# Patient Record
Sex: FEMALE | Race: WHITE | NOT HISPANIC OR LATINO | Employment: OTHER | URBAN - NONMETROPOLITAN AREA
[De-identification: names, ages, dates, MRNs, and addresses within clinical notes are randomized per-mention and may not be internally consistent; named-entity substitution may affect disease eponyms.]

---

## 2017-02-03 DIAGNOSIS — I10 ESSENTIAL HYPERTENSION: ICD-10-CM

## 2017-02-03 RX ORDER — ACEBUTOLOL HYDROCHLORIDE 200 MG/1
200 CAPSULE ORAL 2 TIMES DAILY
Qty: 60 CAPSULE | Refills: 5 | Status: SHIPPED | OUTPATIENT
Start: 2017-02-03

## 2019-05-08 ENCOUNTER — HOSPITAL ENCOUNTER (OUTPATIENT)
Facility: HOSPITAL | Age: 79
Discharge: HOME | End: 2019-05-10
Attending: EMERGENCY MEDICINE | Admitting: SPECIALIST

## 2019-05-08 ENCOUNTER — APPOINTMENT (OUTPATIENT)
Dept: CT IMAGING | Facility: HOSPITAL | Age: 79
End: 2019-05-08

## 2019-05-08 ENCOUNTER — APPOINTMENT (OUTPATIENT)
Dept: GENERAL RADIOLOGY | Facility: HOSPITAL | Age: 79
End: 2019-05-08

## 2019-05-08 DIAGNOSIS — K81.9 CHOLECYSTITIS: Primary | ICD-10-CM

## 2019-05-08 LAB
ALBUMIN SERPL-MCNC: 5 G/DL (ref 3.5–5)
ALBUMIN/GLOB SERPL: 1.6 G/DL (ref 1.1–2.5)
ALP SERPL-CCNC: 54 U/L (ref 24–120)
ALT SERPL W P-5'-P-CCNC: 19 U/L (ref 0–54)
ANION GAP SERPL CALCULATED.3IONS-SCNC: 13 MMOL/L (ref 4–13)
APTT PPP: 25.3 SECONDS (ref 24.1–35)
AST SERPL-CCNC: 71 U/L (ref 7–45)
BACTERIA UR QL AUTO: ABNORMAL /HPF
BASOPHILS # BLD AUTO: 0.01 10*3/MM3 (ref 0–0.2)
BASOPHILS NFR BLD AUTO: 0.1 % (ref 0–2)
BILIRUB SERPL-MCNC: 0.8 MG/DL (ref 0.1–1)
BILIRUB UR QL STRIP: NEGATIVE
BUN BLD-MCNC: 18 MG/DL (ref 5–21)
BUN/CREAT SERPL: 31.6 (ref 7–25)
CALCIUM SPEC-SCNC: 9.7 MG/DL (ref 8.4–10.4)
CHLORIDE SERPL-SCNC: 96 MMOL/L (ref 98–110)
CK MB SERPL-CCNC: 8.1 NG/ML (ref 0–5)
CK MB SERPL-RTO: 0.9 % (ref 0–5.7)
CK SERPL-CCNC: 925 U/L (ref 0–203)
CLARITY UR: CLEAR
CO2 SERPL-SCNC: 24 MMOL/L (ref 24–31)
COLOR UR: YELLOW
CREAT BLD-MCNC: 0.57 MG/DL (ref 0.5–1.4)
DEPRECATED RDW RBC AUTO: 45.3 FL (ref 40–54)
EOSINOPHIL # BLD AUTO: 0 10*3/MM3 (ref 0–0.7)
EOSINOPHIL NFR BLD AUTO: 0 % (ref 0–4)
ERYTHROCYTE [DISTWIDTH] IN BLOOD BY AUTOMATED COUNT: 12.6 % (ref 12–15)
GFR SERPL CREATININE-BSD FRML MDRD: 103 ML/MIN/1.73
GLOBULIN UR ELPH-MCNC: 3.1 GM/DL
GLUCOSE BLD-MCNC: 109 MG/DL (ref 70–100)
GLUCOSE BLDC GLUCOMTR-MCNC: 117 MG/DL (ref 70–130)
GLUCOSE UR STRIP-MCNC: NEGATIVE MG/DL
HCT VFR BLD AUTO: 40 % (ref 37–47)
HGB BLD-MCNC: 14.2 G/DL (ref 12–16)
HGB UR QL STRIP.AUTO: ABNORMAL
HOLD SPECIMEN: NORMAL
HOLD SPECIMEN: NORMAL
IMM GRANULOCYTES # BLD AUTO: 0.05 10*3/MM3 (ref 0–0.05)
IMM GRANULOCYTES NFR BLD AUTO: 0.4 % (ref 0–5)
INR PPP: 1.06 (ref 0.91–1.09)
KETONES UR QL STRIP: NEGATIVE
LEUKOCYTE ESTERASE UR QL STRIP.AUTO: NEGATIVE
LIPASE SERPL-CCNC: 59 U/L (ref 23–203)
LYMPHOCYTES # BLD AUTO: 1.07 10*3/MM3 (ref 0.72–4.86)
LYMPHOCYTES NFR BLD AUTO: 9.5 % (ref 15–45)
MCH RBC QN AUTO: 34.5 PG (ref 28–32)
MCHC RBC AUTO-ENTMCNC: 35.5 G/DL (ref 33–36)
MCV RBC AUTO: 97.1 FL (ref 82–98)
MONOCYTES # BLD AUTO: 0.4 10*3/MM3 (ref 0.19–1.3)
MONOCYTES NFR BLD AUTO: 3.5 % (ref 4–12)
NEUTROPHILS # BLD AUTO: 9.77 10*3/MM3 (ref 1.87–8.4)
NEUTROPHILS NFR BLD AUTO: 86.5 % (ref 39–78)
NITRITE UR QL STRIP: NEGATIVE
NRBC BLD AUTO-RTO: 0 /100 WBC (ref 0–0.2)
PH UR STRIP.AUTO: 7 [PH] (ref 5–8)
PLATELET # BLD AUTO: 233 10*3/MM3 (ref 130–400)
PMV BLD AUTO: 10.1 FL (ref 6–12)
POTASSIUM BLD-SCNC: 4.8 MMOL/L (ref 3.5–5.3)
PROT SERPL-MCNC: 8.1 G/DL (ref 6.3–8.7)
PROT UR QL STRIP: NEGATIVE
PROTHROMBIN TIME: 14.1 SECONDS (ref 11.9–14.6)
RBC # BLD AUTO: 4.12 10*6/MM3 (ref 4.2–5.4)
RBC # UR: ABNORMAL /HPF
REF LAB TEST METHOD: ABNORMAL
SODIUM BLD-SCNC: 133 MMOL/L (ref 135–145)
SP GR UR STRIP: 1.02 (ref 1–1.03)
SQUAMOUS #/AREA URNS HPF: ABNORMAL /HPF
TROPONIN I SERPL-MCNC: 0.02 NG/ML (ref 0–0.03)
UROBILINOGEN UR QL STRIP: ABNORMAL
WBC NRBC COR # BLD: 11.3 10*3/MM3 (ref 4.8–10.8)
WBC UR QL AUTO: ABNORMAL /HPF
WHOLE BLOOD HOLD SPECIMEN: NORMAL
WHOLE BLOOD HOLD SPECIMEN: NORMAL

## 2019-05-08 PROCEDURE — 96375 TX/PRO/DX INJ NEW DRUG ADDON: CPT

## 2019-05-08 PROCEDURE — 80053 COMPREHEN METABOLIC PANEL: CPT | Performed by: EMERGENCY MEDICINE

## 2019-05-08 PROCEDURE — 82550 ASSAY OF CK (CPK): CPT | Performed by: EMERGENCY MEDICINE

## 2019-05-08 PROCEDURE — 84484 ASSAY OF TROPONIN QUANT: CPT | Performed by: EMERGENCY MEDICINE

## 2019-05-08 PROCEDURE — 85025 COMPLETE CBC W/AUTO DIFF WBC: CPT | Performed by: EMERGENCY MEDICINE

## 2019-05-08 PROCEDURE — 93010 ELECTROCARDIOGRAM REPORT: CPT | Performed by: INTERNAL MEDICINE

## 2019-05-08 PROCEDURE — 93005 ELECTROCARDIOGRAM TRACING: CPT | Performed by: EMERGENCY MEDICINE

## 2019-05-08 PROCEDURE — 85730 THROMBOPLASTIN TIME PARTIAL: CPT | Performed by: EMERGENCY MEDICINE

## 2019-05-08 PROCEDURE — 71045 X-RAY EXAM CHEST 1 VIEW: CPT

## 2019-05-08 PROCEDURE — 25010000002 METOCLOPRAMIDE PER 10 MG: Performed by: EMERGENCY MEDICINE

## 2019-05-08 PROCEDURE — 70450 CT HEAD/BRAIN W/O DYE: CPT

## 2019-05-08 PROCEDURE — 82962 GLUCOSE BLOOD TEST: CPT

## 2019-05-08 PROCEDURE — 25010000002 ONDANSETRON PER 1 MG: Performed by: EMERGENCY MEDICINE

## 2019-05-08 PROCEDURE — 81001 URINALYSIS AUTO W/SCOPE: CPT | Performed by: EMERGENCY MEDICINE

## 2019-05-08 PROCEDURE — 83690 ASSAY OF LIPASE: CPT | Performed by: EMERGENCY MEDICINE

## 2019-05-08 PROCEDURE — 25010000002 DIPHENHYDRAMINE PER 50 MG: Performed by: EMERGENCY MEDICINE

## 2019-05-08 PROCEDURE — 83735 ASSAY OF MAGNESIUM: CPT | Performed by: EMERGENCY MEDICINE

## 2019-05-08 PROCEDURE — 85610 PROTHROMBIN TIME: CPT | Performed by: EMERGENCY MEDICINE

## 2019-05-08 PROCEDURE — 82553 CREATINE MB FRACTION: CPT | Performed by: EMERGENCY MEDICINE

## 2019-05-08 PROCEDURE — 99284 EMERGENCY DEPT VISIT MOD MDM: CPT

## 2019-05-08 RX ORDER — METOCLOPRAMIDE HYDROCHLORIDE 5 MG/ML
10 INJECTION INTRAMUSCULAR; INTRAVENOUS ONCE
Status: COMPLETED | OUTPATIENT
Start: 2019-05-08 | End: 2019-05-08

## 2019-05-08 RX ORDER — METOCLOPRAMIDE 5 MG/1
5 TABLET ORAL 2 TIMES DAILY
COMMUNITY
End: 2021-04-09 | Stop reason: HOSPADM

## 2019-05-08 RX ORDER — ACEBUTOLOL HYDROCHLORIDE 200 MG/1
200 CAPSULE ORAL 2 TIMES DAILY
Status: ON HOLD | COMMUNITY
End: 2023-02-03

## 2019-05-08 RX ORDER — MELATONIN
10000 DAILY
COMMUNITY
End: 2022-05-10 | Stop reason: HOSPADM

## 2019-05-08 RX ORDER — PROMETHAZINE HYDROCHLORIDE 12.5 MG/1
12.5 TABLET ORAL EVERY 6 HOURS PRN
COMMUNITY
End: 2019-12-08

## 2019-05-08 RX ORDER — BUTALBITAL, ACETAMINOPHEN AND CAFFEINE 50; 325; 40 MG/1; MG/1; MG/1
1 TABLET ORAL EVERY 4 HOURS PRN
COMMUNITY
End: 2021-04-09 | Stop reason: HOSPADM

## 2019-05-08 RX ORDER — LORAZEPAM 1 MG/1
1 TABLET ORAL EVERY 8 HOURS PRN
COMMUNITY
End: 2019-12-08

## 2019-05-08 RX ORDER — FENOFIBRATE 145 MG/1
145 TABLET, COATED ORAL DAILY
Status: ON HOLD | COMMUNITY
End: 2023-01-09

## 2019-05-08 RX ORDER — ONDANSETRON 2 MG/ML
4 INJECTION INTRAMUSCULAR; INTRAVENOUS ONCE
Status: COMPLETED | OUTPATIENT
Start: 2019-05-08 | End: 2019-05-08

## 2019-05-08 RX ORDER — ASPIRIN 81 MG/1
81 TABLET, CHEWABLE ORAL DAILY
COMMUNITY
End: 2022-05-10 | Stop reason: HOSPADM

## 2019-05-08 RX ORDER — PAROXETINE HYDROCHLORIDE 40 MG/1
40 TABLET, FILM COATED ORAL EVERY MORNING
Status: ON HOLD | COMMUNITY
End: 2023-02-03

## 2019-05-08 RX ORDER — DIPHENHYDRAMINE HYDROCHLORIDE 50 MG/ML
25 INJECTION INTRAMUSCULAR; INTRAVENOUS ONCE
Status: COMPLETED | OUTPATIENT
Start: 2019-05-08 | End: 2019-05-08

## 2019-05-08 RX ADMIN — SODIUM CHLORIDE 500 ML: 9 INJECTION, SOLUTION INTRAVENOUS at 23:00

## 2019-05-08 RX ADMIN — DIPHENHYDRAMINE HYDROCHLORIDE 25 MG: 50 INJECTION, SOLUTION INTRAMUSCULAR; INTRAVENOUS at 21:31

## 2019-05-08 RX ADMIN — METOCLOPRAMIDE 10 MG: 5 INJECTION, SOLUTION INTRAMUSCULAR; INTRAVENOUS at 23:16

## 2019-05-08 RX ADMIN — ONDANSETRON HYDROCHLORIDE 4 MG: 2 INJECTION, SOLUTION INTRAMUSCULAR; INTRAVENOUS at 21:29

## 2019-05-09 ENCOUNTER — APPOINTMENT (OUTPATIENT)
Dept: ULTRASOUND IMAGING | Facility: HOSPITAL | Age: 79
End: 2019-05-09

## 2019-05-09 ENCOUNTER — APPOINTMENT (OUTPATIENT)
Dept: GENERAL RADIOLOGY | Facility: HOSPITAL | Age: 79
End: 2019-05-09

## 2019-05-09 ENCOUNTER — ANESTHESIA EVENT (OUTPATIENT)
Dept: PERIOP | Facility: HOSPITAL | Age: 79
End: 2019-05-09

## 2019-05-09 ENCOUNTER — APPOINTMENT (OUTPATIENT)
Dept: CT IMAGING | Facility: HOSPITAL | Age: 79
End: 2019-05-09

## 2019-05-09 ENCOUNTER — ANESTHESIA (OUTPATIENT)
Dept: PERIOP | Facility: HOSPITAL | Age: 79
End: 2019-05-09

## 2019-05-09 PROBLEM — K81.9 CHOLECYSTITIS: Status: ACTIVE | Noted: 2019-05-09

## 2019-05-09 LAB
MAGNESIUM SERPL-MCNC: 1.8 MG/DL (ref 1.4–2.2)
TROPONIN I SERPL-MCNC: 0.02 NG/ML (ref 0–0.03)

## 2019-05-09 PROCEDURE — C1726 CATH, BAL DIL, NON-VASCULAR: HCPCS | Performed by: SPECIALIST

## 2019-05-09 PROCEDURE — 76705 ECHO EXAM OF ABDOMEN: CPT

## 2019-05-09 PROCEDURE — A9270 NON-COVERED ITEM OR SERVICE: HCPCS | Performed by: SPECIALIST

## 2019-05-09 PROCEDURE — 25010000002 ONDANSETRON PER 1 MG: Performed by: SPECIALIST

## 2019-05-09 PROCEDURE — 25010000002 IOPAMIDOL 61 % SOLUTION: Performed by: EMERGENCY MEDICINE

## 2019-05-09 PROCEDURE — 25010000002 SUCCINYLCHOLINE PER 20 MG: Performed by: NURSE ANESTHETIST, CERTIFIED REGISTERED

## 2019-05-09 PROCEDURE — 25010000002 FENTANYL CITRATE (PF) 250 MCG/5ML SOLUTION: Performed by: NURSE ANESTHETIST, CERTIFIED REGISTERED

## 2019-05-09 PROCEDURE — G0378 HOSPITAL OBSERVATION PER HR: HCPCS

## 2019-05-09 PROCEDURE — 63710000001 ASPIRIN 81 MG CHEWABLE TABLET: Performed by: SPECIALIST

## 2019-05-09 PROCEDURE — 76000 FLUOROSCOPY <1 HR PHYS/QHP: CPT

## 2019-05-09 PROCEDURE — 25010000002 MORPHINE PER 10 MG: Performed by: EMERGENCY MEDICINE

## 2019-05-09 PROCEDURE — 63710000001 METOCLOPRAMIDE 5 MG TABLET: Performed by: SPECIALIST

## 2019-05-09 PROCEDURE — 96375 TX/PRO/DX INJ NEW DRUG ADDON: CPT

## 2019-05-09 PROCEDURE — 88304 TISSUE EXAM BY PATHOLOGIST: CPT | Performed by: SPECIALIST

## 2019-05-09 PROCEDURE — 25010000002 MORPHINE PER 10 MG: Performed by: SPECIALIST

## 2019-05-09 PROCEDURE — 96365 THER/PROPH/DIAG IV INF INIT: CPT

## 2019-05-09 PROCEDURE — 96376 TX/PRO/DX INJ SAME DRUG ADON: CPT

## 2019-05-09 PROCEDURE — 63710000001 METHYLCELLULOSE (LAXATIVE) 500 MG TABLET: Performed by: SPECIALIST

## 2019-05-09 PROCEDURE — 63710000001 GABAPENTIN 300 MG CAPSULE: Performed by: SPECIALIST

## 2019-05-09 PROCEDURE — 63710000001 ONDANSETRON PER 8 MG: Performed by: SPECIALIST

## 2019-05-09 PROCEDURE — 25010000002 PROPOFOL 10 MG/ML EMULSION: Performed by: NURSE ANESTHETIST, CERTIFIED REGISTERED

## 2019-05-09 PROCEDURE — 25010000002 CEFOXITIN PER 1 G: Performed by: SPECIALIST

## 2019-05-09 PROCEDURE — 63710000001 SUCRALFATE 1 GM/10ML SUSPENSION: Performed by: SPECIALIST

## 2019-05-09 PROCEDURE — 25010000002 ONDANSETRON PER 1 MG: Performed by: EMERGENCY MEDICINE

## 2019-05-09 PROCEDURE — 25010000002 KETOROLAC TROMETHAMINE PER 15 MG: Performed by: SPECIALIST

## 2019-05-09 PROCEDURE — 63710000001 ACEBUTOLOL 200 MG CAPSULE: Performed by: SPECIALIST

## 2019-05-09 PROCEDURE — 63710000001 FAMOTIDINE 20 MG TABLET: Performed by: SPECIALIST

## 2019-05-09 PROCEDURE — 25010000002 CEFOXITIN PER 1 G: Performed by: NURSE ANESTHETIST, CERTIFIED REGISTERED

## 2019-05-09 PROCEDURE — 94799 UNLISTED PULMONARY SVC/PX: CPT

## 2019-05-09 PROCEDURE — 25010000002 NEOSTIGMINE PER 0.5 MG: Performed by: NURSE ANESTHETIST, CERTIFIED REGISTERED

## 2019-05-09 PROCEDURE — 74300 X-RAY BILE DUCTS/PANCREAS: CPT

## 2019-05-09 PROCEDURE — 25010000002 ONDANSETRON PER 1 MG: Performed by: NURSE ANESTHETIST, CERTIFIED REGISTERED

## 2019-05-09 PROCEDURE — 63710000001 HYDROCODONE-ACETAMINOPHEN 7.5-325 MG TABLET: Performed by: SPECIALIST

## 2019-05-09 PROCEDURE — 96361 HYDRATE IV INFUSION ADD-ON: CPT

## 2019-05-09 PROCEDURE — 25010000002 IOPAMIDOL 61 % SOLUTION: Performed by: SPECIALIST

## 2019-05-09 PROCEDURE — 25010000002 PROMETHAZINE PER 50 MG: Performed by: EMERGENCY MEDICINE

## 2019-05-09 PROCEDURE — 25010000002 CEFOXITIN SODIUM-DEXTROSE 1-4 GM-%(50ML) RECONSTITUTED SOLUTION: Performed by: EMERGENCY MEDICINE

## 2019-05-09 PROCEDURE — 94760 N-INVAS EAR/PLS OXIMETRY 1: CPT

## 2019-05-09 PROCEDURE — 25010000002 CEFOXITIN SODIUM-DEXTROSE 1-4 GM-%(50ML) RECONSTITUTED SOLUTION: Performed by: SPECIALIST

## 2019-05-09 PROCEDURE — 74177 CT ABD & PELVIS W/CONTRAST: CPT

## 2019-05-09 RX ORDER — PROMETHAZINE HYDROCHLORIDE 25 MG/1
12.5 TABLET ORAL EVERY 6 HOURS PRN
Status: DISCONTINUED | OUTPATIENT
Start: 2019-05-09 | End: 2019-05-10 | Stop reason: HOSPADM

## 2019-05-09 RX ORDER — OXYCODONE AND ACETAMINOPHEN 7.5; 325 MG/1; MG/1
2 TABLET ORAL EVERY 4 HOURS PRN
Status: DISCONTINUED | OUTPATIENT
Start: 2019-05-09 | End: 2019-05-09 | Stop reason: HOSPADM

## 2019-05-09 RX ORDER — FENTANYL CITRATE 50 UG/ML
INJECTION, SOLUTION INTRAMUSCULAR; INTRAVENOUS AS NEEDED
Status: DISCONTINUED | OUTPATIENT
Start: 2019-05-09 | End: 2019-05-09

## 2019-05-09 RX ORDER — LORAZEPAM 1 MG/1
1 TABLET ORAL EVERY 8 HOURS PRN
Status: DISCONTINUED | OUTPATIENT
Start: 2019-05-09 | End: 2019-05-10 | Stop reason: HOSPADM

## 2019-05-09 RX ORDER — PAROXETINE HYDROCHLORIDE 20 MG/1
40 TABLET, FILM COATED ORAL EVERY MORNING
Status: DISCONTINUED | OUTPATIENT
Start: 2019-05-09 | End: 2019-05-10 | Stop reason: HOSPADM

## 2019-05-09 RX ORDER — LORAZEPAM 2 MG/ML
1 INJECTION INTRAMUSCULAR EVERY 6 HOURS PRN
Status: DISCONTINUED | OUTPATIENT
Start: 2019-05-09 | End: 2019-05-10 | Stop reason: HOSPADM

## 2019-05-09 RX ORDER — SODIUM CHLORIDE 0.9 % (FLUSH) 0.9 %
1-10 SYRINGE (ML) INJECTION AS NEEDED
Status: DISCONTINUED | OUTPATIENT
Start: 2019-05-09 | End: 2019-05-09 | Stop reason: HOSPADM

## 2019-05-09 RX ORDER — SIMETHICONE 80 MG
80 TABLET,CHEWABLE ORAL 4 TIMES DAILY PRN
Status: DISCONTINUED | OUTPATIENT
Start: 2019-05-09 | End: 2019-05-10 | Stop reason: HOSPADM

## 2019-05-09 RX ORDER — CEFOXITIN SODIUM 1 G/50ML
1 INJECTION, SOLUTION INTRAVENOUS ONCE
Status: COMPLETED | OUTPATIENT
Start: 2019-05-09 | End: 2019-05-09

## 2019-05-09 RX ORDER — LIDOCAINE HYDROCHLORIDE 40 MG/ML
SOLUTION TOPICAL AS NEEDED
Status: DISCONTINUED | OUTPATIENT
Start: 2019-05-09 | End: 2019-05-09 | Stop reason: SURG

## 2019-05-09 RX ORDER — GABAPENTIN 300 MG/1
600 CAPSULE ORAL EVERY 12 HOURS SCHEDULED
Status: DISCONTINUED | OUTPATIENT
Start: 2019-05-09 | End: 2019-05-10 | Stop reason: HOSPADM

## 2019-05-09 RX ORDER — IBUPROFEN 600 MG/1
600 TABLET ORAL ONCE AS NEEDED
Status: DISCONTINUED | OUTPATIENT
Start: 2019-05-09 | End: 2019-05-09 | Stop reason: HOSPADM

## 2019-05-09 RX ORDER — ASPIRIN 81 MG/1
81 TABLET, CHEWABLE ORAL DAILY
Status: DISCONTINUED | OUTPATIENT
Start: 2019-05-09 | End: 2019-05-10 | Stop reason: HOSPADM

## 2019-05-09 RX ORDER — SODIUM CHLORIDE 9 MG/ML
INJECTION, SOLUTION INTRAVENOUS AS NEEDED
Status: DISCONTINUED | OUTPATIENT
Start: 2019-05-09 | End: 2019-05-09 | Stop reason: HOSPADM

## 2019-05-09 RX ORDER — ONDANSETRON 2 MG/ML
4 INJECTION INTRAMUSCULAR; INTRAVENOUS EVERY 6 HOURS PRN
Status: DISCONTINUED | OUTPATIENT
Start: 2019-05-09 | End: 2019-05-09

## 2019-05-09 RX ORDER — MELATONIN
2000 DAILY
Status: DISCONTINUED | OUTPATIENT
Start: 2019-05-09 | End: 2019-05-10 | Stop reason: HOSPADM

## 2019-05-09 RX ORDER — METOPROLOL TARTRATE 5 MG/5ML
2 INJECTION INTRAVENOUS ONCE AS NEEDED
Status: COMPLETED | OUTPATIENT
Start: 2019-05-09 | End: 2019-05-09

## 2019-05-09 RX ORDER — ONDANSETRON 2 MG/ML
INJECTION INTRAMUSCULAR; INTRAVENOUS AS NEEDED
Status: DISCONTINUED | OUTPATIENT
Start: 2019-05-09 | End: 2019-05-09 | Stop reason: SURG

## 2019-05-09 RX ORDER — CEFOXITIN SODIUM 1 G/50ML
1 INJECTION, SOLUTION INTRAVENOUS EVERY 6 HOURS
Status: COMPLETED | OUTPATIENT
Start: 2019-05-09 | End: 2019-05-10

## 2019-05-09 RX ORDER — KETOROLAC TROMETHAMINE 30 MG/ML
15 INJECTION, SOLUTION INTRAMUSCULAR; INTRAVENOUS EVERY 6 HOURS PRN
Status: DISCONTINUED | OUTPATIENT
Start: 2019-05-09 | End: 2019-05-10 | Stop reason: HOSPADM

## 2019-05-09 RX ORDER — CEFOXITIN SODIUM 1 G/50ML
1 INJECTION, SOLUTION INTRAVENOUS EVERY 6 HOURS
Status: DISCONTINUED | OUTPATIENT
Start: 2019-05-09 | End: 2019-05-09 | Stop reason: SDUPTHER

## 2019-05-09 RX ORDER — LABETALOL HYDROCHLORIDE 5 MG/ML
5 INJECTION, SOLUTION INTRAVENOUS
Status: DISCONTINUED | OUTPATIENT
Start: 2019-05-09 | End: 2019-05-09 | Stop reason: HOSPADM

## 2019-05-09 RX ORDER — SODIUM CHLORIDE, SODIUM LACTATE, POTASSIUM CHLORIDE, CALCIUM CHLORIDE 600; 310; 30; 20 MG/100ML; MG/100ML; MG/100ML; MG/100ML
9 INJECTION, SOLUTION INTRAVENOUS CONTINUOUS
Status: DISCONTINUED | OUTPATIENT
Start: 2019-05-09 | End: 2019-05-09 | Stop reason: HOSPADM

## 2019-05-09 RX ORDER — SODIUM CHLORIDE, SODIUM LACTATE, POTASSIUM CHLORIDE, CALCIUM CHLORIDE 600; 310; 30; 20 MG/100ML; MG/100ML; MG/100ML; MG/100ML
75 INJECTION, SOLUTION INTRAVENOUS CONTINUOUS
Status: DISCONTINUED | OUTPATIENT
Start: 2019-05-09 | End: 2019-05-09

## 2019-05-09 RX ORDER — ALBUTEROL SULFATE 2.5 MG/3ML
2.5 SOLUTION RESPIRATORY (INHALATION) EVERY 6 HOURS PRN
Status: DISCONTINUED | OUTPATIENT
Start: 2019-05-09 | End: 2019-05-10 | Stop reason: HOSPADM

## 2019-05-09 RX ORDER — BUTALBITAL, ACETAMINOPHEN AND CAFFEINE 50; 325; 40 MG/1; MG/1; MG/1
1 TABLET ORAL EVERY 4 HOURS PRN
Status: DISCONTINUED | OUTPATIENT
Start: 2019-05-09 | End: 2019-05-10 | Stop reason: HOSPADM

## 2019-05-09 RX ORDER — FENTANYL CITRATE 50 UG/ML
25 INJECTION, SOLUTION INTRAMUSCULAR; INTRAVENOUS AS NEEDED
Status: DISCONTINUED | OUTPATIENT
Start: 2019-05-09 | End: 2019-05-09 | Stop reason: HOSPADM

## 2019-05-09 RX ORDER — FENOFIBRATE 48 MG/1
48 TABLET, COATED ORAL DAILY
Status: DISCONTINUED | OUTPATIENT
Start: 2019-05-09 | End: 2019-05-10 | Stop reason: HOSPADM

## 2019-05-09 RX ORDER — PROMETHAZINE HYDROCHLORIDE 25 MG/ML
6.25 INJECTION, SOLUTION INTRAMUSCULAR; INTRAVENOUS ONCE
Status: COMPLETED | OUTPATIENT
Start: 2019-05-09 | End: 2019-05-09

## 2019-05-09 RX ORDER — SUCCINYLCHOLINE CHLORIDE 20 MG/ML
INJECTION INTRAMUSCULAR; INTRAVENOUS AS NEEDED
Status: DISCONTINUED | OUTPATIENT
Start: 2019-05-09 | End: 2019-05-09 | Stop reason: SURG

## 2019-05-09 RX ORDER — FAMOTIDINE 10 MG/ML
20 INJECTION, SOLUTION INTRAVENOUS ONCE
Status: COMPLETED | OUTPATIENT
Start: 2019-05-09 | End: 2019-05-09

## 2019-05-09 RX ORDER — FAMOTIDINE 20 MG/1
20 TABLET, FILM COATED ORAL 2 TIMES DAILY
Status: DISCONTINUED | OUTPATIENT
Start: 2019-05-09 | End: 2019-05-09

## 2019-05-09 RX ORDER — ONDANSETRON 2 MG/ML
4 INJECTION INTRAMUSCULAR; INTRAVENOUS ONCE AS NEEDED
Status: DISCONTINUED | OUTPATIENT
Start: 2019-05-09 | End: 2019-05-09 | Stop reason: HOSPADM

## 2019-05-09 RX ORDER — ONDANSETRON 2 MG/ML
4 INJECTION INTRAMUSCULAR; INTRAVENOUS EVERY 4 HOURS PRN
Status: DISCONTINUED | OUTPATIENT
Start: 2019-05-09 | End: 2019-05-09

## 2019-05-09 RX ORDER — FENTANYL CITRATE 50 UG/ML
INJECTION, SOLUTION INTRAMUSCULAR; INTRAVENOUS AS NEEDED
Status: DISCONTINUED | OUTPATIENT
Start: 2019-05-09 | End: 2019-05-09 | Stop reason: SURG

## 2019-05-09 RX ORDER — METOCLOPRAMIDE 5 MG/1
5 TABLET ORAL
Status: DISCONTINUED | OUTPATIENT
Start: 2019-05-09 | End: 2019-05-10 | Stop reason: HOSPADM

## 2019-05-09 RX ORDER — ROCURONIUM BROMIDE 10 MG/ML
INJECTION, SOLUTION INTRAVENOUS AS NEEDED
Status: DISCONTINUED | OUTPATIENT
Start: 2019-05-09 | End: 2019-05-09 | Stop reason: SURG

## 2019-05-09 RX ORDER — METOCLOPRAMIDE HYDROCHLORIDE 5 MG/ML
5 INJECTION INTRAMUSCULAR; INTRAVENOUS
Status: DISCONTINUED | OUTPATIENT
Start: 2019-05-09 | End: 2019-05-09 | Stop reason: HOSPADM

## 2019-05-09 RX ORDER — HYDROCODONE BITARTRATE AND ACETAMINOPHEN 7.5; 325 MG/1; MG/1
1 TABLET ORAL EVERY 4 HOURS PRN
Status: DISCONTINUED | OUTPATIENT
Start: 2019-05-09 | End: 2019-05-10 | Stop reason: HOSPADM

## 2019-05-09 RX ORDER — OXYCODONE AND ACETAMINOPHEN 10; 325 MG/1; MG/1
1 TABLET ORAL ONCE AS NEEDED
Status: DISCONTINUED | OUTPATIENT
Start: 2019-05-09 | End: 2019-05-09 | Stop reason: HOSPADM

## 2019-05-09 RX ORDER — DEXTROSE MONOHYDRATE 25 G/50ML
12.5 INJECTION, SOLUTION INTRAVENOUS AS NEEDED
Status: DISCONTINUED | OUTPATIENT
Start: 2019-05-09 | End: 2019-05-09 | Stop reason: HOSPADM

## 2019-05-09 RX ORDER — PROPOFOL 10 MG/ML
VIAL (ML) INTRAVENOUS AS NEEDED
Status: DISCONTINUED | OUTPATIENT
Start: 2019-05-09 | End: 2019-05-09 | Stop reason: SURG

## 2019-05-09 RX ORDER — NALOXONE HCL 0.4 MG/ML
0.4 VIAL (ML) INJECTION AS NEEDED
Status: DISCONTINUED | OUTPATIENT
Start: 2019-05-09 | End: 2019-05-09 | Stop reason: HOSPADM

## 2019-05-09 RX ORDER — BUPIVACAINE HYDROCHLORIDE AND EPINEPHRINE 5; 5 MG/ML; UG/ML
INJECTION, SOLUTION PERINEURAL AS NEEDED
Status: DISCONTINUED | OUTPATIENT
Start: 2019-05-09 | End: 2019-05-09 | Stop reason: HOSPADM

## 2019-05-09 RX ORDER — MEPERIDINE HYDROCHLORIDE 25 MG/ML
12.5 INJECTION INTRAMUSCULAR; INTRAVENOUS; SUBCUTANEOUS
Status: DISCONTINUED | OUTPATIENT
Start: 2019-05-09 | End: 2019-05-09 | Stop reason: HOSPADM

## 2019-05-09 RX ORDER — CEFOXITIN 1 G/1
INJECTION, POWDER, FOR SOLUTION INTRAVENOUS AS NEEDED
Status: DISCONTINUED | OUTPATIENT
Start: 2019-05-09 | End: 2019-05-09 | Stop reason: SURG

## 2019-05-09 RX ORDER — IPRATROPIUM BROMIDE AND ALBUTEROL SULFATE 2.5; .5 MG/3ML; MG/3ML
3 SOLUTION RESPIRATORY (INHALATION) ONCE AS NEEDED
Status: DISCONTINUED | OUTPATIENT
Start: 2019-05-09 | End: 2019-05-09 | Stop reason: HOSPADM

## 2019-05-09 RX ORDER — KETOROLAC TROMETHAMINE 30 MG/ML
30 INJECTION, SOLUTION INTRAMUSCULAR; INTRAVENOUS EVERY 6 HOURS PRN
Status: DISCONTINUED | OUTPATIENT
Start: 2019-05-09 | End: 2019-05-09

## 2019-05-09 RX ORDER — ACEBUTOLOL HYDROCHLORIDE 200 MG/1
200 CAPSULE ORAL
Status: DISCONTINUED | OUTPATIENT
Start: 2019-05-09 | End: 2019-05-10 | Stop reason: HOSPADM

## 2019-05-09 RX ORDER — ONDANSETRON 8 MG/1
8 TABLET, ORALLY DISINTEGRATING ORAL EVERY 6 HOURS PRN
Status: DISCONTINUED | OUTPATIENT
Start: 2019-05-09 | End: 2019-05-10 | Stop reason: HOSPADM

## 2019-05-09 RX ORDER — FAMOTIDINE 20 MG/1
20 TABLET, FILM COATED ORAL DAILY
Status: DISCONTINUED | OUTPATIENT
Start: 2019-05-10 | End: 2019-05-10 | Stop reason: HOSPADM

## 2019-05-09 RX ORDER — SUCRALFATE ORAL 1 G/10ML
1 SUSPENSION ORAL EVERY 6 HOURS SCHEDULED
Status: DISCONTINUED | OUTPATIENT
Start: 2019-05-09 | End: 2019-05-10 | Stop reason: HOSPADM

## 2019-05-09 RX ORDER — NALOXONE HCL 0.4 MG/ML
0.4 VIAL (ML) INJECTION
Status: DISCONTINUED | OUTPATIENT
Start: 2019-05-09 | End: 2019-05-09

## 2019-05-09 RX ORDER — LIDOCAINE HYDROCHLORIDE 20 MG/ML
INJECTION, SOLUTION INFILTRATION; PERINEURAL AS NEEDED
Status: DISCONTINUED | OUTPATIENT
Start: 2019-05-09 | End: 2019-05-09 | Stop reason: SURG

## 2019-05-09 RX ORDER — DEXTROSE AND SODIUM CHLORIDE 5; .45 G/100ML; G/100ML
75 INJECTION, SOLUTION INTRAVENOUS CONTINUOUS
Status: DISCONTINUED | OUTPATIENT
Start: 2019-05-09 | End: 2019-05-10 | Stop reason: HOSPADM

## 2019-05-09 RX ORDER — GLYCOPYRROLATE 0.2 MG/ML
INJECTION INTRAMUSCULAR; INTRAVENOUS AS NEEDED
Status: DISCONTINUED | OUTPATIENT
Start: 2019-05-09 | End: 2019-05-09 | Stop reason: SURG

## 2019-05-09 RX ORDER — FENTANYL CITRATE 50 UG/ML
25 INJECTION, SOLUTION INTRAMUSCULAR; INTRAVENOUS
Status: DISCONTINUED | OUTPATIENT
Start: 2019-05-09 | End: 2019-05-09 | Stop reason: HOSPADM

## 2019-05-09 RX ADMIN — HYDROCODONE BITARTRATE AND ACETAMINOPHEN 1 TABLET: 7.5; 325 TABLET ORAL at 18:39

## 2019-05-09 RX ADMIN — ONDANSETRON 8 MG: 8 TABLET, ORALLY DISINTEGRATING ORAL at 18:39

## 2019-05-09 RX ADMIN — SODIUM CHLORIDE, POTASSIUM CHLORIDE, SODIUM LACTATE AND CALCIUM CHLORIDE: 600; 310; 30; 20 INJECTION, SOLUTION INTRAVENOUS at 09:14

## 2019-05-09 RX ADMIN — FENTANYL CITRATE 50 MCG: 50 INJECTION INTRAMUSCULAR; INTRAVENOUS at 09:18

## 2019-05-09 RX ADMIN — CEFOXITIN SODIUM 1 G: 1 POWDER, FOR SOLUTION INTRAVENOUS at 09:29

## 2019-05-09 RX ADMIN — IOPAMIDOL 60 ML: 612 INJECTION, SOLUTION INTRAVENOUS at 01:44

## 2019-05-09 RX ADMIN — FENTANYL CITRATE 50 MCG: 50 INJECTION INTRAMUSCULAR; INTRAVENOUS at 10:17

## 2019-05-09 RX ADMIN — ONDANSETRON HYDROCHLORIDE 4 MG: 2 INJECTION, SOLUTION INTRAMUSCULAR; INTRAVENOUS at 04:47

## 2019-05-09 RX ADMIN — FENTANYL CITRATE 100 MCG: 50 INJECTION INTRAMUSCULAR; INTRAVENOUS at 09:41

## 2019-05-09 RX ADMIN — SUCRALFATE 1 G: 1 SUSPENSION ORAL at 17:42

## 2019-05-09 RX ADMIN — CEFOXITIN SODIUM 1 G: 1 INJECTION, SOLUTION INTRAVENOUS at 13:01

## 2019-05-09 RX ADMIN — SUCCINYLCHOLINE CHLORIDE 80 MG: 20 INJECTION, SOLUTION INTRAMUSCULAR; INTRAVENOUS at 09:21

## 2019-05-09 RX ADMIN — ONDANSETRON HYDROCHLORIDE 4 MG: 2 INJECTION, SOLUTION INTRAMUSCULAR; INTRAVENOUS at 08:36

## 2019-05-09 RX ADMIN — CEFOXITIN SODIUM 1 G: 1 INJECTION, SOLUTION INTRAVENOUS at 18:30

## 2019-05-09 RX ADMIN — METOCLOPRAMIDE 5 MG: 5 TABLET ORAL at 13:04

## 2019-05-09 RX ADMIN — ROCURONIUM BROMIDE 25 MG: 10 INJECTION INTRAVENOUS at 09:30

## 2019-05-09 RX ADMIN — METOCLOPRAMIDE 5 MG: 5 TABLET ORAL at 17:42

## 2019-05-09 RX ADMIN — MORPHINE SULFATE 4 MG: 4 INJECTION INTRAVENOUS at 15:56

## 2019-05-09 RX ADMIN — ONDANSETRON HYDROCHLORIDE 4 MG: 2 SOLUTION INTRAMUSCULAR; INTRAVENOUS at 10:27

## 2019-05-09 RX ADMIN — OXYCODONE HYDROCHLORIDE AND ACETAMINOPHEN 2 TABLET: 7.5; 325 TABLET ORAL at 11:17

## 2019-05-09 RX ADMIN — KETOROLAC TROMETHAMINE 15 MG: 30 INJECTION, SOLUTION INTRAMUSCULAR at 21:48

## 2019-05-09 RX ADMIN — ASPIRIN 81 MG: 81 TABLET, CHEWABLE ORAL at 13:04

## 2019-05-09 RX ADMIN — PROMETHAZINE HYDROCHLORIDE 6.25 MG: 25 INJECTION INTRAMUSCULAR; INTRAVENOUS at 01:12

## 2019-05-09 RX ADMIN — CEFOXITIN SODIUM 1 G: 1 INJECTION, SOLUTION INTRAVENOUS at 04:53

## 2019-05-09 RX ADMIN — METOPROLOL TARTRATE 2 MG: 5 INJECTION INTRAVENOUS at 09:10

## 2019-05-09 RX ADMIN — METOCLOPRAMIDE 5 MG: 5 TABLET ORAL at 22:38

## 2019-05-09 RX ADMIN — KETOROLAC TROMETHAMINE 30 MG: 30 INJECTION, SOLUTION INTRAMUSCULAR at 13:57

## 2019-05-09 RX ADMIN — FAMOTIDINE 20 MG: 10 INJECTION, SOLUTION INTRAVENOUS at 04:41

## 2019-05-09 RX ADMIN — DEXTROSE AND SODIUM CHLORIDE 75 ML/HR: 5; 450 INJECTION, SOLUTION INTRAVENOUS at 14:50

## 2019-05-09 RX ADMIN — GABAPENTIN 600 MG: 300 CAPSULE ORAL at 22:38

## 2019-05-09 RX ADMIN — GLYCOPYRROLATE 0.2 MG: 0.2 INJECTION, SOLUTION INTRAMUSCULAR; INTRAVENOUS at 10:27

## 2019-05-09 RX ADMIN — MORPHINE SULFATE 1 MG: 4 INJECTION INTRAVENOUS at 04:51

## 2019-05-09 RX ADMIN — FENTANYL CITRATE 25 MCG: 50 INJECTION INTRAMUSCULAR; INTRAVENOUS at 10:28

## 2019-05-09 RX ADMIN — PROPOFOL 80 MG: 10 INJECTION, EMULSION INTRAVENOUS at 09:21

## 2019-05-09 RX ADMIN — ACEBUTOLOL HYDROCHLORIDE 200 MG: 200 CAPSULE ORAL at 13:03

## 2019-05-09 RX ADMIN — Medication 2 MG: at 10:27

## 2019-05-09 RX ADMIN — LIDOCAINE HYDROCHLORIDE 1 EACH: 40 SOLUTION TOPICAL at 09:22

## 2019-05-09 RX ADMIN — SUCRALFATE 1 G: 1 SUSPENSION ORAL at 13:04

## 2019-05-09 RX ADMIN — LIDOCAINE HYDROCHLORIDE 50 MG: 20 INJECTION, SOLUTION INFILTRATION; PERINEURAL at 09:21

## 2019-05-09 RX ADMIN — SODIUM CHLORIDE, POTASSIUM CHLORIDE, SODIUM LACTATE AND CALCIUM CHLORIDE 75 ML/HR: 600; 310; 30; 20 INJECTION, SOLUTION INTRAVENOUS at 06:02

## 2019-05-09 RX ADMIN — METHYLCELLULOSE 1000 MG: 500 TABLET ORAL at 22:39

## 2019-05-09 RX ADMIN — FAMOTIDINE 20 MG: 20 TABLET, FILM COATED ORAL at 13:04

## 2019-05-09 RX ADMIN — FENTANYL CITRATE 25 MCG: 50 INJECTION INTRAMUSCULAR; INTRAVENOUS at 10:35

## 2019-05-09 RX ADMIN — ROCURONIUM BROMIDE 5 MG: 10 INJECTION INTRAVENOUS at 09:21

## 2019-05-09 NOTE — ANESTHESIA PROCEDURE NOTES
Airway  Urgency: elective    Airway not difficult    General Information and Staff    Patient location during procedure: OR  CRNA: Damaso Deleon CRNA    Indications and Patient Condition  Indications for airway management: airway protection    Preoxygenated: yes  Mask difficulty assessment: 1 - vent by mask    Final Airway Details  Final airway type: endotracheal airway      Successful airway: ETT  Cuffed: yes   Successful intubation technique: direct laryngoscopy  Endotracheal tube insertion site: oral  Blade: Liz  Blade size: 2  ETT size (mm): 7.0  Cormack-Lehane Classification: grade I - full view of glottis  Placement verified by: capnometry   Measured from: lips  ETT to lips (cm): 19  Number of attempts at approach: 1

## 2019-05-09 NOTE — PLAN OF CARE
Problem: Patient Care Overview  Goal: Plan of Care Review  Outcome: Ongoing (interventions implemented as appropriate)   05/09/19 9820   Coping/Psychosocial   Plan of Care Reviewed With patient;family   Plan of Care Review   Progress no change   OTHER   Outcome Summary Pt medicated for c/o incisional pain. Lap x4 d/i with g/t; scant drainage noted. IVF. IV abx. Tolerating regular diet. Voiding. VSS. Cont to monitor.      Goal: Individualization and Mutuality  Outcome: Ongoing (interventions implemented as appropriate)    Goal: Discharge Needs Assessment  Outcome: Ongoing (interventions implemented as appropriate)      Problem: Fall Risk (Adult)  Goal: Identify Related Risk Factors and Signs and Symptoms  Outcome: Outcome(s) achieved Date Met: 05/09/19    Goal: Absence of Fall  Outcome: Ongoing (interventions implemented as appropriate)      Problem: Cholecystectomy (Adult)  Goal: Signs and Symptoms of Listed Potential Problems Will be Absent, Minimized or Managed (Cholecystectomy)  Outcome: Ongoing (interventions implemented as appropriate)

## 2019-05-09 NOTE — OP NOTE
CHOLECYSTECTOMY LAPAROSCOPIC INTRAOPERATIVE CHOLANGIOGRAM  Procedure Note    Zelalem Hung  5/8/2019 - 5/9/2019    Pre-op Diagnosis:   Chronic cholecystitis cholelithiasis  Post-op Diagnosis:     Chronic cholecystitis cholelithiasis    Procedure/CPT® Codes:      Procedure(s):  CHOLECYSTECTOMY LAPAROSCOPIC INTRAOPERATIVE CHOLANGIOGRAM    Surgeon(s):  Chandan Bravo MD    Anesthesia: General    Staff:   Specimens     ID Source Type Tests Collected By Collected At Frozen?      A Gallbladder Tissue · TISSUE PATHOLOGY EXAM   Chandan Bravo MD 5/9/19 0959 No     Description: gallbladder          Estimated Blood Loss: 10 cc    Specimens:                ID Type Source Tests Collected by Time   A : gallbladder Tissue Gallbladder TISSUE PATHOLOGY EXAM Chandan Bravo MD 5/9/2019 0959         Drains: There were no drains    Indications: Zelalem Hung  is a 78 y.o. female presents with several month history of fullness and bloating, for the past 24 hours she has had significant nausea, fullness, emesis, and with this she presented to the emergency room.  In the emergency room her CT scan shows a very distended gallbladder, ultrasound and follow-up shows a distended gallbladder with stones and with the stones noted no biliary dilatation in the central ducts she is for laparoscopic cholecystectomy and treatment of increasingly symptomatic stones.  She is aware the procedure the risk and benefits and with full knowledge of this and apparent understanding she gives her informed consent for surgical intervention.          Findings: Very distended gallbladder, chronically inflamed, laparoscopic cholecystectomy completed cholangiogram completed and normal the ducts were wide but no obstruction noted.  The pelvis was evaluated but the bladder was too large to manipulate I can only see the appendix in the right tube and ovary and these were normal.      Complications: There were no complications blood loss less than 10  cc      Procedure: Patient was placed in a supine position in the surgical suite and after a timeout had been completed  the area was scrubbed prepped and draped with alcohol and Betadine a Ioban was applied.  Following this a transverse skin incision was completed in the upper abdomen incising through the skin and subcutaneous taste tissue to the fascia.  Once of the fascia 2-0 Vicryl stay sutures were placed superior and inferior to the planned transverse incision.  An incision was completed with a 15 blade incising through the fascia and out muscle-splitting technique was completed using Gurpreet clamps and dissecting in a muscle-splitting technique down to the peritoneum.  The peritoneum was opened and entered a blunt trocar 10 mm port was placed in this area and insufflation with CO2 was completed to maintain the abdominal pressure between 10 and 14 mm of pressure.  This accomplished under direct visualization 3  5 mm trochars were placed one in the right mid abdomen and 2 in the upper abdomen.  With the 3, 5 mm ports and one 10 the gallbladders and grasped with the right lateral port and elevated toward the right shoulder.  The adhesions at the karina hepatis was taken down sharply and as well as with Bovie electrocautery and dissection was completed at the karina hepatis.  With dissection completed the critical angle was visualized with the cystic artery and cystic duct with this visualized the cystic artery was then clipped ×3 proximally and incised distally further dissection around the infundibulum was completed and a clip was placed across the infundibulum incision into the infundibulum was completed through which a cholangiogram was brought into the field and cholangiography obtained.  There is free flow into the left and right hepatic radical, hepatic duct, bile duct and free flow into the duodenum.  The common hepatic and common bile duct were very large tapered nicely into the duodenum, there is no  obstruction, no mass.  No abnormalities read per cholangiography, with this completed no other abnormalities noted the Cholangiocath was removed the cystic duct was then transected the infundibulum was controlled using 1-0 PDS loop and the cystic duct was controlled using 2-0 PDS loops and one clip distal to this.  With this completed the gallbladder was slowly and tediously removed from the infrahepatic substance prominent venous and arterial tributaries were noted and a single clip was applied across these.  Ultimately the gallbladder was able to be fully removed.  At this point I looked in the pelvis, placed the patient in Trendelenburg position she had a very full distended bladder I did not want to manipulate and potentially injure the bladder and evaluated what was easily noted.  The right tube and ovary were normal as well as the appendix I did not try to evaluate the left tube and ovary and uterus after initially trying but realizing the bladder was very large and thin-walled.  With this completed the bed was evaluated there is no bleeding from the bed and full control of the cystic duct and cystic artery the gallbladder was then grasped and placed in the Endo Catch  and removed from the right midepigastric port.  There is no spillage of bile or stones and this removal and having completed this the excess gas was relieved the right midepigastric port was closed using 4 figure-of-eight sutures of 0 Vicryl the deep dermis was reapproximated using simple inverted interrupted sutures of 3-0 Vicryl and skin was enclosed using running subcuticular suture of 4-0 Vicryl.  The skin was injected with half percent Marcaine with epinephrine a total of 30 mL used the 5 mm trochars sites were then closed using simple inverted interrupted sutures of 4-0 Vicryl and once completed the area was cleaned with saline Mastisol Steri-Strips 2 x 2 and Tegaderm applied.  Patient was then extubated and transferred to the recovery  room in good condition and we supplied back pressure over the right midepigastric port until the patient was extubated to reduce any increased tension on the larger trocar site.  Chandan Bravo MD     Date: 5/9/2019  Time: 10:54 AM    EMR Dragon/Transcription disclaimer: Much of this encounter note is an electronic transcription/translation of spoken language to printed text. The electronic translation of spoken language may permit erroneous, or at times, nonsensical words or phrases to be inadvertently transcribed; although I have reviewed the note for such errors, some may still exist.

## 2019-05-09 NOTE — PROGRESS NOTES
"Pharmacy Renal Dose Conversion    Zelalem Hung is a 78 y.o. year old female  152.4 cm (60\") 42.3 kg (93 lb 4.8 oz)    Estimated Creatinine Clearance: 38.7 mL/min (by C-G formula based on SCr of 0.57 mg/dL).   Lab Results   Component Value Date    CREATININE 0.57 05/08/2019    CREATININE 0.62 06/07/2015    CREATININE 0.65 06/06/2015       PLAN  Based on prescribing information provided by the drug , Famotidine 20mg po BID and Ketorolac 30mg iv Q6H prn moderate pain,  has been changed to Famotidine 20mg po daily and Ketorolac 15mg iv Q6H prn moderate pain.    Adjusted per the directives and guidelines established by [facility] and Therapeutics Committee and [facility] Medical Executive Committee.  Pharmacy will continue to monitor daily and make further adjustment(s) accordingly.    Sonny Sanchez, PharmD  05/09/193:18 PM;  "

## 2019-05-09 NOTE — H&P
Patient Care Team:  Garth Amezcua APRN as PCP - General (Family Medicine)    Chief complaint central abdominal pain, bloating, fullness  Subjective     Subjective     Zelalem Hung  is a 78 y.o. female presents with several month history of fullness and bloating, for the past 24 hours she has had significant nausea, fullness, emesis, and with this she presented to the emergency room.  In the emergency room her CT scan shows a very distended gallbladder, ultrasound and follow-up shows a distended gallbladder with stones and with the stones noted no biliary dilatation in the central ducts she is for laparoscopic cholecystectomy and treatment of increasingly symptomatic stones.  She is aware the procedure the risk and benefits and with full knowledge of this and apparent understanding she gives her informed consent for surgical intervention.    Past surgical history significant for sinus surgery only.    Medical history significant for history of atrial fibrillation treated with aspirin only by Dr. Roy.  Hypertension, reflux, migraine headaches, elevated lipids, anxiety, osteoporosis.  Hyponatremia.    She smokes 1/2 pack/day, no drinking.     1 para 1, her daughter is a ultrasound technologist at [employer].    She lives by herself but close to her daughter, she has minimal activity she cares for her small dog, she has minimal walking exercises, she has minimal activity.  She checks on several friends, but does not walk around [employer] can walk up a flight of stairs but is somewhat slow doing this.      Review of Systems   Pertinent items are noted in HPI, all other systems reviewed and negative    History  Past Medical History:   Diagnosis Date   • Heart disease    • Migraines      History reviewed. No pertinent surgical history.  History reviewed. No pertinent family history.  Social History     Tobacco Use   • Smoking status: Never Smoker   Substance Use Topics   • Alcohol use: No      Frequency: Never   • Drug use: No     Medications Prior to Admission   Medication Sig Dispense Refill Last Dose   • acebutolol (SECTRAL) 200 MG capsule Take 200 mg by mouth 2 (Two) Times a Day.      • aspirin 81 MG chewable tablet Chew 81 mg Daily.      • butalbital-acetaminophen-caffeine (FIORICET, ESGIC) -40 MG per tablet Take 1 tablet by mouth Every 4 (Four) Hours As Needed for Headache.      • cholecalciferol (VITAMIN D3) 1000 units tablet Take 2,000 Units by mouth Daily.      • fenofibrate (TRICOR) 145 MG tablet Take 145 mg by mouth Daily.      • LORazepam (ATIVAN) 1 MG tablet Take 1 mg by mouth Every 8 (Eight) Hours As Needed for Anxiety.      • metoclopramide (REGLAN) 5 MG tablet Take 5 mg by mouth 4 (Four) Times a Day Before Meals & at Bedtime.      • PARoxetine (PAXIL) 20 MG tablet Take 40 mg by mouth Every Morning.      • promethazine (PHENERGAN) 12.5 MG tablet Take 12.5 mg by mouth Every 6 (Six) Hours As Needed for Nausea or Vomiting.        Allergies:  Patient has no known allergies.    Problem list  Patient Active Problem List   Diagnosis   • Cholecystitis       Objective      Objective     Vital Signs  Temp:  [97.8 °F (36.6 °C)-98.2 °F (36.8 °C)] 98.2 °F (36.8 °C)  Heart Rate:  [80-96] 80  Resp:  [15-16] 16  BP: (110-147)/(45-72) 110/45    Physical Exam:      General Appearance:    Alert, cooperative, in no acute distress a very thin frail 78-year-old, does not do much as far as activity.  She has poor conditioning.   Head:    Normocephalic, without obvious abnormality, atraumatic   Eyes:            Lids and lashes normal, conjunctivae and sclerae normal, no   icterus, no pallor, corneas clear, PERRLA   Ears:    Ears appear intact with no abnormalities noted   Throat:   No oral lesions, no thrush, oral mucosa moist   Neck:   No adenopathy, supple, trachea midline, no thyromegaly, no   carotid bruit, no JVD   Back:     No kyphosis present, no scoliosis present, no skin lesions,      erythema or  scars, no tenderness to percussion or                   palpation,   range of motion normal   Lungs:     Clear to auscultation,respirations regular, even and                  unlabored    Heart:    Regular rhythm and normal rate, normal S1 and S2, no            murmur, no gallop, no rub, no click   Chest Wall:    No abnormalities observed   Abdomen:    Small abdomen, flat, rare bowel sounds, mild discomfort in the midepigastrium and right upper quadrant.   Rectal:     Deferred   Extremities:   Moves all extremities well, no edema, no cyanosis, no             redness   Pulses:   Pulses palpable and equal bilaterally   Skin:   No bleeding, bruising or rash   Lymph nodes:   No palpable adenopathy   Neurologic:   Cranial nerves 2 - 12 grossly intact, sensation intact, DTR       present and equal bilaterally       Results Review:    I reviewed the patient's new imaging results and agree with the interpretation.    Results from last 7 days   Lab Units 05/08/19 2029   WBC 10*3/mm3 11.30*   HEMOGLOBIN g/dL 14.2   HEMATOCRIT % 40.0   PLATELETS 10*3/mm3 233        Results from last 7 days   Lab Units 05/08/19 2029   SODIUM mmol/L 133*   POTASSIUM mmol/L 4.8   CHLORIDE mmol/L 96*   CO2 mmol/L 24.0   BUN mg/dL 18   CREATININE mg/dL 0.57   CALCIUM mg/dL 9.7   BILIRUBIN mg/dL 0.8   ALK PHOS U/L 54   ALT (SGPT) U/L 19   AST (SGOT) U/L 71*   GLUCOSE mg/dL 109*   Other: The visualized abdominal aorta is normal in caliber.      IMPRESSION:  Cholelithiasis       IMPRESSION:  1. Dilated bladder. Bladder outlet obstruction may be present.  2. Dilated gallbladder. Correlation with ultrasound may be suggested.         This report was finalized on 05/09/2019 07:09 by Dr. Filiberto Franco MD.  Assessment/Plan     Assessment/Plan       Cholecystitis      Patient with a distended gallbladder, stones by ultrasound, plan for laparoscopic exploration, cholecystectomy and treatment of the above problem.  She is aware the procedure the risk and  benefits and gives her informed consent for surgery.    I discussed the patients findings and my recommendations with patient, family, nursing staff and primary care team.     Chandan Bravo MD  05/09/19  8:26 AM    Time:Time spent with the patient 30 minutes     EMR Dragon/Transcription disclaimer: Much of this encounter note is an electronic transcription/translation of spoken language to printed text. The electronic translation of spoken language may permit erroneous, or at times, nonsensical words or phrases to be inadvertently transcribed; although I have reviewed the note for such errors, some may still exist.

## 2019-05-09 NOTE — ANESTHESIA PREPROCEDURE EVALUATION
Anesthesia Evaluation     Patient summary reviewed   no history of anesthetic complications:  NPO Solid Status: > 8 hours             Airway   Mallampati: II  TM distance: >3 FB  Neck ROM: full  Dental    (+) upper dentures and lower dentures    Pulmonary    (+) a smoker,   (-) COPD, asthma, sleep apnea  Cardiovascular   Exercise tolerance: good (4-7 METS)    ECG reviewed  Patient on routine beta blocker and Beta blocker given within 24 hours of surgery    (+) hypertension,   (-) pacemaker, past MI, angina, cardiac stents      Neuro/Psych  (-) seizures, TIA, CVA  GI/Hepatic/Renal/Endo    (-) GERD, liver disease, no renal disease, diabetes    Musculoskeletal     Abdominal    Substance History      OB/GYN          Other                        Anesthesia Plan    ASA 2     general     intravenous induction   Anesthetic plan, all risks, benefits, and alternatives have been provided, discussed and informed consent has been obtained with: patient.

## 2019-05-09 NOTE — ANESTHESIA POSTPROCEDURE EVALUATION
"Patient: Zelalem Hung    Procedure Summary     Date:  05/09/19 Room / Location:   [facility] OR 08 /  [facility]    Anesthesia Start:  0914 Anesthesia Stop:  1048    Procedure:  CHOLECYSTECTOMY LAPAROSCOPIC INTRAOPERATIVE CHOLANGIOGRAM (N/A Abdomen) Diagnosis:      Surgeon:  Chandan Bravo MD Provider:  Damaso Deleon CRNA    Anesthesia Type:  general ASA Status:  2          Anesthesia Type: general  Last vitals  BP   113/60 (05/09/19 1115)   Temp   98.2 °F (36.8 °C) (05/09/19 1115)   Pulse   82 (05/09/19 1120)   Resp   14 (05/09/19 1120)     SpO2   93 % (05/09/19 1120)     Post Anesthesia Care and Evaluation    PONV Status: none  Comments: Patient d/c from PACU prior to anes eval based on Elsi score.  Please see RN notes for details of d/c criteria.    Blood pressure 113/60, pulse 82, temperature 98.2 °F (36.8 °C), temperature source Temporal, resp. rate 14, height 152.4 cm (60\"), weight 42.3 kg (93 lb 4.8 oz), SpO2 93 %.          "

## 2019-05-09 NOTE — ED PROVIDER NOTES
"Subjective   77 y/o female with history of migraines arrives for \"not feeling well\" that she defines as nausea and vomiting since yesterday with noted right sided headache (similar to her prior, not the worst of her life) for which she actually went to her PMD today and had labs drawn (no results as of yet) and given a shot of phenergan without relief of her symptoms. She denies falls or trauma, cp, sob, abdominal pain, cp, sob, flank or back pain, dysuria, hematuria or diarrhea or medication changes, unilateral weakness, vision or hearing changes or any other issues. She arrives in [place].         Family, social and past history reviewed as below, prior documentation of H and Ps and other documentation are reviewed:    Past Medical History:  No date: Heart disease  No date: Migraines    History reviewed. No pertinent surgical history.    Social History    Socioeconomic History      Marital status:       Spouse name: Not on file      Number of children: Not on file      Years of education: Not on file      Highest education level: Not on file    Tobacco Use      Smoking status: Never Smoker    Substance and Sexual Activity      Alcohol use: No        Frequency: Never      Drug use: No      Sexual activity: Defer      Family history: reviewed and noncontributory             Review of Systems   All other systems reviewed and are negative.      Past Medical History:   Diagnosis Date   • Heart disease    • Migraines        No Known Allergies    History reviewed. No pertinent surgical history.    History reviewed. No pertinent family history.    Social History     Socioeconomic History   • Marital status:      Spouse name: Not on file   • Number of children: Not on file   • Years of education: Not on file   • Highest education level: Not on file   Tobacco Use   • Smoking status: Never Smoker   Substance and Sexual Activity   • Alcohol use: No     Frequency: Never   • Drug use: No   • Sexual activity: Defer "           Objective   Physical Exam   Constitutional: She is oriented to person, place, and time. She appears well-developed and well-nourished.   HENT:   Head: Normocephalic.   Nose: Nose normal.   Eyes: Conjunctivae and EOM are normal. Pupils are equal, round, and reactive to light.   Neck: Normal range of motion. Neck supple.   Cardiovascular: Normal rate, regular rhythm, normal heart sounds and intact distal pulses.   Pulmonary/Chest: Effort normal and breath sounds normal.   Abdominal: Soft. Bowel sounds are normal.   Musculoskeletal: Normal range of motion.   Neurological: She is alert and oriented to person, place, and time. No cranial nerve deficit or sensory deficit. Coordination normal.   Skin: Skin is warm. Capillary refill takes less than 2 seconds.   Psychiatric: She has a normal mood and affect. Her behavior is normal.   Vitals reviewed.      ECG 12 Lead    Date/Time: 5/9/2019 9:19 PM  Performed by: Rosalino Dallas MD  Authorized by: Rosalino Dallas MD   Interpreted by physician  Rhythm: sinus rhythm  Rate: normal  BPM: 86  QRS axis: normal      ECG 12 Lead    Date/Time: 5/9/2019 11:45 PM  Performed by: Rosalino Dallas MD  Authorized by: Rosalino Dallas MD   Interpreted by physician  Rhythm: sinus rhythm  Rate: normal  BPM: 94  QRS axis: normal                   ED Course    Patient continues with nausea, no vomiting in the ED but medications have not improved her symptoms. Given age will do a CT of her abdomen. Will provide further medication and re-check.     On repeat examination she now has +murphys sign.         CT showed GB to to be severely distended with dilated CBC. Dilated bladder    US shows few gallstones and distention, no fluid, CBC is midly dilated.     Given above will talk with Surgery about possible admission.     Dr. Bravo accepts      XR Chest 1 View   Final Result      CT Head Without Contrast   Final Result   1. No acute intracranial abnormality is seen.                                                        This report was finalized on 05/08/2019 21:18 by Dr. Chan Pelaez MD.      CT Abdomen Pelvis With Contrast    (Results Pending)   US Gallbladder    (Results Pending)     Labs Reviewed   COMPREHENSIVE METABOLIC PANEL - Abnormal; Notable for the following components:       Result Value    Glucose 109 (*)     Sodium 133 (*)     Chloride 96 (*)     AST (SGOT) 71 (*)     BUN/Creatinine Ratio 31.6 (*)     All other components within normal limits    Narrative:     GFR Normal >60  Chronic Kidney Disease <60  Kidney Failure <15   CK MB - Abnormal; Notable for the following components:    CKMB 8.10 (*)     All other components within normal limits   URINALYSIS W/ MICROSCOPIC IF INDICATED (NO CULTURE) - Abnormal; Notable for the following components:    Blood, UA Moderate (2+) (*)     All other components within normal limits   CBC WITH AUTO DIFFERENTIAL - Abnormal; Notable for the following components:    WBC 11.30 (*)     RBC 4.12 (*)     MCH 34.5 (*)     Neutrophil % 86.5 (*)     Lymphocyte % 9.5 (*)     Monocyte % 3.5 (*)     Neutrophils, Absolute 9.77 (*)     All other components within normal limits   CK - Abnormal; Notable for the following components:    Creatine Kinase 925 (*)     All other components within normal limits   URINALYSIS, MICROSCOPIC ONLY - Abnormal; Notable for the following components:    RBC, UA 3-5 (*)     WBC, UA 0-2 (*)     All other components within normal limits   LIPASE - Normal   TROPONIN (IN-HOUSE) - Normal   APTT - Normal   PROTIME-INR - Normal   CKMB INDEX CALCULATION - Normal   TROPONIN (IN-HOUSE) - Normal   MAGNESIUM - Normal   POCT GLUCOSE FINGERSTICK - Normal   RAINBOW DRAW    Narrative:     The following orders were created for panel order [place] Draw.  Procedure                               Abnormality         Status                     ---------                               -----------         ------                     Light  Blue Top[[id number]]                                    Final result               Green Top (Gel)[[id number]]                                   Final result               Lavender Top[[id number]]                                      Final result               Red Top[[id number]]                                           Final result                 Please view results for these tests on the individual orders.   POCT GLUCOSE FINGERSTICK   LIGHT BLUE TOP   GREEN TOP   LAVENDER TOP   RED TOP   CBC AND DIFFERENTIAL    Narrative:     The following orders were created for panel order CBC & Differential.  Procedure                               Abnormality         Status                     ---------                               -----------         ------                     CBC Auto Differential[[id number]]        Abnormal            Final result                 Please view results for these tests on the individual orders.     [facility]      Final diagnoses:   Cholecystitis            Rosalino Dallas MD  05/09/19 0037       Rosalino Dallas MD  05/09/19 0429

## 2019-05-10 VITALS
HEIGHT: 60 IN | RESPIRATION RATE: 16 BRPM | HEART RATE: 76 BPM | DIASTOLIC BLOOD PRESSURE: 56 MMHG | WEIGHT: 93.3 LBS | BODY MASS INDEX: 18.32 KG/M2 | TEMPERATURE: 98.2 F | SYSTOLIC BLOOD PRESSURE: 126 MMHG | OXYGEN SATURATION: 93 %

## 2019-05-10 LAB
ALBUMIN SERPL-MCNC: 3.3 G/DL (ref 3.5–5)
ALBUMIN/GLOB SERPL: 1.4 G/DL (ref 1.1–2.5)
ALP SERPL-CCNC: 53 U/L (ref 24–120)
ALT SERPL W P-5'-P-CCNC: 91 U/L (ref 0–54)
ANION GAP SERPL CALCULATED.3IONS-SCNC: 7 MMOL/L (ref 4–13)
AST SERPL-CCNC: 174 U/L (ref 7–45)
BILIRUB SERPL-MCNC: 0.7 MG/DL (ref 0.1–1)
BUN BLD-MCNC: 17 MG/DL (ref 5–21)
BUN/CREAT SERPL: 25 (ref 7–25)
CALCIUM SPEC-SCNC: 8.6 MG/DL (ref 8.4–10.4)
CHLORIDE SERPL-SCNC: 98 MMOL/L (ref 98–110)
CO2 SERPL-SCNC: 25 MMOL/L (ref 24–31)
CREAT BLD-MCNC: 0.68 MG/DL (ref 0.5–1.4)
CYTO UR: NORMAL
DEPRECATED RDW RBC AUTO: 46.8 FL (ref 40–54)
ERYTHROCYTE [DISTWIDTH] IN BLOOD BY AUTOMATED COUNT: 12.8 % (ref 12–15)
GFR SERPL CREATININE-BSD FRML MDRD: 84 ML/MIN/1.73
GLOBULIN UR ELPH-MCNC: 2.3 GM/DL
GLUCOSE BLD-MCNC: 123 MG/DL (ref 70–100)
HCT VFR BLD AUTO: 34.1 % (ref 37–47)
HGB BLD-MCNC: 12 G/DL (ref 12–16)
LAB AP CASE REPORT: NORMAL
MCH RBC QN AUTO: 35.2 PG (ref 28–32)
MCHC RBC AUTO-ENTMCNC: 35.2 G/DL (ref 33–36)
MCV RBC AUTO: 100 FL (ref 82–98)
PATH REPORT.FINAL DX SPEC: NORMAL
PATH REPORT.GROSS SPEC: NORMAL
PLATELET # BLD AUTO: 176 10*3/MM3 (ref 130–400)
PMV BLD AUTO: 10.4 FL (ref 6–12)
POTASSIUM BLD-SCNC: 4.4 MMOL/L (ref 3.5–5.3)
PROT SERPL-MCNC: 5.6 G/DL (ref 6.3–8.7)
RBC # BLD AUTO: 3.41 10*6/MM3 (ref 4.2–5.4)
SODIUM BLD-SCNC: 130 MMOL/L (ref 135–145)
WBC NRBC COR # BLD: 18.13 10*3/MM3 (ref 4.8–10.8)

## 2019-05-10 PROCEDURE — A9270 NON-COVERED ITEM OR SERVICE: HCPCS | Performed by: SPECIALIST

## 2019-05-10 PROCEDURE — 63710000001 METHYLCELLULOSE (LAXATIVE) 500 MG TABLET: Performed by: SPECIALIST

## 2019-05-10 PROCEDURE — 63710000001 ASPIRIN 81 MG CHEWABLE TABLET: Performed by: SPECIALIST

## 2019-05-10 PROCEDURE — 63710000001 FAMOTIDINE 20 MG TABLET: Performed by: SPECIALIST

## 2019-05-10 PROCEDURE — 63710000001 PROMETHAZINE PER 25 MG: Performed by: SPECIALIST

## 2019-05-10 PROCEDURE — 25010000002 CEFOXITIN SODIUM-DEXTROSE 1-4 GM-%(50ML) RECONSTITUTED SOLUTION: Performed by: SPECIALIST

## 2019-05-10 PROCEDURE — 63710000001 FENOFIBRATE 48 MG TABLET: Performed by: SPECIALIST

## 2019-05-10 PROCEDURE — 63710000001 METOCLOPRAMIDE 5 MG TABLET: Performed by: SPECIALIST

## 2019-05-10 PROCEDURE — 94760 N-INVAS EAR/PLS OXIMETRY 1: CPT

## 2019-05-10 PROCEDURE — G0378 HOSPITAL OBSERVATION PER HR: HCPCS

## 2019-05-10 PROCEDURE — 25010000002 KETOROLAC TROMETHAMINE PER 15 MG: Performed by: SPECIALIST

## 2019-05-10 PROCEDURE — 63710000001 PAROXETINE 20 MG TABLET: Performed by: SPECIALIST

## 2019-05-10 PROCEDURE — 63710000001 LORAZEPAM 1 MG TABLET: Performed by: SPECIALIST

## 2019-05-10 PROCEDURE — 80053 COMPREHEN METABOLIC PANEL: CPT | Performed by: SPECIALIST

## 2019-05-10 PROCEDURE — 63710000001 CHOLECALCIFEROL 1000 UNITS TABLET: Performed by: SPECIALIST

## 2019-05-10 PROCEDURE — 63710000001 ACEBUTOLOL 200 MG CAPSULE: Performed by: SPECIALIST

## 2019-05-10 PROCEDURE — 85027 COMPLETE CBC AUTOMATED: CPT | Performed by: SPECIALIST

## 2019-05-10 PROCEDURE — 94799 UNLISTED PULMONARY SVC/PX: CPT

## 2019-05-10 PROCEDURE — 63710000001 SUCRALFATE 1 GM/10ML SUSPENSION: Performed by: SPECIALIST

## 2019-05-10 PROCEDURE — 63710000001 GABAPENTIN 300 MG CAPSULE: Performed by: SPECIALIST

## 2019-05-10 PROCEDURE — 25010000002 MORPHINE PER 10 MG: Performed by: SPECIALIST

## 2019-05-10 PROCEDURE — 63710000001 HYDROCODONE-ACETAMINOPHEN 7.5-325 MG TABLET: Performed by: SPECIALIST

## 2019-05-10 RX ORDER — ONDANSETRON HCL 8 MG
8 TABLET ORAL EVERY 8 HOURS PRN
Qty: 10 TABLET | Refills: 1 | Status: SHIPPED | OUTPATIENT
Start: 2019-05-10 | End: 2019-12-08

## 2019-05-10 RX ORDER — METHYLCELLULOSE 2 G/19G
2 POWDER, FOR SOLUTION ORAL DAILY
Qty: 60 G | Refills: 6 | Status: SHIPPED | OUTPATIENT
Start: 2019-05-10 | End: 2019-06-09

## 2019-05-10 RX ORDER — HYDROCODONE BITARTRATE AND ACETAMINOPHEN 5; 325 MG/1; MG/1
1 TABLET ORAL EVERY 4 HOURS PRN
Qty: 40 TABLET | Refills: 0 | Status: SHIPPED | OUTPATIENT
Start: 2019-05-10 | End: 2019-12-08

## 2019-05-10 RX ADMIN — METOCLOPRAMIDE 5 MG: 5 TABLET ORAL at 11:41

## 2019-05-10 RX ADMIN — LORAZEPAM 1 MG: 1 TABLET ORAL at 01:23

## 2019-05-10 RX ADMIN — DEXTROSE AND SODIUM CHLORIDE 75 ML/HR: 5; 450 INJECTION, SOLUTION INTRAVENOUS at 01:59

## 2019-05-10 RX ADMIN — SUCRALFATE 1 G: 1 SUSPENSION ORAL at 00:58

## 2019-05-10 RX ADMIN — KETOROLAC TROMETHAMINE 15 MG: 30 INJECTION, SOLUTION INTRAMUSCULAR at 08:47

## 2019-05-10 RX ADMIN — CEFOXITIN SODIUM 1 G: 1 INJECTION, SOLUTION INTRAVENOUS at 01:25

## 2019-05-10 RX ADMIN — SUCRALFATE 1 G: 1 SUSPENSION ORAL at 11:41

## 2019-05-10 RX ADMIN — SUCRALFATE 1 G: 1 SUSPENSION ORAL at 06:30

## 2019-05-10 RX ADMIN — FAMOTIDINE 20 MG: 20 TABLET, FILM COATED ORAL at 08:28

## 2019-05-10 RX ADMIN — CHOLECALCIFEROL (VITAMIN D3) 25 MCG (1,000 UNIT) TABLET 2000 UNITS: TABLET at 08:28

## 2019-05-10 RX ADMIN — GABAPENTIN 600 MG: 300 CAPSULE ORAL at 08:29

## 2019-05-10 RX ADMIN — ASPIRIN 81 MG: 81 TABLET, CHEWABLE ORAL at 08:28

## 2019-05-10 RX ADMIN — CEFOXITIN SODIUM 1 G: 1 INJECTION, SOLUTION INTRAVENOUS at 08:30

## 2019-05-10 RX ADMIN — MORPHINE SULFATE 4 MG: 4 INJECTION INTRAVENOUS at 00:59

## 2019-05-10 RX ADMIN — MORPHINE SULFATE 4 MG: 4 INJECTION INTRAVENOUS at 06:30

## 2019-05-10 RX ADMIN — HYDROCODONE BITARTRATE AND ACETAMINOPHEN 1 TABLET: 7.5; 325 TABLET ORAL at 04:44

## 2019-05-10 RX ADMIN — FENOFIBRATE 48 MG: 48 TABLET ORAL at 08:28

## 2019-05-10 RX ADMIN — ACEBUTOLOL HYDROCHLORIDE 200 MG: 200 CAPSULE ORAL at 08:28

## 2019-05-10 RX ADMIN — HYDROCODONE BITARTRATE AND ACETAMINOPHEN 1 TABLET: 7.5; 325 TABLET ORAL at 14:33

## 2019-05-10 RX ADMIN — METOCLOPRAMIDE 5 MG: 5 TABLET ORAL at 08:27

## 2019-05-10 RX ADMIN — METHYLCELLULOSE 1000 MG: 500 TABLET ORAL at 08:27

## 2019-05-10 RX ADMIN — PROMETHAZINE HYDROCHLORIDE 12.5 MG: 25 TABLET ORAL at 01:23

## 2019-05-10 RX ADMIN — PAROXETINE 40 MG: 20 TABLET, FILM COATED ORAL at 06:30

## 2019-05-10 NOTE — PLAN OF CARE
Problem: Patient Care Overview  Goal: Plan of Care Review  Outcome: Outcome(s) achieved Date Met: 05/10/19   05/10/19 1516   Coping/Psychosocial   Plan of Care Reviewed With patient;family   Plan of Care Review   Progress improving   OTHER   Outcome Summary Patient d/c home to the care of family and self. Reports pain however verbalizes that pain medication controls the pain much better than yesterday. Voiding well. Ambulating well. Denies nausea. Tolerating diet. Verbalizes understanding d/c teaching.      Goal: Individualization and Mutuality  Outcome: Outcome(s) achieved Date Met: 05/10/19    Goal: Discharge Needs Assessment  Outcome: Outcome(s) achieved Date Met: 05/10/19    Goal: Interprofessional Rounds/Family Conf  Outcome: Outcome(s) achieved Date Met: 05/10/19      Problem: Fall Risk (Adult)  Goal: Absence of Fall  Outcome: Outcome(s) achieved Date Met: 05/10/19   05/10/19 1516   Fall Risk (Adult)   Absence of Fall achieves outcome       Problem: Cholecystectomy (Adult)  Goal: Signs and Symptoms of Listed Potential Problems Will be Absent, Minimized or Managed (Cholecystectomy)  Outcome: Outcome(s) achieved Date Met: 05/10/19   05/10/19 1516   Goal/Outcome Evaluation   Problems Assessed (Cholecystectomy) all   Problems Present (Cholecystectomy) pain     Goal: Anesthesia/Sedation Recovery  Outcome: Outcome(s) achieved Date Met: 05/10/19

## 2019-05-10 NOTE — DISCHARGE SUMMARY
Date of Discharge:  5/10/2019    Discharge Diagnosis: Acute and chronic cholecystitis cholelithiasis  Presenting Problem/History of Present Illness  Zelalem Hung  is a 78 y.o. female presents with several month history of fullness and bloating, for the past 24 hours she has had significant nausea, fullness, emesis, and with this she presented to the emergency room.  In the emergency room her CT scan shows a very distended gallbladder, ultrasound and follow-up shows a distended gallbladder with stones and with the stones noted no biliary dilatation in the central ducts she is for laparoscopic cholecystectomy and treatment of increasingly symptomatic stones.  She is aware the procedure the risk and benefits and with full knowledge of this and apparent understanding she gives her informed consent for surgical intervention.              Findings: Very distended gallbladder, chronically inflamed, laparoscopic cholecystectomy completed cholangiogram completed and normal the ducts were wide but no obstruction noted.  The pelvis was evaluated but the bladder was too large to manipulate I can only see the appendix in the right tube and ovary and these were normal.        Complications: There were no complications blood loss less than 10 cc         She was admitted had surgery completed had a unremarkable laparoscopic cholecystectomy she is been monitored postoperatively her liver functions are normal except for SGOT of 174, total bilirubin of 0.7.  Hematocrit 34 presently she is increasing her activity increasing her diet, we will look toward discharging her later today and follow-up with me in 1 week.  Diagnosis for this is acute and chronic cholecystitis cholelithiasis, procedure has been laparoscopic cholecystectomy and cholangiography, she did well from her surgery, presently doing well follow-up with me in 1 week.    Procedures Performed  Procedure(s):  CHOLECYSTECTOMY LAPAROSCOPIC INTRAOPERATIVE CHOLANGIOGRAM        Consults:   Consults     No orders found from 4/9/2019 to 5/9/2019.          Pertinent Test Results:   Lab Results (last 24 hours)     Procedure Component Value Units Date/Time    Comprehensive Metabolic Panel [[id number]]  (Abnormal) Collected:  05/10/19 0450    Specimen:  Blood Updated:  05/10/19 0545     Glucose 123 mg/dL      BUN 17 mg/dL      Creatinine 0.68 mg/dL      Sodium 130 mmol/L      Potassium 4.4 mmol/L      Chloride 98 mmol/L      CO2 25.0 mmol/L      Calcium 8.6 mg/dL      Total Protein 5.6 g/dL      Albumin 3.30 g/dL      ALT (SGPT) 91 U/L      AST (SGOT) 174 U/L      Alkaline Phosphatase 53 U/L      Total Bilirubin 0.7 mg/dL      eGFR Non African Amer 84 mL/min/1.73      Globulin 2.3 gm/dL      A/G Ratio 1.4 g/dL      BUN/Creatinine Ratio 25.0     Anion Gap 7.0 mmol/L     Narrative:       GFR Normal >60  Chronic Kidney Disease <60  Kidney Failure <15    CBC (No Diff) [[id number]]  (Abnormal) Collected:  05/10/19 0450    Specimen:  Blood Updated:  05/10/19 0523     WBC 18.13 10*3/mm3      RBC 3.41 10*6/mm3      Hemoglobin 12.0 g/dL      Hematocrit 34.1 %      .0 fL      MCH 35.2 pg      MCHC 35.2 g/dL      RDW 12.8 %      RDW-SD 46.8 fl      MPV 10.4 fL      Platelets 176 10*3/mm3     Tissue Pathology Exam [[id number]] Collected:  05/09/19 0959    Specimen:  Tissue from Gallbladder Updated:  05/09/19 1323            Condition on Discharge: Good condition    Discharge Disposition discharge to home      Discharge Medications     Discharge Medications      ASK your doctor about these medications      Instructions Start Date   acebutolol 200 MG capsule  Commonly known as:  SECTRAL   200 mg, Oral, 2 Times Daily      aspirin 81 MG chewable tablet   81 mg, Oral, Daily      butalbital-acetaminophen-caffeine -40 MG per tablet  Commonly known as:  FIORICET, ESGIC   1 tablet, Oral, Every 4 Hours PRN      cholecalciferol 1000 units tablet  Commonly known as:  VITAMIN D3   2,000 Units, Oral,  Daily      fenofibrate 145 MG tablet  Commonly known as:  TRICOR   145 mg, Oral, Daily      LORazepam 1 MG tablet  Commonly known as:  ATIVAN   1 mg, Oral, Every 8 Hours PRN      metoclopramide 5 MG tablet  Commonly known as:  REGLAN   5 mg, Oral, 4 Times Daily Before Meals & Nightly      PARoxetine 20 MG tablet  Commonly known as:  PAXIL   40 mg, Oral, Every Morning      promethazine 12.5 MG tablet  Commonly known as:  PHENERGAN   12.5 mg, Oral, Every 6 Hours PRN             Discharge Diet: Regular diet    Activity at Discharge: No lifting or straining more than 15 pounds for the next 6 weeks    Follow-up Appointments follow-up with Dr. Bravo in 1 week.  No future appointments.      Test Results Pending at Discharge   Order Current Status    Tissue Pathology Exam In process           Chandan Bravo MD  05/10/19  8:45 AM    Time: Time spent at discharge 30 minutes     EMR Dragon/Transcription disclaimer: Much of this encounter note is an electronic transcription/translation of spoken language to printed text. The electronic translation of spoken language may permit erroneous, or at times, nonsensical words or phrases to be inadvertently transcribed; although I have reviewed the note for such errors, some may still exist.

## 2019-05-10 NOTE — PROGRESS NOTES
Patient looks good this afternoon ready for discharge, discharge and follow-up with me in 2 weeks.

## 2019-06-10 ENCOUNTER — HOSPITAL ENCOUNTER (EMERGENCY)
Facility: HOSPITAL | Age: 79
Discharge: HOME | End: 2019-06-10
Attending: EMERGENCY MEDICINE | Admitting: EMERGENCY MEDICINE

## 2019-06-10 ENCOUNTER — APPOINTMENT (OUTPATIENT)
Dept: CT IMAGING | Facility: HOSPITAL | Age: 79
End: 2019-06-10

## 2019-06-10 VITALS
HEIGHT: 60 IN | SYSTOLIC BLOOD PRESSURE: 110 MMHG | DIASTOLIC BLOOD PRESSURE: 48 MMHG | TEMPERATURE: 97.9 F | WEIGHT: 92 LBS | BODY MASS INDEX: 18.06 KG/M2 | HEART RATE: 79 BPM | OXYGEN SATURATION: 94 % | RESPIRATION RATE: 14 BRPM

## 2019-06-10 DIAGNOSIS — R11.2 NON-INTRACTABLE VOMITING WITH NAUSEA, UNSPECIFIED VOMITING TYPE: Primary | ICD-10-CM

## 2019-06-10 LAB
ALBUMIN SERPL-MCNC: 4.5 G/DL (ref 3.5–5)
ALBUMIN/GLOB SERPL: 1.5 G/DL (ref 1.1–2.5)
ALP SERPL-CCNC: 69 U/L (ref 24–120)
ALT SERPL W P-5'-P-CCNC: 25 U/L (ref 0–54)
ANION GAP SERPL CALCULATED.3IONS-SCNC: 8 MMOL/L (ref 4–13)
AST SERPL-CCNC: 31 U/L (ref 7–45)
BACTERIA UR QL AUTO: ABNORMAL /HPF
BASOPHILS # BLD AUTO: 0.02 10*3/MM3 (ref 0–0.2)
BASOPHILS NFR BLD AUTO: 0.2 % (ref 0–2)
BILIRUB SERPL-MCNC: 0.5 MG/DL (ref 0.1–1)
BILIRUB UR QL STRIP: NEGATIVE
BUN BLD-MCNC: 14 MG/DL (ref 5–21)
BUN/CREAT SERPL: 24.6 (ref 7–25)
CALCIUM SPEC-SCNC: 9.6 MG/DL (ref 8.4–10.4)
CHLORIDE SERPL-SCNC: 100 MMOL/L (ref 98–110)
CLARITY UR: ABNORMAL
CO2 SERPL-SCNC: 27 MMOL/L (ref 24–31)
COLOR UR: YELLOW
CREAT BLD-MCNC: 0.57 MG/DL (ref 0.5–1.4)
DEPRECATED RDW RBC AUTO: 46.3 FL (ref 40–54)
EOSINOPHIL # BLD AUTO: 0.01 10*3/MM3 (ref 0–0.7)
EOSINOPHIL NFR BLD AUTO: 0.1 % (ref 0–4)
ERYTHROCYTE [DISTWIDTH] IN BLOOD BY AUTOMATED COUNT: 12.7 % (ref 12–15)
GFR SERPL CREATININE-BSD FRML MDRD: 103 ML/MIN/1.73
GLOBULIN UR ELPH-MCNC: 3 GM/DL
GLUCOSE BLD-MCNC: 133 MG/DL (ref 70–100)
GLUCOSE UR STRIP-MCNC: NEGATIVE MG/DL
HCT VFR BLD AUTO: 39.7 % (ref 37–47)
HGB BLD-MCNC: 14 G/DL (ref 12–16)
HGB UR QL STRIP.AUTO: ABNORMAL
HOLD SPECIMEN: NORMAL
HOLD SPECIMEN: NORMAL
HYALINE CASTS UR QL AUTO: ABNORMAL /LPF
IMM GRANULOCYTES # BLD AUTO: 0.06 10*3/MM3 (ref 0–0.05)
IMM GRANULOCYTES NFR BLD AUTO: 0.5 % (ref 0–5)
KETONES UR QL STRIP: NEGATIVE
LEUKOCYTE ESTERASE UR QL STRIP.AUTO: NEGATIVE
LIPASE SERPL-CCNC: 70 U/L (ref 23–203)
LYMPHOCYTES # BLD AUTO: 1.21 10*3/MM3 (ref 0.72–4.86)
LYMPHOCYTES NFR BLD AUTO: 10.2 % (ref 15–45)
MCH RBC QN AUTO: 35 PG (ref 28–32)
MCHC RBC AUTO-ENTMCNC: 35.3 G/DL (ref 33–36)
MCV RBC AUTO: 99.3 FL (ref 82–98)
MONOCYTES # BLD AUTO: 0.51 10*3/MM3 (ref 0.19–1.3)
MONOCYTES NFR BLD AUTO: 4.3 % (ref 4–12)
NEUTROPHILS # BLD AUTO: 10.07 10*3/MM3 (ref 1.87–8.4)
NEUTROPHILS NFR BLD AUTO: 84.7 % (ref 39–78)
NITRITE UR QL STRIP: NEGATIVE
NRBC BLD AUTO-RTO: 0 /100 WBC (ref 0–0.2)
PH UR STRIP.AUTO: 8.5 [PH] (ref 5–8)
PLATELET # BLD AUTO: 215 10*3/MM3 (ref 130–400)
PMV BLD AUTO: 10.4 FL (ref 6–12)
POTASSIUM BLD-SCNC: 3.9 MMOL/L (ref 3.5–5.3)
PROT SERPL-MCNC: 7.5 G/DL (ref 6.3–8.7)
PROT UR QL STRIP: NEGATIVE
RBC # BLD AUTO: 4 10*6/MM3 (ref 4.2–5.4)
RBC # UR: ABNORMAL /HPF
REF LAB TEST METHOD: ABNORMAL
SODIUM BLD-SCNC: 135 MMOL/L (ref 135–145)
SP GR UR STRIP: 1.01 (ref 1–1.03)
SQUAMOUS #/AREA URNS HPF: ABNORMAL /HPF
TROPONIN I SERPL-MCNC: 0.02 NG/ML (ref 0–0.03)
UROBILINOGEN UR QL STRIP: ABNORMAL
WBC NRBC COR # BLD: 11.88 10*3/MM3 (ref 4.8–10.8)
WBC UR QL AUTO: ABNORMAL /HPF
WHOLE BLOOD HOLD SPECIMEN: NORMAL
WHOLE BLOOD HOLD SPECIMEN: NORMAL

## 2019-06-10 PROCEDURE — 96375 TX/PRO/DX INJ NEW DRUG ADDON: CPT

## 2019-06-10 PROCEDURE — 25010000002 PROMETHAZINE PER 50 MG: Performed by: EMERGENCY MEDICINE

## 2019-06-10 PROCEDURE — 93005 ELECTROCARDIOGRAM TRACING: CPT | Performed by: EMERGENCY MEDICINE

## 2019-06-10 PROCEDURE — 25010000002 ONDANSETRON PER 1 MG: Performed by: EMERGENCY MEDICINE

## 2019-06-10 PROCEDURE — 99284 EMERGENCY DEPT VISIT MOD MDM: CPT

## 2019-06-10 PROCEDURE — 84484 ASSAY OF TROPONIN QUANT: CPT | Performed by: EMERGENCY MEDICINE

## 2019-06-10 PROCEDURE — 96374 THER/PROPH/DIAG INJ IV PUSH: CPT

## 2019-06-10 PROCEDURE — 96376 TX/PRO/DX INJ SAME DRUG ADON: CPT

## 2019-06-10 PROCEDURE — 81001 URINALYSIS AUTO W/SCOPE: CPT | Performed by: EMERGENCY MEDICINE

## 2019-06-10 PROCEDURE — 83690 ASSAY OF LIPASE: CPT | Performed by: EMERGENCY MEDICINE

## 2019-06-10 PROCEDURE — 25010000002 IOPAMIDOL 61 % SOLUTION: Performed by: EMERGENCY MEDICINE

## 2019-06-10 PROCEDURE — 74177 CT ABD & PELVIS W/CONTRAST: CPT

## 2019-06-10 PROCEDURE — 85025 COMPLETE CBC W/AUTO DIFF WBC: CPT | Performed by: EMERGENCY MEDICINE

## 2019-06-10 PROCEDURE — 80053 COMPREHEN METABOLIC PANEL: CPT | Performed by: EMERGENCY MEDICINE

## 2019-06-10 RX ORDER — PROMETHAZINE HYDROCHLORIDE 12.5 MG/1
12.5 TABLET ORAL EVERY 8 HOURS PRN
Qty: 12 TABLET | Refills: 0 | OUTPATIENT
Start: 2019-06-10 | End: 2021-03-30

## 2019-06-10 RX ORDER — ONDANSETRON 2 MG/ML
4 INJECTION INTRAMUSCULAR; INTRAVENOUS ONCE
Status: COMPLETED | OUTPATIENT
Start: 2019-06-10 | End: 2019-06-10

## 2019-06-10 RX ORDER — ONDANSETRON 4 MG/1
4 TABLET, ORALLY DISINTEGRATING ORAL EVERY 8 HOURS PRN
Qty: 12 TABLET | Refills: 0 | Status: SHIPPED | OUTPATIENT
Start: 2019-06-10 | End: 2019-12-08

## 2019-06-10 RX ORDER — PANTOPRAZOLE SODIUM 40 MG/1
40 TABLET, DELAYED RELEASE ORAL DAILY
Qty: 12 TABLET | Refills: 0 | Status: SHIPPED | OUTPATIENT
Start: 2019-06-10 | End: 2019-12-08

## 2019-06-10 RX ORDER — PROMETHAZINE HYDROCHLORIDE 25 MG/ML
12.5 INJECTION, SOLUTION INTRAMUSCULAR; INTRAVENOUS ONCE
Status: COMPLETED | OUTPATIENT
Start: 2019-06-10 | End: 2019-06-10

## 2019-06-10 RX ADMIN — ONDANSETRON 4 MG: 2 INJECTION, SOLUTION INTRAMUSCULAR; INTRAVENOUS at 06:15

## 2019-06-10 RX ADMIN — PROMETHAZINE HYDROCHLORIDE 12.5 MG: 25 INJECTION INTRAMUSCULAR; INTRAVENOUS at 10:33

## 2019-06-10 RX ADMIN — SODIUM CHLORIDE 1000 ML: 9 INJECTION, SOLUTION INTRAVENOUS at 06:02

## 2019-06-10 RX ADMIN — ONDANSETRON 4 MG: 2 INJECTION, SOLUTION INTRAMUSCULAR; INTRAVENOUS at 09:57

## 2019-06-10 RX ADMIN — IOPAMIDOL 100 ML: 612 INJECTION, SOLUTION INTRAVENOUS at 08:42

## 2019-06-10 NOTE — ED PROVIDER NOTES
Subjective   77 y/o female with recent lap abdoulaye by Dr. Bravo arrives for evaluation of abdominal pain, nausea and vomiting. She notes intermittent abdominal pain since the surgery but noted it worse today with the aformentioned vomiting. She denies diarrhea or constipation but notes her last BM was >24 hours ago. She cannot tell me if she has passed flatus or not. She denies cp, sob, fevers, chills, falls, trauma, medication changes, palpations or any other issues. She arrives in [facility].         Family, social and past history reviewed as below, prior documentation of H and Ps and other documentation are reviewed:    Past Medical History:  No date: Heart disease  No date: Migraines    Past Surgical History:  5/9/2019: CHOLECYSTECTOMY WITH INTRAOPERATIVE CHOLANGIOGRAM; N/A      Comment:  Procedure: CHOLECYSTECTOMY LAPAROSCOPIC INTRAOPERATIVE                CHOLANGIOGRAM;  Surgeon: Chandan Bravo MD;  Location:               [facility];  Service: General    Social History    Socioeconomic History      Marital status:       Spouse name: Not on file      Number of children: Not on file      Years of education: Not on file      Highest education level: Not on file    Tobacco Use      Smoking status: Never Smoker    Substance and Sexual Activity      Alcohol use: No        Frequency: Never      Drug use: No      Sexual activity: Defer      Family history: reviewed and noncontributory                    Review of Systems   All other systems reviewed and are negative.      Past Medical History:   Diagnosis Date   • Heart disease    • Migraines        No Known Allergies    Past Surgical History:   Procedure Laterality Date   • CHOLECYSTECTOMY WITH INTRAOPERATIVE CHOLANGIOGRAM N/A 5/9/2019    Procedure: CHOLECYSTECTOMY LAPAROSCOPIC INTRAOPERATIVE CHOLANGIOGRAM;  Surgeon: Chandan Bravo MD;  Location: [facility] OR;  Service: General       History reviewed. No pertinent family history.    Social History      Socioeconomic History   • Marital status:      Spouse name: Not on file   • Number of children: Not on file   • Years of education: Not on file   • Highest education level: Not on file   Tobacco Use   • Smoking status: Never Smoker   Substance and Sexual Activity   • Alcohol use: No     Frequency: Never   • Drug use: No   • Sexual activity: Defer           Objective   Physical Exam   Constitutional: She is oriented to person, place, and time. She appears well-developed and well-nourished.   HENT:   Head: Normocephalic.   Nose: Nose normal.   Eyes: Conjunctivae are normal. Pupils are equal, round, and reactive to light.   Neck: Normal range of motion. Neck supple.   Cardiovascular: Normal rate, regular rhythm, normal heart sounds and intact distal pulses.   Pulmonary/Chest: Effort normal and breath sounds normal.   Abdominal: Soft. She exhibits no distension and no mass. Bowel sounds are absent. There is tenderness. There is no rigidity, no rebound and no guarding. No hernia.   Musculoskeletal: Normal range of motion.   Neurological: She is alert and oriented to person, place, and time.   Skin: Skin is warm. Capillary refill takes less than 2 seconds.   Psychiatric: She has a normal mood and affect. Her behavior is normal.   Vitals reviewed.      Procedures           ED Course        Endorsed to Dr. Albert      CT Abdomen Pelvis With Contrast   Final Result        Labs Reviewed   COMPREHENSIVE METABOLIC PANEL - Abnormal; Notable for the following components:       Result Value    Glucose 133 (*)     All other components within normal limits    Narrative:     GFR Normal >60  Chronic Kidney Disease <60  Kidney Failure <15   CBC WITH AUTO DIFFERENTIAL - Abnormal; Notable for the following components:    WBC 11.88 (*)     RBC 4.00 (*)     MCV 99.3 (*)     MCH 35.0 (*)     Neutrophil % 84.7 (*)     Lymphocyte % 10.2 (*)     Neutrophils, Absolute 10.07 (*)     Immature Grans, Absolute 0.06 (*)     All other  components within normal limits   URINALYSIS W/ CULTURE IF INDICATED - Abnormal; Notable for the following components:    Appearance, UA Cloudy (*)     pH, UA 8.5 (*)     Blood, UA Trace (*)     All other components within normal limits   URINALYSIS, MICROSCOPIC ONLY - Abnormal; Notable for the following components:    RBC, UA 6-12 (*)     WBC, UA 0-2 (*)     All other components within normal limits   LIPASE - Normal   TROPONIN (IN-HOUSE) - Normal   RAINBOW DRAW    Narrative:     The following orders were created for panel order [place] Draw.  Procedure                               Abnormality         Status                     ---------                               -----------         ------                     Light Blue Top[[id number]]                                   Final result               Green Top (Gel)[[id number]]                                  Final result               Lavender Top[[id number]]                                     Final result               Red Top[[id number]]                                          Final result                 Please view results for these tests on the individual orders.   CBC AND DIFFERENTIAL    Narrative:     The following orders were created for panel order CBC & Differential.  Procedure                               Abnormality         Status                     ---------                               -----------         ------                     CBC Auto Differential[[id number]]        Abnormal            Final result                 Please view results for these tests on the individual orders.   LIGHT BLUE TOP   GREEN TOP   LAVENDER TOP   RED TOP       Lab Results (last 24 hours)     Procedure Component Value Units Date/Time    CBC & Differential [[id number]] Collected:  06/10/19 0602    Specimen:  Blood Updated:  06/10/19 0653    Narrative:       The following orders were created for panel order CBC & Differential.  Procedure                                Abnormality         Status                     ---------                               -----------         ------                     CBC Auto Differential[[id number]]        Abnormal            Final result                 Please view results for these tests on the individual orders.    Comprehensive Metabolic Panel [[id number]]  (Abnormal) Collected:  06/10/19 0602    Specimen:  Blood Updated:  06/10/19 0704     Glucose 133 mg/dL      BUN 14 mg/dL      Creatinine 0.57 mg/dL      Sodium 135 mmol/L      Potassium 3.9 mmol/L      Chloride 100 mmol/L      CO2 27.0 mmol/L      Calcium 9.6 mg/dL      Total Protein 7.5 g/dL      Albumin 4.50 g/dL      ALT (SGPT) 25 U/L      AST (SGOT) 31 U/L      Alkaline Phosphatase 69 U/L      Total Bilirubin 0.5 mg/dL      eGFR Non African Amer 103 mL/min/1.73      Globulin 3.0 gm/dL      A/G Ratio 1.5 g/dL      BUN/Creatinine Ratio 24.6     Anion Gap 8.0 mmol/L     Narrative:       GFR Normal >60  Chronic Kidney Disease <60  Kidney Failure <15    Lipase [[id number]]  (Normal) Collected:  06/10/19 0602    Specimen:  Blood Updated:  06/10/19 0704     Lipase 70 U/L     CBC Auto Differential [[id number]]  (Abnormal) Collected:  06/10/19 0602    Specimen:  Blood Updated:  06/10/19 0653     WBC 11.88 10*3/mm3      RBC 4.00 10*6/mm3      Hemoglobin 14.0 g/dL      Hematocrit 39.7 %      MCV 99.3 fL      MCH 35.0 pg      MCHC 35.3 g/dL      RDW 12.7 %      RDW-SD 46.3 fl      MPV 10.4 fL      Platelets 215 10*3/mm3      Neutrophil % 84.7 %      Lymphocyte % 10.2 %      Monocyte % 4.3 %      Eosinophil % 0.1 %      Basophil % 0.2 %      Immature Grans % 0.5 %      Neutrophils, Absolute 10.07 10*3/mm3      Lymphocytes, Absolute 1.21 10*3/mm3      Monocytes, Absolute 0.51 10*3/mm3      Eosinophils, Absolute 0.01 10*3/mm3      Basophils, Absolute 0.02 10*3/mm3      Immature Grans, Absolute 0.06 10*3/mm3      nRBC 0.0 /100 WBC     Troponin [[id number]]  (Normal) Collected:  06/10/19 0602    Specimen:   Blood Updated:  06/10/19 0717     Troponin I 0.021 ng/mL     Urinalysis With Culture If Indicated - Urine, Clean Catch [[id number]]  (Abnormal) Collected:  06/10/19 0957    Specimen:  Urine, Clean Catch Updated:  06/10/19 1010     Color, UA Yellow     Appearance, UA Cloudy     pH, UA 8.5     Specific Gravity, UA 1.012     Glucose, UA Negative     Ketones, UA Negative     Bilirubin, UA Negative     Blood, UA Trace     Protein, UA Negative     Leuk Esterase, UA Negative     Nitrite, UA Negative     Urobilinogen, UA 0.2 E.U./dL    Urinalysis, Microscopic Only - Urine, Clean Catch [[id number]]  (Abnormal) Collected:  06/10/19 0957    Specimen:  Urine, Clean Catch Updated:  06/10/19 1010     RBC, UA 6-12 /HPF      WBC, UA 0-2 /HPF      Bacteria, UA None Seen /HPF      Squamous Epithelial Cells, UA 0-2 /HPF      Hyaline Casts, UA 0-2 /LPF      Methodology Automated Microscopy        CT Abdomen Pelvis With Contrast   Final Result        Labs are unremarkable.  CT scan showed no acute findings.  Patient felt better after Zofran and Phenergan.  Tolerating po without difficulty.  I suspect the patient could have an ulcer.  Patient will be discharged home with Zofran, Phenergan, Protonix to follow-up with PCP and GI.  Patient may need an outpatient scope if symptoms persist.  Return for any pain, fever, vomiting or other concerns.          MDM      Final diagnoses:   Non-intractable vomiting with nausea, unspecified vomiting type            Shellie Albert MD  06/10/19 0326

## 2019-12-08 ENCOUNTER — TELEPHONE (OUTPATIENT)
Dept: URGENT CARE | Facility: CLINIC | Age: 79
End: 2019-12-08

## 2019-12-08 DIAGNOSIS — J01.40 ACUTE PANSINUSITIS, RECURRENCE NOT SPECIFIED: ICD-10-CM

## 2019-12-08 RX ORDER — FLUTICASONE PROPIONATE 50 MCG
1 SPRAY, SUSPENSION (ML) NASAL DAILY
Qty: 1 BOTTLE | Refills: 0 | Status: SHIPPED | OUTPATIENT
Start: 2019-12-08 | End: 2019-12-22

## 2019-12-08 RX ORDER — AMOXICILLIN 500 MG/1
500 CAPSULE ORAL 2 TIMES DAILY
Qty: 20 CAPSULE | Refills: 0 | Status: SHIPPED | OUTPATIENT
Start: 2019-12-08 | End: 2019-12-18

## 2019-12-09 ENCOUNTER — APPOINTMENT (OUTPATIENT)
Dept: CT IMAGING | Facility: HOSPITAL | Age: 79
End: 2019-12-09

## 2019-12-09 ENCOUNTER — HOSPITAL ENCOUNTER (EMERGENCY)
Facility: HOSPITAL | Age: 79
Discharge: HOME | End: 2019-12-09
Attending: EMERGENCY MEDICINE | Admitting: EMERGENCY MEDICINE

## 2019-12-09 ENCOUNTER — HOSPITAL ENCOUNTER (EMERGENCY)
Facility: HOSPITAL | Age: 79
End: 2019-12-09

## 2019-12-09 VITALS
HEIGHT: 65 IN | RESPIRATION RATE: 16 BRPM | SYSTOLIC BLOOD PRESSURE: 109 MMHG | HEART RATE: 75 BPM | TEMPERATURE: 97.5 F | WEIGHT: 96 LBS | BODY MASS INDEX: 15.99 KG/M2 | OXYGEN SATURATION: 93 % | DIASTOLIC BLOOD PRESSURE: 53 MMHG

## 2019-12-09 DIAGNOSIS — R51.9 ACUTE NONINTRACTABLE HEADACHE, UNSPECIFIED HEADACHE TYPE: Primary | ICD-10-CM

## 2019-12-09 DIAGNOSIS — R11.2 NON-INTRACTABLE VOMITING WITH NAUSEA, UNSPECIFIED VOMITING TYPE: ICD-10-CM

## 2019-12-09 LAB
ALBUMIN SERPL-MCNC: 4.3 G/DL (ref 3.5–5.2)
ALBUMIN/GLOB SERPL: 1.7 G/DL
ALP SERPL-CCNC: 61 U/L (ref 39–117)
ALT SERPL W P-5'-P-CCNC: 12 U/L (ref 1–33)
ANION GAP SERPL CALCULATED.3IONS-SCNC: 12 MMOL/L (ref 5–15)
APTT PPP: 30.6 SECONDS (ref 24.1–35)
AST SERPL-CCNC: 22 U/L (ref 1–32)
BACTERIA UR QL AUTO: ABNORMAL /HPF
BASOPHILS # BLD AUTO: 0.03 10*3/MM3 (ref 0–0.2)
BASOPHILS NFR BLD AUTO: 0.5 % (ref 0–1.5)
BILIRUB SERPL-MCNC: 0.3 MG/DL (ref 0.2–1.2)
BILIRUB UR QL STRIP: NEGATIVE
BUN BLD-MCNC: 12 MG/DL (ref 8–23)
BUN/CREAT SERPL: 16.2 (ref 7–25)
CALCIUM SPEC-SCNC: 9 MG/DL (ref 8.6–10.5)
CHLORIDE SERPL-SCNC: 94 MMOL/L (ref 98–107)
CLARITY UR: ABNORMAL
CO2 SERPL-SCNC: 24 MMOL/L (ref 22–29)
COLOR UR: YELLOW
CREAT BLD-MCNC: 0.74 MG/DL (ref 0.57–1)
D-LACTATE SERPL-SCNC: 1.1 MMOL/L (ref 0.5–2)
DEPRECATED RDW RBC AUTO: 43.7 FL (ref 37–54)
EOSINOPHIL # BLD AUTO: 0.06 10*3/MM3 (ref 0–0.4)
EOSINOPHIL NFR BLD AUTO: 1 % (ref 0.3–6.2)
ERYTHROCYTE [DISTWIDTH] IN BLOOD BY AUTOMATED COUNT: 12.5 % (ref 12.3–15.4)
ERYTHROCYTE [SEDIMENTATION RATE] IN BLOOD: <1 MM/HR (ref 0–20)
GFR SERPL CREATININE-BSD FRML MDRD: 76 ML/MIN/1.73
GLOBULIN UR ELPH-MCNC: 2.6 GM/DL
GLUCOSE BLD-MCNC: 120 MG/DL (ref 65–99)
GLUCOSE UR STRIP-MCNC: NEGATIVE MG/DL
HCT VFR BLD AUTO: 37.3 % (ref 34–46.6)
HGB BLD-MCNC: 13.4 G/DL (ref 12–15.9)
HGB UR QL STRIP.AUTO: ABNORMAL
HOLD SPECIMEN: NORMAL
HYALINE CASTS UR QL AUTO: ABNORMAL /LPF
IMM GRANULOCYTES # BLD AUTO: 0.02 10*3/MM3 (ref 0–0.05)
IMM GRANULOCYTES NFR BLD AUTO: 0.3 % (ref 0–0.5)
INR PPP: 1.02 (ref 0.91–1.09)
KETONES UR QL STRIP: NEGATIVE
LEUKOCYTE ESTERASE UR QL STRIP.AUTO: ABNORMAL
LYMPHOCYTES # BLD AUTO: 1.19 10*3/MM3 (ref 0.7–3.1)
LYMPHOCYTES NFR BLD AUTO: 20.3 % (ref 19.6–45.3)
MCH RBC QN AUTO: 34.4 PG (ref 26.6–33)
MCHC RBC AUTO-ENTMCNC: 35.9 G/DL (ref 31.5–35.7)
MCV RBC AUTO: 95.6 FL (ref 79–97)
MONOCYTES # BLD AUTO: 0.49 10*3/MM3 (ref 0.1–0.9)
MONOCYTES NFR BLD AUTO: 8.4 % (ref 5–12)
NEUTROPHILS # BLD AUTO: 4.07 10*3/MM3 (ref 1.7–7)
NEUTROPHILS NFR BLD AUTO: 69.5 % (ref 42.7–76)
NITRITE UR QL STRIP: NEGATIVE
NRBC BLD AUTO-RTO: 0 /100 WBC (ref 0–0.2)
PH UR STRIP.AUTO: 7.5 [PH] (ref 5–8)
PLATELET # BLD AUTO: 216 10*3/MM3 (ref 140–450)
PMV BLD AUTO: 9.9 FL (ref 6–12)
POTASSIUM BLD-SCNC: 4.3 MMOL/L (ref 3.5–5.2)
PROCALCITONIN SERPL-MCNC: 0.02 NG/ML (ref 0.1–0.25)
PROT SERPL-MCNC: 6.9 G/DL (ref 6–8.5)
PROT UR QL STRIP: NEGATIVE
PROTHROMBIN TIME: 13.7 SECONDS (ref 11.9–14.6)
RBC # BLD AUTO: 3.9 10*6/MM3 (ref 3.77–5.28)
RBC # UR: ABNORMAL /HPF
REF LAB TEST METHOD: ABNORMAL
SODIUM BLD-SCNC: 130 MMOL/L (ref 136–145)
SP GR UR STRIP: 1.01 (ref 1–1.03)
SQUAMOUS #/AREA URNS HPF: ABNORMAL /HPF
UROBILINOGEN UR QL STRIP: ABNORMAL
WBC NRBC COR # BLD: 5.86 10*3/MM3 (ref 3.4–10.8)
WBC UR QL AUTO: ABNORMAL /HPF
WHOLE BLOOD HOLD SPECIMEN: NORMAL
WHOLE BLOOD HOLD SPECIMEN: NORMAL

## 2019-12-09 PROCEDURE — 25010000002 FENTANYL CITRATE (PF) 100 MCG/2ML SOLUTION: Performed by: EMERGENCY MEDICINE

## 2019-12-09 PROCEDURE — 85610 PROTHROMBIN TIME: CPT | Performed by: EMERGENCY MEDICINE

## 2019-12-09 PROCEDURE — 25010000002 ONDANSETRON PER 1 MG: Performed by: EMERGENCY MEDICINE

## 2019-12-09 PROCEDURE — 83605 ASSAY OF LACTIC ACID: CPT | Performed by: EMERGENCY MEDICINE

## 2019-12-09 PROCEDURE — 87040 BLOOD CULTURE FOR BACTERIA: CPT | Performed by: EMERGENCY MEDICINE

## 2019-12-09 PROCEDURE — 81001 URINALYSIS AUTO W/SCOPE: CPT | Performed by: EMERGENCY MEDICINE

## 2019-12-09 PROCEDURE — 96361 HYDRATE IV INFUSION ADD-ON: CPT

## 2019-12-09 PROCEDURE — 96375 TX/PRO/DX INJ NEW DRUG ADDON: CPT

## 2019-12-09 PROCEDURE — 93005 ELECTROCARDIOGRAM TRACING: CPT | Performed by: EMERGENCY MEDICINE

## 2019-12-09 PROCEDURE — 85651 RBC SED RATE NONAUTOMATED: CPT | Performed by: EMERGENCY MEDICINE

## 2019-12-09 PROCEDURE — 99283 EMERGENCY DEPT VISIT LOW MDM: CPT

## 2019-12-09 PROCEDURE — 70450 CT HEAD/BRAIN W/O DYE: CPT

## 2019-12-09 PROCEDURE — 93010 ELECTROCARDIOGRAM REPORT: CPT | Performed by: INTERNAL MEDICINE

## 2019-12-09 PROCEDURE — 36415 COLL VENOUS BLD VENIPUNCTURE: CPT

## 2019-12-09 PROCEDURE — 85730 THROMBOPLASTIN TIME PARTIAL: CPT | Performed by: EMERGENCY MEDICINE

## 2019-12-09 PROCEDURE — 80053 COMPREHEN METABOLIC PANEL: CPT | Performed by: EMERGENCY MEDICINE

## 2019-12-09 PROCEDURE — 25010000002 METOCLOPRAMIDE PER 10 MG: Performed by: EMERGENCY MEDICINE

## 2019-12-09 PROCEDURE — 85025 COMPLETE CBC W/AUTO DIFF WBC: CPT | Performed by: EMERGENCY MEDICINE

## 2019-12-09 PROCEDURE — 96374 THER/PROPH/DIAG INJ IV PUSH: CPT

## 2019-12-09 PROCEDURE — 84145 PROCALCITONIN (PCT): CPT | Performed by: EMERGENCY MEDICINE

## 2019-12-09 RX ORDER — SODIUM CHLORIDE 9 MG/ML
100 INJECTION, SOLUTION INTRAVENOUS CONTINUOUS
Status: DISCONTINUED | OUTPATIENT
Start: 2019-12-09 | End: 2019-12-09 | Stop reason: HOSPADM

## 2019-12-09 RX ORDER — METOCLOPRAMIDE HYDROCHLORIDE 5 MG/ML
5 INJECTION INTRAMUSCULAR; INTRAVENOUS ONCE
Status: COMPLETED | OUTPATIENT
Start: 2019-12-09 | End: 2019-12-09

## 2019-12-09 RX ORDER — ONDANSETRON 2 MG/ML
4 INJECTION INTRAMUSCULAR; INTRAVENOUS ONCE
Status: COMPLETED | OUTPATIENT
Start: 2019-12-09 | End: 2019-12-09

## 2019-12-09 RX ORDER — ONDANSETRON 4 MG/1
4 TABLET, ORALLY DISINTEGRATING ORAL EVERY 8 HOURS PRN
Qty: 10 TABLET | Refills: 0 | OUTPATIENT
Start: 2019-12-09 | End: 2021-03-30

## 2019-12-09 RX ORDER — ONDANSETRON 4 MG/1
4 TABLET, ORALLY DISINTEGRATING ORAL EVERY 8 HOURS PRN
Qty: 10 TABLET | Refills: 0 | Status: SHIPPED | OUTPATIENT
Start: 2019-12-09 | End: 2019-12-09 | Stop reason: SDUPTHER

## 2019-12-09 RX ORDER — FENTANYL CITRATE 50 UG/ML
25 INJECTION, SOLUTION INTRAMUSCULAR; INTRAVENOUS ONCE
Status: COMPLETED | OUTPATIENT
Start: 2019-12-09 | End: 2019-12-09

## 2019-12-09 RX ADMIN — FENTANYL CITRATE 25 MCG: 50 INJECTION INTRAMUSCULAR; INTRAVENOUS at 14:21

## 2019-12-09 RX ADMIN — METOCLOPRAMIDE 5 MG: 5 INJECTION, SOLUTION INTRAMUSCULAR; INTRAVENOUS at 14:21

## 2019-12-09 RX ADMIN — SODIUM CHLORIDE 100 ML/HR: 9 INJECTION, SOLUTION INTRAVENOUS at 16:20

## 2019-12-09 RX ADMIN — SODIUM CHLORIDE 500 ML: 9 INJECTION, SOLUTION INTRAVENOUS at 15:30

## 2019-12-09 RX ADMIN — ONDANSETRON HYDROCHLORIDE 4 MG: 2 SOLUTION INTRAMUSCULAR; INTRAVENOUS at 15:30

## 2019-12-09 NOTE — ED PROVIDER NOTES
Subjective   This 79-year-old patient presents emergency room with a progressively worsening headache which is been bothering her since 9 days.  She also complains that she feels a fullness in her sinuses and pressure in her head.  States that she has been vomiting yesterday and today.  History of migraine headaches but usually the headaches respond to Fiorinal.  The patient states that she has at times when she is felt hot and cold.  She is not documented fever.  She vomited 4-5 times this morning and 3-4 times yesterday.  Patient denies chest pain has at times felt short of breath has had a mild cough she had a normal bowel movement this morning denies any urinary frequency urgency burning or other urinary disc comfort.  She has had decreased appetite lately.          Review of Systems   Constitutional: Positive for appetite change, chills and fever.   HENT: Positive for sinus pressure and sinus pain.    Eyes: Positive for photophobia.   Respiratory: Positive for cough and shortness of breath. Negative for chest tightness.    Cardiovascular: Negative for chest pain and leg swelling.   Gastrointestinal: Positive for nausea and vomiting. Negative for abdominal pain and diarrhea.   Genitourinary: Negative for difficulty urinating, dysuria, flank pain, hematuria and urgency.   Musculoskeletal: Negative.    Skin: Negative.    Neurological: Positive for headaches.   Psychiatric/Behavioral: Negative.        Past Medical History:   Diagnosis Date   • Heart disease    • Migraines        Allergies   Allergen Reactions   • Codeine Anaphylaxis       Past Surgical History:   Procedure Laterality Date   • CHOLECYSTECTOMY WITH INTRAOPERATIVE CHOLANGIOGRAM N/A 5/9/2019    Procedure: CHOLECYSTECTOMY LAPAROSCOPIC INTRAOPERATIVE CHOLANGIOGRAM;  Surgeon: Chandan Bravo MD;  Location: [facility];  Service: General       History reviewed. No pertinent family history.    Social History     Socioeconomic History   • Marital status:       Spouse name: Not on file   • Number of children: Not on file   • Years of education: Not on file   • Highest education level: Not on file   Tobacco Use   • Smoking status: Never Smoker   • Smokeless tobacco: Never Used   Substance and Sexual Activity   • Alcohol use: No     Frequency: Never   • Drug use: No   • Sexual activity: Defer           Objective   Physical Exam   Constitutional: She is oriented to person, place, and time. She appears well-developed and well-nourished. She appears distressed.   HENT:   Head: Normocephalic.   Eyes: Pupils are equal, round, and reactive to light. EOM are normal.   Neck: Normal range of motion. Neck supple.   Cardiovascular: Normal rate, regular rhythm and normal heart sounds.   Pulmonary/Chest: Effort normal and breath sounds normal.   Abdominal: Soft. Bowel sounds are normal.   Musculoskeletal: Normal range of motion.   Neurological: She is alert and oriented to person, place, and time. No cranial nerve deficit or sensory deficit. She exhibits normal muscle tone. Coordination normal.   Skin: Skin is warm and dry. Capillary refill takes less than 2 seconds.   Nursing note and vitals reviewed.      Procedures           ED Course                      No data recorded                        MDM  Number of Diagnoses or Management Options  Diagnosis management comments: 1600 patient's headache is better.  Daughter is here and states that she has had worse headaches than this this was more persistent than usual and this seemed like her usual migraine type of headache.  We had briefly discussed whether an LP might be required on this patient but the family and the patient herself do not want it.  All of her inflammatory markers are completely normal and she feels much better so I am going to go ahead and discharge her home.      Final diagnoses:   Acute nonintractable headache, unspecified headache type   Non-intractable vomiting with nausea, unspecified vomiting type               Shelby, DO Tha  12/09/19 9169

## 2019-12-14 LAB
BACTERIA SPEC AEROBE CULT: NORMAL
BACTERIA SPEC AEROBE CULT: NORMAL

## 2021-03-30 ENCOUNTER — APPOINTMENT (OUTPATIENT)
Dept: GENERAL RADIOLOGY | Facility: HOSPITAL | Age: 81
End: 2021-03-30

## 2021-03-30 ENCOUNTER — HOSPITAL ENCOUNTER (EMERGENCY)
Facility: HOSPITAL | Age: 81
Discharge: HOME | End: 2021-03-30
Attending: EMERGENCY MEDICINE | Admitting: EMERGENCY MEDICINE

## 2021-03-30 ENCOUNTER — APPOINTMENT (OUTPATIENT)
Dept: CT IMAGING | Facility: HOSPITAL | Age: 81
End: 2021-03-30

## 2021-03-30 VITALS
TEMPERATURE: 98.1 F | HEART RATE: 83 BPM | BODY MASS INDEX: 14.72 KG/M2 | OXYGEN SATURATION: 97 % | RESPIRATION RATE: 18 BRPM | DIASTOLIC BLOOD PRESSURE: 58 MMHG | WEIGHT: 75 LBS | SYSTOLIC BLOOD PRESSURE: 119 MMHG | HEIGHT: 60 IN

## 2021-03-30 DIAGNOSIS — R31.9 HEMATURIA, UNSPECIFIED TYPE: ICD-10-CM

## 2021-03-30 DIAGNOSIS — S00.03XA CONTUSION OF SCALP, INITIAL ENCOUNTER: ICD-10-CM
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
## 2021-03-30 DIAGNOSIS — N25.89 RTA (RENAL TUBULAR ACIDOSIS): ICD-10-CM

## 2021-03-30 DIAGNOSIS — N39.0 ACUTE UTI (URINARY TRACT INFECTION): ICD-10-CM

## 2021-03-30 LAB
ALBUMIN SERPL-MCNC: 4.3 G/DL (ref 3.5–5.2)
ALBUMIN/GLOB SERPL: 1.6 G/DL
ALP SERPL-CCNC: 66 U/L (ref 39–117)
ALT SERPL W P-5'-P-CCNC: 16 U/L (ref 1–33)
AMPHET+METHAMPHET UR QL: NEGATIVE
AMPHETAMINES UR QL: NEGATIVE
ANION GAP SERPL CALCULATED.3IONS-SCNC: 22 MMOL/L (ref 5–15)
APTT PPP: 31.8 SECONDS (ref 24.1–35)
ARTERIAL PATENCY WRIST A: ABNORMAL
AST SERPL-CCNC: 36 U/L (ref 1–32)
ATMOSPHERIC PRESS: 748 MMHG
BACTERIA UR QL AUTO: ABNORMAL /HPF
BARBITURATES UR QL SCN: POSITIVE
BASE EXCESS BLDA CALC-SCNC: -6.3 MMOL/L (ref 0–2)
BASOPHILS # BLD AUTO: 0.03 10*3/MM3 (ref 0–0.2)
BASOPHILS NFR BLD AUTO: 0.7 % (ref 0–1.5)
BDY SITE: ABNORMAL
BENZODIAZ UR QL SCN: NEGATIVE
BILIRUB SERPL-MCNC: 0.4 MG/DL (ref 0–1.2)
BILIRUB UR QL STRIP: NEGATIVE
BODY TEMPERATURE: 37 C
BUN SERPL-MCNC: 7 MG/DL (ref 8–23)
BUN/CREAT SERPL: 11.9 (ref 7–25)
BUPRENORPHINE SERPL-MCNC: NEGATIVE NG/ML
CALCIUM SPEC-SCNC: 9.2 MG/DL (ref 8.6–10.5)
CANNABINOIDS SERPL QL: NEGATIVE
CHLORIDE SERPL-SCNC: 91 MMOL/L (ref 98–107)
CLARITY UR: CLEAR
CO2 SERPL-SCNC: 17 MMOL/L (ref 22–29)
COCAINE UR QL: NEGATIVE
COLOR UR: YELLOW
CREAT SERPL-MCNC: 0.59 MG/DL (ref 0.57–1)
D-LACTATE SERPL-SCNC: 1.1 MMOL/L (ref 0.5–2)
DEPRECATED RDW RBC AUTO: 47.8 FL (ref 37–54)
EOSINOPHIL # BLD AUTO: 0 10*3/MM3 (ref 0–0.4)
EOSINOPHIL NFR BLD AUTO: 0 % (ref 0.3–6.2)
ERYTHROCYTE [DISTWIDTH] IN BLOOD BY AUTOMATED COUNT: 13.1 % (ref 12.3–15.4)
ETHANOL UR QL: <0.01 %
GFR SERPL CREATININE-BSD FRML MDRD: 98 ML/MIN/1.73
GLOBULIN UR ELPH-MCNC: 2.7 GM/DL
GLUCOSE SERPL-MCNC: 72 MG/DL (ref 65–99)
GLUCOSE UR STRIP-MCNC: NEGATIVE MG/DL
HCO3 BLDA-SCNC: 17.7 MMOL/L (ref 20–26)
HCT VFR BLD AUTO: 42.1 % (ref 34–46.6)
HGB BLD-MCNC: 14.6 G/DL (ref 12–15.9)
HGB UR QL STRIP.AUTO: ABNORMAL
HYALINE CASTS UR QL AUTO: ABNORMAL /LPF
IMM GRANULOCYTES # BLD AUTO: 0.01 10*3/MM3 (ref 0–0.05)
IMM GRANULOCYTES NFR BLD AUTO: 0.2 % (ref 0–0.5)
INR PPP: 1.1 (ref 0.91–1.09)
KETONES UR QL STRIP: ABNORMAL
LEUKOCYTE ESTERASE UR QL STRIP.AUTO: NEGATIVE
LYMPHOCYTES # BLD AUTO: 0.96 10*3/MM3 (ref 0.7–3.1)
LYMPHOCYTES NFR BLD AUTO: 22.8 % (ref 19.6–45.3)
Lab: ABNORMAL
MCH RBC QN AUTO: 34.7 PG (ref 26.6–33)
MCHC RBC AUTO-ENTMCNC: 34.7 G/DL (ref 31.5–35.7)
MCV RBC AUTO: 100 FL (ref 79–97)
METHADONE UR QL SCN: NEGATIVE
MODALITY: ABNORMAL
MONOCYTES # BLD AUTO: 0.39 10*3/MM3 (ref 0.1–0.9)
MONOCYTES NFR BLD AUTO: 9.3 % (ref 5–12)
NEUTROPHILS NFR BLD AUTO: 2.82 10*3/MM3 (ref 1.7–7)
NEUTROPHILS NFR BLD AUTO: 67 % (ref 42.7–76)
NITRITE UR QL STRIP: NEGATIVE
NRBC BLD AUTO-RTO: 0 /100 WBC (ref 0–0.2)
OPIATES UR QL: NEGATIVE
OXYCODONE UR QL SCN: NEGATIVE
PCO2 BLDA: 30.7 MM HG (ref 35–45)
PCO2 TEMP ADJ BLD: 30.7 MM HG (ref 35–45)
PCP UR QL SCN: NEGATIVE
PH BLDA: 7.37 PH UNITS (ref 7.35–7.45)
PH UR STRIP.AUTO: 5.5 [PH] (ref 5–8)
PH, TEMP CORRECTED: 7.37 PH UNITS (ref 7.35–7.45)
PLATELET # BLD AUTO: 258 10*3/MM3 (ref 140–450)
PMV BLD AUTO: 9.7 FL (ref 6–12)
PO2 BLDA: 84.5 MM HG (ref 83–108)
PO2 TEMP ADJ BLD: 84.5 MM HG (ref 83–108)
POTASSIUM SERPL-SCNC: 3.8 MMOL/L (ref 3.5–5.2)
PROCALCITONIN SERPL-MCNC: 0.04 NG/ML (ref 0–0.25)
PROPOXYPH UR QL: NEGATIVE
PROT SERPL-MCNC: 7 G/DL (ref 6–8.5)
PROT UR QL STRIP: NEGATIVE
PROTHROMBIN TIME: 13.8 SECONDS (ref 11.9–14.6)
RBC # BLD AUTO: 4.21 10*6/MM3 (ref 3.77–5.28)
RBC # UR: ABNORMAL /HPF
REF LAB TEST METHOD: ABNORMAL
SALICYLATES SERPL-MCNC: <0.3 MG/DL
SAO2 % BLDCOA: 96.9 % (ref 94–99)
SARS-COV-2 RNA PNL SPEC NAA+PROBE: NOT DETECTED
SODIUM SERPL-SCNC: 130 MMOL/L (ref 136–145)
SP GR UR STRIP: 1.02 (ref 1–1.03)
SQUAMOUS #/AREA URNS HPF: ABNORMAL /HPF
TRICYCLICS UR QL SCN: NEGATIVE
TROPONIN T SERPL-MCNC: <0.01 NG/ML (ref 0–0.03)
UROBILINOGEN UR QL STRIP: ABNORMAL
VENTILATOR MODE: ABNORMAL
WBC # BLD AUTO: 4.21 10*3/MM3 (ref 3.4–10.8)
WBC UR QL AUTO: ABNORMAL /HPF

## 2021-03-30 PROCEDURE — 87040 BLOOD CULTURE FOR BACTERIA: CPT | Performed by: EMERGENCY MEDICINE

## 2021-03-30 PROCEDURE — 93010 ELECTROCARDIOGRAM REPORT: CPT | Performed by: EMERGENCY MEDICINE

## 2021-03-30 PROCEDURE — P9612 CATHETERIZE FOR URINE SPEC: HCPCS

## 2021-03-30 PROCEDURE — 80179 DRUG ASSAY SALICYLATE: CPT | Performed by: EMERGENCY MEDICINE

## 2021-03-30 PROCEDURE — 36600 WITHDRAWAL OF ARTERIAL BLOOD: CPT

## 2021-03-30 PROCEDURE — 82803 BLOOD GASES ANY COMBINATION: CPT

## 2021-03-30 PROCEDURE — 71045 X-RAY EXAM CHEST 1 VIEW: CPT

## 2021-03-30 PROCEDURE — 84484 ASSAY OF TROPONIN QUANT: CPT | Performed by: EMERGENCY MEDICINE

## 2021-03-30 PROCEDURE — 85730 THROMBOPLASTIN TIME PARTIAL: CPT | Performed by: EMERGENCY MEDICINE

## 2021-03-30 PROCEDURE — 80053 COMPREHEN METABOLIC PANEL: CPT | Performed by: EMERGENCY MEDICINE

## 2021-03-30 PROCEDURE — 93005 ELECTROCARDIOGRAM TRACING: CPT

## 2021-03-30 PROCEDURE — 99285 EMERGENCY DEPT VISIT HI MDM: CPT

## 2021-03-30 PROCEDURE — 87635 SARS-COV-2 COVID-19 AMP PRB: CPT | Performed by: EMERGENCY MEDICINE

## 2021-03-30 PROCEDURE — 84145 PROCALCITONIN (PCT): CPT | Performed by: EMERGENCY MEDICINE

## 2021-03-30 PROCEDURE — 81001 URINALYSIS AUTO W/SCOPE: CPT | Performed by: EMERGENCY MEDICINE

## 2021-03-30 PROCEDURE — 85610 PROTHROMBIN TIME: CPT | Performed by: EMERGENCY MEDICINE

## 2021-03-30 PROCEDURE — 83605 ASSAY OF LACTIC ACID: CPT | Performed by: EMERGENCY MEDICINE

## 2021-03-30 PROCEDURE — 72125 CT NECK SPINE W/O DYE: CPT

## 2021-03-30 PROCEDURE — 70450 CT HEAD/BRAIN W/O DYE: CPT

## 2021-03-30 PROCEDURE — 85025 COMPLETE CBC W/AUTO DIFF WBC: CPT | Performed by: EMERGENCY MEDICINE

## 2021-03-30 PROCEDURE — 80306 DRUG TEST PRSMV INSTRMNT: CPT | Performed by: EMERGENCY MEDICINE

## 2021-03-30 PROCEDURE — 93005 ELECTROCARDIOGRAM TRACING: CPT | Performed by: EMERGENCY MEDICINE

## 2021-03-30 PROCEDURE — 82077 ASSAY SPEC XCP UR&BREATH IA: CPT | Performed by: EMERGENCY MEDICINE

## 2021-03-30 RX ORDER — SUMATRIPTAN 25 MG/1
25 TABLET, FILM COATED ORAL
COMMUNITY
End: 2021-04-02

## 2021-03-30 RX ORDER — ACETAMINOPHEN 500 MG
1000 TABLET ORAL ONCE
Status: COMPLETED | OUTPATIENT
Start: 2021-03-30 | End: 2021-03-30

## 2021-03-30 RX ORDER — CEFDINIR 300 MG/1
300 CAPSULE ORAL 2 TIMES DAILY
Qty: 20 CAPSULE | Refills: 0 | Status: SHIPPED | OUTPATIENT
Start: 2021-03-30 | End: 2021-04-09 | Stop reason: HOSPADM

## 2021-03-30 RX ADMIN — ACETAMINOPHEN 1000 MG: 500 TABLET, FILM COATED ORAL at 17:34

## 2021-03-30 RX ADMIN — SODIUM CHLORIDE 1000 ML: 9 INJECTION, SOLUTION INTRAVENOUS at 17:34

## 2021-04-02 ENCOUNTER — HOSPITAL ENCOUNTER (INPATIENT)
Facility: HOSPITAL | Age: 81
LOS: 7 days | Discharge: NURSING, REHAB OR LONG-TERM CARE | End: 2021-04-09
Attending: EMERGENCY MEDICINE | Admitting: FAMILY MEDICINE

## 2021-04-02 ENCOUNTER — APPOINTMENT (OUTPATIENT)
Dept: GENERAL RADIOLOGY | Facility: HOSPITAL | Age: 81
End: 2021-04-02

## 2021-04-02 ENCOUNTER — APPOINTMENT (OUTPATIENT)
Dept: CT IMAGING | Facility: HOSPITAL | Age: 81
End: 2021-04-02

## 2021-04-02 DIAGNOSIS — R41.82 ALTERED MENTAL STATUS, UNSPECIFIED ALTERED MENTAL STATUS TYPE: Primary | ICD-10-CM

## 2021-04-02 DIAGNOSIS — R13.10 DYSPHAGIA, UNSPECIFIED TYPE: ICD-10-CM

## 2021-04-02 DIAGNOSIS — Z78.9 DECREASED ACTIVITIES OF DAILY LIVING (ADL): ICD-10-CM

## 2021-04-02 DIAGNOSIS — Z74.09 IMPAIRED MOBILITY: ICD-10-CM

## 2021-04-02 PROBLEM — R09.89 UNKNOWN WHEN SUSPECTED STROKE PATIENT WAS LAST WELL: Status: ACTIVE | Noted: 2021-04-02

## 2021-04-02 PROBLEM — R62.7 FAILURE TO THRIVE IN ADULT: Status: ACTIVE | Noted: 2021-04-02

## 2021-04-02 PROBLEM — G43.909 MIGRAINE: Status: ACTIVE | Noted: 2021-04-02

## 2021-04-02 PROBLEM — E43 SEVERE MALNUTRITION: Status: ACTIVE | Noted: 2021-04-02

## 2021-04-02 PROBLEM — E87.1 HYPONATREMIA: Status: ACTIVE | Noted: 2021-04-02

## 2021-04-02 PROBLEM — G93.40 ENCEPHALOPATHY: Status: ACTIVE | Noted: 2021-04-02

## 2021-04-02 LAB
ALBUMIN SERPL-MCNC: 4.2 G/DL (ref 3.5–5.2)
ALBUMIN/GLOB SERPL: 1.6 G/DL
ALP SERPL-CCNC: 63 U/L (ref 39–117)
ALT SERPL W P-5'-P-CCNC: 20 U/L (ref 1–33)
AMPHET+METHAMPHET UR QL: NEGATIVE
AMPHETAMINES UR QL: NEGATIVE
ANION GAP SERPL CALCULATED.3IONS-SCNC: 15 MMOL/L (ref 5–15)
APTT PPP: 30 SECONDS (ref 24.1–35)
AST SERPL-CCNC: 46 U/L (ref 1–32)
BACTERIA UR QL AUTO: NORMAL /HPF
BARBITURATES UR QL SCN: POSITIVE
BASOPHILS # BLD AUTO: 0.04 10*3/MM3 (ref 0–0.2)
BASOPHILS NFR BLD AUTO: 0.6 % (ref 0–1.5)
BENZODIAZ UR QL SCN: POSITIVE
BILIRUB SERPL-MCNC: 0.4 MG/DL (ref 0–1.2)
BILIRUB UR QL STRIP: NEGATIVE
BUN SERPL-MCNC: 5 MG/DL (ref 8–23)
BUN/CREAT SERPL: 8.1 (ref 7–25)
BUPRENORPHINE SERPL-MCNC: NEGATIVE NG/ML
CALCIUM SPEC-SCNC: 9.4 MG/DL (ref 8.6–10.5)
CANNABINOIDS SERPL QL: NEGATIVE
CHLORIDE SERPL-SCNC: 92 MMOL/L (ref 98–107)
CLARITY UR: CLEAR
CO2 SERPL-SCNC: 24 MMOL/L (ref 22–29)
COCAINE UR QL: NEGATIVE
COLOR UR: YELLOW
CREAT SERPL-MCNC: 0.62 MG/DL (ref 0.57–1)
DEPRECATED RDW RBC AUTO: 46.2 FL (ref 37–54)
EOSINOPHIL # BLD AUTO: 0.01 10*3/MM3 (ref 0–0.4)
EOSINOPHIL NFR BLD AUTO: 0.2 % (ref 0.3–6.2)
ERYTHROCYTE [DISTWIDTH] IN BLOOD BY AUTOMATED COUNT: 13 % (ref 12.3–15.4)
GFR SERPL CREATININE-BSD FRML MDRD: 93 ML/MIN/1.73
GLOBULIN UR ELPH-MCNC: 2.7 GM/DL
GLUCOSE SERPL-MCNC: 118 MG/DL (ref 65–99)
GLUCOSE UR STRIP-MCNC: NEGATIVE MG/DL
HCT VFR BLD AUTO: 38.9 % (ref 34–46.6)
HGB BLD-MCNC: 14 G/DL (ref 12–15.9)
HGB UR QL STRIP.AUTO: ABNORMAL
HYALINE CASTS UR QL AUTO: NORMAL /LPF
IMM GRANULOCYTES # BLD AUTO: 0.03 10*3/MM3 (ref 0–0.05)
IMM GRANULOCYTES NFR BLD AUTO: 0.5 % (ref 0–0.5)
INR PPP: 1.11 (ref 0.91–1.09)
KETONES UR QL STRIP: NEGATIVE
LEUKOCYTE ESTERASE UR QL STRIP.AUTO: NEGATIVE
LYMPHOCYTES # BLD AUTO: 1.08 10*3/MM3 (ref 0.7–3.1)
LYMPHOCYTES NFR BLD AUTO: 16.5 % (ref 19.6–45.3)
MAGNESIUM SERPL-MCNC: 1.7 MG/DL (ref 1.6–2.4)
MCH RBC QN AUTO: 34.7 PG (ref 26.6–33)
MCHC RBC AUTO-ENTMCNC: 36 G/DL (ref 31.5–35.7)
MCV RBC AUTO: 96.5 FL (ref 79–97)
METHADONE UR QL SCN: NEGATIVE
MONOCYTES # BLD AUTO: 0.64 10*3/MM3 (ref 0.1–0.9)
MONOCYTES NFR BLD AUTO: 9.8 % (ref 5–12)
NEUTROPHILS NFR BLD AUTO: 4.74 10*3/MM3 (ref 1.7–7)
NEUTROPHILS NFR BLD AUTO: 72.4 % (ref 42.7–76)
NITRITE UR QL STRIP: NEGATIVE
NRBC BLD AUTO-RTO: 0 /100 WBC (ref 0–0.2)
OPIATES UR QL: NEGATIVE
OSMOLALITY UR: 249 MOSM/KG (ref 601–850)
OXYCODONE UR QL SCN: NEGATIVE
PCP UR QL SCN: NEGATIVE
PH UR STRIP.AUTO: 7.5 [PH] (ref 5–8)
PLATELET # BLD AUTO: 230 10*3/MM3 (ref 140–450)
PMV BLD AUTO: 9.7 FL (ref 6–12)
POTASSIUM SERPL-SCNC: 3.6 MMOL/L (ref 3.5–5.2)
PROPOXYPH UR QL: NEGATIVE
PROT SERPL-MCNC: 6.9 G/DL (ref 6–8.5)
PROT UR QL STRIP: ABNORMAL
PROTHROMBIN TIME: 13.9 SECONDS (ref 11.9–14.6)
QT INTERVAL: 406 MS
QTC INTERVAL: 462 MS
RBC # BLD AUTO: 4.03 10*6/MM3 (ref 3.77–5.28)
RBC # UR: NORMAL /HPF
REF LAB TEST METHOD: NORMAL
SARS-COV-2 RNA PNL SPEC NAA+PROBE: NOT DETECTED
SODIUM SERPL-SCNC: 131 MMOL/L (ref 136–145)
SODIUM UR-SCNC: 71 MMOL/L
SP GR UR STRIP: 1.01 (ref 1–1.03)
SQUAMOUS #/AREA URNS HPF: NORMAL /HPF
T4 FREE SERPL-MCNC: 1.75 NG/DL (ref 0.93–1.7)
TRICYCLICS UR QL SCN: NEGATIVE
TSH SERPL DL<=0.05 MIU/L-ACNC: 0.91 UIU/ML (ref 0.27–4.2)
UROBILINOGEN UR QL STRIP: ABNORMAL
WBC # BLD AUTO: 6.54 10*3/MM3 (ref 3.4–10.8)
WBC UR QL AUTO: NORMAL /HPF

## 2021-04-02 PROCEDURE — 84300 ASSAY OF URINE SODIUM: CPT | Performed by: NURSE PRACTITIONER

## 2021-04-02 PROCEDURE — G0378 HOSPITAL OBSERVATION PER HR: HCPCS

## 2021-04-02 PROCEDURE — 83735 ASSAY OF MAGNESIUM: CPT | Performed by: EMERGENCY MEDICINE

## 2021-04-02 PROCEDURE — 99285 EMERGENCY DEPT VISIT HI MDM: CPT

## 2021-04-02 PROCEDURE — 70450 CT HEAD/BRAIN W/O DYE: CPT

## 2021-04-02 PROCEDURE — 25010000002 ONDANSETRON PER 1 MG: Performed by: EMERGENCY MEDICINE

## 2021-04-02 PROCEDURE — 81001 URINALYSIS AUTO W/SCOPE: CPT | Performed by: EMERGENCY MEDICINE

## 2021-04-02 PROCEDURE — 85610 PROTHROMBIN TIME: CPT | Performed by: EMERGENCY MEDICINE

## 2021-04-02 PROCEDURE — 94799 UNLISTED PULMONARY SVC/PX: CPT

## 2021-04-02 PROCEDURE — 80053 COMPREHEN METABOLIC PANEL: CPT | Performed by: EMERGENCY MEDICINE

## 2021-04-02 PROCEDURE — 25010000002 ONDANSETRON PER 1 MG: Performed by: NURSE PRACTITIONER

## 2021-04-02 PROCEDURE — 84443 ASSAY THYROID STIM HORMONE: CPT | Performed by: EMERGENCY MEDICINE

## 2021-04-02 PROCEDURE — 83935 ASSAY OF URINE OSMOLALITY: CPT | Performed by: NURSE PRACTITIONER

## 2021-04-02 PROCEDURE — 84439 ASSAY OF FREE THYROXINE: CPT | Performed by: EMERGENCY MEDICINE

## 2021-04-02 PROCEDURE — 85025 COMPLETE CBC W/AUTO DIFF WBC: CPT | Performed by: EMERGENCY MEDICINE

## 2021-04-02 PROCEDURE — 80306 DRUG TEST PRSMV INSTRMNT: CPT | Performed by: NURSE PRACTITIONER

## 2021-04-02 PROCEDURE — P9612 CATHETERIZE FOR URINE SPEC: HCPCS

## 2021-04-02 PROCEDURE — 71045 X-RAY EXAM CHEST 1 VIEW: CPT

## 2021-04-02 PROCEDURE — 85730 THROMBOPLASTIN TIME PARTIAL: CPT | Performed by: EMERGENCY MEDICINE

## 2021-04-02 PROCEDURE — 87635 SARS-COV-2 COVID-19 AMP PRB: CPT | Performed by: EMERGENCY MEDICINE

## 2021-04-02 RX ORDER — ACEBUTOLOL HYDROCHLORIDE 200 MG/1
200 CAPSULE ORAL EVERY 12 HOURS SCHEDULED
Status: DISCONTINUED | OUTPATIENT
Start: 2021-04-03 | End: 2021-04-09 | Stop reason: HOSPADM

## 2021-04-02 RX ORDER — SODIUM CHLORIDE 0.9 % (FLUSH) 0.9 %
10 SYRINGE (ML) INJECTION AS NEEDED
Status: DISCONTINUED | OUTPATIENT
Start: 2021-04-02 | End: 2021-04-09 | Stop reason: HOSPADM

## 2021-04-02 RX ORDER — ASPIRIN 81 MG/1
81 TABLET, CHEWABLE ORAL DAILY
Status: DISCONTINUED | OUTPATIENT
Start: 2021-04-03 | End: 2021-04-05

## 2021-04-02 RX ORDER — ONDANSETRON 4 MG/1
4 TABLET, FILM COATED ORAL EVERY 6 HOURS PRN
Status: DISCONTINUED | OUTPATIENT
Start: 2021-04-02 | End: 2021-04-09 | Stop reason: HOSPADM

## 2021-04-02 RX ORDER — ONDANSETRON 2 MG/ML
4 INJECTION INTRAMUSCULAR; INTRAVENOUS EVERY 6 HOURS PRN
Status: DISCONTINUED | OUTPATIENT
Start: 2021-04-02 | End: 2021-04-09 | Stop reason: HOSPADM

## 2021-04-02 RX ORDER — BUTALBITAL, ACETAMINOPHEN AND CAFFEINE 50; 325; 40 MG/1; MG/1; MG/1
1 TABLET ORAL EVERY 6 HOURS PRN
Status: DISCONTINUED | OUTPATIENT
Start: 2021-04-02 | End: 2021-04-04

## 2021-04-02 RX ORDER — PAROXETINE HYDROCHLORIDE 20 MG/1
40 TABLET, FILM COATED ORAL DAILY
Status: DISCONTINUED | OUTPATIENT
Start: 2021-04-03 | End: 2021-04-03

## 2021-04-02 RX ORDER — ONDANSETRON 2 MG/ML
4 INJECTION INTRAMUSCULAR; INTRAVENOUS ONCE
Status: COMPLETED | OUTPATIENT
Start: 2021-04-02 | End: 2021-04-02

## 2021-04-02 RX ORDER — ACETAMINOPHEN 650 MG/1
650 SUPPOSITORY RECTAL EVERY 4 HOURS PRN
Status: DISCONTINUED | OUTPATIENT
Start: 2021-04-02 | End: 2021-04-09 | Stop reason: HOSPADM

## 2021-04-02 RX ORDER — ACETAMINOPHEN 160 MG/5ML
650 SOLUTION ORAL EVERY 4 HOURS PRN
Status: DISCONTINUED | OUTPATIENT
Start: 2021-04-02 | End: 2021-04-09 | Stop reason: HOSPADM

## 2021-04-02 RX ORDER — SODIUM CHLORIDE 9 MG/ML
100 INJECTION, SOLUTION INTRAVENOUS CONTINUOUS
Status: DISCONTINUED | OUTPATIENT
Start: 2021-04-02 | End: 2021-04-03

## 2021-04-02 RX ORDER — ACETAMINOPHEN 325 MG/1
650 TABLET ORAL EVERY 4 HOURS PRN
Status: DISCONTINUED | OUTPATIENT
Start: 2021-04-02 | End: 2021-04-09 | Stop reason: HOSPADM

## 2021-04-02 RX ORDER — SODIUM CHLORIDE 0.9 % (FLUSH) 0.9 %
10 SYRINGE (ML) INJECTION EVERY 12 HOURS SCHEDULED
Status: DISCONTINUED | OUTPATIENT
Start: 2021-04-02 | End: 2021-04-09 | Stop reason: HOSPADM

## 2021-04-02 RX ORDER — ATORVASTATIN CALCIUM 40 MG/1
80 TABLET, FILM COATED ORAL NIGHTLY
Status: DISCONTINUED | OUTPATIENT
Start: 2021-04-02 | End: 2021-04-09 | Stop reason: HOSPADM

## 2021-04-02 RX ADMIN — BUTALBITAL, ACETAMINOPHEN, AND CAFFEINE 1 TABLET: 50; 325; 40 TABLET ORAL at 20:23

## 2021-04-02 RX ADMIN — ONDANSETRON HYDROCHLORIDE 4 MG: 2 SOLUTION INTRAMUSCULAR; INTRAVENOUS at 17:49

## 2021-04-02 RX ADMIN — ONDANSETRON HYDROCHLORIDE 4 MG: 2 SOLUTION INTRAMUSCULAR; INTRAVENOUS at 23:44

## 2021-04-02 RX ADMIN — SODIUM CHLORIDE 100 ML/HR: 9 INJECTION, SOLUTION INTRAVENOUS at 23:42

## 2021-04-02 RX ADMIN — ACETAMINOPHEN 650 MG: 650 SUPPOSITORY RECTAL at 23:09

## 2021-04-02 NOTE — ED PROVIDER NOTES
Subjective   Patient is brought to emergency room by ambulance with a report from EMS that the daughter called them.  They said the patient was normal according to her daughter around 10 this morning when the daughter went by the staff to give her some medicine around 330 she found her to have altered mental status.  They have noticed that she does not cooperate with exam but does not really resist either.  They do not see any focal signs but she has had the sudden change in mental status so they brought her in to be checked out.  She was here couple days ago with a fall and was diagnosed with UTI at that time.      History provided by:  EMS personnel   used: No    Altered Mental Status  Presenting symptoms: behavior changes and lethargy    Severity:  Severe  Most recent episode:  Today  Episode history:  Single  Duration:  1 hour  Timing:  Constant  Progression:  Unchanged  Chronicity:  New  Context: not alcohol use, not dementia, not drug use, not head injury, not homeless, taking medications as prescribed, not nursing home resident, not recent change in medication, not recent illness and not recent infection    Associated symptoms: no abdominal pain, normal movement, no agitation, no bladder incontinence, no decreased appetite, no depression, no difficulty breathing, no eye deviation, no fever, no hallucinations, no headaches, no light-headedness, no nausea, no palpitations, no rash, no seizures, no slurred speech, no suicidal behavior, no visual change, no vomiting and no weakness        Review of Systems   Unable to perform ROS: Mental status change   Constitutional: Negative for decreased appetite and fever.   Cardiovascular: Negative for palpitations.   Gastrointestinal: Negative for abdominal pain, nausea and vomiting.   Genitourinary: Negative for bladder incontinence.   Skin: Negative for rash.   Neurological: Negative for seizures, weakness, light-headedness and headaches.    Psychiatric/Behavioral: Negative for agitation and hallucinations.   All other systems reviewed and are negative.      Past Medical History:   Diagnosis Date   • Heart disease    • Migraines        Allergies   Allergen Reactions   • Codeine Anaphylaxis       Past Surgical History:   Procedure Laterality Date   • CHOLECYSTECTOMY WITH INTRAOPERATIVE CHOLANGIOGRAM N/A 5/9/2019    Procedure: CHOLECYSTECTOMY LAPAROSCOPIC INTRAOPERATIVE CHOLANGIOGRAM;  Surgeon: Chandan Bravo MD;  Location: [facility];  Service: General       History reviewed. No pertinent family history.    Social History     Socioeconomic History   • Marital status:      Spouse name: Not on file   • Number of children: Not on file   • Years of education: Not on file   • Highest education level: Not on file   Tobacco Use   • Smoking status: Never Smoker   • Smokeless tobacco: Never Used   Substance and Sexual Activity   • Alcohol use: No   • Drug use: No   • Sexual activity: Defer       Prior to Admission medications    Medication Sig Start Date End Date Taking? Authorizing Provider   acebutolol (SECTRAL) 200 MG capsule Take 200 mg by mouth 2 (Two) Times a Day.    ProviderLevy MD   aspirin 81 MG chewable tablet Chew 81 mg Daily.    ProviderLevy MD   butalbital-acetaminophen-caffeine (FIORICET, ESGIC) -40 MG per tablet Take 1 tablet by mouth Every 4 (Four) Hours As Needed for Headache.    ProviderLevy MD   cefdinir (OMNICEF) 300 MG capsule Take 1 capsule by mouth 2 (Two) Times a Day for 5 days. 3/30/21 4/4/21  Leonardo Jin MD   cholecalciferol (VITAMIN D3) 1000 units tablet Take 2,000 Units by mouth Daily.    ProviderLevy MD   fenofibrate (TRICOR) 145 MG tablet Take 145 mg by mouth Daily.    ProviderLevy MD   LORazepam (ATIVAN) 2 MG tablet Take 1 mg by mouth As Needed. 10/7/19   Emergency, Nurse Epic, RN   metoclopramide (REGLAN) 5 MG tablet Take 5 mg by mouth 2 (Two) Times a Day.     ProviderLevy MD   PARoxetine (PAXIL) 20 MG tablet Take 40 mg by mouth Every Morning.    ProviderLevy MD   SUMAtriptan (IMITREX) 25 MG tablet Take 25 mg by mouth Every 2 (Two) Hours As Needed for Migraine. Take one tablet at onset of headache. May repeat dose one time in 2 hours if headache not relieved.    ProviderLevy MD       Medications   sodium chloride 0.9 % flush 10 mL (has no administration in time range)   ondansetron (ZOFRAN) injection 4 mg (4 mg Intravenous Given 4/2/21 1749)       Vitals:    04/02/21 1651   BP: 137/68   Pulse: 75   Resp: 17   Temp: 97 °F (36.1 °C)   SpO2: 94%         Objective   Physical Exam  Vitals and nursing note reviewed.   Constitutional:       Appearance: She is well-developed.   HENT:      Head: Normocephalic and atraumatic.   Eyes:      Pupils: Pupils are equal, round, and reactive to light.   Cardiovascular:      Rate and Rhythm: Normal rate and regular rhythm.   Pulmonary:      Effort: Pulmonary effort is normal.      Breath sounds: Normal breath sounds.   Abdominal:      General: Bowel sounds are normal.      Palpations: Abdomen is soft.   Musculoskeletal:         General: Normal range of motion.      Cervical back: Normal range of motion and neck supple.   Skin:     General: Skin is warm and dry.   Neurological:      Mental Status: She is lethargic and confused.      Deep Tendon Reflexes: Reflexes normal.      Comments: Patient does open her eyes to name but does not really cooperate with exam.  She does have movement of all extremities and does not seem to have any focal or one-sided weaknesses   Psychiatric:      Comments: Patient is lethargic and nontoxic.         Procedures         Lab Results (last 24 hours)     Procedure Component Value Units Date/Time    CBC & Differential [[id number]]  (Abnormal) Collected: 04/02/21 1729    Specimen: Blood Updated: 04/02/21 1738    Narrative:      The following orders were created for panel order CBC &  Differential.  Procedure                               Abnormality         Status                     ---------                               -----------         ------                     CBC Auto Differential[[id number]]        Abnormal            Final result                 Please view results for these tests on the individual orders.    Comprehensive Metabolic Panel [[id number]]  (Abnormal) Collected: 04/02/21 1729    Specimen: Blood Updated: 04/02/21 1758     Glucose 118 mg/dL      BUN 5 mg/dL      Creatinine 0.62 mg/dL      Sodium 131 mmol/L      Potassium 3.6 mmol/L      Chloride 92 mmol/L      CO2 24.0 mmol/L      Calcium 9.4 mg/dL      Total Protein 6.9 g/dL      Albumin 4.20 g/dL      ALT (SGPT) 20 U/L      AST (SGOT) 46 U/L      Alkaline Phosphatase 63 U/L      Total Bilirubin 0.4 mg/dL      eGFR Non African Amer 93 mL/min/1.73      Globulin 2.7 gm/dL      A/G Ratio 1.6 g/dL      BUN/Creatinine Ratio 8.1     Anion Gap 15.0 mmol/L     Narrative:      GFR Normal >60  Chronic Kidney Disease <60  Kidney Failure <15      aPTT [[id number]]  (Normal) Collected: 04/02/21 1729    Specimen: Blood Updated: 04/02/21 1747     PTT 30.0 seconds     Protime-INR [[id number]]  (Abnormal) Collected: 04/02/21 1729    Specimen: Blood Updated: 04/02/21 1747     Protime 13.9 Seconds      INR 1.11    TSH [[id number]]  (Normal) Collected: 04/02/21 1729    Specimen: Blood Updated: 04/02/21 1804     TSH 0.912 uIU/mL     T4, Free [[id number]]  (Abnormal) Collected: 04/02/21 1729    Specimen: Blood Updated: 04/02/21 1804     Free T4 1.75 ng/dL     Narrative:      Results may be falsely increased if patient taking Biotin.      Magnesium [[id number]]  (Normal) Collected: 04/02/21 1729    Specimen: Blood Updated: 04/02/21 1752     Magnesium 1.7 mg/dL     CBC Auto Differential [[id number]]  (Abnormal) Collected: 04/02/21 1729    Specimen: Blood Updated: 04/02/21 1738     WBC 6.54 10*3/mm3      RBC 4.03 10*6/mm3      Hemoglobin 14.0 g/dL       Hematocrit 38.9 %      MCV 96.5 fL      MCH 34.7 pg      MCHC 36.0 g/dL      RDW 13.0 %      RDW-SD 46.2 fl      MPV 9.7 fL      Platelets 230 10*3/mm3      Neutrophil % 72.4 %      Lymphocyte % 16.5 %      Monocyte % 9.8 %      Eosinophil % 0.2 %      Basophil % 0.6 %      Immature Grans % 0.5 %      Neutrophils, Absolute 4.74 10*3/mm3      Lymphocytes, Absolute 1.08 10*3/mm3      Monocytes, Absolute 0.64 10*3/mm3      Eosinophils, Absolute 0.01 10*3/mm3      Basophils, Absolute 0.04 10*3/mm3      Immature Grans, Absolute 0.03 10*3/mm3      nRBC 0.0 /100 WBC     Urinalysis With Culture If Indicated - Urine, Catheter [[id number]]  (Abnormal) Collected: 04/02/21 1730    Specimen: Urine, Catheter Updated: 04/02/21 1745     Color, UA Yellow     Appearance, UA Clear     pH, UA 7.5     Specific Gravity, UA 1.011     Glucose, UA Negative     Ketones, UA Negative     Bilirubin, UA Negative     Blood, UA Moderate (2+)     Protein, UA Trace     Leuk Esterase, UA Negative     Nitrite, UA Negative     Urobilinogen, UA 0.2 E.U./dL    Urinalysis, Microscopic Only - Urine, Catheter [[id number]] Collected: 04/02/21 1730    Specimen: Urine, Catheter Updated: 04/02/21 1745     RBC, UA None Seen /HPF      WBC, UA None Seen /HPF      Bacteria, UA None Seen /HPF      Squamous Epithelial Cells, UA None Seen /HPF      Hyaline Casts, UA None Seen /LPF      Methodology Automated Microscopy          CT Head Without Contrast   Final Result   No acute intracranial abnormality, no interval change. There   are chronic findings associated with aging.       This report was finalized on 04/02/2021 17:46 by Dr. Louis Akhtar MD.      XR Chest 1 View   Final Result   Subtle new linear infiltrate in the left lung base,   atelectasis versus a small area of aspiration pneumonia.   This report was finalized on 04/02/2021 17:32 by Dr. Louis Akhtar MD.          ED Course  ED Course as of Apr 02 1834 Fri Apr 02, 2021   1069 Patient's daughter is here  now and says that the patient was seen here on Monday or Tuesday and had a good work-up then.  She had a CT head that was negative because of her recent fall.  They found her sodium to be a little low and her urine to have a possible infection were treating that.  Since that time she has not felt pretty good but has done okay.  This morning she had to get up by herself to come to the kitchen to eat.  When daughter came back and checked on this afternoon she was laying across the bed cannot seem to get up.  She has had the symptoms before the low sodium.  She brought her back in for recheck.    [TR]   1833 Patient continues to be poorly responsive.  She does open her eyes to look at you but that is about it.  She obviously is very somnolent compared to usual.  I do not have a good explanation for her altered mental status present time we will put in the hospital for further evaluation.    [TR]      ED Course User Index  [TR] Tha Avitia Jr., MD          MDM  Number of Diagnoses or Management Options  Altered mental status, unspecified altered mental status type: new and requires workup     Amount and/or Complexity of Data Reviewed  Clinical lab tests: ordered and reviewed  Tests in the radiology section of CPT®: ordered and reviewed  Tests in the medicine section of CPT®: ordered and reviewed  Discuss the patient with other providers: yes    Risk of Complications, Morbidity, and/or Mortality  Presenting problems: moderate  Diagnostic procedures: moderate  Management options: moderate    Patient Progress  Patient progress: stable      Final diagnoses:   Altered mental status, unspecified altered mental status type          Tha Avitia Jr., MD  04/02/21 7162

## 2021-04-02 NOTE — H&P
[facility]  HISTORY AND PHYSICAL    Date of Admission: 4/2/2021  Primary Care Physician: Garth Amezcua APRN    Subjective     Chief Complaint: 1-2-week history weakness, altered mental status    History of Present Illness  Zelalem Hung is an 80-year-old female with a past medical history of chronic hyponatremia, frequent UTIs, migraines, SVT, gastroesophageal reflux disease and hypertriglyceridemia.  Patient's daughter is at bedside and provides majority of history.  She reports patient developed headache, weakness and dizziness approximately 3/27/2021, followed with a fall in the middle of the night.  Due to worsening weakness, mild confusion and concerns for injury as patient did hit her left temple they presented to Psychiatric 3/30/2021.  UTI was diagnosed, no other acute findings.  Patient was treated with Omnicef.  Daughter states patient is never totally recovered and has had declining status with extremely poor appetite with minimal oral intake.  She does tolerate soft foods such as baked potato etc.  She has had nausea without vomiting.  This morning patient was taking medication, answering questions and following commands.  This afternoon daughter noted patient was not responding and therefore EMS was called.  Patient transferred to Psychiatric emergency department for evaluation.  No significant acute findings noted except for sodium of 131 which is at baseline.  Patient more alert at this time, she is very slow to answer questions however she is not confused.  She tries to follow commands however states she is too weak to help.  She is incontinent of urine.  She states she has left sided soreness when she yawns.  Pain is not reproducible on palpation.  She was unable to provide meaningful review of systems.  Patient is admitted for further evaluation and treatment.    Review of Systems   Due to slowed mentation/altered  mental status patient is unable to provide a meaningful review of systems.    Past Medical History:   Past Medical History:   Diagnosis Date   • Chronic hyponatremia    • Frequent UTI    • Migraines    • SVT (supraventricular tachycardia) (CMS/HCC)        Past Surgical History:   Past Surgical History:   Procedure Laterality Date   • CHOLECYSTECTOMY WITH INTRAOPERATIVE CHOLANGIOGRAM N/A 5/9/2019    Procedure: CHOLECYSTECTOMY LAPAROSCOPIC INTRAOPERATIVE CHOLANGIOGRAM;  Surgeon: Chandan Bravo MD;  Location: [facility];  Service: General       Family History: family history includes Anemia in her mother; Colon cancer in her father; Heart attack in her father; Heart disease in her father; Stroke in her mother.    Social History:  reports that she has never smoked. She has never used smokeless tobacco. She reports that she does not drink alcohol and does not use drugs.    Code Status: DNI, patient wishes for CPR and defibrillation if needed, daughter will speak for her      Allergies:  Allergies   Allergen Reactions   • Codeine Anaphylaxis       Medications:  Prior to Admission medications    Medication Sig Start Date End Date Taking? Authorizing Provider   acebutolol (SECTRAL) 200 MG capsule Take 200 mg by mouth 2 (Two) Times a Day.    Levy Lo MD   aspirin 81 MG chewable tablet Chew 81 mg Daily.    Levy Lo MD   butalbital-acetaminophen-caffeine (FIORICET, ESGIC) -40 MG per tablet Take 1 tablet by mouth Every 4 (Four) Hours As Needed for Headache.    Levy Lo MD   cefdinir (OMNICEF) 300 MG capsule Take 1 capsule by mouth 2 (Two) Times a Day for 5 days. 3/30/21 4/4/21  Leonardo Jin MD   cholecalciferol (VITAMIN D3) 1000 units tablet Take 2,000 Units by mouth Daily.    Levy Lo MD   fenofibrate (TRICOR) 145 MG tablet Take 145 mg by mouth Daily.    Levy Lo MD   LORazepam (ATIVAN) 2 MG tablet Take 1 mg by mouth As Needed. 10/7/19   Emergency,  "Nurse Rosa, RN   metoclopramide (REGLAN) 5 MG tablet Take 5 mg by mouth 2 (Two) Times a Day.    Provider, MD Levy   PARoxetine (PAXIL) 20 MG tablet Take 40 mg by mouth Every Morning.    Provider, MD Levy       Objective     /68 (BP Location: Left arm, Patient Position: Lying)   Pulse 75   Temp 97 °F (36.1 °C) (Axillary)   Resp 17   Ht 157.5 cm (62\")   Wt 36.8 kg (81 lb 1.6 oz)   SpO2 94%   BMI 14.83 kg/m²   Physical Exam  Vitals reviewed.   Constitutional:       Appearance: She is ill-appearing.      Comments: Awake, appears older than stated age   HENT:      Head: Normocephalic and atraumatic.      Mouth/Throat:      Mouth: Mucous membranes are dry.      Pharynx: Oropharynx is clear.   Eyes:      Conjunctiva/sclera: Conjunctivae normal.      Pupils: Pupils are equal, round, and reactive to light.   Neck:      Comments: No JVD  Cardiovascular:      Rate and Rhythm: Normal rate and regular rhythm.   Pulmonary:      Effort: Pulmonary effort is normal.      Breath sounds: Normal breath sounds.   Abdominal:      General: Abdomen is flat. Bowel sounds are normal.      Palpations: Abdomen is soft.   Musculoskeletal:      Cervical back: Normal range of motion.      Right lower leg: No edema.      Left lower leg: No edema.      Comments: Generalized weakness and debility   Skin:     General: Skin is warm and dry.   Neurological:      Comments: Left upper extremity weakness, extremely slow to respond to questions and commands however she is oriented when she does answer   Psychiatric:         Behavior: Behavior normal.      Comments: Flat affect       Pertinent Data:   Lab Results (last 72 hours)     Procedure Component Value Units Date/Time    COVID PRE-OP / PRE-PROCEDURE SCREENING ORDER (NO ISOLATION) - Swab, Nasal Cavity [[id number]] Collected: 04/02/21 1908    Specimen: Swab from Nasal Cavity Updated: 04/02/21 1917    Narrative:      The following orders were created for panel order COVID PRE-OP " / PRE-PROCEDURE SCREENING ORDER (NO ISOLATION) - Swab, Nasal Cavity.  Procedure                               Abnormality         Status                     ---------                               -----------         ------                     COVID-19,Jane Bio IN-KORY...[[id number]]                      In process                   Please view results for these tests on the individual orders.    COVID-19,Jane Bio IN-HOUSE,Nasal Swab No Transport Media 3-4 HR TAT - Swab, Nasal Cavity [[id number]] Collected: 04/02/21 1908    Specimen: Swab from Nasal Cavity Updated: 04/02/21 1917    TSH [[id number]]  (Normal) Collected: 04/02/21 1729    Specimen: Blood Updated: 04/02/21 1804     TSH 0.912 uIU/mL     T4, Free [[id number]]  (Abnormal) Collected: 04/02/21 1729    Specimen: Blood Updated: 04/02/21 1804     Free T4 1.75 ng/dL     Narrative:      Results may be falsely increased if patient taking Biotin.      Comprehensive Metabolic Panel [[id number]]  (Abnormal) Collected: 04/02/21 1729    Specimen: Blood Updated: 04/02/21 1758     Glucose 118 mg/dL      BUN 5 mg/dL      Creatinine 0.62 mg/dL      Sodium 131 mmol/L      Potassium 3.6 mmol/L      Chloride 92 mmol/L      CO2 24.0 mmol/L      Calcium 9.4 mg/dL      Total Protein 6.9 g/dL      Albumin 4.20 g/dL      ALT (SGPT) 20 U/L      AST (SGOT) 46 U/L      Alkaline Phosphatase 63 U/L      Total Bilirubin 0.4 mg/dL      eGFR Non African Amer 93 mL/min/1.73      Globulin 2.7 gm/dL      A/G Ratio 1.6 g/dL      BUN/Creatinine Ratio 8.1     Anion Gap 15.0 mmol/L     Magnesium [[id number]]  (Normal) Collected: 04/02/21 1729    Specimen: Blood Updated: 04/02/21 1752     Magnesium 1.7 mg/dL     aPTT [[id number]]  (Normal) Collected: 04/02/21 1729    Specimen: Blood Updated: 04/02/21 1747     PTT 30.0 seconds     Protime-INR [[id number]]  (Abnormal) Collected: 04/02/21 1729    Specimen: Blood Updated: 04/02/21 1747     Protime 13.9 Seconds      INR 1.11    Urinalysis, Microscopic Only -  Urine, Catheter [[id number]] Collected: 04/02/21 1730    Specimen: Urine, Catheter Updated: 04/02/21 1745     RBC, UA None Seen /HPF      WBC, UA None Seen /HPF      Bacteria, UA None Seen /HPF      Squamous Epithelial Cells, UA None Seen /HPF      Hyaline Casts, UA None Seen /LPF      Methodology Automated Microscopy    Urinalysis With Culture If Indicated - Urine, Catheter [[id number]]  (Abnormal) Collected: 04/02/21 1730    Specimen: Urine, Catheter Updated: 04/02/21 1745     Color, UA Yellow     Appearance, UA Clear     pH, UA 7.5     Specific Gravity, UA 1.011     Glucose, UA Negative     Ketones, UA Negative     Bilirubin, UA Negative     Blood, UA Moderate (2+)     Protein, UA Trace     Leuk Esterase, UA Negative     Nitrite, UA Negative     Urobilinogen, UA 0.2 E.U./dL    CBC Auto Differential [[id number]]  (Abnormal) Collected: 04/02/21 1729    Specimen: Blood Updated: 04/02/21 1738     WBC 6.54 10*3/mm3      RBC 4.03 10*6/mm3      Hemoglobin 14.0 g/dL      Hematocrit 38.9 %      MCV 96.5 fL      MCH 34.7 pg      MCHC 36.0 g/dL      RDW 13.0 %      RDW-SD 46.2 fl      MPV 9.7 fL      Platelets 230 10*3/mm3      Neutrophil % 72.4 %      Lymphocyte % 16.5 %      Monocyte % 9.8 %      Eosinophil % 0.2 %      Basophil % 0.6 %      Immature Grans % 0.5 %      Neutrophils, Absolute 4.74 10*3/mm3      Lymphocytes, Absolute 1.08 10*3/mm3      Monocytes, Absolute 0.64 10*3/mm3      Eosinophils, Absolute 0.01 10*3/mm3      Basophils, Absolute 0.04 10*3/mm3      Immature Grans, Absolute 0.03 10*3/mm3      nRBC 0.0 /100 WBC           Assessment / Plan     Assessment:   Active Hospital Problems    Diagnosis    • **Encephalopathy    • Unknown when patient last well, possible stroke     • Failure to thrive in adult    • Hyponatremia    • Severe malnutrition (CMS/HCC)    • History of migraines      Plan:   1.  Admit as observation  2.  Follow stroke/TIA protocol  3.  Echocardiogram, MRI of the brain and carotid studies in  a.m.  4.  Home medications reviewed and restarted as appropriate  5.  DVT prophylaxis with SCDs  6.  Consult neurology in a.m.  7.  Labs in a.m.  8.  Urine studies for hyponatremia  9.  PT/OT/speech therapy consults  10.  Consult  will need home health or possibly placement  11.  Consult nutrition  12.  Hydrate with normal saline 100 mL/hour    I discussed the patient's findings and my recommendations with: Lucian Chase DO  Time spent 35 minutes    Patient seen and examined by me on 4/2/2021 at 6:41 PM.    Electronically signed by LISA Stevenson, 04/02/21, 19:41 CDT.    I personally evaluated and examined the patient in conjunction with LISA Zaldivar and agree with the assessment, treatment plan, and disposition of the patient as recorded by her. My history, exam, and further recommendations are:     Seen and discussed with her daughter.  She works here in ultrasound.    The patient has been not at her baseline for the last 10-14 days.  She does live by herself and typically can assist herself in her own activities of daily living even though she has family that checks on her frequently.  Her daughter noticed a change in her overall strength.  She has recently fallen and had to pull herself up on a chest of drawers in her bedroom.  She has a bruise underneath her left eye that they think was secondary to her hitting herself in the face with one of the doors.  They have noticed that her speech has been very slow and choppy.  She takes a long time to decide what she wants to say.  However, she has not been disoriented.  Concerns at this point time are for possible occult stroke as she also does have some left upper extremity weakness that is notable on exam.  She does follow all directions.    She was seen in the ER on 3/30 by Dr. Jin.  She was given IV fluids and diagnosed with urinary tract infection.  She was prescribed Omnicef.  Her urinalysis showed 0-2 white blood  cells and 1+ bacteria.  Negative leukocyte esterase and negative nitrites.  There was some blood.  A culture did not reflex.  She has no urinary symptoms.  I feel that this urinalysis was extremely bland.  No plans to continue antibiotics at this point in time.  Her current urinalysis does show some blood, but no signs of infection.  Hematuria work-up can be deferred to an outpatient basis.    Her sodium is 131.  Her daughter states that she has chronic hyponatremia and sometimes is in the 120s.  We will hydrate.    MRI of the brain without contrast and basically a stroke work-up.  If this is negative, then she basically has failure to thrive at this point.  Thyroid studies have already been assessed and are basically normal.    Therapy services.    Case management consultation as she may require placement post discharge.    Her main complaint for me in the ER tonight is that of headache and nausea.  She and her daughter tell me that she has problems with chronic migraines.  She takes Fioricet for this.  She last took a Fioricet very early this morning and would like another one now.    Electronically signed by Lucian Chase DO, 4/2/2021, 20:02 CDT.

## 2021-04-03 ENCOUNTER — APPOINTMENT (OUTPATIENT)
Dept: MRI IMAGING | Facility: HOSPITAL | Age: 81
End: 2021-04-03

## 2021-04-03 ENCOUNTER — APPOINTMENT (OUTPATIENT)
Dept: CARDIOLOGY | Facility: HOSPITAL | Age: 81
End: 2021-04-03

## 2021-04-03 ENCOUNTER — APPOINTMENT (OUTPATIENT)
Dept: ULTRASOUND IMAGING | Facility: HOSPITAL | Age: 81
End: 2021-04-03

## 2021-04-03 LAB
ANION GAP SERPL CALCULATED.3IONS-SCNC: 20 MMOL/L (ref 5–15)
BH CV ECHO MEAS - AO MAX PG (FULL): 2.6 MMHG
BH CV ECHO MEAS - AO MAX PG: 4.1 MMHG
BH CV ECHO MEAS - AO MEAN PG (FULL): 1 MMHG
BH CV ECHO MEAS - AO MEAN PG: 2 MMHG
BH CV ECHO MEAS - AO ROOT AREA (BSA CORRECTED): 2.2
BH CV ECHO MEAS - AO ROOT AREA: 6.6 CM^2
BH CV ECHO MEAS - AO ROOT DIAM: 2.9 CM
BH CV ECHO MEAS - AO V2 MAX: 101 CM/SEC
BH CV ECHO MEAS - AO V2 MEAN: 67.6 CM/SEC
BH CV ECHO MEAS - AO V2 VTI: 23.3 CM
BH CV ECHO MEAS - AVA(I,A): 1.6 CM^2
BH CV ECHO MEAS - AVA(I,D): 1.6 CM^2
BH CV ECHO MEAS - AVA(V,A): 1.7 CM^2
BH CV ECHO MEAS - AVA(V,D): 1.7 CM^2
BH CV ECHO MEAS - BSA(HAYCOCK): 1.3 M^2
BH CV ECHO MEAS - BSA: 1.3 M^2
BH CV ECHO MEAS - BZI_BMI: 14.3 KILOGRAMS/M^2
BH CV ECHO MEAS - BZI_METRIC_HEIGHT: 160 CM
BH CV ECHO MEAS - BZI_METRIC_WEIGHT: 36.7 KG
BH CV ECHO MEAS - EDV(CUBED): 31.6 ML
BH CV ECHO MEAS - EDV(MOD-SP4): 44 ML
BH CV ECHO MEAS - EDV(TEICH): 39.7 ML
BH CV ECHO MEAS - EF(CUBED): 68.5 %
BH CV ECHO MEAS - EF(MOD-SP4): 60.7 %
BH CV ECHO MEAS - EF(TEICH): 61.5 %
BH CV ECHO MEAS - ESV(CUBED): 9.9 ML
BH CV ECHO MEAS - ESV(MOD-SP4): 17.3 ML
BH CV ECHO MEAS - ESV(TEICH): 15.3 ML
BH CV ECHO MEAS - FS: 32 %
BH CV ECHO MEAS - IVS/LVPW: 0.93
BH CV ECHO MEAS - IVSD: 0.69 CM
BH CV ECHO MEAS - LA DIMENSION: 2.6 CM
BH CV ECHO MEAS - LA/AO: 0.9
BH CV ECHO MEAS - LAT PEAK E' VEL: 6 CM/SEC
BH CV ECHO MEAS - LV DIASTOLIC VOL/BSA (35-75): 33.4 ML/M^2
BH CV ECHO MEAS - LV MASS(C)D: 54.3 GRAMS
BH CV ECHO MEAS - LV MASS(C)DI: 41.2 GRAMS/M^2
BH CV ECHO MEAS - LV MAX PG: 1.5 MMHG
BH CV ECHO MEAS - LV MEAN PG: 1 MMHG
BH CV ECHO MEAS - LV SYSTOLIC VOL/BSA (12-30): 13.1 ML/M^2
BH CV ECHO MEAS - LV V1 MAX: 60.8 CM/SEC
BH CV ECHO MEAS - LV V1 MEAN: 36.1 CM/SEC
BH CV ECHO MEAS - LV V1 VTI: 13.4 CM
BH CV ECHO MEAS - LVIDD: 3.2 CM
BH CV ECHO MEAS - LVIDS: 2.2 CM
BH CV ECHO MEAS - LVLD AP4: 5.7 CM
BH CV ECHO MEAS - LVLS AP4: 4.1 CM
BH CV ECHO MEAS - LVOT AREA (M): 2.8 CM^2
BH CV ECHO MEAS - LVOT AREA: 2.8 CM^2
BH CV ECHO MEAS - LVOT DIAM: 1.9 CM
BH CV ECHO MEAS - LVPWD: 0.74 CM
BH CV ECHO MEAS - MED PEAK E' VEL: 6 CM/SEC
BH CV ECHO MEAS - MV A MAX VEL: 45.1 CM/SEC
BH CV ECHO MEAS - MV DEC SLOPE: 239.5 CM/SEC^2
BH CV ECHO MEAS - MV DEC TIME: 0.26 SEC
BH CV ECHO MEAS - MV E MAX VEL: 48 CM/SEC
BH CV ECHO MEAS - MV E/A: 1.1
BH CV ECHO MEAS - MV P1/2T MAX VEL: 60.3 CM/SEC
BH CV ECHO MEAS - MV P1/2T: 73.7 MSEC
BH CV ECHO MEAS - MVA P1/2T LCG: 3.6 CM^2
BH CV ECHO MEAS - MVA(P1/2T): 3 CM^2
BH CV ECHO MEAS - PA MAX PG: 3.3 MMHG
BH CV ECHO MEAS - PA V2 MAX: 90.9 CM/SEC
BH CV ECHO MEAS - RAP SYSTOLE: 5 MMHG
BH CV ECHO MEAS - RVSP: 39.1 MMHG
BH CV ECHO MEAS - SI(AO): 116.9 ML/M^2
BH CV ECHO MEAS - SI(CUBED): 16.4 ML/M^2
BH CV ECHO MEAS - SI(LVOT): 28.8 ML/M^2
BH CV ECHO MEAS - SI(MOD-SP4): 20.3 ML/M^2
BH CV ECHO MEAS - SI(TEICH): 18.6 ML/M^2
BH CV ECHO MEAS - SV(AO): 153.9 ML
BH CV ECHO MEAS - SV(CUBED): 21.6 ML
BH CV ECHO MEAS - SV(LVOT): 38 ML
BH CV ECHO MEAS - SV(MOD-SP4): 26.7 ML
BH CV ECHO MEAS - SV(TEICH): 24.4 ML
BH CV ECHO MEAS - TR MAX VEL: 292 CM/SEC
BH CV ECHO MEASUREMENTS AVERAGE E/E' RATIO: 8
BUN SERPL-MCNC: 5 MG/DL (ref 8–23)
BUN/CREAT SERPL: 8.3 (ref 7–25)
CALCIUM SPEC-SCNC: 9 MG/DL (ref 8.6–10.5)
CHLORIDE SERPL-SCNC: 94 MMOL/L (ref 98–107)
CHOLEST SERPL-MCNC: 189 MG/DL (ref 0–200)
CO2 SERPL-SCNC: 20 MMOL/L (ref 22–29)
CREAT SERPL-MCNC: 0.6 MG/DL (ref 0.57–1)
DEPRECATED RDW RBC AUTO: 46.5 FL (ref 37–54)
ERYTHROCYTE [DISTWIDTH] IN BLOOD BY AUTOMATED COUNT: 12.9 % (ref 12.3–15.4)
GFR SERPL CREATININE-BSD FRML MDRD: 96 ML/MIN/1.73
GLUCOSE BLDC GLUCOMTR-MCNC: 78 MG/DL (ref 70–130)
GLUCOSE BLDC GLUCOMTR-MCNC: 81 MG/DL (ref 70–130)
GLUCOSE SERPL-MCNC: 75 MG/DL (ref 65–99)
HBA1C MFR BLD: 5.4 % (ref 4.8–5.6)
HCT VFR BLD AUTO: 38.9 % (ref 34–46.6)
HDLC SERPL-MCNC: 71 MG/DL (ref 40–60)
HGB BLD-MCNC: 14 G/DL (ref 12–15.9)
LDLC SERPL CALC-MCNC: 101 MG/DL (ref 0–100)
LDLC/HDLC SERPL: 1.4 {RATIO}
LEFT ATRIUM VOLUME INDEX: 22 ML/M2
MAGNESIUM SERPL-MCNC: 1.7 MG/DL (ref 1.6–2.4)
MAXIMAL PREDICTED HEART RATE: 140 BPM
MCH RBC QN AUTO: 35.1 PG (ref 26.6–33)
MCHC RBC AUTO-ENTMCNC: 36 G/DL (ref 31.5–35.7)
MCV RBC AUTO: 97.5 FL (ref 79–97)
PLATELET # BLD AUTO: 249 10*3/MM3 (ref 140–450)
PMV BLD AUTO: 10.4 FL (ref 6–12)
POTASSIUM SERPL-SCNC: 3.3 MMOL/L (ref 3.5–5.2)
RBC # BLD AUTO: 3.99 10*6/MM3 (ref 3.77–5.28)
SODIUM SERPL-SCNC: 134 MMOL/L (ref 136–145)
STRESS TARGET HR: 119 BPM
TRIGL SERPL-MCNC: 94 MG/DL (ref 0–150)
VLDLC SERPL-MCNC: 17 MG/DL (ref 5–40)
WBC # BLD AUTO: 7.87 10*3/MM3 (ref 3.4–10.8)

## 2021-04-03 PROCEDURE — 92610 EVALUATE SWALLOWING FUNCTION: CPT | Performed by: SPEECH-LANGUAGE PATHOLOGIST

## 2021-04-03 PROCEDURE — 83036 HEMOGLOBIN GLYCOSYLATED A1C: CPT | Performed by: NURSE PRACTITIONER

## 2021-04-03 PROCEDURE — 97166 OT EVAL MOD COMPLEX 45 MIN: CPT

## 2021-04-03 PROCEDURE — 93306 TTE W/DOPPLER COMPLETE: CPT | Performed by: INTERNAL MEDICINE

## 2021-04-03 PROCEDURE — 82962 GLUCOSE BLOOD TEST: CPT

## 2021-04-03 PROCEDURE — 85027 COMPLETE CBC AUTOMATED: CPT | Performed by: NURSE PRACTITIONER

## 2021-04-03 PROCEDURE — 25010000002 ONDANSETRON PER 1 MG: Performed by: NURSE PRACTITIONER

## 2021-04-03 PROCEDURE — 70551 MRI BRAIN STEM W/O DYE: CPT

## 2021-04-03 PROCEDURE — 80061 LIPID PANEL: CPT | Performed by: NURSE PRACTITIONER

## 2021-04-03 PROCEDURE — 63710000001 PROMETHAZINE PER 25 MG: Performed by: FAMILY MEDICINE

## 2021-04-03 PROCEDURE — 93306 TTE W/DOPPLER COMPLETE: CPT

## 2021-04-03 PROCEDURE — 25810000003 SODIUM CHLORIDE 0.9 % WITH KCL 20 MEQ 20-0.9 MEQ/L-% SOLUTION: Performed by: FAMILY MEDICINE

## 2021-04-03 PROCEDURE — 25010000002 KETOROLAC TROMETHAMINE PER 15 MG: Performed by: FAMILY MEDICINE

## 2021-04-03 PROCEDURE — 83735 ASSAY OF MAGNESIUM: CPT | Performed by: FAMILY MEDICINE

## 2021-04-03 PROCEDURE — 80048 BASIC METABOLIC PNL TOTAL CA: CPT | Performed by: NURSE PRACTITIONER

## 2021-04-03 PROCEDURE — 63710000001 ONDANSETRON PER 8 MG: Performed by: NURSE PRACTITIONER

## 2021-04-03 PROCEDURE — 99222 1ST HOSP IP/OBS MODERATE 55: CPT | Performed by: PSYCHIATRY & NEUROLOGY

## 2021-04-03 PROCEDURE — 93880 EXTRACRANIAL BILAT STUDY: CPT | Performed by: SURGERY

## 2021-04-03 PROCEDURE — 97161 PT EVAL LOW COMPLEX 20 MIN: CPT | Performed by: PHYSICAL THERAPIST

## 2021-04-03 PROCEDURE — 93880 EXTRACRANIAL BILAT STUDY: CPT

## 2021-04-03 RX ORDER — SODIUM CHLORIDE AND POTASSIUM CHLORIDE 150; 900 MG/100ML; MG/100ML
75 INJECTION, SOLUTION INTRAVENOUS CONTINUOUS
Status: DISCONTINUED | OUTPATIENT
Start: 2021-04-03 | End: 2021-04-05

## 2021-04-03 RX ORDER — FAMOTIDINE 10 MG/ML
20 INJECTION, SOLUTION INTRAVENOUS EVERY 12 HOURS SCHEDULED
Status: DISCONTINUED | OUTPATIENT
Start: 2021-04-03 | End: 2021-04-04

## 2021-04-03 RX ORDER — KETOROLAC TROMETHAMINE 30 MG/ML
15 INJECTION, SOLUTION INTRAMUSCULAR; INTRAVENOUS EVERY 6 HOURS PRN
Status: DISPENSED | OUTPATIENT
Start: 2021-04-03 | End: 2021-04-08

## 2021-04-03 RX ORDER — POTASSIUM CHLORIDE 750 MG/1
40 CAPSULE, EXTENDED RELEASE ORAL ONCE
Status: DISCONTINUED | OUTPATIENT
Start: 2021-04-03 | End: 2021-04-07

## 2021-04-03 RX ORDER — FAMOTIDINE 10 MG/ML
20 INJECTION, SOLUTION INTRAVENOUS 2 TIMES DAILY
Status: DISCONTINUED | OUTPATIENT
Start: 2021-04-03 | End: 2021-04-03

## 2021-04-03 RX ORDER — PROMETHAZINE HYDROCHLORIDE 25 MG/1
6.25 TABLET ORAL EVERY 6 HOURS PRN
Status: DISCONTINUED | OUTPATIENT
Start: 2021-04-03 | End: 2021-04-09 | Stop reason: HOSPADM

## 2021-04-03 RX ORDER — FAMOTIDINE 10 MG/ML
20 INJECTION, SOLUTION INTRAVENOUS DAILY
Status: DISCONTINUED | OUTPATIENT
Start: 2021-04-04 | End: 2021-04-03

## 2021-04-03 RX ORDER — POTASSIUM CHLORIDE 750 MG/1
40 CAPSULE, EXTENDED RELEASE ORAL 2 TIMES DAILY
Status: DISCONTINUED | OUTPATIENT
Start: 2021-04-03 | End: 2021-04-03

## 2021-04-03 RX ORDER — PROMETHAZINE HYDROCHLORIDE 12.5 MG/1
6.25 SUPPOSITORY RECTAL EVERY 6 HOURS PRN
Status: DISCONTINUED | OUTPATIENT
Start: 2021-04-03 | End: 2021-04-09 | Stop reason: HOSPADM

## 2021-04-03 RX ORDER — PAROXETINE HYDROCHLORIDE 20 MG/1
20 TABLET, FILM COATED ORAL DAILY
Status: DISCONTINUED | OUTPATIENT
Start: 2021-04-04 | End: 2021-04-09 | Stop reason: HOSPADM

## 2021-04-03 RX ORDER — LANOLIN ALCOHOL/MO/W.PET/CERES
3 CREAM (GRAM) TOPICAL NIGHTLY
Status: DISCONTINUED | OUTPATIENT
Start: 2021-04-03 | End: 2021-04-04

## 2021-04-03 RX ADMIN — PROMETHAZINE HYDROCHLORIDE 6.25 MG: 25 TABLET ORAL at 12:25

## 2021-04-03 RX ADMIN — FAMOTIDINE 20 MG: 10 INJECTION INTRAVENOUS at 21:11

## 2021-04-03 RX ADMIN — BUTALBITAL, ACETAMINOPHEN, AND CAFFEINE 1 TABLET: 50; 325; 40 TABLET ORAL at 08:31

## 2021-04-03 RX ADMIN — KETOROLAC TROMETHAMINE 15 MG: 30 INJECTION, SOLUTION INTRAMUSCULAR; INTRAVENOUS at 23:38

## 2021-04-03 RX ADMIN — ACEBUTOLOL HYDROCHLORIDE 200 MG: 200 CAPSULE ORAL at 21:11

## 2021-04-03 RX ADMIN — ONDANSETRON 4 MG: 4 TABLET, FILM COATED ORAL at 21:11

## 2021-04-03 RX ADMIN — ACEBUTOLOL HYDROCHLORIDE 200 MG: 200 CAPSULE ORAL at 08:31

## 2021-04-03 RX ADMIN — POTASSIUM CHLORIDE 40 MEQ: 750 CAPSULE, EXTENDED RELEASE ORAL at 08:32

## 2021-04-03 RX ADMIN — POTASSIUM CHLORIDE AND SODIUM CHLORIDE 75 ML/HR: 900; 150 INJECTION, SOLUTION INTRAVENOUS at 11:26

## 2021-04-03 RX ADMIN — KETOROLAC TROMETHAMINE 15 MG: 30 INJECTION, SOLUTION INTRAMUSCULAR; INTRAVENOUS at 11:26

## 2021-04-03 RX ADMIN — PROMETHAZINE HYDROCHLORIDE 6.25 MG: 25 TABLET ORAL at 23:38

## 2021-04-03 RX ADMIN — Medication 3 MG: at 21:11

## 2021-04-03 RX ADMIN — ONDANSETRON HYDROCHLORIDE 4 MG: 2 SOLUTION INTRAMUSCULAR; INTRAVENOUS at 08:24

## 2021-04-03 RX ADMIN — PAROXETINE 40 MG: 20 TABLET, FILM COATED ORAL at 08:31

## 2021-04-03 RX ADMIN — ASPIRIN 81 MG: 81 TABLET, CHEWABLE ORAL at 08:32

## 2021-04-03 RX ADMIN — FAMOTIDINE 20 MG: 10 INJECTION INTRAVENOUS at 08:24

## 2021-04-03 RX ADMIN — KETOROLAC TROMETHAMINE 15 MG: 30 INJECTION, SOLUTION INTRAMUSCULAR; INTRAVENOUS at 04:18

## 2021-04-03 RX ADMIN — SODIUM CHLORIDE, PRESERVATIVE FREE 10 ML: 5 INJECTION INTRAVENOUS at 21:12

## 2021-04-03 RX ADMIN — ATORVASTATIN CALCIUM 80 MG: 40 TABLET, FILM COATED ORAL at 21:11

## 2021-04-03 RX ADMIN — BUTALBITAL, ACETAMINOPHEN, AND CAFFEINE 1 TABLET: 50; 325; 40 TABLET ORAL at 15:13

## 2021-04-03 RX ADMIN — PROMETHAZINE HYDROCHLORIDE 6.25 MG: 12.5 SUPPOSITORY RECTAL at 04:27

## 2021-04-03 NOTE — NURSING NOTE
"Pt CO headache, asking for more Fioricet. NPO status maintained since she failed initial swallow eval, suppository Tylenol given.   Pt was pressing call light while nursing staff was assisting her stating \"my button doesn't work.\" Explained to pt it does.  Pt is now saying she feels sick and is going to throw up. emesis bag provided, IV fluids, and IV Zofran. Pt told RN she is going to stick her finger down her throat to make herself throw up. Provided education to not stick her finger down her throat and force herself to vomit.     "

## 2021-04-03 NOTE — THERAPY DISCHARGE NOTE
Acute Care - Speech Language Pathology   Swallow Initial Evaluation/Discharge [facility]     Patient Name: Zelalem Hung  : 1940  MRN: [id number]  Today's Date: 2021               Admit Date: 2021  Clinical bedside swallow evaluation complete. Pt alert and oriented x4. She is noted to have upper and lower dentures in place and was presented a full range of PO consistencies except for mechanical soft solids. Adequate oral perpetration and bolus clearance with all trials as well as timely swallow initiation. No overt s/s of aspiration noted at this time. Pt ok to begin a regular diet with thin liquids. Ok for meds to be administered whole with thin liquids as tolerated. SLP is signing off. MD to re-consult if concerns arise.  Bryon Dunlap MS CCC-SLP 2021 14:26 CDT    Visit Dx:    ICD-10-CM ICD-9-CM   1. Altered mental status, unspecified altered mental status type  R41.82 780.97   2. Impaired mobility  Z74.09 799.89   3. Dysphagia, unspecified type  R13.10 787.20   4. Decreased activities of daily living (ADL)  Z78.9 V49.89     Patient Active Problem List   Diagnosis   • Cholecystitis   • Encephalopathy   • Unknown when patient last well, possible stroke    • Hyponatremia   • Failure to thrive in adult   • Severe malnutrition (CMS/HCC)   • History of migraines   • Closed fracture of nasal bone with routine healing   • Traumatic ecchymosis of face   • Acquired deviated nasal septum   • Epistaxis     Past Medical History:   Diagnosis Date   • Chronic hyponatremia    • Frequent UTI    • Migraines    • SVT (supraventricular tachycardia) (CMS/HCC)      Past Surgical History:   Procedure Laterality Date   • CHOLECYSTECTOMY WITH INTRAOPERATIVE CHOLANGIOGRAM N/A 2019    Procedure: CHOLECYSTECTOMY LAPAROSCOPIC INTRAOPERATIVE CHOLANGIOGRAM;  Surgeon: Chandan Bravo MD;  Location: [facility];  Service: General           EDUCATION  The patient has been educated in the following areas:   Dysphagia  (Swallowing Impairment).    SLP Recommendation and Plan                                                                     Time Calculation:                SLP Discharge Summary  Anticipated Discharge Disposition (SLP): home  Reason for Discharge: other (see comments) (eval only)  Progress Toward Achieving Short/long Term Goals: other (see comments) (eval only)  Discharge Destination: unknown    Bryon Dunlap, MS CCC-SLP  4/6/2021

## 2021-04-03 NOTE — PLAN OF CARE
Goal Outcome Evaluation:  Plan of Care Reviewed With: patient     Outcome Summary: PT evaluation completed. The patient presents alert and oriented x4. She reports severe head pain that resembles her typical migraine pain. She reports photophobia with her migraine. She has poor motivation to move. She feels nauseated all the time and being upright makes it worse. Once upright she did belch multiple times, but reported no relief of nausea. She demonstrates no focal strength, ROM, or coordination issues. She is generally weak and unsteady during stance and gait. She will benefit from skilled PT services to work on activity tolerance, strength, and gait. Recommend discharge home with assist or SNF.

## 2021-04-03 NOTE — PLAN OF CARE
Goal Outcome Evaluation:        Outcome Summary: RD nutrition assessment completed.  Pt is currently NPO and in Cardiology.  No family in the room.  Will continue to follow for diet advancement to at least full liquids and/or initiation of AMONS. as well as further d/c needs

## 2021-04-03 NOTE — PLAN OF CARE
Goal Outcome Evaluation:  Plan of Care Reviewed With: patient, family  Progress: no change  Outcome Summary: OT evaluation completed.  Pt demo need for skilled OT services.  Pt required min A with bed mobility, CGA/min A for functional transfers, and CGA/min A for functional mobility due to need for hand held assist.  Pt completed LB dressing sitting EOB with Sup, toileting with CGA/min A for clothing management, and sink side grooming with CGA.  Pt has slow pace with all movements and her migraine pain and nausea are exacerbated with movement/activity.  Pt had 1 posterior loss of balance requiring min A to correct once ambulating from bathroom and turning to sit EOB.  OT will cont to follow to maximize her I and safety with ADLs and functional mobility.  It is recommended for pt to discharge home with assist and HH OT/PT vs SNF pending her progress.

## 2021-04-03 NOTE — PLAN OF CARE
"Goal Outcome Evaluation: Pt c/o headache and nausea most of the day that improved into the evening.  IVF now contain K. US obtained and MRI complete.  Pt able to eat some dinner tonight. Medications adjusted by Physician. Daughter at bedside much of the day.  Pt pleasant and cooperative, although, voices she is \"depressed\".  She appears upset with herself that she fell at home.  Mood and affect improved throughout the day.  Pt educated about the affects of taking Fioricet close to bedtime, as it dose contain caffeine.  Will continue to monitor.            "

## 2021-04-03 NOTE — THERAPY EVALUATION
Patient Name: Zelalem Hung  : 1940    MRN: [id number]                              Today's Date: 4/3/2021       Admit Date: 2021    Visit Dx:     ICD-10-CM ICD-9-CM   1. Altered mental status, unspecified altered mental status type  R41.82 780.97   2. Impaired mobility  Z74.09 799.89     Patient Active Problem List   Diagnosis   • Cholecystitis   • Encephalopathy   • Unknown when patient last well, possible stroke    • Hyponatremia   • Failure to thrive in adult   • Severe malnutrition (CMS/HCC)   • History of migraines     Past Medical History:   Diagnosis Date   • Chronic hyponatremia    • Frequent UTI    • Migraines    • SVT (supraventricular tachycardia) (CMS/HCC)      Past Surgical History:   Procedure Laterality Date   • CHOLECYSTECTOMY WITH INTRAOPERATIVE CHOLANGIOGRAM N/A 2019    Procedure: CHOLECYSTECTOMY LAPAROSCOPIC INTRAOPERATIVE CHOLANGIOGRAM;  Surgeon: Chandan Bravo MD;  Location: [facility];  Service: General     General Information     Row Name 21 1100          Physical Therapy Time and Intention    Document Type  evaluation 1-2 week history of weakness, AMS, s/p fall 3 /27 hitting her head, came to ER found to have UTI and sent home, returned due to decreased responsiveness  -MS     Mode of Treatment  physical therapy;co-treatment  -MS     Row Name 21 1100          General Information    Patient Profile Reviewed  yes  -MS     Prior Level of Function  independent:;all household mobility;ADL's pt was in charge of her brother's medication who lives next door and is on hospice  -MS     Existing Precautions/Restrictions  fall  -MS     Barriers to Rehab  physical barrier  -MS     Row Name 21 1100          Living Environment    Lives With  alone  -MS     Row Name 21 1100          Home Main Entrance    Number of Stairs, Main Entrance  two tub, does a sink bath  -MS     Stair Railings, Main Entrance  railing on right side (ascending)  -MS     Row Name 21  1100          Stairs Within Home, Primary    Number of Stairs, Within Home, Primary  none  -MS     Row Name 04/03/21 1100          Cognition    Orientation Status (Cognition)  oriented x 4  -MS     Row Name 04/03/21 1100          Safety Issues, Functional Mobility    Safety Issues Affecting Function (Mobility)  insight into deficits/self-awareness  -MS     Impairments Affecting Function (Mobility)  balance;endurance/activity tolerance;pain;strength pt reports bright lights make her migraine worse  -MS       User Key  (r) = Recorded By, (t) = Taken By, (c) = Cosigned By    Initials Name Provider Type    Charla Ely, PT, DPT, NCS Physical Therapist        Mobility     Row Name 04/03/21 1100          Bed Mobility    Bed Mobility  supine-sit;sit-supine  -MS     Supine-Sit [place] (Bed Mobility)  minimum assist (75% patient effort);verbal cues;nonverbal cues (demo/gesture)  -MS     Sit-Supine [place] (Bed Mobility)  minimum assist (75% patient effort);verbal cues;nonverbal cues (demo/gesture)  -MS     Assistive Device (Bed Mobility)  bed rails;head of bed elevated  -MS     Row Name 04/03/21 1100          Transfers    Comment (Transfers)  posterior lean and LOB  -MS     Row Name 04/03/21 1100          Sit-Stand Transfer    Sit-Stand [place] (Transfers)  minimum assist (75% patient effort);verbal cues;nonverbal cues (demo/gesture)  -MS     Row Name 04/03/21 1100          Gait/Stairs (Locomotion)    [place] Level (Gait)  minimum assist (75% patient effort);verbal cues;nonverbal cues (demo/gesture)  -MS     Assistive Device (Gait)  -- HHA  -MS     Distance in Feet (Gait)  15ft x2 with posterior LOB multiple times  -MS       User Key  (r) = Recorded By, (t) = Taken By, (c) = Cosigned By    Initials Name Provider Type    Charla Ely, PT, DPT, NCS Physical Therapist        Obj/Interventions     Row Name 04/03/21 1100          Range of Motion Comprehensive    General Range of Motion  no range  of motion deficits identified  -MS     Row Name 04/03/21 1100          Strength Comprehensive (MMT)    Comment, General Manual Muscle Testing (MMT) Assessment  grossly 4-/5 poor effort due to migraine, no focal deficits found  -MS     Row Name 04/03/21 1100          Balance    Balance Assessment  sitting static balance;sitting dynamic balance;standing static balance;standing dynamic balance  -MS     Static Sitting Balance  WFL;sitting, edge of bed  -MS     Dynamic Sitting Balance  WFL  -MS     Static Standing Balance  mild impairment  -MS     Dynamic Standing Balance  mild impairment  -MS     Comment, Balance  posterior lean in standing  -MS     Row Name 04/03/21 1100          Sensory Assessment (Somatosensory)    Sensory Assessment (Somatosensory)  sensation intact  -MS       User Key  (r) = Recorded By, (t) = Taken By, (c) = Cosigned By    Initials Name Provider Type    MS Charla Dennis, PT, DPT, NCS Physical Therapist        Goals/Plan     Row Name 04/03/21 1100          Bed Mobility Goal 1 (PT)    Activity/Assistive Device (Bed Mobility Goal 1, PT)  bed mobility activities, all  -MS     [place] Level/Cues Needed (Bed Mobility Goal 1, PT)  independent  -MS     Time Frame (Bed Mobility Goal 1, PT)  long term goal (LTG);by discharge  -MS     Progress/Outcomes (Bed Mobility Goal 1, PT)  goal ongoing  -MS     Row Name 04/03/21 1100          Transfer Goal 1 (PT)    Activity/Assistive Device (Transfer Goal 1, PT)  sit-to-stand/stand-to-sit;bed-to-chair/chair-to-bed  -MS     [place] Level/Cues Needed (Transfer Goal 1, PT)  independent  -MS     Time Frame (Transfer Goal 1, PT)  long term goal (LTG);by discharge  -MS     Progress/Outcome (Transfer Goal 1, PT)  goal ongoing  -MS     Row Name 04/03/21 1100          Gait Training Goal 1 (PT)    Activity/Assistive Device (Gait Training Goal 1, PT)  gait (walking locomotion);decrease fall risk;increase endurance/gait distance;improve balance and speed  -MS      [place] Level (Gait Training Goal 1, PT)  independent;modified independence  -MS     Distance (Gait Training Goal 1, PT)  100ft with assistive device if needed  -MS     Time Frame (Gait Training Goal 1, PT)  long term goal (LTG);by discharge  -MS     Progress/Outcome (Gait Training Goal 1, PT)  goal ongoing  -MS     Row Name 04/03/21 1100          Stairs Goal 1 (PT)    Activity/Assistive Device (Stairs Goal 1, PT)  ascending stairs;descending stairs;using handrail, right  -MS     [place] Level/Cues Needed (Stairs Goal 1, PT)  modified independence  -MS     Number of Stairs (Stairs Goal 1, PT)  2  -MS     Time Frame (Stairs Goal 1, PT)  long term goal (LTG);by discharge  -MS     Progress/Outcome (Stairs Goal 1, PT)  goal ongoing  -MS       User Key  (r) = Recorded By, (t) = Taken By, (c) = Cosigned By    Initials Name Provider Type    MS Charla Dennis, PT, DPT, NCS Physical Therapist        Clinical Impression     Row Name 04/03/21 1100          Pain    Additional Documentation  Pain Scale: Numbers Pre/Post-Treatment (Group)  -MS     Row Name 04/03/21 1100          Pain Scale: Numbers Pre/Post-Treatment    Pretreatment Pain Rating  7/10  -MS     Posttreatment Pain Rating  7/10  -MS     Pain Location  head  -MS     Pain Intervention(s)  Medication (See MAR);Repositioned;Ambulation/increased activity  -MS     Row Name 04/03/21 1100          Plan of Care Review    Plan of Care Reviewed With  patient  -MS     Outcome Summary  PT evaluation completed. The patient presents alert and oriented x4. She reports severe head pain that resembles her typical migraine pain. She reports photophobia with her migraine. She has poor motivation to move. She feels nauseated all the time and being upright makes it worse. Once upright she did belch multiple times, but reported no relief of nausea. She demonstrates no focal strength, ROM, or coordination issues. She is generally weak and unsteady during stance and gait. She  will benefit from skilled PT services to work on activity tolerance, strength, and gait. Recommend discharge home with assist or SNF.  -MS     Row Name 04/03/21 1100          Therapy Assessment/Plan (PT)    Patient/Family Therapy Goals Statement (PT)  feel better  -MS     Rehab Potential (PT)  good, to achieve stated therapy goals  -MS     Criteria for Skilled Interventions Met (PT)  yes;meets criteria;skilled treatment is necessary  -MS     Predicted Duration of Therapy Intervention (PT)  until discharge  -MS     Row Name 04/03/21 1100          Positioning and Restraints    Post Treatment Position  bed  -MS     In Bed  fowlers;call light within reach;encouraged to call for assist;side rails up x2;with family/caregiver  -MS       User Key  (r) = Recorded By, (t) = Taken By, (c) = Cosigned By    Initials Name Provider Type    Charla Ely, PT, DPT, NCS Physical Therapist        Outcome Measures     Row Name 04/03/21 1100          How much help from another person do you currently need...    Turning from your back to your side while in flat bed without using bedrails?  3  -MS     Moving from lying on back to sitting on the side of a flat bed without bedrails?  3  -MS     Moving to and from a bed to a chair (including a wheelchair)?  3  -MS     Standing up from a chair using your arms (e.g., wheelchair, bedside chair)?  3  -MS     Climbing 3-5 steps with a railing?  1  -MS     To walk in hospital room?  3  -MS     AM-PAC 6 Clicks Score (PT)  16  -MS     Row Name 04/03/21 1100          Functional Assessment    Outcome Measure Options  AM-PAC 6 Clicks Basic Mobility (PT)  -MS       User Key  (r) = Recorded By, (t) = Taken By, (c) = Cosigned By    Initials Name Provider Type    Charla Ely, PT, DPT, NCS Physical Therapist        Physical Therapy Education                 Title: PT OT SLP Therapies (In Progress)     Topic: Physical Therapy (In Progress)     Point: Mobility training (In Progress)     Learning  Progress Summary           Patient Acceptance, E, NR by MS at 4/3/2021 1140    Comment: role of PT in her care                   Point: Home exercise program (Not Started)     Learner Progress:  Not documented in this visit.          Point: Body mechanics (Not Started)     Learner Progress:  Not documented in this visit.          Point: Precautions (Not Started)     Learner Progress:  Not documented in this visit.                      User Key     Initials Effective Dates Name Provider Type Discipline    MS 06/19/18 -  Charla Dennis, PT, DPT, NCS Physical Therapist PT              PT Recommendation and Plan  Planned Therapy Interventions (PT): balance training, bed mobility training, gait training, patient/family education, strengthening, stair training, transfer training  Plan of Care Reviewed With: patient  Outcome Summary: PT evaluation completed. The patient presents alert and oriented x4. She reports severe head pain that resembles her typical migraine pain. She reports photophobia with her migraine. She has poor motivation to move. She feels nauseated all the time and being upright makes it worse. Once upright she did belch multiple times, but reported no relief of nausea. She demonstrates no focal strength, ROM, or coordination issues. She is generally weak and unsteady during stance and gait. She will benefit from skilled PT services to work on activity tolerance, strength, and gait. Recommend discharge home with assist or SNF.     Time Calculation:   PT Charges     Row Name 04/03/21 1100             Time Calculation    Start Time  1100 10 min this morning checking on pt  -MS      Stop Time  1128  -MS      Time Calculation (min)  28 min  -MS      PT Received On  04/03/21  -MS      PT Goal Re-Cert Due Date  04/13/21  -MS        User Key  (r) = Recorded By, (t) = Taken By, (c) = Cosigned By    Initials Name Provider Type    MS Charla Dennis, PT, DPT, NCS Physical Therapist        Therapy Charges for  Today     Code Description Service Date Service Provider Modifiers Qty    80873775829 HC PT EVAL LOW COMPLEXITY 3 4/3/2021 Charla Dennis, PT, DPT, NCS GP 1          PT G-Codes  Outcome Measure Options: AM-PAC 6 Clicks Basic Mobility (PT)  AM-PAC 6 Clicks Score (PT): 16    Charla Dennis, PT, DPT, NCS  4/3/2021

## 2021-04-03 NOTE — THERAPY EVALUATION
Patient Name: Zelalem Hung  : 1940    MRN: [id number]                              Today's Date: 4/3/2021       Admit Date: 2021    Visit Dx:     ICD-10-CM ICD-9-CM   1. Altered mental status, unspecified altered mental status type  R41.82 780.97   2. Impaired mobility  Z74.09 799.89     Patient Active Problem List   Diagnosis   • Cholecystitis   • Encephalopathy   • Unknown when patient last well, possible stroke    • Hyponatremia   • Failure to thrive in adult   • Severe malnutrition (CMS/HCC)   • History of migraines     Past Medical History:   Diagnosis Date   • Chronic hyponatremia    • Frequent UTI    • Migraines    • SVT (supraventricular tachycardia) (CMS/HCC)      Past Surgical History:   Procedure Laterality Date   • CHOLECYSTECTOMY WITH INTRAOPERATIVE CHOLANGIOGRAM N/A 2019    Procedure: CHOLECYSTECTOMY LAPAROSCOPIC INTRAOPERATIVE CHOLANGIOGRAM;  Surgeon: Chandan Bravo MD;  Location: [facility] OR;  Service: General     General Information     Row Name 21 1040          OT Time and Intention    Document Type  evaluation 1-2 week history of weakness, AMS, s/p fall 3 /27 hitting her head, came to ER found to have UTI and sent home, returned due to decreased responsiveness  -CS     Mode of Treatment  occupational therapy;co-treatment  -CS     Row Name 21 1040          General Information    Patient Profile Reviewed  yes  -CS     Prior Level of Function  independent:;ADL's;all household mobility Pt manages her brothers medication who lives across the street  -CS     Existing Precautions/Restrictions  fall  -CS     Barriers to Rehab  physical barrier  -CS     Row Name 21 1040          Occupational Profile    Performance Patterns (Occupational Profile)  Pt reports she bathes herself using wipes and does not get into tub shower any longer  -CS     Environmental Supports and Barriers (Occupational Profile)  tub shower, grab bars  -CS     Row Name 21 1040          Living  Environment    Lives With  alone  -[facility] Name 04/03/21 1040          Home Main Entrance    Number of Stairs, Main Entrance  two  -     Stair Railings, Main Entrance  railing on right side (ascending)  -     Row Name 04/03/21 1040          Cognition    Orientation Status (Cognition)  oriented x 4  -[facility] Name 04/03/21 1040          Safety Issues, Functional Mobility    Safety Issues Affecting Function (Mobility)  insight into deficits/self-awareness  -     Impairments Affecting Function (Mobility)  balance;endurance/activity tolerance;pain;strength  -       User Key  (r) = Recorded By, (t) = Taken By, (c) = Cosigned By    Initials Name Provider Type     Svitlana Anand S, OTR/L, CNT Occupational Therapist          Mobility/ADL's     Row Name 04/03/21 1040          Bed Mobility    Bed Mobility  supine-sit;sit-supine  -     Supine-Sit [place] (Bed Mobility)  minimum assist (75% patient effort);verbal cues;nonverbal cues (demo/gesture)  -     Sit-Supine [place] (Bed Mobility)  minimum assist (75% patient effort);verbal cues;nonverbal cues (demo/gesture)  -     Assistive Device (Bed Mobility)  bed rails;head of bed elevated  -[facility] Name 04/03/21 1040          Transfers    Transfers  sit-stand transfer;toilet transfer  -     Sit-Stand [place] (Transfers)  minimum assist (75% patient effort);verbal cues;nonverbal cues (demo/gesture)  -     [place] Level (Toilet Transfer)  contact guard;verbal cues  -     Assistive Device (Toilet Transfer)  commode;grab bars/safety frame  -[facility] Name 04/03/21 1040          Toilet Transfer    Type (Toilet Transfer)  stand-sit;sit-stand  -[facility] Name 04/03/21 1040          Functional Mobility    Functional Mobility- Ind. Level  contact guard assist;verbal cues required  -     Functional Mobility- Comment  Once pt got back to the EOB she had posterior LOB requiring min A to correct.  -[facility] Name 04/03/21 1040           Activities of Daily Living    BADL Assessment/Intervention  lower body dressing;toileting;grooming  -CS     Row Name 04/03/21 1040          Lower Body Dressing Assessment/Training    [place] Level (Lower Body Dressing)  don;doff;socks;supervision;verbal cues  -CS     Position (Lower Body Dressing)  edge of bed sitting  -CS     Row Name 04/03/21 1040          Toileting Assessment/Training    [place] Level (Toileting)  adjust/manage clothing;contact guard assist;minimum assist (75% patient effort);perform perineal hygiene  -CS     Assistive Devices (Toileting)  commode  -CS     Position (Toileting)  edge of bed sitting  -CS     Row Name 04/03/21 1040          Grooming Assessment/Training    [place] Level (Grooming)  contact guard assist;verbal cues;wash face, hands  -CS     Position (Grooming)  edge of bed sitting  -       User Key  (r) = Recorded By, (t) = Taken By, (c) = Cosigned By    Initials Name Provider Type    CS Svitlana Anand S, OTR/L, CNT Occupational Therapist        Obj/Interventions     Row Name 04/03/21 1040          Sensory Assessment (Somatosensory)    Sensory Assessment (Somatosensory)  sensation intact  -     Row Name 04/03/21 1040          Range of Motion Comprehensive    General Range of Motion  bilateral upper extremity ROM WNL  -     Row Name 04/03/21 1040          Strength Comprehensive (MMT)    Comment, General Manual Muscle Testing (MMT) Assessment  BUE strength: 4-/5  -CS     Row Name 04/03/21 1040          Balance    Balance Assessment  sitting static balance;sitting dynamic balance;standing static balance;standing dynamic balance  -CS     Static Sitting Balance  WNL  -CS     Dynamic Sitting Balance  WNL  -CS     Static Standing Balance  mild impairment  -CS     Dynamic Standing Balance  mild impairment  -CS     Comment, Balance  posterior lean and LOB during static and dynamic standing  -       User Key  (r) = Recorded By, (t) = Taken By, (c) = Cosigned By     Initials Name Provider Type    Svitlana Thomas, OTR/L, CNT Occupational Therapist        Goals/Plan     Row Name 04/03/21 1040          Transfer Goal 1 (OT)    Activity/Assistive Device (Transfer Goal 1, OT)  toilet  -CS     [place] Level/Cues Needed (Transfer Goal 1, OT)  standby assist  -CS     Time Frame (Transfer Goal 1, OT)  long term goal (LTG)  -CS     Progress/Outcome (Transfer Goal 1, OT)  goal ongoing  -CS     Row Name 04/03/21 1040          Dressing Goal 1 (OT)    Activity/Device (Dressing Goal 1, OT)  dressing skills, all  -CS     [place]/Cues Needed (Dressing Goal 1, OT)  supervision required  -CS     Time Frame (Dressing Goal 1, OT)  long term goal (LTG)  -CS     Progress/Outcome (Dressing Goal 1, OT)  goal ongoing  -CS     Row Name 04/03/21 1040          Toileting Goal 1 (OT)    Activity/Device (Toileting Goal 1, OT)  toileting skills, all  -CS     [place] Level/Cues Needed (Toileting Goal 1, OT)  supervision required  -CS     Time Frame (Toileting Goal 1, OT)  long term goal (LTG)  -CS     Progress/Outcome (Toileting Goal 1, OT)  goal ongoing  -CS     Row Name 04/03/21 1040          Strength Goal 1 (OT)    Strength Goal 1 (OT)  Pt will be I with BUE HEP to maximize her strength for BADLs/IADLs.  -CS     Time Frame (Strength Goal 1, OT)  long term goal (LTG)  -CS     Progress/Outcome (Strength Goal 1, OT)  goal ongoing  -CS     Row Name 04/03/21 1040          Therapy Assessment/Plan (OT)    Planned Therapy Interventions (OT)  activity tolerance training;BADL retraining;functional balance retraining;occupation/activity based interventions;patient/caregiver education/training;transfer/mobility retraining;strengthening exercise  -CS       User Key  (r) = Recorded By, (t) = Taken By, (c) = Cosigned By    Initials Name Provider Type    Svitlana Thomas, OTR/L, CNT Occupational Therapist        Clinical Impression     Row Name 04/03/21 1040          Pain Assessment    Additional  Documentation  Pain Scale: Numbers Pre/Post-Treatment (Group)  -CS     Row Name 04/03/21 1040          Pain Scale: Numbers Pre/Post-Treatment    Pretreatment Pain Rating  7/10  -CS     Posttreatment Pain Rating  7/10  -CS     Pain Location  head  -CS     Pain Intervention(s)  Medication (See MAR);Repositioned;Ambulation/increased activity  -CS     Row Name 04/03/21 1040          Pain Scale: FACES Pre/Post-Treatment    Pain: FACES Scale, Pretreatment  2-->hurts little bit  -CS     Posttreatment Pain Rating  2-->hurts little bit  -CS     Row Name 04/03/21 1040          Plan of Care Review    Plan of Care Reviewed With  patient;family  -CS     Progress  no change  -CS     Outcome Summary  OT evaluation completed.  Pt demo need for skilled OT services.  Pt required min A with bed mobility, CGA/min A for functional transfers, and CGA/min A for functional mobility due to need for hand held assist.  Pt completed LB dressing sitting EOB with Sup, toileting with CGA/min A for clothing management, and sink side grooming with CGA.  Pt has slow pace with all movements and her migraine pain and nausea are exacerbated with movement/activity.  Pt had 1 posterior loss of balance requiring min A to correct once ambulating from bathroom and turning to sit EOB.  OT will cont to follow to maximize her I and safety with ADLs and functional mobility.  It is recommended for pt to discharge home with assist and HH OT/PT vs SNF pending her progress.  -     Row Name 04/03/21 1040          Therapy Assessment/Plan (OT)    Patient/Family Therapy Goal Statement (OT)  to go home  -CS     Rehab Potential (OT)  good, to achieve stated therapy goals  -     Criteria for Skilled Therapeutic Interventions Met (OT)  yes;skilled treatment is necessary  -CS     Therapy Frequency (OT)  5 times/wk  -CS     Predicted Duration of Therapy Intervention (OT)  until hospital discharge  -CS     Row Name 04/03/21 1040          Therapy Plan Review/Discharge Plan  (OT)    Equipment Needs Upon Discharge (OT)  walker, rolling  -CS     Anticipated Discharge Disposition (OT)  home with 24/7 care;home with home health;skilled nursing facility  -CS     Row Name 04/03/21 1040          Positioning and Restraints    Pre-Treatment Position  in bed  -CS     Post Treatment Position  bed  -CS     In Bed  fowlers;call light within reach;encouraged to call for assist;exit alarm on;with family/caregiver;notified nsg;side rails up x2  -CS       User Key  (r) = Recorded By, (t) = Taken By, (c) = Cosigned By    Initials Name Provider Type    CS Svitlana Anand OTR/L, CORIE Occupational Therapist        Outcome Measures     Row Name 04/03/21 1040          How much help from another is currently needed...    Putting on and taking off regular lower body clothing?  3  -CS     Bathing (including washing, rinsing, and drying)  3  -CS     Toileting (which includes using toilet bed pan or urinal)  3  -CS     Putting on and taking off regular upper body clothing  4  -CS     Taking care of personal grooming (such as brushing teeth)  4  -CS     Eating meals  4  -CS     AM-PAC 6 Clicks Score (OT)  21  -CS     Row Name 04/03/21 1040          Functional Assessment    Outcome Measure Options  AM-PAC 6 Clicks Daily Activity (OT)  -CS       User Key  (r) = Recorded By, (t) = Taken By, (c) = Cosigned By    Initials Name Provider Type    Svitlana Thomas OTR/L, COREI Occupational Therapist        Occupational Therapy Education                 Title: PT OT SLP Therapies (In Progress)     Topic: Occupational Therapy (In Progress)     Point: ADL training (Done)     Description:   Instruct learner(s) on proper safety adaptation and remediation techniques during self care or transfers.   Instruct in proper use of assistive devices.              Learning Progress Summary           Patient Acceptance, E, VU,NR by  at 4/3/2021 1252                   Point: Home exercise program (Not Started)     Description:    Instruct learner(s) on appropriate technique for monitoring, assisting and/or progressing therapeutic exercises/activities.              Learner Progress:  Not documented in this visit.          Point: Precautions (Done)     Description:   Instruct learner(s) on prescribed precautions during self-care and functional transfers.              Learning Progress Summary           Patient Acceptance, E, VU,NR by  at 4/3/2021 1252                   Point: Body mechanics (Done)     Description:   Instruct learner(s) on proper positioning and spine alignment during self-care, functional mobility activities and/or exercises.              Learning Progress Summary           Patient Acceptance, E, VU,NR by  at 4/3/2021 1252                               User Key     Initials Effective Dates Name Provider Type Discipline     04/02/19 -  Svitlana Anand, OTR/L, CNT Occupational Therapist OT              OT Recommendation and Plan  Planned Therapy Interventions (OT): activity tolerance training, BADL retraining, functional balance retraining, occupation/activity based interventions, patient/caregiver education/training, transfer/mobility retraining, strengthening exercise  Therapy Frequency (OT): 5 times/wk  Plan of Care Review  Plan of Care Reviewed With: patient, family  Progress: no change  Outcome Summary: OT evaluation completed.  Pt demo need for skilled OT services.  Pt required min A with bed mobility, CGA/min A for functional transfers, and CGA/min A for functional mobility due to need for hand held assist.  Pt completed LB dressing sitting EOB with Sup, toileting with CGA/min A for clothing management, and sink side grooming with CGA.  Pt has slow pace with all movements and her migraine pain and nausea are exacerbated with movement/activity.  Pt had 1 posterior loss of balance requiring min A to correct once ambulating from bathroom and turning to sit EOB.  OT will cont to follow to maximize her I and safety  with ADLs and functional mobility.  It is recommended for pt to discharge home with assist and HH OT/PT vs SNF pending her progress.     Time Calculation:   Time Calculation- OT     Row Name 04/03/21 1251             Time Calculation- OT    OT Start Time  1040  -CS      OT Stop Time  1136  -CS      OT Time Calculation (min)  56 min  -CS      OT Received On  04/03/21  -CS      OT Goal Re-Cert Due Date  04/13/21  -CS        User Key  (r) = Recorded By, (t) = Taken By, (c) = Cosigned By    Initials Name Provider Type    CS Svitlana Anand OTR/L, CORIE Occupational Therapist        Therapy Charges for Today     Code Description Service Date Service Provider Modifiers Qty    [id number] HC OT EVAL MOD COMPLEXITY 4 4/3/2021 Svitlana Anand OTR/L, CNT GO 1               AURORA Abrams/L, CNT  4/3/2021

## 2021-04-03 NOTE — CONSULTS
Neurology Consult Note    Patient:  Zelalem Hung   YOB: 1940  MRN:  [id number]  Date of Admission:  4/2/2021  4:47 PM    Date: 4/3/2021    Referring Provider: Silvana Hernandez APRN  Reason for Consultation: suspect stroke      History of present illness:     This is a 80 y.o.  female.with H/O chronic hyponatremia, migraine headaches, SVT, osteoporosis, anxiety, dyslipidemia,  1/2ppd smoker, evaluated for a suspected stroke    Patient noted a headache, dizziness and weakness on 3/27 and fell in the middle of the night when she got up to use bathroom.  She does not recall why she fell or that she felt dizzy but records indicate she c/o this. She hit her head left temple on her dresser. . She presented to our ER on 3/30/21 with some confusion but noting headache and neck pain after the  fall . She had an unremarkable head Ct (except microvascular disease) and Cervical spine Ct. She was diagnosed with a UTI and with noted hematuria, given antibiotics (Omnicef) and sent home. She returned yesterday.when daughter called the ambulance.  She had been normal at 10 AM and returned to see her mom (the patient) around 3:30 PM and found her with altered mental state where she does not resist anything but will not cooperate, not really responding. She moves all 4 extremities and does not show any cranial nerve weakness. Repeat Head CT was unremarkable  She currently c/o nausea and states this occurs every times she stands. She is asking for something to help her sleep and to treat her nausea.     MRI of brain does not show an acute stroke  UDS + for benzo's and barbiturates.--Patient reports that she had 4 Fiorcets and also take lorazepam.   Sodium 134  K+ 3.3  Free T4 1.75  TSH is normal BUN, creat normal    However notes from 2015 indicate she has a H/O atrial fibrillation  Past Medical History:   Diagnosis Date   • Chronic hyponatremia    • Frequent UTI    • Migraines    • SVT (supraventricular  tachycardia) (CMS/HCC)        Past Surgical History:   Procedure Laterality Date   • CHOLECYSTECTOMY WITH INTRAOPERATIVE CHOLANGIOGRAM N/A 5/9/2019    Procedure: CHOLECYSTECTOMY LAPAROSCOPIC INTRAOPERATIVE CHOLANGIOGRAM;  Surgeon: Chandan Bravo MD;  Location: [facility];  Service: General       Prior to Admission medications    Medication Sig Start Date End Date Taking? Authorizing Provider   acebutolol (SECTRAL) 200 MG capsule Take 200 mg by mouth 2 (Two) Times a Day.    Levy Lo MD   aspirin 81 MG chewable tablet Chew 81 mg Daily.    Levy Lo MD   butalbital-acetaminophen-caffeine (FIORICET, ESGIC) -40 MG per tablet Take 1 tablet by mouth Every 4 (Four) Hours As Needed for Headache.    Levy Lo MD   cefdinir (OMNICEF) 300 MG capsule Take 1 capsule by mouth 2 (Two) Times a Day for 5 days. 3/30/21 4/4/21  Leonardo Jin MD   cholecalciferol (VITAMIN D3) 1000 units tablet Take 2,000 Units by mouth Daily.    Levy Lo MD   fenofibrate (TRICOR) 145 MG tablet Take 145 mg by mouth Daily.    Levy Lo MD   LORazepam (ATIVAN) 2 MG tablet Take 1 mg by mouth As Needed. 10/7/19   Emergency, Nurse Rosa, RN   metoclopramide (REGLAN) 5 MG tablet Take 5 mg by mouth 2 (Two) Times a Day.    Levy Lo MD   PARoxetine (PAXIL) 20 MG tablet Take 40 mg by mouth Every Morning.    Levy Lo MD       Hospital scheduled medications:   acebutolol, 200 mg, Oral, Q12H  aspirin, 81 mg, Oral, Daily  atorvastatin, 80 mg, Oral, Nightly  famotidine, 20 mg, Intravenous, Q12H  [START ON 4/4/2021] PARoxetine, 20 mg, Oral, Daily  potassium chloride, 40 mEq, Oral, Once  sodium chloride, 10 mL, Intravenous, Q12H      Hospital PRN medications:  acetaminophen **OR** acetaminophen **OR** acetaminophen  •  butalbital-acetaminophen-caffeine  •  ketorolac  •  ondansetron **OR** ondansetron  •  promethazine **OR** promethazine  •  [COMPLETED] Insert peripheral IV  **AND** sodium chloride  •  sodium chloride    Allergies   Allergen Reactions   • Codeine Anaphylaxis       Social History     Socioeconomic History   • Marital status:      Spouse name: Not on file   • Number of children: Not on file   • Years of education: Not on file   • Highest education level: Not on file   Tobacco Use   • Smoking status: Never Smoker   • Smokeless tobacco: Never Used   Vaping Use   • Vaping Use: Never used   Substance and Sexual Activity   • Alcohol use: No   • Drug use: No   • Sexual activity: Defer     Family History   Problem Relation Age of Onset   • Stroke Mother    • Anemia Mother    • Heart disease Father    • Heart attack Father    • Colon cancer Father        Review of Systems  A 14 point review of systems was reviewed and was negative except for nausea and headache (ranked a 2 or 3) and difficulty falling asleep    Vital Signs   Temp:  [97 °F (36.1 °C)-99.4 °F (37.4 °C)] 97.6 °F (36.4 °C)  Heart Rate:  [69-88] 69  Resp:  [16-18] 16  BP: (107-137)/(50-68) 136/58    General Exam:  Head:  Normal cephalic, atraumatic  HEENT:  Neck supple  Fundoscopic Exam:  No signs of disc edema  CVS:  Regular rate and rhythm.  No murmurs  Carotid Examination:  No bruits  Lungs:  Clear to auscultation  Abdomen:  Non-tender, Non-distended  Extremities:  No signs of peripheral edema  Skin:  No rashes    Neurologic Exam:    Mental Status:    -Awake, Alert, Oriented to person, place, month, year (although corrected herself as she initially stated it was 2023 then changed it to 2021.  Knew month, season, floor, city, state, county and who the president is. She knows tomorrow is [facility].  -No word finding difficulties  -No aphasia  -No dysarthria  -Follows simple and complex commands    CN II:  Visual fields full.  Pupils equally reactive to light  CN III, IV, VI:  Extraocular Muscles full with no signs of nystagmus  CN V:  Facial sensory is symmetric   CN VII:  Facial motor symmetric except from  surgery left forehead   CN VIII:  Gross hearing intact bilaterally  CN IX/X:  Palate elevates symmetrically  CN XI:  Shoulder shrug symmetric  CN XII:  Tongue is midline on protrusion    Motor: (strength out of 5:  1= minimal movement, 2 = movement in plane of gravity, 3 = movement against gravity, 4 = movement against some resistance, 5 = full strength)    -Right Upper Ext: Proximal: 5 Distal: 5  -Left Upper Ext: Proximal: 5 Distal: 5    -Right Lower Ext: Proximal: 5 Distal: 5  -Left Lower Ext: Proximal: 5 Distal: 5    DTR:  -Right   Bicep: 2+ Triceps: 2+ Brachioradialis: 2+   Patella: 2+ Ankle: 2+ Neg Babinski  -Left   Bicep: 2+ Triceps: 2+ Brachioradialis: 2+   Patella: 2+ Ankle: 2+ Neg Babinski    Sensory:  -Intact to light touch, pinprick, temperature, pain, and proprioception    Coordination:  -Finger to nose intact  -Heel to shin intact  -No ataxia    Gait  -No signs of ataxia  -ambulates unassisted      Results Review:  Lab Results (last 7 days)     Procedure Component Value Units Date/Time    POC Glucose Once [[id number]]  (Normal) Collected: 04/03/21 1214    Specimen: Blood Updated: 04/03/21 1225     Glucose 78 mg/dL      Comment: : BAM Alvarezkevin EricaMeter ID: [id number]       Magnesium [[id number]]  (Normal) Collected: 04/03/21 0404    Specimen: Blood Updated: 04/03/21 0753     Magnesium 1.7 mg/dL     Hemoglobin A1c [[id number]]  (Normal) Collected: 04/03/21 0404    Specimen: Blood Updated: 04/03/21 0538     Hemoglobin A1C 5.40 %     Narrative:      Hemoglobin A1C Ranges:    Increased Risk for Diabetes  5.7% to 6.4%  Diabetes                     >= 6.5%  Diabetic Goal                < 7.0%    Basic Metabolic Panel [[id number]]  (Abnormal) Collected: 04/03/21 0404    Specimen: Blood Updated: 04/03/21 0525     Glucose 75 mg/dL      BUN 5 mg/dL      Creatinine 0.60 mg/dL      Sodium 134 mmol/L      Potassium 3.3 mmol/L      Chloride 94 mmol/L      CO2 20.0 mmol/L      Calcium 9.0 mg/dL      eGFR Non   Amer 96 mL/min/1.73      BUN/Creatinine Ratio 8.3     Anion Gap 20.0 mmol/L     Narrative:      GFR Normal >60  Chronic Kidney Disease <60  Kidney Failure <15      Lipid Panel [[id number]]  (Abnormal) Collected: 04/03/21 0404    Specimen: Blood Updated: 04/03/21 0525     Total Cholesterol 189 mg/dL      Triglycerides 94 mg/dL      HDL Cholesterol 71 mg/dL      LDL Cholesterol  101 mg/dL      VLDL Cholesterol 17 mg/dL      LDL/HDL Ratio 1.40    Narrative:      Cholesterol Reference Ranges  (U.S. Department of Health and Human Services ATP III Classifications)    Desirable          <200 mg/dL  Borderline High    200-239 mg/dL  High Risk          >240 mg/dL      Triglyceride Reference Ranges  (U.S. Department of Health and Human Services ATP III Classifications)    Normal           <150 mg/dL  Borderline High  150-199 mg/dL  High             200-499 mg/dL  Very High        >500 mg/dL    HDL Reference Ranges  (U.S. Department of Health and Human Services ATP III Classifications)    Low     <40 mg/dl (major risk factor for CHD)  High    >60 mg/dl ('negative' risk factor for CHD)        LDL Reference Ranges  (U.S. Department of Health and Human Services ATP III Classifications)    Optimal          <100 mg/dL  Near Optimal     100-129 mg/dL  Borderline High  130-159 mg/dL  High             160-189 mg/dL  Very High        >189 mg/dL    CBC (No Diff) [[id number]]  (Abnormal) Collected: 04/03/21 0404    Specimen: Blood Updated: 04/03/21 0458     WBC 7.87 10*3/mm3      RBC 3.99 10*6/mm3      Hemoglobin 14.0 g/dL      Hematocrit 38.9 %      MCV 97.5 fL      MCH 35.1 pg      MCHC 36.0 g/dL      RDW 12.9 %      RDW-SD 46.5 fl      MPV 10.4 fL      Platelets 249 10*3/mm3     Osmolality, Urine - Urine, Catheter [[id number]]  (Abnormal) Collected: 04/02/21 1730    Specimen: Urine, Catheter Updated: 04/02/21 2314     Osmolality, Urine 249 mOsm/kg     Urine Drug Screen - Urine, Catheter [[id number]]  (Abnormal) Collected: 04/02/21  1730    Specimen: Urine, Catheter Updated: 04/02/21 2257     THC, Screen, Urine Negative     Phencyclidine (PCP), Urine Negative     Cocaine Screen, Urine Negative     Methamphetamine, Ur Negative     Opiate Screen Negative     Amphetamine Screen, Urine Negative     Benzodiazepine Screen, Urine Positive     Tricyclic Antidepressants Screen Negative     Methadone Screen, Urine Negative     Barbiturates Screen, Urine Positive     Oxycodone Screen, Urine Negative     Propoxyphene Screen Negative     Buprenorphine, Screen, Urine Negative    Narrative:      Cutoff For Drugs Screened:    Amphetamines               500 ng/ml  Barbiturates               200 ng/ml  Benzodiazepines            150 ng/ml  Cocaine                    150 ng/ml  Methadone                  200 ng/ml  Opiates                    100 ng/ml  Phencyclidine               25 ng/ml  THC                            50 ng/ml  Methamphetamine            500 ng/ml  Tricyclic Antidepressants  300 ng/ml  Oxycodone                  100 ng/ml  Propoxyphene               300 ng/ml  Buprenorphine               10 ng/ml    The normal value for all drugs tested is negative. This report includes unconfirmed screening results, with the cutoff values listed, to be used for medical treatment purposes only.  Unconfirmed results must not be used for non-medical purposes such as employment or legal testing.  Clinical consideration should be applied to any drug of abuse test, particularly when unconfirmed results are used.      Sodium, Urine, Random - Urine, Catheter [[id number]] Collected: 04/02/21 1730    Specimen: Urine, Catheter Updated: 04/02/21 2242     Sodium, Urine 71 mmol/L     Narrative:      Reference intervals for random urine have not been established.  Clinical usage is dependent upon physician's interpretation in combination with other laboratory tests.       COVID PRE-OP / PRE-PROCEDURE SCREENING ORDER (NO ISOLATION) - Swab, Nasal Cavity [[id number]]  (Normal)  Collected: 04/02/21 1908    Specimen: Swab from Nasal Cavity Updated: 04/02/21 2000    Narrative:      The following orders were created for panel order COVID PRE-OP / PRE-PROCEDURE SCREENING ORDER (NO ISOLATION) - Swab, Nasal Cavity.  Procedure                               Abnormality         Status                     ---------                               -----------         ------                     COVID-19,[facility] IN-KORY...[[id number]]  Normal              Final result                 Please view results for these tests on the individual orders.    COVID-19,[facility] Bio IN-HOUSE,Nasal Swab No Transport Media 3-4 HR TAT - Swab, Nasal Cavity [[id number]]  (Normal) Collected: 04/02/21 1908    Specimen: Swab from Nasal Cavity Updated: 04/02/21 2000     COVID19 Not Detected    Narrative:      Fact sheet for providers: https://www.fda.gov/media/673322/download     Fact sheet for patients: https://www.fda.gov/media/917256/download    Test performed by PCR.    Consider negative results in combination with clinical observations, patient history, and epidemiological information.    TSH [[id number]]  (Normal) Collected: 04/02/21 1729    Specimen: Blood Updated: 04/02/21 1804     TSH 0.912 uIU/mL     T4, Free [[id number]]  (Abnormal) Collected: 04/02/21 1729    Specimen: Blood Updated: 04/02/21 1804     Free T4 1.75 ng/dL     Narrative:      Results may be falsely increased if patient taking Biotin.      Comprehensive Metabolic Panel [[id number]]  (Abnormal) Collected: 04/02/21 1729    Specimen: Blood Updated: 04/02/21 1758     Glucose 118 mg/dL      BUN 5 mg/dL      Creatinine 0.62 mg/dL      Sodium 131 mmol/L      Potassium 3.6 mmol/L      Chloride 92 mmol/L      CO2 24.0 mmol/L      Calcium 9.4 mg/dL      Total Protein 6.9 g/dL      Albumin 4.20 g/dL      ALT (SGPT) 20 U/L      AST (SGOT) 46 U/L      Alkaline Phosphatase 63 U/L      Total Bilirubin 0.4 mg/dL      eGFR Non African Amer 93 mL/min/1.73      Globulin 2.7 gm/dL       A/G Ratio 1.6 g/dL      BUN/Creatinine Ratio 8.1     Anion Gap 15.0 mmol/L     Narrative:      GFR Normal >60  Chronic Kidney Disease <60  Kidney Failure <15      Magnesium [[id number]]  (Normal) Collected: 04/02/21 1729    Specimen: Blood Updated: 04/02/21 1752     Magnesium 1.7 mg/dL     aPTT [[id number]]  (Normal) Collected: 04/02/21 1729    Specimen: Blood Updated: 04/02/21 1747     PTT 30.0 seconds     Protime-INR [[id number]]  (Abnormal) Collected: 04/02/21 1729    Specimen: Blood Updated: 04/02/21 1747     Protime 13.9 Seconds      INR 1.11    Urinalysis, Microscopic Only - Urine, Catheter [[id number]] Collected: 04/02/21 1730    Specimen: Urine, Catheter Updated: 04/02/21 1745     RBC, UA None Seen /HPF      WBC, UA None Seen /HPF      Bacteria, UA None Seen /HPF      Squamous Epithelial Cells, UA None Seen /HPF      Hyaline Casts, UA None Seen /LPF      Methodology Automated Microscopy    Urinalysis With Culture If Indicated - Urine, Catheter [[id number]]  (Abnormal) Collected: 04/02/21 1730    Specimen: Urine, Catheter Updated: 04/02/21 1745     Color, UA Yellow     Appearance, UA Clear     pH, UA 7.5     Specific Gravity, UA 1.011     Glucose, UA Negative     Ketones, UA Negative     Bilirubin, UA Negative     Blood, UA Moderate (2+)     Protein, UA Trace     Leuk Esterase, UA Negative     Nitrite, UA Negative     Urobilinogen, UA 0.2 E.U./dL    CBC & Differential [[id number]]  (Abnormal) Collected: 04/02/21 1729    Specimen: Blood Updated: 04/02/21 1738    Narrative:      The following orders were created for panel order CBC & Differential.  Procedure                               Abnormality         Status                     ---------                               -----------         ------                     CBC Auto Differential[[id number]]        Abnormal            Final result                 Please view results for these tests on the individual orders.    CBC Auto Differential [[id number]]   (Abnormal) Collected: 04/02/21 1729    Specimen: Blood Updated: 04/02/21 1738     WBC 6.54 10*3/mm3      RBC 4.03 10*6/mm3      Hemoglobin 14.0 g/dL      Hematocrit 38.9 %      MCV 96.5 fL      MCH 34.7 pg      MCHC 36.0 g/dL      RDW 13.0 %      RDW-SD 46.2 fl      MPV 9.7 fL      Platelets 230 10*3/mm3      Neutrophil % 72.4 %      Lymphocyte % 16.5 %      Monocyte % 9.8 %      Eosinophil % 0.2 %      Basophil % 0.6 %      Immature Grans % 0.5 %      Neutrophils, Absolute 4.74 10*3/mm3      Lymphocytes, Absolute 1.08 10*3/mm3      Monocytes, Absolute 0.64 10*3/mm3      Eosinophils, Absolute 0.01 10*3/mm3      Basophils, Absolute 0.04 10*3/mm3      Immature Grans, Absolute 0.03 10*3/mm3      nRBC 0.0 /100 WBC           .  Imaging Results (Last 24 Hours)     Procedure Component Value Units Date/Time    MRI Brain Without Contrast [[id number]] Collected: 04/03/21 0953     Updated: 04/03/21 1001    Narrative:      Exam: MRI BRAIN WO CONTRAST-     Indication: Stroke, follow up; R41.82-Altered mental status, unspecified     Comparison: CT head 4/2/2021     Findings:     No abnormal diffusion restriction. No increased susceptibility to  suggest hemorrhage. Major physiologic flow voids are maintained.     Patchy and confluent areas of increased T2/FLAIR signal are present in  the periventricular and subcortical white matter. This is a pattern most  consistent with chronic microvascular ischemia. No midline shift or mass  effect. Mild global cerebral volume loss with ex vacuo ventricular  dilatation is noted. Sella turcica and its contents are unremarkable.     Orbits are unremarkable. Paranasal sinuses are clear. Mastoid air cells  are clear.       Impression:         1.  No acute intracranial findings.  2.  Global cerebral volume loss and presumed chronic microvascular  ischemic white matter change.  This report was finalized on 04/03/2021 09:58 by Dr. Sonny Mtz MD.              Impression  1. Encephalopathy, improved  and likely due to medication   In particular lorazepam and Fioricet in an 80 year old who would not be able to metabolize these medication well due to her age  2. Medication overuse headache in addition to headache from head injury  3. 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  4. Nausea with standing    Plan    · Discontinue Fioricet and all opioids  · Discontinue lorazepam and all benzo's  · She would benefit from further PT to help with gait imbalance  · Orthostase patient  · Zofran now  · Melatonin for sleep        I discussed the patients findings and my recommendations with patient and nursing staff    Kym Trimble MD  04/03/21  14:24 CDT

## 2021-04-03 NOTE — PLAN OF CARE
Clinical bedside swallow evaluation complete. Pt alert and oriented x4. She is noted to have upper and lower dentures in place and was presented a full range of PO consistencies except for mechanical soft solids. Adequate oral perpetration and bolus clearance with all trials as well as timely swallow initiation. No overt s/s of aspiration noted at this time. Pt ok to begin a regular diet with thin liquids. Ok for meds to be administered whole with thin liquids as tolerated. SLP is signing off. MD to re-consult if concerns arise.

## 2021-04-03 NOTE — PROGRESS NOTES
[facility] Medicine Services  INPATIENT PROGRESS NOTE    Length of Stay: 1  Date of Admission: 4/2/2021  Primary Care Physician: Garth Amezcua APRN    Subjective   Chief Complaint: Failure to thrive.    HPI   Patient is remained weak.  Patient denies any chest pain.  Patient having shortness of breath.  T-max 99.4.  T-current 98.2.  Blood pressure stable.  Patient left by her side with her daughter house and her brothers house nearby    Review of Systems   Constitutional: Positive for activity change, appetite change and fatigue. Negative for chills and fever.   HENT: Negative for hearing loss, nosebleeds, tinnitus and trouble swallowing.    Eyes: Negative for visual disturbance.   Respiratory: Negative for cough, chest tightness, shortness of breath and wheezing.    Cardiovascular: Negative for chest pain, palpitations and leg swelling.   Gastrointestinal: Positive for abdominal pain and nausea. Negative for abdominal distention, blood in stool, constipation, diarrhea and vomiting.   Endocrine: Negative for cold intolerance, heat intolerance, polydipsia, polyphagia and polyuria.   Genitourinary: Negative for decreased urine volume, difficulty urinating, dysuria, flank pain, frequency and hematuria.   Musculoskeletal: Positive for arthralgias, gait problem and myalgias. Negative for joint swelling.   Skin: Negative for rash.   Allergic/Immunologic: Negative for immunocompromised state.   Neurological: Positive for weakness. Negative for dizziness, syncope, light-headedness and headaches.   Hematological: Negative for adenopathy. Does not bruise/bleed easily.   Psychiatric/Behavioral: Negative for confusion and sleep disturbance. The patient is not nervous/anxious.           All pertinent negatives and positives are as above. All other systems have been reviewed and are negative unless otherwise stated.     Objective    Temp:  [97 °F (36.1 °C)-99.4 °F (37.4 °C)] 98.2 °F  (36.8 °C)  Heart Rate:  [75-88] 81  Resp:  [16-18] 16  BP: (107-137)/(50-68) 121/52  No intake or output data in the 24 hours ending 04/03/21 1023  Physical Exam  Vitals and nursing note reviewed.   Constitutional:       Appearance: She is well-developed.      Comments: Cachectic.   HENT:      Head: Normocephalic.   Eyes:      Conjunctiva/sclera: Conjunctivae normal.      Pupils: Pupils are equal, round, and reactive to light.   Neck:      Vascular: No JVD.   Cardiovascular:      Rate and Rhythm: Normal rate and regular rhythm.      Heart sounds: Normal heart sounds. No murmur heard.   No friction rub. No gallop.    Pulmonary:      Effort: Pulmonary effort is normal. No respiratory distress.      Breath sounds: Normal breath sounds. No wheezing or rales.      Comments: Diminished breath sound bilateral, clear.  Chest:      Chest wall: No tenderness.   Abdominal:      General: Bowel sounds are normal. There is no distension.      Palpations: Abdomen is soft.      Tenderness: There is abdominal tenderness. There is no guarding or rebound.      Comments: Epigastric discomfort.  No guarding no rebound   Musculoskeletal:         General: No tenderness or deformity. Normal range of motion.      Cervical back: Neck supple.   Skin:     General: Skin is warm and dry.      Findings: No rash.   Neurological:      Mental Status: She is alert and oriented to person, place, and time.      Cranial Nerves: No cranial nerve deficit.      Motor: No abnormal muscle tone.      Deep Tendon Reflexes: Reflexes normal.   Psychiatric:         Behavior: Behavior normal.         Thought Content: Thought content normal.         Judgment: Judgment normal.         Results Review:  Lab Results (last 24 hours)       Procedure Component Value Units Date/Time    Magnesium [[id number]]  (Normal) Collected: 04/03/21 0404    Specimen: Blood Updated: 04/03/21 0753     Magnesium 1.7 mg/dL     Hemoglobin A1c [[id number]]  (Normal) Collected: 04/03/21 0404     Specimen: Blood Updated: 04/03/21 0538     Hemoglobin A1C 5.40 %     Narrative:      Hemoglobin A1C Ranges:    Increased Risk for Diabetes  5.7% to 6.4%  Diabetes                     >= 6.5%  Diabetic Goal                < 7.0%    Basic Metabolic Panel [[id number]]  (Abnormal) Collected: 04/03/21 0404    Specimen: Blood Updated: 04/03/21 0525     Glucose 75 mg/dL      BUN 5 mg/dL      Creatinine 0.60 mg/dL      Sodium 134 mmol/L      Potassium 3.3 mmol/L      Chloride 94 mmol/L      CO2 20.0 mmol/L      Calcium 9.0 mg/dL      eGFR Non African Amer 96 mL/min/1.73      BUN/Creatinine Ratio 8.3     Anion Gap 20.0 mmol/L     Narrative:      GFR Normal >60  Chronic Kidney Disease <60  Kidney Failure <15      Lipid Panel [[id number]]  (Abnormal) Collected: 04/03/21 0404    Specimen: Blood Updated: 04/03/21 0525     Total Cholesterol 189 mg/dL      Triglycerides 94 mg/dL      HDL Cholesterol 71 mg/dL      LDL Cholesterol  101 mg/dL      VLDL Cholesterol 17 mg/dL      LDL/HDL Ratio 1.40    Narrative:      Cholesterol Reference Ranges  (U.S. Department of Health and Human Services ATP III Classifications)    Desirable          <200 mg/dL  Borderline High    200-239 mg/dL  High Risk          >240 mg/dL      Triglyceride Reference Ranges  (U.S. Department of Health and Human Services ATP III Classifications)    Normal           <150 mg/dL  Borderline High  150-199 mg/dL  High             200-499 mg/dL  Very High        >500 mg/dL    HDL Reference Ranges  (U.S. Department of Health and Human Services ATP III Classifcations)    Low     <40 mg/dl (major risk factor for CHD)  High    >60 mg/dl ('negative' risk factor for CHD)        LDL Reference Ranges  (U.S. Department of Health and Human Services ATP III Classifcations)    Optimal          <100 mg/dL  Near Optimal     100-129 mg/dL  Borderline High  130-159 mg/dL  High             160-189 mg/dL  Very High        >189 mg/dL    CBC (No Diff) [[id number]]  (Abnormal) Collected:  04/03/21 0404    Specimen: Blood Updated: 04/03/21 0458     WBC 7.87 10*3/mm3      RBC 3.99 10*6/mm3      Hemoglobin 14.0 g/dL      Hematocrit 38.9 %      MCV 97.5 fL      MCH 35.1 pg      MCHC 36.0 g/dL      RDW 12.9 %      RDW-SD 46.5 fl      MPV 10.4 fL      Platelets 249 10*3/mm3     Osmolality, Urine - Urine, Catheter [[id number]]  (Abnormal) Collected: 04/02/21 1730    Specimen: Urine, Catheter Updated: 04/02/21 2314     Osmolality, Urine 249 mOsm/kg     Urine Drug Screen - Urine, Catheter [[id number]]  (Abnormal) Collected: 04/02/21 1730    Specimen: Urine, Catheter Updated: 04/02/21 2257     THC, Screen, Urine Negative     Phencyclidine (PCP), Urine Negative     Cocaine Screen, Urine Negative     Methamphetamine, Ur Negative     Opiate Screen Negative     Amphetamine Screen, Urine Negative     Benzodiazepine Screen, Urine Positive     Tricyclic Antidepressants Screen Negative     Methadone Screen, Urine Negative     Barbiturates Screen, Urine Positive     Oxycodone Screen, Urine Negative     Propoxyphene Screen Negative     Buprenorphine, Screen, Urine Negative    Narrative:      Cutoff For Drugs Screened:    Amphetamines               500 ng/ml  Barbiturates               200 ng/ml  Benzodiazepines            150 ng/ml  Cocaine                    150 ng/ml  Methadone                  200 ng/ml  Opiates                    100 ng/ml  Phencyclidine               25 ng/ml  THC                            50 ng/ml  Methamphetamine            500 ng/ml  Tricyclic Antidepressants  300 ng/ml  Oxycodone                  100 ng/ml  Propoxyphene               300 ng/ml  Buprenorphine               10 ng/ml    The normal value for all drugs tested is negative. This report includes unconfirmed screening results, with the cutoff values listed, to be used for medical treatment purposes only.  Unconfirmed results must not be used for non-medical purposes such as employment or legal testing.  Clinical consideration should be  applied to any drug of abuse test, particularly when unconfirmed results are used.      Sodium, Urine, Random - Urine, Catheter [[id number]] Collected: 04/02/21 1730    Specimen: Urine, Catheter Updated: 04/02/21 2242     Sodium, Urine 71 mmol/L     Narrative:      Reference intervals for random urine have not been established.  Clinical usage is dependent upon physician's interpretation in combination with other laboratory tests.       COVID PRE-OP / PRE-PROCEDURE SCREENING ORDER (NO ISOLATION) - Swab, Nasal Cavity [[id number]]  (Normal) Collected: 04/02/21 1908    Specimen: Swab from Nasal Cavity Updated: 04/02/21 2000    Narrative:      The following orders were created for panel order COVID PRE-OP / PRE-PROCEDURE SCREENING ORDER (NO ISOLATION) - Swab, Nasal Cavity.  Procedure                               Abnormality         Status                     ---------                               -----------         ------                     COVID-19,Jane Bio IN-KORY...[[id number]]  Normal              Final result                 Please view results for these tests on the individual orders.    COVID-19,Jane Bio IN-HOUSE,Nasal Swab No Transport Media 3-4 HR TAT - Swab, Nasal Cavity [[id number]]  (Normal) Collected: 04/02/21 1908    Specimen: Swab from Nasal Cavity Updated: 04/02/21 2000     COVID19 Not Detected    Narrative:      Fact sheet for providers: https://www.fda.gov/media/560423/download     Fact sheet for patients: https://www.fda.gov/media/125478/download    Test performed by PCR.    Consider negative results in combination with clinical observations, patient history, and epidemiological information.    TSH [[id number]]  (Normal) Collected: 04/02/21 1729    Specimen: Blood Updated: 04/02/21 1804     TSH 0.912 uIU/mL     T4, Free [[id number]]  (Abnormal) Collected: 04/02/21 1729    Specimen: Blood Updated: 04/02/21 1804     Free T4 1.75 ng/dL     Narrative:      Results may be falsely increased if patient taking  Biotin.      Comprehensive Metabolic Panel [[id number]]  (Abnormal) Collected: 04/02/21 1729    Specimen: Blood Updated: 04/02/21 1758     Glucose 118 mg/dL      BUN 5 mg/dL      Creatinine 0.62 mg/dL      Sodium 131 mmol/L      Potassium 3.6 mmol/L      Chloride 92 mmol/L      CO2 24.0 mmol/L      Calcium 9.4 mg/dL      Total Protein 6.9 g/dL      Albumin 4.20 g/dL      ALT (SGPT) 20 U/L      AST (SGOT) 46 U/L      Alkaline Phosphatase 63 U/L      Total Bilirubin 0.4 mg/dL      eGFR Non African Amer 93 mL/min/1.73      Globulin 2.7 gm/dL      A/G Ratio 1.6 g/dL      BUN/Creatinine Ratio 8.1     Anion Gap 15.0 mmol/L     Narrative:      GFR Normal >60  Chronic Kidney Disease <60  Kidney Failure <15      Magnesium [[id number]]  (Normal) Collected: 04/02/21 1729    Specimen: Blood Updated: 04/02/21 1752     Magnesium 1.7 mg/dL     aPTT [[id number]]  (Normal) Collected: 04/02/21 1729    Specimen: Blood Updated: 04/02/21 1747     PTT 30.0 seconds     Protime-INR [[id number]]  (Abnormal) Collected: 04/02/21 1729    Specimen: Blood Updated: 04/02/21 1747     Protime 13.9 Seconds      INR 1.11    Urinalysis, Microscopic Only - Urine, Catheter [[id number]] Collected: 04/02/21 1730    Specimen: Urine, Catheter Updated: 04/02/21 1745     RBC, UA None Seen /HPF      WBC, UA None Seen /HPF      Bacteria, UA None Seen /HPF      Squamous Epithelial Cells, UA None Seen /HPF      Hyaline Casts, UA None Seen /LPF      Methodology Automated Microscopy    Urinalysis With Culture If Indicated - Urine, Catheter [[id number]]  (Abnormal) Collected: 04/02/21 1730    Specimen: Urine, Catheter Updated: 04/02/21 1745     Color, UA Yellow     Appearance, UA Clear     pH, UA 7.5     Specific Gravity, UA 1.011     Glucose, UA Negative     Ketones, UA Negative     Bilirubin, UA Negative     Blood, UA Moderate (2+)     Protein, UA Trace     Leuk Esterase, UA Negative     Nitrite, UA Negative     Urobilinogen, UA 0.2 E.U./dL    CBC & Differential  [[id number]]  (Abnormal) Collected: 04/02/21 1729    Specimen: Blood Updated: 04/02/21 1738    Narrative:      The following orders were created for panel order CBC & Differential.  Procedure                               Abnormality         Status                     ---------                               -----------         ------                     CBC Auto Differential[[id number]]        Abnormal            Final result                 Please view results for these tests on the individual orders.    CBC Auto Differential [[id number]]  (Abnormal) Collected: 04/02/21 1729    Specimen: Blood Updated: 04/02/21 1738     WBC 6.54 10*3/mm3      RBC 4.03 10*6/mm3      Hemoglobin 14.0 g/dL      Hematocrit 38.9 %      MCV 96.5 fL      MCH 34.7 pg      MCHC 36.0 g/dL      RDW 13.0 %      RDW-SD 46.2 fl      MPV 9.7 fL      Platelets 230 10*3/mm3      Neutrophil % 72.4 %      Lymphocyte % 16.5 %      Monocyte % 9.8 %      Eosinophil % 0.2 %      Basophil % 0.6 %      Immature Grans % 0.5 %      Neutrophils, Absolute 4.74 10*3/mm3      Lymphocytes, Absolute 1.08 10*3/mm3      Monocytes, Absolute 0.64 10*3/mm3      Eosinophils, Absolute 0.01 10*3/mm3      Basophils, Absolute 0.04 10*3/mm3      Immature Grans, Absolute 0.03 10*3/mm3      nRBC 0.0 /100 WBC              Cultures:  Blood Culture   Date Value Ref Range Status   03/30/2021 No growth at 3 days  Preliminary   03/30/2021 No growth at 3 days  Preliminary       Radiology Data:    Imaging Results (Last 24 Hours)       Procedure Component Value Units Date/Time    MRI Brain Without Contrast [[id number]] Collected: 04/03/21 0953     Updated: 04/03/21 1001    Narrative:      Exam: MRI BRAIN WO CONTRAST-     Indication: Stroke, follow up; R41.82-Altered mental status, unspecified     Comparison: CT head 4/2/2021     Findings:     No abnormal diffusion restriction. No increased susceptibility to  suggest hemorrhage. Major physiologic flow voids are maintained.     Patchy and  confluent areas of increased T2/FLAIR signal are present in  the periventricular and subcortical white matter. This is a pattern most  consistent with chronic microvascular ischemia. No midline shift or mass  effect. Mild global cerebral volume loss with ex vacuo ventricular  dilatation is noted. Sella turcica and its contents are unremarkable.     Orbits are unremarkable. Paranasal sinuses are clear. Mastoid air cells  are clear.       Impression:         1.  No acute intracranial findings.  2.  Global cerebral volume loss and presumed chronic microvascular  ischemic white matter change.  This report was finalized on 04/03/2021 09:58 by Dr. Sonny Mtz MD.    CT Head Without Contrast [[id number]] Collected: 04/02/21 1745     Updated: 04/02/21 1749    Narrative:      EXAMINATION: CT HEAD WO CONTRAST- 4/2/2021 5:45 PM CDT     HISTORY: Mental status change, unknown cause.     DOSE: 544 mGycm (Automatic exposure control technique was implemented in  an effort to keep the radiation dose as low as possible without  compromising image quality)     REPORT: Axial CT of the head was performed without contrast,  reconstructed coronal and sagittal images are also reviewed.     Comparison: CT head without contrast 3/30/2021     No intracranial hemorrhage, mass or mass effect is identified. The  ventricles and basal cisterns are within normal limits. There is mild  diffuse atrophy. There is decreased attenuation in the periventricular  white matter tracts compatible with chronic small vessel white matter  ischemic disease. The extracranial structures appear within normal  limits.       Impression:      No acute intracranial abnormality, no interval change. There  are chronic findings associated with aging.     This report was finalized on 04/02/2021 17:46 by Dr. Louis Akhtar MD.    XR Chest 1 View [[id number]] Collected: 04/02/21 1731     Updated: 04/02/21 1735    Narrative:      EXAMINATION: XR CHEST 1 VW- 4/2/2021 5:31 PM  CDT     HISTORY: weakness.     REPORT: A frontal view the chest was obtained.     COMPARISON: Chest x-ray 3/30/2021.     Small new area of linear infiltrate in the left lung base questioned for  atelectasis versus aspiration. Right lung remains clear. Heart size is  normal. No pneumothorax or pleural effusion is identified. There is  diffuse osteopenia. No acute osseous abnormality is identified.       Impression:      Subtle new linear infiltrate in the left lung base,  atelectasis versus a small area of aspiration pneumonia.  This report was finalized on 04/02/2021 17:32 by Dr. Louis Akhtar MD.            Allergies   Allergen Reactions    Codeine Anaphylaxis       Scheduled meds:   acebutolol, 200 mg, Oral, Q12H  aspirin, 81 mg, Oral, Daily  atorvastatin, 80 mg, Oral, Nightly  [START ON 4/4/2021] famotidine, 20 mg, Intravenous, Daily  PARoxetine, 40 mg, Oral, Daily  potassium chloride, 40 mEq, Oral, BID  sodium chloride, 10 mL, Intravenous, Q12H        PRN meds:  acetaminophen **OR** acetaminophen **OR** acetaminophen    butalbital-acetaminophen-caffeine    ketorolac    ondansetron **OR** ondansetron    promethazine **OR** promethazine    [COMPLETED] Insert peripheral IV **AND** sodium chloride    sodium chloride    Assessment/Plan       Encephalopathy    Unknown when patient last well, possible stroke     Hyponatremia    Failure to thrive in adult    Severe malnutrition (CMS/HCC)    History of migraines      Plan:    Migraine headaches/TIA.  Consult neurology.  Fioricet as needed.  Toradol as needed.  CT scan head-No acute intracranial abnormality- no interval change. There  are chronic findings associated with aging.  MRI of the brain- No acute intracranial findings, global cerebral volume loss and presumed chronic microvascular ischemic white matter change.  Echocardiogram.  Carotid duplex.    Hyponatremia.  Chronic history of hyponatremia.  Improving.  Baseline sodium is 131.    Hypokalemia.  Potassium  replacement.  Magnesium is normal.    History of recurrent UTI.  IV hydration.    Reflux.  Pepcid.  Zofran as needed.  Phenergan as needed.  Recent cholecystectomy, 5 months ago per family.    History of SVT/hyperlipidemia/hyperlipidemia.  Currently in sinus rhythm with a rate of 70.  Continue acebutolol.  Continue Lipitor.  Chest x-ray-Subtle new linear infiltrate in the left lung base-atelectasis versus a small area of aspiration pneumonia.    Depression.  Continue Paxil.    SCD prophylaxis.    UDS.  Positive for barbiturates and benzo.    Nutrition/failure to thrive/malnourished.  BMI is  14.  Nutrition consult.  Speech consult.    Deconditioning/falling.  PT and OT consult.    Covid-19-negative.  Blood cultures-no growth in 3 days.    Discharge Plannin to 3 days.  Possible need rehab placement versus home health.    Electronically signed by Tay Guardado MD, 21, 10:23 AM CDT.

## 2021-04-03 NOTE — PROGRESS NOTES
Discharge Planning Assessment  [facility]     Patient Name: Zelalem Hung  MRN: [id number]  Today's Date: 4/3/2021    Admit Date: 4/2/2021    Discharge Needs Assessment     Row Name 04/03/21 1334       Living Environment    Lives With  alone    Current Living Arrangements  home/apartment/condo    Primary Care Provided by  self    Provides Primary Care For  no one    Family Caregiver if Needed  child(neema), adult    Quality of Family Relationships  helpful;involved;supportive    Able to Return to Prior Arrangements  yes       Resource/Environmental Concerns    Resource/Environmental Concerns  none    Transportation Concerns  car, none       Transition Planning    Patient/Family Anticipates Transition to  home    Patient/Family Anticipated Services at Transition  none    Transportation Anticipated  family or friend will provide       Discharge Needs Assessment    Readmission Within the Last 30 Days  no previous admission in last 30 days    Equipment Currently Used at Home  none    Concerns to be Addressed  no discharge needs identified;denies needs/concerns at this time    Anticipated Changes Related to Illness  none    Equipment Needed After Discharge  -- unknown at this time    Discharge Coordination/Progress  Pt has RX coverage and a PCP. Pt lived alone prior to admission. PT OT has evaluated pt documenting home with assistance vs SNF. SW is going to following chart to determine if pt works well with therapy to determine next steps.        Discharge Plan    No documentation.       Continued Care and Services - Admitted Since 4/2/2021    Coordination has not been started for this encounter.         Demographic Summary    No documentation.       Functional Status    No documentation.       Psychosocial    No documentation.       Abuse/Neglect    No documentation.       Legal    No documentation.       Substance Abuse    No documentation.       Patient Forms    No documentation.           Farzana Melendez

## 2021-04-03 NOTE — PLAN OF CARE
Goal Outcome Evaluation:  Plan of Care Reviewed With: patient  Progress: no change  Outcome Summary: Pt alert, answers most orientation questions but can be forgetful. Veyr anxious, calls out often for medications. CO headache and nausea, asking for Fiorcet often. PRN Tylenol suppository ineefective, as well as IV Zofran, prn Torodol and phenergan ordered. Pt calls out for staff often, askin for them to stay with her. Turns with assist. Skin in tact, small bruise on SINA eye from when she fell and elbows red.

## 2021-04-04 LAB
ALBUMIN SERPL-MCNC: 3.4 G/DL (ref 3.5–5.2)
ALBUMIN/GLOB SERPL: 1.5 G/DL
ALP SERPL-CCNC: 53 U/L (ref 39–117)
ALT SERPL W P-5'-P-CCNC: 17 U/L (ref 1–33)
AMYLASE SERPL-CCNC: 56 U/L (ref 28–100)
ANION GAP SERPL CALCULATED.3IONS-SCNC: 12 MMOL/L (ref 5–15)
AST SERPL-CCNC: 36 U/L (ref 1–32)
BACTERIA SPEC AEROBE CULT: NORMAL
BACTERIA SPEC AEROBE CULT: NORMAL
BASOPHILS # BLD AUTO: 0.05 10*3/MM3 (ref 0–0.2)
BASOPHILS NFR BLD AUTO: 0.8 % (ref 0–1.5)
BILIRUB SERPL-MCNC: 0.3 MG/DL (ref 0–1.2)
BUN SERPL-MCNC: 13 MG/DL (ref 8–23)
BUN/CREAT SERPL: 18.3 (ref 7–25)
CALCIUM SPEC-SCNC: 8.7 MG/DL (ref 8.6–10.5)
CHLORIDE SERPL-SCNC: 101 MMOL/L (ref 98–107)
CO2 SERPL-SCNC: 23 MMOL/L (ref 22–29)
CREAT SERPL-MCNC: 0.71 MG/DL (ref 0.57–1)
DEPRECATED RDW RBC AUTO: 49.9 FL (ref 37–54)
EOSINOPHIL # BLD AUTO: 0.04 10*3/MM3 (ref 0–0.4)
EOSINOPHIL NFR BLD AUTO: 0.6 % (ref 0.3–6.2)
ERYTHROCYTE [DISTWIDTH] IN BLOOD BY AUTOMATED COUNT: 13.7 % (ref 12.3–15.4)
GFR SERPL CREATININE-BSD FRML MDRD: 79 ML/MIN/1.73
GLOBULIN UR ELPH-MCNC: 2.2 GM/DL
GLUCOSE SERPL-MCNC: 79 MG/DL (ref 65–99)
HCT VFR BLD AUTO: 35.7 % (ref 34–46.6)
HGB BLD-MCNC: 12.2 G/DL (ref 12–15.9)
IMM GRANULOCYTES # BLD AUTO: 0.02 10*3/MM3 (ref 0–0.05)
IMM GRANULOCYTES NFR BLD AUTO: 0.3 % (ref 0–0.5)
LIPASE SERPL-CCNC: 35 U/L (ref 13–60)
LYMPHOCYTES # BLD AUTO: 1.44 10*3/MM3 (ref 0.7–3.1)
LYMPHOCYTES NFR BLD AUTO: 22.4 % (ref 19.6–45.3)
MCH RBC QN AUTO: 34 PG (ref 26.6–33)
MCHC RBC AUTO-ENTMCNC: 34.2 G/DL (ref 31.5–35.7)
MCV RBC AUTO: 99.4 FL (ref 79–97)
MONOCYTES # BLD AUTO: 0.65 10*3/MM3 (ref 0.1–0.9)
MONOCYTES NFR BLD AUTO: 10.1 % (ref 5–12)
NEUTROPHILS NFR BLD AUTO: 4.22 10*3/MM3 (ref 1.7–7)
NEUTROPHILS NFR BLD AUTO: 65.8 % (ref 42.7–76)
NRBC BLD AUTO-RTO: 0 /100 WBC (ref 0–0.2)
PLATELET # BLD AUTO: 204 10*3/MM3 (ref 140–450)
PMV BLD AUTO: 10.3 FL (ref 6–12)
POTASSIUM SERPL-SCNC: 4.4 MMOL/L (ref 3.5–5.2)
PROT SERPL-MCNC: 5.6 G/DL (ref 6–8.5)
RBC # BLD AUTO: 3.59 10*6/MM3 (ref 3.77–5.28)
SODIUM SERPL-SCNC: 136 MMOL/L (ref 136–145)
WBC # BLD AUTO: 6.42 10*3/MM3 (ref 3.4–10.8)

## 2021-04-04 PROCEDURE — 25810000003 SODIUM CHLORIDE 0.9 % WITH KCL 20 MEQ 20-0.9 MEQ/L-% SOLUTION: Performed by: FAMILY MEDICINE

## 2021-04-04 PROCEDURE — 97116 GAIT TRAINING THERAPY: CPT

## 2021-04-04 PROCEDURE — 99233 SBSQ HOSP IP/OBS HIGH 50: CPT | Performed by: PSYCHIATRY & NEUROLOGY

## 2021-04-04 PROCEDURE — 82150 ASSAY OF AMYLASE: CPT | Performed by: FAMILY MEDICINE

## 2021-04-04 PROCEDURE — 25010000002 KETOROLAC TROMETHAMINE PER 15 MG: Performed by: FAMILY MEDICINE

## 2021-04-04 PROCEDURE — 25010000002 ONDANSETRON PER 1 MG: Performed by: NURSE PRACTITIONER

## 2021-04-04 PROCEDURE — 80053 COMPREHEN METABOLIC PANEL: CPT | Performed by: FAMILY MEDICINE

## 2021-04-04 PROCEDURE — 83690 ASSAY OF LIPASE: CPT | Performed by: FAMILY MEDICINE

## 2021-04-04 PROCEDURE — 85025 COMPLETE CBC W/AUTO DIFF WBC: CPT | Performed by: FAMILY MEDICINE

## 2021-04-04 RX ORDER — MEGESTROL ACETATE 40 MG/ML
200 SUSPENSION ORAL DAILY
Status: DISCONTINUED | OUTPATIENT
Start: 2021-04-04 | End: 2021-04-09 | Stop reason: HOSPADM

## 2021-04-04 RX ORDER — FAMOTIDINE 10 MG/ML
20 INJECTION, SOLUTION INTRAVENOUS EVERY 12 HOURS SCHEDULED
Status: DISCONTINUED | OUTPATIENT
Start: 2021-04-04 | End: 2021-04-09 | Stop reason: HOSPADM

## 2021-04-04 RX ORDER — FAMOTIDINE 10 MG/ML
20 INJECTION, SOLUTION INTRAVENOUS DAILY
Status: DISCONTINUED | OUTPATIENT
Start: 2021-04-05 | End: 2021-04-04

## 2021-04-04 RX ORDER — LANOLIN ALCOHOL/MO/W.PET/CERES
6 CREAM (GRAM) TOPICAL NIGHTLY
Status: DISCONTINUED | OUTPATIENT
Start: 2021-04-04 | End: 2021-04-09 | Stop reason: HOSPADM

## 2021-04-04 RX ORDER — MEGESTROL ACETATE 40 MG/ML
200 SUSPENSION ORAL DAILY
Status: DISCONTINUED | OUTPATIENT
Start: 2021-04-04 | End: 2021-04-04

## 2021-04-04 RX ORDER — OLANZAPINE 2.5 MG/1
2.5 TABLET ORAL NIGHTLY
Status: DISCONTINUED | OUTPATIENT
Start: 2021-04-04 | End: 2021-04-05

## 2021-04-04 RX ADMIN — ONDANSETRON HYDROCHLORIDE 4 MG: 2 SOLUTION INTRAMUSCULAR; INTRAVENOUS at 11:26

## 2021-04-04 RX ADMIN — ACETAMINOPHEN 650 MG: 325 TABLET, FILM COATED ORAL at 08:03

## 2021-04-04 RX ADMIN — ATORVASTATIN CALCIUM 80 MG: 40 TABLET, FILM COATED ORAL at 20:07

## 2021-04-04 RX ADMIN — OLANZAPINE 2.5 MG: 2.5 TABLET, FILM COATED ORAL at 20:07

## 2021-04-04 RX ADMIN — POTASSIUM CHLORIDE AND SODIUM CHLORIDE 75 ML/HR: 900; 150 INJECTION, SOLUTION INTRAVENOUS at 00:45

## 2021-04-04 RX ADMIN — PAROXETINE 20 MG: 20 TABLET, FILM COATED ORAL at 08:11

## 2021-04-04 RX ADMIN — SODIUM CHLORIDE, PRESERVATIVE FREE 10 ML: 5 INJECTION INTRAVENOUS at 08:11

## 2021-04-04 RX ADMIN — FAMOTIDINE 20 MG: 10 INJECTION INTRAVENOUS at 20:08

## 2021-04-04 RX ADMIN — ASPIRIN 81 MG: 81 TABLET, CHEWABLE ORAL at 08:11

## 2021-04-04 RX ADMIN — KETOROLAC TROMETHAMINE 15 MG: 30 INJECTION, SOLUTION INTRAMUSCULAR; INTRAVENOUS at 11:25

## 2021-04-04 RX ADMIN — KETOROLAC TROMETHAMINE 15 MG: 30 INJECTION, SOLUTION INTRAMUSCULAR; INTRAVENOUS at 05:34

## 2021-04-04 RX ADMIN — KETOROLAC TROMETHAMINE 15 MG: 30 INJECTION, SOLUTION INTRAMUSCULAR; INTRAVENOUS at 18:31

## 2021-04-04 RX ADMIN — FAMOTIDINE 20 MG: 10 INJECTION INTRAVENOUS at 08:12

## 2021-04-04 RX ADMIN — ONDANSETRON HYDROCHLORIDE 4 MG: 2 SOLUTION INTRAMUSCULAR; INTRAVENOUS at 18:31

## 2021-04-04 RX ADMIN — MEGESTROL ACETATE 200 MG: 40 SUSPENSION ORAL at 17:25

## 2021-04-04 RX ADMIN — ONDANSETRON HYDROCHLORIDE 4 MG: 2 SOLUTION INTRAMUSCULAR; INTRAVENOUS at 05:35

## 2021-04-04 RX ADMIN — ACEBUTOLOL HYDROCHLORIDE 200 MG: 200 CAPSULE ORAL at 20:07

## 2021-04-04 RX ADMIN — ACEBUTOLOL HYDROCHLORIDE 200 MG: 200 CAPSULE ORAL at 08:11

## 2021-04-04 RX ADMIN — Medication 6 MG: at 20:08

## 2021-04-04 RX ADMIN — POTASSIUM CHLORIDE AND SODIUM CHLORIDE 75 ML/HR: 900; 150 INJECTION, SOLUTION INTRAVENOUS at 14:44

## 2021-04-04 NOTE — PLAN OF CARE
Goal Outcome Evaluation:  Plan of Care Reviewed With: patient  Progress: no change  Outcome Summary: Pt A&O tonight. Still CO headache and nausea at times. PRN torodol, Zofran, and Phenergan helpful. Able to rest tonight. Ambulates w assist to BR, voiding adequatly. Turns self, no skin breakdown. Tolerating PO intake, fluids encouraged.

## 2021-04-04 NOTE — PROGRESS NOTES
Neurology Progress Note      Date of admission: 4/2/2021  4:47 PM  Date of visit: 4/4/2021    Chief Complaint:  F/u encephalopathy    Subjective     Subjective:    Patient denying headache. Reports she did not sleep well last night.  Continues to c/o nausea but felt better after zofran during the night    Does have a drop in blood pressure when she stands and then will correct Orthostatics today show:     Heart Rate 77 67 82    /69 134/61 147/63    Patient Position Lying Sitting Standing        Medications:  Current Facility-Administered Medications   Medication Dose Route Frequency Provider Last Rate Last Admin   • acebutolol (SECTRAL) capsule 200 mg  200 mg Oral Q12H Silvana Hernandez, APRN   200 mg at 04/04/21 0811   • acetaminophen (TYLENOL) tablet 650 mg  650 mg Oral Q4H PRN Silvana Hernandez APRN   650 mg at 04/04/21 0803    Or   • acetaminophen (TYLENOL) 160 MG/5ML solution 650 mg  650 mg Oral Q4H PRN Silvana Hernandez, APRN        Or   • acetaminophen (TYLENOL) suppository 650 mg  650 mg Rectal Q4H PRN Silvana Hernandez APRN   650 mg at 04/02/21 2309   • aspirin chewable tablet 81 mg  81 mg Oral Daily Silvana Hernandez, APRN   81 mg at 04/04/21 0811   • atorvastatin (LIPITOR) tablet 80 mg  80 mg Oral Nightly Silvana Hernandez, APRN   80 mg at 04/03/21 2111   • famotidine (PEPCID) injection 20 mg  20 mg Intravenous Q12H Tay Guardado MD       • ketorolac (TORADOL) injection 15 mg  15 mg Intravenous Q6H PRN Amara Barclay MD   15 mg at 04/04/21 1125   • megestrol (MEGACE) 40 MG/ML suspension 200 mg  200 mg Oral Daily Tay Guardado MD       • melatonin tablet 3 mg  3 mg Oral Nightly Kym Colmenares MD   3 mg at 04/03/21 2111   • ondansetron (ZOFRAN) tablet 4 mg  4 mg Oral Q6H PRN Silvana Hernandez, APRN   4 mg at 04/03/21 2111    Or   • ondansetron (ZOFRAN) injection 4 mg  4 mg Intravenous Q6H PRN Silvana Hernandez, APRN   4 mg at 04/04/21 1126   • PARoxetine (PAXIL) tablet 20 mg  20  mg Oral Daily Tay Guardado MD   20 mg at 04/04/21 0811   • potassium chloride (MICRO-K) CR capsule 40 mEq  40 mEq Oral Once Tay Guardado MD       • promethazine (PHENERGAN) tablet 6.25 mg  6.25 mg Oral Q6H PRN Amara Barclay MD   6.25 mg at 04/03/21 2338    Or   • promethazine (PHENERGAN) suppository 6.25 mg  6.25 mg Rectal Q6H PRN Amara Barclay MD   6.25 mg at 04/03/21 0427   • sodium chloride 0.9 % flush 10 mL  10 mL Intravenous PRN Silvana Hernandez APRPEARL       • sodium chloride 0.9 % flush 10 mL  10 mL Intravenous Q12H Silvana Hernandez APRN   10 mL at 04/04/21 0811   • sodium chloride 0.9 % flush 10 mL  10 mL Intravenous PRN Silvana Hernandez APRPEARL       • sodium chloride 0.9 % with KCl 20 mEq/L infusion  75 mL/hr Intravenous Continuous Tay Guardado MD 75 mL/hr at 04/04/21 1444 75 mL/hr at 04/04/21 1444       Review of Systems:   -A 14 point review of systems is completed and is negative except for c/o difficulty with sleep and nausea  Objective     Objective      Vital Signs  Temp:  [97.9 °F (36.6 °C)-98.2 °F (36.8 °C)] 98.1 °F (36.7 °C)  Heart Rate:  [66-82] 82  Resp:  [16-20] 16  BP: (125-153)/(58-69) 147/63    Physical Exam:    HEENT:  Neck supple  CVS:  Regular rate and rhythm.  No murmurs  Carotid Examination:  No bruits  Lungs:  Clear to auscultation  Abdomen:  Non-tender, Non-distended  Extremities:  No signs of peripheral edema    Neurologic Exam:    -Awake, Alert, Oriented to person, place, month, year  Knew month, season, floor, city, state, county and who the president is. She knows today is [facility].  -No word finding difficulties  -No aphasia  -No dysarthria  -Follows simple and complex commands    Cranial nerves II through XII intact.  CN II:  Visual fields full.  Pupils equally reactive to light  CN III, IV, VI:  Extraocular Muscles full with no signs of nystagmus  CN V:  Facial sensory is symmetric   CN VII:  Facial motor symmetric except from surgery on left forehead  CN  VIII:  Gross hearing intact bilaterally  CN IX/X:  Palate elevates symmetrically  CN XI:  Shoulder shrug symmetric  CN XII:  Tongue is midline on protrusion  Motor: (strength out of 5:  1= minimal movement, 2 = movement in plane of gravity, 3 = movement against gravity, 4 = movement against some resistance, 5 = full strength)    -Right Upper Ext: Proximal: 5 Distal: 5  -Left Upper Ext: Proximal: 5 Distal: 5    -Right Lower Ext: Proximal: 5 Distal: 5  -Left Lower Ext: Proximal: 5 Distal: 5    DTR:  2+ throughout in all four extremities    Sensory:  -Intact to light touch, pinprick, temperature, pain, and proprioception    Coordination/Gait:  -No ataxia     Results Review:    I reviewed the patient's new clinical results.    Lab Results (last 24 hours)     Procedure Component Value Units Date/Time    Comprehensive Metabolic Panel [[id number]]  (Abnormal) Collected: 04/04/21 0438    Specimen: Blood Updated: 04/04/21 0540     Glucose 79 mg/dL      BUN 13 mg/dL      Creatinine 0.71 mg/dL      Sodium 136 mmol/L      Potassium 4.4 mmol/L      Chloride 101 mmol/L      CO2 23.0 mmol/L      Calcium 8.7 mg/dL      Total Protein 5.6 g/dL      Albumin 3.40 g/dL      ALT (SGPT) 17 U/L      AST (SGOT) 36 U/L      Alkaline Phosphatase 53 U/L      Total Bilirubin 0.3 mg/dL      eGFR Non African Amer 79 mL/min/1.73      Globulin 2.2 gm/dL      A/G Ratio 1.5 g/dL      BUN/Creatinine Ratio 18.3     Anion Gap 12.0 mmol/L     Narrative:      GFR Normal >60  Chronic Kidney Disease <60  Kidney Failure <15      Lipase [[id number]]  (Normal) Collected: 04/04/21 0438    Specimen: Blood Updated: 04/04/21 0533     Lipase 35 U/L     Amylase [[id number]]  (Normal) Collected: 04/04/21 0438    Specimen: Blood Updated: 04/04/21 0533     Amylase 56 U/L     CBC & Differential [[id number]]  (Abnormal) Collected: 04/04/21 0438    Specimen: Blood Updated: 04/04/21 0516    Narrative:      The following orders were created for panel order CBC &  Differential.  Procedure                               Abnormality         Status                     ---------                               -----------         ------                     CBC Auto Differential[[id number]]        Abnormal            Final result                 Please view results for these tests on the individual orders.    CBC Auto Differential [[id number]]  (Abnormal) Collected: 04/04/21 0438    Specimen: Blood Updated: 04/04/21 0516     WBC 6.42 10*3/mm3      RBC 3.59 10*6/mm3      Hemoglobin 12.2 g/dL      Hematocrit 35.7 %      MCV 99.4 fL      MCH 34.0 pg      MCHC 34.2 g/dL      RDW 13.7 %      RDW-SD 49.9 fl      MPV 10.3 fL      Platelets 204 10*3/mm3      Neutrophil % 65.8 %      Lymphocyte % 22.4 %      Monocyte % 10.1 %      Eosinophil % 0.6 %      Basophil % 0.8 %      Immature Grans % 0.3 %      Neutrophils, Absolute 4.22 10*3/mm3      Lymphocytes, Absolute 1.44 10*3/mm3      Monocytes, Absolute 0.65 10*3/mm3      Eosinophils, Absolute 0.04 10*3/mm3      Basophils, Absolute 0.05 10*3/mm3      Immature Grans, Absolute 0.02 10*3/mm3      nRBC 0.0 /100 WBC         Imaging Results (Last 24 Hours)     Procedure Component Value Units Date/Time    US Carotid Bilateral [[id number]] Collected: 04/04/21 1009     Updated: 04/04/21 1013    Narrative:      History: Carotid occlusive disease       Impression:      Impression:  1. There is less than 50% stenosis of the right internal carotid artery.  2. There is less than 50% stenosis of the left internal carotid artery.  3. Antegrade flow is demonstrated in bilateral vertebral arteries.     Comments: Bilateral carotid vertebral arterial duplex scan was  performed.     Grayscale imaging shows intimal thickening and calcified elements at the  carotid bifurcation. The right internal carotid artery peak systolic  velocity is 72.4 cm/sec. The end-diastolic velocity is 14.6 cm/sec. The  right ICA/CCA ratio is approximately 0.9 . These findings correlate  with  less than 50% stenosis of the right internal carotid artery.     Grayscale imaging shows intimal thickening and calcified elements at the  carotid bifurcation. The left internal carotid artery peak systolic  velocity is 110 cm/sec. The end-diastolic velocity is 24.5 cm/sec. The  left ICA/CCA ratio is approximately 1.4 . These findings correlate with  less than 50% stenosis of the left internal carotid artery.     Antegrade flow is demonstrated in bilateral vertebral arteries.     This report was finalized on 04/04/2021 10:09 by Dr. Henry Rubio MD.          Assessment/Plan     Hospital Problem List      Encephalopathy    Unknown when patient last well, possible stroke     Hyponatremia    Failure to thrive in adult    Severe malnutrition (CMS/HCC)    History of migraines    Impression:  1. Encephalopathy secondary to medications  2. Migraine headaches and medication overuse headache and headache related to head trauma  3. Chronic nausea  4. Risk for falls may be related to initial drop in blood pressure that quickly corrects    Plan:  Recommend she be discharged to SNF or home with assist  Consider  Zyprexa for nausea--trial 2.5 mg at night with typical dose being 5.0 mg        Kym Trimble MD  04/04/21  15:57 CDT

## 2021-04-04 NOTE — PLAN OF CARE
Goal Outcome Evaluation:  Plan of Care Reviewed With: patient  Progress: improving  Outcome Summary: Pt was able to transfer supine to sit with CGA.  Sit to stand with min assist.  Amb 60' with RWX and min assist. Will continue to work with pt to increase stregnth and progress amb.  Pt will benefit from having a RWX at home.

## 2021-04-04 NOTE — PLAN OF CARE
Goal Outcome Evaluation:  Plan of Care Reviewed With: patient  Progress: no change  Outcome Summary: Pt c/o left side/rib pain this shift. pt states just hurting all over. pt not eating well. not even eating 10% of meals. Pt enoucraged to eat and pt keeps stating not hungry. Pt to start megace this shift, awaiting nutrition consult.

## 2021-04-04 NOTE — NURSING NOTE
Neuro handoff with TERRY Brown&O. Gt pt up to BR, she became shaky  and nauseous, appeared anxious about moving.

## 2021-04-04 NOTE — THERAPY TREATMENT NOTE
Acute Care - Physical Therapy Treatment Note  [facility]     Patient Name: Zelalem Hung  : 1940  MRN: [id number]  Today's Date: 2021           PT Assessment (last 12 hours)      PT Evaluation and Treatment     Row Name 21 1118          Physical Therapy Time and Intention    Subjective Information  complains of;nausea/vomiting;pain  -     Document Type  therapy note (daily note)  -     Mode of Treatment  physical therapy  -     Row Name 21 1118          General Information    Existing Precautions/Restrictions  fall  -     Row Name 21 1118          Pain Scale: Numbers Pre/Post-Treatment    Pretreatment Pain Rating  6/10  -JAMES     Posttreatment Pain Rating  6/10  -     Pain Location  head  -     Row Name 21 1118          Bed Mobility    Supine-Sit [place] (Bed Mobility)  verbal cues;minimum assist (75% patient effort)  -     Sit-Supine [place] (Bed Mobility)  verbal cues;minimum assist (75% patient effort)  -     Assistive Device (Bed Mobility)  bed rails;head of bed elevated  -[facility] Name 21 1118          Transfers    Transfers  sit-stand transfer;stand-sit transfer  -     Sit-Stand [place] (Transfers)  verbal cues;minimum assist (75% patient effort)  -     Stand-Sit [place] (Transfers)  verbal cues;contact guard  -     Row Name 21 1118          Sit-Stand Transfer    Assistive Device (Sit-Stand Transfers)  walker, front-wheeled  -[facility] Name 21 1118          Stand-Sit Transfer    Assistive Device (Stand-Sit Transfers)  walker, front-wheeled  -[facility] Name 21 1118          Gait/Stairs (Locomotion)    [place] Level (Gait)  verbal cues;minimum assist (75% patient effort)  -     Assistive Device (Gait)  walker, front-wheeled  -     Distance in Feet (Gait)  60  -JAMES     Deviations/Abnormal Patterns (Gait)  gait speed decreased;stride length decreased  -JAMES     Bilateral Gait Deviations  forward flexed  posture  -JAMES     Row Name 04/04/21 1118          Motor Skills    Therapeutic Exercise  aerobic  -JAMES     Row Name 04/04/21 1118          Aerobic Exercise    Comment, Aerobic Exercise (Therapeutic Exercise)  sitting EOB, AROM BLE X 10 sitting EOB  -JAMES     Row Name 04/04/21 1118          Positioning and Restraints    Pre-Treatment Position  in bed  -JAMES     Post Treatment Position  bed  -JAMES     In Bed  fowlers;call light within reach;encouraged to call for assist;with family/caregiver;side rails up x2  -JAMES       User Key  (r) = Recorded By, (t) = Taken By, (c) = Cosigned By    Initials Name Provider Type    JAMES Daniel Urban, PTA Physical Therapy Assistant        Physical Therapy Education                 Title: PT OT SLP Therapies (In Progress)     Topic: Physical Therapy (In Progress)     Point: Mobility training (In Progress)     Learning Progress Summary           Patient Acceptance, E, NR by MS at 4/3/2021 1140    Comment: role of PT in her care                   Point: Home exercise program (Not Started)     Learner Progress:  Not documented in this visit.          Point: Body mechanics (Not Started)     Learner Progress:  Not documented in this visit.          Point: Precautions (Not Started)     Learner Progress:  Not documented in this visit.                      User Key     Initials Effective Dates Name Provider Type Discipline    MS 06/19/18 -  Charla Dennis, PT, DPT, NCS Physical Therapist PT              PT Recommendation and Plan     Plan of Care Reviewed With: patient  Progress: improving  Outcome Summary: Pt was able to transfer supine to sit with CGA.  Sit to stand with min assist.  Amb 60' with RWX and min assist. Will continue to work with pt to increase stregnth and progress amb.  Pt will benefit from having a RWX at home.       Time Calculation:   PT Charges     Row Name 04/04/21 1118             Time Calculation    Start Time  1118  -JAMES      Stop Time  1132  -JAMES      Time Calculation (min)   14 min  -JAMES      PT Received On  04/04/21  -JAMES         Time Calculation- PT    Total Timed Code Minutes- PT  14 minute(s)  -JAMES         Timed Charges    87808 - Gait Training Minutes   14  -JAMES        User Key  (r) = Recorded By, (t) = Taken By, (c) = Cosigned By    Initials Name Provider Type    Daniel [place] PTA Physical Therapy Assistant        Therapy Charges for Today     Code Description Service Date Service Provider Modifiers Qty    80546699701 HC GAIT TRAINING EA 15 MIN 4/4/2021 Daniel Urban PTA GP 1          PT G-Codes  Outcome Measure Options: AM-PAC 6 Clicks Basic Mobility (PT)  AM-PAC 6 Clicks Score (PT): 16  AM-PAC 6 Clicks Score (OT): 21    Daniel Urban PTA  4/4/2021

## 2021-04-04 NOTE — PROGRESS NOTES
[facility] Medicine Services  INPATIENT PROGRESS NOTE    Length of Stay: 2  Date of Admission: 4/2/2021  Primary Care Physician: Garth Amezcua APRN    Subjective   Chief Complaint: Failure to thrive.    HPI   Patient remains weak and uncoordinated.  Patient denies any chest pain.  Patient not requiring oxygen.  T-max 98.2.  T-current 98.  Blood pressure stable.  Patient has not been eating well per nursing staff    Review of Systems   Constitutional: Positive for activity change, appetite change and fatigue. Negative for chills and fever.   HENT: Negative for hearing loss, nosebleeds, tinnitus and trouble swallowing.    Eyes: Negative for visual disturbance.   Respiratory: Negative for cough, chest tightness, shortness of breath and wheezing.    Cardiovascular: Negative for chest pain, palpitations and leg swelling.   Gastrointestinal: Positive for abdominal pain and nausea. Negative for abdominal distention, blood in stool, constipation, diarrhea and vomiting.   Endocrine: Negative for cold intolerance, heat intolerance, polydipsia, polyphagia and polyuria.   Genitourinary: Negative for decreased urine volume, difficulty urinating, dysuria, flank pain, frequency and hematuria.   Musculoskeletal: Positive for arthralgias, gait problem and myalgias. Negative for joint swelling.   Skin: Negative for rash.   Allergic/Immunologic: Negative for immunocompromised state.   Neurological: Positive for weakness. Negative for dizziness, syncope, light-headedness and headaches.   Hematological: Negative for adenopathy. Does not bruise/bleed easily.   Psychiatric/Behavioral: Negative for confusion and sleep disturbance. The patient is not nervous/anxious.           All pertinent negatives and positives are as above. All other systems have been reviewed and are negative unless otherwise stated.     Objective    Temp:  [97.6 °F (36.4 °C)-98.2 °F (36.8 °C)] 98 °F (36.7 °C)  Heart Rate:   [66-73] 69  Resp:  [16-20] 16  BP: (125-141)/(56-64) 131/59    Intake/Output Summary (Last 24 hours) at 4/4/2021 1002  Last data filed at 4/4/2021 0600  Gross per 24 hour   Intake 1682.5 ml   Output 500 ml   Net 1182.5 ml     Physical Exam  Vitals and nursing note reviewed.   Constitutional:       Appearance: She is well-developed.      Comments: Cachectic.   HENT:      Head: Normocephalic.   Eyes:      Conjunctiva/sclera: Conjunctivae normal.      Pupils: Pupils are equal, round, and reactive to light.   Neck:      Vascular: No JVD.   Cardiovascular:      Rate and Rhythm: Normal rate and regular rhythm.      Heart sounds: Normal heart sounds. No murmur heard.   No friction rub. No gallop.    Pulmonary:      Effort: Pulmonary effort is normal. No respiratory distress.      Breath sounds: Normal breath sounds. No wheezing or rales.      Comments: Diminished breath sound bilateral, clear.  Chest:      Chest wall: No tenderness.   Abdominal:      General: Bowel sounds are normal. There is no distension.      Palpations: Abdomen is soft.      Tenderness: There is abdominal tenderness. There is no guarding or rebound.      Comments: Epigastric discomfort.  No guarding no rebound   Musculoskeletal:         General: No tenderness or deformity. Normal range of motion.      Cervical back: Neck supple.   Skin:     General: Skin is warm and dry.      Findings: No rash.   Neurological:      Mental Status: She is alert and oriented to person, place, and time.      Cranial Nerves: No cranial nerve deficit.      Motor: No abnormal muscle tone.      Deep Tendon Reflexes: Reflexes normal.   Psychiatric:         Behavior: Behavior normal.         Thought Content: Thought content normal.         Judgment: Judgment normal.      Results Review:  Lab Results (last 24 hours)     Procedure Component Value Units Date/Time    Comprehensive Metabolic Panel [[id number]]  (Abnormal) Collected: 04/04/21 0438    Specimen: Blood Updated: 04/04/21  0540     Glucose 79 mg/dL      BUN 13 mg/dL      Creatinine 0.71 mg/dL      Sodium 136 mmol/L      Potassium 4.4 mmol/L      Chloride 101 mmol/L      CO2 23.0 mmol/L      Calcium 8.7 mg/dL      Total Protein 5.6 g/dL      Albumin 3.40 g/dL      ALT (SGPT) 17 U/L      AST (SGOT) 36 U/L      Alkaline Phosphatase 53 U/L      Total Bilirubin 0.3 mg/dL      eGFR Non African Amer 79 mL/min/1.73      Globulin 2.2 gm/dL      A/G Ratio 1.5 g/dL      BUN/Creatinine Ratio 18.3     Anion Gap 12.0 mmol/L     Narrative:      GFR Normal >60  Chronic Kidney Disease <60  Kidney Failure <15      Lipase [[id number]]  (Normal) Collected: 04/04/21 0438    Specimen: Blood Updated: 04/04/21 0533     Lipase 35 U/L     Amylase [[id number]]  (Normal) Collected: 04/04/21 0438    Specimen: Blood Updated: 04/04/21 0533     Amylase 56 U/L     CBC & Differential [[id number]]  (Abnormal) Collected: 04/04/21 0438    Specimen: Blood Updated: 04/04/21 0516    Narrative:      The following orders were created for panel order CBC & Differential.  Procedure                               Abnormality         Status                     ---------                               -----------         ------                     CBC Auto Differential[[id number]]        Abnormal            Final result                 Please view results for these tests on the individual orders.    CBC Auto Differential [[id number]]  (Abnormal) Collected: 04/04/21 0438    Specimen: Blood Updated: 04/04/21 0516     WBC 6.42 10*3/mm3      RBC 3.59 10*6/mm3      Hemoglobin 12.2 g/dL      Hematocrit 35.7 %      MCV 99.4 fL      MCH 34.0 pg      MCHC 34.2 g/dL      RDW 13.7 %      RDW-SD 49.9 fl      MPV 10.3 fL      Platelets 204 10*3/mm3      Neutrophil % 65.8 %      Lymphocyte % 22.4 %      Monocyte % 10.1 %      Eosinophil % 0.6 %      Basophil % 0.8 %      Immature Grans % 0.3 %      Neutrophils, Absolute 4.22 10*3/mm3      Lymphocytes, Absolute 1.44 10*3/mm3      Monocytes, Absolute  0.65 10*3/mm3      Eosinophils, Absolute 0.04 10*3/mm3      Basophils, Absolute 0.05 10*3/mm3      Immature Grans, Absolute 0.02 10*3/mm3      nRBC 0.0 /100 WBC     POC Glucose Once [[id number]]  (Normal) Collected: 04/03/21 1653    Specimen: Blood Updated: 04/03/21 1705     Glucose 81 mg/dL      Comment: : BAM Thomson EricaMeter ID: [id number]              Cultures:  Blood Culture   Date Value Ref Range Status   03/30/2021 No growth at 4 days  Preliminary   03/30/2021 No growth at 4 days  Preliminary       Radiology Data:    Imaging Results (Last 24 Hours)     ** No results found for the last 24 hours. **          Allergies   Allergen Reactions   • Codeine Anaphylaxis       Scheduled meds:   acebutolol, 200 mg, Oral, Q12H  aspirin, 81 mg, Oral, Daily  atorvastatin, 80 mg, Oral, Nightly  famotidine, 20 mg, Intravenous, Q12H  melatonin, 3 mg, Oral, Nightly  PARoxetine, 20 mg, Oral, Daily  potassium chloride, 40 mEq, Oral, Once  sodium chloride, 10 mL, Intravenous, Q12H        PRN meds:  acetaminophen **OR** acetaminophen **OR** acetaminophen  •  ketorolac  •  ondansetron **OR** ondansetron  •  promethazine **OR** promethazine  •  [COMPLETED] Insert peripheral IV **AND** sodium chloride  •  sodium chloride    Assessment/Plan       Encephalopathy    Unknown when patient last well, possible stroke     Hyponatremia    Failure to thrive in adult    Severe malnutrition (CMS/HCC)    History of migraines      Plan:  Migraine headaches/TIA.  Consult neurology.  Fioricet as needed.  Toradol as needed.  CT scan head-No acute intracranial abnormality- no interval change. There  are chronic findings associated with aging.  MRI of the brain- No acute intracranial findings, global cerebral volume loss and presumed chronic microvascular ischemic white matter change.  Echocardiogram-ejection fraction 61 to 65%, tricuspid regurgitation, mild pulmonary hypertension.  Carotid duplex-preliminary less than 50% bilateral carotid  arteries, antegrade flow bilateral vertebral arteries.  Recommendation from nephrology-DC Fioricet and opiate, DC lorazepam and now benzos, continue PT and OT.     Hyponatremia.  Chronic history of hyponatremia.  Improving.  Baseline sodium is 131.     Hypokalemia.  Potassium replacement.  Magnesium is normal.     History of recurrent UTI.  IV hydration.     Reflux.  Pepcid.  Zofran as needed.  Phenergan as needed.  Recent cholecystectomy, 5 months ago per family.     History of SVT/hyperlipidemia/hyperlipidemia.  Currently in sinus rhythm with a rate of 70.  Continue acebutolol.  Continue Lipitor.  Chest x-ray-Subtle new linear infiltrate in the left lung base-atelectasis versus a small area of aspiration pneumonia.     Depression.  Continue Paxil.     SCD prophylaxis.     UDS.  Positive for barbiturates and benzo.     Nutrition/failure to thrive/malnourished.  BMI is  14.  Nutrition consult.  Speech consult.  Boost in between meals.     Deconditioning/falling.  PT and OT consult.     Covid-19-negative.  Blood cultures-no growth in 4 days.     Discharge Plannin to 3 days.  Possible need rehab placement versus home health.    Electronically signed by Tay Guardado MD, 21, 10:02 AM CDT.

## 2021-04-05 ENCOUNTER — APPOINTMENT (OUTPATIENT)
Dept: CT IMAGING | Facility: HOSPITAL | Age: 81
End: 2021-04-05

## 2021-04-05 PROBLEM — R04.0 EPISTAXIS: Status: ACTIVE | Noted: 2021-04-05

## 2021-04-05 PROBLEM — S02.2XXD CLOSED FRACTURE OF NASAL BONE WITH ROUTINE HEALING: Status: ACTIVE | Noted: 2021-04-05

## 2021-04-05 PROBLEM — S00.83XA TRAUMATIC ECCHYMOSIS OF FACE: Status: ACTIVE | Noted: 2021-04-05

## 2021-04-05 PROBLEM — J34.2 ACQUIRED DEVIATED NASAL SEPTUM: Status: ACTIVE | Noted: 2021-04-05

## 2021-04-05 LAB
ALBUMIN SERPL-MCNC: 3.2 G/DL (ref 3.5–5.2)
ALBUMIN/GLOB SERPL: 1.5 G/DL
ALP SERPL-CCNC: 55 U/L (ref 39–117)
ALT SERPL W P-5'-P-CCNC: 16 U/L (ref 1–33)
ANION GAP SERPL CALCULATED.3IONS-SCNC: 9 MMOL/L (ref 5–15)
AST SERPL-CCNC: 31 U/L (ref 1–32)
BASOPHILS # BLD AUTO: 0.03 10*3/MM3 (ref 0–0.2)
BASOPHILS NFR BLD AUTO: 0.2 % (ref 0–1.5)
BILIRUB SERPL-MCNC: 0.4 MG/DL (ref 0–1.2)
BUN SERPL-MCNC: 10 MG/DL (ref 8–23)
BUN/CREAT SERPL: 15.6 (ref 7–25)
CALCIUM SPEC-SCNC: 8.7 MG/DL (ref 8.6–10.5)
CHLORIDE SERPL-SCNC: 100 MMOL/L (ref 98–107)
CO2 SERPL-SCNC: 27 MMOL/L (ref 22–29)
CREAT SERPL-MCNC: 0.64 MG/DL (ref 0.57–1)
DEPRECATED RDW RBC AUTO: 50.6 FL (ref 37–54)
EOSINOPHIL # BLD AUTO: 0.05 10*3/MM3 (ref 0–0.4)
EOSINOPHIL NFR BLD AUTO: 0.4 % (ref 0.3–6.2)
ERYTHROCYTE [DISTWIDTH] IN BLOOD BY AUTOMATED COUNT: 13.5 % (ref 12.3–15.4)
GFR SERPL CREATININE-BSD FRML MDRD: 89 ML/MIN/1.73
GLOBULIN UR ELPH-MCNC: 2.2 GM/DL
GLUCOSE SERPL-MCNC: 79 MG/DL (ref 65–99)
HCT VFR BLD AUTO: 36.2 % (ref 34–46.6)
HGB BLD-MCNC: 12.3 G/DL (ref 12–15.9)
IMM GRANULOCYTES # BLD AUTO: 0.06 10*3/MM3 (ref 0–0.05)
IMM GRANULOCYTES NFR BLD AUTO: 0.5 % (ref 0–0.5)
LYMPHOCYTES # BLD AUTO: 1.12 10*3/MM3 (ref 0.7–3.1)
LYMPHOCYTES NFR BLD AUTO: 8.6 % (ref 19.6–45.3)
MCH RBC QN AUTO: 34.3 PG (ref 26.6–33)
MCHC RBC AUTO-ENTMCNC: 34 G/DL (ref 31.5–35.7)
MCV RBC AUTO: 100.8 FL (ref 79–97)
MONOCYTES # BLD AUTO: 0.77 10*3/MM3 (ref 0.1–0.9)
MONOCYTES NFR BLD AUTO: 5.9 % (ref 5–12)
NEUTROPHILS NFR BLD AUTO: 10.94 10*3/MM3 (ref 1.7–7)
NEUTROPHILS NFR BLD AUTO: 84.4 % (ref 42.7–76)
NRBC BLD AUTO-RTO: 0 /100 WBC (ref 0–0.2)
PLATELET # BLD AUTO: 183 10*3/MM3 (ref 140–450)
PMV BLD AUTO: 10.3 FL (ref 6–12)
POTASSIUM SERPL-SCNC: 4.1 MMOL/L (ref 3.5–5.2)
PROT SERPL-MCNC: 5.4 G/DL (ref 6–8.5)
RBC # BLD AUTO: 3.59 10*6/MM3 (ref 3.77–5.28)
SODIUM SERPL-SCNC: 136 MMOL/L (ref 136–145)
WBC # BLD AUTO: 12.97 10*3/MM3 (ref 3.4–10.8)

## 2021-04-05 PROCEDURE — 25010000002 ONDANSETRON PER 1 MG: Performed by: NURSE PRACTITIONER

## 2021-04-05 PROCEDURE — 70450 CT HEAD/BRAIN W/O DYE: CPT

## 2021-04-05 PROCEDURE — 70486 CT MAXILLOFACIAL W/O DYE: CPT

## 2021-04-05 PROCEDURE — 97116 GAIT TRAINING THERAPY: CPT

## 2021-04-05 PROCEDURE — 99232 SBSQ HOSP IP/OBS MODERATE 35: CPT | Performed by: CLINICAL NURSE SPECIALIST

## 2021-04-05 PROCEDURE — 25010000002 KETOROLAC TROMETHAMINE PER 15 MG: Performed by: FAMILY MEDICINE

## 2021-04-05 PROCEDURE — 97110 THERAPEUTIC EXERCISES: CPT

## 2021-04-05 PROCEDURE — 97535 SELF CARE MNGMENT TRAINING: CPT

## 2021-04-05 PROCEDURE — 85025 COMPLETE CBC W/AUTO DIFF WBC: CPT | Performed by: FAMILY MEDICINE

## 2021-04-05 PROCEDURE — 99222 1ST HOSP IP/OBS MODERATE 55: CPT | Performed by: OTOLARYNGOLOGY

## 2021-04-05 PROCEDURE — 63710000001 PROMETHAZINE PER 25 MG: Performed by: FAMILY MEDICINE

## 2021-04-05 PROCEDURE — 80053 COMPREHEN METABOLIC PANEL: CPT | Performed by: FAMILY MEDICINE

## 2021-04-05 PROCEDURE — 99221 1ST HOSP IP/OBS SF/LOW 40: CPT | Performed by: NEUROLOGICAL SURGERY

## 2021-04-05 RX ORDER — OXYMETAZOLINE HYDROCHLORIDE 0.05 G/100ML
2 SPRAY NASAL 2 TIMES DAILY
Status: COMPLETED | OUTPATIENT
Start: 2021-04-05 | End: 2021-04-07

## 2021-04-05 RX ORDER — ECHINACEA PURPUREA EXTRACT 125 MG
2 TABLET ORAL
Status: DISCONTINUED | OUTPATIENT
Start: 2021-04-05 | End: 2021-04-09 | Stop reason: HOSPADM

## 2021-04-05 RX ORDER — OLANZAPINE 5 MG/1
5 TABLET ORAL NIGHTLY
Status: DISCONTINUED | OUTPATIENT
Start: 2021-04-05 | End: 2021-04-09 | Stop reason: HOSPADM

## 2021-04-05 RX ORDER — OXYMETAZOLINE HYDROCHLORIDE 0.05 G/100ML
2 SPRAY NASAL 3 TIMES DAILY
Status: DISCONTINUED | OUTPATIENT
Start: 2021-04-05 | End: 2021-04-09 | Stop reason: HOSPADM

## 2021-04-05 RX ORDER — ECHINACEA PURPUREA EXTRACT 125 MG
2 TABLET ORAL CONTINUOUS PRN
Status: DISCONTINUED | OUTPATIENT
Start: 2021-04-05 | End: 2021-04-09 | Stop reason: HOSPADM

## 2021-04-05 RX ADMIN — SALINE NASAL SPRAY 2 SPRAY: 1.5 SOLUTION NASAL at 23:26

## 2021-04-05 RX ADMIN — PROMETHAZINE HYDROCHLORIDE 6.25 MG: 25 TABLET ORAL at 00:44

## 2021-04-05 RX ADMIN — ASPIRIN 81 MG: 81 TABLET, CHEWABLE ORAL at 09:10

## 2021-04-05 RX ADMIN — ACEBUTOLOL HYDROCHLORIDE 200 MG: 200 CAPSULE ORAL at 20:35

## 2021-04-05 RX ADMIN — KETOROLAC TROMETHAMINE 15 MG: 30 INJECTION, SOLUTION INTRAMUSCULAR; INTRAVENOUS at 14:46

## 2021-04-05 RX ADMIN — FAMOTIDINE 20 MG: 10 INJECTION INTRAVENOUS at 09:10

## 2021-04-05 RX ADMIN — OLANZAPINE 5 MG: 5 TABLET, FILM COATED ORAL at 20:34

## 2021-04-05 RX ADMIN — FAMOTIDINE 20 MG: 10 INJECTION INTRAVENOUS at 20:34

## 2021-04-05 RX ADMIN — ACETAMINOPHEN 650 MG: 325 TABLET, FILM COATED ORAL at 05:39

## 2021-04-05 RX ADMIN — Medication 6 MG: at 20:34

## 2021-04-05 RX ADMIN — ONDANSETRON HYDROCHLORIDE 4 MG: 2 SOLUTION INTRAMUSCULAR; INTRAVENOUS at 21:50

## 2021-04-05 RX ADMIN — SODIUM CHLORIDE, PRESERVATIVE FREE 10 ML: 5 INJECTION INTRAVENOUS at 20:35

## 2021-04-05 RX ADMIN — PAROXETINE 20 MG: 20 TABLET, FILM COATED ORAL at 09:10

## 2021-04-05 RX ADMIN — MEGESTROL ACETATE 200 MG: 40 SUSPENSION ORAL at 09:10

## 2021-04-05 RX ADMIN — ATORVASTATIN CALCIUM 80 MG: 40 TABLET, FILM COATED ORAL at 20:34

## 2021-04-05 RX ADMIN — KETOROLAC TROMETHAMINE 15 MG: 30 INJECTION, SOLUTION INTRAMUSCULAR; INTRAVENOUS at 00:44

## 2021-04-05 RX ADMIN — Medication 2 SPRAY: at 20:34

## 2021-04-05 RX ADMIN — ACETAMINOPHEN 650 MG: 325 TABLET, FILM COATED ORAL at 20:34

## 2021-04-05 RX ADMIN — Medication 2 SPRAY: at 13:15

## 2021-04-05 RX ADMIN — ACEBUTOLOL HYDROCHLORIDE 200 MG: 200 CAPSULE ORAL at 09:10

## 2021-04-05 NOTE — PLAN OF CARE
Goal Outcome Evaluation:  Plan of Care Reviewed With: patient, daughter  Progress: no change  Outcome Summary: Ntn follow up.  Regular diet, oral intake avg 25% of past 3 meals. ONS adjusted. Boost pudding, yogurt w/ breakfast daily, magic cup with lunch and dinner. Pt qualifies for malnutrition. Cont to follow for plan of care.

## 2021-04-05 NOTE — CONSULTS
CC fall with facial fracture and small SDH  Subjective:  Patient lying in bed. No family at bedside. States she is thirsty and assisted patient with taking a few sips with a straw. She is awake and alert.         Medications:  Current Medications             Current Facility-Administered Medications   Medication Dose Route Frequency Provider Last Rate Last Admin   • acebutolol (SECTRAL) capsule 200 mg  200 mg Oral Q12H Silvana Hernandez, APRN   200 mg at 04/05/21 0910   • acetaminophen (TYLENOL) tablet 650 mg  650 mg Oral Q4H PRN Silvana Hernandez, APRN   650 mg at 04/05/21 0539     Or   • acetaminophen (TYLENOL) 160 MG/5ML solution 650 mg  650 mg Oral Q4H PRN Silvana Hernandez, APRN         Or   • acetaminophen (TYLENOL) suppository 650 mg  650 mg Rectal Q4H PRN Silvana Hernandez, APRN   650 mg at 04/02/21 2309   • aspirin chewable tablet 81 mg  81 mg Oral Daily Silvana Hernandez APRN   81 mg at 04/05/21 0910   • atorvastatin (LIPITOR) tablet 80 mg  80 mg Oral Nightly Silvana Hernandez, APRN   80 mg at 04/04/21 2007   • famotidine (PEPCID) injection 20 mg  20 mg Intravenous Q12H Tay Guardado MD   20 mg at 04/05/21 0910   • ketorolac (TORADOL) injection 15 mg  15 mg Intravenous Q6H PRN Amara Barclay MD   15 mg at 04/05/21 0044   • megestrol (MEGACE) 40 MG/ML suspension 200 mg  200 mg Oral Daily Tay Guardado MD   200 mg at 04/05/21 0910   • melatonin tablet 6 mg  6 mg Oral Nightly Kym Colmenares MD   6 mg at 04/04/21 2008   • OLANZapine (zyPREXA) tablet 2.5 mg  2.5 mg Oral Nightly Kym Colmenares MD   2.5 mg at 04/04/21 2007   • ondansetron (ZOFRAN) tablet 4 mg  4 mg Oral Q6H PRN Silvana Hernandez, APRN   4 mg at 04/03/21 2111     Or   • ondansetron (ZOFRAN) injection 4 mg  4 mg Intravenous Q6H PRN Silvana Hernandez, LISA   4 mg at 04/04/21 1831   • oxymetazoline (AFRIN) nasal spray 2 spray  2 spray Each Nare BID Jesse Castillo MD   2 spray at 04/05/21 1315   • PARoxetine  (PAXIL) tablet 20 mg  20 mg Oral Daily Tay Guardado MD   20 mg at 04/05/21 0910   • potassium chloride (MICRO-K) CR capsule 40 mEq  40 mEq Oral Once Tay Guardado MD       • promethazine (PHENERGAN) tablet 6.25 mg  6.25 mg Oral Q6H PRN Amara Barclay MD   6.25 mg at 04/05/21 0044     Or   • promethazine (PHENERGAN) suppository 6.25 mg  6.25 mg Rectal Q6H PRN Amara Barclay MD   6.25 mg at 04/03/21 0427   • sodium chloride 0.9 % flush 10 mL  10 mL Intravenous PRN Silvana Hernandez APRN       • sodium chloride 0.9 % flush 10 mL  10 mL Intravenous Q12H Silvana Hernandez APRN   10 mL at 04/04/21 0811   • sodium chloride 0.9 % flush 10 mL  10 mL Intravenous PRN Silvana Hernandez APRN                Review of Systems:   -A 14 point review of systems is completed and is negative except for c/o thirst.        Objective       Vital Signs  Temp:  [97.9 °F (36.6 °C)-98.5 °F (36.9 °C)] 98.3 °F (36.8 °C)  Heart Rate:  [66-82] 73  Resp:  [12-16] 15  BP: (132-156)/(47-73) 156/73     Physical Exam:  HEENT:  Neck supple. Bruising left eye and between on forehead.   CVS:  Regular rate and rhythm.  No murmurs  Carotid Examination:  No bruits  Lungs:  Clear to auscultation  Abdomen:  Non-tender, Non-distended  Extremities:  No signs of peripheral edema     Neurologic Exam:     -Awake, Alert, Oriented to person, place, month, year  Knew month, season, floor, city, state, county and who the president is. Named the holiday yesterday (Easter). .  -No word finding difficulties  -No aphasia  -No dysarthria  -Follows simple and complex commands     Cranial nerves II through XII intact.  CN II:  Visual fields full.  Pupils equally reactive to light  CN III, IV, VI:  Extraocular Muscles full with no signs of nystagmus  CN V:  Facial sensory is symmetric   CN VII:  Facial motor symmetric except from surgery on left forehead  CN VIII:  Gross hearing intact bilaterally  CN IX/X:  Palate elevates symmetrically  CN XI:  Shoulder  shrug symmetric  CN XII:  Tongue is midline on protrusion  Motor: (strength out of 5:  1= minimal movement, 2 = movement in plane of gravity, 3 = movement against gravity, 4 = movement against some resistance, 5 = full strength)     -Right Upper Ext: Proximal: 5 Distal: 5  -Left Upper Ext: Proximal: 5   Distal: 5     -Right Lower Ext: Proximal: 5 Distal: 5  -Left Lower Ext: Proximal: 5   Distal: 5     Good strength but generally weak     DTR:  2+ throughout in all four extremities     Sensory:  -Intact to light touch, pinprick, temperature, pain, and proprioception     Coordination/Gait:  -No ataxia  Ambulated 60 feet with rolling walker with PT and contact guard.         Results Review:    I reviewed the patient's new clinical results.            Results from last 7 days   Lab Units 04/05/21  0350 04/04/21 0438 04/03/21  0404   WBC 10*3/mm3 12.97* 6.42 7.87   HEMOGLOBIN g/dL 12.3 12.2 14.0   HEMATOCRIT % 36.2 35.7 38.9   PLATELETS 10*3/mm3 183 204 249                 Results from last 7 days   Lab Units 04/05/21  0350 04/04/21 0438 04/03/21  0404 04/02/21  1729   SODIUM mmol/L 136 136 134* 131*   POTASSIUM mmol/L 4.1 4.4 3.3* 3.6   CHLORIDE mmol/L 100 101 94* 92*   CO2 mmol/L 27.0 23.0 20.0* 24.0   BUN mg/dL 10 13 5* 5*   CREATININE mg/dL 0.64 0.71 0.60 0.62   CALCIUM mg/dL 8.7 8.7 9.0 9.4   BILIRUBIN mg/dL 0.4 0.3  --  0.4   ALK PHOS U/L 55 53  --  63   ALT (SGPT) U/L 16 17  --  20   AST (SGOT) U/L 31 36*  --  46*   GLUCOSE mg/dL 79 79 75 118*               Lab Results   Component Value Date     MG 1.7 04/03/2021     PROTIME 13.9 04/02/2021     INR 1.11 (H) 04/02/2021      No components found for: POCGLUC  No components found for: A1C        Lab Results   Component Value Date     HDL 71 (H) 04/03/2021      (H) 04/03/2021      No components found for: B12        Lab Results   Component Value Date     TSH 0.912 04/02/2021   Facial CT IMPRESSION:  1. Acute appearing minimally displaced bilateral nasal bone  fractures  with small soft tissue gas overlying soft tissue swelling. Septum  deviated apex right, difficult to compare to prior due to differences in  technique.  2. Large midline frontal subcutaneous forehead hematoma.  Head CT IMPRESSION:  1. Small subdural hematoma along the right tentorium and right middle  cranial fossa. 1 mm thickness no mass effect           Assessment/Plan      Hospital Problem List      Traumatic SDH    Encephalopathy    Unknown when patient last well, possible stroke     Hyponatremia    Failure to thrive in adult    Severe malnutrition (CMS/HCC)    History of migraines     Impression:  1.fall with small SDH, patient may also have metabolic encephalopathy  2. Migraine headaches and medication overuse headache and headache related to head trauma  3. Chronic nausea  4. Risk for falls may be related to initial drop in blood pressure that quickly corrects     Plan:  1. Repeat Head CT in 6 and 12 hours  2. No neurosurgical intervention planned

## 2021-04-05 NOTE — PLAN OF CARE
"Goal Outcome Evaluation:  Plan of Care Reviewed With: patient  Progress: no change  Outcome Summary: Pt A&O. Very anxious at times, did request for somethign to help her relax, melatonin given. CO headache, \"soreness\" refering to her abdomen, and nausea. Scheduled and prn meds given for relief.  "

## 2021-04-05 NOTE — PLAN OF CARE
Goal Outcome Evaluation:  Plan of Care Reviewed With: patient  Progress: improving  Outcome Summary: Pt was able to transfer supine to sit with CGA.  Sit to stand with min assist.  Amb 60' with RWX and CGA/min assist with cues for safety.  Performed LE exercises in fowlers.  Pt is still very weak, would benefit from SNF to continue therapy to increase strength and safety prior to returning home alone.

## 2021-04-05 NOTE — PROGRESS NOTES
Neurology Progress Note      Chief Complaint:  F/u encephalopathy    Subjective     Subjective:  Patient lying in bed. No family at bedside. States she is thirsty and assisted patient with taking a few sips with a straw. She is awake and alert.       Medications:  Current Facility-Administered Medications   Medication Dose Route Frequency Provider Last Rate Last Admin   • acebutolol (SECTRAL) capsule 200 mg  200 mg Oral Q12H Silvana Hernandez, APRN   200 mg at 04/05/21 0910   • acetaminophen (TYLENOL) tablet 650 mg  650 mg Oral Q4H PRN Silvana Hernandez, APRN   650 mg at 04/05/21 0539    Or   • acetaminophen (TYLENOL) 160 MG/5ML solution 650 mg  650 mg Oral Q4H PRN Silvana Hernandez, APRN        Or   • acetaminophen (TYLENOL) suppository 650 mg  650 mg Rectal Q4H PRN Silvana Hernandez, APRN   650 mg at 04/02/21 2309   • aspirin chewable tablet 81 mg  81 mg Oral Daily Silvana Hernandez, APRN   81 mg at 04/05/21 0910   • atorvastatin (LIPITOR) tablet 80 mg  80 mg Oral Nightly Silvana Hernandez, APRN   80 mg at 04/04/21 2007   • famotidine (PEPCID) injection 20 mg  20 mg Intravenous Q12H Tay Guardado MD   20 mg at 04/05/21 0910   • ketorolac (TORADOL) injection 15 mg  15 mg Intravenous Q6H PRN Amara Barclay MD   15 mg at 04/05/21 0044   • megestrol (MEGACE) 40 MG/ML suspension 200 mg  200 mg Oral Daily Tay Guardado MD   200 mg at 04/05/21 0910   • melatonin tablet 6 mg  6 mg Oral Nightly Kym Colmenares MD   6 mg at 04/04/21 2008   • OLANZapine (zyPREXA) tablet 2.5 mg  2.5 mg Oral Nightly Kym Colmenares MD   2.5 mg at 04/04/21 2007   • ondansetron (ZOFRAN) tablet 4 mg  4 mg Oral Q6H PRN Silvana Hernandez, APRN   4 mg at 04/03/21 2111    Or   • ondansetron (ZOFRAN) injection 4 mg  4 mg Intravenous Q6H PRN Silvana Hernandez, LISA   4 mg at 04/04/21 1831   • oxymetazoline (AFRIN) nasal spray 2 spray  2 spray Each Nare BID Jesse Castillo MD   2 spray at 04/05/21 1315   • PARoxetine  (PAXIL) tablet 20 mg  20 mg Oral Daily Tay Guardado MD   20 mg at 04/05/21 0910   • potassium chloride (MICRO-K) CR capsule 40 mEq  40 mEq Oral Once Tay Guardado MD       • promethazine (PHENERGAN) tablet 6.25 mg  6.25 mg Oral Q6H PRN Amara Barclay MD   6.25 mg at 04/05/21 0044    Or   • promethazine (PHENERGAN) suppository 6.25 mg  6.25 mg Rectal Q6H PRN Amara Barclay MD   6.25 mg at 04/03/21 0427   • sodium chloride 0.9 % flush 10 mL  10 mL Intravenous PRN Silvana Hernandez APRN       • sodium chloride 0.9 % flush 10 mL  10 mL Intravenous Q12H Silvana Hernandez APRN   10 mL at 04/04/21 0811   • sodium chloride 0.9 % flush 10 mL  10 mL Intravenous PRN Silvana Hernandez APRN           Review of Systems:   -A 14 point review of systems is completed and is negative except for c/o thirst.      Objective      Vital Signs  Temp:  [97.9 °F (36.6 °C)-98.5 °F (36.9 °C)] 98.3 °F (36.8 °C)  Heart Rate:  [66-82] 73  Resp:  [12-16] 15  BP: (132-156)/(47-73) 156/73    Physical Exam:  HEENT:  Neck supple. Bruising left eye and between on forehead.   CVS:  Regular rate and rhythm.  No murmurs  Carotid Examination:  No bruits  Lungs:  Clear to auscultation  Abdomen:  Non-tender, Non-distended  Extremities:  No signs of peripheral edema     Neurologic Exam:     -Awake, Alert, Oriented to person, place, month, year  Knew month, season, floor, city, state, county and who the president is. Named the holiday yesterday (Easter). .  -No word finding difficulties  -No aphasia  -No dysarthria  -Follows simple and complex commands     Cranial nerves II through XII intact.  CN II:  Visual fields full.  Pupils equally reactive to light  CN III, IV, VI:  Extraocular Muscles full with no signs of nystagmus  CN V:  Facial sensory is symmetric   CN VII:  Facial motor symmetric except from surgery on left forehead  CN VIII:  Gross hearing intact bilaterally  CN IX/X:  Palate elevates symmetrically  CN XI:  Shoulder shrug  symmetric  CN XII:  Tongue is midline on protrusion  Motor: (strength out of 5:  1= minimal movement, 2 = movement in plane of gravity, 3 = movement against gravity, 4 = movement against some resistance, 5 = full strength)     -Right Upper Ext: Proximal: 5 Distal: 5  -Left Upper Ext: Proximal: 5   Distal: 5     -Right Lower Ext: Proximal: 5 Distal: 5  -Left Lower Ext: Proximal: 5   Distal: 5     Good strength but generally weak    DTR:  2+ throughout in all four extremities     Sensory:  -Intact to light touch, pinprick, temperature, pain, and proprioception     Coordination/Gait:  -No ataxia  Ambulated 60 feet with rolling walker with PT and contact guard.      Results Review:    I reviewed the patient's new clinical results.    Results from last 7 days   Lab Units 04/05/21  0350 04/04/21 0438 04/03/21  0404   WBC 10*3/mm3 12.97* 6.42 7.87   HEMOGLOBIN g/dL 12.3 12.2 14.0   HEMATOCRIT % 36.2 35.7 38.9   PLATELETS 10*3/mm3 183 204 249        Results from last 7 days   Lab Units 04/05/21  0350 04/04/21 0438 04/03/21  0404 04/02/21  1729   SODIUM mmol/L 136 136 134* 131*   POTASSIUM mmol/L 4.1 4.4 3.3* 3.6   CHLORIDE mmol/L 100 101 94* 92*   CO2 mmol/L 27.0 23.0 20.0* 24.0   BUN mg/dL 10 13 5* 5*   CREATININE mg/dL 0.64 0.71 0.60 0.62   CALCIUM mg/dL 8.7 8.7 9.0 9.4   BILIRUBIN mg/dL 0.4 0.3  --  0.4   ALK PHOS U/L 55 53  --  63   ALT (SGPT) U/L 16 17  --  20   AST (SGOT) U/L 31 36*  --  46*   GLUCOSE mg/dL 79 79 75 118*        Lab Results   Component Value Date    MG 1.7 04/03/2021    PROTIME 13.9 04/02/2021    INR 1.11 (H) 04/02/2021     No components found for: POCGLUC  No components found for: A1C  Lab Results   Component Value Date    HDL 71 (H) 04/03/2021     (H) 04/03/2021     No components found for: B12  Lab Results   Component Value Date    TSH 0.912 04/02/2021       Assessment/Plan     Hospital Problem List      Encephalopathy    Unknown when patient last well, possible stroke     Hyponatremia     Failure to thrive in adult    Severe malnutrition (CMS/HCC)    History of migraines    Impression:  1. Encephalopathy secondary to medications  2. Migraine headaches and medication overuse headache and headache related to head trauma  3. Chronic nausea  4. Risk for falls may be related to initial drop in blood pressure that quickly corrects    Plan:  1. Supportive care.  2. Therapies recommending SNF.  3. Nutrition has been consulted for nutrition  4. Zyprexa 2.5 mg at HS for chronic nausea.  5. Neurology will sign off.         Leslie Sunshine, LISA  04/05/21  14:03 CDT

## 2021-04-05 NOTE — PLAN OF CARE
Goal Outcome Evaluation:  Plan of Care Reviewed With: patient  Progress: improving  Outcome Summary: Pt. A&OX3, Disoriented to year right now. VSS. Pt. fell today and CT of face shows dislocated bilateral nasal fracture and septum deviated to the right. CT of head showed small subdural hematoma, left frontal hematoma. Pt. has extreme swelling and bruising to forehead, nose, and eyes. C/o pain that has been relieved w/Toradol. Neuro and ENT consulted. Repeat CT for night shift. Daughter @ bedside. Affrin ordered. SCD's for VTE prevention, ASA d/c. Pt. has been ambulating x1 to bathroom, but now assist of 2 to BSC. Bed alarm remains set. Safety maintained and continue to monitor.

## 2021-04-05 NOTE — CONSULTS
[facility] Otolaryngology Head and Neck Surgery  Consult  Patient Name: Zelalem Hung  : 1940   MRN: [id number]  Date of Admission: 2021  Today's Date: 21  Length of Stay: 3  Room: [facility]/    Referring physician: Jesse Castillo MD   Primary Care Physician: Garth Amezcua APRN    Subjective    Subjective   History of Present Illness  Chief Complaint/Reason for Consultation: Fracture  Accompanied by: Granddaughter  Zelalem Hung is a  80 y.o. female who was admitted to the hospital for weakness and confusion.  Earlier today she fell, injuring her face.  She has had a CT scan.  I am consulted for nasal fracture.  Not remember why she got up.  She denies any loss of consciousness but does not really sure.  She is unable to give much history.  She does complain of nasal pain.  The granddaughter says that her nose has been slightly oozing since the injury.  Patient seen chart is reviewed.    Review of Systems unobtainable from patient  Otherwise complete ROS is negative except as mentioned above.    Past Medical History:   Past Medical History:   Diagnosis Date   • Chronic hyponatremia    • Frequent UTI    • Migraines    • SVT (supraventricular tachycardia) (CMS/HCC)      Past Surgical History:  Past Surgical History:   Procedure Laterality Date   • CHOLECYSTECTOMY WITH INTRAOPERATIVE CHOLANGIOGRAM N/A 2019    Procedure: CHOLECYSTECTOMY LAPAROSCOPIC INTRAOPERATIVE CHOLANGIOGRAM;  Surgeon: Chandan Bravo MD;  Location: [facility];  Service: General     Social History:  reports that she has never smoked. She has never used smokeless tobacco. She reports that she does not drink alcohol and does not use drugs.    Family History: family history includes Anemia in her mother; Colon cancer in her father; Heart attack in her father; Heart disease in her father; Stroke in her mother.     Allergies:   Allergies   Allergen Reactions   • Codeine Anaphylaxis      Medications: Scheduled Meds:acebutolol, 200 mg, Oral, Q12H  atorvastatin, 80 mg, Oral, Nightly  famotidine, 20 mg, Intravenous, Q12H  megestrol, 200 mg, Oral, Daily  melatonin, 6 mg, Oral, Nightly  OLANZapine, 5 mg, Oral, Nightly  oxymetazoline, 2 spray, Each Nare, BID  PARoxetine, 20 mg, Oral, Daily  potassium chloride, 40 mEq, Oral, Once  sodium chloride, 10 mL, Intravenous, Q12H      Continuous Infusions:   PRN Meds:.acetaminophen **OR** acetaminophen **OR** acetaminophen  •  ketorolac  •  ondansetron **OR** ondansetron  •  promethazine **OR** promethazine  •  [COMPLETED] Insert peripheral IV **AND** sodium chloride  •  sodium chloride    Objective    Objective   Temp:  [97.8 °F (36.6 °C)-98.5 °F (36.9 °C)] 97.8 °F (36.6 °C)  Heart Rate:  [66-78] 78  Resp:  [12-16] 16  BP: (132-156)/(47-82) 145/82  Physical Exam  Constitutional:       General: She is awake.      Appearance: She is well-developed and underweight.      Comments: Obvious facial trauma   HENT:      Head:        Right Ear: External ear normal. Decreased hearing noted.      Left Ear: External ear normal. Decreased hearing noted.      Nose: Nasal deformity (Bilateral nasal bone fracture, highly mobile, overlying skin with ecchymosis, mild abrasion of the dorsum), septal deviation (Left to right mid moderate to severe with overlying clotted blood), congestion and rhinorrhea present. Rhinorrhea is bloody.      Right Nostril: Epistaxis (Mild) present.      Right Turbinates: Swollen.      Left Turbinates: Swollen.        Mouth/Throat:      Lips: Pink.      Mouth: Mucous membranes are dry.      Dentition: Abnormal dentition (Edentulous). Has dentures (Upper and lower).      Tongue: No lesions. Tongue does not deviate from midline.      Palate: No mass and lesions.      Comments: Old dried blood in the oropharynx  Eyes:      General: Gaze aligned appropriately.      Extraocular Movements: Extraocular movements intact.      Right eye: No nystagmus.       Left eye: No nystagmus.      Conjunctiva/sclera:      Left eye: Hemorrhage (Medial sub conjunctival hemorrhage) present.        Comments: OU-ecchymosis and subcutaneous hematoma in the inferior lids and part of the superior lids   Neck:      Thyroid: No thyroid mass or thyromegaly.      Trachea: Trachea and phonation normal.      Comments: Atrophic musculature  Musculoskeletal:      Cervical back: Decreased range of motion.   Lymphadenopathy:      Cervical: No cervical adenopathy.   Neurological:      Mental Status: She is disoriented.      Cranial Nerves: Cranial nerves are intact.   Psychiatric:         Attention and Perception: She is inattentive.         Speech: Speech normal.         Behavior: Behavior is cooperative.         Cognition and Memory: Cognition is impaired.       Result Review:  I have personally reviewed the results from the time of this admission to 04/05/21 5:58 PM CDT and agree with these findings:  [x]  Laboratory  []  Microbiology  [x]  Radiology  []  EKG/Telemetry   []  Cardiology/Vascular   []  Pathology  [x]  Old records  []  Other:  Most notable findings include: Nasal bone fracture  Progress Notes by Jesse Castillo MD (04/05/2021 11:15)   Progress Notes by Leslie Sunshine APRN (04/05/2021 14:03)   Consults by Alfredo yRan MD (04/05/2021 17:05)   Comprehensive Metabolic Panel (04/05/2021 03:50)   CBC & Differential (04/05/2021 03:50)   CT Facial Bones Without Contrast (04/05/2021 14:20)       Assessment/Plan    Assessment / Plan   Assessment:     Active Hospital Problems    Diagnosis    • **Encephalopathy    • Closed fracture of nasal bone with routine healing    • Traumatic ecchymosis of face    • Acquired deviated nasal septum    • Epistaxis    • Unknown when patient last well, possible stroke     • Hyponatremia    • Failure to thrive in adult    • Severe malnutrition (CMS/HCC)    • History of migraines             Plan:   · Nasal fracture-patient has edema of the nasal  dorsum.  She appears to have very mobile nasal bones.  I will wait till the edema subsides and then decide if she needs close reduction.  · Deviated nasal septum-she has a fractured septum with deviation to the right and appropriate hematoma present between the septum and the lateral wall of the nose.  I will begin nasal hygiene.  She should not blow her nose  · Ecchymosis of the face-especially in the periorbita bilaterally.  I will begin ice to her face.  She should avoid any anticoagulants at this point in time to not worsen the hematoma.  · Abrasion of nose-this appears small and should heal without further treatment.    I discussed the patient's findings and my recommendations with granddaughter.  Following patient as in-patient. Further recommendations will be made based on serial examinations.    Medications/Orders for this encounter: Medications ordered- Afrin and saline to the nose   Orders-  Ice to the face and avoid blowing nose         Electronically signed by Talat Minor Jr, MD, 04/05/21, 5:58 PM CDT.

## 2021-04-05 NOTE — PLAN OF CARE
Goal Outcome Evaluation:  Plan of Care Reviewed With: patient  Progress: improving  Outcome Summary: OT tx completed.  Pt completed bed mobility with SBA, functional transfers to/from bed/chair/commode with CGA/min A with use of RWX.  Pt demonstrates significant posterior lean requiring tactile cueing and vcs upon standing from each surface.  Pt able to walk to/from bathroom with CGA/min A with use of RWX.  Pt completed toileting with min A and sink side grooming with min A due to assist required for standing balance during each task.  Pt fatigued quickly with activity and became nauseous upon sitting in bedside chair following toileting and sink side grooming activity.  OT will continue to follow to maximize her I and safety with ADLs however pt is unsafe to return home alone at this time.  It is recommended for pt to discharge to SNF upon discharge from hospital.  If pt not agreeable she will require 24/7 physical assist.

## 2021-04-05 NOTE — THERAPY TREATMENT NOTE
Acute Care - NICU   Treatment Note  [facility]     Patient Name: Zelalem Hung  : 1940  MRN: [id number]  Today's Date: 2021              Admit Date: 2021       ICD-10-CM ICD-9-CM   1. Altered mental status, unspecified altered mental status type  R41.82 780.97   2. Impaired mobility  Z74.09 799.89       Patient Active Problem List   Diagnosis   • Cholecystitis   • Encephalopathy   • Unknown when patient last well, possible stroke    • Hyponatremia   • Failure to thrive in adult   • Severe malnutrition (CMS/HCC)   • History of migraines       Past Medical History:   Diagnosis Date   • Chronic hyponatremia    • Frequent UTI    • Migraines    • SVT (supraventricular tachycardia) (CMS/HCC)        Past Surgical History:   Procedure Laterality Date   • CHOLECYSTECTOMY WITH INTRAOPERATIVE CHOLANGIOGRAM N/A 2019    Procedure: CHOLECYSTECTOMY LAPAROSCOPIC INTRAOPERATIVE CHOLANGIOGRAM;  Surgeon: Chandan Bravo MD;  Location: [facility];  Service: General                    Occupational Therapy Education                 Title: PT OT SLP Therapies (In Progress)     Topic: Occupational Therapy (In Progress)     Point: ADL training (Done)     Description:   Instruct learner(s) on proper safety adaptation and remediation techniques during self care or transfers.   Instruct in proper use of assistive devices.              Learning Progress Summary           Patient Acceptance, E, VU,NR by CS at 2021 1022    Acceptance, E, VU,NR by CS at 4/3/2021 1252                   Point: Home exercise program (Not Started)     Description:   Instruct learner(s) on appropriate technique for monitoring, assisting and/or progressing therapeutic exercises/activities.              Learner Progress:  Not documented in this visit.          Point: Precautions (Done)     Description:   Instruct learner(s) on prescribed precautions during self-care and functional transfers.              Learning Progress Summary            Patient Acceptance, E, VU,NR by  at 4/3/2021 1252                   Point: Body mechanics (Done)     Description:   Instruct learner(s) on proper positioning and spine alignment during self-care, functional mobility activities and/or exercises.              Learning Progress Summary           Patient Acceptance, E, VU,NR by  at 4/3/2021 1252                               User Key     Initials Effective Dates Name Provider Type Discipline     04/02/19 -  Svitlana Anand, OTR/L, CNT Occupational Therapist OT                  OT Recommendation and Plan  Equipment Needs Upon Discharge (OT): walker, rolling  Anticipated Discharge Disposition (OT): skilled nursing facility  Planned Therapy Interventions (OT): activity tolerance training, BADL retraining, functional balance retraining, occupation/activity based interventions, patient/caregiver education/training, transfer/mobility retraining, strengthening exercise  Therapy Frequency (OT): 5 times/wk      Progress: improving  Outcome Summary: OT tx completed.  Pt completed bed mobility with SBA, functional transfers to/from bed/chair/commode with CGA/min A with use of RWX.  Pt demonstrates significant posterior lean requiring tactile cueing and vcs upon standing from each surface.  Pt able to walk to/from bathroom with CGA/min A with use of RWX.  Pt completed toileting with min A and sink side grooming with min A due to assist required for standing balance during each task.  Pt fatigued quickly with activity and became nauseous upon sitting in bedside chair following toileting and sink side grooming activity.  OT will continue to follow to maximize her I and safety with ADLs however pt is unsafe to return home alone at this time.  It is recommended for pt to discharge to SNF upon discharge from hospital.  If pt not agreeable she will require 24/7 physical assist.                Time Calculation:   Time Calculation- OT     Row Name 04/05/21 4478             Time  Calculation- OT    OT Start Time  0730  -CS      OT Stop Time  0801  -CS      OT Time Calculation (min)  31 min  -CS      Total Timed Code Minutes- OT  31 minute(s)  -CS      OT Received On  04/05/21  -CS         Timed Charges    92108 - OT Self Care/Mgmt Minutes  31  -CS        User Key  (r) = Recorded By, (t) = Taken By, (c) = Cosigned By    Initials Name Provider Type    CS Svitlana Anand OTR/L, CORIE Occupational Therapist          Therapy Charges for Today     Code Description Service Date Service Provider Modifiers Qty    13582396387 HC OT SELF CARE/MGMT/TRAIN EA 15 MIN 4/5/2021 Svitlana Anand OTR/L, CORIE GO 2                   AURORA Abrams/L, CNT  4/5/2021

## 2021-04-05 NOTE — THERAPY TREATMENT NOTE
Acute Care - Physical Therapy Treatment Note  [facility]     Patient Name: Zelalem Hung  : 1940  MRN: [id number]  Today's Date: 2021           PT Assessment (last 12 hours)      PT Evaluation and Treatment     Row Name 21 1017          Physical Therapy Time and Intention    Subjective Information  complains of;weakness  -JAMES     Document Type  therapy note (daily note)  -JAMES     Mode of Treatment  physical therapy  -JAMES     Row Name 21 1017          General Information    Existing Precautions/Restrictions  fall  -JAMES     Row Name 21 1017          Pain Scale: Numbers Pre/Post-Treatment    Pretreatment Pain Rating  0/10 - no pain  -JAMES     Posttreatment Pain Rating  0/10 - no pain  -JAMES     Row Name 21 1017          Bed Mobility    Supine-Sit [place] (Bed Mobility)  verbal cues;contact guard  -     Sit-Supine [place] (Bed Mobility)  verbal cues;contact guard  -     Row Name 21 1017          Transfers    Sit-Stand [place] (Transfers)  verbal cues;minimum assist (75% patient effort)  -     Stand-Sit [place] (Transfers)  verbal cues;contact guard  -JAMES     Row Name 21 1017          Sit-Stand Transfer    Assistive Device (Sit-Stand Transfers)  walker, front-wheeled  -JAMES     Row Name 21 1017          Stand-Sit Transfer    Assistive Device (Stand-Sit Transfers)  walker, front-wheeled  -JAMES     Row Name 21 1017          Gait/Stairs (Locomotion)    [place] Level (Gait)  verbal cues;contact guard;minimum assist (75% patient effort)  -JAMES     Assistive Device (Gait)  walker, front-wheeled  -JAMES     Distance in Feet (Gait)  120  -JAMES     Deviations/Abnormal Patterns (Gait)  gait speed decreased  -JAMES     Bilateral Gait Deviations  forward flexed posture  -JAMES     Row Name 21 1017          Aerobic Exercise    Comment, Aerobic Exercise (Therapeutic Exercise)  in fowlers, AARMIRIAN/AROM BLE X  15   -JAMES     Row Name             [REMOVED] Wound 19  0857 Other (See comments) abdomen    Wound - Properties Group Placement Date: 05/09/19  -RM Placement Time: 0857  -RM Side: Other (See comments)  -RM Location: abdomen  -RM Removal Date: 04/05/21  -BS Removal Time: 0851 -BS Wound Outcome: Healed  -BS Type: incision  -RM    Retired Wound - Properties Group Date first assessed: 05/09/19  -RM Time first assessed: 0857  -RM Side: Other (See comments)  -RM Location: abdomen  -RM Resolution Date: 04/05/21  -BS Resolution Time: 0851 -BS Wound Outcome: Healed  -BS Type: incision  -RM    Row Name 04/05/21 1017          Positioning and Restraints    Pre-Treatment Position  in bed  -JAMES     Post Treatment Position  bed  -JAMES     In Bed  fowlers;call light within reach;encouraged to call for assist;exit alarm on;side rails up x2  -JAMES       User Key  (r) = Recorded By, (t) = Taken By, (c) = Cosigned By    Initials Name Provider Type    Daniel Bay PTA Physical Therapy Assistant    Ashley Emanuel, RN Registered Nurse    RM Moni Zazueta RN Registered Nurse        Physical Therapy Education                 Title: PT OT SLP Therapies (In Progress)     Topic: Physical Therapy (In Progress)     Point: Mobility training (In Progress)     Learning Progress Summary           Patient Acceptance, E, NR by JAMES at 4/5/2021 1017    Comment: benefits of activity, safety with transfers and amb    Acceptance, E, NR by MS at 4/3/2021 1140    Comment: role of PT in her care                   Point: Home exercise program (Not Started)     Learner Progress:  Not documented in this visit.          Point: Body mechanics (Not Started)     Learner Progress:  Not documented in this visit.          Point: Precautions (Not Started)     Learner Progress:  Not documented in this visit.                      User Key     Initials Effective Dates Name Provider Type Discipline    MS 06/19/18 -  Charla Dennis, PT, DPT, NCS Physical Therapist PT    JAMES 12/08/16 -  Daniel Urban PTA  Physical Therapy Assistant PT              PT Recommendation and Plan     Plan of Care Reviewed With: patient  Progress: improving  Outcome Summary: Pt was able to transfer supine to sit with CGA.  Sit to stand with min assist.  Amb 60' with RWX and CGA/min assist with cues for safety.  Performed LE exercises in fowlers.  Pt is still very weak, would benefit from SNF to continue therapy to increase strength and safety prior to returning home alone.       Time Calculation:   PT Charges     Row Name 04/05/21 1017             Time Calculation    Start Time  1017  -JAMES      Stop Time  1046  -JAMES      Time Calculation (min)  29 min  -JAMES      PT Received On  04/05/21  -JAMES         Time Calculation- PT    Total Timed Code Minutes- PT  29 minute(s)  -JAMES         Timed Charges    37909 - PT Therapeutic Exercise Minutes  10  -JAMES      62783 - Gait Training Minutes   19  -JAMES        User Key  (r) = Recorded By, (t) = Taken By, (c) = Cosigned By    Initials Name Provider Type    Daniel Bay PTA Physical Therapy Assistant        Therapy Charges for Today     Code Description Service Date Service Provider Modifiers Qty    33004130798 HC GAIT TRAINING EA 15 MIN 4/4/2021 Daniel Urban, PTA GP 1    75239991167 HC GAIT TRAINING EA 15 MIN 4/5/2021 Daniel Urban, PTA GP 1    25602856619 HC PT THER PROC EA 15 MIN 4/5/2021 Daniel Urban, PTA GP 1          PT G-Codes  Outcome Measure Options: AM-PAC 6 Clicks Daily Activity (OT)  AM-PAC 6 Clicks Score (PT): 16  AM-PAC 6 Clicks Score (OT): 20    Daniel Urban PTA  4/5/2021

## 2021-04-05 NOTE — PROGRESS NOTES
[facility] Medicine Services  INPATIENT PROGRESS NOTE    Patient Name: Zelalem Hung  Date of Admission: 4/2/2021  Today's Date: 04/05/21  Length of Stay: 3  Primary Care Physician: Garth Amezcua APRN    Subjective   Chief Complaint: feeling better  HPI   Sitting up in bed, eating breakfast  No confusion    Addendum:  Called by nursing.  Patient fell on face/head.  Has significant nose swelling, is now confused    Review of Systems   Constitutional: Negative for fatigue and fever.   HENT: Negative for congestion and ear pain.    Eyes: Negative for pain and visual disturbance.   Respiratory: Negative for cough, shortness of breath and wheezing.    Cardiovascular: Negative for chest pain and palpitations.   Gastrointestinal: Negative for diarrhea, nausea and vomiting.   Endocrine: Negative for heat intolerance.   Genitourinary: Negative for dysuria and frequency.   Musculoskeletal: Negative for arthralgias and back pain.   Skin: Negative for rash and wound.   Neurological: Negative for dizziness and light-headedness.   Psychiatric/Behavioral: Negative for confusion. The patient is not nervous/anxious.    All other systems reviewed and are negative.         All pertinent negatives and positives are as above. All other systems have been reviewed and are negative unless otherwise stated.     Objective    Temp:  [97.9 °F (36.6 °C)-98.5 °F (36.9 °C)] 98.3 °F (36.8 °C)  Heart Rate:  [66-82] 77  Resp:  [12-16] 15  BP: (132-153)/(47-69) 146/64  Physical Exam  Constitutional:       Appearance: Normal appearance. She is well-developed.   HENT:      Head: Normocephalic and atraumatic.      Comments: Malar brusing     Right Ear: Tympanic membrane, ear canal and external ear normal.      Left Ear: Tympanic membrane, ear canal and external ear normal.      Nose: Nasal deformity, septal deviation, signs of injury and laceration present.      Mouth/Throat:      Mouth: Mucous membranes are  moist.      Pharynx: Oropharynx is clear.   Eyes:      Extraocular Movements: Extraocular movements intact.      Conjunctiva/sclera: Conjunctivae normal.      Pupils: Pupils are equal, round, and reactive to light.   Cardiovascular:      Rate and Rhythm: Normal rate and regular rhythm.      Heart sounds: Normal heart sounds.   Pulmonary:      Effort: Pulmonary effort is normal.      Breath sounds: Normal breath sounds.   Abdominal:      General: Bowel sounds are normal. There is no distension.      Palpations: Abdomen is soft.      Tenderness: There is no abdominal tenderness.   Musculoskeletal:         General: Normal range of motion.      Cervical back: Normal range of motion and neck supple.   Skin:     General: Skin is warm and dry.   Neurological:      Mental Status: She is alert and oriented to person, place, and time.   Psychiatric:         Mood and Affect: Mood normal.         Speech: Speech normal.         Behavior: Behavior normal.         Thought Content: Thought content normal.         Judgment: Judgment normal.           Results Review:  I have reviewed the labs, radiology results, and diagnostic studies.    Laboratory Data:   Results from last 7 days   Lab Units 04/05/21  0350 04/04/21 0438 04/03/21  0404   WBC 10*3/mm3 12.97* 6.42 7.87   HEMOGLOBIN g/dL 12.3 12.2 14.0   HEMATOCRIT % 36.2 35.7 38.9   PLATELETS 10*3/mm3 183 204 249        Results from last 7 days   Lab Units 04/05/21  0350 04/04/21  0438 04/03/21  0404 04/02/21  1729   SODIUM mmol/L 136 136 134* 131*   POTASSIUM mmol/L 4.1 4.4 3.3* 3.6   CHLORIDE mmol/L 100 101 94* 92*   CO2 mmol/L 27.0 23.0 20.0* 24.0   BUN mg/dL 10 13 5* 5*   CREATININE mg/dL 0.64 0.71 0.60 0.62   CALCIUM mg/dL 8.7 8.7 9.0 9.4   BILIRUBIN mg/dL 0.4 0.3  --  0.4   ALK PHOS U/L 55 53  --  63   ALT (SGPT) U/L 16 17  --  20   AST (SGOT) U/L 31 36*  --  46*   GLUCOSE mg/dL 79 79 75 118*       Culture Data:   Blood Culture   Date Value Ref Range Status   03/30/2021 No  growth at 5 days  Final   03/30/2021 No growth at 5 days  Final       Radiology Data:   Imaging Results (Last 24 Hours)     ** No results found for the last 24 hours. **          I have reviewed the patient's current medications.     Assessment/Plan     Active Hospital Problems    Diagnosis    • **Encephalopathy    • Unknown when patient last well, possible stroke     • Hyponatremia    • Failure to thrive in adult    • Severe malnutrition (CMS/HCC)    • History of migraines      Labs reviewed: leukocytosis, potassium normal    Imaging reviewed: CT head reviewed, CT maxillofacial reviewed    Imaging independently interpreted by me: CT head:  Subdural hematoma    Telemetry reviewed          1.  AMS due to polypharmacy  -resolved    2.  Hyponatremia  -monitor    3.  Hypokalemia  -resolved    4.  Migraines  -neuro following    5.  Facial Trauma  -CT Head performed  -CT maxillofacial performed    6.  Nasal fracture with deviated septum  -consult ENT    7.  Subdural hematoma  -Consult Neurosurgery            Discharge Planning: I expect the patient to be discharged to SNF in 2-3 days    Electronically signed by Jesse Castillo MD, 04/05/21, 11:16 CDT.

## 2021-04-06 LAB
ALBUMIN SERPL-MCNC: 3.8 G/DL (ref 3.5–5.2)
ALBUMIN/GLOB SERPL: 1.5 G/DL
ALP SERPL-CCNC: 62 U/L (ref 39–117)
ALT SERPL W P-5'-P-CCNC: 19 U/L (ref 1–33)
ANION GAP SERPL CALCULATED.3IONS-SCNC: 15 MMOL/L (ref 5–15)
AST SERPL-CCNC: 38 U/L (ref 1–32)
BASOPHILS # BLD AUTO: 0.03 10*3/MM3 (ref 0–0.2)
BASOPHILS NFR BLD AUTO: 0.2 % (ref 0–1.5)
BILIRUB SERPL-MCNC: 0.4 MG/DL (ref 0–1.2)
BUN SERPL-MCNC: 14 MG/DL (ref 8–23)
BUN/CREAT SERPL: 28.6 (ref 7–25)
CALCIUM SPEC-SCNC: 9 MG/DL (ref 8.6–10.5)
CHLORIDE SERPL-SCNC: 94 MMOL/L (ref 98–107)
CO2 SERPL-SCNC: 26 MMOL/L (ref 22–29)
CREAT SERPL-MCNC: 0.49 MG/DL (ref 0.57–1)
DEPRECATED RDW RBC AUTO: 48.4 FL (ref 37–54)
EOSINOPHIL # BLD AUTO: 0 10*3/MM3 (ref 0–0.4)
EOSINOPHIL NFR BLD AUTO: 0 % (ref 0.3–6.2)
ERYTHROCYTE [DISTWIDTH] IN BLOOD BY AUTOMATED COUNT: 13.5 % (ref 12.3–15.4)
GFR SERPL CREATININE-BSD FRML MDRD: 122 ML/MIN/1.73
GLOBULIN UR ELPH-MCNC: 2.5 GM/DL
GLUCOSE SERPL-MCNC: 99 MG/DL (ref 65–99)
HCT VFR BLD AUTO: 35.9 % (ref 34–46.6)
HGB BLD-MCNC: 12.8 G/DL (ref 12–15.9)
IMM GRANULOCYTES # BLD AUTO: 0.08 10*3/MM3 (ref 0–0.05)
IMM GRANULOCYTES NFR BLD AUTO: 0.6 % (ref 0–0.5)
LYMPHOCYTES # BLD AUTO: 0.69 10*3/MM3 (ref 0.7–3.1)
LYMPHOCYTES NFR BLD AUTO: 5 % (ref 19.6–45.3)
MCH RBC QN AUTO: 34.8 PG (ref 26.6–33)
MCHC RBC AUTO-ENTMCNC: 35.7 G/DL (ref 31.5–35.7)
MCV RBC AUTO: 97.6 FL (ref 79–97)
MONOCYTES # BLD AUTO: 0.8 10*3/MM3 (ref 0.1–0.9)
MONOCYTES NFR BLD AUTO: 5.8 % (ref 5–12)
NEUTROPHILS NFR BLD AUTO: 12.22 10*3/MM3 (ref 1.7–7)
NEUTROPHILS NFR BLD AUTO: 88.4 % (ref 42.7–76)
NRBC BLD AUTO-RTO: 0 /100 WBC (ref 0–0.2)
PLATELET # BLD AUTO: 177 10*3/MM3 (ref 140–450)
PMV BLD AUTO: 10.6 FL (ref 6–12)
POTASSIUM SERPL-SCNC: 3.6 MMOL/L (ref 3.5–5.2)
PROT SERPL-MCNC: 6.3 G/DL (ref 6–8.5)
RBC # BLD AUTO: 3.68 10*6/MM3 (ref 3.77–5.28)
SODIUM SERPL-SCNC: 135 MMOL/L (ref 136–145)
WBC # BLD AUTO: 13.82 10*3/MM3 (ref 3.4–10.8)

## 2021-04-06 PROCEDURE — 97164 PT RE-EVAL EST PLAN CARE: CPT | Performed by: PHYSICAL THERAPIST

## 2021-04-06 PROCEDURE — 80053 COMPREHEN METABOLIC PANEL: CPT | Performed by: FAMILY MEDICINE

## 2021-04-06 PROCEDURE — 25010000002 KETOROLAC TROMETHAMINE PER 15 MG: Performed by: FAMILY MEDICINE

## 2021-04-06 PROCEDURE — 63710000001 PROMETHAZINE PER 25 MG: Performed by: FAMILY MEDICINE

## 2021-04-06 PROCEDURE — 97116 GAIT TRAINING THERAPY: CPT | Performed by: PHYSICAL THERAPIST

## 2021-04-06 PROCEDURE — 85025 COMPLETE CBC W/AUTO DIFF WBC: CPT | Performed by: FAMILY MEDICINE

## 2021-04-06 PROCEDURE — 97535 SELF CARE MNGMENT TRAINING: CPT

## 2021-04-06 PROCEDURE — 97116 GAIT TRAINING THERAPY: CPT

## 2021-04-06 PROCEDURE — 97110 THERAPEUTIC EXERCISES: CPT

## 2021-04-06 RX ADMIN — SODIUM CHLORIDE, PRESERVATIVE FREE 10 ML: 5 INJECTION INTRAVENOUS at 09:07

## 2021-04-06 RX ADMIN — Medication 2 SPRAY: at 09:07

## 2021-04-06 RX ADMIN — ACETAMINOPHEN 650 MG: 325 TABLET, FILM COATED ORAL at 02:09

## 2021-04-06 RX ADMIN — MEGESTROL ACETATE 200 MG: 40 SUSPENSION ORAL at 08:52

## 2021-04-06 RX ADMIN — SALINE NASAL SPRAY 2 SPRAY: 1.5 SOLUTION NASAL at 18:44

## 2021-04-06 RX ADMIN — KETOROLAC TROMETHAMINE 15 MG: 30 INJECTION, SOLUTION INTRAMUSCULAR; INTRAVENOUS at 15:02

## 2021-04-06 RX ADMIN — PROMETHAZINE HYDROCHLORIDE 6.25 MG: 25 TABLET ORAL at 03:31

## 2021-04-06 RX ADMIN — Medication 2 SPRAY: at 21:28

## 2021-04-06 RX ADMIN — ACEBUTOLOL HYDROCHLORIDE 200 MG: 200 CAPSULE ORAL at 21:26

## 2021-04-06 RX ADMIN — FAMOTIDINE 20 MG: 10 INJECTION INTRAVENOUS at 08:53

## 2021-04-06 RX ADMIN — SODIUM CHLORIDE, PRESERVATIVE FREE 10 ML: 5 INJECTION INTRAVENOUS at 21:27

## 2021-04-06 RX ADMIN — Medication 2 SPRAY: at 15:07

## 2021-04-06 RX ADMIN — ATORVASTATIN CALCIUM 80 MG: 40 TABLET, FILM COATED ORAL at 21:27

## 2021-04-06 RX ADMIN — SALINE NASAL SPRAY 2 SPRAY: 1.5 SOLUTION NASAL at 06:14

## 2021-04-06 RX ADMIN — Medication 6 MG: at 21:26

## 2021-04-06 RX ADMIN — SALINE NASAL SPRAY 2 SPRAY: 1.5 SOLUTION NASAL at 13:56

## 2021-04-06 RX ADMIN — PAROXETINE 20 MG: 20 TABLET, FILM COATED ORAL at 08:52

## 2021-04-06 RX ADMIN — ACETAMINOPHEN 650 MG: 325 TABLET, FILM COATED ORAL at 07:48

## 2021-04-06 RX ADMIN — KETOROLAC TROMETHAMINE 15 MG: 30 INJECTION, SOLUTION INTRAMUSCULAR; INTRAVENOUS at 05:43

## 2021-04-06 RX ADMIN — SALINE NASAL SPRAY 2 SPRAY: 1.5 SOLUTION NASAL at 21:27

## 2021-04-06 RX ADMIN — KETOROLAC TROMETHAMINE 15 MG: 30 INJECTION, SOLUTION INTRAMUSCULAR; INTRAVENOUS at 23:19

## 2021-04-06 RX ADMIN — FAMOTIDINE 20 MG: 10 INJECTION INTRAVENOUS at 21:27

## 2021-04-06 RX ADMIN — OLANZAPINE 5 MG: 5 TABLET, FILM COATED ORAL at 21:26

## 2021-04-06 RX ADMIN — SALINE NASAL SPRAY 2 SPRAY: 1.5 SOLUTION NASAL at 11:01

## 2021-04-06 RX ADMIN — ACEBUTOLOL HYDROCHLORIDE 200 MG: 200 CAPSULE ORAL at 09:25

## 2021-04-06 NOTE — THERAPY RE-EVALUATION
Patient Name: Zelalem Hung  : 1940    MRN: [id number]                              Today's Date: 2021       Admit Date: 2021    Visit Dx:     ICD-10-CM ICD-9-CM   1. Altered mental status, unspecified altered mental status type  R41.82 780.97   2. Impaired mobility  Z74.09 799.89   3. Dysphagia, unspecified type  R13.10 787.20   4. Decreased activities of daily living (ADL)  Z78.9 V49.89     Patient Active Problem List   Diagnosis   • Cholecystitis   • Encephalopathy   • Unknown when patient last well, possible stroke    • Hyponatremia   • Failure to thrive in adult   • Severe malnutrition (CMS/HCC)   • History of migraines   • Closed fracture of nasal bone with routine healing   • Traumatic ecchymosis of face   • Acquired deviated nasal septum   • Epistaxis     Past Medical History:   Diagnosis Date   • Chronic hyponatremia    • Frequent UTI    • Migraines    • SVT (supraventricular tachycardia) (CMS/HCC)      Past Surgical History:   Procedure Laterality Date   • CHOLECYSTECTOMY WITH INTRAOPERATIVE CHOLANGIOGRAM N/A 2019    Procedure: CHOLECYSTECTOMY LAPAROSCOPIC INTRAOPERATIVE CHOLANGIOGRAM;  Surgeon: Chandan Bravo MD;  Location: [facility];  Service: General     General Information     Row Name 21 1057          Physical Therapy Time and Intention    Document Type  re-evaluation  -SB (r) AB (t) SB (c)     Mode of Treatment  physical therapy  -SB (r) AB (t) SB (c)     Row Name 21 1057          General Information    Patient Profile Reviewed  yes  -SB (r) AB (t) SB (c)     Existing Precautions/Restrictions  fall  -SB (r) AB (t) SB (c)     Row Name 21 1057          Cognition    Orientation Status (Cognition)  oriented x 4  -SB (r) AB (t) SB (c)     Row Name 21 1057          Safety Issues, Functional Mobility    Impairments Affecting Function (Mobility)  balance;endurance/activity tolerance;pain;strength  -SB (r) AB (t) SB (c)       User Key  (r) = Recorded By, (t) =  Taken By, (c) = Cosigned By    Initials Name Provider Type    Michelle Bates, PT DPT Physical Therapist    Min Watts, PT Student PT Student        Mobility     Row Name 04/06/21 1057          Bed Mobility    Comment (Bed Mobility)  pt up in chair upon PT arrival  -SB (r) AB (t) SB (c)     Row Name 04/06/21 1057          Transfers    Comment (Transfers)  posterior lean and LOB upon initial standing; pt sat back down and improved upright stance after another sit-stand attempt  -SB (r) AB (t) SB (c)     Row Name 04/06/21 1057          Sit-Stand Transfer    Sit-Stand [place] (Transfers)  verbal cues;contact guard  -SB (r) AB (t) SB (c)     Assistive Device (Sit-Stand Transfers)  walker, front-wheeled  -SB (r) AB (t) SB (c)     Row Name 04/06/21 1057          Gait/Stairs (Locomotion)    [place] Level (Gait)  verbal cues;contact guard  -SB (r) AB (t) SB (c)     Assistive Device (Gait)  walker, front-wheeled  -SB (r) AB (t) SB (c)     Distance in Feet (Gait)  40'  -SB (r) AB (t) SB (c)     Deviations/Abnormal Patterns (Gait)  gait speed decreased  -SB (r) AB (t) SB (c)     Bilateral Gait Deviations  forward flexed posture  -SB (r) AB (t) SB (c)       User Key  (r) = Recorded By, (t) = Taken By, (c) = Cosigned By    Initials Name Provider Type    Michelle Bates, PT DPT Physical Therapist    Min Watts, PT Student PT Student        Obj/Interventions     Row Name 04/06/21 1057          Balance    Balance Assessment  sitting static balance  -SB (r) AB (t) SB (c)     Static Sitting Balance  WFL;sitting in chair  -SB (r) AB (t) SB (c)     Static Standing Balance  mild impairment;standing;unsupported  -SB (r) AB (t) SB (c)     Comment, Balance  posterior lean in standing with LOB x1  -SB (r) AB (t) SB (c)       User Key  (r) = Recorded By, (t) = Taken By, (c) = Cosigned By    Initials Name Provider Type    Michelle Bates, PT DPT Physical Therapist    Min Watts, PT Student PT Student         Goals/Plan     Row Name 04/06/21 1057          Bed Mobility Goal 1 (PT)    Activity/Assistive Device (Bed Mobility Goal 1, PT)  bed mobility activities, all  -SB (r) AB (t) SB (c)     [place] Level/Cues Needed (Bed Mobility Goal 1, PT)  supervision required  -SB (r) AB (t) SB (c)     Time Frame (Bed Mobility Goal 1, PT)  long term goal (LTG);10 days  -SB (r) AB (t) SB (c)     Progress/Outcomes (Bed Mobility Goal 1, PT)  goal ongoing;goal revised this date  -SB (r) AB (t) SB (c)     Row Name 04/06/21 1057          Transfer Goal 1 (PT)    Activity/Assistive Device (Transfer Goal 1, PT)  sit-to-stand/stand-to-sit;bed-to-chair/chair-to-bed  -SB (r) AB (t) SB (c)     [place] Level/Cues Needed (Transfer Goal 1, PT)  standby assist  -SB (r) AB (t) SB (c)     Time Frame (Transfer Goal 1, PT)  long term goal (LTG);10 days  -SB (r) AB (t) SB (c)     Progress/Outcome (Transfer Goal 1, PT)  goal ongoing;goal revised this date  -SB (r) AB (t) SB (c)     Row Name 04/06/21 1057          Gait Training Goal 1 (PT)    Activity/Assistive Device (Gait Training Goal 1, PT)  gait (walking locomotion);decrease fall risk;increase endurance/gait distance;improve balance and speed;walker, rolling  -SB (r) AB (t) SB (c)     [place] Level (Gait Training Goal 1, PT)  standby assist  -SB (r) AB (t) SB (c)     Distance (Gait Training Goal 1, PT)  100'  -SB (r) AB (t) SB (c)     Time Frame (Gait Training Goal 1, PT)  long term goal (LTG);10 days  -SB (r) AB (t) SB (c)     Progress/Outcome (Gait Training Goal 1, PT)  goal ongoing;goal revised this date  -SB (r) AB (t) SB (c)     Row Name 04/06/21 1057          Stairs Goal 1 (PT)    Activity/Assistive Device (Stairs Goal 1, PT)  ascending stairs;descending stairs;using handrail, right  -SB (r) AB (t) SB (c)     [place] Level/Cues Needed (Stairs Goal 1, PT)  contact guard assist  -SB (r) AB (t) SB (c)     Number of Stairs (Stairs Goal 1, PT)  2  -SB (r) AB (t) SB (c)     Time  Frame (Stairs Goal 1, PT)  long term goal (LTG);10 days  -SB (r) AB (t) SB (c)     Progress/Outcome (Stairs Goal 1, PT)  goal ongoing;goal revised this date  -SB (r) AB (t) SB (c)     Row Name 04/06/21 1059          Patient Education Goal (PT)    Activity (Patient Education Goal, PT)  static standing balance WFL with no posterior lean  -SB (r) AB (t) SB (c)     [place]/Cues/Accuracy (Memory Goal 2, PT)  demonstrates adequately;independent  -SB (r) AB (t) SB (c)     Time Frame (Patient Education Goal, PT)  long term goal (LTG);10 days  -SB (r) AB (t) SB (c)     Progress/Outcome (Patient Education Goal, PT)  goal ongoing;goal revised this date  -SB (r) AB (t) SB (c)       User Key  (r) = Recorded By, (t) = Taken By, (c) = Cosigned By    Initials Name Provider Type    Michelle Bates, PT DPT Physical Therapist    Min Watts, PT Student PT Student        Clinical Impression     Row Name 04/06/21 1051          Pain    Additional Documentation  Pain Scale: Numbers Pre/Post-Treatment (Group)  -SB (r) AB (t) SB (c)     Row Name 04/06/21 1057          Pain Scale: Numbers Pre/Post-Treatment    Pretreatment Pain Rating  2/10  -SB (r) AB (t) SB (c)     Posttreatment Pain Rating  2/10  -SB (r) AB (t) SB (c)     Pain Location - Side  Bilateral  -SB (r) AB (t) SB (c)     Pain Location  face  -SB (r) AB (t) SB (c)     Pre/Posttreatment Pain Comment  pain in face localized to area of bruising  -SB (r) AB (t) SB (c)     Pain Intervention(s)  Repositioned;Ambulation/increased activity  -SB (r) AB (t) SB (c)     Row Name 04/06/21 1058          Plan of Care Review    Plan of Care Reviewed With  patient  -SB (r) AB (t) SB (c)     Progress  declining  -SB (r) AB (t) SB (c)     Outcome Summary  Pt re-eval complete. Pt O&Ax4 and cooperative. Pt has been seen and evaluated by PT on 4/3/21. She experienced a fall during hospital stay since her initial PT eval. Pt is aware that she had a fall, but does not recall how she fell. Pt  "with significant brusing on face. She reported 2/10 pain localized to area of bruising. Pt reported blurred vision that was improved when she put on her glasses. Visual assessment revealed a peripheral vision deficit bilaterally. Pt upright in chair upon PT arrival with sitter. Sit-stand CGA with FWW. Pt demonstrated posterior lean in standing with LOB x1. Pt able to stand again with improved standing balance. Pt ambulated 40' with CGA and use of FWW. Pt reported feeling more \"off balance\" since she fell. Pt has experienced a mild decline in functional status after experienced falls since her initial PT eval. Goals have been addressed accordingly. PT to continue to monitor pt progress and continue POC. PT recommends discharge to home with home health vs SNF for further rehab.  -SB (r) AB (t) SB (c)     Row Name 04/06/21 1057          Therapy Assessment/Plan (PT)    Rehab Potential (PT)  good, to achieve stated therapy goals  -SB (r) AB (t) SB (c)     Criteria for Skilled Interventions Met (PT)  yes;meets criteria;skilled treatment is necessary  -SB (r) AB (t) SB (c)     Row Name 04/06/21 1057          Vital Signs    O2 Delivery Pre Treatment  room air  -SB (r) AB (t) SB (c)     O2 Delivery Intra Treatment  room air  -SB (r) AB (t) SB (c)     O2 Delivery Post Treatment  room air  -SB (r) AB (t) SB (c)     Pre Patient Position  Sitting  -SB (r) AB (t) SB (c)     Intra Patient Position  Standing  -SB (r) AB (t) SB (c)     Post Patient Position  Sitting  -SB (r) AB (t) SB (c)     Row Name 04/06/21 1057          Positioning and Restraints    Pre-Treatment Position  sitting in chair/recliner  -SB (r) AB (t) SB (c)     Post Treatment Position  chair  -SB (r) AB (t) SB (c)     In Chair  sitting;call light within reach;encouraged to call for assist;exit alarm on;with other staff;legs elevated;patient within staff view  -SB (r) AB (t) SB (c)       User Key  (r) = Recorded By, (t) = Taken By, (c) = Cosigned By    Initials Name " Provider Type    Michelle Bates, PT DPT Physical Therapist    Min Watts, PT Student PT Student        Outcome Measures     Row Name 04/06/21 1057          How much help from another person do you currently need...    Turning from your back to your side while in flat bed without using bedrails?  3  -SB (r) AB (t) SB (c)     Moving from lying on back to sitting on the side of a flat bed without bedrails?  3  -SB (r) AB (t) SB (c)     Moving to and from a bed to a chair (including a wheelchair)?  3  -SB (r) AB (t) SB (c)     Standing up from a chair using your arms (e.g., wheelchair, bedside chair)?  3  -SB (r) AB (t) SB (c)     Climbing 3-5 steps with a railing?  2  -SB (r) AB (t) SB (c)     To walk in hospital room?  3  -SB (r) AB (t) SB (c)     AM-PAC 6 Clicks Score (PT)  17  -SB (r) AB (t)     Row Name 04/06/21 1057          Functional Assessment    Outcome Measure Options  AM-PAC 6 Clicks Basic Mobility (PT)  -SB (r) AB (t) SB (c)       User Key  (r) = Recorded By, (t) = Taken By, (c) = Cosigned By    Initials Name Provider Type    Michelle Bates, PT DPT Physical Therapist    Min Watts, PT Student PT Student        Physical Therapy Education                 Title: PT OT SLP Therapies (In Progress)     Topic: Physical Therapy (Done)     Point: Mobility training (Done)     Learning Progress Summary           Patient Acceptance, E, VU by AB at 4/6/2021 1114    Comment: PT role in care, re-exam findings, discharge plan    Acceptance, E, NR by JAMES at 4/5/2021 1017    Comment: benefits of activity, safety with transfers and amb    Acceptance, E, NR by MS at 4/3/2021 1140    Comment: role of PT in her care                   Point: Home exercise program (Done)     Learning Progress Summary           Patient Acceptance, E, VU by AB at 4/6/2021 1114    Comment: PT role in care, re-exam findings, discharge plan                   Point: Body mechanics (Done)     Learning Progress Summary           Patient  "Acceptance, E, VU by AB at 4/6/2021 1114    Comment: PT role in care, re-exam findings, discharge plan                   Point: Precautions (Done)     Learning Progress Summary           Patient Acceptance, E, VU by AB at 4/6/2021 1114    Comment: PT role in care, re-exam findings, discharge plan                               User Key     Initials Effective Dates Name Provider Type Discipline    MS 06/19/18 -  Charla Dennis, PT, DPT, NCS Physical Therapist PT    JAMES 12/08/16 -  Daniel Urban PTA Physical Therapy Assistant PT    AB 12/02/20 -  Min Morales, PT Student PT Student PT              PT Recommendation and Plan  Planned Therapy Interventions (PT): balance training, bed mobility training, gait training, neuromuscular re-education, patient/family education, postural re-education, stair training, strengthening, ROM (range of motion), transfer training  Plan of Care Reviewed With: patient  Progress: declining  Outcome Summary: Pt re-eval complete. Pt O&Ax4 and cooperative. Pt has been seen and evaluated by PT on 4/3/21. She experienced a fall during hospital stay since her initial PT eval. Pt is aware that she had a fall, but does not recall how she fell. Pt with significant brusing on face. She reported 2/10 pain localized to area of bruising. Pt reported blurred vision that was improved when she put on her glasses. Visual assessment revealed a peripheral vision deficit bilaterally. Pt upright in chair upon PT arrival with sitter. Sit-stand CGA with FWW. Pt demonstrated posterior lean in standing with LOB x1. Pt able to stand again with improved standing balance. Pt ambulated 40' with CGA and use of FWW. Pt reported feeling more \"off balance\" since she fell. Pt has experienced a mild decline in functional status after experienced falls since her initial PT eval. Goals have been addressed accordingly. PT to continue to monitor pt progress and continue POC. PT recommends discharge to home with home " health vs SNF for further rehab.     Time Calculation:   PT Charges     Row Name 04/06/21 1057             Time Calculation    Start Time  1030 re-eval + gait  -SB (r) AB (t) SB (c)      Stop Time  1110  -SB (r) AB (t) SB (c)      Time Calculation (min)  40 min  -SB (r) AB (t)      PT Received On  04/06/21  -SB (r) AB (t) SB (c)      PT Goal Re-Cert Due Date  04/16/21  -SB (r) AB (t) SB (c)         Time Calculation- PT    Total Timed Code Minutes- PT  15 minute(s)  -SB (r) AB (t) SB (c)         Timed Charges    49622 - Gait Training Minutes   15  -SB (r) AB (t) SB (c)        User Key  (r) = Recorded By, (t) = Taken By, (c) = Cosigned By    Initials Name Provider Type    Michelle Bates, PT DPT Physical Therapist    Min Watts, PT Student PT Student            PT G-Codes  Outcome Measure Options: AM-PAC 6 Clicks Basic Mobility (PT)  AM-PAC 6 Clicks Score (PT): 17  AM-PAC 6 Clicks Score (OT): 20    Min Morales, PT Student  4/6/2021

## 2021-04-06 NOTE — CONSULTS
CC fall with facial fracture and small SDH  Subjective:  Patient lying in bed. No family at bedside. States she is thirsty and assisted patient with taking a few sips with a straw. She is awake and alert.         Medications:  Current Medications             Current Facility-Administered Medications   Medication Dose Route Frequency Provider Last Rate Last Admin   • acebutolol (SECTRAL) capsule 200 mg  200 mg Oral Q12H Silvana Hernandez, APRN   200 mg at 04/05/21 0910   • acetaminophen (TYLENOL) tablet 650 mg  650 mg Oral Q4H PRN Silvana Hernandez, APRN   650 mg at 04/05/21 0539     Or   • acetaminophen (TYLENOL) 160 MG/5ML solution 650 mg  650 mg Oral Q4H PRN Silvana Hernandez, APRN         Or   • acetaminophen (TYLENOL) suppository 650 mg  650 mg Rectal Q4H PRN Silvana Hernandez, APRN   650 mg at 04/02/21 2309   • aspirin chewable tablet 81 mg  81 mg Oral Daily Silvana Hernandez APRN   81 mg at 04/05/21 0910   • atorvastatin (LIPITOR) tablet 80 mg  80 mg Oral Nightly Silvana Hernandez, APRN   80 mg at 04/04/21 2007   • famotidine (PEPCID) injection 20 mg  20 mg Intravenous Q12H Tay Guardado MD   20 mg at 04/05/21 0910   • ketorolac (TORADOL) injection 15 mg  15 mg Intravenous Q6H PRN Amara Barclay MD   15 mg at 04/05/21 0044   • megestrol (MEGACE) 40 MG/ML suspension 200 mg  200 mg Oral Daily Tay Guardado MD   200 mg at 04/05/21 0910   • melatonin tablet 6 mg  6 mg Oral Nightly Kym Colmenares MD   6 mg at 04/04/21 2008   • OLANZapine (zyPREXA) tablet 2.5 mg  2.5 mg Oral Nightly Kym Colmenares MD   2.5 mg at 04/04/21 2007   • ondansetron (ZOFRAN) tablet 4 mg  4 mg Oral Q6H PRN Silvana Hernandez, APRN   4 mg at 04/03/21 2111     Or   • ondansetron (ZOFRAN) injection 4 mg  4 mg Intravenous Q6H PRN Silvana Hernandez, LISA   4 mg at 04/04/21 1831   • oxymetazoline (AFRIN) nasal spray 2 spray  2 spray Each Nare BID Jesse Castillo MD   2 spray at 04/05/21 1315   • PARoxetine  (PAXIL) tablet 20 mg  20 mg Oral Daily Tay Guardado MD   20 mg at 04/05/21 0910   • potassium chloride (MICRO-K) CR capsule 40 mEq  40 mEq Oral Once Tay Guardado MD       • promethazine (PHENERGAN) tablet 6.25 mg  6.25 mg Oral Q6H PRN Amara Barclay MD   6.25 mg at 04/05/21 0044     Or   • promethazine (PHENERGAN) suppository 6.25 mg  6.25 mg Rectal Q6H PRN Amara Barclay MD   6.25 mg at 04/03/21 0427   • sodium chloride 0.9 % flush 10 mL  10 mL Intravenous PRN Silvana Hernandez APRN       • sodium chloride 0.9 % flush 10 mL  10 mL Intravenous Q12H Silvana Hernandez APRN   10 mL at 04/04/21 0811   • sodium chloride 0.9 % flush 10 mL  10 mL Intravenous PRN Silvana Hernandez APRN                Review of Systems:   -A 14 point review of systems is completed and is negative except for c/o thirst.        Objective       Vital Signs  Temp:  [97.9 °F (36.6 °C)-98.5 °F (36.9 °C)] 98.3 °F (36.8 °C)  Heart Rate:  [66-82] 73  Resp:  [12-16] 15  BP: (132-156)/(47-73) 156/73     Physical Exam:  HEENT:  Neck supple. Bruising left eye and between on forehead.   CVS:  Regular rate and rhythm.  No murmurs  Carotid Examination:  No bruits  Lungs:  Clear to auscultation  Abdomen:  Non-tender, Non-distended  Extremities:  No signs of peripheral edema     Neurologic Exam:     -Awake, Alert, Oriented to person, place, month, year  Knew month, season, floor, city, state, county and who the president is. Named the holiday yesterday (Easter). .  -No word finding difficulties  -No aphasia  -No dysarthria  -Follows simple and complex commands     Cranial nerves II through XII intact.  CN II:  Visual fields full.  Pupils equally reactive to light  CN III, IV, VI:  Extraocular Muscles full with no signs of nystagmus  CN V:  Facial sensory is symmetric   CN VII:  Facial motor symmetric except from surgery on left forehead  CN VIII:  Gross hearing intact bilaterally  CN IX/X:  Palate elevates symmetrically  CN XI:  Shoulder  shrug symmetric  CN XII:  Tongue is midline on protrusion  Motor: (strength out of 5:  1= minimal movement, 2 = movement in plane of gravity, 3 = movement against gravity, 4 = movement against some resistance, 5 = full strength)     -Right Upper Ext: Proximal: 5 Distal: 5  -Left Upper Ext: Proximal: 5   Distal: 5     -Right Lower Ext: Proximal: 5 Distal: 5  -Left Lower Ext: Proximal: 5   Distal: 5     Good strength but generally weak     DTR:  2+ throughout in all four extremities     Sensory:  -Intact to light touch, pinprick, temperature, pain, and proprioception     Coordination/Gait:  -No ataxia  Ambulated 60 feet with rolling walker with PT and contact guard.         Results Review:    I reviewed the patient's new clinical results.            Results from last 7 days   Lab Units 04/05/21  0350 04/04/21 0438 04/03/21  0404   WBC 10*3/mm3 12.97* 6.42 7.87   HEMOGLOBIN g/dL 12.3 12.2 14.0   HEMATOCRIT % 36.2 35.7 38.9   PLATELETS 10*3/mm3 183 204 249                 Results from last 7 days   Lab Units 04/05/21  0350 04/04/21 0438 04/03/21  0404 04/02/21  1729   SODIUM mmol/L 136 136 134* 131*   POTASSIUM mmol/L 4.1 4.4 3.3* 3.6   CHLORIDE mmol/L 100 101 94* 92*   CO2 mmol/L 27.0 23.0 20.0* 24.0   BUN mg/dL 10 13 5* 5*   CREATININE mg/dL 0.64 0.71 0.60 0.62   CALCIUM mg/dL 8.7 8.7 9.0 9.4   BILIRUBIN mg/dL 0.4 0.3  --  0.4   ALK PHOS U/L 55 53  --  63   ALT (SGPT) U/L 16 17  --  20   AST (SGOT) U/L 31 36*  --  46*   GLUCOSE mg/dL 79 79 75 118*               Lab Results   Component Value Date     MG 1.7 04/03/2021     PROTIME 13.9 04/02/2021     INR 1.11 (H) 04/02/2021      No components found for: POCGLUC  No components found for: A1C        Lab Results   Component Value Date     HDL 71 (H) 04/03/2021      (H) 04/03/2021      No components found for: B12        Lab Results   Component Value Date     TSH 0.912 04/02/2021   Facial CT IMPRESSION:  1. Acute appearing minimally displaced bilateral nasal bone  fractures  with small soft tissue gas overlying soft tissue swelling. Septum  deviated apex right, difficult to compare to prior due to differences in  technique.  2. Large midline frontal subcutaneous forehead hematoma.  Head CT IMPRESSION:  1. Small subdural hematoma along the right tentorium and right middle  cranial fossa. 1 mm thickness no mass effect initially 2.4 mm on repeat scan (minimal change without clinical significance           Assessment/Plan      Hospital Problem List      Traumatic SDH    Encephalopathy    Unknown when patient last well, possible stroke     Hyponatremia    Failure to thrive in adult    Severe malnutrition (CMS/HCC)    History of migraines     Impression:  1.fall with small SDH, patient may also have metabolic encephalopathy  2. Migraine headaches and medication overuse headache and headache related to head trauma  3. Chronic nausea  4. Risk for falls may be related to initial drop in blood pressure that quickly corrects  5.Small subdural hematoma along the right tentorium and right middle  cranial fossa. 1 mm thickness no mass effect initially 2.4 mm on repeat scan (minimal change without clinical significance still no mass effect)   Plan:  1. Repeat Head CT in 6  Hours no neurosurgical intervention planned

## 2021-04-06 NOTE — PLAN OF CARE
Goal Outcome Evaluation:  Plan of Care Reviewed With: patient  Progress: no change  Pt alert and oriented to self and year but not place, frequently looks for brother. Pt up with therapy and resting in chair. Nasal spray and oral care done per orders. Pt c/o pain to head, tx with prn orders. Pts face still bruised and swollen from fall. Up x2 to bsc. BM today. Safety maintained. Room close to nurses station.

## 2021-04-06 NOTE — PLAN OF CARE
Goal Outcome Evaluation:  Plan of Care Reviewed With: patient  Progress: no change  Outcome Summary: Pt demonstrates decreased endurance through tx. Pt comes to EOB with CGA. Pt t/fs with CGA/min A EOB to BSC to recliner. Pt max A for LB dressing. Pt min A for toileting. Pt would benefit from acute rehab at discharge. Continue OT POC

## 2021-04-06 NOTE — PROGRESS NOTES
[facility] Medicine Services  INPATIENT PROGRESS NOTE    Patient Name: Zelalem Hung  Date of Admission: 4/2/2021  Today's Date: 04/06/21  Length of Stay: 4  Primary Care Physician: Garth Amezcua APRN    Subjective   Chief Complaint: feeling better    Sitting up in bed, says pain is ok.  Has a lot more facial bruising, swelling today    Review of Systems   Constitutional: Negative for fatigue and fever.   HENT: Positive for facial swelling. Negative for congestion and ear pain.    Eyes: Negative for pain and visual disturbance.   Respiratory: Negative for cough, shortness of breath and wheezing.    Cardiovascular: Negative for chest pain and palpitations.   Gastrointestinal: Negative for diarrhea, nausea and vomiting.   Endocrine: Negative for heat intolerance.   Genitourinary: Negative for dysuria and frequency.   Musculoskeletal: Negative for arthralgias and back pain.   Skin: Negative for rash and wound.        bruising   Neurological: Negative for dizziness and light-headedness.   Psychiatric/Behavioral: Negative for confusion. The patient is not nervous/anxious.    All other systems reviewed and are negative.         All pertinent negatives and positives are as above. All other systems have been reviewed and are negative unless otherwise stated.     Objective    Temp:  [97.4 °F (36.3 °C)-98.3 °F (36.8 °C)] 97.9 °F (36.6 °C)  Heart Rate:  [66-85] 68  Resp:  [15-20] 16  BP: (142-158)/(49-82) 148/56  Physical Exam  Constitutional:       Appearance: Normal appearance. She is well-developed.   HENT:      Head: Normocephalic and atraumatic.      Right Ear: Tympanic membrane, ear canal and external ear normal.      Left Ear: Tympanic membrane, ear canal and external ear normal.      Nose: Nasal deformity and septal deviation present.      Comments: Nose swellling     Mouth/Throat:      Mouth: Mucous membranes are moist.      Pharynx: Oropharynx is clear.   Eyes:       Extraocular Movements: Extraocular movements intact.      Conjunctiva/sclera: Conjunctivae normal.      Pupils: Pupils are equal, round, and reactive to light.   Cardiovascular:      Rate and Rhythm: Normal rate and regular rhythm.      Heart sounds: Normal heart sounds.   Pulmonary:      Effort: Pulmonary effort is normal.      Breath sounds: Normal breath sounds.   Abdominal:      General: Bowel sounds are normal. There is no distension.      Palpations: Abdomen is soft.      Tenderness: There is no abdominal tenderness.   Musculoskeletal:         General: Normal range of motion.      Cervical back: Normal range of motion and neck supple.   Skin:     General: Skin is warm and dry.      Comments: Bilateral facial bruising/hematoma     Neurological:      Mental Status: She is alert and oriented to person, place, and time.   Psychiatric:         Mood and Affect: Mood normal.         Speech: Speech normal.         Behavior: Behavior normal.         Thought Content: Thought content normal.         Judgment: Judgment normal.           Results Review:  I have reviewed the labs, radiology results, and diagnostic studies.    Laboratory Data:   Results from last 7 days   Lab Units 04/06/21  0545 04/05/21  0350 04/04/21  0438   WBC 10*3/mm3 13.82* 12.97* 6.42   HEMOGLOBIN g/dL 12.8 12.3 12.2   HEMATOCRIT % 35.9 36.2 35.7   PLATELETS 10*3/mm3 177 183 204        Results from last 7 days   Lab Units 04/06/21  0545 04/05/21  0350 04/04/21  0438   SODIUM mmol/L 135* 136 136   POTASSIUM mmol/L 3.6 4.1 4.4   CHLORIDE mmol/L 94* 100 101   CO2 mmol/L 26.0 27.0 23.0   BUN mg/dL 14 10 13   CREATININE mg/dL 0.49* 0.64 0.71   CALCIUM mg/dL 9.0 8.7 8.7   BILIRUBIN mg/dL 0.4 0.4 0.3   ALK PHOS U/L 62 55 53   ALT (SGPT) U/L 19 16 17   AST (SGOT) U/L 38* 31 36*   GLUCOSE mg/dL 99 79 79       Culture Data:   Blood Culture   Date Value Ref Range Status   03/30/2021 No growth at 5 days  Final   03/30/2021 No growth at 5 days  Final        Radiology Data:   Imaging Results (Last 24 Hours)     Procedure Component Value Units Date/Time    CT Head Without Contrast [[id number]] Collected: 04/05/21 2209     Updated: 04/05/21 2218    Narrative:      EXAMINATION: CT HEAD WO CONTRAST- 4/5/2021 10:09 PM CDT     HISTORY: Subdural hematoma; R41.82-Altered mental status, unspecified;  Z74.09-Other reduced mobility; R13.10-Dysphagia, unspecified.     DOSE: 564 mGycm (Automatic exposure control technique was implemented in  an effort to keep the radiation dose as low as possible without  compromising image quality)     REPORT: Spiral CT of the head was performed without contrast,  reconstructed coronal and sagittal images were also reviewed.     COMPARISON: CT head without contrast 4/5/2021 1411 hours.     There is extensive swelling over the right face, nasal region,  periorbital region and scalp soft tissues of the forehead. This appears  stable. There is associated change in a thin acute supratentorial  subdural hematoma on the right, with a maximum thickness of  approximately 2.4 mm. This extends along the medial aspect of the right  middle cranial fossa to the temporal tip subdural space. No  intraparenchymal hemorrhage is identified. There is mild cerebral  atrophy diffusely. There is mild ventriculomegaly that matches the  degree of atrophy. There is decreased attenuation in the subcortical and  periventricular matter tracts compatible with moderate chronic small  vessel white matter ischemic disease. Review of bone windows shows no  skull fracture. Nasal fractures are better demonstrated on CT of the  facial bones from earlier today.       Impression:      1. Thin acute right supratentorial subdural hematoma with a maximum  thickness of approximately 2.4 mm, contiguous with acute subdural  hemorrhage in the medial and anterior aspect of the right middle cranial  fossa, stable. No intraparenchymal hemorrhage or significant change is  identified. Again  noted is moderate swelling over the forehead, nasal  region and face, greater on the right.     This report was finalized on 04/05/2021 22:15 by Dr. Louis Akhtar MD.          I have reviewed the patient's current medications.     Assessment/Plan     Active Hospital Problems    Diagnosis    • **Encephalopathy    • Closed fracture of nasal bone with routine healing    • Traumatic ecchymosis of face    • Acquired deviated nasal septum    • Epistaxis    • Unknown when patient last well, possible stroke     • Hyponatremia    • Failure to thrive in adult    • Severe malnutrition (CMS/HCC)    • History of migraines      Labs reviewed: leukocytosis, potassium normal    Imaging reviewed: CT Head repeated    Imaging independently interpreted by me: CT head repeat/follow up:  Subdural hematoma stable    Telemetry reviewed          1.  AMS due to polypharmacy  -resolved    2.  Hyponatremia  -monitor    3.  Hypokalemia  -resolved    4.  Migraines  -neuro following      5.  Nasal fracture with deviated septum  -ENT following    6.  Subdural hematoma  -Neurosurgery following            Discharge Planning: I expect the patient to be discharged to SNF in 2-3 days    Electronically signed by Jesse Castillo MD, 04/06/21, 10:50 CDT.

## 2021-04-06 NOTE — NURSING NOTE
Bedside neuro handoff with KAMILLE Allen and KAMILLE Cisneros. No changes, disoriented to place still currently.

## 2021-04-06 NOTE — THERAPY TREATMENT NOTE
Acute Care - Physical Therapy Treatment Note  [facility]     Patient Name: Zelalem Hung  : 1940  MRN: [id number]  Today's Date: 2021           PT Assessment (last 12 hours)      PT Evaluation and Treatment     [facility] Name 21 1328          Physical Therapy Time and Intention    Subjective Information  complains of;weakness;fatigue  -     Document Type  therapy note (daily note)  -     Mode of Treatment  physical therapy  -[facility] Name 21 1328          General Information    Existing Precautions/Restrictions  fall  -[facility] Name 21 1328          Pain Scale: Numbers Pre/Post-Treatment    Pretreatment Pain Rating  0/10 - no pain  -     Posttreatment Pain Rating  0/10 - no pain  -[facility] Name 21 1328          Bed Mobility    Comment (Bed Mobility)  CHAIR  -[facility] Name 21 1328          Transfers    Comment (Transfers)  posterior lean upon first standing, min assist to correct  -     Sit-Stand [place] (Transfers)  contact guard;minimum assist (75% patient effort);verbal cues  -     Stand-Sit [place] (Transfers)  contact guard;minimum assist (75% patient effort);verbal cues  -[facility] Name 21 1328          Gait/Stairs (Locomotion)    [place] Level (Gait)  contact guard;minimum assist (75% patient effort);verbal cues  [facility]     Assistive Device (Gait)  walker, front-wheeled  -     Distance in Feet (Gait)  40 x 2  -     Comment (Gait/Stairs)  2 LOB to the right min assist to correct  -[facility] Name 21 1328          Balance    Comment, Balance  BLE ROM x 10reps, had pt tap a target with L & R heel/toe  -[facility] Name 21 1328          Positioning and Restraints    Pre-Treatment Position  sitting in chair/recliner  -     Post Treatment Position  chair  -     In Chair  reclined;call light within reach;encouraged to call for assist;exit alarm on;patient within staff view  -       User Key  (r) = Recorded By, (t) = Taken  By, (c) = Cosigned By    Initials Name Provider Type     Liz Don, PTA Physical Therapy Assistant        Physical Therapy Education                 Title: PT OT SLP Therapies (In Progress)     Topic: Physical Therapy (Done)     Point: Mobility training (Done)     Learning Progress Summary           Patient Acceptance, E, VU by AB at 4/6/2021 1114    Comment: PT role in care, re-exam findings, discharge plan    Acceptance, E, NR by JAMES at 4/5/2021 1017    Comment: benefits of activity, safety with transfers and amb    Acceptance, E, NR by MS at 4/3/2021 1140    Comment: role of PT in her care                   Point: Home exercise program (Done)     Learning Progress Summary           Patient Acceptance, E, VU by AB at 4/6/2021 1114    Comment: PT role in care, re-exam findings, discharge plan                   Point: Body mechanics (Done)     Learning Progress Summary           Patient Acceptance, E, VU by AB at 4/6/2021 1114    Comment: PT role in care, re-exam findings, discharge plan                   Point: Precautions (Done)     Learning Progress Summary           Patient Acceptance, E, VU by AB at 4/6/2021 1114    Comment: PT role in care, re-exam findings, discharge plan                               User Key     Initials Effective Dates Name Provider Type Discipline    MS 06/19/18 -  Charla Dennis, PT, DPT, NCS Physical Therapist PT    JAMES 12/08/16 -  Daniel Urban PTA Physical Therapy Assistant PT    AB 12/02/20 -  Min Morales PT Student PT Student PT              PT Recommendation and Plan             Time Calculation:   PT Charges     Row Name 04/06/21 1400 04/06/21 1057          Time Calculation    Start Time  1328  -  1030 re-eval + gait  -SB (r) AB (t) SB (c)     Stop Time  1351  -AH  1110  -SB (r) AB (t) SB (c)     Time Calculation (min)  23 min  -AH  40 min  -SB (r) AB (t)     PT Received On  --  04/06/21  -SB (r) AB (t) SB (c)     PT Goal Re-Cert Due Date  --  04/16/21  -SB (r)  AB (t) SB (c)        Time Calculation- PT    Total Timed Code Minutes- PT  23 minute(s)  -AH  15 minute(s)  -SB (r) AB (t) SB (c)        Timed Charges    76666 - PT Therapeutic Exercise Minutes  10  -AH  --     41939 - Gait Training Minutes   13  -AH  15  -SB (r) AB (t) SB (c)       User Key  (r) = Recorded By, (t) = Taken By, (c) = Cosigned By    Initials Name Provider Type    Liz Simms PTA Physical Therapy Assistant    Michelle Bates, PT DPT Physical Therapist    Min Watts, HUGO Student PT Student        Therapy Charges for Today     Code Description Service Date Service Provider Modifiers Qty    62551129232 HC PT THER PROC EA 15 MIN 4/6/2021 Liz Don, PTA GP 1    06470998451 HC GAIT TRAINING EA 15 MIN 4/6/2021 Liz Don PTA GP 1          PT G-Codes  Outcome Measure Options: AM-PAC 6 Clicks Basic Mobility (PT)  AM-PAC 6 Clicks Score (PT): 17  AM-PAC 6 Clicks Score (OT): 20    Liz Don PTA  4/6/2021

## 2021-04-06 NOTE — PLAN OF CARE
Problem: Adult Inpatient Plan of Care  Goal: Plan of Care Review  Outcome: Ongoing, Progressing  Flowsheets (Taken 4/6/2021 0506)  Progress: no change  Plan of Care Reviewed With: patient  Outcome Summary: Pt is alert and orient to person, birthday, and time. Occasional confusion when asked location. C/o moderate pain during the night relieved by PRN tylenol. Swelling is decreasing on forehead. Repeat CT showed no changes. NSR on tele, . Sitter at bedside, bed alarm set. SCD's for VTE prevention. VSS, safety maintained.

## 2021-04-06 NOTE — PLAN OF CARE
"Goal Outcome Evaluation:  Plan of Care Reviewed With: patient  Progress: declining  Outcome Summary: Pt re-eval complete. Pt O&Ax4 and cooperative. Pt has been seen and evaluated by PT on 4/3/21. She experienced a fall during hospital stay since her initial PT eval. Pt is aware that she had a fall, but does not recall how she fell. Pt with significant brusing on face. She reported 2/10 pain localized to area of bruising. Pt reported blurred vision that was improved when she put on her glasses. Visual assessment revealed a peripheral vision deficit bilaterally. Pt upright in chair upon PT arrival with sitter. Sit-stand CGA with FWW. Pt demonstrated posterior lean in standing with LOB x1. Pt able to stand again with improved standing balance. Pt ambulated 40' with CGA and use of FWW. Pt reported feeling more \"off balance\" since she fell. Pt has experienced a mild decline in functional status after experienced falls since her initial PT eval. Goals have been addressed accordingly. PT to continue to monitor pt progress and continue POC. PT recommends discharge to home with home health vs SNF for further rehab.  "

## 2021-04-06 NOTE — THERAPY TREATMENT NOTE
Acute Care - Occupational Therapy Treatment Note  [facility]     Patient Name: Zelalem Hung  : 1940  MRN: [id number]  Today's Date: 2021             Admit Date: 2021       ICD-10-CM ICD-9-CM   1. Altered mental status, unspecified altered mental status type  R41.82 780.97   2. Impaired mobility  Z74.09 799.89   3. Dysphagia, unspecified type  R13.10 787.20   4. Decreased activities of daily living (ADL)  Z78.9 V49.89     Patient Active Problem List   Diagnosis   • Cholecystitis   • Encephalopathy   • Unknown when patient last well, possible stroke    • Hyponatremia   • Failure to thrive in adult   • Severe malnutrition (CMS/HCC)   • History of migraines   • Closed fracture of nasal bone with routine healing   • Traumatic ecchymosis of face   • Acquired deviated nasal septum   • Epistaxis     Past Medical History:   Diagnosis Date   • Chronic hyponatremia    • Frequent UTI    • Migraines    • SVT (supraventricular tachycardia) (CMS/HCC)      Past Surgical History:   Procedure Laterality Date   • CHOLECYSTECTOMY WITH INTRAOPERATIVE CHOLANGIOGRAM N/A 2019    Procedure: CHOLECYSTECTOMY LAPAROSCOPIC INTRAOPERATIVE CHOLANGIOGRAM;  Surgeon: Chandan Bravo MD;  Location: [facility];  Service: General            OT ASSESSMENT FLOWSHEET (last 12 hours)      OT Evaluation and Treatment     Row Name 21 0825                   OT Time and Intention    Subjective Information  complains of;weakness;fatigue  -TS        Document Type  therapy note (daily note)  -TS        Mode of Treatment  occupational therapy  -TS        Patient Effort  good  -TS           General Information    Existing Precautions/Restrictions  fall  -TS           Cognition    Personal Safety Interventions  fall prevention program maintained;gait belt;elopement precautions initiated;nonskid shoes/slippers when out of bed  -TS           Pain Scale: Numbers Pre/Post-Treatment    Pretreatment Pain Rating  1/10  -TS        Posttreatment  Pain Rating  1/10  -TS        Pain Location  head  -TS        Pain Intervention(s)  -- pre medicated prior to tx with tylenol  -TS           Bed Mobility    Supine-Sit [place] (Bed Mobility)  contact guard  -TS        Assistive Device (Bed Mobility)  head of bed elevated;draw sheet  -TS           Transfer Assessment/Treatment    Transfers  sit-stand transfer;stand-sit transfer;stand pivot/stand step transfer;toilet transfer  -TS           Transfers    Sit-Stand [place] (Transfers)  contact guard;minimum assist (75% patient effort)  -TS        Stand-Sit [place] (Transfers)  contact guard;minimum assist (75% patient effort)  -TS        [place] Level (Toilet Transfer)  contact guard;minimum assist (75% patient effort)  -TS        Assistive Device (Toilet Transfer)  commode, bedside without drop arms  -TS           Stand Pivot/Stand Step Transfer    Stand Pivot/Stand Step [place] (Transfers)  contact guard;minimum assist (75% patient effort)  -TS           Toilet Transfer    Type (Toilet Transfer)  sit-stand;stand-sit  -TS           Activities of Daily Living    BADL Assessment/Intervention  lower body dressing;toileting;grooming  -TS           Lower Body Dressing Assessment/Training    [place] Level (Lower Body Dressing)  don;doff;pants/bottoms;socks;maximum assist (25% patient effort)  -TS        Position (Lower Body Dressing)  supported sitting;supported standing  -TS           Grooming Assessment/Training    [place] Level (Grooming)  wash face, hands;set up  -TS        Position (Grooming)  supported sitting  -TS           Toileting Assessment/Training    [place] Level (Toileting)  toileting skills;standby assist;perform perineal hygiene;minimum assist (75% patient effort);adjust/manage clothing;maximum assist (25% patient effort)  -TS        Assistive Devices (Toileting)  commode, bedside without drop arms  -TS        Position (Toileting)  supported sitting;supported standing   -TS           Plan of Care Review    Plan of Care Reviewed With  patient  -TS        Progress  no change  -TS        Outcome Summary  Pt demonstrates decreased endurance through tx. Pt comes to EOB with CGA. Pt t/fs with CGA/min A EOB to BSC to recliner. Pt max A for LB dressing. Pt min A for toileting. Pt would benefit from acute rehab at discharge. Continue OT POC   -TS           Positioning and Restraints    Pre-Treatment Position  in bed  -TS        Post Treatment Position  chair  -TS        In Chair  call light within reach;encouraged to call for assist;reclined;exit alarm on  -TS          User Key  (r) = Recorded By, (t) = Taken By, (c) = Cosigned By    Initials Name Effective Dates    TS Indira Borja, JAN/L 08/02/16 -          Occupational Therapy Education                 Title: PT OT SLP Therapies (In Progress)     Topic: Occupational Therapy (In Progress)     Point: ADL training (Done)     Description:   Instruct learner(s) on proper safety adaptation and remediation techniques during self care or transfers.   Instruct in proper use of assistive devices.              Learning Progress Summary           Patient Acceptance, E, VU,NR by CS at 4/5/2021 1022    Acceptance, E, VU,NR by CS at 4/3/2021 1252                   Point: Home exercise program (Not Started)     Description:   Instruct learner(s) on appropriate technique for monitoring, assisting and/or progressing therapeutic exercises/activities.              Learner Progress:  Not documented in this visit.          Point: Precautions (Done)     Description:   Instruct learner(s) on prescribed precautions during self-care and functional transfers.              Learning Progress Summary           Patient Acceptance, E, VU,NR by CS at 4/3/2021 1252                   Point: Body mechanics (Done)     Description:   Instruct learner(s) on proper positioning and spine alignment during self-care, functional mobility activities and/or exercises.               Learning Progress Summary           Patient Acceptance, E, VU,NR by  at 4/3/2021 1252                               User Key     Initials Effective Dates Name Provider Type Discipline     04/02/19 -  Svitlana Anand, OTR/L, CNT Occupational Therapist OT                  OT Recommendation and Plan     Plan of Care Review  Plan of Care Reviewed With: patient  Progress: no change  Outcome Summary: Pt demonstrates decreased endurance through tx. Pt comes to EOB with CGA. Pt t/fs with CGA/min A EOB to BSC to recliner. Pt max A for LB dressing. Pt min A for toileting. Pt would benefit from acute rehab at discharge. Continue OT POC   Plan of Care Reviewed With: patient  Outcome Summary: Pt demonstrates decreased endurance through tx. Pt comes to EOB with CGA. Pt t/fs with CGA/min A EOB to BSC to recliner. Pt max A for LB dressing. Pt min A for toileting. Pt would benefit from acute rehab at discharge. Continue OT POC         Time Calculation:   Time Calculation- OT     Row Name 04/06/21 1316 04/06/21 1057          Time Calculation- OT    OT Start Time  0825  -TS  --     OT Stop Time  0920  -TS  --     OT Time Calculation (min)  55 min  -TS  --     Total Timed Code Minutes- OT  55 minute(s)  -TS  --     OT Received On  04/06/21  -TS  --        Timed Charges    34490 - Gait Training Minutes   --  15  -SB (r) AB (t) SB (c)     80807 - OT Self Care/Mgmt Minutes  55  -TS  --       User Key  (r) = Recorded By, (t) = Taken By, (c) = Cosigned By    Initials Name Provider Type    TS Indira Borja MONTOYA/L Occupational Therapy Assistant    SB Michelle Aguillon, PT DPT Physical Therapist    Min Watts, PT Student PT Student        Therapy Charges for Today     Code Description Service Date Service Provider Modifiers Qty    92964536701 HC OT SELF CARE/MGMT/TRAIN EA 15 MIN 4/6/2021 Indira Borja MONTOYA/L GO 4               Indira KANG. JAN Borja/LATONYA  4/6/2021

## 2021-04-07 ENCOUNTER — APPOINTMENT (OUTPATIENT)
Dept: CT IMAGING | Facility: HOSPITAL | Age: 81
End: 2021-04-07

## 2021-04-07 LAB
ALBUMIN SERPL-MCNC: 3.5 G/DL (ref 3.5–5.2)
ALBUMIN/GLOB SERPL: 1.3 G/DL
ALP SERPL-CCNC: 66 U/L (ref 39–117)
ALT SERPL W P-5'-P-CCNC: 31 U/L (ref 1–33)
ANION GAP SERPL CALCULATED.3IONS-SCNC: 9 MMOL/L (ref 5–15)
AST SERPL-CCNC: 64 U/L (ref 1–32)
BASOPHILS # BLD AUTO: 0.04 10*3/MM3 (ref 0–0.2)
BASOPHILS NFR BLD AUTO: 0.3 % (ref 0–1.5)
BILIRUB SERPL-MCNC: 0.4 MG/DL (ref 0–1.2)
BUN SERPL-MCNC: 17 MG/DL (ref 8–23)
BUN/CREAT SERPL: 28.3 (ref 7–25)
CALCIUM SPEC-SCNC: 8.8 MG/DL (ref 8.6–10.5)
CHLORIDE SERPL-SCNC: 96 MMOL/L (ref 98–107)
CO2 SERPL-SCNC: 32 MMOL/L (ref 22–29)
CREAT SERPL-MCNC: 0.6 MG/DL (ref 0.57–1)
DEPRECATED RDW RBC AUTO: 49.1 FL (ref 37–54)
EOSINOPHIL # BLD AUTO: 0.02 10*3/MM3 (ref 0–0.4)
EOSINOPHIL NFR BLD AUTO: 0.1 % (ref 0.3–6.2)
ERYTHROCYTE [DISTWIDTH] IN BLOOD BY AUTOMATED COUNT: 13.6 % (ref 12.3–15.4)
GFR SERPL CREATININE-BSD FRML MDRD: 96 ML/MIN/1.73
GLOBULIN UR ELPH-MCNC: 2.8 GM/DL
GLUCOSE SERPL-MCNC: 97 MG/DL (ref 65–99)
HCT VFR BLD AUTO: 35.9 % (ref 34–46.6)
HGB BLD-MCNC: 12.7 G/DL (ref 12–15.9)
IMM GRANULOCYTES # BLD AUTO: 0.11 10*3/MM3 (ref 0–0.05)
IMM GRANULOCYTES NFR BLD AUTO: 0.7 % (ref 0–0.5)
LYMPHOCYTES # BLD AUTO: 0.94 10*3/MM3 (ref 0.7–3.1)
LYMPHOCYTES NFR BLD AUTO: 6 % (ref 19.6–45.3)
MAGNESIUM SERPL-MCNC: 1.6 MG/DL (ref 1.6–2.4)
MCH RBC QN AUTO: 34.7 PG (ref 26.6–33)
MCHC RBC AUTO-ENTMCNC: 35.4 G/DL (ref 31.5–35.7)
MCV RBC AUTO: 98.1 FL (ref 79–97)
MONOCYTES # BLD AUTO: 0.88 10*3/MM3 (ref 0.1–0.9)
MONOCYTES NFR BLD AUTO: 5.6 % (ref 5–12)
NEUTROPHILS NFR BLD AUTO: 13.72 10*3/MM3 (ref 1.7–7)
NEUTROPHILS NFR BLD AUTO: 87.3 % (ref 42.7–76)
NRBC BLD AUTO-RTO: 0 /100 WBC (ref 0–0.2)
PLATELET # BLD AUTO: 182 10*3/MM3 (ref 140–450)
PMV BLD AUTO: 10.5 FL (ref 6–12)
POTASSIUM SERPL-SCNC: 2.9 MMOL/L (ref 3.5–5.2)
PROT SERPL-MCNC: 6.3 G/DL (ref 6–8.5)
RBC # BLD AUTO: 3.66 10*6/MM3 (ref 3.77–5.28)
SODIUM SERPL-SCNC: 137 MMOL/L (ref 136–145)
WBC # BLD AUTO: 15.71 10*3/MM3 (ref 3.4–10.8)

## 2021-04-07 PROCEDURE — 99232 SBSQ HOSP IP/OBS MODERATE 35: CPT | Performed by: OTOLARYNGOLOGY

## 2021-04-07 PROCEDURE — 85025 COMPLETE CBC W/AUTO DIFF WBC: CPT | Performed by: FAMILY MEDICINE

## 2021-04-07 PROCEDURE — 97110 THERAPEUTIC EXERCISES: CPT

## 2021-04-07 PROCEDURE — 80053 COMPREHEN METABOLIC PANEL: CPT | Performed by: FAMILY MEDICINE

## 2021-04-07 PROCEDURE — 83735 ASSAY OF MAGNESIUM: CPT | Performed by: FAMILY MEDICINE

## 2021-04-07 PROCEDURE — 70450 CT HEAD/BRAIN W/O DYE: CPT

## 2021-04-07 PROCEDURE — 97535 SELF CARE MNGMENT TRAINING: CPT

## 2021-04-07 PROCEDURE — 25010000002 ONDANSETRON PER 1 MG: Performed by: NURSE PRACTITIONER

## 2021-04-07 PROCEDURE — 25010000002 KETOROLAC TROMETHAMINE PER 15 MG: Performed by: FAMILY MEDICINE

## 2021-04-07 PROCEDURE — 97116 GAIT TRAINING THERAPY: CPT

## 2021-04-07 RX ORDER — POTASSIUM CHLORIDE 750 MG/1
40 CAPSULE, EXTENDED RELEASE ORAL EVERY 6 HOURS
Status: COMPLETED | OUTPATIENT
Start: 2021-04-07 | End: 2021-04-07

## 2021-04-07 RX ADMIN — FAMOTIDINE 20 MG: 10 INJECTION INTRAVENOUS at 20:11

## 2021-04-07 RX ADMIN — Medication 2 SPRAY: at 20:12

## 2021-04-07 RX ADMIN — POTASSIUM CHLORIDE 40 MEQ: 750 CAPSULE, EXTENDED RELEASE ORAL at 20:10

## 2021-04-07 RX ADMIN — SODIUM CHLORIDE, PRESERVATIVE FREE 10 ML: 5 INJECTION INTRAVENOUS at 08:24

## 2021-04-07 RX ADMIN — MEGESTROL ACETATE 200 MG: 40 SUSPENSION ORAL at 08:23

## 2021-04-07 RX ADMIN — KETOROLAC TROMETHAMINE 15 MG: 30 INJECTION, SOLUTION INTRAMUSCULAR; INTRAVENOUS at 14:29

## 2021-04-07 RX ADMIN — SODIUM CHLORIDE, PRESERVATIVE FREE 10 ML: 5 INJECTION INTRAVENOUS at 20:11

## 2021-04-07 RX ADMIN — ACEBUTOLOL HYDROCHLORIDE 200 MG: 200 CAPSULE ORAL at 08:23

## 2021-04-07 RX ADMIN — SALINE NASAL SPRAY 2 SPRAY: 1.5 SOLUTION NASAL at 11:37

## 2021-04-07 RX ADMIN — ONDANSETRON HYDROCHLORIDE 4 MG: 2 SOLUTION INTRAMUSCULAR; INTRAVENOUS at 15:53

## 2021-04-07 RX ADMIN — ACEBUTOLOL HYDROCHLORIDE 200 MG: 200 CAPSULE ORAL at 20:12

## 2021-04-07 RX ADMIN — Medication 2 SPRAY: at 15:54

## 2021-04-07 RX ADMIN — FAMOTIDINE 20 MG: 10 INJECTION INTRAVENOUS at 08:23

## 2021-04-07 RX ADMIN — Medication 6 MG: at 20:10

## 2021-04-07 RX ADMIN — SALINE NASAL SPRAY 2 SPRAY: 1.5 SOLUTION NASAL at 23:58

## 2021-04-07 RX ADMIN — SALINE NASAL SPRAY 2 SPRAY: 1.5 SOLUTION NASAL at 18:44

## 2021-04-07 RX ADMIN — POTASSIUM CHLORIDE 40 MEQ: 750 CAPSULE, EXTENDED RELEASE ORAL at 12:32

## 2021-04-07 RX ADMIN — SALINE NASAL SPRAY 2 SPRAY: 1.5 SOLUTION NASAL at 06:05

## 2021-04-07 RX ADMIN — KETOROLAC TROMETHAMINE 15 MG: 30 INJECTION, SOLUTION INTRAMUSCULAR; INTRAVENOUS at 08:27

## 2021-04-07 RX ADMIN — ATORVASTATIN CALCIUM 80 MG: 40 TABLET, FILM COATED ORAL at 20:10

## 2021-04-07 RX ADMIN — ACETAMINOPHEN 650 MG: 325 TABLET, FILM COATED ORAL at 06:12

## 2021-04-07 RX ADMIN — Medication 2 SPRAY: at 08:24

## 2021-04-07 RX ADMIN — OLANZAPINE 5 MG: 5 TABLET, FILM COATED ORAL at 20:11

## 2021-04-07 RX ADMIN — PAROXETINE 20 MG: 20 TABLET, FILM COATED ORAL at 08:24

## 2021-04-07 NOTE — DISCHARGE PLACEMENT REQUEST
"AbhilashZelalem lawrence (80 y.o. Female)     Date of Birth Social Security Number Address Home Phone MRN    1940  [address]  [place] [address] [phone] [id number]    Jewish Marital Status          Christianity        Admission Date Admission Type Admitting Provider Attending Provider Department, Room/Bed    4/2/21 Emergency Jesse Castillo MD Moore, Jonathan Scott, MD [facility] 3A, 324/1    Discharge Date Discharge Disposition Discharge Destination                       Attending Provider: Jesse Castillo MD    Allergies: Codeine    Isolation: None   Infection: None   Code Status: CPR    Ht: 157.5 cm (62\")   Wt: 36.7 kg (81 lb)    Admission Cmt: None   Principal Problem: Encephalopathy [G93.40]                 Active Insurance as of 4/2/2021     Primary Coverage     Payor Plan Insurance Group Employer/Plan Group    MEDICARE MEDICARE A & B      Payor Plan Address Payor Plan Phone Number Payor Plan Fax Number Effective Dates    [address] [phone]  12/1/2005 - None Entered    [facility] [id number]       Subscriber Name Subscriber Birth Date Member ID       ZELALEM HUNG 1940 [id number]           Secondary Coverage     Payor Plan Insurance Group Employer/Plan Group    [place]      Payor Plan Address Payor Plan Phone Number Payor Plan Fax Number Effective Dates    [address] [phone]  5/8/2019 - None Entered    [facility] [id number]       Subscriber Name Subscriber Birth Date Member ID       ZELALEM HUNG 1940 [id number]                 Emergency Contacts      (Rel.) Home Phone Work Phone Mobile Phone    shannon aldridge (Daughter) [phone] -- --            Insurance Information                MEDICARE/MEDICARE A & B Phone: [phone]    Subscriber: Zelalem Hung Subscriber#: [id number]    Group#:  Precert#:         [place] Phone: [phone]    Subscriber: Zelalem Hung " Subscriber#: [id number]    Group#:  Precert#:

## 2021-04-07 NOTE — PROGRESS NOTES
[facility] Medicine Services  INPATIENT PROGRESS NOTE    Patient Name: Zelalem Hung  Date of Admission: 4/2/2021  Today's Date: 04/07/21  Length of Stay: 5  Primary Care Physician: Garth Amezcua APRN    Subjective   Chief Complaint: feeling better    More confused today  Potassium down, no n/v/d      Review of Systems   Constitutional: Negative for fatigue and fever.   HENT: Positive for facial swelling. Negative for congestion and ear pain.    Eyes: Negative for pain and visual disturbance.   Respiratory: Negative for cough, shortness of breath and wheezing.    Cardiovascular: Negative for chest pain and palpitations.   Gastrointestinal: Negative for diarrhea, nausea and vomiting.   Endocrine: Negative for heat intolerance.   Genitourinary: Negative for dysuria and frequency.   Musculoskeletal: Negative for arthralgias and back pain.   Skin: Negative for rash and wound.        bruising   Neurological: Negative for dizziness and light-headedness.   Psychiatric/Behavioral: Negative for confusion. The patient is not nervous/anxious.    All other systems reviewed and are negative.         All pertinent negatives and positives are as above. All other systems have been reviewed and are negative unless otherwise stated.     Objective    Temp:  [97.4 °F (36.3 °C)-99.8 °F (37.7 °C)] 97.4 °F (36.3 °C)  Heart Rate:  [84-97] 94  Resp:  [15-16] 16  BP: (120-151)/(56-80) 127/60  Physical Exam  Constitutional:       Appearance: Normal appearance. She is well-developed.   HENT:      Head: Normocephalic and atraumatic.      Right Ear: Tympanic membrane, ear canal and external ear normal.      Left Ear: Tympanic membrane, ear canal and external ear normal.      Nose: Nasal deformity and septal deviation present.      Comments: Nose swellling     Mouth/Throat:      Mouth: Mucous membranes are moist.      Pharynx: Oropharynx is clear.   Eyes:      Extraocular Movements: Extraocular  movements intact.      Conjunctiva/sclera: Conjunctivae normal.      Pupils: Pupils are equal, round, and reactive to light.   Cardiovascular:      Rate and Rhythm: Normal rate and regular rhythm.      Heart sounds: Normal heart sounds.   Pulmonary:      Effort: Pulmonary effort is normal.      Breath sounds: Normal breath sounds.   Abdominal:      General: Bowel sounds are normal. There is no distension.      Palpations: Abdomen is soft.      Tenderness: There is no abdominal tenderness.   Musculoskeletal:         General: Normal range of motion.      Cervical back: Normal range of motion and neck supple.   Skin:     General: Skin is warm and dry.      Comments: Bilateral facial bruising/hematoma     Neurological:      Mental Status: She is alert and oriented to person, place, and time.   Psychiatric:         Mood and Affect: Mood normal.         Speech: Speech normal.         Behavior: Behavior normal.         Thought Content: Thought content normal.         Judgment: Judgment normal.           Results Review:  I have reviewed the labs, radiology results, and diagnostic studies.    Laboratory Data:   Results from last 7 days   Lab Units 04/07/21  0559 04/06/21  0545 04/05/21  0350   WBC 10*3/mm3 15.71* 13.82* 12.97*   HEMOGLOBIN g/dL 12.7 12.8 12.3   HEMATOCRIT % 35.9 35.9 36.2   PLATELETS 10*3/mm3 182 177 183        Results from last 7 days   Lab Units 04/07/21  0559 04/06/21  0545 04/05/21  0350   SODIUM mmol/L 137 135* 136   POTASSIUM mmol/L 2.9* 3.6 4.1   CHLORIDE mmol/L 96* 94* 100   CO2 mmol/L 32.0* 26.0 27.0   BUN mg/dL 17 14 10   CREATININE mg/dL 0.60 0.49* 0.64   CALCIUM mg/dL 8.8 9.0 8.7   BILIRUBIN mg/dL 0.4 0.4 0.4   ALK PHOS U/L 66 62 55   ALT (SGPT) U/L 31 19 16   AST (SGOT) U/L 64* 38* 31   GLUCOSE mg/dL 97 99 79       Culture Data:   Blood Culture   Date Value Ref Range Status   03/30/2021 No growth at 5 days  Final   03/30/2021 No growth at 5 days  Final       Radiology Data:   Imaging Results  (Last 24 Hours)     Procedure Component Value Units Date/Time    CT Head Without Contrast [[id number]] Resulted: 04/07/21 1148     Updated: 04/07/21 1159          I have reviewed the patient's current medications.     Assessment/Plan     Active Hospital Problems    Diagnosis    • **Encephalopathy    • Closed fracture of nasal bone with routine healing    • Traumatic ecchymosis of face    • Acquired deviated nasal septum    • Epistaxis    • Unknown when patient last well, possible stroke     • Hyponatremia    • Failure to thrive in adult    • Severe malnutrition (CMS/HCC)    • History of migraines      Labs reviewed: leukocytosis, potassium down    Imaging reviewed: ordered and pending    Telemetry reviewed    Telemetry reviewed by me:  ALOK          1.  AMS due to polypharmacy  -resolved    2.  Hyponatremia  -monitor    3.  Hypokalemia  -PO K  -Check Mg    4.  Migraines  -neuro following      5.  Nasal fracture with deviated septum  -ENT following    6.  Subdural hematoma  -Neurosurgery following  -Repeat Head CT given confusion            Discharge Planning: I expect the patient to be discharged to SNF in 2-3 days    Electronically signed by Jesse Castillo MD, 04/07/21, 12:08 CDT.

## 2021-04-07 NOTE — PROGRESS NOTES
Continued Stay Note  [facility]     Patient Name: Zelalem Hung  MRN: [id number]  Today's Date: 4/7/2021    Admit Date: 4/2/2021    Discharge Plan     Row Name 04/07/21 1208       Plan    Plan Comments  Spoke to dtr (Alejandra) regarding dc plans. Alejandra states pt was independent prior to admit and was living alone. Alejandra is aware that therapy and Physician are recommending short term rehab post dc. Discussed acute rehab and snf's. Alejandra prefers [facility] acute rehab and possibly [facility] if pt does not qualify for acute. Made referral to [facility] acute, await answer.        Discharge Codes    No documentation.             FRANCESCA Olsen

## 2021-04-07 NOTE — PLAN OF CARE
Goal Outcome Evaluation:         Not oriented.  Confused.  Sitter at bedside.  Trying to leave bed.  Incontinent.  SCD.  Room air.  Tele, NS, HR 78.  Will continue to monitor.

## 2021-04-07 NOTE — PLAN OF CARE
Goal Outcome Evaluation:  Plan of Care Reviewed With: patient  Progress: no change  Outcome Summary: Pt confused but follows directions through OT tx. Pt t/fs with CGA/min A. completes toileting task with min/mod A. Pt min A for TB bathiing task while seated on shower chair. Pt would benefit from acute rehab at discharge. Continue OT POC

## 2021-04-07 NOTE — THERAPY TREATMENT NOTE
Acute Care - Physical Therapy Treatment Note  [facility]     Patient Name: Zelalem Hung  : 1940  MRN: [id number]  Today's Date: 2021           PT Assessment (last 12 hours)      PT Evaluation and Treatment     [facility] Name 21 1129 21 0737       Physical Therapy Time and Intention    Subjective Information  complains of;pain  -  complains of;pain  -AH    Document Type  therapy note (daily note)  -  therapy note (daily note)  -    Mode of Treatment  physical therapy  -  physical therapy  -    Row Name 21 1129 21 0737       General Information    Existing Precautions/Restrictions  fall  -  fall  -    Row Name 21 1129 21 0737       Pain Scale: Word Pre/Post-Treatment    Pain: Word Scale, Pretreatment  2 - mild pain  -  2 - mild pain  -    Posttreatment Pain Rating  2 - mild pain  -  2 - mild pain  -    Pain Location  head  -  head R flank  -    Pain Intervention(s)  Repositioned  -  Repositioned;Ambulation/increased activity  -    Row Name 21 1129 21 0737       Bed Mobility    Supine-Sit [place] (Bed Mobility)  -- chair  -  contact guard;minimum assist (75% patient effort);verbal cues  -    Sit-Supine [place] (Bed Mobility)  -- chair  -  -- chair  -[facility] Name 21 1129 21 0737       Transfers    Comment (Transfers)  --  posterior lean upon first standing, min assist to correct  -    Sit-Stand [place] (Transfers)  contact guard;minimum assist (75% patient effort);verbal cues  -  contact guard;minimum assist (75% patient effort);verbal cues  -    Stand-Sit [place] (Transfers)  contact guard;minimum assist (75% patient effort);verbal cues  -  contact guard;minimum assist (75% patient effort);verbal cues  -    [place] Level (Toilet Transfer)  --  contact guard;minimum assist (75% patient effort);verbal cues  -    Assistive Device (Toilet Transfer)  --  commode;walker,  front-wheeled;grab bars/safety frame  -[facility] Name 04/07/21 1129 04/07/21 0737       Gait/Stairs (Locomotion)    [place] Level (Gait)  contact guard;minimum assist (75% patient effort);verbal cues  -  contact guard;minimum assist (75% patient effort);verbal cues  -    Assistive Device (Gait)  -- HHA  -  walker, front-wheeled  -    Distance in Feet (Gait)  5  -  45 x 2  -    Comment (Gait/Stairs)  trans to W/C to go for CT  -  assist to guide rwx to avoid objects, 2 LOB min assist to correct  -[facility] Name 04/07/21 1129 04/07/21 0737       Safety Issues, Functional Mobility    Safety Issues Affecting Function (Mobility)  --  awareness of need for assistance;insight into deficits/self-awareness;problem-solving;safety precaution awareness  -    Impairments Affecting Function (Mobility)  balance;coordination;endurance/activity tolerance  -  balance;coordination;endurance/activity tolerance  -[facility] Name 04/07/21 1129          Motor Skills    Therapeutic Exercise  hip;knee;ankle  -[facility] Name 04/07/21 1129          Hip (Therapeutic Exercise)    Hip (Therapeutic Exercise)  AAROM (active assistive range of motion)  -     Hip AAROM (Therapeutic Exercise)  bilateral;aBduction;aDduction  -[facility] Name 04/07/21 1129          Ankle (Therapeutic Exercise)    Ankle (Therapeutic Exercise)  AAROM (active assistive range of motion);AROM (active range of motion);PROM (passive range of motion);isometric exercises;strengthening exercise;other (see comments)  -     Ankle PROM (Therapeutic Exercise)  other (see comments) B heel cord stretch  -[facility] Name 04/07/21 0737          Plan of Care Review    Plan of Care Reviewed With  patient  -     Progress  no change  -     Outcome Summary  pt somewhat confused with am, thought she saw her dog, pt easily redirected, pt trans to EOB min assist, sit-stand min assist, with posterior lean upon first standing, min assist to correct, pt amb 10 feet  to bathroom with rwx cga-min assist, then 45 feet in [place] with rwx cga-min assist, assist to guide rwx to avoid objects in garcia. pt trans to chair cga-min assist, pt would benefit from rehab  -     Row Name 04/07/21 1129 04/07/21 0737       Positioning and Restraints    Pre-Treatment Position  sitting in chair/recliner  -  in bed  -AH    Post Treatment Position  wheelchair  -  chair  -    In Chair  --  sitting;call light within reach;encouraged to call for assist;exit alarm on;with family/caregiver;patient within staff view  -    In Wheelchair  with other staff going for CT  -  --      User Key  (r) = Recorded By, (t) = Taken By, (c) = Cosigned By    Initials Name Provider Type     Liz Don, THIAGO Physical Therapy Assistant        Physical Therapy Education                 Title: PT OT SLP Therapies (In Progress)     Topic: Physical Therapy (Done)     Point: Mobility training (Done)     Learning Progress Summary           Patient Acceptance, E,TB, VU by  at 4/7/2021 0813    Comment: trans    Acceptance, E, VU by AB at 4/6/2021 1114    Comment: PT role in care, re-exam findings, discharge plan    Acceptance, E, NR by JAMES at 4/5/2021 1017    Comment: benefits of activity, safety with transfers and amb    Acceptance, E, NR by MS at 4/3/2021 1140    Comment: role of PT in her care                   Point: Home exercise program (Done)     Learning Progress Summary           Patient Acceptance, E, VU by AB at 4/6/2021 1114    Comment: PT role in care, re-exam findings, discharge plan                   Point: Body mechanics (Done)     Learning Progress Summary           Patient Acceptance, E, VU by AB at 4/6/2021 1114    Comment: PT role in care, re-exam findings, discharge plan                   Point: Precautions (Done)     Learning Progress Summary           Patient Acceptance, E, VU by AB at 4/6/2021 1114    Comment: PT role in care, re-exam findings, discharge plan                                User Key     Initials Effective Dates Name Provider Type [facility] 08/02/16 -  Liz Don, THIAGO Physical Therapy Assistant PT    MS 06/19/18 -  Charla Dennis, PT, DPT, NCS Physical Therapist PT    JAMES 12/08/16 -  Daniel Urban, THIAGO Physical Therapy Assistant PT    AB 12/02/20 -  Min Morales, PT Student PT Student PT              PT Recommendation and Plan     Plan of Care Reviewed With: patient  Progress: no change  Outcome Summary: pt somewhat confused with am, thought she saw her dog, pt easily redirected, pt trans to EOB min assist, sit-stand min assist, with posterior lean upon first standing, min assist to correct, pt amb 10 feet to bathroom with rwx cga-min assist, then 45 feet in [place] with rwx cga-min assist, assist to guide rwx to avoid objects in [place]. pt trans to chair cga-min assist, pt would benefit from rehab  Outcome Measures     Row Name 04/06/21 1500             Functional Assessment    Outcome Measure Options  [facility]  -        User Key  (r) = Recorded By, (t) = Taken By, (c) = Cosigned By    Initials Name Provider Type     Liz Don PTA Physical Therapy Assistant           Time Calculation:   PT Charges     Row Name 04/07/21 1150 04/07/21 0813          Time Calculation    Start Time  1129  -  0737  -     Stop Time  1145  -  0806  -     Time Calculation (min)  16 min  -  29 min  -     PT Received On  --  04/07/21  -        Time Calculation- PT    Total Timed Code Minutes- PT  15 minute(s)  -  29 minute(s)  -        Timed Charges    75243 - PT Therapeutic Exercise Minutes  15  -  --     96931 - Gait Training Minutes   --  29  -       User Key  (r) = Recorded By, (t) = Taken By, (c) = Cosigned By    Initials Name Provider Type     Liz Don PTA Physical Therapy Assistant        Therapy Charges for Today     Code Description Service Date Service Provider Modifiers Qty    99250873630 HC PT THER PROC EA 15 MIN 4/6/2021 Liz Don, THIAGO GP  1    24478002024 HC GAIT TRAINING EA 15 MIN 4/6/2021 Liz Don, PTA GP 1    10208949413 HC GAIT TRAINING EA 15 MIN 4/7/2021 Liz Don, PTA GP 2    84246904485 HC PT THER PROC EA 15 MIN 4/7/2021 Liz Don, PTA GP 1          PT G-Codes  Outcome Measure Options: Tin[place]  AM-PAC 6 Clicks Score (PT): 17  AM-PAC 6 Clicks Score (OT): 20    Liz Don PTA  4/7/2021

## 2021-04-07 NOTE — THERAPY TREATMENT NOTE
Acute Care - Occupational Therapy Treatment Note  [facility]     Patient Name: Zelalem Hung  : 1940  MRN: [id number]  Today's Date: 2021             Admit Date: 2021       ICD-10-CM ICD-9-CM   1. Altered mental status, unspecified altered mental status type  R41.82 780.97   2. Impaired mobility  Z74.09 799.89   3. Dysphagia, unspecified type  R13.10 787.20   4. Decreased activities of daily living (ADL)  Z78.9 V49.89     Patient Active Problem List   Diagnosis   • Cholecystitis   • Encephalopathy   • Unknown when patient last well, possible stroke    • Hyponatremia   • Failure to thrive in adult   • Severe malnutrition (CMS/HCC)   • History of migraines   • Closed fracture of nasal bone with routine healing   • Traumatic ecchymosis of face   • Acquired deviated nasal septum   • Epistaxis     Past Medical History:   Diagnosis Date   • Chronic hyponatremia    • Frequent UTI    • Migraines    • SVT (supraventricular tachycardia) (CMS/HCC)      Past Surgical History:   Procedure Laterality Date   • CHOLECYSTECTOMY WITH INTRAOPERATIVE CHOLANGIOGRAM N/A 2019    Procedure: CHOLECYSTECTOMY LAPAROSCOPIC INTRAOPERATIVE CHOLANGIOGRAM;  Surgeon: Chandan Bravo MD;  Location: [facility];  Service: General            OT ASSESSMENT FLOWSHEET (last 12 hours)      OT Evaluation and Treatment     Row Name 21 0815                   OT Time and Intention    Subjective Information  complains of;pain  -TS        Document Type  therapy note (daily note)  -TS        Mode of Treatment  occupational therapy  -TS        Patient Effort  adequate  -TS        Comment  confusion  -TS           General Information    Existing Precautions/Restrictions  fall  -TS           Cognition    Personal Safety Interventions  fall prevention program maintained;gait belt;nonskid shoes/slippers when out of bed;elopement precautions initiated  -TS           Pain Scale: Word Pre/Post-Treatment    Pain: Word Scale, Pretreatment  2 -  mild pain  -TS        Posttreatment Pain Rating  2 - mild pain  -TS        Pain Location  head  -TS           Transfer Assessment/Treatment    Transfers  sit-stand transfer;stand-sit transfer;toilet transfer;stand pivot/stand step transfer;shower transfer  -TS           Transfers    Sit-Stand [place] (Transfers)  contact guard;minimum assist (75% patient effort)  -TS        Stand-Sit [place] (Transfers)  contact guard  -TS        [place] Level (Toilet Transfer)  contact guard;verbal cues  -TS        Assistive Device (Toilet Transfer)  commode;grab bars/safety frame  -TS        [place] Level (Shower Transfer)  contact guard  -TS        Assistive Device (Shower Transfer)  grab bar, tub/shower;shower chair  -TS           Stand Pivot/Stand Step Transfer    Stand Pivot/Stand Step [place] (Transfers)  contact guard;minimum assist (75% patient effort);verbal cues  -TS        Assistive Device (Stand Pivot Stand Step Transfer)  -- grab bars  -TS           Toilet Transfer    Type (Toilet Transfer)  sit-stand;stand-sit  -TS           Shower Transfer    Type (Shower Transfer)  sit-stand;stand-sit  -TS           Activities of Daily Living    BADL Assessment/Intervention  upper body dressing;lower body dressing;bathing;toileting  -TS           Bathing Assessment/Intervention    [place] Level (Bathing)  upper body;lower body;contact guard assist;minimum assist (75% patient effort)  -TS        Assistive Devices (Bathing)  grab bar, tub/shower;hand-held shower spray hose;shower chair  -TS        Position (Bathing)  supported sitting;supported standing  -TS           Upper Body Dressing Assessment/Training    [place] Level (Upper Body Dressing)  don;doff;minimum assist (75% patient effort)  -TS        Position (Upper Body Dressing)  supported sitting  -TS           Lower Body Dressing Assessment/Training    [place] Level (Lower Body Dressing)  don;doff;socks;pants/bottoms;moderate assist (50%  patient effort);maximum assist (25% patient effort)  -TS        Position (Lower Body Dressing)  supported sitting;supported standing  -TS           Toileting Assessment/Training    [place] Level (Toileting)  toileting skills;standby assist;adjust/manage clothing;minimum assist (75% patient effort);perform perineal hygiene;contact guard assist  -TS        Assistive Devices (Toileting)  commode;grab bar/safety frame  -TS        Position (Toileting)  unsupported sitting;supported standing  -TS           Plan of Care Review    Plan of Care Reviewed With  patient  -TS        Progress  no change  -TS        Outcome Summary  Pt confused but follows directions through OT tx. Pt t/fs with CGA/min A. completes toileting task with min/mod A. Pt min A for TB bathiing task while seated on shower chair. Pt would benefit from acute rehab at discharge. Continue OT POC   -TS           Positioning and Restraints    Pre-Treatment Position  sitting in chair/recliner  -TS        Post Treatment Position  chair  -TS        In Chair  reclined;call light within reach;encouraged to call for assist;with other staff  -TS          User Key  (r) = Recorded By, (t) = Taken By, (c) = Cosigned By    Initials Name Effective Dates    TS Indira Borja, MONTOYA/L 08/02/16 -          Occupational Therapy Education                 Title: PT OT SLP Therapies (In Progress)     Topic: Occupational Therapy (In Progress)     Point: ADL training (Done)     Description:   Instruct learner(s) on proper safety adaptation and remediation techniques during self care or transfers.   Instruct in proper use of assistive devices.              Learning Progress Summary           Patient Acceptance, E, VU,NR by CS at 4/5/2021 1022    Acceptance, E, VU,NR by CS at 4/3/2021 1252                   Point: Home exercise program (Not Started)     Description:   Instruct learner(s) on appropriate technique for monitoring, assisting and/or progressing therapeutic  exercises/activities.              Learner Progress:  Not documented in this visit.          Point: Precautions (Done)     Description:   Instruct learner(s) on prescribed precautions during self-care and functional transfers.              Learning Progress Summary           Patient Acceptance, E, VU,NR by  at 4/3/2021 1252                   Point: Body mechanics (Done)     Description:   Instruct learner(s) on proper positioning and spine alignment during self-care, functional mobility activities and/or exercises.              Learning Progress Summary           Patient Acceptance, E, VU,NR by  at 4/3/2021 1252                               User Key     Initials Effective Dates Name Provider Type Discipline     04/02/19 -  Svitlana Anand, OTR/L, CNT Occupational Therapist OT                  OT Recommendation and Plan     Plan of Care Review  Plan of Care Reviewed With: patient  Progress: no change  Outcome Summary: Pt confused but follows directions through OT tx. Pt t/fs with CGA/min A. completes toileting task with min/mod A. Pt min A for TB bathiing task while seated on shower chair. Pt would benefit from acute rehab at discharge. Continue OT POC   Plan of Care Reviewed With: patient  Outcome Summary: Pt confused but follows directions through OT tx. Pt t/fs with CGA/min A. completes toileting task with min/mod A. Pt min A for TB bathiing task while seated on shower chair. Pt would benefit from acute rehab at discharge. Continue OT POC     Outcome Measures     Row Name 04/07/21 1200 04/06/21 1500          How much help from another is currently needed...    Putting on and taking off regular lower body clothing?  2  -TS  --     Bathing (including washing, rinsing, and drying)  2  -TS  --     Toileting (which includes using toilet bed pan or urinal)  3  -TS  --     Putting on and taking off regular upper body clothing  3  -TS  --     Taking care of personal grooming (such as brushing teeth)  3  -TS  --      Eating meals  4  -TS  --     AM-PAC 6 Clicks Score (OT)  17  -TS  --        Functional Assessment    Outcome Measure Options  --  [place]  -       User Key  (r) = Recorded By, (t) = Taken By, (c) = Cosigned By    Initials Name Provider Type    Liz Simms, THIAGO Physical Therapy Assistant     Indira Borja COTA/L Occupational Therapy Assistant          Time Calculation:   Time Calculation- OT     Row Name 04/07/21 1227 04/07/21 0813          Time Calculation- OT    OT Start Time  0815  -TS  --     OT Stop Time  0910  -TS  --     OT Time Calculation (min)  55 min  -TS  --     Total Timed Code Minutes- OT  55 minute(s)  -TS  --     OT Received On  04/07/21  -TS  --        Timed Charges    00973 - Gait Training Minutes   --  29  -AH     65110 - OT Self Care/Mgmt Minutes  55  -TS  --       User Key  (r) = Recorded By, (t) = Taken By, (c) = Cosigned By    Initials Name Provider Type    Liz Simms, THIAGO Physical Therapy Assistant     Indira Borja COTA/L Occupational Therapy Assistant        Therapy Charges for Today     Code Description Service Date Service Provider Modifiers Qty    24266572089 HC OT SELF CARE/MGMT/TRAIN EA 15 MIN 4/6/2021 Indira Borja COTA/L GO 4    65419231500 HC OT SELF CARE/MGMT/TRAIN EA 15 MIN 4/7/2021 Indira Borja COTA/L GO 4               Indira KANG. JAN Borja/LATONYA  4/7/2021

## 2021-04-07 NOTE — PROGRESS NOTES
[facility]   Surgical Progress Note    Patient Name: Zelalem Hung  : 1940  MRN: [id number]  Date of admission: 2021  Surgical Procedures Since Admission:  Subjective   Subjective     Chief Complaint: Nasal fracture    History of Present Illness  Since last examined Ms. Zhao has remained somewhat confused.  She denies any pain in her face.  She has had no particular issues.  Her daughter is present and says that the patient remains confused.  She does say that the nose looks okay to her.    Review of Systems    Objective   Objective     Vitals:   Temp:  [98 °F (36.7 °C)-99.8 °F (37.7 °C)] 98.8 °F (37.1 °C)  Heart Rate:  [75-97] 84  Resp:  [15-16] 16  BP: (120-151)/(56-75) 130/56    Physical Exam  Constitutional:       Appearance: She is well-developed and underweight.      Comments: Calm, in no acute distress  Facial ecchymosis that is slowly resolving   HENT:      Head:        Right Ear: External ear normal. Decreased hearing noted.      Left Ear: External ear normal. Decreased hearing noted.      Nose: Nasal deformity (Minimal nasal deformity with mobile nasal bones) and septal deviation (Moderate left to right high deviation) present.      Right Turbinates: Swollen.      Left Turbinates: Swollen.        Comments: Ecchymosis of nasal dorsal skin present  Mobile nasal bones that are in reasonably good alignment  Mild to moderate tenderness of the nasal dorsum with examination  Eyes:      Comments: Bilateral ecchymosis of the periorbita especially the lower lids bleeding extending into the upper cheeks, minimal edema   Neck:      Comments: Atrophic musculature  Musculoskeletal:      Cervical back: Decreased range of motion.   Lymphadenopathy:      Cervical: No cervical adenopathy.   Neurological:      General: No focal deficit present.      Mental Status: She is alert. She is confused.      Cranial Nerves: Cranial nerves are intact.   Psychiatric:         Mood and Affect: Mood normal.          Speech: Speech normal.         Behavior: Behavior is cooperative.         Cognition and Memory: Memory is impaired.          Result Review    Result Review:  I have personally reviewed the results from the time of this admission to 04/07/21 8:14 AM CDT and agree with these findings:  []  Laboratory  []  Microbiology  []  Radiology  []  EKG/Telemetry   []  Cardiology/Vascular   []  Pathology  []  Old records  []  Other:  Most notable findings include: No findings pertinent to ENT    Assessment/Plan   Assessment / Plan     Brief Patient Summary:   Zellaem Hung is a 80 y.o. female who has minimal nasal deformity.  Daughter is not interested in surgical correction at this point time.  Patient does not seem to be suffering significant impairment including nasal bleeding, or breathing.  I will follow this nasal fracture and scalp hematoma.    Active Hospital Problems:   Active Hospital Problems    Diagnosis    • **Encephalopathy    • Closed fracture of nasal bone with routine healing    • Traumatic ecchymosis of face    • Acquired deviated nasal septum    • Epistaxis    • Unknown when patient last well, possible stroke     • Hyponatremia    • Failure to thrive in adult    • Severe malnutrition (CMS/HCC)    • History of migraines        Plan:   · Nasal fracture-patient has reasonable reduction at this point in time.  He does not require surgical correction of the nasal bones  · Deviated nasal septum-the nasal septum does not seem to be impairing her breathing at this point in time.  I will continue nasal hygiene with nasal saline.  · Excoriation of dorsum of nose-healing appropriately, requires no further therapy  · Ecchymosis of the face-resolving appropriately at this point in time, requires no further treatment  · Hematoma of the normal area-appears to be shrinking.  There is no evidence of infection.  There is no pain.  This will require observation and drainage if it becomes a problem.    Discussed plan with the  daughter    Following patient as in-patient. Further recommendations will be made based on serial examinations.    Medications/Orders for this encounter: No new medications ordered.  No New orders written.     Electronically signed by Talat Minor Jr, MD, 04/07/21, 8:21 AM CDT.

## 2021-04-07 NOTE — PROGRESS NOTES
Continued Stay Note  DELON Rondon     Patient Name: Zelalem Hung  MRN: [id number]  Today's Date: 4/7/2021    Admit Date: 4/2/2021    Discharge Plan     Row Name 04/07/21 0756       Plan    Plan Comments  Noted therapy is recommending short term rehab post dc. Attempted to call pt dtr (Alejandra Tai) [phone] but her voicemail is not set up and unable to leave a message. Will continue to try to reach pt dtr to discuss.  (Pended)         Discharge Codes    No documentation.             Brian Nicole

## 2021-04-07 NOTE — PLAN OF CARE
Goal Outcome Evaluation:  Plan of Care Reviewed With: patient  Progress: no change  Outcome Summary: pt somewhat confused with am, thought she saw her dog, pt easily redirected, pt trans to EOB min assist, sit-stand min assist, with posterior lean upon first standing, min assist to correct, pt amb 10 feet to bathroom with rwx cga-min assist, then 45 feet in garcia with rwx cga-min assist, assist to guide rwx to avoid objects in garcia. pt trans to chair cga-min assist, pt would benefit from rehab

## 2021-04-07 NOTE — THERAPY TREATMENT NOTE
Acute Care - Physical Therapy Treatment Note  [facility]     Patient Name: Zelalem Hung  : 1940  MRN: [id number]  Today's Date: 2021           PT Assessment (last 12 hours)      PT Evaluation and Treatment     [facility] Name 21 0737          Physical Therapy Time and Intention    Subjective Information  complains of;pain  -     Document Type  therapy note (daily note)  -     Mode of Treatment  physical therapy  -[facility] Name 21 0737          General Information    Existing Precautions/Restrictions  fall  -[facility] Name 21 0737          Pain Scale: Word Pre/Post-Treatment    Pain: Word Scale, Pretreatment  2 - mild pain  -     Posttreatment Pain Rating  2 - mild pain  -     Pain Location  head R flank  -     Pain Intervention(s)  Repositioned;Ambulation/increased activity  -[facility] Name 21 0737          Bed Mobility    Supine-Sit Ramsey (Bed Mobility)  contact guard;minimum assist (75% patient effort);verbal cues  -     Sit-Supine Ramsey (Bed Mobility)  -- chair  -[facility] Name 21 0737          Transfers    Comment (Transfers)  posterior lean upon first standing, min assist to correct  -     Sit-Stand Ramsey (Transfers)  contact guard;minimum assist (75% patient effort);verbal cues  -     Stand-Sit Ramsey (Transfers)  contact guard;minimum assist (75% patient effort);verbal cues  -     Ramsey Level (Toilet Transfer)  contact guard;minimum assist (75% patient effort);verbal cues  -     Assistive Device (Toilet Transfer)  commode;walker, front-wheeled;grab bars/safety frame  -[facility] Name 21 0737          Gait/Stairs (Locomotion)    Ramsey Level (Gait)  contact guard;minimum assist (75% patient effort);verbal cues  -     Assistive Device (Gait)  walker, front-wheeled  -     Distance in Feet (Gait)  45 x 2  -     Comment (Gait/Stairs)  assist to guide rwx to avoid objects, 2 LOB min assist to correct   -     Row Name 04/07/21 0737          Safety Issues, Functional Mobility    Safety Issues Affecting Function (Mobility)  awareness of need for assistance;insight into deficits/self-awareness;problem-solving;safety precaution awareness  -     Impairments Affecting Function (Mobility)  balance;coordination;endurance/activity tolerance  -     Row Name 04/07/21 0737          Plan of Care Review    Plan of Care Reviewed With  patient  -     Progress  no change  -     Outcome Summary  pt somewhat confused with am, thought she saw her dog, pt easily redirected, pt trans to EOB min assist, sit-stand min assist, with posterior lean upon first standing, min assist to correct, pt amb 10 feet to bathroom with rwx cga-min assist, then 45 feet in garcia with rwx cga-min assist, assist to guide rwx to avoid objects in garcia. pt trans to chair cga-min assist, pt would benefit from rehab  -     Row Name 04/07/21 0737          Positioning and Restraints    Pre-Treatment Position  in bed  -     Post Treatment Position  chair  -     In Chair  sitting;call light within reach;encouraged to call for assist;exit alarm on;with family/caregiver;patient within staff view  -       User Key  (r) = Recorded By, (t) = Taken By, (c) = Cosigned By    Initials Name Provider Type     Liz Don, PTA Physical Therapy Assistant        Physical Therapy Education                 Title: PT OT SLP Therapies (In Progress)     Topic: Physical Therapy (Done)     Point: Mobility training (Done)     Learning Progress Summary           Patient Acceptance, E,TB, VU by  at 4/7/2021 0813    Comment: trans    Acceptance, E, VU by AB at 4/6/2021 1114    Comment: PT role in care, re-exam findings, discharge plan    Acceptance, E, NR by JAMES at 4/5/2021 1017    Comment: benefits of activity, safety with transfers and amb    Acceptance, E, NR by MS at 4/3/2021 1140    Comment: role of PT in her care                   Point: Home exercise program  (Done)     Learning Progress Summary           Patient Acceptance, E, VU by AB at 4/6/2021 1114    Comment: PT role in care, re-exam findings, discharge plan                   Point: Body mechanics (Done)     Learning Progress Summary           Patient Acceptance, E, VU by AB at 4/6/2021 1114    Comment: PT role in care, re-exam findings, discharge plan                   Point: Precautions (Done)     Learning Progress Summary           Patient Acceptance, E, VU by AB at 4/6/2021 1114    Comment: PT role in care, re-exam findings, discharge plan                               User Key     Initials Effective Dates Name Provider Type [facility] 08/02/16 -  Liz Don, PTA Physical Therapy Assistant PT    MS 06/19/18 -  Charla Dennis, PT, DPT, NCS Physical Therapist PT    JAMES 12/08/16 -  Daniel Urban, PTA Physical Therapy Assistant PT    AB 12/02/20 -  Min Morales, PT Student PT Student PT              PT Recommendation and Plan     Plan of Care Reviewed With: patient  Progress: no change  Outcome Summary: pt somewhat confused with am, thought she saw her dog, pt easily redirected, pt trans to EOB min assist, sit-stand min assist, with posterior lean upon first standing, min assist to correct, pt amb 10 feet to bathroom with rwx cga-min assist, then 45 feet in garcia with rwx cga-min assist, assist to guide rwx to avoid objects in garcia. pt trans to chair cga-min assist, pt would benefit from rehab  Outcome Measures     Row Name 04/06/21 1500             Functional Assessment    Outcome Measure Options  [facility]  -        User Key  (r) = Recorded By, (t) = Taken By, (c) = Cosigned By    Initials Name Provider Type     Liz Don, THIAGO Physical Therapy Assistant           Time Calculation:   PT Charges     Row Name 04/07/21 0813             Time Calculation    Start Time  0737  -      Stop Time  0806  -      Time Calculation (min)  29 min  -      PT Received On  04/07/21  -         Time  Calculation- PT    Total Timed Code Minutes- PT  29 minute(s)  -         Timed Charges    55169 - Gait Training Minutes   29  -        User Key  (r) = Recorded By, (t) = Taken By, (c) = Cosigned By    Initials Name Provider Type    Liz Simms PTA Physical Therapy Assistant        Therapy Charges for Today     Code Description Service Date Service Provider Modifiers Qty    26248545053 HC PT THER PROC EA 15 MIN 4/6/2021 Liz Don, THIAGO GP 1    38650935917 HC GAIT TRAINING EA 15 MIN 4/6/2021 Liz Don, THIAGO GP 1    07898436235 HC GAIT TRAINING EA 15 MIN 4/7/2021 Liz Don, THIAGO GP 2          PT G-Codes  Outcome Measure Options: Tinetti  AM-PAC 6 Clicks Score (PT): 17  AM-PAC 6 Clicks Score (OT): 20    Liz Don PTA  4/7/2021

## 2021-04-08 LAB
ALBUMIN SERPL-MCNC: 3.4 G/DL (ref 3.5–5.2)
ALBUMIN/GLOB SERPL: 1.2 G/DL
ALP SERPL-CCNC: 66 U/L (ref 39–117)
ALT SERPL W P-5'-P-CCNC: 31 U/L (ref 1–33)
ANION GAP SERPL CALCULATED.3IONS-SCNC: 11 MMOL/L (ref 5–15)
AST SERPL-CCNC: 54 U/L (ref 1–32)
BASOPHILS # BLD AUTO: 0.02 10*3/MM3 (ref 0–0.2)
BASOPHILS NFR BLD AUTO: 0.2 % (ref 0–1.5)
BILIRUB SERPL-MCNC: 0.3 MG/DL (ref 0–1.2)
BUN SERPL-MCNC: 17 MG/DL (ref 8–23)
BUN/CREAT SERPL: 34.7 (ref 7–25)
CALCIUM SPEC-SCNC: 9.4 MG/DL (ref 8.6–10.5)
CHLORIDE SERPL-SCNC: 104 MMOL/L (ref 98–107)
CO2 SERPL-SCNC: 24 MMOL/L (ref 22–29)
CREAT SERPL-MCNC: 0.49 MG/DL (ref 0.57–1)
DEPRECATED RDW RBC AUTO: 50 FL (ref 37–54)
EOSINOPHIL # BLD AUTO: 0 10*3/MM3 (ref 0–0.4)
EOSINOPHIL NFR BLD AUTO: 0 % (ref 0.3–6.2)
ERYTHROCYTE [DISTWIDTH] IN BLOOD BY AUTOMATED COUNT: 13.9 % (ref 12.3–15.4)
GFR SERPL CREATININE-BSD FRML MDRD: 122 ML/MIN/1.73
GLOBULIN UR ELPH-MCNC: 2.8 GM/DL
GLUCOSE SERPL-MCNC: 108 MG/DL (ref 65–99)
HCT VFR BLD AUTO: 34.1 % (ref 34–46.6)
HGB BLD-MCNC: 12.1 G/DL (ref 12–15.9)
IMM GRANULOCYTES # BLD AUTO: 0.06 10*3/MM3 (ref 0–0.05)
IMM GRANULOCYTES NFR BLD AUTO: 0.5 % (ref 0–0.5)
LYMPHOCYTES # BLD AUTO: 0.86 10*3/MM3 (ref 0.7–3.1)
LYMPHOCYTES NFR BLD AUTO: 7.2 % (ref 19.6–45.3)
MCH RBC QN AUTO: 34.9 PG (ref 26.6–33)
MCHC RBC AUTO-ENTMCNC: 35.5 G/DL (ref 31.5–35.7)
MCV RBC AUTO: 98.3 FL (ref 79–97)
MONOCYTES # BLD AUTO: 0.98 10*3/MM3 (ref 0.1–0.9)
MONOCYTES NFR BLD AUTO: 8.2 % (ref 5–12)
NEUTROPHILS NFR BLD AUTO: 10.02 10*3/MM3 (ref 1.7–7)
NEUTROPHILS NFR BLD AUTO: 83.9 % (ref 42.7–76)
NRBC BLD AUTO-RTO: 0 /100 WBC (ref 0–0.2)
PLATELET # BLD AUTO: 194 10*3/MM3 (ref 140–450)
PMV BLD AUTO: 10.8 FL (ref 6–12)
POTASSIUM SERPL-SCNC: 4.2 MMOL/L (ref 3.5–5.2)
PROT SERPL-MCNC: 6.2 G/DL (ref 6–8.5)
RBC # BLD AUTO: 3.47 10*6/MM3 (ref 3.77–5.28)
SODIUM SERPL-SCNC: 139 MMOL/L (ref 136–145)
WBC # BLD AUTO: 11.94 10*3/MM3 (ref 3.4–10.8)

## 2021-04-08 PROCEDURE — 97110 THERAPEUTIC EXERCISES: CPT

## 2021-04-08 PROCEDURE — 97116 GAIT TRAINING THERAPY: CPT

## 2021-04-08 PROCEDURE — 92610 EVALUATE SWALLOWING FUNCTION: CPT

## 2021-04-08 PROCEDURE — 97535 SELF CARE MNGMENT TRAINING: CPT

## 2021-04-08 PROCEDURE — 99231 SBSQ HOSP IP/OBS SF/LOW 25: CPT | Performed by: NURSE PRACTITIONER

## 2021-04-08 PROCEDURE — 25010000002 ONDANSETRON PER 1 MG: Performed by: NURSE PRACTITIONER

## 2021-04-08 PROCEDURE — 85025 COMPLETE CBC W/AUTO DIFF WBC: CPT | Performed by: FAMILY MEDICINE

## 2021-04-08 PROCEDURE — 80053 COMPREHEN METABOLIC PANEL: CPT | Performed by: FAMILY MEDICINE

## 2021-04-08 RX ADMIN — Medication 2 SPRAY: at 20:51

## 2021-04-08 RX ADMIN — ATORVASTATIN CALCIUM 80 MG: 40 TABLET, FILM COATED ORAL at 20:51

## 2021-04-08 RX ADMIN — SODIUM CHLORIDE, PRESERVATIVE FREE 10 ML: 5 INJECTION INTRAVENOUS at 09:24

## 2021-04-08 RX ADMIN — SODIUM CHLORIDE, PRESERVATIVE FREE 10 ML: 5 INJECTION INTRAVENOUS at 20:52

## 2021-04-08 RX ADMIN — Medication 2 SPRAY: at 16:35

## 2021-04-08 RX ADMIN — ONDANSETRON HYDROCHLORIDE 4 MG: 2 SOLUTION INTRAMUSCULAR; INTRAVENOUS at 00:23

## 2021-04-08 RX ADMIN — FAMOTIDINE 20 MG: 10 INJECTION INTRAVENOUS at 20:51

## 2021-04-08 RX ADMIN — Medication 6 MG: at 20:51

## 2021-04-08 RX ADMIN — SALINE NASAL SPRAY 2 SPRAY: 1.5 SOLUTION NASAL at 17:40

## 2021-04-08 RX ADMIN — SALINE NASAL SPRAY 2 SPRAY: 1.5 SOLUTION NASAL at 09:22

## 2021-04-08 RX ADMIN — SALINE NASAL SPRAY 2 SPRAY: 1.5 SOLUTION NASAL at 20:51

## 2021-04-08 RX ADMIN — Medication 2 SPRAY: at 09:23

## 2021-04-08 RX ADMIN — SALINE NASAL SPRAY 2 SPRAY: 1.5 SOLUTION NASAL at 11:37

## 2021-04-08 RX ADMIN — OLANZAPINE 5 MG: 5 TABLET, FILM COATED ORAL at 20:51

## 2021-04-08 RX ADMIN — FAMOTIDINE 20 MG: 10 INJECTION INTRAVENOUS at 09:37

## 2021-04-08 NOTE — PLAN OF CARE
Problem: Adult Inpatient Plan of Care  Goal: Plan of Care Review  Outcome: Ongoing, Progressing  Flowsheets (Taken 4/8/2021 1329)  Progress: improving  Plan of Care Reviewed With:   patient   other (see comments)  Outcome Summary: Pt. performed ue exs while fowlers in bed, transfered to-from bed-toilet with cga and rwx, no issues with tx!   Goal Outcome Evaluation:  Plan of Care Reviewed With: patient, other (see comments)  Progress: improving  Outcome Summary: Pt. performed ue exs while fowlers in bed, transfered to-from bed-toilet with cga and rwx, no issues with tx!

## 2021-04-08 NOTE — PROGRESS NOTES
"Zelalem Abhilash  80 y.o.      Chief complaint:   Subdural hematoma    Subjective:  Symptoms:  Stable.    Activity level: Impaired due to weakness.    Pain:  She reports no pain.      No events    Temp:  [97.4 °F (36.3 °C)-98.2 °F (36.8 °C)] 98.2 °F (36.8 °C)  Heart Rate:  [71-95] 76  Resp:  [16] 16  BP: (127-156)/(50-83) 140/81      Objective:  General Appearance:  Comfortable, well-appearing and in no acute distress.    Vital signs: (most recent): Blood pressure 140/81, pulse 76, temperature 98.2 °F (36.8 °C), temperature source Oral, resp. rate 16, height 157.5 cm (62\"), weight 36.7 kg (81 lb), SpO2 97 %, not currently breastfeeding.  Vital signs are normal.  No fever.    Output: Producing urine.    HEENT: Normal HEENT exam.    Lungs:  Normal effort and normal respiratory rate.  She is not in respiratory distress.    Heart: Normal rate.  Regular rhythm.    Chest: Symmetric chest wall expansion.   Skin:  Warm and dry.    Abdomen: Abdomen is soft and non-distended.  Bowel sounds are normal.   There is no abdominal tenderness.     Pulses: Distal pulses are intact.        Neurologic Exam     Mental Status   Attention: decreased. Concentration: decreased.   Level of consciousness: arousable by verbal stimuli    Motor Exam Moving all extremities symmetrically       Lab Results (last 24 hours)     Procedure Component Value Units Date/Time    Comprehensive Metabolic Panel [[id number]]  (Abnormal) Collected: 04/08/21 0518    Specimen: Blood Updated: 04/08/21 0610     Glucose 108 mg/dL      BUN 17 mg/dL      Creatinine 0.49 mg/dL      Sodium 139 mmol/L      Potassium 4.2 mmol/L      Chloride 104 mmol/L      CO2 24.0 mmol/L      Calcium 9.4 mg/dL      Total Protein 6.2 g/dL      Albumin 3.40 g/dL      ALT (SGPT) 31 U/L      AST (SGOT) 54 U/L      Alkaline Phosphatase 66 U/L      Total Bilirubin 0.3 mg/dL      eGFR Non African Amer 122 mL/min/1.73      Globulin 2.8 gm/dL      A/G Ratio 1.2 g/dL      BUN/Creatinine Ratio 34.7    "  Anion Gap 11.0 mmol/L     Narrative:      GFR Normal >60  Chronic Kidney Disease <60  Kidney Failure <15      CBC & Differential [[id number]]  (Abnormal) Collected: 04/08/21 0518    Specimen: Blood Updated: 04/08/21 0544    Narrative:      The following orders were created for panel order CBC & Differential.  Procedure                               Abnormality         Status                     ---------                               -----------         ------                     CBC Auto Differential[[id number]]        Abnormal            Final result                 Please view results for these tests on the individual orders.    CBC Auto Differential [[id number]]  (Abnormal) Collected: 04/08/21 0518    Specimen: Blood Updated: 04/08/21 0544     WBC 11.94 10*3/mm3      RBC 3.47 10*6/mm3      Hemoglobin 12.1 g/dL      Hematocrit 34.1 %      MCV 98.3 fL      MCH 34.9 pg      MCHC 35.5 g/dL      RDW 13.9 %      RDW-SD 50.0 fl      MPV 10.8 fL      Platelets 194 10*3/mm3      Neutrophil % 83.9 %      Lymphocyte % 7.2 %      Monocyte % 8.2 %      Eosinophil % 0.0 %      Basophil % 0.2 %      Immature Grans % 0.5 %      Neutrophils, Absolute 10.02 10*3/mm3      Lymphocytes, Absolute 0.86 10*3/mm3      Monocytes, Absolute 0.98 10*3/mm3      Eosinophils, Absolute 0.00 10*3/mm3      Basophils, Absolute 0.02 10*3/mm3      Immature Grans, Absolute 0.06 10*3/mm3      nRBC 0.0 /100 WBC               Plan:   CV: Heart rate and blood pressure stable  Respiratory: Oxygen level stable  GI: Tolerating p.o.  : Adequate urine output  Neuro: Patient moving all extremities symmetrically.  Not following any commands.  Currently not answering any questions but does open eyes to verbal.  Continue with current medical treatment.  CT scan shows complete resolution of the subdural hematoma.  Neurosurgery will sign off at this time      Encephalopathy    Unknown when patient last well, possible stroke     Hyponatremia    Failure to thrive in  adult    Severe malnutrition (CMS/HCC)    History of migraines    Closed fracture of nasal bone with routine healing    Traumatic ecchymosis of face    Acquired deviated nasal septum    Epistaxis        Jsese Rudolph, APRN

## 2021-04-08 NOTE — THERAPY TREATMENT NOTE
Acute Care - Occupational Therapy Treatment Note  [facility]     Patient Name: Zelalem Hung  : 1940  MRN: [id number]  Today's Date: 2021             Admit Date: 2021       ICD-10-CM ICD-9-CM   1. Altered mental status, unspecified altered mental status type  R41.82 780.97   2. Impaired mobility  Z74.09 799.89   3. Dysphagia, unspecified type  R13.10 787.20   4. Decreased activities of daily living (ADL)  Z78.9 V49.89     Patient Active Problem List   Diagnosis   • Cholecystitis   • Encephalopathy   • Unknown when patient last well, possible stroke    • Hyponatremia   • Failure to thrive in adult   • Severe malnutrition (CMS/HCC)   • History of migraines   • Closed fracture of nasal bone with routine healing   • Traumatic ecchymosis of face   • Acquired deviated nasal septum   • Epistaxis     Past Medical History:   Diagnosis Date   • Chronic hyponatremia    • Frequent UTI    • Migraines    • SVT (supraventricular tachycardia) (CMS/HCC)      Past Surgical History:   Procedure Laterality Date   • CHOLECYSTECTOMY WITH INTRAOPERATIVE CHOLANGIOGRAM N/A 2019    Procedure: CHOLECYSTECTOMY LAPAROSCOPIC INTRAOPERATIVE CHOLANGIOGRAM;  Surgeon: Chandna Bravo MD;  Location: [facility];  Service: General            OT ASSESSMENT FLOWSHEET (last 12 hours)      OT Evaluation and Treatment     Row Name 21 1329                   OT Time and Intention    Subjective Information  no complaints  -        Document Type  therapy note (daily note)  -        Mode of Treatment  occupational therapy  -        Patient Effort  good  -           General Information    Existing Precautions/Restrictions  fall  -           Pain Assessment    Additional Documentation  Pain Scale: Word Pre/Post-Treatment (Group)  -           Pain Scale: Numbers Pre/Post-Treatment    Pretreatment Pain Rating  0/10 - no pain  -CJ        Posttreatment Pain Rating  0/10 - no pain  -        Pain Intervention(s)   Repositioned;Rest  -CJ           Bed Mobility    Supine-Sit [place] (Bed Mobility)  set up;verbal cues;contact guard  -CJ        Sit-Supine [place] (Bed Mobility)  set up;verbal cues;contact guard  -CJ           Functional Mobility    Functional Mobility- Ind. Level  set up required;verbal cues required;contact guard assist  -CJ        Functional Mobility- Device  rolling walker  -CJ        Functional Mobility-Distance (Feet)  20  -CJ           Transfers    Sit-Stand [place] (Transfers)  set up;verbal cues;contact guard  -CJ        Stand-Sit [place] (Transfers)  set up;verbal cues;contact guard  -CJ        [place] Level (Toilet Transfer)  set up;verbal cues;contact guard  -CJ        Assistive Device (Toilet Transfer)  commode;walker, front-wheeled  -           Sit-Stand Transfer    Assistive Device (Sit-Stand Transfers)  walker, front-wheeled  -           Stand-Sit Transfer    Assistive Device (Stand-Sit Transfers)  walker, front-wheeled  -           Toilet Transfer    Type (Toilet Transfer)  lateral;sit-stand;stand-sit  -           Motor Skills    Motor Skills  therapeutic exercise  -        Therapeutic Exercise  shoulder;elbow/forearm  -           Shoulder (Therapeutic Exercise)    Shoulder (Therapeutic Exercise)  AROM (active range of motion);strengthening exercise  -        Shoulder AROM (Therapeutic Exercise)  bilateral;flexion;extension;sitting;10 repetitions  -        Shoulder Strengthening (Therapeutic Exercise)  bilateral;flexion;extension;sitting;2 lb free weight;3 lb free weight  -           Elbow/Forearm (Therapeutic Exercise)    Elbow/Forearm (Therapeutic Exercise)  AROM (active range of motion);strengthening exercise  -        Elbow/Forearm AROM (Therapeutic Exercise)  bilateral;flexion;extension;sitting;10 repetitions;2 sets  -        Elbow/Forearm Strengthening (Therapeutic Exercise)  bilateral;flexion;extension;sitting;2 lb free weight;3 lb free weight   -           Activities of Daily Living    65684 - OT Self Care/Mgmt Minutes  12  -        BADL Assessment/Intervention  toileting  -           Toileting Assessment/Training    [place] Level (Toileting)  toileting skills;set up;verbal cues;contact guard assist  -        Assistive Devices (Toileting)  commode  -        Position (Toileting)  supported sitting;supported standing  -           Positioning and Restraints    Pre-Treatment Position  in bed  -        Post Treatment Position  bed  -        In Bed  fowlers;call light within reach;encouraged to call for assist;with other staff;side rails up x2  -           Progress Summary (OT)    Progress Toward Functional Goals (OT)  progress toward functional goals is good  -        Barriers to Overall Progress (OT)  Fall!  -        Impairments Still Limiting Function (OT)  continue with ot poc!  -          User Key  (r) = Recorded By, (t) = Taken By, (c) = Cosigned By    Initials Name Effective Dates     Elia Polanco, EVANGELINA 08/02/16 -          Occupational Therapy Education                 Title: PT OT SLP Therapies (Done)     Topic: Occupational Therapy (Done)     Point: ADL training (Done)     Description:   Instruct learner(s) on proper safety adaptation and remediation techniques during self care or transfers.   Instruct in proper use of assistive devices.              Learning Progress Summary           Patient Acceptance, E,TB, VU,DU,NR by  at 4/8/2021 1329    Comment: Pt. fowlers in bed performed ue exs and transfered to toilet!    Acceptance, E, VU,NR by  at 4/5/2021 1022    Acceptance, E, VU,NR by  at 4/3/2021 1252                   Point: Home exercise program (Done)     Description:   Instruct learner(s) on appropriate technique for monitoring, assisting and/or progressing therapeutic exercises/activities.              Learning Progress Summary           Patient Acceptance, E,TB, VU,DU,NR by  at 4/8/2021 132     Comment: Pt. fowlers in bed performed ue exs and transfered to toilet!                   Point: Precautions (Done)     Description:   Instruct learner(s) on prescribed precautions during self-care and functional transfers.              Learning Progress Summary           Patient Acceptance, E,TB, VU,DU,NR by  at 4/8/2021 1329    Comment: Pt. fowlers in bed performed ue exs and transfered to toilet!    Acceptance, E, VU,NR by  at 4/3/2021 1252                   Point: Body mechanics (Done)     Description:   Instruct learner(s) on proper positioning and spine alignment during self-care, functional mobility activities and/or exercises.              Learning Progress Summary           Patient Acceptance, E,TB, VU,DU,NR by  at 4/8/2021 1329    Comment: Pt. fowlers in bed performed ue exs and transfered to toilet!    Acceptance, E, VU,NR by  at 4/3/2021 1252                               User Key     Initials Effective Dates Name Provider Type Discipline     08/02/16 -  Elia Polanco COTA/L Occupational Therapy Assistant OT     04/02/19 -  Svitlana Anand OTR/L, CNT Occupational Therapist OT                  OT Recommendation and Plan     Progress Toward Functional Goals (OT): progress toward functional goals is good  Plan of Care Review  Plan of Care Reviewed With: patient, other (see comments)  Progress: improving  Outcome Summary: Pt. performed ue exs while fowlers in bed, transfered to-from bed-toilet with cga and rwx, no issues with tx!  Plan of Care Reviewed With: patient, other (see comments)  Outcome Summary: Pt. performed ue exs while fowlers in bed, transfered to-from bed-toilet with cga and rwx, no issues with tx!    Outcome Measures     Row Name 04/08/21 1329 04/07/21 1200 04/06/21 1500       How much help from another is currently needed...    Putting on and taking off regular lower body clothing?  2  -  2  -TS  --    Bathing (including washing, rinsing, and drying)  2  -  2  -TS  --     Toileting (which includes using toilet bed pan or urinal)  3  -  3  -TS  --    Putting on and taking off regular upper body clothing  3  -  3  -TS  --    Taking care of personal grooming (such as brushing teeth)  3  -  3  -TS  --    Eating meals  4  -CJ  4  -TS  --    AM-PAC 6 Clicks Score (OT)  17  -  17  -TS  --       Functional Assessment    Outcome Measure Options  AM-PAC 6 Clicks Daily Activity (OT)  -  --  [facility]      User Key  (r) = Recorded By, (t) = Taken By, (c) = Cosigned By    Initials Name Provider Type     Liz Don, THIAGO Physical Therapy Assistant    Elia Zuluaga COTA/L Occupational Therapy Assistant    Indira Smith COTA/L Occupational Therapy Assistant          Time Calculation:   Time Calculation- OT     Row Name 04/08/21 1329 04/08/21 1316          Time Calculation- OT    OT Start Time  1329  -  --     OT Stop Time  1400  -  --     OT Time Calculation (min)  31 min  -  --     Total Timed Code Minutes- OT  31 minute(s)  -  --     TCU Minutes- OT  31 min  -  --     OT Received On  04/08/21  -  --        Timed Charges    66088 - OT Therapeutic Exercise Minutes  19  -  --     25981 - Gait Training Minutes   --  26  -JAMES     98922 - OT Manual Therapy Minutes  --  -  --     82101 - OT Self Care/Mgmt Minutes  12  -  --       User Key  (r) = Recorded By, (t) = Taken By, (c) = Cosigned By    Initials Name Provider Type     Elia Polanco COTA/L Occupational Therapy Assistant    Daniel Bay, THIAGO Physical Therapy Assistant        Therapy Charges for Today     Code Description Service Date Service Provider Modifiers Qty    62305470934 HC OT THER PROC EA 15 MIN 4/8/2021 Elia Polanco COTA/L GO 1    33173043425 HC OT SELF CARE/MGMT/TRAIN EA 15 MIN 4/8/2021 Elia Polanco COTA/L GO 1               EVANGELINA Thomas  4/8/2021

## 2021-04-08 NOTE — PLAN OF CARE
Goal Outcome Evaluation:  Plan of Care Reviewed With: (P) patient  Progress: (P)  (initial evaluation)    Swallowing evaluation complete. Patient was cooperative, lethargic, and oriented to person. During oral [facility] exam, patient was noted to have mild oral motor weakness and a lingual tremor. Patient also had mild pharyngeal weakness and reduced laryngeal movement. Dentures at bedside. However, they did not appear clean so they were not placed. Consistencies given included pureed, honey thick, nectar thick, thin, and regular consistencies. Patient exhibited a throat clear in 2/3 trials after nectar thick liquid and exhibited a throat clear after all trials (4/4) of thin liquid. After trial of regular consistencies (cracker), patient was unable to clear bolus after multiple washes with thin liquids. Patient had to manually remove cracker. SLP recommends honey thick liquids/pureed diet due to throat clearing after nectar and thin liquid and inability to swallow cracker. Medicine should be given crushed with applesauce. Ok for ice chips and water between meals. SLP plans to continue to follow and treat.

## 2021-04-08 NOTE — PROGRESS NOTES
[facility] Medicine Services  INPATIENT PROGRESS NOTE    Patient Name: Zelalem Hung  Date of Admission: 4/2/2021  Today's Date: 04/08/21  Length of Stay: 6  Primary Care Physician: Garth Amezcua APRN    Subjective   Chief Complaint: feeling better    Resting comfortably  Had some vomiting last night      Review of Systems   Constitutional: Negative for fatigue and fever.   HENT: Positive for facial swelling. Negative for congestion and ear pain.    Eyes: Negative for pain and visual disturbance.   Respiratory: Negative for cough, shortness of breath and wheezing.    Cardiovascular: Negative for chest pain and palpitations.   Gastrointestinal: Negative for diarrhea, nausea and vomiting.   Endocrine: Negative for heat intolerance.   Genitourinary: Negative for dysuria and frequency.   Musculoskeletal: Negative for arthralgias and back pain.   Skin: Negative for rash and wound.        bruising   Neurological: Negative for dizziness and light-headedness.   Psychiatric/Behavioral: Negative for confusion. The patient is not nervous/anxious.    All other systems reviewed and are negative.         All pertinent negatives and positives are as above. All other systems have been reviewed and are negative unless otherwise stated.     Objective    Temp:  [97.4 °F (36.3 °C)-98.2 °F (36.8 °C)] 98.2 °F (36.8 °C)  Heart Rate:  [71-94] 76  Resp:  [16] 16  BP: (127-156)/(50-83) 140/81  Physical Exam  Constitutional:       Appearance: Normal appearance. She is well-developed.   HENT:      Head: Normocephalic and atraumatic.      Right Ear: Tympanic membrane, ear canal and external ear normal.      Left Ear: Tympanic membrane, ear canal and external ear normal.      Nose: Nasal deformity and septal deviation present.      Comments: Nose swellling     Mouth/Throat:      Mouth: Mucous membranes are moist.      Pharynx: Oropharynx is clear.   Eyes:      Extraocular Movements: Extraocular  movements intact.      Conjunctiva/sclera: Conjunctivae normal.      Pupils: Pupils are equal, round, and reactive to light.   Cardiovascular:      Rate and Rhythm: Normal rate and regular rhythm.      Heart sounds: Normal heart sounds.   Pulmonary:      Effort: Pulmonary effort is normal.      Breath sounds: Normal breath sounds.   Abdominal:      General: Bowel sounds are normal. There is no distension.      Palpations: Abdomen is soft.      Tenderness: There is no abdominal tenderness.   Musculoskeletal:         General: Normal range of motion.      Cervical back: Normal range of motion and neck supple.   Skin:     General: Skin is warm and dry.      Comments: Bilateral facial bruising/hematoma     Neurological:      Mental Status: She is alert and oriented to person, place, and time.   Psychiatric:         Mood and Affect: Mood normal.         Speech: Speech normal.         Behavior: Behavior normal.         Thought Content: Thought content normal.         Judgment: Judgment normal.           Results Review:  I have reviewed the labs, radiology results, and diagnostic studies.    Laboratory Data:   Results from last 7 days   Lab Units 04/08/21  0518 04/07/21  0559 04/06/21  0545   WBC 10*3/mm3 11.94* 15.71* 13.82*   HEMOGLOBIN g/dL 12.1 12.7 12.8   HEMATOCRIT % 34.1 35.9 35.9   PLATELETS 10*3/mm3 194 182 177        Results from last 7 days   Lab Units 04/08/21  0518 04/07/21  0559 04/06/21  0545   SODIUM mmol/L 139 137 135*   POTASSIUM mmol/L 4.2 2.9* 3.6   CHLORIDE mmol/L 104 96* 94*   CO2 mmol/L 24.0 32.0* 26.0   BUN mg/dL 17 17 14   CREATININE mg/dL 0.49* 0.60 0.49*   CALCIUM mg/dL 9.4 8.8 9.0   BILIRUBIN mg/dL 0.3 0.4 0.4   ALK PHOS U/L 66 66 62   ALT (SGPT) U/L 31 31 19   AST (SGOT) U/L 54* 64* 38*   GLUCOSE mg/dL 108* 97 99       Culture Data:   Blood Culture   Date Value Ref Range Status   03/30/2021 No growth at 5 days  Final   03/30/2021 No growth at 5 days  Final       Radiology Data:   Imaging Results  (Last 24 Hours)     ** No results found for the last 24 hours. **          I have reviewed the patient's current medications.     Assessment/Plan     Active Hospital Problems    Diagnosis    • **Encephalopathy    • Closed fracture of nasal bone with routine healing    • Traumatic ecchymosis of face    • Acquired deviated nasal septum    • Epistaxis    • Unknown when patient last well, possible stroke     • Hyponatremia    • Failure to thrive in adult    • Severe malnutrition (CMS/HCC)    • History of migraines      Labs reviewed: leukocytosis, potassium down    Imaging reviewed: CT Head    CT Head independently interpreted by me:  SDH has resolved          1.  AMS due to polypharmacy  -resolved    2.  Hyponatremia  -monitor    3.  Hypokalemia  -resolved    4.  Migraines  -neuro following      5.  Nasal fracture with deviated septum  -ENT following    6.  Subdural hematoma  -Neurosurgery following  -Repeat Head CT given confusion            Discharge Planning: I expect the patient to be discharged to SNF in 2-3 days    Electronically signed by Jesse Castillo MD, 04/08/21, 10:06 CDT.

## 2021-04-08 NOTE — PLAN OF CARE
Goal Outcome Evaluation:  Plan of Care Reviewed With: patient  Progress: improving  Outcome Summary: Pt was able to transfer supine to sit with min assist.  Sit to stand with min assist.  Amb 30' X 2 with RWX and CGA/min assist.  Will continue to work with pt to increase strength and balance to improve safety with mobility.

## 2021-04-08 NOTE — PLAN OF CARE
Goal Outcome Evaluation:pt slept all morning. No s/s pain. Facial bruising and swelling noted. Alert to person only. Up x1 assist with gait belt and walker to bathroom. Unsteady gait noted. Iid to left forearm flushed and locked after use. Speech therapy cleared pt for pureed diet and honey thick liquids. Meds in applesauce. Safety maintained. Sitter at bedside. Continue to monitor

## 2021-04-08 NOTE — PROGRESS NOTES
Continued Stay Note  [facility]     Patient Name: Zelalem Hung  MRN: [id number]  Today's Date: 4/8/2021    Admit Date: 4/2/2021    Discharge Plan     Row Name 04/08/21 1216       Plan    Plan Comments  Per John at [facility] they will have a bed for her on Monday (4-12). Will call dtr to notify (Alejandra)        Discharge Codes    No documentation.             FRANCESCA Olsen

## 2021-04-08 NOTE — THERAPY EVALUATION
Acute Care - Speech Language Pathology   Swallow Initial Evaluation [facility]     Patient Name: Zelalem Hung  : 1940  MRN: [id number]  Today's Date: 2021               Admit Date: 2021  Swallowing evaluation complete. Patient was cooperative, lethargic, and oriented to person. During oral [facility] exam, patient was noted to have mild oral motor weakness and a lingual tremor. Patient also had mild pharyngeal weakness and reduced laryngeal movement. Dentures at bedside. However, they did not appear clean so they were not placed. Consistencies given included pureed, honey thick, nectar thick, thin, and regular consistencies. Patient exhibited a throat clear in 2/3 trials after nectar thick liquid and exhibited a throat clear after all trials (4/4) of thin liquid. After trial of regular consistencies (cracker), patient was unable to clear bolus after multiple washes with thin liquids. Patient had to manually remove cracker. SLP recommends honey thick liquids/pureed diet due to throat clearing after nectar and thin liquid and inability to swallow cracker. Medicine should be given crushed with applesauce. Ok for ice chips and water between meals. SLP plans to continue to follow and treat.   Nu Mcmullen [facility]  Visit Dx:     ICD-10-CM ICD-9-CM   1. Altered mental status, unspecified altered mental status type  R41.82 780.97   2. Impaired mobility  Z74.09 799.89   3. Dysphagia, unspecified type  R13.10 787.20   4. Decreased activities of daily living (ADL)  Z78.9 V49.89     Patient Active Problem List   Diagnosis    Cholecystitis    Encephalopathy    Unknown when patient last well, possible stroke     Hyponatremia    Failure to thrive in adult    Severe malnutrition (CMS/HCC)    History of migraines    Closed fracture of nasal bone with routine healing    Traumatic ecchymosis of face    Acquired deviated nasal septum    Epistaxis     Past Medical History:   Diagnosis Date    Chronic hyponatremia     Frequent  UTI     Migraines     SVT (supraventricular tachycardia) (CMS/HCC)      Past Surgical History:   Procedure Laterality Date    CHOLECYSTECTOMY WITH INTRAOPERATIVE CHOLANGIOGRAM N/A 5/9/2019    Procedure: CHOLECYSTECTOMY LAPAROSCOPIC INTRAOPERATIVE CHOLANGIOGRAM;  Surgeon: Chandan Bravo MD;  Location: [facility] OR;  Service: General        SWALLOW EVALUATION (last 72 hours)        SLP Adult Swallow Evaluation       Row Name 04/08/21 125                   Rehab Evaluation    Document Type  evaluation  (Pended)   -        Subjective Information  no complaints  (Pended)   -        Patient Observations  cooperative;agree to therapy;lethargic  (Pended)   -        Care Plan Review  evaluation/treatment results reviewed  (Pended)   -        Patient Effort  adequate  (Pended)   -           General Information    Patient Profile Reviewed  yes  (Pended)   -        Pertinent History Of Current Problem  hyponatremia, frequent UTIs, encephalopathy, CT of head- right supratentonal subdural hematoma  (Pended)   -        Current Method of Nutrition  NPO  (Pended)   -        Precautions/Limitations, Vision  WFL;for purposes of eval  (Pended)   -        Precautions/Limitations, Hearing  WFL;for purposes of eval  (Pended)   -        Prior Level of Function-Communication  WFL  (Pended)   -        Prior Level of Function-Swallowing  no diet consistency restrictions  (Pended)   -        Plans/Goals Discussed with  patient  (Pended)   -        Barriers to Rehab  none identified  (Pended)   -        Patient's Goals for Discharge  patient did not state  (Pended)   -           Pain    Additional Documentation  Pain Scale: FACES Pre/Post-Treatment (Group)  (Pended)   -           Pain Scale: FACES Pre/Post-Treatment    Pain: FACES Scale, Pretreatment  0-->no hurt  (Pended)   -        Posttreatment Pain Rating  0-->no hurt  (Pended)   -           Oral Motor Structure and Function    Dentition Assessment   edentulous, dentures not available  (Pended)   -        Secretion Management  WNL/WFL  (Pended)   -        Mucosal Quality  moist, healthy  (Pended)   -        Volitional Swallow  weak  (Pended)   -        Volitional Cough  WFL  (Pended)   -           Oral Musculature and Cranial Nerve Assessment    Oral Motor General Assessment  generalized oral motor weakness  (Pended)   -        Oral Labial or Buccal Impairment, Detail, Cranial Nerve VII (Facial):  reduced strength bilaterally  (Pended)   -        Lingual Impairment, Detail. Cranial Nerves IX, XII (Glossopharyngeal and Hypoglossal)  reduced strength  (Pended)  lingual tremor  -           General Eating/Swallowing Observations    Respiratory Support Currently in Use  room air  (Pended)   -        Eating/Swallowing Skills  fed by SLP  (Pended)   -        Positioning During Eating  upright in bed  (Pended)   -        Utensils Used  spoon;cup;straw  (Pended)   -        Consistencies Trialed  regular textures;honey-thick liquids;nectar/syrup-thick liquids;thin liquids;pureed  (Pended)   -           Clinical Swallow Eval    Oral Prep Phase  impaired  (Pended)   -        Oral Transit  WFL  (Pended)   -        Oral Residue  WFL  (Pended)   -        Pharyngeal Phase  suspected pharyngeal impairment  (Pended)   -        Esophageal Phase  unremarkable  (Pended)   -        Clinical Swallow Evaluation Summary  see eval note  (Pended)   -           Oral Prep Concerns    Oral Prep Concerns  prolonged mastication;bolus removed from mouth manually  (Pended)   -        Prolonged Mastication  regular consistencies  (Pended)  dentures not available  -        Bolus Removed from Mouth Manually  regular consistencies  (Pended)  dentures not available  -           Pharyngeal Phase Concerns    Pharyngeal Phase Concerns  throat clear  (Pended)   -        Throat Clear  thin;nectar  (Pended)   -           Clinical Impression    Daily Summary  of Progress (SLP)  progress toward functional goals is good  (Pended)   -        Barriers to Overall Progress (SLP)  n/a  (Pended)   -        SLP Swallowing Diagnosis  mild;oral dysphagia;suspected pharyngeal dysphagia  (Pended)   -        Functional Impact  risk of aspiration/pneumonia  (Pended)   -        Rehab Potential/Prognosis, Swallowing  good, to achieve stated therapy goals  (Pended)   -        Swallow Criteria for Skilled Therapeutic Interventions Met  demonstrates skilled criteria  (Pended)   -           Recommendations    Therapy Frequency (Swallow)  at least;3 days per week  (Pended)   -        Predicted Duration Therapy Intervention (Days)  until discharge  (Pended)   -        SLP Diet Recommendation  puree;honey thick liquids;water between meals after oral care, with supervision;ice chips between meals after oral care, with supervision  (Pended)   -        Recommended Precautions and Strategies  upright posture during/after eating;small bites of food and sips of liquid  (Pended)   -        Oral Care Recommendations  Oral Care BID/PRN  (Pended)   -        SLP Rec. for Method of Medication Administration  meds crushed;with pudding or applesauce  (Pended)   -        Monitor for Signs of Aspiration  yes;notify SLP if any concerns  (Pended)   -        Anticipated Discharge Disposition (SLP)  home  (Pended)   -           Swallow Goals (SLP)    Oral Nutrition/Hydration Goal Selection (SLP)  oral nutrition/hydration, SLP goal 1  (Pended)   -        Labial Strengthening Goal Selection (SLP)  labial strengthening, SLP goal 1  (Pended)   -        Lingual Strengthening Goal Selection (SLP)  lingual strengthening, SLP goal 1  (Pended)   -        Pharyngeal Strengthening Exercise Goal Selection (SLP)  pharyngeal strengthening exercise, SLP goal 1  (Pended)   -        Additional Documentation  labial strengthening goal selection (SLP);lingual strengthening goal selection  (SLP);pharyngeal strengthening exercise goal selection (SLP)  (Pended)   -SW           Oral Nutrition/Hydration Goal 1 (SLP)    Oral Nutrition/Hydration Goal 1, SLP  Pt will tolerate LRD without s/s of aspiration  (Pended)   -SW        Time Frame (Oral Nutrition/Hydration Goal 1, SLP)  by discharge  (Pended)   -SW        Barriers (Oral Nutrition/Hydration Goal 1, SLP)  n/a  (Pended)   -SW        Progress/Outcomes (Oral Nutrition/Hydration Goal 1, SLP)  goal ongoing  (Pended)   -SW           Labial Strengthening Goal 1 (SLP)    Activity (Labial Strengthening Goal 1, SLP)  increase labial tone  (Pended)   -SW        Increase Labial Tone  labial resistance exercises  (Pended)   -SW        [place]/Accuracy (Labial Strengthening Goal 1, SLP)  with minimal cues (75-90% accuracy)  (Pended)   -SW        Time Frame (Labial Strengthening Goal 1, SLP)  by discharge  (Pended)   -SW        Barriers (Labial Strengthening Goal 1, SLP)  n/a  (Pended)   -SW        Progress/Outcomes (Labial Strengthening Goal 1, SLP)  goal ongoing  (Pended)   -SW           Lingual Strengthening Goal 1 (SLP)    Activity (Lingual Strengthening Goal 1, SLP)  increase lingual tone/sensation/control/coordination/movement  (Pended)   -SW        Increase Lingual Tone/Sensation/Control/Coordination/Movement  lingual movement exercises;lingual resistance exercises  (Pended)   -SW        [place]/Accuracy (Lingual Strengthening Goal 1, SLP)  with minimal cues (75-90% accuracy)  (Pended)   -SW        Time Frame (Lingual Strengthening Goal 1, SLP)  by discharge  (Pended)   -SW        Barriers (Lingual Strengthening Goal 1, SLP)  n/a  (Pended)   -SW        Progress/Outcomes (Lingual Strengthening Goal 1, SLP)  goal ongoing  (Pended)   -SW           Pharyngeal Strengthening Exercise Goal 1 (SLP)    Activity (Pharyngeal Strengthening Goal 1, SLP)  increase squeeze/positive pressure generation;increase superior movement of the hyolaryngeal complex  (Pended)    -SW        Increase Superior Movement of the Hyolaryngeal Complex  Mendelsohn;shaker  (Pended)   -SW        Increase Squeeze/Positive Pressure Generation  hard effortful swallow  (Pended)   -SW        [place]/Accuracy (Pharyngeal Strengthening Goal 1, SLP)  with minimal cues (75-90% accuracy)  (Pended)   -SW        Time Frame (Pharyngeal Strengthening Goal 1, SLP)  by discharge  (Pended)   -SW        Barriers (Pharyngeal Strengthening Goal 1, SLP)  n/a  (Pended)   -SW        Progress/Outcomes (Pharyngeal Strengthening Goal 1, SLP)  goal ongoing  (Pended)   -SW              User Key  (r) = Recorded By, (t) = Taken By, (c) = Cosigned By      Initials Name Effective Dates    Nu Maldonado, Speech Therapy Student 01/18/21 -             EDUCATION  The patient has been educated in the following areas:   Dysphagia (Swallowing Impairment).    SLP Recommendation and Plan  SLP Swallowing Diagnosis: (P) mild, oral dysphagia, suspected pharyngeal dysphagia  SLP Diet Recommendation: (P) puree, honey thick liquids, water between meals after oral care, with supervision, ice chips between meals after oral care, with supervision  Recommended Precautions and Strategies: (P) upright posture during/after eating, small bites of food and sips of liquid  SLP Rec. for Method of Medication Administration: (P) meds crushed, with pudding or applesauce     Monitor for Signs of Aspiration: (P) yes, notify SLP if any concerns     Swallow Criteria for Skilled Therapeutic Interventions Met: (P) demonstrates skilled criteria  Anticipated Discharge Disposition (SLP): (P) home  Rehab Potential/Prognosis, Swallowing: (P) good, to achieve stated therapy goals  Therapy Frequency (Swallow): (P) at least, 3 days per week  Predicted Duration Therapy Intervention (Days): (P) until discharge     Daily Summary of Progress (SLP): (P) progress toward functional goals is good                   Plan of Care Reviewed With: (P) patient  Progress: (P)   (initial evaluation)    SLP GOALS       Row Name 04/08/21 1255             Oral Nutrition/Hydration Goal 1 (SLP)    Oral Nutrition/Hydration Goal 1, SLP  Pt will tolerate LRD without s/s of aspiration  (Pended)   -SW      Time Frame (Oral Nutrition/Hydration Goal 1, SLP)  by discharge  (Pended)   -SW      Barriers (Oral Nutrition/Hydration Goal 1, SLP)  n/a  (Pended)   -SW      Progress/Outcomes (Oral Nutrition/Hydration Goal 1, SLP)  goal ongoing  (Pended)   -SW         Labial Strengthening Goal 1 (SLP)    Activity (Labial Strengthening Goal 1, SLP)  increase labial tone  (Pended)   -SW      Increase Labial Tone  labial resistance exercises  (Pended)   -SW      [place]/Accuracy (Labial Strengthening Goal 1, SLP)  with minimal cues (75-90% accuracy)  (Pended)   -SW      Time Frame (Labial Strengthening Goal 1, SLP)  by discharge  (Pended)   -SW      Barriers (Labial Strengthening Goal 1, SLP)  n/a  (Pended)   -SW      Progress/Outcomes (Labial Strengthening Goal 1, SLP)  goal ongoing  (Pended)   -SW         Lingual Strengthening Goal 1 (SLP)    Activity (Lingual Strengthening Goal 1, SLP)  increase lingual tone/sensation/control/coordination/movement  (Pended)   -SW      Increase Lingual Tone/Sensation/Control/Coordination/Movement  lingual movement exercises;lingual resistance exercises  (Pended)   -SW      [place]/Accuracy (Lingual Strengthening Goal 1, SLP)  with minimal cues (75-90% accuracy)  (Pended)   -SW      Time Frame (Lingual Strengthening Goal 1, SLP)  by discharge  (Pended)   -SW      Barriers (Lingual Strengthening Goal 1, SLP)  n/a  (Pended)   -SW      Progress/Outcomes (Lingual Strengthening Goal 1, SLP)  goal ongoing  (Pended)   -SW         Pharyngeal Strengthening Exercise Goal 1 (SLP)    Activity (Pharyngeal Strengthening Goal 1, SLP)  increase squeeze/positive pressure generation;increase superior movement of the hyolaryngeal complex  (Pended)   -SW      Increase Superior Movement of  the Hyolaryngeal Complex  Mendelsohn;shaker  (Pended)   -SW      Increase Squeeze/Positive Pressure Generation  hard effortful swallow  (Pended)   -SW      [place]/Accuracy (Pharyngeal Strengthening Goal 1, SLP)  with minimal cues (75-90% accuracy)  (Pended)   -SW      Time Frame (Pharyngeal Strengthening Goal 1, SLP)  by discharge  (Pended)   -SW      Barriers (Pharyngeal Strengthening Goal 1, SLP)  n/a  (Pended)   -SW      Progress/Outcomes (Pharyngeal Strengthening Goal 1, SLP)  goal ongoing  (Pended)   -SW            User Key  (r) = Recorded By, (t) = Taken By, (c) = Cosigned By      Initials Name Provider Type    Nu Maldonado, Speech Therapy Student Speech Therapy Student                Time Calculation:   Time Calculation- SLP       Row Name 04/08/21 1356             Time Calculation- SLP    SLP Start Time  1255  (Pended)   -SW      SLP Stop Time  1356  (Pended)   -      SLP Time Calculation (min)  61 min  (Pended)   -SW      SLP Received On  04/08/21  (Pended)   -      SLP Goal Re-Cert Due Date  04/18/21  (Pended)   -            User Key  (r) = Recorded By, (t) = Taken By, (c) = Cosigned By      Initials Name Provider Type    Nu Maldonado Speech Therapy Student Speech Therapy Student                      Nu Mcmullen Speech Therapy Student  4/8/2021

## 2021-04-08 NOTE — PLAN OF CARE
Goal Outcome Evaluation:        Outcome Summary: Pt oriented to self only at this time. VSS. Voiding, up to BR with assist x2. Pt sleep minimally during shift. Sitter at bedside. Vomiting x1, zophran administered. PO potassium administered,pt did not marlene swallowing potassium pills well. Oral care preformed as ordered. Pt denied pain and did not appear to be in pain. Safety maintained. Will continue to monitor. Speech consult place due to pt vomiting and difficulty swallowing pills. NPO.

## 2021-04-08 NOTE — NURSING NOTE
1630 Took over care of this patient and she denies pain and shortness of breath. Pt has a sitter at the bedside and the SR are up.  Bed in low position with alarm on.     1830 No changes in patient condition.

## 2021-04-08 NOTE — THERAPY TREATMENT NOTE
Acute Care - Physical Therapy Treatment Note  [facility]     Patient Name: Zelalem Hung  : 1940  MRN: [id number]  Today's Date: 2021           PT Assessment (last 12 hours)      PT Evaluation and Treatment     Row Name 21 1316 21 1019       Physical Therapy Time and Intention    Subjective Information  complains of;fatigue  -JAMES  --    Document Type  therapy note (daily note)  -JAMES  therapy note (daily note)  -JAMES    Mode of Treatment  physical therapy  -  physical therapy  -    Comment, Session Not Performed  --  unable to wake pt enough to participate.  Per aid, pt had not slept in a few days, and was nauseated over night.  Will check on later.   -    Comment  confusion, some expressive aphasia, or just difficulty getting words out  -  --    Row Name 21 1316          General Information    Existing Precautions/Restrictions  fall  -     Row Name 21 1316          Pain Scale: Numbers Pre/Post-Treatment    Pretreatment Pain Rating  0/10 - no pain  -     Posttreatment Pain Rating  0/10 - no pain  -[facility] Name 21 1316          Bed Mobility    Supine-Sit [place] (Bed Mobility)  verbal cues;minimum assist (75% patient effort)  -     Sit-Supine [place] (Bed Mobility)  verbal cues;minimum assist (75% patient effort)  -[facility] Name 21 1316          Transfers    Sit-Stand [place] (Transfers)  verbal cues;contact guard  -     Stand-Sit [place] (Transfers)  verbal cues;contact guard  -[facility] Name 21 1316          Sit-Stand Transfer    Assistive Device (Sit-Stand Transfers)  walker, front-wheeled  -     Row Name 21 1316          Stand-Sit Transfer    Assistive Device (Stand-Sit Transfers)  walker, front-wheeled  -[facility] Name 21 1316          Gait/Stairs (Locomotion)    [place] Level (Gait)  verbal cues;contact guard;minimum assist (75% patient effort)  -     Assistive Device (Gait)  walker, front-wheeled   -JAMES     Distance in Feet (Gait)  30' X 2   -JAMES     Deviations/Abnormal Patterns (Gait)  gait speed decreased  -JAMES     Bilateral Gait Deviations  forward flexed posture  -JAMES     Row Name 04/08/21 1316          Aerobic Exercise    Comment, Aerobic Exercise (Therapeutic Exercise)  AROM BLE X 15 while sitting EOB   -JAMES     Row Name 04/08/21 1316          Positioning and Restraints    Pre-Treatment Position  in bed  -JAMES     Post Treatment Position  bed  -JAMES     In Bed  fowlers;call light within reach;encouraged to call for assist;side rails up x2  -JAMES       User Key  (r) = Recorded By, (t) = Taken By, (c) = Cosigned By    Initials Name Provider Type    JAMES Daniel Urban, PTA Physical Therapy Assistant        Physical Therapy Education                 Title: PT OT SLP Therapies (In Progress)     Topic: Physical Therapy (Done)     Point: Mobility training (Done)     Learning Progress Summary           Patient Acceptance, E, VU,NR by JAMES at 4/8/2021 1316    Comment: safety with amb    Acceptance, E,TB, VU by AH at 4/7/2021 0813    Comment: trans    Acceptance, E, VU by AB at 4/6/2021 1114    Comment: PT role in care, re-exam findings, discharge plan    Acceptance, E, NR by JAMES at 4/5/2021 1017    Comment: benefits of activity, safety with transfers and amb    Acceptance, E, NR by MS at 4/3/2021 1140    Comment: role of PT in her care                   Point: Home exercise program (Done)     Learning Progress Summary           Patient Acceptance, E, VU by AB at 4/6/2021 1114    Comment: PT role in care, re-exam findings, discharge plan                   Point: Body mechanics (Done)     Learning Progress Summary           Patient Acceptance, E, VU by AB at 4/6/2021 1114    Comment: PT role in care, re-exam findings, discharge plan                   Point: Precautions (Done)     Learning Progress Summary           Patient Acceptance, E, VU by AB at 4/6/2021 1114    Comment: PT role in care, re-exam findings, discharge plan                                User Key     Initials Effective Dates Name Provider Type [facility] 08/02/16 -  Liz Don, THIAGO Physical Therapy Assistant PT    MS 06/19/18 -  Charla Dennis, PT, DPT, NCS Physical Therapist PT    JAMES 12/08/16 -  Daniel Urban, THIAGO Physical Therapy Assistant PT    AB 12/02/20 -  Min Morales, PT Student PT Student PT              PT Recommendation and Plan     Plan of Care Reviewed With: patient  Progress: improving  Outcome Summary: Pt was able to transfer supine to sit with min assist.  Sit to stand with min assist.  Amb 30' X 2 with RWX and CGA/min assist.  Will continue to work with pt to increase strength and balance to improve safety with mobility.  Outcome Measures     Row Name 04/07/21 1200 04/06/21 1500          How much help from another is currently needed...    Putting on and taking off regular lower body clothing?  2  -TS  --     Bathing (including washing, rinsing, and drying)  2  -TS  --     Toileting (which includes using toilet bed pan or urinal)  3  -TS  --     Putting on and taking off regular upper body clothing  3  -TS  --     Taking care of personal grooming (such as brushing teeth)  3  -TS  --     Eating meals  4  -TS  --     AM-PAC 6 Clicks Score (OT)  17  -TS  --        Functional Assessment    Outcome Measure Options  --  [facility]  -       User Key  (r) = Recorded By, (t) = Taken By, (c) = Cosigned By    Initials Name Provider Type     Liz Don, THIAGO Physical Therapy Assistant    TS Indira Borja, JAN/L Occupational Therapy Assistant           Time Calculation:   PT Charges     Row Name 04/08/21 1316             Time Calculation    Start Time  1316  -JAMES      Stop Time  1342  -JAMES      Time Calculation (min)  26 min  -JAMES      PT Received On  04/08/21  -JAMES         Time Calculation- PT    Total Timed Code Minutes- PT  26 minute(s)  -JAMES         Timed Charges    02956 - Gait Training Minutes   26  -JAMES        User Key  (r) = Recorded  By, (t) = Taken By, (c) = Cosigned By    Initials Name Provider Type    JAMES Daniel Urban PTA Physical Therapy Assistant        Therapy Charges for Today     Code Description Service Date Service Provider Modifiers Qty    [id number] HC GAIT TRAINING EA 15 MIN 4/8/2021 Daniel Urban PTA GP 2          PT G-Codes  Outcome Measure Options: [place]  AM-PAC 6 Clicks Score (PT): 17  AM-PAC 6 Clicks Score (OT): 17    Daniel Urban PTA  4/8/2021

## 2021-04-09 VITALS
SYSTOLIC BLOOD PRESSURE: 140 MMHG | HEART RATE: 84 BPM | TEMPERATURE: 97.6 F | DIASTOLIC BLOOD PRESSURE: 70 MMHG | HEIGHT: 62 IN | RESPIRATION RATE: 16 BRPM | WEIGHT: 81 LBS | BODY MASS INDEX: 14.91 KG/M2 | OXYGEN SATURATION: 94 %

## 2021-04-09 LAB
ALBUMIN SERPL-MCNC: 3.4 G/DL (ref 3.5–5.2)
ALBUMIN/GLOB SERPL: 1.2 G/DL
ALP SERPL-CCNC: 70 U/L (ref 39–117)
ALT SERPL W P-5'-P-CCNC: 30 U/L (ref 1–33)
ANION GAP SERPL CALCULATED.3IONS-SCNC: 10 MMOL/L (ref 5–15)
AST SERPL-CCNC: 53 U/L (ref 1–32)
BASOPHILS # BLD AUTO: 0.03 10*3/MM3 (ref 0–0.2)
BASOPHILS NFR BLD AUTO: 0.3 % (ref 0–1.5)
BILIRUB SERPL-MCNC: 0.4 MG/DL (ref 0–1.2)
BUN SERPL-MCNC: 21 MG/DL (ref 8–23)
BUN/CREAT SERPL: 33.9 (ref 7–25)
CALCIUM SPEC-SCNC: 9 MG/DL (ref 8.6–10.5)
CHLORIDE SERPL-SCNC: 103 MMOL/L (ref 98–107)
CO2 SERPL-SCNC: 25 MMOL/L (ref 22–29)
CREAT SERPL-MCNC: 0.62 MG/DL (ref 0.57–1)
DEPRECATED RDW RBC AUTO: 50.7 FL (ref 37–54)
EOSINOPHIL # BLD AUTO: 0.05 10*3/MM3 (ref 0–0.4)
EOSINOPHIL NFR BLD AUTO: 0.5 % (ref 0.3–6.2)
ERYTHROCYTE [DISTWIDTH] IN BLOOD BY AUTOMATED COUNT: 14.1 % (ref 12.3–15.4)
GFR SERPL CREATININE-BSD FRML MDRD: 93 ML/MIN/1.73
GLOBULIN UR ELPH-MCNC: 2.9 GM/DL
GLUCOSE SERPL-MCNC: 105 MG/DL (ref 65–99)
HCT VFR BLD AUTO: 33.9 % (ref 34–46.6)
HGB BLD-MCNC: 12.1 G/DL (ref 12–15.9)
IMM GRANULOCYTES # BLD AUTO: 0.07 10*3/MM3 (ref 0–0.05)
IMM GRANULOCYTES NFR BLD AUTO: 0.6 % (ref 0–0.5)
LYMPHOCYTES # BLD AUTO: 1.62 10*3/MM3 (ref 0.7–3.1)
LYMPHOCYTES NFR BLD AUTO: 14.7 % (ref 19.6–45.3)
MCH RBC QN AUTO: 35 PG (ref 26.6–33)
MCHC RBC AUTO-ENTMCNC: 35.7 G/DL (ref 31.5–35.7)
MCV RBC AUTO: 98 FL (ref 79–97)
MONOCYTES # BLD AUTO: 1.39 10*3/MM3 (ref 0.1–0.9)
MONOCYTES NFR BLD AUTO: 12.6 % (ref 5–12)
NEUTROPHILS NFR BLD AUTO: 7.87 10*3/MM3 (ref 1.7–7)
NEUTROPHILS NFR BLD AUTO: 71.3 % (ref 42.7–76)
NRBC BLD AUTO-RTO: 0 /100 WBC (ref 0–0.2)
PLATELET # BLD AUTO: 216 10*3/MM3 (ref 140–450)
PMV BLD AUTO: 10.3 FL (ref 6–12)
POTASSIUM SERPL-SCNC: 3.9 MMOL/L (ref 3.5–5.2)
PROT SERPL-MCNC: 6.3 G/DL (ref 6–8.5)
RBC # BLD AUTO: 3.46 10*6/MM3 (ref 3.77–5.28)
SARS-COV-2 RNA PNL SPEC NAA+PROBE: NOT DETECTED
SODIUM SERPL-SCNC: 138 MMOL/L (ref 136–145)
WBC # BLD AUTO: 11.03 10*3/MM3 (ref 3.4–10.8)

## 2021-04-09 PROCEDURE — 97116 GAIT TRAINING THERAPY: CPT

## 2021-04-09 PROCEDURE — 80053 COMPREHEN METABOLIC PANEL: CPT | Performed by: FAMILY MEDICINE

## 2021-04-09 PROCEDURE — 97110 THERAPEUTIC EXERCISES: CPT

## 2021-04-09 PROCEDURE — 85025 COMPLETE CBC W/AUTO DIFF WBC: CPT | Performed by: FAMILY MEDICINE

## 2021-04-09 PROCEDURE — 87635 SARS-COV-2 COVID-19 AMP PRB: CPT | Performed by: FAMILY MEDICINE

## 2021-04-09 PROCEDURE — 92526 ORAL FUNCTION THERAPY: CPT

## 2021-04-09 RX ORDER — ACETAMINOPHEN 325 MG/1
650 TABLET ORAL EVERY 4 HOURS PRN
Qty: 120 TABLET | Refills: 0 | Status: ON HOLD | OUTPATIENT
Start: 2021-04-09 | End: 2023-02-03

## 2021-04-09 RX ORDER — OLANZAPINE 5 MG/1
5 TABLET ORAL NIGHTLY
Qty: 30 TABLET | Refills: 0 | Status: SHIPPED | OUTPATIENT
Start: 2021-04-09 | End: 2022-05-10 | Stop reason: HOSPADM

## 2021-04-09 RX ORDER — ECHINACEA PURPUREA EXTRACT 125 MG
2 TABLET ORAL AS NEEDED
Qty: 30 ML | Refills: 12 | Status: ON HOLD | OUTPATIENT
Start: 2021-04-09 | End: 2022-05-06

## 2021-04-09 RX ADMIN — MEGESTROL ACETATE 200 MG: 40 SUSPENSION ORAL at 08:18

## 2021-04-09 RX ADMIN — ACEBUTOLOL HYDROCHLORIDE 200 MG: 200 CAPSULE ORAL at 08:18

## 2021-04-09 RX ADMIN — FAMOTIDINE 20 MG: 10 INJECTION INTRAVENOUS at 08:19

## 2021-04-09 RX ADMIN — SALINE NASAL SPRAY 2 SPRAY: 1.5 SOLUTION NASAL at 10:26

## 2021-04-09 RX ADMIN — PAROXETINE 20 MG: 20 TABLET, FILM COATED ORAL at 08:18

## 2021-04-09 RX ADMIN — SALINE NASAL SPRAY 2 SPRAY: 1.5 SOLUTION NASAL at 18:10

## 2021-04-09 RX ADMIN — SODIUM CHLORIDE, PRESERVATIVE FREE 10 ML: 5 INJECTION INTRAVENOUS at 08:18

## 2021-04-09 RX ADMIN — SALINE NASAL SPRAY 2 SPRAY: 1.5 SOLUTION NASAL at 08:18

## 2021-04-09 RX ADMIN — ACETAMINOPHEN 650 MG: 325 TABLET, FILM COATED ORAL at 01:02

## 2021-04-09 RX ADMIN — Medication 2 SPRAY: at 08:18

## 2021-04-09 RX ADMIN — ACETAMINOPHEN 650 MG: 325 TABLET, FILM COATED ORAL at 16:22

## 2021-04-09 RX ADMIN — Medication 2 SPRAY: at 16:22

## 2021-04-09 RX ADMIN — ACEBUTOLOL HYDROCHLORIDE 200 MG: 200 CAPSULE ORAL at 01:21

## 2021-04-09 NOTE — NURSING NOTE
Bedside shift report completed with Mago SIMENTAL, no neuro changes noted   How Severe Are They?: mild Is This A New Presentation, Or A Follow-Up?: Skin Lesions

## 2021-04-09 NOTE — THERAPY TREATMENT NOTE
Acute Care - Physical Therapy Treatment Note  [facility]     Patient Name: Zelalem Hung  : 1940  MRN: [id number]  Today's Date: 2021           PT Assessment (last 12 hours)      PT Evaluation and Treatment     Row Name 21 1313 21 0853       Physical Therapy Time and Intention    Subjective Information  complains of;fatigue  -JAMES  complains of;fatigue  -JAMES    Document Type  therapy note (daily note)  -JAMES  therapy note (daily note)  -JAMES    Mode of Treatment  physical therapy  -  physical therapy  -    Comment  --  confusion  -JAMES    Row Name 21 1313 21 0853       General Information    Existing Precautions/Restrictions  fall  -  fall  -    Row Name 21 0853       Pain Scale: Numbers Pre/Post-Treatment    Pretreatment Pain Rating  0/10 - no pain  -JAMES  0/10 - no pain  -JAMES    Posttreatment Pain Rating  0/10 - no pain  -JAMES  0/10 - no pain  -JAMES    Row Name 21 1313 04/09/21 0853       Bed Mobility    Supine-Sit [place] (Bed Mobility)  verbal cues;contact guard  -JAMES  verbal cues;contact guard  -JAMES    Sit-Supine [place] (Bed Mobility)  verbal cues;contact guard  -JAMES  verbal cues;contact guard  -JAMES    Row Name 21 1313 04/09/21 0853       Transfers    Sit-Stand [place] (Transfers)  verbal cues;contact guard  -JAMES  verbal cues;contact guard  -JAMES    Stand-Sit [place] (Transfers)  verbal cues;contact guard  -JAMES  verbal cues;contact guard  -JAMES    Row Name 21 1313 04/09/21 0853       Sit-Stand Transfer    Assistive Device (Sit-Stand Transfers)  walker, front-wheeled  -JAMES  walker, front-wheeled  -JAMES    Row Name 21 1313 21 0853       Stand-Sit Transfer    Assistive Device (Stand-Sit Transfers)  walker, front-wheeled  -JAMES  walker, front-wheeled  -JAMES    Row Name 21 1313 04/09/21 0853       Gait/Stairs (Locomotion)    [place] Level (Gait)  verbal cues;contact guard;minimum assist (75% patient effort)  -JAMES  verbal  cues;contact guard;minimum assist (75% patient effort)  -JAMES    Assistive Device (Gait)  walker, front-wheeled  -JAMES  walker, front-wheeled  -JAMES    Distance in Feet (Gait)  60' X 2  -JAMES  40' X 2   -JAMES    Deviations/Abnormal Patterns (Gait)  gait speed decreased;stride length decreased  -JAMES  gait speed decreased  -JAMES    Bilateral Gait Deviations  forward flexed posture  -JAMES  forward flexed posture  -JAMES    Comment (Gait/Stairs)  --  pt had a few LOB, required min assist to correct  -JAMES    Row Name 04/09/21 0853          Aerobic Exercise    Comment, Aerobic Exercise (Therapeutic Exercise)  sitting EOB, AROM BLE X  20   -JAMES     Row Name 04/09/21 1313 04/09/21 0853       Positioning and Restraints    Pre-Treatment Position  in bed  -JAMES  in bed  -JAMES    Post Treatment Position  bed  -JAMES  bed  -JAMES    In Bed  fowlers;call light within reach;encouraged to call for assist;exit alarm on;patient within staff view;side rails up x2  -JAMES  fowlers;call light within reach;encouraged to call for assist;exit alarm on;with family/caregiver;side rails up x2  -JAMES      User Key  (r) = Recorded By, (t) = Taken By, (c) = Cosigned By    Initials Name Provider Type    JAMES Daniel Urban, PTA Physical Therapy Assistant        Physical Therapy Education                 Title: PT OT SLP Therapies (Done)     Topic: Physical Therapy (Done)     Point: Mobility training (Done)     Learning Progress Summary           Patient Acceptance, E, VU,NR by JAMES at 4/9/2021 0853    Comment: safety with transfer and amb    Acceptance, E, VU,NR by JAMES at 4/8/2021 1316    Comment: safety with amb    Acceptance, E,TB, VU by  at 4/7/2021 0813    Comment: trans    Acceptance, E, VU by AB at 4/6/2021 1114    Comment: PT role in care, re-exam findings, discharge plan    Acceptance, E, NR by JAMES at 4/5/2021 1017    Comment: benefits of activity, safety with transfers and amb    Acceptance, E, NR by MS at 4/3/2021 1140    Comment: role of PT in her care                    Point: Home exercise program (Done)     Learning Progress Summary           Patient Acceptance, E, VU by AB at 4/6/2021 1114    Comment: PT role in care, re-exam findings, discharge plan                   Point: Body mechanics (Done)     Learning Progress Summary           Patient Acceptance, E, VU by AB at 4/6/2021 1114    Comment: PT role in care, re-exam findings, discharge plan                   Point: Precautions (Done)     Learning Progress Summary           Patient Acceptance, E, VU by AB at 4/6/2021 1114    Comment: PT role in care, re-exam findings, discharge plan                               User Key     Initials Effective Dates Name Provider Type Discipline     08/02/16 -  Liz Don, PTA Physical Therapy Assistant PT    MS 06/19/18 -  Charla Dennis, PT, DPT, NCS Physical Therapist PT    JAMES 12/08/16 -  Daniel Urban, PTA Physical Therapy Assistant PT    AB 12/02/20 -  Min Morales, PT Student PT Student PT              PT Recommendation and Plan     Plan of Care Reviewed With: patient  Progress: improving  Outcome Summary: Pt was able to transfer supine to sit with CGA with the  HOB elevated.  Sit to stand with CGA/min assist.  Amb 40' X 2 with RWX and CGA/min assist, pt had slight LOB especially with turning that required min assist to correct.  Outcome Measures     Row Name 04/08/21 1329 04/07/21 1200 04/06/21 1500       How much help from another is currently needed...    Putting on and taking off regular lower body clothing?  2  -CJ  2  -TS  --    Bathing (including washing, rinsing, and drying)  2  -CJ  2  -TS  --    Toileting (which includes using toilet bed pan or urinal)  3  -CJ  3  -TS  --    Putting on and taking off regular upper body clothing  3  -CJ  3  -TS  --    Taking care of personal grooming (such as brushing teeth)  3  -CJ  3  -TS  --    Eating meals  4  -CJ  4  -TS  --    AM-PAC 6 Clicks Score (OT)  17  -CJ  17  -TS  --       Functional Assessment    Outcome Measure  Options  AM-PAC 6 Clicks Daily Activity (OT)  -  --  Kierra  -      User Key  (r) = Recorded By, (t) = Taken By, (c) = Cosigned By    Initials Name Provider Type    Liz Simms, PTA Physical Therapy Assistant    CJ Elia Polanco, MONTOYA/L Occupational Therapy Assistant     Indira Borja, MONTOYA/L Occupational Therapy Assistant           Time Calculation:   PT Charges     Row Name 04/09/21 1313 04/09/21 0853          Time Calculation    Start Time  1313  -JAMES  0853  -JAMES     Stop Time  1330  -JAMES  0917  -JAMES     Time Calculation (min)  17 min  -JAMES  24 min  -JAMES     PT Received On  04/09/21  -JAMES  04/09/21  -JAMES        Time Calculation- PT    Total Timed Code Minutes- PT  17 minute(s)  -JAMES  24 minute(s)  -JAMES        Timed Charges    82395 - PT Therapeutic Exercise Minutes  --  10  -JAMES     24380 - Gait Training Minutes   17  -JAMES  14  -JAMES       User Key  (r) = Recorded By, (t) = Taken By, (c) = Cosigned By    Initials Name Provider Type    Daniel Bay PTA Physical Therapy Assistant        Therapy Charges for Today     Code Description Service Date Service Provider Modifiers Qty    82160958165 HC GAIT TRAINING EA 15 MIN 4/8/2021 Daniel Urban, PTA GP 2    40354962229 HC GAIT TRAINING EA 15 MIN 4/9/2021 Daniel Urban, PTA GP 1    72824154408 HC PT THER PROC EA 15 MIN 4/9/2021 Daniel Urban, PTA GP 1    90592442151 HC GAIT TRAINING EA 15 MIN 4/9/2021 Daniel Urban, PTA GP 1          PT G-Codes  Outcome Measure Options: AM-PAC 6 Clicks Daily Activity (OT)  AM-PAC 6 Clicks Score (PT): 17  AM-PAC 6 Clicks Score (OT): 17    Daniel Urban PTA  4/9/2021

## 2021-04-09 NOTE — PLAN OF CARE
Goal Outcome Evaluation:  Plan of Care Reviewed With: patient  Progress: improving  Outcome Summary: alert and oriented 2 to 3, rested quietly this shift, c/o headache tylenol given x1 this shift with no further complaints, VSS, safety maintained, will continue to monitor.

## 2021-04-09 NOTE — DISCHARGE SUMMARY
[facility] Medicine Services  DISCHARGE SUMMARY       Date of Admission: 4/2/2021  Date of Discharge:  4/9/2021  Primary Care Physician: Garth Amezcua APRN    Presenting Problem/History of Present Illness:  Altered mental status, unspecified altered mental status type [R41.82]     Final Discharge Diagnoses:  Active Hospital Problems    Diagnosis    • **Encephalopathy    • Closed fracture of nasal bone with routine healing    • Traumatic ecchymosis of face    • Acquired deviated nasal septum    • Epistaxis    • Unknown when patient last well, possible stroke     • Hyponatremia    • Failure to thrive in adult    • Severe malnutrition (CMS/HCC)    • History of migraines    1.  AMS due to polypharmacy  -resolved     2.  Hyponatremia  -monitor     3.  Hypokalemia  -resolved     4.  Migraines  -neuro following        5.  Nasal fracture with deviated septum  -ENT following     6.  Subdural hematoma  -Neurosurgery following  -Repeat Head CT given confusion       Consults:   1.  Neurology  2.  ENT  3.  Neurosurgery    Procedures Performed:   n/a    Pertinent Test Results:     1.  CT Maxillofacial: 1. Acute appearing minimally displaced bilateral nasal bone fractures  with small soft tissue gas overlying soft tissue swelling. Septum  deviated apex right, difficult to compare to prior due to differences in  technique.  2. Large midline frontal subcutaneous forehead hematoma.    2.  CT Head:  1. Small subdural hematoma along the right tentorium and right middle  cranial fossa.  2. Large frontal scalp hematoma.    Chief Complaint on Day of Discharge: feeling better    History of Present Illness on Day of Discharge:   Sitting up in bed, confusion better    Hospital Course:  The patient is a 80 y.o. female who presented to [facility] with weakness and confusion.  Neurology was consulted.  There was concerns that her medication regimen was causing much of her confusion.  Her  "Ativan was stopped.  Her Fioricet was stopped.  Her mental status improved.      She suffered a fall on 4/5 with head and facial trauma.  CT head showed a small subdural hematoma.  Neurosurgery was consulted.  This was followed with serial head CT's.  The SDH resolved without surgical intervention    She also had a nasal fracture.  ENT was consulted.  She did not want the fracture reduced surgically.  She was treated with nasal saline.      She was initially confused after her fall and injuries, but that has improved.  She has been accepted to [facility] for further rehab and strengthening.      She is comfortable with being discharged and with the discharge plan.  She was given the chance to ask questions and all questions were answered to her satisfaction.        Condition on Discharge:  Stable    Physical Exam on Discharge:  /55 (BP Location: Left arm, Patient Position: Lying)   Pulse 77   Temp 98.3 °F (36.8 °C) (Oral)   Resp 16   Ht 157.5 cm (62\")   Wt 36.7 kg (81 lb)   SpO2 92%   BMI 14.82 kg/m²   Physical Exam  Constitutional:       Appearance: Normal appearance. She is well-developed.   HENT:      Head: Normocephalic and atraumatic.      Comments: Facial bruising     Right Ear: Tympanic membrane, ear canal and external ear normal.      Left Ear: Tympanic membrane, ear canal and external ear normal.      Nose: Nose normal.      Mouth/Throat:      Mouth: Mucous membranes are moist.      Pharynx: Oropharynx is clear.   Eyes:      Extraocular Movements: Extraocular movements intact.      Conjunctiva/sclera: Conjunctivae normal.      Pupils: Pupils are equal, round, and reactive to light.   Cardiovascular:      Rate and Rhythm: Normal rate and regular rhythm.      Heart sounds: Normal heart sounds.   Pulmonary:      Effort: Pulmonary effort is normal.      Breath sounds: Normal breath sounds.   Abdominal:      General: Bowel sounds are normal. There is no distension.      Palpations: Abdomen is soft. "      Tenderness: There is no abdominal tenderness.   Musculoskeletal:         General: Normal range of motion.      Cervical back: Normal range of motion and neck supple.   Skin:     General: Skin is warm and dry.   Neurological:      Mental Status: She is alert and oriented to person, place, and time.   Psychiatric:         Mood and Affect: Mood normal.         Speech: Speech normal.         Behavior: Behavior normal.         Thought Content: Thought content normal.         Judgment: Judgment normal.           Discharge Disposition:  SNF    Discharge Medications:     Discharge Medications      ASK your doctor about these medications      Instructions Start Date   acebutolol 200 MG capsule  Commonly known as: SECTRAL   200 mg, Oral, 2 Times Daily      aspirin 81 MG chewable tablet   81 mg, Oral, Daily      butalbital-acetaminophen-caffeine -40 MG per tablet  Commonly known as: FIORICET, ESGIC   1 tablet, Oral, Every 4 Hours PRN      cefdinir 300 MG capsule  Commonly known as: OMNICEF  Ask about: Should I take this medication?   300 mg, Oral, 2 Times Daily      cholecalciferol 25 MCG (1000 UT) tablet  Commonly known as: VITAMIN D3   2,000 Units, Oral, Daily      fenofibrate 145 MG tablet  Commonly known as: TRICOR   145 mg, Oral, Daily      LORazepam 2 MG tablet  Commonly known as: ATIVAN   1 mg, Oral, As Needed      metoclopramide 5 MG tablet  Commonly known as: REGLAN   5 mg, Oral, 2 Times Daily      PARoxetine 20 MG tablet  Commonly known as: PAXIL   40 mg, Oral, Every Morning             Discharge Diet: regular    Activity at Discharge: as tolerated    Discharge Care Plan/Instructions:   Go to ER for worsening confusion, fever    Follow-up Appointments:   No future appointments.    Test Results Pending at Discharge:   N/A    Electronically signed by Jesse Castillo MD, 04/09/21, 13:36 CDT.    Time: 35 minutes

## 2021-04-09 NOTE — THERAPY TREATMENT NOTE
Acute Care - Physical Therapy Treatment Note  [facility]     Patient Name: Zelalem Hung  : 1940  MRN: [id number]  Today's Date: 2021           PT Assessment (last 12 hours)      PT Evaluation and Treatment     Row Name 21 0853          Physical Therapy Time and Intention    Subjective Information  complains of;fatigue  -JAMES     Document Type  therapy note (daily note)  -JAMES     Mode of Treatment  physical therapy  -JAMES     Comment  confusion  -JAMES     Row Name 21 0853          General Information    Existing Precautions/Restrictions  fall  -JAMES     Row Name 21 0853          Pain Scale: Numbers Pre/Post-Treatment    Pretreatment Pain Rating  0/10 - no pain  -JAMES     Posttreatment Pain Rating  0/10 - no pain  -JAMES     Row Name 21 0853          Bed Mobility    Supine-Sit [place] (Bed Mobility)  verbal cues;contact guard  -JAMES     Sit-Supine [place] (Bed Mobility)  verbal cues;contact guard  -JAMES     Row Name 21 0853          Transfers    Sit-Stand [place] (Transfers)  verbal cues;contact guard  -JAMES     Stand-Sit [place] (Transfers)  verbal cues;contact guard  -JAMES     Row Name 21 0853          Sit-Stand Transfer    Assistive Device (Sit-Stand Transfers)  walker, front-wheeled  -JAMES     Row Name 21 0853          Stand-Sit Transfer    Assistive Device (Stand-Sit Transfers)  walker, front-wheeled  -JAMES     Row Name 21 0853          Gait/Stairs (Locomotion)    [place] Level (Gait)  verbal cues;contact guard;minimum assist (75% patient effort)  -JAMES     Assistive Device (Gait)  walker, front-wheeled  -JAMES     Distance in Feet (Gait)  40' X 2   -JAMES     Deviations/Abnormal Patterns (Gait)  gait speed decreased  -JAMES     Bilateral Gait Deviations  forward flexed posture  -JAMES     Comment (Gait/Stairs)  pt had a few LOB, required min assist to correct  -JAMES     Row Name 21 0853          Aerobic Exercise    Comment, Aerobic Exercise (Therapeutic  Exercise)  sitting EOB, AROM BLE X  20   -JAMES     Row Name 04/09/21 0853          Positioning and Restraints    Pre-Treatment Position  in bed  -JAMES     Post Treatment Position  bed  -JAMES     In Bed  fowlers;call light within reach;encouraged to call for assist;exit alarm on;with family/caregiver;side rails up x2  -JAMES       User Key  (r) = Recorded By, (t) = Taken By, (c) = Cosigned By    Initials Name Provider Type    Daniel Bay, PTA Physical Therapy Assistant        Physical Therapy Education                 Title: PT OT SLP Therapies (Done)     Topic: Physical Therapy (Done)     Point: Mobility training (Done)     Learning Progress Summary           Patient Acceptance, E, VU,NR by  at 4/9/2021 0853    Comment: safety with transfer and amb    Acceptance, E, VU,NR by JAMES at 4/8/2021 1316    Comment: safety with amb    Acceptance, E,TB, VU by  at 4/7/2021 0813    Comment: trans    Acceptance, E, VU by AB at 4/6/2021 1114    Comment: PT role in care, re-exam findings, discharge plan    Acceptance, E, NR by JAMES at 4/5/2021 1017    Comment: benefits of activity, safety with transfers and amb    Acceptance, E, NR by MS at 4/3/2021 1140    Comment: role of PT in her care                   Point: Home exercise program (Done)     Learning Progress Summary           Patient Acceptance, E, VU by AB at 4/6/2021 1114    Comment: PT role in care, re-exam findings, discharge plan                   Point: Body mechanics (Done)     Learning Progress Summary           Patient Acceptance, E, VU by AB at 4/6/2021 1114    Comment: PT role in care, re-exam findings, discharge plan                   Point: Precautions (Done)     Learning Progress Summary           Patient Acceptance, E, VU by AB at 4/6/2021 1114    Comment: PT role in care, re-exam findings, discharge plan                               User Key     Initials Effective Dates Name Provider Type [facility] 08/02/16 -  Liz Don PTA Physical Therapy  Assistant PT    MS 06/19/18 -  Charla Dennis, PT, DPT, NCS Physical Therapist PT    JAMES 12/08/16 -  Daniel Urban PTA Physical Therapy Assistant PT    AB 12/02/20 -  Min Morales, HUGO Student PT Student PT              PT Recommendation and Plan     Plan of Care Reviewed With: patient  Progress: improving  Outcome Summary: Pt was able to transfer supine to sit with CGA with the  HOB elevated.  Sit to stand with CGA/min assist.  Amb 40' X 2 with RWX and CGA/min assist, pt had slight LOB especially with turning that required min assist to correct.  Outcome Measures     Row Name 04/08/21 1329 04/07/21 1200 04/06/21 1500       How much help from another is currently needed...    Putting on and taking off regular lower body clothing?  2  -  2  -TS  --    Bathing (including washing, rinsing, and drying)  2  -  2  -TS  --    Toileting (which includes using toilet bed pan or urinal)  3  -  3  -TS  --    Putting on and taking off regular upper body clothing  3  -  3  -TS  --    Taking care of personal grooming (such as brushing teeth)  3  -  3  -TS  --    Eating meals  4  -  4  -TS  --    AM-PAC 6 Clicks Score (OT)  17  -  17  -TS  --       Functional Assessment    Outcome Measure Options  AM-PAC 6 Clicks Daily Activity (OT)  -  --  [place]  -CHRISTOPHE      User Key  (r) = Recorded By, (t) = Taken By, (c) = Cosigned By    Initials Name Provider Type     Liz Don, PTA Physical Therapy Assistant     Elia Polanco, MONTOYA/L Occupational Therapy Assistant     Indira Borja, MONTOYA/L Occupational Therapy Assistant           Time Calculation:   PT Charges     Row Name 04/09/21 0853             Time Calculation    Start Time  0853  -JAMES      Stop Time  0917  -JAMES      Time Calculation (min)  24 min  -JAMES      PT Received On  04/09/21  -         Time Calculation- PT    Total Timed Code Minutes- PT  24 minute(s)  -JAMES         Timed Charges    39580 - PT Therapeutic Exercise Minutes  10  -JAMES       73829 - Gait Training Minutes   14  -JAMES        User Key  (r) = Recorded By, (t) = Taken By, (c) = Cosigned By    Initials Name Provider Type    Daniel Bay PTA Physical Therapy Assistant        Therapy Charges for Today     Code Description Service Date Service Provider Modifiers Qty    83162522692 HC GAIT TRAINING EA 15 MIN 4/8/2021 Daniel Urban, THIAGO GP 2    22454934863 HC GAIT TRAINING EA 15 MIN 4/9/2021 Daniel Urban, THIAGO GP 1    74207573611 HC PT THER PROC EA 15 MIN 4/9/2021 Daniel Urban, PTA GP 1          PT G-Codes  Outcome Measure Options: AM-PAC 6 Clicks Daily Activity (OT)  AM-PAC 6 Clicks Score (PT): 17  AM-PAC 6 Clicks Score (OT): 17    Daniel Urban PTA  4/9/2021

## 2021-04-09 NOTE — PROGRESS NOTES
Continued Stay Note  [facility]     Patient Name: Zelalem Hung  MRN: [id number]  Today's Date: 4/9/2021    Admit Date: 4/2/2021    Discharge Plan     Row Name 04/09/21 1105       Plan    Provided Post Acute Provider List?  Yes    Post Acute Provider List  Nursing Home    Provided Post Acute Provider Quality & Resource List?  Yes    Post Acute Provider Quality and Resource List  Nursing Home    Delivered To  Support Person;Patient    Method of Delivery  In person    Patient/Family in Agreement with Plan  yes    Plan Comments  COLE spoke with John from [facility] who states that she is not able to offer a bed anymore and is recommending SNF. COLE spoke with pt, pts , and daughter in room and they have requested referral to [facility]. COLE spoke with Kandi, viet at [facility], who is aware of this. COLE will await possible bed offer        Discharge Codes    No documentation.             Farzana Melendez

## 2021-04-09 NOTE — THERAPY DISCHARGE NOTE
Acute Care - Speech Language Pathology   Swallow Treatment Note/Discharge [facility]     Patient Name: Zelalem Hung  : 1940  MRN: [id number]  Today's Date: 2021               Admit Date: 2021  Swallowing therapy completed to ensure diet tolerance. Patient was alert, cooperative, and oriented x3. Patient presented with less confusion today. Consistencies given included pureed, honey thick, nectar thick, and thin liquid. Patient exhibiting throat clearing after trials of nectar thick and thin liquid. Aspiration can not be ruled out at this time. Patient completed 5 trials of hard effortful swallows. She had increased laryngeal movement from previous sessions. Patient also completed labial and lingual oral motor exercises. Patient's oral motor movement and range of motion was improved from previous session as well. Patient will be discharged today to skilled nursing facility. Continue current diet of pureed diet consistency with honey thick liquid consistency.  SLAVA Nazario CCC-SLP 2021 15:25 CDT    Visit Dx:    ICD-10-CM ICD-9-CM   1. Altered mental status, unspecified altered mental status type  R41.82 780.97   2. Impaired mobility  Z74.09 799.89   3. Dysphagia, unspecified type  R13.10 787.20   4. Decreased activities of daily living (ADL)  Z78.9 V49.89     Patient Active Problem List   Diagnosis    Cholecystitis    Encephalopathy    Unknown when patient last well, possible stroke     Hyponatremia    Failure to thrive in adult    Severe malnutrition (CMS/HCC)    History of migraines    Closed fracture of nasal bone with routine healing    Traumatic ecchymosis of face    Acquired deviated nasal septum    Epistaxis     Past Medical History:   Diagnosis Date    Chronic hyponatremia     Frequent UTI     Migraines     SVT (supraventricular tachycardia) (CMS/HCC)      Past Surgical History:   Procedure Laterality Date    CHOLECYSTECTOMY WITH INTRAOPERATIVE CHOLANGIOGRAM N/A  5/9/2019    Procedure: CHOLECYSTECTOMY LAPAROSCOPIC INTRAOPERATIVE CHOLANGIOGRAM;  Surgeon: Chandan Bravo MD;  Location: [facility];  Service: General          SWALLOW EVALUATION (last 72 hours)        SLP Adult Swallow Evaluation       Row Name 04/09/21 1242 04/08/21 1255                Rehab Evaluation    Document Type  therapy note (daily note)  (Pended)   -SW  evaluation  -CS (r) SW (t) CS (c)       Subjective Information  no complaints  (Pended)   -  no complaints  -CS (r) SW (t) CS (c)       Patient Observations  alert;cooperative;agree to therapy  (Pended)   -SW  cooperative;agree to therapy;lethargic  -CS (r) SW (t) CS (c)       Patient/Family/Caregiver Comments/Observations  no family present  (Pended)   -SW  --       Care Plan Review  care plan/treatment goals reviewed  (Pended)   -SW  evaluation/treatment results reviewed  -CS (r) SW (t) CS (c)       Patient Effort  good  (Pended)   -SW  adequate  -CS (r) SW (t) CS (c)          General Information    Patient Profile Reviewed  --  yes  -CS (r) SW (t) CS (c)       Pertinent History Of Current Problem  --  hyponatremia, frequent UTIs, encephalopathy, CT of head- right supratentonal subdural hematoma  -CS (r) SW (t) CS (c)       Current Method of Nutrition  --  NPO  -CS (r) SW (t) CS (c)       Precautions/Limitations, Vision  --  WFL;for purposes of eval  -CS (r) SW (t) CS (c)       Precautions/Limitations, Hearing  --  WFL;for purposes of eval  -CS (r) SW (t) CS (c)       Prior Level of Function-Communication  --  WFL  -CS (r) SW (t) CS (c)       Prior Level of Function-Swallowing  --  no diet consistency restrictions  -CS (r) SW (t) CS (c)       Plans/Goals Discussed with  --  patient  -CS (r) SW (t) CS (c)       Barriers to Rehab  --  none identified  -CS (r) SW (t) CS (c)       Patient's Goals for Discharge  --  patient did not state  -CS (r) SW (t) CS (c)          Pain    Additional Documentation  Pain Scale: FACES Pre/Post-Treatment (Group)   (Pended)   -SW  Pain Scale: FACES Pre/Post-Treatment (Group)  -CS (r) SW (t) CS (c)          Pain Scale: FACES Pre/Post-Treatment    Pain: FACES Scale, Pretreatment  0-->no hurt  (Pended)   -SW  0-->no hurt  -CS (r) SW (t) CS (c)       Posttreatment Pain Rating  0-->no hurt  (Pended)   -SW  0-->no hurt  -CS (r) SW (t) CS (c)          Oral Motor Structure and Function    Dentition Assessment  --  edentulous, dentures not available  -CS (r) SW (t) CS (c)       Secretion Management  --  WNL/WFL  -CS (r) SW (t) CS (c)       Mucosal Quality  --  moist, healthy  -CS (r) SW (t) CS (c)       Volitional Swallow  --  weak  -CS (r) SW (t) CS (c)       Volitional Cough  --  WFL  -CS (r) SW (t) CS (c)          Oral Musculature and Cranial Nerve Assessment    Oral Motor General Assessment  --  generalized oral motor weakness  -CS (r) SW (t) CS (c)       Oral Labial or Buccal Impairment, Detail, Cranial Nerve VII (Facial):  --  reduced strength bilaterally  -CS (r) SW (t) CS (c)       Lingual Impairment, Detail. Cranial Nerves IX, XII (Glossopharyngeal and Hypoglossal)  --  reduced strength lingual tremor  -CS (r) SW (t) CS (c)          General Eating/Swallowing Observations    Respiratory Support Currently in Use  --  room air  -CS (r) SW (t) CS (c)       Eating/Swallowing Skills  --  fed by SLP  -CS (r) SW (t) CS (c)       Positioning During Eating  --  upright in bed  -CS (r) SW (t) CS (c)       Utensils Used  --  spoon;cup;straw  -CS (r) SW (t) CS (c)       Consistencies Trialed  --  regular textures;honey-thick liquids;nectar/syrup-thick liquids;thin liquids;pureed  -CS (r) SW (t) CS (c)          Clinical Swallow Eval    Oral Prep Phase  --  impaired  -CS (r) SW (t) CS (c)       Oral Transit  --  WFL  -CS (r) SW (t) CS (c)       Oral Residue  --  WFL  -CS (r) SW (t) CS (c)       Pharyngeal Phase  --  suspected pharyngeal impairment  -CS (r) SW (t) CS (c)       Esophageal Phase  --  unremarkable  -CS (r) SW (t) CS (c)        Clinical Swallow Evaluation Summary  --  see eval note  -CS (r)  (t) CS (c)          Oral Prep Concerns    Oral Prep Concerns  --  prolonged mastication;bolus removed from mouth manually  -CS (r)  (t) CS (c)       Prolonged Mastication  --  regular consistencies dentures not available  -CS (r)  (t) CS (c)       Bolus Removed from Mouth Manually  --  regular consistencies dentures not available  -CS (r)  (t) CS (c)          Pharyngeal Phase Concerns    Pharyngeal Phase Concerns  --  throat clear  -CS (r)  (t) CS (c)       Throat Clear  --  thin;nectar  -CS (r)  (t) CS (c)          Clinical Impression    Daily Summary of Progress (SLP)  --  progress toward functional goals is good  -CS (r)  (t) CS (c)       Barriers to Overall Progress (SLP)  --  n/a  - (r)  (t) CS (c)       SLP Swallowing Diagnosis  --  mild;oral dysphagia;suspected pharyngeal dysphagia  - (r)  (t) CS (c)       Functional Impact  --  risk of aspiration/pneumonia  - (r)  (t) CS (c)       Rehab Potential/Prognosis, Swallowing  --  good, to achieve stated therapy goals  - (r)  (t) CS (c)       Swallow Criteria for Skilled Therapeutic Interventions Met  --  demonstrates skilled criteria  - (r)  (t) CS (c)          Recommendations    Therapy Frequency (Swallow)  --  at least;3 days per week  -CS (r)  (t) CS (c)       Predicted Duration Therapy Intervention (Days)  --  until discharge  - (r)  (t) CS (c)       SLP Diet Recommendation  --  puree;honey thick liquids;water between meals after oral care, with supervision;ice chips between meals after oral care, with supervision  - (r)  (t) CS (c)       Recommended Precautions and Strategies  --  upright posture during/after eating;small bites of food and sips of liquid  -CS (r)  (t) CS (c)       Oral Care Recommendations  --  Oral Care BID/PRN  - (r)  (t) CS (c)       SLP Rec. for Method of Medication Administration  --  meds crushed;with pudding or applesauce   -CS (r) SW (t) CS (c)       Monitor for Signs of Aspiration  --  yes;notify SLP if any concerns  -CS (r) SW (t) CS (c)       Anticipated Discharge Disposition (SLP)  --  home  -CS (r) SW (t) CS (c)          Swallow Goals (SLP)    Oral Nutrition/Hydration Goal Selection (SLP)  --  oral nutrition/hydration, SLP goal 1  -CS (r) SW (t) CS (c)       Labial Strengthening Goal Selection (SLP)  --  labial strengthening, SLP goal 1  -CS (r) SW (t) CS (c)       Lingual Strengthening Goal Selection (SLP)  --  lingual strengthening, SLP goal 1  -CS (r) SW (t) CS (c)       Pharyngeal Strengthening Exercise Goal Selection (SLP)  --  pharyngeal strengthening exercise, SLP goal 1  -CS (r) SW (t) CS (c)       Additional Documentation  --  labial strengthening goal selection (SLP);lingual strengthening goal selection (SLP);pharyngeal strengthening exercise goal selection (SLP)  -CS (r) SW (t) CS (c)          Oral Nutrition/Hydration Goal 1 (SLP)    Oral Nutrition/Hydration Goal 1, SLP  Pt will tolerate LRD without s/s of aspiration  (Pended)   -SW  Pt will tolerate LRD without s/s of aspiration  -CS (r) SW (t) CS (c)       Time Frame (Oral Nutrition/Hydration Goal 1, SLP)  by discharge  (Pended)   -SW  by discharge  -CS (r) SW (t) CS (c)       Barriers (Oral Nutrition/Hydration Goal 1, SLP)  n/a  (Pended)   -SW  n/a  -CS (r) SW (t) CS (c)       Progress/Outcomes (Oral Nutrition/Hydration Goal 1, SLP)  goal partially met  (Pended)   -SW  goal ongoing  -CS (r) SW (t) CS (c)          Labial Strengthening Goal 1 (SLP)    Activity (Labial Strengthening Goal 1, SLP)  increase labial tone  (Pended)   -SW  increase labial tone  -CS (r) SW (t) CS (c)       Increase Labial Tone  labial resistance exercises  (Pended)   -SW  labial resistance exercises  -CS (r) SW (t) CS (c)       [place]/Accuracy (Labial Strengthening Goal 1, SLP)  with minimal cues (75-90% accuracy)  (Pended)   -SW  with minimal cues (75-90% accuracy)  -CS (r) SW (t) CS  (c)       Time Frame (Labial Strengthening Goal 1, SLP)  by discharge  (Pended)   -SW  by discharge  -CS (r) SW (t) CS (c)       Barriers (Labial Strengthening Goal 1, SLP)  n/a  (Pended)   -SW  n/a  -CS (r) SW (t) CS (c)       Progress/Outcomes (Labial Strengthening Goal 1, SLP)  goal partially met  (Pended)   -SW  goal ongoing  -CS (r) SW (t) CS (c)          Lingual Strengthening Goal 1 (SLP)    Activity (Lingual Strengthening Goal 1, SLP)  increase lingual tone/sensation/control/coordination/movement  (Pended)   -SW  increase lingual tone/sensation/control/coordination/movement  -CS (r) SW (t) CS (c)       Increase Lingual Tone/Sensation/Control/Coordination/Movement  lingual movement exercises;lingual resistance exercises  (Pended)   -SW  lingual movement exercises;lingual resistance exercises  -CS (r) SW (t) CS (c)       [place]/Accuracy (Lingual Strengthening Goal 1, SLP)  with minimal cues (75-90% accuracy)  (Pended)   -SW  with minimal cues (75-90% accuracy)  -CS (r) SW (t) CS (c)       Time Frame (Lingual Strengthening Goal 1, SLP)  by discharge  (Pended)   -SW  by discharge  -CS (r) SW (t) CS (c)       Barriers (Lingual Strengthening Goal 1, SLP)  n/a  (Pended)   -SW  n/a  -CS (r) SW (t) CS (c)       Progress/Outcomes (Lingual Strengthening Goal 1, SLP)  continuing progress toward goal  (Pended)   -SW  goal ongoing  -CS (r) SW (t) CS (c)          Pharyngeal Strengthening Exercise Goal 1 (SLP)    Activity (Pharyngeal Strengthening Goal 1, SLP)  increase squeeze/positive pressure generation;increase superior movement of the hyolaryngeal complex  (Pended)   -SW  increase squeeze/positive pressure generation;increase superior movement of the hyolaryngeal complex  -CS (r) SW (t) CS (c)       Increase Superior Movement of the Hyolaryngeal Complex  Mendelsohn;shaker  (Pended)   -SW  Mendelsohn;shaker  -CS (r) SW (t) CS (c)       Increase Squeeze/Positive Pressure Generation  hard effortful swallow  (Pended)    -SW  hard effortful swallow  -CS (r) SW (t) CS (c)       [place]/Accuracy (Pharyngeal Strengthening Goal 1, SLP)  with minimal cues (75-90% accuracy)  (Pended)   -SW  with minimal cues (75-90% accuracy)  -CS (r) SW (t) CS (c)       Time Frame (Pharyngeal Strengthening Goal 1, SLP)  by discharge  (Pended)   -SW  by discharge  -CS (r) SW (t) CS (c)       Barriers (Pharyngeal Strengthening Goal 1, SLP)  n/a  (Pended)   -SW  n/a  -CS (r) SW (t) CS (c)       Progress/Outcomes (Pharyngeal Strengthening Goal 1, SLP)  continuing progress toward goal  (Pended)   -SW  goal ongoing  -CS (r) SW (t) CS (c)             User Key  (r) = Recorded By, (t) = Taken By, (c) = Cosigned By      Initials Name Effective Dates    Bryon Davis, MS CCC-SLP 04/03/18 -     Nu Maldonado, Speech Therapy Student 01/18/21 -             EDUCATION  The patient has been educated in the following areas:   Dysphagia (Swallowing Impairment).    SLP Recommendation and Plan                                                            SLP GOALS       Row Name 04/09/21 1242 04/08/21 1255          Oral Nutrition/Hydration Goal 1 (SLP)    Oral Nutrition/Hydration Goal 1, SLP  Pt will tolerate LRD without s/s of aspiration  (Pended)   -SW  Pt will tolerate LRD without s/s of aspiration  -CS (r) SW (t) CS (c)     Time Frame (Oral Nutrition/Hydration Goal 1, SLP)  by discharge  (Pended)   -SW  by discharge  -CS (r) SW (t) CS (c)     Barriers (Oral Nutrition/Hydration Goal 1, SLP)  n/a  (Pended)   -SW  n/a  -CS (r) SW (t) CS (c)     Progress/Outcomes (Oral Nutrition/Hydration Goal 1, SLP)  goal partially met  (Pended)   -SW  goal ongoing  -CS (r) SW (t) CS (c)        Labial Strengthening Goal 1 (SLP)    Activity (Labial Strengthening Goal 1, SLP)  increase labial tone  (Pended)   -SW  increase labial tone  -CS (r) SW (t) CS (c)     Increase Labial Tone  labial resistance exercises  (Pended)   -SW  labial resistance exercises  -CS (r) SW (t) CS (c)      [place]/Accuracy (Labial Strengthening Goal 1, SLP)  with minimal cues (75-90% accuracy)  (Pended)   -SW  with minimal cues (75-90% accuracy)  -CS (r) SW (t) CS (c)     Time Frame (Labial Strengthening Goal 1, SLP)  by discharge  (Pended)   -SW  by discharge  -CS (r) SW (t) CS (c)     Barriers (Labial Strengthening Goal 1, SLP)  n/a  (Pended)   -SW  n/a  -CS (r) SW (t) CS (c)     Progress/Outcomes (Labial Strengthening Goal 1, SLP)  goal partially met  (Pended)   -SW  goal ongoing  -CS (r) SW (t) CS (c)        Lingual Strengthening Goal 1 (SLP)    Activity (Lingual Strengthening Goal 1, SLP)  increase lingual tone/sensation/control/coordination/movement  (Pended)   -SW  increase lingual tone/sensation/control/coordination/movement  -CS (r) SW (t) CS (c)     Increase Lingual Tone/Sensation/Control/Coordination/Movement  lingual movement exercises;lingual resistance exercises  (Pended)   -SW  lingual movement exercises;lingual resistance exercises  -CS (r) SW (t) CS (c)     [place]/Accuracy (Lingual Strengthening Goal 1, SLP)  with minimal cues (75-90% accuracy)  (Pended)   -SW  with minimal cues (75-90% accuracy)  -CS (r) SW (t) CS (c)     Time Frame (Lingual Strengthening Goal 1, SLP)  by discharge  (Pended)   -SW  by discharge  -CS (r) SW (t) CS (c)     Barriers (Lingual Strengthening Goal 1, SLP)  n/a  (Pended)   -SW  n/a  -CS (r) SW (t) CS (c)     Progress/Outcomes (Lingual Strengthening Goal 1, SLP)  continuing progress toward goal  (Pended)   -SW  goal ongoing  -CS (r) SW (t) CS (c)        Pharyngeal Strengthening Exercise Goal 1 (SLP)    Activity (Pharyngeal Strengthening Goal 1, SLP)  increase squeeze/positive pressure generation;increase superior movement of the hyolaryngeal complex  (Pended)   -SW  increase squeeze/positive pressure generation;increase superior movement of the hyolaryngeal complex  -CS (r) SW (t) CS (c)     Increase Superior Movement of the Hyolaryngeal Complex  Mendelsohn;shaker   (Pended)   -SW  Mendelsohn;shaker  -CS (r) SW (t) CS (c)     Increase Squeeze/Positive Pressure Generation  hard effortful swallow  (Pended)   -  hard effortful swallow  -CS (r) SW (t) CS (c)     [place]/Accuracy (Pharyngeal Strengthening Goal 1, SLP)  with minimal cues (75-90% accuracy)  (Pended)   -SW  with minimal cues (75-90% accuracy)  -CS (r) SW (t) CS (c)     Time Frame (Pharyngeal Strengthening Goal 1, SLP)  by discharge  (Pended)   -SW  by discharge  -CS (r) SW (t) CS (c)     Barriers (Pharyngeal Strengthening Goal 1, SLP)  n/a  (Pended)   -SW  n/a  -CS (r) SW (t) CS (c)     Progress/Outcomes (Pharyngeal Strengthening Goal 1, SLP)  continuing progress toward goal  (Pended)   -  goal ongoing  -CS (r) SW (t) CS (c)           User Key  (r) = Recorded By, (t) = Taken By, (c) = Cosigned By      Initials Name Provider Type    Bryon Davis MS CCC-SLP Speech and Language Pathologist    Nu Maldonado, Speech Therapy Student Speech Therapy Student                  Time Calculation:   Time Calculation- SLP       Row Name 04/09/21 1406             Time Calculation- [facility]    SLP Start Time  1242  (Pended)   -      SLP Stop Time  1259  (Pended)   -      SLP Time Calculation (min)  17 min  (Pended)   -      SLP Received On  04/09/21  (Pended)   -            User Key  (r) = Recorded By, (t) = Taken By, (c) = Cosigned By      Initials Name Provider Type    Nu Maldonado, Speech Therapy Student Speech Therapy Student            Therapy Charges for Today       Code Description Service Date Service Provider Modifiers Qty    01881012895 HC ST TREATMENT SWALLOW 1 4/9/2021 Madie Hardy CCC-SLP GN 1                      Madie Hardy CCC-ELLE  4/9/2021

## 2021-04-09 NOTE — PLAN OF CARE
Goal Outcome Evaluation:  Plan of Care Reviewed With: patient  Progress: improving  Outcome Summary: Pt was able to transfer supine to sit with CGA with the  HOB elevated.  Sit to stand with CGA/min assist.  Amb 40' X 2 with RWX and CGA/min assist, pt had slight LOB especially with turning that required min assist to correct.

## 2021-04-09 NOTE — PROGRESS NOTES
Continued Stay Note   Nela     Patient Name: Zelalem Hung  MRN: [id number]  Today's Date: 4/9/2021    Admit Date: 4/2/2021    Discharge Plan     Row Name 04/09/21 0750       Plan    Plan Comments  Per documentation pt will have a bed at [facility] on Monday 4/12. SW will follow and assist with discharge plan        Discharge Codes    No documentation.             Farzana Melendez

## 2021-04-09 NOTE — PROGRESS NOTES
Continued Stay Note   [facility]     Patient Name: Zelalem Hung  MRN: [id number]  Today's Date: 4/9/2021    Admit Date: 4/2/2021    Discharge Plan     Row Name 04/09/21 1324       Plan    Plan Comments  Kandi, admissions from [facility], has offered a bed for pt. Pt is able to be discharged to [facility] when medically stable  Phone: [phone]         Fax: [phone]                 Farzana Melendez

## 2021-04-09 NOTE — PLAN OF CARE
Goal Outcome Evaluation:  Plan of Care Reviewed With: (P) patient  Progress: (P) improving    Swallowing therapy completed to ensure diet tolerance. Patient was alert, cooperative, and oriented x3. Patient presented with less confusion today. Consistencies given included pureed, honey thick, nectar thick, and thin liquid. Patient exhibiting throat clearing after trials of nectar thick and thin liquid. Aspiration can not be ruled out at this time. Patient completed 5 trials of hard effortful swallows. She had increased laryngeal movement from previous sessions. Patient also completed labial and lingual oral motor exercises. Patient's oral motor movement and range of motion was improved from previous session as well. Patient will be discharged today to skilled nursing facility.

## 2021-04-09 NOTE — PLAN OF CARE
Goal Outcome Evaluation:   Report called to KAMILLE Talley at [facility] ([phone]) for pt transfer. Daughter, Leny notified for transfer by ambulance service as well.

## 2021-04-10 NOTE — THERAPY DISCHARGE NOTE
Acute Care - Occupational Therapy Discharge Summary  [facility]     Patient Name: Zelalem Hung  : 1940  MRN: [id number]    Today's Date: 4/10/2021                 Admit Date: 2021        OT Recommendation and Plan    Visit Dx:    ICD-10-CM ICD-9-CM   1. Altered mental status, unspecified altered mental status type  R41.82 780.97   2. Impaired mobility  Z74.09 799.89   3. Dysphagia, unspecified type  R13.10 787.20   4. Decreased activities of daily living (ADL)  Z78.9 V49.89               OT Rehab Goals     Row Name 04/10/21 0708             Transfer Goal 1 (OT)    Activity/Assistive Device (Transfer Goal 1, OT)  toilet  -TS      [place] Level/Cues Needed (Transfer Goal 1, OT)  standby assist  -TS      Time Frame (Transfer Goal 1, OT)  long term goal (LTG)  -TS      Progress/Outcome (Transfer Goal 1, OT)  goal not met  -TS         Dressing Goal 1 (OT)    Activity/Device (Dressing Goal 1, OT)  dressing skills, all  -TS      [place]/Cues Needed (Dressing Goal 1, OT)  supervision required  -TS      Time Frame (Dressing Goal 1, OT)  long term goal (LTG)  -TS      Progress/Outcome (Dressing Goal 1, OT)  goal not met  -TS         Toileting Goal 1 (OT)    Activity/Device (Toileting Goal 1, OT)  toileting skills, all  -TS      [place] Level/Cues Needed (Toileting Goal 1, OT)  supervision required  -TS      Time Frame (Toileting Goal 1, OT)  long term goal (LTG)  -TS      Progress/Outcome (Toileting Goal 1, OT)  goal not met  -TS         Strength Goal 1 (OT)    Strength Goal 1 (OT)  Pt will be I with BUE HEP to maximize her strength for BADLs/IADLs.  -TS      Time Frame (Strength Goal 1, OT)  long term goal (LTG)  -TS      Progress/Outcome (Strength Goal 1, OT)  goal not met  -TS        User Key  (r) = Recorded By, (t) = Taken By, (c) = Cosigned By    Initials Name Provider Type Discipline    TS Indira Borja, MONTOYA/L Occupational Therapy Assistant OT          Outcome Measures     Row Name  04/08/21 1329 04/07/21 1200          How much help from another is currently needed...    Putting on and taking off regular lower body clothing?  2  -  2  -TS     Bathing (including washing, rinsing, and drying)  2  -  2  -TS     Toileting (which includes using toilet bed pan or urinal)  3  -CJ  3  -TS     Putting on and taking off regular upper body clothing  3  -  3  -TS     Taking care of personal grooming (such as brushing teeth)  3  -  3  -TS     Eating meals  4  -  4  -TS     AM-PAC 6 Clicks Score (OT)  17  -  17  -TS        Functional Assessment    Outcome Measure Options  AM-PAC 6 Clicks Daily Activity (OT)  -  --       User Key  (r) = Recorded By, (t) = Taken By, (c) = Cosigned By    Initials Name Provider Type     Elia Polanco COTA/L Occupational Therapy Assistant    Indira Smith COTA/L Occupational Therapy Assistant          Timed Therapy Charges  Total Units: 2    Charges  Total Units: 2    Procedure Name Documented Minutes Units Code    HC OT THER PROC EA 15 MIN 19  1    05756 (CPT®)      HC OT SELF CARE/MGMT/TRAIN EA 15 MIN 12  1    77062 (CPT®)               Documented Minutes  Total Minutes: 31    Therapy Provided Minutes    86770 - OT Therapeutic Exercise Minutes 19    59259 - OT Self Care/Mgmt Minutes 12                    OT Discharge Summary  Anticipated Discharge Disposition (OT): skilled nursing facility  Reason for Discharge: Discharge from facility  Outcomes Achieved: Refer to plan of care for updates on goals achieved  Discharge Destination: SNF      EVANGELINA Gu  4/10/2021

## 2021-04-10 NOTE — THERAPY DISCHARGE NOTE
Acute Care - Physical Therapy Discharge Summary  [facility]       Patient Name: Zelalem Hung  : 1940  MRN: [id number]    Today's Date: 4/10/2021                 Admit Date: 2021      PT Recommendation and Plan    Visit Dx:    ICD-10-CM ICD-9-CM   1. Altered mental status, unspecified altered mental status type  R41.82 780.97   2. Impaired mobility  Z74.09 799.89   3. Dysphagia, unspecified type  R13.10 787.20   4. Decreased activities of daily living (ADL)  Z78.9 V49.89       Outcome Measures     Row Name 21 1329 21 1200          How much help from another is currently needed...    Putting on and taking off regular lower body clothing?  2  -  2  -TS     Bathing (including washing, rinsing, and drying)  2  -  2  -TS     Toileting (which includes using toilet bed pan or urinal)  3  -  3  -TS     Putting on and taking off regular upper body clothing  3  -  3  -TS     Taking care of personal grooming (such as brushing teeth)  3  -  3  -TS     Eating meals  4  -  4  -TS     AM-PAC 6 Clicks Score (OT)  17  -  17  -TS        Functional Assessment    Outcome Measure Options  AM-PAC 6 Clicks Daily Activity (OT)  -  --       User Key  (r) = Recorded By, (t) = Taken By, (c) = Cosigned By    Initials Name Provider Type     Elia Polanco COTA/L Occupational Therapy Assistant     Indira Borja COTA/L Occupational Therapy Assistant              Rehab Goal Summary     Row Name 04/10/21 0708             Bed Mobility Goal 1 (PT)    Activity/Assistive Device (Bed Mobility Goal 1, PT)  bed mobility activities, all  -      [place] Level/Cues Needed (Bed Mobility Goal 1, PT)  supervision required  -      Time Frame (Bed Mobility Goal 1, PT)  long term goal (LTG);10 days  -      Progress/Outcomes (Bed Mobility Goal 1, PT)  goal not met  -         Transfer Goal 1 (PT)    Activity/Assistive Device (Transfer Goal 1, PT)   sit-to-stand/stand-to-sit;bed-to-chair/chair-to-bed  -AH      [place] Level/Cues Needed (Transfer Goal 1, PT)  standby assist  -AH      Time Frame (Transfer Goal 1, PT)  long term goal (LTG);10 days  -AH      Progress/Outcome (Transfer Goal 1, PT)  goal not met  -AH         Gait Training Goal 1 (PT)    Activity/Assistive Device (Gait Training Goal 1, PT)  gait (walking locomotion);decrease fall risk;increase endurance/gait distance;improve balance and speed;walker, rolling  -AH      [place] Level (Gait Training Goal 1, PT)  standby assist  -AH      Distance (Gait Training Goal 1, PT)  100'  -AH      Time Frame (Gait Training Goal 1, PT)  long term goal (LTG);10 days  -AH      Progress/Outcome (Gait Training Goal 1, PT)  goal not met  -AH         Stairs Goal 1 (PT)    Activity/Assistive Device (Stairs Goal 1, PT)  ascending stairs;descending stairs;using handrail, right  -      [place] Level/Cues Needed (Stairs Goal 1, PT)  contact guard assist  -AH      Number of Stairs (Stairs Goal 1, PT)  2  -AH      Time Frame (Stairs Goal 1, PT)  long term goal (LTG);10 days  -AH      Progress/Outcome (Stairs Goal 1, PT)  goal not met  -         Patient Education Goal (PT)    Activity (Patient Education Goal, PT)  static standing balance WFL with no posterior lean  -AH      [place]/Cues/Accuracy (Memory Goal 2, PT)  demonstrates adequately;independent  -AH      Time Frame (Patient Education Goal, PT)  long term goal (LTG);10 days  -AH      Progress/Outcome (Patient Education Goal, PT)  goal not met  -        User Key  (r) = Recorded By, (t) = Taken By, (c) = Cosigned By    Initials Name Provider Type [facility] Liz Don PTA Physical Therapy Assistant PT              PT Discharge Summary  Reason for Discharge: Discharge from facility  Outcomes Achieved: Refer to plan of care for updates on goals achieved  Discharge Destination: [facility]      Liz Don PTA   4/10/2021

## 2022-01-26 ENCOUNTER — HOSPITAL ENCOUNTER (EMERGENCY)
Facility: HOSPITAL | Age: 82
Discharge: HOME | End: 2022-01-26
Attending: EMERGENCY MEDICINE | Admitting: EMERGENCY MEDICINE

## 2022-01-26 ENCOUNTER — APPOINTMENT (OUTPATIENT)
Dept: GENERAL RADIOLOGY | Facility: HOSPITAL | Age: 82
End: 2022-01-26

## 2022-01-26 ENCOUNTER — APPOINTMENT (OUTPATIENT)
Dept: CT IMAGING | Facility: HOSPITAL | Age: 82
End: 2022-01-26

## 2022-01-26 VITALS
BODY MASS INDEX: 19.24 KG/M2 | OXYGEN SATURATION: 95 % | RESPIRATION RATE: 18 BRPM | HEART RATE: 89 BPM | DIASTOLIC BLOOD PRESSURE: 77 MMHG | WEIGHT: 98 LBS | TEMPERATURE: 97.6 F | HEIGHT: 60 IN | SYSTOLIC BLOOD PRESSURE: 147 MMHG

## 2022-01-26 DIAGNOSIS — S22.069A CLOSED FRACTURE OF SEVENTH THORACIC VERTEBRA, UNSPECIFIED FRACTURE MORPHOLOGY, INITIAL ENCOUNTER: ICD-10-CM

## 2022-01-26 DIAGNOSIS — R07.89 CHEST WALL PAIN: Primary | ICD-10-CM

## 2022-01-26 DIAGNOSIS — R09.1 PLEURISY: ICD-10-CM

## 2022-01-26 LAB
ALBUMIN SERPL-MCNC: 4.5 G/DL (ref 3.5–5.2)
ALBUMIN/GLOB SERPL: 1.7 G/DL
ALP SERPL-CCNC: 71 U/L (ref 39–117)
ALT SERPL W P-5'-P-CCNC: 8 U/L (ref 1–33)
ANION GAP SERPL CALCULATED.3IONS-SCNC: 8 MMOL/L (ref 5–15)
APTT PPP: 33.3 SECONDS (ref 24.1–35)
AST SERPL-CCNC: 18 U/L (ref 1–32)
BACTERIA UR QL AUTO: ABNORMAL /HPF
BASOPHILS # BLD AUTO: 0.04 10*3/MM3 (ref 0–0.2)
BASOPHILS NFR BLD AUTO: 0.4 % (ref 0–1.5)
BILIRUB SERPL-MCNC: 0.4 MG/DL (ref 0–1.2)
BILIRUB UR QL STRIP: NEGATIVE
BUN SERPL-MCNC: 15 MG/DL (ref 8–23)
BUN/CREAT SERPL: 20.3 (ref 7–25)
CALCIUM SPEC-SCNC: 9.6 MG/DL (ref 8.6–10.5)
CHLORIDE SERPL-SCNC: 101 MMOL/L (ref 98–107)
CLARITY UR: CLEAR
CO2 SERPL-SCNC: 25 MMOL/L (ref 22–29)
COLOR UR: YELLOW
CREAT SERPL-MCNC: 0.74 MG/DL (ref 0.57–1)
CRP SERPL-MCNC: <0.3 MG/DL (ref 0–0.5)
D DIMER PPP FEU-MCNC: 0.25 MG/L (FEU) (ref 0–0.5)
D-LACTATE SERPL-SCNC: 0.9 MMOL/L (ref 0.5–2)
DEPRECATED RDW RBC AUTO: 51.6 FL (ref 37–54)
EOSINOPHIL # BLD AUTO: 0.06 10*3/MM3 (ref 0–0.4)
EOSINOPHIL NFR BLD AUTO: 0.6 % (ref 0.3–6.2)
ERYTHROCYTE [DISTWIDTH] IN BLOOD BY AUTOMATED COUNT: 13.4 % (ref 12.3–15.4)
GFR SERPL CREATININE-BSD FRML MDRD: 75 ML/MIN/1.73
GLOBULIN UR ELPH-MCNC: 2.6 GM/DL
GLUCOSE SERPL-MCNC: 109 MG/DL (ref 65–99)
GLUCOSE UR STRIP-MCNC: NEGATIVE MG/DL
HCT VFR BLD AUTO: 41.2 % (ref 34–46.6)
HGB BLD-MCNC: 13.7 G/DL (ref 12–15.9)
HGB UR QL STRIP.AUTO: ABNORMAL
HYALINE CASTS UR QL AUTO: ABNORMAL /LPF
IMM GRANULOCYTES # BLD AUTO: 0.05 10*3/MM3 (ref 0–0.05)
IMM GRANULOCYTES NFR BLD AUTO: 0.5 % (ref 0–0.5)
INR PPP: 1.05 (ref 0.91–1.09)
KETONES UR QL STRIP: NEGATIVE
LEUKOCYTE ESTERASE UR QL STRIP.AUTO: NEGATIVE
LYMPHOCYTES # BLD AUTO: 1.31 10*3/MM3 (ref 0.7–3.1)
LYMPHOCYTES NFR BLD AUTO: 14 % (ref 19.6–45.3)
MAGNESIUM SERPL-MCNC: 2 MG/DL (ref 1.6–2.4)
MCH RBC QN AUTO: 34.2 PG (ref 26.6–33)
MCHC RBC AUTO-ENTMCNC: 33.3 G/DL (ref 31.5–35.7)
MCV RBC AUTO: 102.7 FL (ref 79–97)
MONOCYTES # BLD AUTO: 0.66 10*3/MM3 (ref 0.1–0.9)
MONOCYTES NFR BLD AUTO: 7.1 % (ref 5–12)
NEUTROPHILS NFR BLD AUTO: 7.23 10*3/MM3 (ref 1.7–7)
NEUTROPHILS NFR BLD AUTO: 77.4 % (ref 42.7–76)
NITRITE UR QL STRIP: NEGATIVE
NRBC BLD AUTO-RTO: 0 /100 WBC (ref 0–0.2)
NT-PROBNP SERPL-MCNC: 368.3 PG/ML (ref 0–1800)
PH UR STRIP.AUTO: 7 [PH] (ref 5–8)
PLATELET # BLD AUTO: 293 10*3/MM3 (ref 140–450)
PMV BLD AUTO: 9 FL (ref 6–12)
POTASSIUM SERPL-SCNC: 4.7 MMOL/L (ref 3.5–5.2)
PROCALCITONIN SERPL-MCNC: 0.06 NG/ML (ref 0–0.25)
PROT SERPL-MCNC: 7.1 G/DL (ref 6–8.5)
PROT UR QL STRIP: NEGATIVE
PROTHROMBIN TIME: 13.3 SECONDS (ref 11.9–14.6)
RBC # BLD AUTO: 4.01 10*6/MM3 (ref 3.77–5.28)
RBC # UR STRIP: ABNORMAL /HPF
REF LAB TEST METHOD: ABNORMAL
SARS-COV-2 RNA PNL SPEC NAA+PROBE: NOT DETECTED
SODIUM SERPL-SCNC: 134 MMOL/L (ref 136–145)
SP GR UR STRIP: 1.01 (ref 1–1.03)
SQUAMOUS #/AREA URNS HPF: ABNORMAL /HPF
TROPONIN T SERPL-MCNC: <0.01 NG/ML (ref 0–0.03)
UROBILINOGEN UR QL STRIP: ABNORMAL
WBC # UR STRIP: ABNORMAL /HPF
WBC NRBC COR # BLD: 9.35 10*3/MM3 (ref 3.4–10.8)

## 2022-01-26 PROCEDURE — 86140 C-REACTIVE PROTEIN: CPT | Performed by: EMERGENCY MEDICINE

## 2022-01-26 PROCEDURE — 85025 COMPLETE CBC W/AUTO DIFF WBC: CPT | Performed by: EMERGENCY MEDICINE

## 2022-01-26 PROCEDURE — 93005 ELECTROCARDIOGRAM TRACING: CPT | Performed by: EMERGENCY MEDICINE

## 2022-01-26 PROCEDURE — 0 IOPAMIDOL PER 1 ML: Performed by: EMERGENCY MEDICINE

## 2022-01-26 PROCEDURE — 84145 PROCALCITONIN (PCT): CPT | Performed by: EMERGENCY MEDICINE

## 2022-01-26 PROCEDURE — 96375 TX/PRO/DX INJ NEW DRUG ADDON: CPT

## 2022-01-26 PROCEDURE — 83880 ASSAY OF NATRIURETIC PEPTIDE: CPT | Performed by: EMERGENCY MEDICINE

## 2022-01-26 PROCEDURE — 96374 THER/PROPH/DIAG INJ IV PUSH: CPT

## 2022-01-26 PROCEDURE — 87040 BLOOD CULTURE FOR BACTERIA: CPT | Performed by: EMERGENCY MEDICINE

## 2022-01-26 PROCEDURE — 80053 COMPREHEN METABOLIC PANEL: CPT | Performed by: EMERGENCY MEDICINE

## 2022-01-26 PROCEDURE — 81001 URINALYSIS AUTO W/SCOPE: CPT | Performed by: EMERGENCY MEDICINE

## 2022-01-26 PROCEDURE — 99283 EMERGENCY DEPT VISIT LOW MDM: CPT

## 2022-01-26 PROCEDURE — 93010 ELECTROCARDIOGRAM REPORT: CPT | Performed by: EMERGENCY MEDICINE

## 2022-01-26 PROCEDURE — P9612 CATHETERIZE FOR URINE SPEC: HCPCS

## 2022-01-26 PROCEDURE — 84484 ASSAY OF TROPONIN QUANT: CPT | Performed by: EMERGENCY MEDICINE

## 2022-01-26 PROCEDURE — 87635 SARS-COV-2 COVID-19 AMP PRB: CPT | Performed by: EMERGENCY MEDICINE

## 2022-01-26 PROCEDURE — 25010000002 FENTANYL CITRATE (PF) 50 MCG/ML SOLUTION: Performed by: EMERGENCY MEDICINE

## 2022-01-26 PROCEDURE — 85379 FIBRIN DEGRADATION QUANT: CPT | Performed by: EMERGENCY MEDICINE

## 2022-01-26 PROCEDURE — 71275 CT ANGIOGRAPHY CHEST: CPT

## 2022-01-26 PROCEDURE — 72128 CT CHEST SPINE W/O DYE: CPT

## 2022-01-26 PROCEDURE — 83735 ASSAY OF MAGNESIUM: CPT | Performed by: EMERGENCY MEDICINE

## 2022-01-26 PROCEDURE — 25010000002 ONDANSETRON PER 1 MG: Performed by: EMERGENCY MEDICINE

## 2022-01-26 PROCEDURE — 71045 X-RAY EXAM CHEST 1 VIEW: CPT

## 2022-01-26 PROCEDURE — 85730 THROMBOPLASTIN TIME PARTIAL: CPT | Performed by: EMERGENCY MEDICINE

## 2022-01-26 PROCEDURE — 85610 PROTHROMBIN TIME: CPT | Performed by: EMERGENCY MEDICINE

## 2022-01-26 PROCEDURE — 83605 ASSAY OF LACTIC ACID: CPT | Performed by: EMERGENCY MEDICINE

## 2022-01-26 RX ORDER — OXYCODONE HYDROCHLORIDE AND ACETAMINOPHEN 5; 325 MG/1; MG/1
1 TABLET ORAL EVERY 8 HOURS PRN
Qty: 6 TABLET | Refills: 0 | Status: ON HOLD | OUTPATIENT
Start: 2022-01-26 | End: 2022-05-06

## 2022-01-26 RX ORDER — FENTANYL CITRATE 50 UG/ML
25 INJECTION, SOLUTION INTRAMUSCULAR; INTRAVENOUS ONCE
Status: COMPLETED | OUTPATIENT
Start: 2022-01-26 | End: 2022-01-26

## 2022-01-26 RX ORDER — ONDANSETRON 2 MG/ML
4 INJECTION INTRAMUSCULAR; INTRAVENOUS ONCE
Status: COMPLETED | OUTPATIENT
Start: 2022-01-26 | End: 2022-01-26

## 2022-01-26 RX ORDER — METHYLPREDNISOLONE 4 MG/1
TABLET ORAL
Qty: 21 TABLET | Refills: 0 | Status: ON HOLD | OUTPATIENT
Start: 2022-01-26 | End: 2022-05-06

## 2022-01-26 RX ADMIN — IOPAMIDOL 65 ML: 755 INJECTION, SOLUTION INTRAVENOUS at 10:13

## 2022-01-26 RX ADMIN — FENTANYL CITRATE 25 MCG: 50 INJECTION, SOLUTION INTRAMUSCULAR; INTRAVENOUS at 13:59

## 2022-01-26 RX ADMIN — ONDANSETRON HYDROCHLORIDE 4 MG: 2 SOLUTION INTRAMUSCULAR; INTRAVENOUS at 13:59

## 2022-01-26 NOTE — ED PROVIDER NOTES
Subjective   Patient is 81 years old who has sudden onset of back pain came to the ED for evaluation the pain is in the thoracic area bilaterally.  But not in the thoracic spine area.  There is no L-spine tenderness is no step-offs there is no laxity the pain is worse in the ED breath and on coughing.  No history of trauma      Back Pain  Pain location: Posterior chest wall pain bilateral.  Quality:  Aching  Radiates to:  Does not radiate  Pain severity:  Moderate  Pain is:  Worse during the day  Onset quality:  Gradual  Timing:  Constant  Chronicity:  New  Context: not emotional stress, not falling, not jumping from heights, not lifting heavy objects, not MVA, not occupational injury, not physical stress, not recent illness and not recent injury    Relieved by:  Nothing  Worsened by:  Coughing and deep breathing  Ineffective treatments:  None tried  Associated symptoms: no abdominal pain, no abdominal swelling, no bowel incontinence, no chest pain, no dysuria, no fever, no headaches, no leg pain, no numbness, no paresthesias, no perianal numbness, no tingling and no weakness    Risk factors: no hx of cancer, no hx of osteoporosis, not obese and no steroid use        Review of Systems   Constitutional: Negative.  Negative for activity change, appetite change, chills, diaphoresis, fatigue and fever.   HENT: Negative for congestion, drooling, ear pain, facial swelling, hearing loss, sinus pressure and sore throat.    Eyes: Negative.  Negative for discharge.   Cardiovascular: Negative for chest pain.   Gastrointestinal: Negative for abdominal distention, abdominal pain, blood in stool, bowel incontinence, diarrhea, nausea and vomiting.   Endocrine: Negative.  Negative for cold intolerance, heat intolerance, polydipsia, polyphagia and polyuria.   Genitourinary: Negative.  Negative for dysuria, flank pain and urgency.   Musculoskeletal: Positive for back pain. Negative for arthralgias, myalgias and neck stiffness.    Skin: Negative.  Negative for color change, pallor and rash.   Allergic/Immunologic: Negative.    Neurological: Negative.  Negative for dizziness, tingling, seizures, speech difficulty, weakness, numbness, headaches and paresthesias.   Hematological: Negative.  Negative for adenopathy.   All other systems reviewed and are negative.      Past Medical History:   Diagnosis Date   • Chronic hyponatremia    • Frequent UTI    • Migraines    • SVT (supraventricular tachycardia) (CMS/HCC)        Allergies   Allergen Reactions   • Codeine Anaphylaxis       Past Surgical History:   Procedure Laterality Date   • CHOLECYSTECTOMY WITH INTRAOPERATIVE CHOLANGIOGRAM N/A 5/9/2019    Procedure: CHOLECYSTECTOMY LAPAROSCOPIC INTRAOPERATIVE CHOLANGIOGRAM;  Surgeon: Chandan Bravo MD;  Location: [facility];  Service: General       Family History   Problem Relation Age of Onset   • Stroke Mother    • Anemia Mother    • Heart disease Father    • Heart attack Father    • Colon cancer Father        Social History     Socioeconomic History   • Marital status:    Tobacco Use   • Smoking status: Never Smoker   • Smokeless tobacco: Never Used   Vaping Use   • Vaping Use: Never used   Substance and Sexual Activity   • Alcohol use: No   • Drug use: No   • Sexual activity: Defer           Objective   Physical Exam  Vitals and nursing note reviewed. Exam conducted with a chaperone present.   Constitutional:       General: She is not in acute distress.     Appearance: Normal appearance. She is well-developed. She is not toxic-appearing or diaphoretic.   HENT:      Head: Normocephalic and atraumatic.      Right Ear: External ear normal.      Nose: Nose normal.      Mouth/Throat:      Mouth: Mucous membranes are moist.   Eyes:      Conjunctiva/sclera: Conjunctivae normal.      Pupils: Pupils are equal, round, and reactive to light.   Cardiovascular:      Rate and Rhythm: Normal rate and regular rhythm.      Chest Wall: PMI is not displaced.       Pulses: Normal pulses. No decreased pulses.      Heart sounds: Normal heart sounds. No murmur heard.      Pulmonary:      Effort: Pulmonary effort is normal. No tachypnea, accessory muscle usage or respiratory distress.      Breath sounds: Normal breath sounds. No stridor. No decreased breath sounds, wheezing, rhonchi or rales.   Chest:      Chest wall: No tenderness or crepitus.      Comments: Chest wall tenderness no subcu emphysema midline trachea  Abdominal:      General: Bowel sounds are normal. There is no distension.      Palpations: Abdomen is soft.      Tenderness: There is no abdominal tenderness.   Musculoskeletal:         General: No swelling or tenderness. Normal range of motion.      Cervical back: Normal range of motion and neck supple. No rigidity.      Comments: Lower extremity exam bilaterally is unremarkable.  There is no right or left calf tenderness .  There is no palpable venous cord.  No obvious difference in the size of the legs.  No pitting edema.  The dorsalis pedis and posterior tibial femoral and popliteal pulses are palpable and +2 bilaterally.  Homans sign is negative   Skin:     General: Skin is warm.      Capillary Refill: Capillary refill takes less than 2 seconds.      Coloration: Skin is not jaundiced.      Findings: No erythema or rash.   Neurological:      General: No focal deficit present.      Mental Status: She is alert and oriented to person, place, and time. Mental status is at baseline.      Cranial Nerves: No cranial nerve deficit.      Motor: No weakness.      Coordination: Coordination normal.      Deep Tendon Reflexes: Reflexes are normal and symmetric. Reflexes normal.   Psychiatric:         Mood and Affect: Mood normal.         Procedures           ED Course  ED Course as of 01/26/22 1541   Wed Jan 26, 2022   0850 Normal sinus rhythm low voltage T wave inversion in the septal leads [TS]   1342 . Thoracic aorta normal in caliber with no evidence of aneurysm  or  dissection. Mild atheromatous disease of the thoracic aorta and coronary  arteries. No CT evidence of pulmonary embolus. Cardiomegaly.  2. Small pulmonary nodules. Fleischner Society guidelines recommends  follow-up CT in 12 months.  3. Centrilobular emphysema. Dependent atelectasis. Mild bronchiectasis.  4. Prior cholecystectomy. Gastric wall thickening likely an artifact of  underdistention. No other acute findings in the upper abdomen.  5. There is a T7 compression deformity. Please see the thoracic spine CT  report for additional detail. Degenerative changes of the spine and  shoulders.  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 No definite acute fractures  identified. Mild degenerative endplate spurring is present at several  levels.  This was discussed with the patient [TS]   1413 Patient came in a posterior chest wall pain lab work-up was initiated on the patient.  CT of the chest obtained which shows T7 old compression fracture her thoracic aorta is normal there are 2 small pulmonary nodules [TS]   1413 This has been discussed with the patient's family she was given something for pain control her pain is not over the thoracic spine but over the posterior chest walls bilaterally.  Her work-up CRP procalcitonin etc. are all negative there is no PE noted.  I will discharge her home with a follow-up with the primary MD and to return the ER for any worsening symptoms for further evaluation assessment. [TS]   1414 May need follow-up with neurosurgery for her T7 compression. [TS]   1414 Risks and benefits of treatments given and alternative treatment options discussed with patient/family. I answered all the questions in simple, plain language, and there was voiced understanding and agreement with plan of care. There were no further  questions. Differential diagnosis discussed. Patient/family was advised that the practice of medicine is not always an exact science, and sometimes tests, physical exam, or history may not show the underlying conditions with certainty. Additionally, the condition may change or show itself later after initial presentation. There was also expressed understanding and agreement with this limitation of emergency medicine practice. Patient/family was asked to return to ED if any problem or issues or if condition worsens or does not improved. Patient/family agreed to follow up with PCP/specialist as advised, or return to ED if unable to see a provider in a timely fashion for continued symptoms.  [TS]      ED Course User Index  [TS] Leonardo Jin MD                                                 MDM  Number of Diagnoses or Management Options  Diagnosis management comments: Patient with posterior chest wall pain could be pleurisy       Amount and/or Complexity of Data Reviewed  Clinical lab tests: ordered and reviewed  Tests in the radiology section of CPT®: ordered and reviewed  Tests in the medicine section of CPT®: reviewed and ordered    Risk of Complications, Morbidity, and/or Mortality  Presenting problems: moderate  Diagnostic procedures: moderate  Management options: moderate        Final diagnoses:   Chest wall pain   Closed fracture of seventh thoracic vertebra, unspecified fracture morphology, initial encounter (HCC)   Pleurisy       ED Disposition  ED Disposition     ED Disposition Condition Comment    Discharge Stable           Garth Amezcua, APRN  [address] [facility] [place] [address] [phone]    In 3 days           Medication List      New Prescriptions    methylPREDNISolone 4 MG dose pack  Commonly known as: MEDROL  Take as directed on package instructions.     oxyCODONE-acetaminophen 5-325 MG per tablet  Commonly known as: PERCOCET  Take 1 tablet by mouth Every 8 (Eight) Hours As Needed  for Severe Pain .           Where to Get Your Medications      These medications were sent to [facility] - [place] - [address] - [phone]  - [phone] [address] [address]    Phone: [phone]   · methylPREDNISolone 4 MG dose pack  · oxyCODONE-acetaminophen 5-325 MG per tablet          Leonardo Jin MD  01/26/22 1419       Leonardo Jin MD  01/26/22 1408

## 2022-01-26 NOTE — DISCHARGE INSTRUCTIONS
May need follow-up with neurosurgery for kyphoplasty if needed Dr. Davidson or Dr. Clemente [phone]

## 2022-01-27 LAB
QT INTERVAL: 404 MS
QTC INTERVAL: 442 MS

## 2022-01-31 LAB
BACTERIA SPEC AEROBE CULT: NORMAL
BACTERIA SPEC AEROBE CULT: NORMAL

## 2022-05-05 ENCOUNTER — APPOINTMENT (OUTPATIENT)
Dept: CT IMAGING | Facility: HOSPITAL | Age: 82
End: 2022-05-05

## 2022-05-05 ENCOUNTER — HOSPITAL ENCOUNTER (INPATIENT)
Facility: HOSPITAL | Age: 82
LOS: 4 days | Discharge: NURSING, REHAB OR LONG-TERM CARE | End: 2022-05-10
Attending: FAMILY MEDICINE | Admitting: INTERNAL MEDICINE

## 2022-05-05 ENCOUNTER — APPOINTMENT (OUTPATIENT)
Dept: GENERAL RADIOLOGY | Facility: HOSPITAL | Age: 82
End: 2022-05-05

## 2022-05-05 DIAGNOSIS — R09.1 PLEURISY: ICD-10-CM

## 2022-05-05 DIAGNOSIS — Z74.09 IMPAIRED MOBILITY: ICD-10-CM

## 2022-05-05 DIAGNOSIS — Z74.09 IMPAIRED FUNCTIONAL MOBILITY AND ACTIVITY TOLERANCE: ICD-10-CM

## 2022-05-05 DIAGNOSIS — Z78.9 DECREASED ACTIVITIES OF DAILY LIVING (ADL): ICD-10-CM

## 2022-05-05 DIAGNOSIS — S72.002A CLOSED FRACTURE OF LEFT HIP, INITIAL ENCOUNTER: ICD-10-CM

## 2022-05-05 DIAGNOSIS — K52.9 COLITIS: Primary | ICD-10-CM

## 2022-05-05 LAB
AMPHET+METHAMPHET UR QL: NEGATIVE
AMPHETAMINES UR QL: NEGATIVE
APTT PPP: 34.7 SECONDS (ref 24.1–35)
B PARAPERT DNA SPEC QL NAA+PROBE: NOT DETECTED
B PERT DNA SPEC QL NAA+PROBE: NOT DETECTED
BACTERIA UR QL AUTO: ABNORMAL /HPF
BARBITURATES UR QL SCN: NEGATIVE
BASOPHILS # BLD AUTO: 0.03 10*3/MM3 (ref 0–0.2)
BASOPHILS NFR BLD AUTO: 0.2 % (ref 0–1.5)
BENZODIAZ UR QL SCN: POSITIVE
BILIRUB UR QL STRIP: NEGATIVE
BUPRENORPHINE SERPL-MCNC: NEGATIVE NG/ML
C PNEUM DNA NPH QL NAA+NON-PROBE: NOT DETECTED
CANNABINOIDS SERPL QL: NEGATIVE
CLARITY UR: CLEAR
COCAINE UR QL: NEGATIVE
COLOR UR: YELLOW
D DIMER PPP FEU-MCNC: 8.97 MCGFEU/ML (ref 0–0.5)
D-LACTATE SERPL-SCNC: 2.3 MMOL/L (ref 0.5–2)
DEPRECATED RDW RBC AUTO: 52.1 FL (ref 37–54)
EOSINOPHIL # BLD AUTO: 0 10*3/MM3 (ref 0–0.4)
EOSINOPHIL NFR BLD AUTO: 0 % (ref 0.3–6.2)
ERYTHROCYTE [DISTWIDTH] IN BLOOD BY AUTOMATED COUNT: 14.4 % (ref 12.3–15.4)
FLUAV SUBTYP SPEC NAA+PROBE: NOT DETECTED
FLUBV RNA ISLT QL NAA+PROBE: NOT DETECTED
GLUCOSE UR STRIP-MCNC: NEGATIVE MG/DL
HADV DNA SPEC NAA+PROBE: NOT DETECTED
HCOV 229E RNA SPEC QL NAA+PROBE: NOT DETECTED
HCOV HKU1 RNA SPEC QL NAA+PROBE: NOT DETECTED
HCOV NL63 RNA SPEC QL NAA+PROBE: NOT DETECTED
HCOV OC43 RNA SPEC QL NAA+PROBE: NOT DETECTED
HCT VFR BLD AUTO: 44.6 % (ref 34–46.6)
HGB BLD-MCNC: 15.8 G/DL (ref 12–15.9)
HGB UR QL STRIP.AUTO: ABNORMAL
HMPV RNA NPH QL NAA+NON-PROBE: NOT DETECTED
HPIV1 RNA ISLT QL NAA+PROBE: NOT DETECTED
HPIV2 RNA SPEC QL NAA+PROBE: NOT DETECTED
HPIV3 RNA NPH QL NAA+PROBE: NOT DETECTED
HPIV4 P GENE NPH QL NAA+PROBE: NOT DETECTED
HYALINE CASTS UR QL AUTO: ABNORMAL /LPF
IMM GRANULOCYTES # BLD AUTO: 0.17 10*3/MM3 (ref 0–0.05)
IMM GRANULOCYTES NFR BLD AUTO: 1 % (ref 0–0.5)
INR PPP: 1.23 (ref 0.91–1.09)
KETONES UR QL STRIP: ABNORMAL
LEUKOCYTE ESTERASE UR QL STRIP.AUTO: NEGATIVE
LYMPHOCYTES # BLD AUTO: 0.88 10*3/MM3 (ref 0.7–3.1)
LYMPHOCYTES NFR BLD AUTO: 5 % (ref 19.6–45.3)
M PNEUMO IGG SER IA-ACNC: NOT DETECTED
MAGNESIUM SERPL-MCNC: 1.8 MG/DL (ref 1.6–2.4)
MCH RBC QN AUTO: 35.2 PG (ref 26.6–33)
MCHC RBC AUTO-ENTMCNC: 35.4 G/DL (ref 31.5–35.7)
MCV RBC AUTO: 99.3 FL (ref 79–97)
METHADONE UR QL SCN: NEGATIVE
MONOCYTES # BLD AUTO: 0.84 10*3/MM3 (ref 0.1–0.9)
MONOCYTES NFR BLD AUTO: 4.7 % (ref 5–12)
NEUTROPHILS NFR BLD AUTO: 15.77 10*3/MM3 (ref 1.7–7)
NEUTROPHILS NFR BLD AUTO: 89.1 % (ref 42.7–76)
NITRITE UR QL STRIP: NEGATIVE
NRBC BLD AUTO-RTO: 0 /100 WBC (ref 0–0.2)
NT-PROBNP SERPL-MCNC: 6919 PG/ML (ref 0–1800)
OPIATES UR QL: NEGATIVE
OXYCODONE UR QL SCN: NEGATIVE
PCP UR QL SCN: NEGATIVE
PH UR STRIP.AUTO: 6 [PH] (ref 5–8)
PLATELET # BLD AUTO: 392 10*3/MM3 (ref 140–450)
PMV BLD AUTO: 9.5 FL (ref 6–12)
PROPOXYPH UR QL: NEGATIVE
PROT UR QL STRIP: ABNORMAL
PROTHROMBIN TIME: 15 SECONDS (ref 11.9–14.6)
RBC # BLD AUTO: 4.49 10*6/MM3 (ref 3.77–5.28)
RBC # UR STRIP: ABNORMAL /HPF
REF LAB TEST METHOD: ABNORMAL
RHINOVIRUS RNA SPEC NAA+PROBE: NOT DETECTED
RSV RNA NPH QL NAA+NON-PROBE: NOT DETECTED
SARS-COV-2 RNA NPH QL NAA+NON-PROBE: NOT DETECTED
SP GR UR STRIP: 1.02 (ref 1–1.03)
SQUAMOUS #/AREA URNS HPF: ABNORMAL /HPF
TRICYCLICS UR QL SCN: NEGATIVE
UROBILINOGEN UR QL STRIP: ABNORMAL
WBC # UR STRIP: ABNORMAL /HPF
WBC NRBC COR # BLD: 17.69 10*3/MM3 (ref 3.4–10.8)

## 2022-05-05 PROCEDURE — 85610 PROTHROMBIN TIME: CPT | Performed by: FAMILY MEDICINE

## 2022-05-05 PROCEDURE — 84145 PROCALCITONIN (PCT): CPT | Performed by: EMERGENCY MEDICINE

## 2022-05-05 PROCEDURE — P9612 CATHETERIZE FOR URINE SPEC: HCPCS

## 2022-05-05 PROCEDURE — 72125 CT NECK SPINE W/O DYE: CPT

## 2022-05-05 PROCEDURE — 74177 CT ABD & PELVIS W/CONTRAST: CPT

## 2022-05-05 PROCEDURE — 99285 EMERGENCY DEPT VISIT HI MDM: CPT

## 2022-05-05 PROCEDURE — 72131 CT LUMBAR SPINE W/O DYE: CPT

## 2022-05-05 PROCEDURE — 84439 ASSAY OF FREE THYROXINE: CPT | Performed by: FAMILY MEDICINE

## 2022-05-05 PROCEDURE — 85730 THROMBOPLASTIN TIME PARTIAL: CPT | Performed by: FAMILY MEDICINE

## 2022-05-05 PROCEDURE — 83605 ASSAY OF LACTIC ACID: CPT | Performed by: FAMILY MEDICINE

## 2022-05-05 PROCEDURE — 71275 CT ANGIOGRAPHY CHEST: CPT

## 2022-05-05 PROCEDURE — 85025 COMPLETE CBC W/AUTO DIFF WBC: CPT | Performed by: FAMILY MEDICINE

## 2022-05-05 PROCEDURE — 84443 ASSAY THYROID STIM HORMONE: CPT | Performed by: FAMILY MEDICINE

## 2022-05-05 PROCEDURE — 70450 CT HEAD/BRAIN W/O DYE: CPT

## 2022-05-05 PROCEDURE — 85379 FIBRIN DEGRADATION QUANT: CPT | Performed by: FAMILY MEDICINE

## 2022-05-05 PROCEDURE — 73502 X-RAY EXAM HIP UNI 2-3 VIEWS: CPT

## 2022-05-05 PROCEDURE — 83735 ASSAY OF MAGNESIUM: CPT | Performed by: FAMILY MEDICINE

## 2022-05-05 PROCEDURE — 80053 COMPREHEN METABOLIC PANEL: CPT | Performed by: FAMILY MEDICINE

## 2022-05-05 PROCEDURE — 83880 ASSAY OF NATRIURETIC PEPTIDE: CPT | Performed by: FAMILY MEDICINE

## 2022-05-05 PROCEDURE — 93010 ELECTROCARDIOGRAM REPORT: CPT | Performed by: INTERNAL MEDICINE

## 2022-05-05 PROCEDURE — 93005 ELECTROCARDIOGRAM TRACING: CPT | Performed by: FAMILY MEDICINE

## 2022-05-05 PROCEDURE — 84484 ASSAY OF TROPONIN QUANT: CPT | Performed by: FAMILY MEDICINE

## 2022-05-05 PROCEDURE — 86140 C-REACTIVE PROTEIN: CPT | Performed by: FAMILY MEDICINE

## 2022-05-05 PROCEDURE — 80306 DRUG TEST PRSMV INSTRMNT: CPT | Performed by: FAMILY MEDICINE

## 2022-05-05 PROCEDURE — 87040 BLOOD CULTURE FOR BACTERIA: CPT | Performed by: FAMILY MEDICINE

## 2022-05-05 PROCEDURE — 72128 CT CHEST SPINE W/O DYE: CPT

## 2022-05-05 PROCEDURE — 81001 URINALYSIS AUTO W/SCOPE: CPT | Performed by: FAMILY MEDICINE

## 2022-05-05 PROCEDURE — 0202U NFCT DS 22 TRGT SARS-COV-2: CPT | Performed by: FAMILY MEDICINE

## 2022-05-05 PROCEDURE — 83690 ASSAY OF LIPASE: CPT | Performed by: FAMILY MEDICINE

## 2022-05-05 PROCEDURE — 36415 COLL VENOUS BLD VENIPUNCTURE: CPT

## 2022-05-05 PROCEDURE — 82565 ASSAY OF CREATININE: CPT

## 2022-05-05 RX ORDER — SODIUM CHLORIDE 9 MG/ML
125 INJECTION, SOLUTION INTRAVENOUS CONTINUOUS
Status: DISCONTINUED | OUTPATIENT
Start: 2022-05-05 | End: 2022-05-06

## 2022-05-05 RX ORDER — FENTANYL CITRATE 50 UG/ML
50 INJECTION, SOLUTION INTRAMUSCULAR; INTRAVENOUS ONCE
Status: COMPLETED | OUTPATIENT
Start: 2022-05-05 | End: 2022-05-06

## 2022-05-05 RX ADMIN — SODIUM CHLORIDE 500 ML: 9 INJECTION, SOLUTION INTRAVENOUS at 21:57

## 2022-05-05 RX ADMIN — SODIUM CHLORIDE 125 ML/HR: 9 INJECTION, SOLUTION INTRAVENOUS at 23:20

## 2022-05-06 ENCOUNTER — APPOINTMENT (OUTPATIENT)
Dept: MRI IMAGING | Facility: HOSPITAL | Age: 82
End: 2022-05-06

## 2022-05-06 ENCOUNTER — APPOINTMENT (OUTPATIENT)
Dept: GENERAL RADIOLOGY | Facility: HOSPITAL | Age: 82
End: 2022-05-06

## 2022-05-06 ENCOUNTER — ANESTHESIA EVENT (OUTPATIENT)
Dept: PERIOP | Facility: HOSPITAL | Age: 82
End: 2022-05-06

## 2022-05-06 ENCOUNTER — ANESTHESIA (OUTPATIENT)
Dept: PERIOP | Facility: HOSPITAL | Age: 82
End: 2022-05-06

## 2022-05-06 PROBLEM — K56.41 FECAL IMPACTION: Status: ACTIVE | Noted: 2022-05-06

## 2022-05-06 PROBLEM — E87.20 METABOLIC ACIDOSIS: Status: ACTIVE | Noted: 2022-05-06
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
## 2022-05-06 PROBLEM — S22.000A COMPRESSION FRACTURE OF BODY OF THORACIC VERTEBRA (HCC): Status: ACTIVE | Noted: 2022-05-06

## 2022-05-06 PROBLEM — A41.9 SEPSIS: Status: ACTIVE | Noted: 2022-05-06

## 2022-05-06 PROBLEM — K52.9 COLITIS: Status: ACTIVE | Noted: 2022-05-06

## 2022-05-06 PROBLEM — S32.020A COMPRESSION FRACTURE OF L2 LUMBAR VERTEBRA (HCC): Status: ACTIVE | Noted: 2022-05-06

## 2022-05-06 PROBLEM — S72.002A CLOSED LEFT HIP FRACTURE: Status: ACTIVE | Noted: 2022-05-06

## 2022-05-06 LAB
ALBUMIN SERPL-MCNC: 3.8 G/DL (ref 3.5–5.2)
ALBUMIN SERPL-MCNC: 3.8 G/DL (ref 3.5–5.2)
ALBUMIN/GLOB SERPL: 1.1 G/DL
ALBUMIN/GLOB SERPL: 1.3 G/DL
ALP SERPL-CCNC: 80 U/L (ref 39–117)
ALP SERPL-CCNC: 83 U/L (ref 39–117)
ALT SERPL W P-5'-P-CCNC: 13 U/L (ref 1–33)
ALT SERPL W P-5'-P-CCNC: 14 U/L (ref 1–33)
ANION GAP SERPL CALCULATED.3IONS-SCNC: 18 MMOL/L (ref 5–15)
ANION GAP SERPL CALCULATED.3IONS-SCNC: 22 MMOL/L (ref 5–15)
ANION GAP SERPL CALCULATED.3IONS-SCNC: 22 MMOL/L (ref 5–15)
ANION GAP SERPL CALCULATED.3IONS-SCNC: 26 MMOL/L (ref 5–15)
ARTERIAL PATENCY WRIST A: ABNORMAL
AST SERPL-CCNC: 23 U/L (ref 1–32)
AST SERPL-CCNC: 24 U/L (ref 1–32)
ATMOSPHERIC PRESS: 743 MMHG
BASE EXCESS BLDA CALC-SCNC: -12.7 MMOL/L (ref 0–2)
BASOPHILS # BLD AUTO: 0.04 10*3/MM3 (ref 0–0.2)
BASOPHILS NFR BLD AUTO: 0.2 % (ref 0–1.5)
BDY SITE: ABNORMAL
BILIRUB SERPL-MCNC: 0.7 MG/DL (ref 0–1.2)
BILIRUB SERPL-MCNC: 0.7 MG/DL (ref 0–1.2)
BODY TEMPERATURE: 37 C
BUN SERPL-MCNC: 22 MG/DL (ref 8–23)
BUN SERPL-MCNC: 23 MG/DL (ref 8–23)
BUN SERPL-MCNC: 24 MG/DL (ref 8–23)
BUN SERPL-MCNC: 25 MG/DL (ref 8–23)
BUN/CREAT SERPL: 32.4 (ref 7–25)
BUN/CREAT SERPL: 32.9 (ref 7–25)
BUN/CREAT SERPL: 36.9 (ref 7–25)
BUN/CREAT SERPL: 37.3 (ref 7–25)
CALCIUM SPEC-SCNC: 9.6 MG/DL (ref 8.6–10.5)
CALCIUM SPEC-SCNC: 9.7 MG/DL (ref 8.6–10.5)
CALCIUM SPEC-SCNC: 9.8 MG/DL (ref 8.6–10.5)
CALCIUM SPEC-SCNC: 9.9 MG/DL (ref 8.6–10.5)
CHLORIDE SERPL-SCNC: 97 MMOL/L (ref 98–107)
CHLORIDE SERPL-SCNC: 97 MMOL/L (ref 98–107)
CHLORIDE SERPL-SCNC: 98 MMOL/L (ref 98–107)
CHLORIDE SERPL-SCNC: 99 MMOL/L (ref 98–107)
CO2 SERPL-SCNC: 10 MMOL/L (ref 22–29)
CO2 SERPL-SCNC: 15 MMOL/L (ref 22–29)
CO2 SERPL-SCNC: 18 MMOL/L (ref 22–29)
CO2 SERPL-SCNC: 21 MMOL/L (ref 22–29)
CREAT BLDA-MCNC: 0.7 MG/DL (ref 0.6–1.3)
CREAT SERPL-MCNC: 0.65 MG/DL (ref 0.57–1)
CREAT SERPL-MCNC: 0.67 MG/DL (ref 0.57–1)
CREAT SERPL-MCNC: 0.68 MG/DL (ref 0.57–1)
CREAT SERPL-MCNC: 0.7 MG/DL (ref 0.57–1)
CRP SERPL-MCNC: 11.9 MG/DL (ref 0–0.5)
D-LACTATE SERPL-SCNC: 1.9 MMOL/L (ref 0.5–2)
DEPRECATED RDW RBC AUTO: 53.4 FL (ref 37–54)
EGFRCR SERPLBLD CKD-EPI 2021: 87 ML/MIN/1.73
EGFRCR SERPLBLD CKD-EPI 2021: 87.6 ML/MIN/1.73
EGFRCR SERPLBLD CKD-EPI 2021: 87.9 ML/MIN/1.73
EGFRCR SERPLBLD CKD-EPI 2021: 88.6 ML/MIN/1.73
EOSINOPHIL # BLD AUTO: 0 10*3/MM3 (ref 0–0.4)
EOSINOPHIL NFR BLD AUTO: 0 % (ref 0.3–6.2)
ERYTHROCYTE [DISTWIDTH] IN BLOOD BY AUTOMATED COUNT: 14.6 % (ref 12.3–15.4)
GLOBULIN UR ELPH-MCNC: 3 GM/DL
GLOBULIN UR ELPH-MCNC: 3.4 GM/DL
GLUCOSE SERPL-MCNC: 103 MG/DL (ref 65–99)
GLUCOSE SERPL-MCNC: 109 MG/DL (ref 65–99)
GLUCOSE SERPL-MCNC: 113 MG/DL (ref 65–99)
GLUCOSE SERPL-MCNC: 156 MG/DL (ref 65–99)
HCO3 BLDA-SCNC: 11.3 MMOL/L (ref 20–26)
HCT VFR BLD AUTO: 40.5 % (ref 34–46.6)
HGB BLD-MCNC: 14.1 G/DL (ref 12–15.9)
IMM GRANULOCYTES # BLD AUTO: 0.19 10*3/MM3 (ref 0–0.05)
IMM GRANULOCYTES NFR BLD AUTO: 1 % (ref 0–0.5)
INHALED O2 CONCENTRATION: 21 %
LIPASE SERPL-CCNC: 18 U/L (ref 13–60)
LYMPHOCYTES # BLD AUTO: 0.88 10*3/MM3 (ref 0.7–3.1)
LYMPHOCYTES NFR BLD AUTO: 4.4 % (ref 19.6–45.3)
Lab: ABNORMAL
MAGNESIUM SERPL-MCNC: 1.6 MG/DL (ref 1.6–2.4)
MCH RBC QN AUTO: 34.6 PG (ref 26.6–33)
MCHC RBC AUTO-ENTMCNC: 34.8 G/DL (ref 31.5–35.7)
MCV RBC AUTO: 99.3 FL (ref 79–97)
MODALITY: ABNORMAL
MONOCYTES # BLD AUTO: 1.07 10*3/MM3 (ref 0.1–0.9)
MONOCYTES NFR BLD AUTO: 5.4 % (ref 5–12)
NEUTROPHILS NFR BLD AUTO: 17.82 10*3/MM3 (ref 1.7–7)
NEUTROPHILS NFR BLD AUTO: 89 % (ref 42.7–76)
NRBC BLD AUTO-RTO: 0 /100 WBC (ref 0–0.2)
PCO2 BLDA: 22 MM HG (ref 35–45)
PCO2 TEMP ADJ BLD: 22 MM HG (ref 35–45)
PH BLDA: 7.32 PH UNITS (ref 7.35–7.45)
PH, TEMP CORRECTED: 7.32 PH UNITS (ref 7.35–7.45)
PLATELET # BLD AUTO: 321 10*3/MM3 (ref 140–450)
PMV BLD AUTO: 9.4 FL (ref 6–12)
PO2 BLDA: 60.8 MM HG (ref 83–108)
PO2 TEMP ADJ BLD: 60.8 MM HG (ref 83–108)
POTASSIUM SERPL-SCNC: 3.8 MMOL/L (ref 3.5–5.2)
POTASSIUM SERPL-SCNC: 3.9 MMOL/L (ref 3.5–5.2)
POTASSIUM SERPL-SCNC: 4.2 MMOL/L (ref 3.5–5.2)
POTASSIUM SERPL-SCNC: 4.4 MMOL/L (ref 3.5–5.2)
PROCALCITONIN SERPL-MCNC: 0.34 NG/ML (ref 0–0.25)
PROT SERPL-MCNC: 6.8 G/DL (ref 6–8.5)
PROT SERPL-MCNC: 7.2 G/DL (ref 6–8.5)
QT INTERVAL: 362 MS
QTC INTERVAL: 469 MS
RBC # BLD AUTO: 4.08 10*6/MM3 (ref 3.77–5.28)
SAO2 % BLDCOA: 89.7 % (ref 94–99)
SODIUM SERPL-SCNC: 133 MMOL/L (ref 136–145)
SODIUM SERPL-SCNC: 134 MMOL/L (ref 136–145)
SODIUM SERPL-SCNC: 138 MMOL/L (ref 136–145)
SODIUM SERPL-SCNC: 138 MMOL/L (ref 136–145)
T4 FREE SERPL-MCNC: 1.81 NG/DL (ref 0.93–1.7)
TROPONIN T SERPL-MCNC: 0.03 NG/ML (ref 0–0.03)
TROPONIN T SERPL-MCNC: 0.03 NG/ML (ref 0–0.03)
TROPONIN T SERPL-MCNC: 0.04 NG/ML (ref 0–0.03)
TSH SERPL DL<=0.05 MIU/L-ACNC: 0.68 UIU/ML (ref 0.27–4.2)
VENTILATOR MODE: ABNORMAL
WBC NRBC COR # BLD: 20 10*3/MM3 (ref 3.4–10.8)

## 2022-05-06 PROCEDURE — 25010000002 PROPOFOL 10 MG/ML EMULSION: Performed by: NURSE ANESTHETIST, CERTIFIED REGISTERED

## 2022-05-06 PROCEDURE — 83605 ASSAY OF LACTIC ACID: CPT | Performed by: FAMILY MEDICINE

## 2022-05-06 PROCEDURE — 0SRB0JZ REPLACEMENT OF LEFT HIP JOINT WITH SYNTHETIC SUBSTITUTE, OPEN APPROACH: ICD-10-PCS | Performed by: ORTHOPAEDIC SURGERY

## 2022-05-06 PROCEDURE — 71045 X-RAY EXAM CHEST 1 VIEW: CPT

## 2022-05-06 PROCEDURE — 80053 COMPREHEN METABOLIC PANEL: CPT | Performed by: INTERNAL MEDICINE

## 2022-05-06 PROCEDURE — 25010000002 FENTANYL CITRATE (PF) 50 MCG/ML SOLUTION: Performed by: EMERGENCY MEDICINE

## 2022-05-06 PROCEDURE — 25010000002 FENTANYL CITRATE (PF) 100 MCG/2ML SOLUTION: Performed by: NURSE ANESTHETIST, CERTIFIED REGISTERED

## 2022-05-06 PROCEDURE — 25010000002 PIPERACILLIN SOD-TAZOBACTAM PER 1 G: Performed by: INTERNAL MEDICINE

## 2022-05-06 PROCEDURE — 82803 BLOOD GASES ANY COMBINATION: CPT

## 2022-05-06 PROCEDURE — 0 IOPAMIDOL PER 1 ML: Performed by: EMERGENCY MEDICINE

## 2022-05-06 PROCEDURE — 25010000002 PIPERACILLIN SOD-TAZOBACTAM PER 1 G: Performed by: EMERGENCY MEDICINE

## 2022-05-06 PROCEDURE — 83735 ASSAY OF MAGNESIUM: CPT | Performed by: INTERNAL MEDICINE

## 2022-05-06 PROCEDURE — C1776 JOINT DEVICE (IMPLANTABLE): HCPCS | Performed by: ORTHOPAEDIC SURGERY

## 2022-05-06 PROCEDURE — 36600 WITHDRAWAL OF ARTERIAL BLOOD: CPT

## 2022-05-06 PROCEDURE — 85025 COMPLETE CBC W/AUTO DIFF WBC: CPT | Performed by: INTERNAL MEDICINE

## 2022-05-06 PROCEDURE — 72146 MRI CHEST SPINE W/O DYE: CPT

## 2022-05-06 PROCEDURE — 84484 ASSAY OF TROPONIN QUANT: CPT | Performed by: INTERNAL MEDICINE

## 2022-05-06 PROCEDURE — 72148 MRI LUMBAR SPINE W/O DYE: CPT

## 2022-05-06 PROCEDURE — 25010000002 DEXAMETHASONE PER 1 MG: Performed by: ANESTHESIOLOGY

## 2022-05-06 PROCEDURE — 99222 1ST HOSP IP/OBS MODERATE 55: CPT | Performed by: NURSE PRACTITIONER

## 2022-05-06 DEVICE — TRI-LOCK BPS FEMORAL STEM 12/14 TAPER TRI-LOCK BPS W/GRIPTION SIZE 3 STD 101MM
Type: IMPLANTABLE DEVICE | Site: HIP | Status: FUNCTIONAL
Brand: TRI-LOCK GRIPTION

## 2022-05-06 DEVICE — ARTICUL/EZE FEMORAL HEAD DIAMETER 28MM +5 12/14 TAPER
Type: IMPLANTABLE DEVICE | Site: HIP | Status: FUNCTIONAL
Brand: ARTICUL/EZE

## 2022-05-06 DEVICE — SELF CENTERING BI-POLAR HEAD 28MM ID 39MM OD
Type: IMPLANTABLE DEVICE | Site: HIP | Status: FUNCTIONAL
Brand: SELF CENTERING

## 2022-05-06 DEVICE — CAP BIPOL HIP CORAIL ACTIS: Type: IMPLANTABLE DEVICE | Site: HIP | Status: FUNCTIONAL

## 2022-05-06 RX ORDER — DEXAMETHASONE SODIUM PHOSPHATE 4 MG/ML
4 INJECTION, SOLUTION INTRA-ARTICULAR; INTRALESIONAL; INTRAMUSCULAR; INTRAVENOUS; SOFT TISSUE ONCE AS NEEDED
Status: COMPLETED | OUTPATIENT
Start: 2022-05-06 | End: 2022-05-06

## 2022-05-06 RX ORDER — FAMOTIDINE 20 MG/1
20 TABLET, FILM COATED ORAL DAILY
COMMUNITY

## 2022-05-06 RX ORDER — ONDANSETRON 2 MG/ML
4 INJECTION INTRAMUSCULAR; INTRAVENOUS EVERY 6 HOURS PRN
Status: DISCONTINUED | OUTPATIENT
Start: 2022-05-06 | End: 2022-05-10 | Stop reason: HOSPADM

## 2022-05-06 RX ORDER — LORAZEPAM 0.5 MG/1
0.5 TABLET ORAL 3 TIMES DAILY
Status: DISCONTINUED | OUTPATIENT
Start: 2022-05-06 | End: 2022-05-10 | Stop reason: HOSPADM

## 2022-05-06 RX ORDER — PROMETHAZINE HYDROCHLORIDE 25 MG/1
12.5 TABLET ORAL EVERY 4 HOURS PRN
Status: DISCONTINUED | OUTPATIENT
Start: 2022-05-06 | End: 2022-05-06 | Stop reason: SDUPTHER

## 2022-05-06 RX ORDER — ACEBUTOLOL HYDROCHLORIDE 200 MG/1
200 CAPSULE ORAL EVERY 12 HOURS SCHEDULED
Status: DISCONTINUED | OUTPATIENT
Start: 2022-05-06 | End: 2022-05-10 | Stop reason: HOSPADM

## 2022-05-06 RX ORDER — FENTANYL CITRATE 50 UG/ML
INJECTION, SOLUTION INTRAMUSCULAR; INTRAVENOUS AS NEEDED
Status: DISCONTINUED | OUTPATIENT
Start: 2022-05-06 | End: 2022-05-06 | Stop reason: SURG

## 2022-05-06 RX ORDER — DROPERIDOL 2.5 MG/ML
0.62 INJECTION, SOLUTION INTRAMUSCULAR; INTRAVENOUS ONCE AS NEEDED
Status: DISCONTINUED | OUTPATIENT
Start: 2022-05-06 | End: 2022-05-06 | Stop reason: HOSPADM

## 2022-05-06 RX ORDER — GABAPENTIN 100 MG/1
100 CAPSULE ORAL 3 TIMES DAILY
Status: DISCONTINUED | OUTPATIENT
Start: 2022-05-06 | End: 2022-05-10 | Stop reason: HOSPADM

## 2022-05-06 RX ORDER — FENOFIBRATE 145 MG/1
145 TABLET, COATED ORAL DAILY
Status: DISCONTINUED | OUTPATIENT
Start: 2022-05-06 | End: 2022-05-07

## 2022-05-06 RX ORDER — OLANZAPINE 5 MG/1
5 TABLET ORAL NIGHTLY
Status: DISCONTINUED | OUTPATIENT
Start: 2022-05-06 | End: 2022-05-06

## 2022-05-06 RX ORDER — ACETAMINOPHEN 325 MG/1
650 TABLET ORAL EVERY 4 HOURS PRN
Status: DISCONTINUED | OUTPATIENT
Start: 2022-05-06 | End: 2022-05-10 | Stop reason: HOSPADM

## 2022-05-06 RX ORDER — SODIUM CHLORIDE 0.9 % (FLUSH) 0.9 %
3-10 SYRINGE (ML) INJECTION AS NEEDED
Status: DISCONTINUED | OUTPATIENT
Start: 2022-05-06 | End: 2022-05-06 | Stop reason: HOSPADM

## 2022-05-06 RX ORDER — ASPIRIN 81 MG/1
81 TABLET, CHEWABLE ORAL DAILY
Status: DISCONTINUED | OUTPATIENT
Start: 2022-05-06 | End: 2022-05-10 | Stop reason: HOSPADM

## 2022-05-06 RX ORDER — ONDANSETRON 2 MG/ML
4 INJECTION INTRAMUSCULAR; INTRAVENOUS EVERY 6 HOURS PRN
Status: DISCONTINUED | OUTPATIENT
Start: 2022-05-06 | End: 2022-05-06 | Stop reason: SDUPTHER

## 2022-05-06 RX ORDER — ACETAMINOPHEN 650 MG/1
650 SUPPOSITORY RECTAL EVERY 4 HOURS PRN
Status: DISCONTINUED | OUTPATIENT
Start: 2022-05-06 | End: 2022-05-10 | Stop reason: HOSPADM

## 2022-05-06 RX ORDER — FENTANYL CITRATE 50 UG/ML
25 INJECTION, SOLUTION INTRAMUSCULAR; INTRAVENOUS
Status: DISCONTINUED | OUTPATIENT
Start: 2022-05-06 | End: 2022-05-06 | Stop reason: HOSPADM

## 2022-05-06 RX ORDER — SODIUM CHLORIDE 0.9 % (FLUSH) 0.9 %
3 SYRINGE (ML) INJECTION EVERY 12 HOURS SCHEDULED
Status: DISCONTINUED | OUTPATIENT
Start: 2022-05-06 | End: 2022-05-06 | Stop reason: HOSPADM

## 2022-05-06 RX ORDER — IPRATROPIUM BROMIDE AND ALBUTEROL SULFATE 2.5; .5 MG/3ML; MG/3ML
3 SOLUTION RESPIRATORY (INHALATION)
Status: DISCONTINUED | OUTPATIENT
Start: 2022-05-06 | End: 2022-05-10 | Stop reason: HOSPADM

## 2022-05-06 RX ORDER — SODIUM CHLORIDE 0.9 % (FLUSH) 0.9 %
10 SYRINGE (ML) INJECTION EVERY 12 HOURS SCHEDULED
Status: DISCONTINUED | OUTPATIENT
Start: 2022-05-06 | End: 2022-05-10 | Stop reason: HOSPADM

## 2022-05-06 RX ORDER — SODIUM CHLORIDE, SODIUM LACTATE, POTASSIUM CHLORIDE, CALCIUM CHLORIDE 600; 310; 30; 20 MG/100ML; MG/100ML; MG/100ML; MG/100ML
100 INJECTION, SOLUTION INTRAVENOUS CONTINUOUS
Status: DISCONTINUED | OUTPATIENT
Start: 2022-05-06 | End: 2022-05-07

## 2022-05-06 RX ORDER — MELATONIN
2000 DAILY
Status: DISCONTINUED | OUTPATIENT
Start: 2022-05-06 | End: 2022-05-10 | Stop reason: HOSPADM

## 2022-05-06 RX ORDER — FLUMAZENIL 0.1 MG/ML
0.2 INJECTION INTRAVENOUS AS NEEDED
Status: DISCONTINUED | OUTPATIENT
Start: 2022-05-06 | End: 2022-05-06 | Stop reason: HOSPADM

## 2022-05-06 RX ORDER — ACETAMINOPHEN 500 MG
1000 TABLET ORAL ONCE
Status: DISCONTINUED | OUTPATIENT
Start: 2022-05-06 | End: 2022-05-06 | Stop reason: SDUPTHER

## 2022-05-06 RX ORDER — FAMOTIDINE 20 MG/1
20 TABLET, FILM COATED ORAL DAILY
Status: DISCONTINUED | OUTPATIENT
Start: 2022-05-06 | End: 2022-05-10 | Stop reason: HOSPADM

## 2022-05-06 RX ORDER — ONDANSETRON 2 MG/ML
4 INJECTION INTRAMUSCULAR; INTRAVENOUS AS NEEDED
Status: DISCONTINUED | OUTPATIENT
Start: 2022-05-06 | End: 2022-05-06 | Stop reason: HOSPADM

## 2022-05-06 RX ORDER — HYDROMORPHONE HYDROCHLORIDE 1 MG/ML
0.25 INJECTION, SOLUTION INTRAMUSCULAR; INTRAVENOUS; SUBCUTANEOUS
Status: DISCONTINUED | OUTPATIENT
Start: 2022-05-06 | End: 2022-05-06 | Stop reason: HOSPADM

## 2022-05-06 RX ORDER — NALOXONE HCL 0.4 MG/ML
0.04 VIAL (ML) INJECTION AS NEEDED
Status: DISCONTINUED | OUTPATIENT
Start: 2022-05-06 | End: 2022-05-06 | Stop reason: HOSPADM

## 2022-05-06 RX ORDER — ACETAMINOPHEN 325 MG/1
650 TABLET ORAL EVERY 4 HOURS PRN
Status: DISCONTINUED | OUTPATIENT
Start: 2022-05-06 | End: 2022-05-06

## 2022-05-06 RX ORDER — LORAZEPAM 0.5 MG/1
0.5 TABLET ORAL 3 TIMES DAILY
Status: ON HOLD | COMMUNITY
End: 2022-05-10 | Stop reason: SDUPTHER

## 2022-05-06 RX ORDER — DIPHENHYDRAMINE HCL 25 MG
25 CAPSULE ORAL EVERY 6 HOURS PRN
Status: DISCONTINUED | OUTPATIENT
Start: 2022-05-06 | End: 2022-05-10 | Stop reason: HOSPADM

## 2022-05-06 RX ORDER — LABETALOL HYDROCHLORIDE 5 MG/ML
5 INJECTION, SOLUTION INTRAVENOUS
Status: DISCONTINUED | OUTPATIENT
Start: 2022-05-06 | End: 2022-05-06 | Stop reason: HOSPADM

## 2022-05-06 RX ORDER — ENOXAPARIN SODIUM 100 MG/ML
30 INJECTION SUBCUTANEOUS DAILY
Status: DISCONTINUED | OUTPATIENT
Start: 2022-05-07 | End: 2022-05-10 | Stop reason: HOSPADM

## 2022-05-06 RX ORDER — LIDOCAINE HYDROCHLORIDE 20 MG/ML
INJECTION, SOLUTION EPIDURAL; INFILTRATION; INTRACAUDAL; PERINEURAL AS NEEDED
Status: DISCONTINUED | OUTPATIENT
Start: 2022-05-06 | End: 2022-05-06 | Stop reason: SURG

## 2022-05-06 RX ORDER — ECHINACEA PURPUREA EXTRACT 125 MG
2 TABLET ORAL AS NEEDED
Status: DISCONTINUED | OUTPATIENT
Start: 2022-05-06 | End: 2022-05-10 | Stop reason: HOSPADM

## 2022-05-06 RX ORDER — MEGESTROL ACETATE 40 MG/ML
400 SUSPENSION ORAL DAILY
COMMUNITY

## 2022-05-06 RX ORDER — PROMETHAZINE HYDROCHLORIDE 25 MG/1
12.5 TABLET ORAL EVERY 4 HOURS PRN
Status: DISCONTINUED | OUTPATIENT
Start: 2022-05-06 | End: 2022-05-10 | Stop reason: HOSPADM

## 2022-05-06 RX ORDER — SODIUM CHLORIDE 0.9 % (FLUSH) 0.9 %
10 SYRINGE (ML) INJECTION AS NEEDED
Status: DISCONTINUED | OUTPATIENT
Start: 2022-05-06 | End: 2022-05-10 | Stop reason: HOSPADM

## 2022-05-06 RX ORDER — PAROXETINE HYDROCHLORIDE 20 MG/1
40 TABLET, FILM COATED ORAL DAILY
Status: DISCONTINUED | OUTPATIENT
Start: 2022-05-06 | End: 2022-05-10 | Stop reason: HOSPADM

## 2022-05-06 RX ORDER — DIAZEPAM 5 MG/1
5 TABLET ORAL EVERY 6 HOURS PRN
Status: DISCONTINUED | OUTPATIENT
Start: 2022-05-06 | End: 2022-05-10 | Stop reason: HOSPADM

## 2022-05-06 RX ORDER — ROCURONIUM BROMIDE 10 MG/ML
INJECTION, SOLUTION INTRAVENOUS AS NEEDED
Status: DISCONTINUED | OUTPATIENT
Start: 2022-05-06 | End: 2022-05-06 | Stop reason: SURG

## 2022-05-06 RX ORDER — SODIUM CHLORIDE 9 MG/ML
100 INJECTION, SOLUTION INTRAVENOUS CONTINUOUS
Status: DISCONTINUED | OUTPATIENT
Start: 2022-05-06 | End: 2022-05-07

## 2022-05-06 RX ORDER — ACETAMINOPHEN 160 MG/5ML
650 SOLUTION ORAL EVERY 4 HOURS PRN
Status: DISCONTINUED | OUTPATIENT
Start: 2022-05-06 | End: 2022-05-10 | Stop reason: HOSPADM

## 2022-05-06 RX ORDER — ONDANSETRON 4 MG/1
4 TABLET, FILM COATED ORAL EVERY 6 HOURS PRN
Status: DISCONTINUED | OUTPATIENT
Start: 2022-05-06 | End: 2022-05-10 | Stop reason: HOSPADM

## 2022-05-06 RX ORDER — OXYCODONE HYDROCHLORIDE AND ACETAMINOPHEN 5; 325 MG/1; MG/1
1 TABLET ORAL EVERY 8 HOURS PRN
Status: DISCONTINUED | OUTPATIENT
Start: 2022-05-06 | End: 2022-05-10 | Stop reason: HOSPADM

## 2022-05-06 RX ORDER — PROPOFOL 10 MG/ML
VIAL (ML) INTRAVENOUS AS NEEDED
Status: DISCONTINUED | OUTPATIENT
Start: 2022-05-06 | End: 2022-05-06 | Stop reason: SURG

## 2022-05-06 RX ORDER — OXYCODONE HYDROCHLORIDE AND ACETAMINOPHEN 5; 325 MG/1; MG/1
1 TABLET ORAL ONCE AS NEEDED
Status: DISCONTINUED | OUTPATIENT
Start: 2022-05-06 | End: 2022-05-06 | Stop reason: HOSPADM

## 2022-05-06 RX ORDER — MEGESTROL ACETATE 40 MG/ML
400 SUSPENSION ORAL DAILY
Status: DISCONTINUED | OUTPATIENT
Start: 2022-05-06 | End: 2022-05-10 | Stop reason: HOSPADM

## 2022-05-06 RX ORDER — MAGNESIUM HYDROXIDE 1200 MG/15ML
LIQUID ORAL AS NEEDED
Status: DISCONTINUED | OUTPATIENT
Start: 2022-05-06 | End: 2022-05-06 | Stop reason: HOSPADM

## 2022-05-06 RX ORDER — ONDANSETRON 4 MG/1
4 TABLET, FILM COATED ORAL EVERY 6 HOURS PRN
Status: DISCONTINUED | OUTPATIENT
Start: 2022-05-06 | End: 2022-05-06 | Stop reason: SDUPTHER

## 2022-05-06 RX ORDER — NEOSTIGMINE METHYLSULFATE 5 MG/5 ML
SYRINGE (ML) INTRAVENOUS AS NEEDED
Status: DISCONTINUED | OUTPATIENT
Start: 2022-05-06 | End: 2022-05-06 | Stop reason: SURG

## 2022-05-06 RX ORDER — GABAPENTIN 100 MG/1
100 CAPSULE ORAL 3 TIMES DAILY
Status: ON HOLD | COMMUNITY
End: 2022-05-10 | Stop reason: SDUPTHER

## 2022-05-06 RX ORDER — LIDOCAINE HYDROCHLORIDE 10 MG/ML
0.5 INJECTION, SOLUTION EPIDURAL; INFILTRATION; INTRACAUDAL; PERINEURAL ONCE AS NEEDED
Status: DISCONTINUED | OUTPATIENT
Start: 2022-05-06 | End: 2022-05-06 | Stop reason: HOSPADM

## 2022-05-06 RX ORDER — PROMETHAZINE HYDROCHLORIDE 12.5 MG/1
12.5 SUPPOSITORY RECTAL EVERY 4 HOURS PRN
Status: DISCONTINUED | OUTPATIENT
Start: 2022-05-06 | End: 2022-05-10 | Stop reason: HOSPADM

## 2022-05-06 RX ADMIN — Medication 3 MG: at 15:56

## 2022-05-06 RX ADMIN — Medication 10 ML: at 08:40

## 2022-05-06 RX ADMIN — GLYCOPYRROLATE 0.4 MG: 0.2 INJECTION INTRAMUSCULAR; INTRAVENOUS at 15:56

## 2022-05-06 RX ADMIN — GABAPENTIN 100 MG: 100 CAPSULE ORAL at 21:53

## 2022-05-06 RX ADMIN — SODIUM CHLORIDE, POTASSIUM CHLORIDE, SODIUM LACTATE AND CALCIUM CHLORIDE 100 ML/HR: 600; 310; 30; 20 INJECTION, SOLUTION INTRAVENOUS at 15:06

## 2022-05-06 RX ADMIN — LIDOCAINE HYDROCHLORIDE 50 MG: 20 INJECTION, SOLUTION EPIDURAL; INFILTRATION; INTRACAUDAL; PERINEURAL at 15:26

## 2022-05-06 RX ADMIN — Medication 2000 UNITS: at 08:40

## 2022-05-06 RX ADMIN — ACETAMINOPHEN 650 MG: 325 TABLET ORAL at 12:35

## 2022-05-06 RX ADMIN — PROPOFOL 75 MG: 10 INJECTION, EMULSION INTRAVENOUS at 15:25

## 2022-05-06 RX ADMIN — Medication 10 ML: at 21:54

## 2022-05-06 RX ADMIN — TAZOBACTAM SODIUM AND PIPERACILLIN SODIUM 3.38 G: 375; 3 INJECTION, SOLUTION INTRAVENOUS at 02:08

## 2022-05-06 RX ADMIN — FENTANYL CITRATE 50 MCG: 0.05 INJECTION, SOLUTION INTRAMUSCULAR; INTRAVENOUS at 00:30

## 2022-05-06 RX ADMIN — PROPOFOL 75 MG: 10 INJECTION, EMULSION INTRAVENOUS at 15:26

## 2022-05-06 RX ADMIN — TAZOBACTAM SODIUM AND PIPERACILLIN SODIUM 3.38 G: 375; 3 INJECTION, SOLUTION INTRAVENOUS at 08:38

## 2022-05-06 RX ADMIN — IOPAMIDOL 80 ML: 755 INJECTION, SOLUTION INTRAVENOUS at 00:29

## 2022-05-06 RX ADMIN — OXYCODONE HYDROCHLORIDE AND ACETAMINOPHEN 1 TABLET: 5; 325 TABLET ORAL at 05:25

## 2022-05-06 RX ADMIN — FENOFIBRATE 145 MG: 145 TABLET ORAL at 08:40

## 2022-05-06 RX ADMIN — MEGESTROL ACETATE 400 MG: 40 SUSPENSION ORAL at 21:53

## 2022-05-06 RX ADMIN — FENTANYL CITRATE 100 MCG: 50 INJECTION, SOLUTION INTRAMUSCULAR; INTRAVENOUS at 15:59

## 2022-05-06 RX ADMIN — FAMOTIDINE 20 MG: 20 TABLET, FILM COATED ORAL at 21:53

## 2022-05-06 RX ADMIN — SODIUM CHLORIDE, POTASSIUM CHLORIDE, SODIUM LACTATE AND CALCIUM CHLORIDE 100 ML/HR: 600; 310; 30; 20 INJECTION, SOLUTION INTRAVENOUS at 18:31

## 2022-05-06 RX ADMIN — LORAZEPAM 0.5 MG: 0.5 TABLET ORAL at 21:53

## 2022-05-06 RX ADMIN — TAZOBACTAM SODIUM AND PIPERACILLIN SODIUM 3.38 G: 375; 3 INJECTION, SOLUTION INTRAVENOUS at 15:38

## 2022-05-06 RX ADMIN — LIDOCAINE HYDROCHLORIDE 50 MG: 20 INJECTION, SOLUTION EPIDURAL; INFILTRATION; INTRACAUDAL; PERINEURAL at 15:30

## 2022-05-06 RX ADMIN — PAROXETINE 40 MG: 20 TABLET, FILM COATED ORAL at 08:40

## 2022-05-06 RX ADMIN — SODIUM BICARBONATE 100 MEQ: 84 INJECTION, SOLUTION INTRAVENOUS at 05:17

## 2022-05-06 RX ADMIN — ACEBUTOLOL HYDROCHLORIDE 200 MG: 200 CAPSULE ORAL at 08:40

## 2022-05-06 RX ADMIN — VASOPRESSIN 1 ML: 20 INJECTION INTRAVENOUS at 15:37

## 2022-05-06 RX ADMIN — ROCURONIUM BROMIDE 15 MG: 10 SOLUTION INTRAVENOUS at 15:30

## 2022-05-06 RX ADMIN — DEXAMETHASONE SODIUM PHOSPHATE 4 MG: 4 INJECTION, SOLUTION INTRA-ARTICULAR; INTRALESIONAL; INTRAMUSCULAR; INTRAVENOUS; SOFT TISSUE at 15:05

## 2022-05-06 RX ADMIN — ACEBUTOLOL HYDROCHLORIDE 200 MG: 200 CAPSULE ORAL at 21:53

## 2022-05-06 NOTE — H&P
[facility]  HISTORY AND PHYSICAL    Date of Admission: 5/5/2022  Primary Care Physician: Garth Amezcua APRN    Subjective     Chief Complaint: Weakness confusion    History of Present Illness  Patient is 81-year-old white female past medical history recurrent UTIs, SVT previous intracranial bleed fall of last year.  She presents with a 3 to 4-week history of low back pain rating to the left.  She is had several falls apparently recently.  The patient is not the best historian and the daughter is currently not here.  In the ER the she was and she brought her in because of weakness nausea diaphoresis whenever she tries to stand her up.  Patient denies vomiting just simply nausea with standing.  She has also been significantly constipated she said her bowels move move this morning but that was very hard and had not moved in a while prior to that.  She denies fevers or chills her p.o. intake has been poor.  She is also tachypneic with elevated respiratory rate.  Her sat was 90% on room air on her ABG.  Her PCO2 that was 22 bicarb is 11.3.  Her lactic acid is elevated her Trope is minimally elevated she denies chest pain studies 134 which is chronic her BUN to creatinine ratio is elevated at 37 CO2 on her CMP is 15 anion gap 22.  She also an elevated CRP and an elevated procalcitonin.  White count 17.6 with a left shift.    CTs of the head are negative CTs of the thoracic and lumbar spine show compression fractures at the body with retropulsion and 57% loss of height.  She also has 1 and thoracic spine at T7 with an 81% loss of height.  CT of the chest shows no infiltrates emphysematous changes enlargement of the heart no fluid.  CT of the cervical spine shows degenerative changes CT of the head shows no evidence of acute findings.  Finally CT of the abdomen shows large amount of stool in the rectum with inflammation in the perirectal fat concerning for  constipation and possible impaction.  She also has findings concerning for colitis of the descending and sigmoid colon possibly infectious or inflammatory.  Given these abnormalities we have been asked to admit the patient.    In further discussion with her she states that the pain she has in her back does not seem to bother her if she sits very still but only when she moves.  The least amount of movement causes her severe pain.        Review of Systems   14 point review of systems negative except for as per HPI    Otherwise complete ROS reviewed and negative except as mentioned in the HPI.    Past Medical History:   Past Medical History:   Diagnosis Date   • Chronic hyponatremia    • Frequent UTI    • Migraines    • SVT (supraventricular tachycardia) (HCC)      Past Surgical History:  Past Surgical History:   Procedure Laterality Date   • CHOLECYSTECTOMY WITH INTRAOPERATIVE CHOLANGIOGRAM N/A 5/9/2019    Procedure: CHOLECYSTECTOMY LAPAROSCOPIC INTRAOPERATIVE CHOLANGIOGRAM;  Surgeon: Chandan Bravo MD;  Location: [facility];  Service: General     Social History:  reports that she has never smoked. She has never used smokeless tobacco. She reports that she does not drink alcohol and does not use drugs.    Family History: family history includes Anemia in her mother; Colon cancer in her father; Heart attack in her father; Heart disease in her father; Stroke in her mother.       Allergies:  Allergies   Allergen Reactions   • Codeine Anaphylaxis       Medications:  Prior to Admission medications    Medication Sig Start Date End Date Taking? Authorizing Provider   acebutolol (SECTRAL) 200 MG capsule Take 200 mg by mouth 2 (Two) Times a Day.    ProviderLevy MD   acetaminophen (TYLENOL) 325 MG tablet Take 2 tablets by mouth Every 4 (Four) Hours As Needed for Mild Pain . 4/9/21   Jesse Castillo MD   aspirin 81 MG chewable tablet Chew 81 mg Daily.    ProviderLevy MD   cholecalciferol (VITAMIN D3)  "1000 units tablet Take 2,000 Units by mouth Daily.    ProviderLevy MD   fenofibrate (TRICOR) 145 MG tablet Take 145 mg by mouth Daily.    ProviderLevy MD   methylPREDNISolone (MEDROL) 4 MG dose pack Take as directed on package instructions. 1/26/22   Leonardo Jin MD   OLANZapine (zyPREXA) 5 MG tablet Take 1 tablet by mouth Every Night. 4/9/21   Jesse Castillo MD   oxyCODONE-acetaminophen (PERCOCET) 5-325 MG per tablet Take 1 tablet by mouth Every 8 (Eight) Hours As Needed for Severe Pain . 1/26/22   Leonardo Jin MD   PARoxetine (PAXIL) 20 MG tablet Take 40 mg by mouth Every Morning.    Provider, MD Levy   sodium chloride 0.65 % nasal spray 2 sprays into the nostril(s) as directed by provider As Needed for Congestion (as desired frequently). 4/9/21   Jesse Castillo MD     I have utilized all available immediate resources to obtain, update, and review the patient's current medications.    Objective     Vital Signs: /95 (BP Location: Right arm, Patient Position: Lying)   Pulse 115   Temp 97.4 °F (36.3 °C) (Oral)   Resp 22   Ht 152.4 cm (60\")   Wt 43.4 kg (95 lb 10.9 oz)   SpO2 95%   BMI 18.69 kg/m²   Physical Exam   Gen.:  Well-nourished well-developed white female in no acute distress  HEENT: Atraumatic, normocephalic.  Pupils equal, round, and reactive to light. Extraocular movements intact.  Sclerae anicteric.  External ears negative.  Mucous membranes moist.  Neck is supple without lymphadenopathy.  No JVD is noted.  No carotid bruits are auscultated.  Oropharynx is without erythema or exudate.   Chest: Clear to auscultation and percussion.  CV: Regular rate and rhythm.  Normal S1-S2.  No gallops, murmurs. or rubs.  Abdomen: Soft, tender left lower quadrant, nondistended.  Active bowel sounds,  No hepatosplenomegaly.  No masses.  No hernias.  Extremities: No clubbing, edema, or cyanosis.  Capillary refill is normal.  Pulses are 2+ and symmetric at radial and " dorsalis pedis.  Posterior tibial pulses are intact and 2+ palpable.  Neuro: Patient is awake, alert, and oriented ×3.  Cranial nerves II through XII are grossly intact.  Motor is 5 out of 5 bilaterally.  DTRs are 2+ and symmetric bilaterally. Sensory exam is nonfocal  Skin: Warm, dry, and intact.  No evidence of breakdown.  Sensorium: Some confusion    Nursing notes and vital signs reviewed        Results Reviewed:  Lab Results (last 24 hours)     Procedure Component Value Units Date/Time    Blood Gas, Arterial - [[id number]]  (Abnormal) Collected: 05/06/22 0252    Specimen: Arterial Blood Updated: 05/06/22 0239     Site Right Brachial     Meliton's Test N/A     pH, Arterial 7.319 pH units      Comment: 84 Value below reference range        pCO2, Arterial 22.0 mm Hg      Comment: 84 Value below reference range        pO2, Arterial 60.8 mm Hg      Comment: 84 Value below reference range        HCO3, Arterial 11.3 mmol/L      Comment: 84 Value below reference range        Base Excess, Arterial -12.7 mmol/L      Comment: 84 Value below reference range        O2 Saturation, Arterial 89.7 %      Comment: 84 Value below reference range        Temperature 37.0 C      Barometric Pressure for Blood Gas 743 mmHg      Modality Room Air     FIO2 21 %      Ventilator Mode NA     Collected by [id number]     Comment: Meter: [id number]     :  [id number]        pCO2, Temperature Corrected 22.0 mm Hg      pH, Temp Corrected 7.319 pH Units      pO2, Temperature Corrected 60.8 mm Hg     STAT Lactic Acid, Reflex [[id number]]  (Normal) Collected: 05/06/22 0132    Specimen: Blood Updated: 05/06/22 0152     Lactate 1.9 mmol/L     Procalcitonin [[id number]]  (Abnormal) Collected: 05/05/22 2353    Specimen: Blood Updated: 05/06/22 0130     Procalcitonin 0.34 ng/mL     Narrative:      As a Marker for Sepsis (Non-Neonates):    1. <0.5 ng/mL represents a low risk of severe sepsis and/or septic shock.  2. >2 ng/mL represents a high risk of  "severe sepsis and/or septic shock.    As a Marker for Lower Respiratory Tract Infections that require antibiotic therapy:    PCT on Admission    Antibiotic Therapy       6-12 Hrs later    >0.5                Strongly Recommended  >0.25 - <0.5        Recommended   0.1 - 0.25          Discouraged              Remeasure/reassess PCT  <0.1                Strongly Discouraged     Remeasure/reassess PCT    As 28 day mortality risk marker: \"Change in Procalcitonin Result\" (>80% or <=80%) if Day 0 (or Day 1) and Day 4 values are available. Refer to http://[email or web address]-pct-calculator.com    Change in PCT <=80%  A decrease of PCT levels below or equal to 80% defines a positive change in PCT test result representing a higher risk for 28-day all-cause mortality of patients diagnosed with severe sepsis for septic shock.    Change in PCT >80%  A decrease of PCT levels of more than 80% defines a negative change in PCT result representing a lower risk for 28-day all-cause mortality of patients diagnosed with severe sepsis or septic shock.       TSH [[id number]]  (Normal) Collected: 05/05/22 2353    Specimen: Blood Updated: 05/06/22 0028     TSH 0.676 uIU/mL     T4, Free [[id number]]  (Abnormal) Collected: 05/05/22 2353    Specimen: Blood Updated: 05/06/22 0027     Free T4 1.81 ng/dL     Narrative:      Results may be falsely increased if patient taking Biotin.      C-reactive Protein [[id number]]  (Abnormal) Collected: 05/05/22 2353    Specimen: Blood Updated: 05/06/22 0025     C-Reactive Protein 11.90 mg/dL     Comprehensive Metabolic Panel [[id number]]  (Abnormal) Collected: 05/05/22 2353    Specimen: Blood Updated: 05/06/22 0023     Glucose 103 mg/dL      BUN 24 mg/dL      Creatinine 0.65 mg/dL      Sodium 134 mmol/L      Potassium 4.4 mmol/L      Chloride 97 mmol/L      CO2 15.0 mmol/L      Calcium 9.9 mg/dL      Total Protein 7.2 g/dL      Albumin 3.80 g/dL      ALT (SGPT) 14 U/L      AST (SGOT) 23 U/L      Alkaline Phosphatase 83 " U/L      Total Bilirubin 0.7 mg/dL      Globulin 3.4 gm/dL      A/G Ratio 1.1 g/dL      BUN/Creatinine Ratio 36.9     Anion Gap 22.0 mmol/L      eGFR 88.6 mL/min/1.73      Comment: National Kidney Foundation and American Society of Nephrology (ASN) Task Force recommended calculation based on the Chronic Kidney Disease Epidemiology Collaboration (CKD-EPI) equation refit without adjustment for race.       Narrative:      GFR Normal >60  Chronic Kidney Disease <60  Kidney Failure <15      Troponin [[id number]]  (Abnormal) Collected: 05/05/22 2353    Specimen: Blood Updated: 05/06/22 0020     Troponin T 0.032 ng/mL     Narrative:      Troponin T Reference Range:  <= 0.03 ng/mL-   Negative for AMI  >0.03 ng/mL-     Abnormal for myocardial necrosis.  Clinicians would have to utilize clinical acumen, EKG, Troponin and serial changes to determine if it is an Acute Myocardial Infarction or myocardial injury due to an underlying chronic condition.       Results may be falsely decreased if patient taking Biotin.      Lipase [[id number]]  (Normal) Collected: 05/05/22 2353    Specimen: Blood Updated: 05/06/22 0018     Lipase 18 U/L     Troponin [[id number]]  (Normal) Collected: 05/05/22 2353    Specimen: Blood Updated: 05/06/22 0017     Troponin T 0.026 ng/mL     Narrative:      Troponin T Reference Range:  <= 0.03 ng/mL-   Negative for AMI  >0.03 ng/mL-     Abnormal for myocardial necrosis.  Clinicians would have to utilize clinical acumen, EKG, Troponin and serial changes to determine if it is an Acute Myocardial Infarction or myocardial injury due to an underlying chronic condition.       Results may be falsely decreased if patient taking Biotin.      POC Creatinine [[id number]]  (Normal) Collected: 05/05/22 2355    Specimen: Blood Updated: 05/06/22 0013     Creatinine 0.70 mg/dL      Comment: Serial Number: [id number]:  [id number]       Blood Culture - Blood, Wrist, Right [[id number]] Collected: 05/05/22 2317    Specimen: Blood  from Wrist, Right Updated: 05/05/22 2326    Blood Culture - Blood, Arm, Right [[id number]] Collected: 05/05/22 2317    Specimen: Blood from Arm, Right Updated: 05/05/22 2326    Magnesium [[id number]]  (Normal) Collected: 05/05/22 2155    Specimen: Blood Updated: 05/05/22 2312     Magnesium 1.8 mg/dL     BNP [[id number]]  (Abnormal) Collected: 05/05/22 2155    Specimen: Blood Updated: 05/05/22 2312     proBNP 6,919.0 pg/mL     Narrative:      Among patients with dyspnea, NT-proBNP is highly sensitive for the detection of acute congestive heart failure. In addition NT-proBNP of <300 pg/ml effectively rules out acute congestive heart failure with 99% negative predictive value.    Results may be falsely decreased if patient taking Biotin.      Respiratory Panel PCR w/COVID-19(SARS-CoV-2) [facility]GRA/PAD/COR/MAD/EZRA In-House, NP Swab in UTM/VTM, 3-4 HR TAT - Swab, Nasopharynx [[id number]]  (Normal) Collected: 05/05/22 2155    Specimen: Swab from Nasopharynx Updated: 05/05/22 2302     ADENOVIRUS, PCR Not Detected     Coronavirus 229E Not Detected     Coronavirus HKU1 Not Detected     Coronavirus NL63 Not Detected     Coronavirus OC43 Not Detected     COVID19 Not Detected     Human Metapneumovirus Not Detected     Human Rhinovirus/Enterovirus Not Detected     Influenza A PCR Not Detected     Influenza B PCR Not Detected     Parainfluenza Virus 1 Not Detected     Parainfluenza Virus 2 Not Detected     Parainfluenza Virus 3 Not Detected     Parainfluenza Virus 4 Not Detected     RSV, PCR Not Detected     Bordetella pertussis pcr Not Detected     Bordetella parapertussis PCR Not Detected     Chlamydophila pneumoniae PCR Not Detected     Mycoplasma pneumo by PCR Not Detected    Narrative:      In the setting of a positive respiratory panel with a viral infection PLUS a negative procalcitonin without other underlying concern for bacterial infection, consider observing off antibiotics or discontinuation of antibiotics and continue  supportive care. If the respiratory panel is positive for atypical bacterial infection (Bordetella pertussis, Chlamydophila pneumoniae, or Mycoplasma pneumoniae), consider antibiotic de-escalation to target atypical bacterial infection.    Urinalysis With Culture If Indicated - Urine, Catheter [[id number]]  (Abnormal) Collected: 05/05/22 2223    Specimen: Urine, Catheter Updated: 05/05/22 2250     Color, UA Yellow     Appearance, UA Clear     pH, UA 6.0     Specific Gravity, UA 1.025     Glucose, UA Negative     Ketones, UA 15 mg/dL (1+)     Bilirubin, UA Negative     Blood, UA Moderate (2+)     Protein,  mg/dL (2+)     Leuk Esterase, UA Negative     Nitrite, UA Negative     Urobilinogen, UA 0.2 E.U./dL    Urinalysis, Microscopic Only - Urine, Catheter [[id number]]  (Abnormal) Collected: 05/05/22 2223    Specimen: Urine, Catheter Updated: 05/05/22 2250     RBC, UA 0-2 /HPF      WBC, UA 3-5 /HPF      Comment: Urine culture not indicated.        Bacteria, UA 2+ /HPF      Squamous Epithelial Cells, UA 0-2 /HPF      Hyaline Casts, UA 0-2 /LPF      Methodology Automated Microscopy    Protime-INR [[id number]]  (Abnormal) Collected: 05/05/22 2155    Specimen: Blood Updated: 05/05/22 2246     Protime 15.0 Seconds      INR 1.23    aPTT [[id number]]  (Normal) Collected: 05/05/22 2155    Specimen: Blood Updated: 05/05/22 2246     PTT 34.7 seconds     D-dimer, Quantitative [[id number]]  (Abnormal) Collected: 05/05/22 2155    Specimen: Blood Updated: 05/05/22 2246     D-Dimer, Quantitative 8.97 MCGFEU/mL     Narrative:      Reference Range is 0-0.50 MCGFEU/mL. However, results <0.50 MCGFEU/mL tends to rule out DVT or PE. Results >0.50 MCGFEU/mL are not useful in predicting absence or presence of DVT or PE.      Urine Drug Screen - Urine, Catheter [[id number]]  (Abnormal) Collected: 05/05/22 2223    Specimen: Urine, Catheter Updated: 05/05/22 2240     THC, Screen, Urine Negative     Phencyclidine (PCP), Urine Negative     Cocaine  Screen, Urine Negative     Methamphetamine, Ur Negative     Opiate Screen Negative     Amphetamine Screen, Urine Negative     Benzodiazepine Screen, Urine Positive     Tricyclic Antidepressants Screen Negative     Methadone Screen, Urine Negative     Barbiturates Screen, Urine Negative     Oxycodone Screen, Urine Negative     Propoxyphene Screen Negative     Buprenorphine, Screen, Urine Negative    Narrative:      Cutoff For Drugs Screened:    Amphetamines               500 ng/ml  Barbiturates               200 ng/ml  Benzodiazepines            150 ng/ml  Cocaine                    150 ng/ml  Methadone                  200 ng/ml  Opiates                    100 ng/ml  Phencyclidine               25 ng/ml  THC                            50 ng/ml  Methamphetamine            500 ng/ml  Tricyclic Antidepressants  300 ng/ml  Oxycodone                  100 ng/ml  Propoxyphene               300 ng/ml  Buprenorphine               10 ng/ml    The normal value for all drugs tested is negative. This report includes unconfirmed screening results, with the cutoff values listed, to be used for medical treatment purposes only.  Unconfirmed results must not be used for non-medical purposes such as employment or legal testing.  Clinical consideration should be applied to any drug of abuse test, particularly when unconfirmed results are used.      Lactic Acid, Plasma [[id number]]  (Abnormal) Collected: 05/05/22 2155    Specimen: Blood Updated: 05/05/22 2227     Lactate 2.3 mmol/L     CBC & Differential [[id number]]  (Abnormal) Collected: 05/05/22 2155    Specimen: Blood Updated: 05/05/22 2207    Narrative:      The following orders were created for panel order CBC & Differential.  Procedure                               Abnormality         Status                     ---------                               -----------         ------                     CBC Auto Differential[[id number]]        Abnormal            Final result                  Please view results for these tests on the individual orders.    CBC Auto Differential [[id number]]  (Abnormal) Collected: 05/05/22 2155    Specimen: Blood Updated: 05/05/22 2207     WBC 17.69 10*3/mm3      RBC 4.49 10*6/mm3      Hemoglobin 15.8 g/dL      Hematocrit 44.6 %      MCV 99.3 fL      MCH 35.2 pg      MCHC 35.4 g/dL      RDW 14.4 %      RDW-SD 52.1 fl      MPV 9.5 fL      Platelets 392 10*3/mm3      Neutrophil % 89.1 %      Lymphocyte % 5.0 %      Monocyte % 4.7 %      Eosinophil % 0.0 %      Basophil % 0.2 %      Immature Grans % 1.0 %      Neutrophils, Absolute 15.77 10*3/mm3      Lymphocytes, Absolute 0.88 10*3/mm3      Monocytes, Absolute 0.84 10*3/mm3      Eosinophils, Absolute 0.00 10*3/mm3      Basophils, Absolute 0.03 10*3/mm3      Immature Grans, Absolute 0.17 10*3/mm3      nRBC 0.0 /100 WBC         Imaging Results (Last 24 Hours)     Procedure Component Value Units Date/Time    CT Abdomen Pelvis With Contrast [[id number]] Resulted: 05/05/22 2359     Updated: 05/06/22 0033    CT Cervical Spine Without Contrast [[id number]] Resulted: 05/05/22 2359     Updated: 05/06/22 0033    CT Thoracic Spine Without Contrast [[id number]] Resulted: 05/05/22 2359     Updated: 05/06/22 0033    CT Lumbar Spine Without Contrast [[id number]] Resulted: 05/05/22 2359     Updated: 05/06/22 0033    CT Angiogram Chest [[id number]] Resulted: 05/05/22 2359     Updated: 05/06/22 0033    CT Head Without Contrast [[id number]] Resulted: 05/05/22 2359     Updated: 05/06/22 0033    XR Hip With or Without Pelvis 2 - 3 View Left [[id number]] Resulted: 05/05/22 2254     Updated: 05/05/22 2255        I have personally reviewed and interpreted the radiology studies and ECG obtained at time of admission.     Assessment / Plan     Assessment:   Active Hospital Problems    Diagnosis    • **Colitis    • Fall    • Compression fracture of body of thoracic vertebra (HCC)    • Compression fracture of L2 lumbar vertebra (HCC)    • Sepsis (HCC)    •  Metabolic acidosis    • Fecal impaction (HCC)    • Failure to thrive in adult    1.  Colitis along with a fecal impaction.  Plans admit the patient culture blood Zosyn is been started will be continued for now.  We will have nursing staff disimpact patient and give her milk of molasses enema need to work on a bowel regimen.  2.  Falls consult physical therapy.  3.  Compression fractures I suspect the lumbar one is the one causing her pain consult PT consult neurosurgery for recommendations brace versus kyphoplasty.  4.  Metabolic acidosis we will place patient on bicarb drip.  5.  Sepsis patient does meet sepsis criteria once again culture blood broad-spectrum antibiotics aggressive fluid resuscitation.  6.  Failure to thrive work on above problems consult PT patient may need to go to SNF temporarily.  7.  Medications reviewed and resumed as appropriate.    Patient seen after midnight       Code Status/Advanced Care Plan: DNR/DNI    The patient's surrogate decision maker is patient's daughter.     I discussed my findings and recommendations with the patient.    Estimated length of stay is to be determined.     The patient was seen and examined by me on May 6, 2022 at 4:45 AM.    Electronically signed by Waqar Dewitt MD, 05/06/22, 05:00 CDT.

## 2022-05-06 NOTE — CONSULTS
Reason for consult: Dr. Garth Rollins    Chief Complaint   Patient presents with    Weakness - Generalized    Back Pain         Requesting provider: Thoracic and lumbar compression fracture    HPI: This is an 81-year-old female patient who we are asked to see in consult for thoracic and lumbar compression fractures.  The patient has been complaining of back pain for a few weeks.  She has had several falls.  The patient is not a good historian.  There is no other family members present.  The patient was brought into the emergency room due to generalized weakness and significant difficulty with trying to stand and walk.  The patient was admitted to the hospitalist.  Imaging did reveal that she does have a compression fracture of T7 which is worse whenever compared to imaging that was done in January.  The patient also does have a compression fracture of L2 with retropulsion.  Patient is also complaining of leg pain however she does have a left hip fracture.  It is uncertain if the patient has any true radicular leg pain.  The patient does have an elevated troponin level but is not complaining of any chest pain.  She also does have a urinary tract infection.  The patient is constipated.  Orthopedics has seen the patient for her hip fracture and is recommending surgery.  Rates her pain on a scale 0-10 at an 8.  She does complain of entire spine pain.  She is able to move her upper extremities without difficulty but she does have a significant amount of pain when trying to move her lower extremities in particular in psoas movements.    Review of Systems   Constitutional: Positive for activity change. Negative for fever.   Musculoskeletal: Positive for arthralgias, back pain, myalgias and neck pain.   Neurological: Positive for weakness.   Psychiatric/Behavioral: Positive for confusion.   All other systems reviewed and are negative.       Pertinent positives/negatives documented in HPI.  All other systems reviewed and  "negative.    Past Medical History:  has a past medical history of Chronic hyponatremia, Frequent UTI, Migraines, and SVT (supraventricular tachycardia) (HCC).    Past Surgical History:  has a past surgical history that includes cholecystectomy with intraoperative cholangiogram (N/A, 5/9/2019).    Family History: family history includes Anemia in her mother; Colon cancer in her father; Heart attack in her father; Heart disease in her father; Stroke in her mother.    Social History:  reports that she has never smoked. She has never used smokeless tobacco. She reports that she does not drink alcohol and does not use drugs.    Allergies: Codeine    Medications: Scheduled Meds:acebutolol, 200 mg, Oral, Q12H  aspirin, 81 mg, Oral, Daily  cholecalciferol, 2,000 Units, Oral, Daily  fenofibrate, 145 mg, Oral, Daily  ipratropium-albuterol, 3 mL, Nebulization, 4x Daily - RT  OLANZapine, 5 mg, Oral, Nightly  PARoxetine, 40 mg, Oral, Daily  piperacillin-tazobactam, 3.375 g, Intravenous, Q8H  sodium chloride, 10 mL, Intravenous, Q12H      Continuous Infusions:sodium bicarbonate drip (greater than 75 mEq/bag), 100 mEq, Last Rate: 75 mL/hr at 05/06/22 0928      PRN Meds:.acetaminophen **OR** acetaminophen **OR** acetaminophen    ondansetron **OR** ondansetron    oxyCODONE-acetaminophen    sodium chloride    sodium chloride     Objective:  General Appearance:  Comfortable, well-appearing, in no acute distress and in pain.    Vital signs: (most recent): Blood pressure 129/78, pulse 104, temperature 97.3 °F (36.3 °C), temperature source Oral, resp. rate 20, height 152.4 cm (60\"), weight 43.4 kg (95 lb 10.9 oz), SpO2 96 %, not currently breastfeeding.  Vital signs are normal.  No fever.    Output: Producing urine.    HEENT: Normal HEENT exam.    Lungs:  Normal effort and normal respiratory rate.  She is not in respiratory distress.    Heart: Normal rate.  Regular rhythm.    Chest: Symmetric chest wall expansion.   Extremities: " Decreased range of motion.    Skin:  Warm and dry.    Abdomen: Abdomen is soft and non-distended.  Bowel sounds are normal.   There is no abdominal tenderness.     Pulses: Distal pulses are intact.        Neurologic Exam     Mental Status   Oriented to person, place, and time.   Attention: decreased. Concentration: decreased.   Speech: speech is normal   Level of consciousness: alert  Normal comprehension.     Cranial Nerves     CN II   Visual fields full to confrontation.     CN III, IV, VI   Pupils are equal, round, and reactive to light.  Extraocular motions are normal.     CN V   Facial sensation intact.     CN VII   Facial expression full, symmetric.     CN VIII   CN VIII normal.     CN IX, X   CN IX normal.   CN X normal.     CN XI   CN XI normal.     CN XII   CN XII normal.     Motor Exam   Muscle bulk: normal    Strength   Strength 5/5 throughout. Limitations of the lower extremity exam due to pain     Sensory Exam   Light touch normal.     Gait, Coordination, and Reflexes     Reflexes   Reflexes 2+ except as noted.       Vital Signs  Temp:  [97.3 °F (36.3 °C)-97.8 °F (36.6 °C)] 97.3 °F (36.3 °C)  Heart Rate:  [] 104  Resp:  [20-22] 20  BP: (119-169)/(66-99) 129/78    Physical Exam  Vitals reviewed.   Constitutional:       General: Vital signs are normal.      Appearance: Normal appearance. She is well-developed.   HENT:      Head: Normocephalic.   Eyes:      General: Lids are normal.      Extraocular Movements: EOM normal.      Conjunctiva/sclera: Conjunctivae normal.      Pupils: Pupils are equal, round, and reactive to light.   Cardiovascular:      Rate and Rhythm: Normal rate and regular rhythm.      Pulses: Intact distal pulses.   Pulmonary:      Effort: Pulmonary effort is normal. No respiratory distress.      Breath sounds: Normal breath sounds.   Abdominal:      General: Bowel sounds are normal. There is no distension.      Palpations: Abdomen is soft.   Musculoskeletal:      Cervical back:  Normal range of motion.      Comments: Thoracic and lumbar back pain   Skin:     General: Skin is warm and dry.   Neurological:      Mental Status: She is alert and oriented to person, place, and time.      GCS: GCS eye subscore is 4. GCS verbal subscore is 5. GCS motor subscore is 6.      Cranial Nerves: No cranial nerve deficit.      Sensory: No sensory deficit.      Motor: Weakness present.      Deep Tendon Reflexes: Strength normal and reflexes are normal and symmetric. Reflexes normal.      Comments: Nonambulatory   Psychiatric:         Speech: Speech normal.         Behavior: Behavior normal.         Thought Content: Thought content normal.         Cognition and Memory: Cognition is impaired.         Results Review:   I reviewed the patient's new clinical results.  I reviewed the patient's new imaging results and agree with the interpretation.  I reviewed the patient's other test results and agree with the interpretation          Lab Results (last 24 hours)       Procedure Component Value Units Date/Time    Comprehensive Metabolic Panel [[id number]]  (Abnormal) Collected: 05/06/22 0459    Specimen: Blood Updated: 05/06/22 0535     Glucose 109 mg/dL      BUN 22 mg/dL      Creatinine 0.68 mg/dL      Sodium 133 mmol/L      Potassium 4.2 mmol/L      Comment: Slight hemolysis detected by analyzer. Results may be affected.        Chloride 97 mmol/L      CO2 10.0 mmol/L      Calcium 9.7 mg/dL      Total Protein 6.8 g/dL      Albumin 3.80 g/dL      ALT (SGPT) 13 U/L      AST (SGOT) 24 U/L      Alkaline Phosphatase 80 U/L      Total Bilirubin 0.7 mg/dL      Globulin 3.0 gm/dL      A/G Ratio 1.3 g/dL      BUN/Creatinine Ratio 32.4     Anion Gap 26.0 mmol/L      eGFR 87.6 mL/min/1.73      Comment: National Kidney Foundation and American Society of Nephrology (ASN) Task Force recommended calculation based on the Chronic Kidney Disease Epidemiology Collaboration (CKD-EPI) equation refit without adjustment for race.        Narrative:      GFR Normal >60  Chronic Kidney Disease <60  Kidney Failure <15      Magnesium [[id number]]  (Normal) Collected: 05/06/22 0459    Specimen: Blood Updated: 05/06/22 0535     Magnesium 1.6 mg/dL     CBC Auto Differential [[id number]]  (Abnormal) Collected: 05/06/22 0459    Specimen: Blood Updated: 05/06/22 0515     WBC 20.00 10*3/mm3      RBC 4.08 10*6/mm3      Hemoglobin 14.1 g/dL      Hematocrit 40.5 %      MCV 99.3 fL      MCH 34.6 pg      MCHC 34.8 g/dL      RDW 14.6 %      RDW-SD 53.4 fl      MPV 9.4 fL      Platelets 321 10*3/mm3      Neutrophil % 89.0 %      Lymphocyte % 4.4 %      Monocyte % 5.4 %      Eosinophil % 0.0 %      Basophil % 0.2 %      Immature Grans % 1.0 %      Neutrophils, Absolute 17.82 10*3/mm3      Lymphocytes, Absolute 0.88 10*3/mm3      Monocytes, Absolute 1.07 10*3/mm3      Eosinophils, Absolute 0.00 10*3/mm3      Basophils, Absolute 0.04 10*3/mm3      Immature Grans, Absolute 0.19 10*3/mm3      nRBC 0.0 /100 WBC     Blood Gas, Arterial - [[id number]]  (Abnormal) Collected: 05/06/22 0252    Specimen: Arterial Blood Updated: 05/06/22 0239     Site Right Brachial     Meliton's Test N/A     pH, Arterial 7.319 pH units      Comment: 84 Value below reference range        pCO2, Arterial 22.0 mm Hg      Comment: 84 Value below reference range        pO2, Arterial 60.8 mm Hg      Comment: 84 Value below reference range        HCO3, Arterial 11.3 mmol/L      Comment: 84 Value below reference range        Base Excess, Arterial -12.7 mmol/L      Comment: 84 Value below reference range        O2 Saturation, Arterial 89.7 %      Comment: 84 Value below reference range        Temperature 37.0 C      Barometric Pressure for Blood Gas 743 mmHg      Modality Room Air     FIO2 21 %      Ventilator Mode NA     Collected by [id number]     Comment: Meter: [id number]     :  [id number]        pCO2, Temperature Corrected 22.0 mm Hg      pH, Temp Corrected 7.319 pH Units      pO2, Temperature  "Corrected 60.8 mm Hg     STAT Lactic Acid, Reflex [[id number]]  (Normal) Collected: 05/06/22 0132    Specimen: Blood Updated: 05/06/22 0152     Lactate 1.9 mmol/L     Procalcitonin [[id number]]  (Abnormal) Collected: 05/05/22 2353    Specimen: Blood Updated: 05/06/22 0130     Procalcitonin 0.34 ng/mL     Narrative:      As a Marker for Sepsis (Non-Neonates):    1. <0.5 ng/mL represents a low risk of severe sepsis and/or septic shock.  2. >2 ng/mL represents a high risk of severe sepsis and/or septic shock.    As a Marker for Lower Respiratory Tract Infections that require antibiotic therapy:    PCT on Admission    Antibiotic Therapy       6-12 Hrs later    >0.5                Strongly Recommended  >0.25 - <0.5        Recommended   0.1 - 0.25          Discouraged              Remeasure/reassess PCT  <0.1                Strongly Discouraged     Remeasure/reassess PCT    As 28 day mortality risk marker: \"Change in Procalcitonin Result\" (>80% or <=80%) if Day 0 (or Day 1) and Day 4 values are available. Refer to http://[email or web address]-[email or web address]    Change in PCT <=80%  A decrease of PCT levels below or equal to 80% defines a positive change in PCT test result representing a higher risk for 28-day all-cause mortality of patients diagnosed with severe sepsis for septic shock.    Change in PCT >80%  A decrease of PCT levels of more than 80% defines a negative change in PCT result representing a lower risk for 28-day all-cause mortality of patients diagnosed with severe sepsis or septic shock.       TSH [[id number]]  (Normal) Collected: 05/05/22 2353    Specimen: Blood Updated: 05/06/22 0028     TSH 0.676 uIU/mL     T4, Free [[id number]]  (Abnormal) Collected: 05/05/22 2353    Specimen: Blood Updated: 05/06/22 0027     Free T4 1.81 ng/dL     Narrative:      Results may be falsely increased if patient taking Biotin.      C-reactive Protein [[id number]]  (Abnormal) Collected: 05/05/22 2353    Specimen: Blood Updated: 05/06/22 " 0025     C-Reactive Protein 11.90 mg/dL     Comprehensive Metabolic Panel [[id number]]  (Abnormal) Collected: 05/05/22 2353    Specimen: Blood Updated: 05/06/22 0023     Glucose 103 mg/dL      BUN 24 mg/dL      Creatinine 0.65 mg/dL      Sodium 134 mmol/L      Potassium 4.4 mmol/L      Chloride 97 mmol/L      CO2 15.0 mmol/L      Calcium 9.9 mg/dL      Total Protein 7.2 g/dL      Albumin 3.80 g/dL      ALT (SGPT) 14 U/L      AST (SGOT) 23 U/L      Alkaline Phosphatase 83 U/L      Total Bilirubin 0.7 mg/dL      Globulin 3.4 gm/dL      A/G Ratio 1.1 g/dL      BUN/Creatinine Ratio 36.9     Anion Gap 22.0 mmol/L      eGFR 88.6 mL/min/1.73      Comment: National Kidney Foundation and American Society of Nephrology (ASN) Task Force recommended calculation based on the Chronic Kidney Disease Epidemiology Collaboration (CKD-EPI) equation refit without adjustment for race.       Narrative:      GFR Normal >60  Chronic Kidney Disease <60  Kidney Failure <15      Troponin [[id number]]  (Abnormal) Collected: 05/05/22 2353    Specimen: Blood Updated: 05/06/22 0020     Troponin T 0.032 ng/mL     Narrative:      Troponin T Reference Range:  <= 0.03 ng/mL-   Negative for AMI  >0.03 ng/mL-     Abnormal for myocardial necrosis.  Clinicians would have to utilize clinical acumen, EKG, Troponin and serial changes to determine if it is an Acute Myocardial Infarction or myocardial injury due to an underlying chronic condition.       Results may be falsely decreased if patient taking Biotin.      Lipase [[id number]]  (Normal) Collected: 05/05/22 2353    Specimen: Blood Updated: 05/06/22 0018     Lipase 18 U/L     Troponin [[id number]]  (Normal) Collected: 05/05/22 2353    Specimen: Blood Updated: 05/06/22 0017     Troponin T 0.026 ng/mL     Narrative:      Troponin T Reference Range:  <= 0.03 ng/mL-   Negative for AMI  >0.03 ng/mL-     Abnormal for myocardial necrosis.  Clinicians would have to utilize clinical acumen, EKG, Troponin and serial  changes to determine if it is an Acute Myocardial Infarction or myocardial injury due to an underlying chronic condition.       Results may be falsely decreased if patient taking Biotin.      POC Creatinine [[id number]]  (Normal) Collected: 05/05/22 2355    Specimen: Blood Updated: 05/06/22 0013     Creatinine 0.70 mg/dL      Comment: Serial Number: [id number]:  [id number]       Blood Culture - Blood, Wrist, Right [[id number]] Collected: 05/05/22 2317    Specimen: Blood from Wrist, Right Updated: 05/05/22 2326    Blood Culture - Blood, Arm, Right [[id number]] Collected: 05/05/22 2317    Specimen: Blood from Arm, Right Updated: 05/05/22 2326    Magnesium [[id number]]  (Normal) Collected: 05/05/22 2155    Specimen: Blood Updated: 05/05/22 2312     Magnesium 1.8 mg/dL     BNP [[id number]]  (Abnormal) Collected: 05/05/22 2155    Specimen: Blood Updated: 05/05/22 2312     proBNP 6,919.0 pg/mL     Narrative:      Among patients with dyspnea, NT-proBNP is highly sensitive for the detection of acute congestive heart failure. In addition NT-proBNP of <300 pg/ml effectively rules out acute congestive heart failure with 99% negative predictive value.    Results may be falsely decreased if patient taking Biotin.      Respiratory Panel PCR w/COVID-19(SARS-CoV-2) [facility]/EZRA In-House, NP Swab in UTM/[facility], 3-4 HR TAT - Swab, Nasopharynx [[id number]]  (Normal) Collected: 05/05/22 2155    Specimen: Swab from Nasopharynx Updated: 05/05/22 2302     ADENOVIRUS, PCR Not Detected     Coronavirus 229E Not Detected     Coronavirus HKU1 Not Detected     Coronavirus NL63 Not Detected     Coronavirus OC43 Not Detected     COVID19 Not Detected     Human Metapneumovirus Not Detected     Human Rhinovirus/Enterovirus Not Detected     Influenza A PCR Not Detected     Influenza B PCR Not Detected     Parainfluenza Virus 1 Not Detected     Parainfluenza Virus 2 Not Detected     Parainfluenza Virus 3 Not Detected     Parainfluenza Virus 4  Not Detected     RSV, PCR Not Detected     Bordetella pertussis pcr Not Detected     Bordetella parapertussis PCR Not Detected     Chlamydophila pneumoniae PCR Not Detected     Mycoplasma pneumo by PCR Not Detected    Narrative:      In the setting of a positive respiratory panel with a viral infection PLUS a negative procalcitonin without other underlying concern for bacterial infection, consider observing off antibiotics or discontinuation of antibiotics and continue supportive care. If the respiratory panel is positive for atypical bacterial infection (Bordetella pertussis, Chlamydophila pneumoniae, or Mycoplasma pneumoniae), consider antibiotic de-escalation to target atypical bacterial infection.    Urinalysis With Culture If Indicated - Urine, Catheter [[id number]]  (Abnormal) Collected: 05/05/22 2223    Specimen: Urine, Catheter Updated: 05/05/22 2250     Color, UA Yellow     Appearance, UA Clear     pH, UA 6.0     Specific Gravity, UA 1.025     Glucose, UA Negative     Ketones, UA 15 mg/dL (1+)     Bilirubin, UA Negative     Blood, UA Moderate (2+)     Protein,  mg/dL (2+)     Leuk Esterase, UA Negative     Nitrite, UA Negative     Urobilinogen, UA 0.2 E.U./dL    Urinalysis, Microscopic Only - Urine, Catheter [[id number]]  (Abnormal) Collected: 05/05/22 2223    Specimen: Urine, Catheter Updated: 05/05/22 2250     RBC, UA 0-2 /HPF      WBC, UA 3-5 /HPF      Comment: Urine culture not indicated.        Bacteria, UA 2+ /HPF      Squamous Epithelial Cells, UA 0-2 /HPF      Hyaline Casts, UA 0-2 /LPF      Methodology Automated Microscopy    Protime-INR [[id number]]  (Abnormal) Collected: 05/05/22 2155    Specimen: Blood Updated: 05/05/22 2246     Protime 15.0 Seconds      INR 1.23    aPTT [[id number]]  (Normal) Collected: 05/05/22 2155    Specimen: Blood Updated: 05/05/22 2246     PTT 34.7 seconds     D-dimer, Quantitative [[id number]]  (Abnormal) Collected: 05/05/22 2155    Specimen: Blood Updated: 05/05/22  2246     D-Dimer, Quantitative 8.97 MCGFEU/mL     Narrative:      Reference Range is 0-0.50 MCGFEU/mL. However, results <0.50 MCGFEU/mL tends to rule out DVT or PE. Results >0.50 MCGFEU/mL are not useful in predicting absence or presence of DVT or PE.      Urine Drug Screen - Urine, Catheter [[id number]]  (Abnormal) Collected: 05/05/22 2223    Specimen: Urine, Catheter Updated: 05/05/22 2240     THC, Screen, Urine Negative     Phencyclidine (PCP), Urine Negative     Cocaine Screen, Urine Negative     Methamphetamine, Ur Negative     Opiate Screen Negative     Amphetamine Screen, Urine Negative     Benzodiazepine Screen, Urine Positive     Tricyclic Antidepressants Screen Negative     Methadone Screen, Urine Negative     Barbiturates Screen, Urine Negative     Oxycodone Screen, Urine Negative     Propoxyphene Screen Negative     Buprenorphine, Screen, Urine Negative    Narrative:      Cutoff For Drugs Screened:    Amphetamines               500 ng/ml  Barbiturates               200 ng/ml  Benzodiazepines            150 ng/ml  Cocaine                    150 ng/ml  Methadone                  200 ng/ml  Opiates                    100 ng/ml  Phencyclidine               25 ng/ml  THC                            50 ng/ml  Methamphetamine            500 ng/ml  Tricyclic Antidepressants  300 ng/ml  Oxycodone                  100 ng/ml  Propoxyphene               300 ng/ml  Buprenorphine               10 ng/ml    The normal value for all drugs tested is negative. This report includes unconfirmed screening results, with the cutoff values listed, to be used for medical treatment purposes only.  Unconfirmed results must not be used for non-medical purposes such as employment or legal testing.  Clinical consideration should be applied to any drug of abuse test, particularly when unconfirmed results are used.      Lactic Acid, Plasma [[id number]]  (Abnormal) Collected: 05/05/22 2155    Specimen: Blood Updated: 05/05/22 2227      Lactate 2.3 mmol/L     CBC & Differential [[id number]]  (Abnormal) Collected: 05/05/22 2155    Specimen: Blood Updated: 05/05/22 2207    Narrative:      The following orders were created for panel order CBC & Differential.  Procedure                               Abnormality         Status                     ---------                               -----------         ------                     CBC Auto Differential[[id number]]        Abnormal            Final result                 Please view results for these tests on the individual orders.    CBC Auto Differential [[id number]]  (Abnormal) Collected: 05/05/22 2155    Specimen: Blood Updated: 05/05/22 2207     WBC 17.69 10*3/mm3      RBC 4.49 10*6/mm3      Hemoglobin 15.8 g/dL      Hematocrit 44.6 %      MCV 99.3 fL      MCH 35.2 pg      MCHC 35.4 g/dL      RDW 14.4 %      RDW-SD 52.1 fl      MPV 9.5 fL      Platelets 392 10*3/mm3      Neutrophil % 89.1 %      Lymphocyte % 5.0 %      Monocyte % 4.7 %      Eosinophil % 0.0 %      Basophil % 0.2 %      Immature Grans % 1.0 %      Neutrophils, Absolute 15.77 10*3/mm3      Lymphocytes, Absolute 0.88 10*3/mm3      Monocytes, Absolute 0.84 10*3/mm3      Eosinophils, Absolute 0.00 10*3/mm3      Basophils, Absolute 0.03 10*3/mm3      Immature Grans, Absolute 0.17 10*3/mm3      nRBC 0.0 /100 WBC           CT scan of the thoracic and lumbar spine shows a compression deformity at T7 which was present in January 2022 however it has compressed down more on the scan done in May.  There is also a new compression deformity of L2 with retropulsion.  Increased kyphosis is noted.                                  May 6, 2022                                                                                January 2022      Assessment/Plan:   The patient does appear to have a worsening fracture of T7 and a new compression fracture of L2.  The patient also does have a impacted fracture of the left hip.  Orthopedics is going to take the  patient to surgery later today to fix the left hip fracture.  We are going to obtain an MRI of the thoracic and lumbar spine and check the acuity of the fractures and also to evaluate the retropulsion as the patient is complaining of some leg pain however it is uncertain if this is a radicular type leg pain or not.  Depending on the results of the MRI will determine if we need to take the patient to surgery for the compression fractures.  Currently the patient is nonambulatory due to the hip fracture.  Patient is NPO.  Thank you for the opportunity to participate this patient's plan of care      Colitis    Failure to thrive in adult    Fall    Compression fracture of body of thoracic vertebra (HCC)    Compression fracture of L2 lumbar vertebra (HCC)    Sepsis (HCC)    Metabolic acidosis    Fecal impaction (HCC)    Closed left hip fracture (HCC)      I discussed the patients findings and my recommendations with patient, nursing staff and consulting provider    Jesse Rudolph, LISA  05/06/22  10:27 CDT    Time: More than 50% of time spent in counseling and coordination of care:  Total face-to-face/floor time 68 min.  Time spent in counseling 36 min. Counseling included the following topics: Diagnosis and plan of care and treatment options

## 2022-05-06 NOTE — PLAN OF CARE
Goal Outcome Evaluation:  Plan of Care Reviewed With: patient, daughter            Patient is received from PACU around 18 30 PM. Mild drowsy but easily arousable. Eyes open spontaneously, follows commands. O2 @ 3 L NC. Denies pain at this time. Incision site with skinifix and foam CDI left hip. Tolerating well. Min present. CDI. Safety precautions maintained. Tele continue. . Continue to monitor. Offered food.

## 2022-05-06 NOTE — PROGRESS NOTES
[facility] Medicine Services  INPATIENT PROGRESS NOTE    Length of Stay: 0  Date of Admission: 5/5/2022  Primary Care Physician: Garth Amezcua APRN    Subjective   Chief Complaint: Follow-up back pain  HPI   Patient resting in bed.  She is felt to be a poor historian.  She states that her back has been hurting for maybe for 5 days and when asked if she has fallen she states she fell in April, then states that was in 2021.  She then states she believes she fell yesterday and may have fallen on her bottom.  She complains of back pain nonlocalized.  She complains of bilateral leg pain as well.  She denies any numbness or tingling to the lower extremities.  She states she believes she feels a little short of breath this morning.  She is quite anxious and is taking very shallow breaths.  She is currently on 4 L nasal cannula and typically does not wear oxygen at home.  She denies any chest pain.  She apparently has had some nausea and very poor oral intake per her report.  She denies abdominal pain or diarrhea.    Review of Systems   All pertinent negatives and positives are as above. All other systems have been reviewed and are negative unless otherwise stated.     Objective    Temp:  [97.3 °F (36.3 °C)-97.8 °F (36.6 °C)] 97.3 °F (36.3 °C)  Heart Rate:  [] 104  Resp:  [20-22] 20  BP: (119-169)/(66-99) 129/78  Physical Exam  Vitals and nursing note reviewed.   Constitutional:       General: She is not in acute distress.     Appearance: She is ill-appearing.   HENT:      Head: Normocephalic and atraumatic.   Cardiovascular:      Rate and Rhythm: Regular rhythm. Tachycardia present.      Pulses: Normal pulses.      Comments: Sinus tach 104-112  Pulmonary:      Breath sounds: No wheezing, rhonchi or rales.      Comments: Tachypnea with shallow respirations  Abdominal:      General: Bowel sounds are normal. There is no distension.      Palpations: Abdomen is soft.       Tenderness: There is no abdominal tenderness.   Musculoskeletal:         General: Tenderness (throughout the spine and left groin with palpation) present. No swelling.      Cervical back: Normal range of motion and neck supple. No tenderness.   Skin:     General: Skin is warm and dry.      Findings: No erythema or rash.   Neurological:      Mental Status: She is alert and oriented to person, place, and time.   Psychiatric:         Mood and Affect: Mood normal.      Comments: Somewhat anxious     Results Review:  I have reviewed the labs, radiology results, and diagnostic studies.    Laboratory Data:   Results from last 7 days   Lab Units 05/06/22 0459 05/05/22  2155   WBC 10*3/mm3 20.00* 17.69*   HEMOGLOBIN g/dL 14.1 15.8   HEMATOCRIT % 40.5 44.6   PLATELETS 10*3/mm3 321 392        Results from last 7 days   Lab Units 05/06/22 0459 05/05/22  2355 05/05/22  2353   SODIUM mmol/L 133*  --  134*   POTASSIUM mmol/L 4.2  --  4.4   CHLORIDE mmol/L 97*  --  97*   CO2 mmol/L 10.0*  --  15.0*   BUN mg/dL 22  --  24*   CREATININE mg/dL 0.68 0.70 0.65   CALCIUM mg/dL 9.7  --  9.9   BILIRUBIN mg/dL 0.7  --  0.7   ALK PHOS U/L 80  --  83   ALT (SGPT) U/L 13  --  14   AST (SGOT) U/L 24  --  23   GLUCOSE mg/dL 109*  --  103*     Radiology Data:   Imaging Results (Last 24 Hours)     Procedure Component Value Units Date/Time    CT Lumbar Spine Without Contrast [[id number]] Collected: 05/06/22 0849     Updated: 05/06/22 0857    Narrative:      EXAM/TECHNIQUE: CT LUMBAR SPINE WO CONTRAST-     INDICATION: Fall, low back pain     COMPARISON: None recent.     DLP: 208 mGy cm. Automated exposure control was also utilized to  decrease patient radiation dose.     FINDINGS:     There are 5 lumbar type vertebral bodies. No acute subluxations.     There is a 2 column compression fracture of L2 with up to 50% vertebral  body height loss and 6 mm osseous retropulsion causing at least mild  central canal stenosis.     Remaining vertebral body  heights are maintained. No other fracture  identified.     Mild multilevel lumbar spine degenerative change with mild multilevel  neural foraminal narrowing secondary to facet arthropathy. No area of  severe central canal or neural foraminal osseous stenosis.     Paravertebral soft tissues are unremarkable.       Impression:         Acute 2 column compression fracture of L2 with up to 50% vertebral body  height loss and 6 mm osseous retropulsion causing mild central canal  narrowing.     These findings are in agreement with the critical and emergent findings  from the [facility] preliminary report.  This report was finalized on 05/06/2022 08:53 by Dr. Sonny Mtz MD.    CT Thoracic Spine Without Contrast [[id number]] Collected: 05/06/22 0846     Updated: 05/06/22 0854    Narrative:      EXAMINATION: CT THORACIC SPINE WO CONTRAST-      5/5/2022 11:57 PM CDT     HISTORY: Fall, back pain     In order to have a CT radiation dose as low as reasonably achievable  Automated Exposure Control was utilized for adjustment of the mA and/or  KV according to patient size.     DLP in mGycm= 398.     Axial, sagittal, and coronal noncontrast thoracic spine CT.     Comparison is made with January 26, 2022.     A preliminary [facility] report was faxed to the Emergency Department  immediately after this study was interpreted at 1:07 AM.     Diffuse osteopenia.  8-10 degrees left convex scoliosis.     Exaggerated thoracic kyphosis.     High-grade anterior wedge compression of T7 now with 75-80% loss of  height compared with 30-35% 4 months ago.     Superior endplate compression of L2 with 50-55% loss of height is a new  finding compared with 4 months ago.     The age of these fractures is indeterminate as there is no adjacent soft  tissue edema.     Vertebral bodies and posterior elements are otherwise intact.     Summary:  1. Interval thoracic spine fracture at T7 and lumbar fracture at L2. New  compared with 4 months ago. No retropulsion  or spinal canal stenosis is  seen.                                   This report was finalized on 05/06/2022 08:51 by Dr. Chan Pelaez MD.    CT Abdomen Pelvis With Contrast [[id number]] Collected: 05/06/22 0838     Updated: 05/06/22 0852    Narrative:      EXAM/TECHNIQUE: CT ABDOMEN PELVIS W CONTRAST-     INDICATION: Generalized malaise, abdominal tenderness on exam     COMPARISON: 6/10/2019     DLP: 191 mGy cm. Automated exposure control was also utilized to  decrease patient radiation dose.     FINDINGS:     No acute lung base finding.     No focal liver lesion. Prior cholecystectomy. No biliary ductal  dilatation. The pancreas appears normal. Spleen and adrenal glands are  unremarkable.     Small RIGHT lower pole renal cyst. 1 cm LEFT lower pole renal lesion is  indeterminate by CT criteria but is similar in size compared to studies  dating back to 2005, supporting benign proteinaceous cyst. No  urolithiasis or hydronephrosis. Urinary bladder is distended. No focal  urinary bladder wall thickening.     Large rectal stool conglomerate. Perirectal fat stranding is present.  Normal appendix. No colonic wall thickening or evidence of abnormal  colonic distention. No small bowel obstruction or evidence of active  small bowel inflammation.     No ascites or free pelvic fluid. No pelvic mass or pelvic collection.  Uterus and adnexa appear unremarkable.     Nonaneurysmal abdominal aorta with heavy vascular calcification. No  enlarged retroperitoneal, mesenteric, pelvic, or inguinal lymph nodes.  Portal vein is slightly diminutive in appearance.     No acute abdominal wall soft tissue abnormality. No aggressive osseous  lesion. Lumbar spine including compression fracture of L2 to be  described in a separate same-day report.       Impression:         1.  Large rectal stool conglomerate with mild perirectal fat stranding.  Findings are concerning for stercoral colitis.     2.  Distended urinary bladder. No hydronephrosis  or urinary tract  calculi.     3.  L2 compression fracture, described in a separate same-day CT lumbar  spine report.  This report was finalized on 05/06/2022 08:49 by Dr. Sonny Mtz MD.    CT Cervical Spine Without Contrast [[id number]] Collected: 05/06/22 0837     Updated: 05/06/22 0850    Narrative:      CT CERVICAL SPINE WO CONTRAST- 5/5/2022 11:57 PM CDT     HISTORY: Fall      COMPARISON: 3/30/2021     DOSE LENGTH PRODUCT: 325 mGy cm. Automated exposure control was also  utilized to decrease patient radiation dose.     TECHNIQUE: Axial images of the cervical spine obtained without contrast.  Sagittal coronal images reformatted     FINDINGS:  There is diffuse osteopenia. There is loss of normal cervical  lordosis. There is anterolisthesis of C2 over C3 measuring 1 to 2 mm.  There is anterolisthesis of C7 over T1 measuring 2 mm. There is chronic  loss of height of C4 and to lesser extent C5. There is uncinate process  hypertrophy at C4-C7. There is mild facet osteoarthropathy. There is  mild to moderate degenerative disc change. No acute cervical vertebral  fracture. No prominent central cervical canal stenosis. There is  moderate right C4/C5 neural foramen narrowing with mild narrowing above  and below this level.        Emphysema within the visible lung apices.        Impression:      1. Osteopenia. Similar alignment with chronic changes as described  above. No acute cervical vertebral fracture or traumatic malalignment     This report was finalized on 05/06/2022 08:47 by Dr. Adeola Lawrence MD.    CT Angiogram Chest [[id number]] Collected: 05/06/22 0839     Updated: 05/06/22 0847    Narrative:      EXAMINATION: CT ANGIOGRAM CHEST-      5/5/2022 11:57 PM CDT     HISTORY: hypoxia, tachycardia, elevated dimer     In order to have a CT radiation dose as low as reasonably achievable  Automated Exposure Control was utilized for adjustment of the mA and/or  KV according to patient size.     DLP in mGycm= 246.      CT angiogram chest.  CT angiography protocol.   CT imaging with bolus IV contrast injection.   Under concurrent supervision axial, sagittal, coronal, and  three-dimensional data sets were constructed.     A preliminary [facility] report was faxed to the Emergency Department  immediately after this study was interpreted at 1:49 AM.     Heart size is within normal limits.  Coronary artery calcification is present.     Normal-sized thoracic aorta with no aneurysm or dissection.     Symmetric and normally opacified pulmonary arteries.  No pulmonary embolism.     Chronic lung changes.  Hyperexpansion.  Diffuse emphysema.  No pneumonia, pneumothorax, or pleural effusion.     Osteopenic bones.  Mild chronic appearing L2 superior endplate compression fracture.  High-grade anterior wedge compression of T7.  Both of these levels have progressed since a CT from January 26, 2022.     Summary:  1. Diffuse chronic lung changes.  2. No pulmonary embolism.  3. T7 and L2 compression fractures are new compared with 4 months ago.                                   This report was finalized on 05/06/2022 08:44 by Dr. Chan Pelaez MD.    XR Hip With or Without Pelvis 2 - 3 View Left [[id number]] Collected: 05/06/22 0722     Updated: 05/06/22 0727    Narrative:      EXAMINATION:   XR HIP W OR WO PELVIS 2-3 VIEW LEFT-  5/6/2022 7:22 AM  CDT     HISTORY: Patient fell     Single AP view of the left hip is obtained. There is an impacted left  hip fracture. This is a limited exam.     IMPRESSION single view the left hip demonstrate impacted left hip  fracture  This report was finalized on 05/06/2022 07:24 by Dr. Filiberto Franco MD.    CT Head Without Contrast [[id number]] Collected: 05/06/22 0656     Updated: 05/06/22 0701    Narrative:      EXAMINATION:   CT HEAD WO CONTRAST-  5/6/2022 6:56 AM CDT     HISTORY: CT BRAIN without contrast 5/5/2022 11:57 PM CDT     HISTORY: Patient had a bleed last year patient fell     COMPARISON: April 7, 2021       DLP: 5 5 mGy cm. In order to have a CT radiation dose as low as  reasonably achievable, Automated Exposure Control was utilized for  adjustment of the mA and/or KV according to patient size.     TECHNIQUE: Serial axial tomographic images of the brain were obtained  without the use of intravenous contrast.      FINDINGS:   The midline structures are nondisplaced. There is mild cerebral and  cerebellar volume loss, with an associated increase in the prominence of  the ventricles and sulci. The basilar cisterns are normal in size and  configuration. There is no evidence of intracranial hemorrhage or  mass-effect. There is low attenuation in the periventricular white  matter, consistent with chronic ischemic change. There are no abnormal  extra-axial fluid collections. There is no evidence of tonsillar  herniation.      The included orbits and their contents are unremarkable. The visualized  paranasal sinuses, mastoid air cells and middle ear cavities are clear.  The visualized osseous structures and overlying soft tissues of the  skull and face are intact.        Impression:      Mild cerebral and cerebellar volume loss with chronic microvascular  disease but no evidence of acute intracranial process.        This report was finalized on 05/06/2022 06:58 by Dr. Filiberto Franco MD.        I have reviewed the patient current medications.     Assessment/Plan     Active Hospital Problems    Diagnosis    • **Colitis    • Fall    • Compression fracture of body of thoracic vertebra (HCC)    • Compression fracture of L2 lumbar vertebra (HCC)    • Sepsis (HCC)    • Metabolic acidosis    • Fecal impaction (HCC)    • Closed left hip fracture (HCC)    • Failure to thrive in adult      Plan:  1.  Patient was brought to the emergency department on 5/5/2022 by her daughter due to weakness, nausea, and diaphoresis with position changes.  The patient also had complaints of back pain.    2.  CT of the lumbar spine notes acute 2 column  compression fracture of L2 with up to 50% vertebral body height loss and retropulsion.  CT of the thoracic spine shows interval thoracic spine fracture at T7 with no retropulsion or spinal canal stenosis.  This is now at 75 to 80% loss of height compared to 30 to 35% 4 months ago.  Discussed with neurosurgery.  MRI of the lumbar and thoracic spine to be performed today.  Further recommendations pending.    3.  X-ray of the left hip shows an impacted left hip fracture.  Orthopedic surgery consult pending.  Patient has been made n.p.o.    4.  CT of the abdomen and pelvis shows a large rectal stool conglomerate with mild perirectal fat stranding.  Findings concerning for stercoral colitis.  Patient status post milk and molasses enema with good results per nursing.  White blood cell count elevated on admission.  We will continue Zosyn for now.  Blood cultures are pending.  Urinalysis noted 2+ bacteria however did not reflex to culture.  Patient had no urinary complaints but is incontinent.    5.  CTA of the chest shows diffuse chronic lung changes with no evidence of pulmonary embolism.  We will schedule DuoNeb breathing treatments and encourage use of incentive spirometer.  Wean supplemental oxygen as tolerated.    6.  Metabolic acidosis noted, likely secondary to poor oral intake and nausea.  Patient was placed on a bicarbonate drip on admission.  Continue to reassess BMP every 6 hours.    7.  SCDs for DVT prophylaxis.    8.  Elevated troponin noted.  EKG on admission is poor quality but no acute ST-T wave changes noted.  Troponin to be reassessed at noon.  We will hold on any further cardiac work-up at this time.    9.  Labs in a.m.    10.  Will need PT/OT based on further recommendations from neurosurgery and orthopedic surgery.  She will likely need skilled nursing facility placement.    Discharge Planning: I expect the patient to be discharged to SNF in ? days.    Electronically signed by LISA Hidalgo,  5/6/2022, 09:34 CDT.

## 2022-05-06 NOTE — PLAN OF CARE
Goal Outcome Evaluation:  Plan of Care Reviewed With: patient        Progress: no change  Outcome Evaluation: Ntn assessment completed per BMI protocol.  Pt NPO at this time for surgery. Await oral diet advancement and tolerance. Cont to follow for plan of care.

## 2022-05-06 NOTE — ANESTHESIA PREPROCEDURE EVALUATION
Anesthesia Evaluation     Patient summary reviewed   no history of anesthetic complications:  NPO Solid Status: > 8 hours             Airway   Mallampati: II  TM distance: >3 FB  Neck ROM: full  Dental    (+) upper dentures and lower dentures    Pulmonary    (-) COPD, asthma, sleep apnea, not a smoker  Cardiovascular   Exercise tolerance: good (4-7 METS)    ECG reviewed  Patient on routine beta blocker and Beta blocker given within 24 hours of surgery    (+) hypertension, dysrhythmias Tachycardia,   (-) pacemaker, past MI, angina, cardiac stents    ROS comment: Echo:  · Left ventricular ejection fraction appears to be 61 - 65%. Left ventricular systolic function is normal.  · Left ventricular diastolic function was normal.  · Estimated right ventricular systolic pressure from tricuspid regurgitation is mildly elevated (35-45 mmHg).  · Mild pulmonary hypertension is present.  · Saline test results are negative.        Neuro/Psych  (-) seizures, TIA, CVA  GI/Hepatic/Renal/Endo    (-) GERD, liver disease, no renal disease, diabetes    Musculoskeletal     Abdominal    Substance History      OB/GYN          Other                          Anesthesia Plan    ASA 3 - emergent     general   (Concern for sepsis, on empiric zosyn. Has elevated WBCs, tachycardia, tachypnea and UTI. No PNB    Discussed with patient at bedside regarding No CPR. Patient is in agreement with rescinding this and being full code in the OR. Also discussed with Darren (daughter) over the phone and confirmed with Mago Ely RN at bedside)  intravenous induction     Anesthetic plan, all risks, benefits, and alternatives have been provided, discussed and informed consent has been obtained with: patient.

## 2022-05-06 NOTE — PLAN OF CARE
Problem: Adult Inpatient Plan of Care  Goal: Plan of Care Review  Outcome: Ongoing, Progressing  Flowsheets (Taken 5/6/2022 0701)  Progress: no change  Plan of Care Reviewed With: patient  Outcome Evaluation: A&Ox4. C/o left flank pain, prn pain meds given with good relief. Pt continually pulling O2 off, sats running 88-90 on RA, sats run mid 90s on 4L NC. Sinus tach on tele. Pt screened sepsis positive, MD aware. SCDs. . Milk and molasses enema performed with moderate output. IVF infusing. Clear liquid diet maintained. Voiding - incont. Call light in reach. Safety maintained.

## 2022-05-06 NOTE — ED PROVIDER NOTES
Subjective   Ms. Hung is an 81-year-old female who was recently admitted to the hospital because of altered mental status.  She also has a history of brain bleed after a fall last year.  Her daughter brings her into the ED again today because of weakness and nausea with diaphoresis whenever she tries to get her up.  The patient is also complaining of low back pain.  Apparently the daughter found the patient on the floor of her bedroom next to the bed this morning.  The patient not really able to contribute much more to the history just complains of not feeling well especially when she has to get up.  Right now she is denying any chest pain, active nausea or shortness of breath.  She does complain of low back pain especially on the right.          Review of Systems   Constitutional: Positive for diaphoresis.   Gastrointestinal: Positive for nausea.   Neurological: Positive for weakness.   All other systems reviewed and are negative.      Past Medical History:   Diagnosis Date   • Chronic hyponatremia    • Frequent UTI    • Migraines    • SVT (supraventricular tachycardia) (HCC)        Allergies   Allergen Reactions   • Codeine Anaphylaxis       Past Surgical History:   Procedure Laterality Date   • CHOLECYSTECTOMY WITH INTRAOPERATIVE CHOLANGIOGRAM N/A 5/9/2019    Procedure: CHOLECYSTECTOMY LAPAROSCOPIC INTRAOPERATIVE CHOLANGIOGRAM;  Surgeon: Chandan Bravo MD;  Location: [facility];  Service: General       Family History   Problem Relation Age of Onset   • Stroke Mother    • Anemia Mother    • Heart disease Father    • Heart attack Father    • Colon cancer Father        Social History     Socioeconomic History   • Marital status:    Tobacco Use   • Smoking status: Never Smoker   • Smokeless tobacco: Never Used   Vaping Use   • Vaping Use: Never used   Substance and Sexual Activity   • Alcohol use: No   • Drug use: No   • Sexual activity: Defer           Objective   Physical Exam  Vitals and nursing note  reviewed.   Constitutional:       Appearance: She is well-developed.   HENT:      Head: Normocephalic and atraumatic.      Right Ear: External ear normal.      Left Ear: External ear normal.      Nose: Nose normal.      Mouth/Throat:      Mouth: Mucous membranes are moist.      Pharynx: Oropharynx is clear.   Eyes:      Conjunctiva/sclera: Conjunctivae normal.   Cardiovascular:      Rate and Rhythm: Normal rate and regular rhythm.      Pulses: Normal pulses.      Heart sounds: Normal heart sounds.   Pulmonary:      Effort: Pulmonary effort is normal.      Breath sounds: Normal breath sounds.   Abdominal:      General: Bowel sounds are normal.      Palpations: Abdomen is soft.      Tenderness: There is generalized abdominal tenderness. There is no guarding or rebound.      Comments: Left lower quadrant abdominal pain   Musculoskeletal:         General: Normal range of motion.      Cervical back: Normal range of motion and neck supple.      Thoracic back: Tenderness present.      Lumbar back: Tenderness present.   Skin:     General: Skin is warm and dry.      Capillary Refill: Capillary refill takes less than 2 seconds.   Neurological:      General: No focal deficit present.      Mental Status: She is alert and oriented to person, place, and time.   Psychiatric:         Behavior: Behavior normal.         Thought Content: Thought content normal.         Judgment: Judgment normal.         Procedures         Lab Results (last 24 hours)     Procedure Component Value Units Date/Time    CBC & Differential [[id number]]  (Abnormal) Collected: 05/05/22 2155    Specimen: Blood Updated: 05/05/22 2207    Narrative:      The following orders were created for panel order CBC & Differential.  Procedure                               Abnormality         Status                     ---------                               -----------         ------                     CBC Auto Differential[[id number]]        Abnormal            Final  result                 Please view results for these tests on the individual orders.    Protime-INR [[id number]]  (Abnormal) Collected: 05/05/22 2155    Specimen: Blood Updated: 05/05/22 2246     Protime 15.0 Seconds      INR 1.23    aPTT [[id number]]  (Normal) Collected: 05/05/22 2155    Specimen: Blood Updated: 05/05/22 2246     PTT 34.7 seconds     Respiratory Panel PCR w/COVID-19(SARS-CoV-2) [facility]/PAD/[facility]/MAD/EZRA In-House, NP Swab in UTM/VTM, 3-4 HR TAT - Swab, Nasopharynx [[id number]]  (Normal) Collected: 05/05/22 2155    Specimen: Swab from Nasopharynx Updated: 05/05/22 2302     ADENOVIRUS, PCR Not Detected     Coronavirus 229E Not Detected     Coronavirus HKU1 Not Detected     Coronavirus NL63 Not Detected     Coronavirus OC43 Not Detected     COVID19 Not Detected     Human Metapneumovirus Not Detected     Human Rhinovirus/Enterovirus Not Detected     Influenza A PCR Not Detected     Influenza B PCR Not Detected     Parainfluenza Virus 1 Not Detected     Parainfluenza Virus 2 Not Detected     Parainfluenza Virus 3 Not Detected     Parainfluenza Virus 4 Not Detected     RSV, PCR Not Detected     Bordetella pertussis pcr Not Detected     Bordetella parapertussis PCR Not Detected     Chlamydophila pneumoniae PCR Not Detected     Mycoplasma pneumo by PCR Not Detected    Narrative:      In the setting of a positive respiratory panel with a viral infection PLUS a negative procalcitonin without other underlying concern for bacterial infection, consider observing off antibiotics or discontinuation of antibiotics and continue supportive care. If the respiratory panel is positive for atypical bacterial infection (Bordetella pertussis, Chlamydophila pneumoniae, or Mycoplasma pneumoniae), consider antibiotic de-escalation to target atypical bacterial infection.    BNP [[id number]]  (Abnormal) Collected: 05/05/22 2155    Specimen: Blood Updated: 05/05/22 2312     proBNP 6,919.0 pg/mL     Narrative:      Among patients  with dyspnea, NT-proBNP is highly sensitive for the detection of acute congestive heart failure. In addition NT-proBNP of <300 pg/ml effectively rules out acute congestive heart failure with 99% negative predictive value.    Results may be falsely decreased if patient taking Biotin.      D-dimer, Quantitative [[id number]]  (Abnormal) Collected: 05/05/22 2155    Specimen: Blood Updated: 05/05/22 2246     D-Dimer, Quantitative 8.97 MCGFEU/mL     Narrative:      Reference Range is 0-0.50 MCGFEU/mL. However, results <0.50 MCGFEU/mL tends to rule out DVT or PE. Results >0.50 MCGFEU/mL are not useful in predicting absence or presence of DVT or PE.      Lactic Acid, Plasma [[id number]]  (Abnormal) Collected: 05/05/22 2155    Specimen: Blood Updated: 05/05/22 2227     Lactate 2.3 mmol/L     Magnesium [[id number]]  (Normal) Collected: 05/05/22 2155    Specimen: Blood Updated: 05/05/22 2312     Magnesium 1.8 mg/dL     CBC Auto Differential [[id number]]  (Abnormal) Collected: 05/05/22 2155    Specimen: Blood Updated: 05/05/22 2207     WBC 17.69 10*3/mm3      RBC 4.49 10*6/mm3      Hemoglobin 15.8 g/dL      Hematocrit 44.6 %      MCV 99.3 fL      MCH 35.2 pg      MCHC 35.4 g/dL      RDW 14.4 %      RDW-SD 52.1 fl      MPV 9.5 fL      Platelets 392 10*3/mm3      Neutrophil % 89.1 %      Lymphocyte % 5.0 %      Monocyte % 4.7 %      Eosinophil % 0.0 %      Basophil % 0.2 %      Immature Grans % 1.0 %      Neutrophils, Absolute 15.77 10*3/mm3      Lymphocytes, Absolute 0.88 10*3/mm3      Monocytes, Absolute 0.84 10*3/mm3      Eosinophils, Absolute 0.00 10*3/mm3      Basophils, Absolute 0.03 10*3/mm3      Immature Grans, Absolute 0.17 10*3/mm3      nRBC 0.0 /100 WBC     Urinalysis With Culture If Indicated - Urine, Catheter [[id number]]  (Abnormal) Collected: 05/05/22 2223    Specimen: Urine, Catheter Updated: 05/05/22 2250     Color, UA Yellow     Appearance, UA Clear     pH, UA 6.0     Specific Gravity, UA 1.025     Glucose, UA  Negative     Ketones, UA 15 mg/dL (1+)     Bilirubin, UA Negative     Blood, UA Moderate (2+)     Protein,  mg/dL (2+)     Leuk Esterase, UA Negative     Nitrite, UA Negative     Urobilinogen, UA 0.2 E.U./dL    Urine Drug Screen - Urine, Catheter [[id number]]  (Abnormal) Collected: 05/05/22 2223    Specimen: Urine, Catheter Updated: 05/05/22 2240     THC, Screen, Urine Negative     Phencyclidine (PCP), Urine Negative     Cocaine Screen, Urine Negative     Methamphetamine, Ur Negative     Opiate Screen Negative     Amphetamine Screen, Urine Negative     Benzodiazepine Screen, Urine Positive     Tricyclic Antidepressants Screen Negative     Methadone Screen, Urine Negative     Barbiturates Screen, Urine Negative     Oxycodone Screen, Urine Negative     Propoxyphene Screen Negative     Buprenorphine, Screen, Urine Negative    Narrative:      Cutoff For Drugs Screened:    Amphetamines               500 ng/ml  Barbiturates               200 ng/ml  Benzodiazepines            150 ng/ml  Cocaine                    150 ng/ml  Methadone                  200 ng/ml  Opiates                    100 ng/ml  Phencyclidine               25 ng/ml  THC                            50 ng/ml  Methamphetamine            500 ng/ml  Tricyclic Antidepressants  300 ng/ml  Oxycodone                  100 ng/ml  Propoxyphene               300 ng/ml  Buprenorphine               10 ng/ml    The normal value for all drugs tested is negative. This report includes unconfirmed screening results, with the cutoff values listed, to be used for medical treatment purposes only.  Unconfirmed results must not be used for non-medical purposes such as employment or legal testing.  Clinical consideration should be applied to any drug of abuse test, particularly when unconfirmed results are used.      Urinalysis, Microscopic Only - Urine, Catheter [[id number]]  (Abnormal) Collected: 05/05/22 2223    Specimen: Urine, Catheter Updated: 05/05/22 2250     RBC, UA  0-2 /HPF      WBC, UA 3-5 /HPF      Comment: Urine culture not indicated.        Bacteria, UA 2+ /HPF      Squamous Epithelial Cells, UA 0-2 /HPF      Hyaline Casts, UA 0-2 /LPF      Methodology Automated Microscopy    Blood Culture - Blood, Wrist, Right [[id number]] Collected: 05/05/22 2317    Specimen: Blood from Wrist, Right Updated: 05/05/22 2326    Blood Culture - Blood, Arm, Right [[id number]] Collected: 05/05/22 2317    Specimen: Blood from Arm, Right Updated: 05/05/22 2326    Comprehensive Metabolic Panel [[id number]]  (Abnormal) Collected: 05/05/22 2353    Specimen: Blood Updated: 05/06/22 0023     Glucose 103 mg/dL      BUN 24 mg/dL      Creatinine 0.65 mg/dL      Sodium 134 mmol/L      Potassium 4.4 mmol/L      Chloride 97 mmol/L      CO2 15.0 mmol/L      Calcium 9.9 mg/dL      Total Protein 7.2 g/dL      Albumin 3.80 g/dL      ALT (SGPT) 14 U/L      AST (SGOT) 23 U/L      Alkaline Phosphatase 83 U/L      Total Bilirubin 0.7 mg/dL      Globulin 3.4 gm/dL      A/G Ratio 1.1 g/dL      BUN/Creatinine Ratio 36.9     Anion Gap 22.0 mmol/L      eGFR 88.6 mL/min/1.73      Comment: National Kidney Foundation and American Society of Nephrology (ASN) Task Force recommended calculation based on the Chronic Kidney Disease Epidemiology Collaboration (CKD-EPI) equation refit without adjustment for race.       Narrative:      GFR Normal >60  Chronic Kidney Disease <60  Kidney Failure <15      Lipase [[id number]]  (Normal) Collected: 05/05/22 2353    Specimen: Blood Updated: 05/06/22 0018     Lipase 18 U/L     Troponin [[id number]]  (Abnormal) Collected: 05/05/22 2353    Specimen: Blood Updated: 05/06/22 0020     Troponin T 0.032 ng/mL     Narrative:      Troponin T Reference Range:  <= 0.03 ng/mL-   Negative for AMI  >0.03 ng/mL-     Abnormal for myocardial necrosis.  Clinicians would have to utilize clinical acumen, EKG, Troponin and serial changes to determine if it is an Acute Myocardial Infarction or myocardial injury  "due to an underlying chronic condition.       Results may be falsely decreased if patient taking Biotin.      Troponin [[id number]]  (Normal) Collected: 05/05/22 2353    Specimen: Blood Updated: 05/06/22 0017     Troponin T 0.026 ng/mL     Narrative:      Troponin T Reference Range:  <= 0.03 ng/mL-   Negative for AMI  >0.03 ng/mL-     Abnormal for myocardial necrosis.  Clinicians would have to utilize clinical acumen, EKG, Troponin and serial changes to determine if it is an Acute Myocardial Infarction or myocardial injury due to an underlying chronic condition.       Results may be falsely decreased if patient taking Biotin.      C-reactive Protein [[id number]]  (Abnormal) Collected: 05/05/22 2353    Specimen: Blood Updated: 05/06/22 0025     C-Reactive Protein 11.90 mg/dL     TSH [[id number]]  (Normal) Collected: 05/05/22 2353    Specimen: Blood Updated: 05/06/22 0028     TSH 0.676 uIU/mL     T4, Free [[id number]]  (Abnormal) Collected: 05/05/22 2353    Specimen: Blood Updated: 05/06/22 0027     Free T4 1.81 ng/dL     Narrative:      Results may be falsely increased if patient taking Biotin.      Procalcitonin [[id number]]  (Abnormal) Collected: 05/05/22 2353    Specimen: Blood Updated: 05/06/22 0130     Procalcitonin 0.34 ng/mL     Narrative:      As a Marker for Sepsis (Non-Neonates):    1. <0.5 ng/mL represents a low risk of severe sepsis and/or septic shock.  2. >2 ng/mL represents a high risk of severe sepsis and/or septic shock.    As a Marker for Lower Respiratory Tract Infections that require antibiotic therapy:    PCT on Admission    Antibiotic Therapy       6-12 Hrs later    >0.5                Strongly Recommended  >0.25 - <0.5        Recommended   0.1 - 0.25          Discouraged              Remeasure/reassess PCT  <0.1                Strongly Discouraged     Remeasure/reassess PCT    As 28 day mortality risk marker: \"Change in Procalcitonin Result\" (>80% or <=80%) if Day 0 (or Day 1) and Day 4 values are " available. Refer to http://www.[facility]-pct-calculator.com    Change in PCT <=80%  A decrease of PCT levels below or equal to 80% defines a positive change in PCT test result representing a higher risk for 28-day all-cause mortality of patients diagnosed with severe sepsis for septic shock.    Change in PCT >80%  A decrease of PCT levels of more than 80% defines a negative change in PCT result representing a lower risk for 28-day all-cause mortality of patients diagnosed with severe sepsis or septic shock.       POC Creatinine [[id number]]  (Normal) Collected: 05/05/22 2355    Specimen: Blood Updated: 05/06/22 0013     Creatinine 0.70 mg/dL      Comment: Serial Number: [id number]  [id number]       STAT Lactic Acid, Reflex [[id number]] Collected: 05/06/22 0132    Specimen: Blood Updated: 05/06/22 0137      No Radiology Exams Resulted Within Past 24 Hours  ED Course  ED Course as of 05/06/22 0146   Fri May 06, 2022   0138 I received signout from Dr. Castro.  Patient is an 81-year-old female who comes into the emergency department with complaint of weakness, nausea, and abdominal pain and left paraspinous lumbar back pain/left hip pain.  Did have a fall off her bed today.  States it was a minor fall.  On exam she has left lower quadrant abdominal pain.  She also has some mild left flank pain.  No midline spine pain.  Labs revealed elevated WBC, procalcitonin, lactic acid and CRP.  Dr. Castro ordered CT head, C-spine, T-spine, L-spine, abdomen pelvis.  D-dimer was up so we added a CT of the chest as well.    Images revealed T7 and L2 compression fractures.  T7 compression fracture seen on a previous CT scan, likely old.  No old CTL spines for comparison but she does not have any midline tenderness over L2, suspect this is an old injury as well.    She does have descending and sigmoid colitis which could explain the left-sided pelvic pain, hip pain and back pain.  We will treat with Zosyn, plan for admission to the  hospitalist service. [AW]      ED Course User Index  [AW] Lefty Toussaint MD                                                 [facility]      Final diagnoses:   Colitis       ED Disposition  ED Disposition     ED Disposition   Decision to Admit    Condition   --    Comment   Level of Care: Remote Telemetry [26]   Diagnosis: Colitis [487823]   Admitting Physician: SHARRI SWANSON [[id number]]   Attending Physician: SHARRI SWANSON [[id number]]   Isolate for COVID?: No [0]   Certification: I Certify That Inpatient Hospital Services Are Medically Necessary For Greater Than 2 Midnights               No follow-up provider specified.       Medication List      No changes were made to your prescriptions during this visit.       The care of the patient was handed over to Dr. Toussaint at shift change.     Lefty Toussaint MD  05/06/22 0146

## 2022-05-06 NOTE — CASE MANAGEMENT/SOCIAL WORK
Discharge Planning Assessment  [facility]     Patient Name: Zelalem Hung  MRN: [id number]  Today's Date: 5/6/2022    Admit Date: 5/5/2022     Discharge Needs Assessment     Row Name 05/06/22 1424       Living Environment    People in Home alone    Current Living Arrangements home    Primary Care Provided by self;child(neema)    Provides Primary Care For no one, unable/limited ability to care for self;child(neema)    Family Caregiver if Needed child(neema), adult    Family Caregiver Names Madie Tai lives next door in [facility]    Quality of Family Relationships helpful;involved;supportive    Able to Return to Prior Arrangements no       Resource/Environmental Concerns    Resource/Environmental Concerns none       Transition Planning    Patient/Family Anticipates Transition to inpatient rehabilitation facility    Patient/Family Anticipated Services at Transition skilled nursing    Transportation Anticipated family or friend will provide  or EMS       Discharge Needs Assessment    Equipment Currently Used at Home walker, rolling;commode;oxygen;respiratory supplies    Equipment Needed After Discharge none    Discharge Facility/Level of Care Needs nursing facility, skilled    Provided Post Acute Provider List? Yes    Post Acute Provider List Inpatient Rehab    Provided Post Acute Provider Quality & Resource List? Yes    Post Acute Provider Quality and Resource List Inpatient Rehab    Delivered To Patient    Method of Delivery In person    Patient's Choice of Community Agency(s) [facility]  has used [facility] for in home services    Discharge Coordination/Progress Pending hip surgery, PT/OT evals pending. will follow for dc needs. Has gone to [facility] in past and had good experience. Would like to go there if inpt rehab needed. SW updated and will follow with referral when appropriate. No precert required. Patient has PCP and rx coverage. DME in home.               Discharge Plan    No documentation.                Continued Care and Services - Admitted Since 5/5/2022    Coordination has not been started for this encounter.       Expected Discharge Date and Time     Expected Discharge Date Expected Discharge Time    May 9, 2022          Demographic Summary    No documentation.                Functional Status    No documentation.                Psychosocial    No documentation.                Abuse/Neglect    No documentation.                Legal    No documentation.                Substance Abuse    No documentation.                Patient Forms    No documentation.                   Kamala Mccoy RN

## 2022-05-06 NOTE — CONSULTS
Orthopaedic Surgery Consult Note      5/6/2022   11:05 CDT    Name:  Zelalem Hung  MRN:    [id number]     Acct:     [id number]   Room:  [address] [place] [address] Day: 0     Admit Date: 5/5/2022  PCP: Garth Amezcua APRN        Subjective:     CC:  Left hip pain/hip fracture    HPI: 81 y.o. female consult who presented to the ED last night with 3-4 week onset of lower back and left hip pain.  History obtained from the patient's daughter who is an ultrasound tech here and present in patient's room.  The patient is currently not in the room due to getting an MRI of her spine. She denies any known history of falls or specific trauma lately. Workup in ED revealed a subcapital femoral neck fracture as well as colitis, compression fractures, and a fecal impaction.  Our service was consulted for further operative management of her hip fracture. The patient is fairly sedentary but is ambulatory.         Medications:     Allergies:   Allergies   Allergen Reactions   • Codeine Anaphylaxis       Current Meds:   Current Facility-Administered Medications   Medication Dose Route Frequency Provider Last Rate Last Admin   • acebutolol (SECTRAL) capsule 200 mg  200 mg Oral Q12H Waqar Dewitt MD   200 mg at 05/06/22 0840   • acetaminophen (TYLENOL) tablet 650 mg  650 mg Oral Q4H PRN Waqar Dewitt MD        Or   • acetaminophen (TYLENOL) 160 MG/5ML solution 650 mg  650 mg Oral Q4H PRN Waqar Dewitt MD        Or   • acetaminophen (TYLENOL) suppository 650 mg  650 mg Rectal Q4H PRN Waqar Dewitt MD       • aspirin chewable tablet 81 mg  81 mg Oral Daily Waqar Dewitt MD       • cholecalciferol (VITAMIN D3) tablet 2,000 Units  2,000 Units Oral Daily Waqar Dewitt MD   2,000 Units at 05/06/22 0840   • fenofibrate (TRICOR) tablet 145 mg  145 mg Oral Daily Waqar Dewitt MD   145 mg at 05/06/22 0840   • ipratropium-albuterol (DUO-NEB) nebulizer solution 3 mL  3 mL Nebulization 4x Daily - RT Sheila Foss APRN        • OLANZapine (zyPREXA) tablet 5 mg  5 mg Oral Nightly Waqar Dewitt MD       • ondansetron (ZOFRAN) tablet 4 mg  4 mg Oral Q6H PRN Waqar Dewitt MD        Or   • ondansetron (ZOFRAN) injection 4 mg  4 mg Intravenous Q6H PRN Waqar Dewitt MD       • oxyCODONE-acetaminophen (PERCOCET) 5-325 MG per tablet 1 tablet  1 tablet Oral Q8H PRN Waqar Dewitt MD   1 tablet at 05/06/22 0525   • PARoxetine (PAXIL) tablet 40 mg  40 mg Oral Daily Waqar Dewitt MD   40 mg at 05/06/22 0840   • piperacillin-tazobactam (ZOSYN) 3.375 g in iso-osmotic dextrose 50 ml (premix)  3.375 g Intravenous Q8H Waqar Dewitt MD   3.375 g at 05/06/22 0838   • sodium bicarbonate 8.4 % 100 mEq in sodium chloride 0.45 % 1,000 mL infusion (greater than 75 mEq)  100 mEq Intravenous Continuous Garth Rollins DO 75 mL/hr at 05/06/22 0928 Rate Change at 05/06/22 0928   • sodium chloride 0.9 % flush 10 mL  10 mL Intravenous Q12H Waqar Dewitt MD   10 mL at 05/06/22 0840   • sodium chloride 0.9 % flush 10 mL  10 mL Intravenous PRN Waqar Dewitt MD       • sodium chloride nasal spray 2 spray  2 spray Nasal PRN Waqar Dewitt MD               Data:     Code Status:    Code Status and Medical Interventions:   Ordered at: 05/06/22 4183     Medical Intervention Limits:    NO intubation (DNI)     Level Of Support Discussed With:    Patient     Code Status (Patient has no pulse and is not breathing):    No CPR (Do Not Attempt to Resuscitate)     Medical Interventions (Patient has pulse or is breathing):    Limited Support       Family History   Problem Relation Age of Onset   • Stroke Mother    • Anemia Mother    • Heart disease Father    • Heart attack Father    • Colon cancer Father        Social History     Socioeconomic History   • Marital status:    Tobacco Use   • Smoking status: Never Smoker   • Smokeless tobacco: Never Used   Vaping Use   • Vaping Use: Never used   Substance and Sexual Activity   •  Alcohol use: No   • Drug use: No   • Sexual activity: Defer       Past Medical History:   Diagnosis Date   • Chronic hyponatremia    • Frequent UTI    • Migraines    • SVT (supraventricular tachycardia) (HCC)      Past Surgical History:   Procedure Laterality Date   • CHOLECYSTECTOMY WITH INTRAOPERATIVE CHOLANGIOGRAM N/A 5/9/2019    Procedure: CHOLECYSTECTOMY LAPAROSCOPIC INTRAOPERATIVE CHOLANGIOGRAM;  Surgeon: Chandan Bravo MD;  Location: [facility];  Service: General       Review of Symptoms:  CONSTITUTIONAL:  negative for  fevers, chills, sweats, fatigue, malaise, anorexia and weight loss  EYES:  negative for  double vision, blurred vision and visual disturbance  HEENT:  negative for  hearing loss, tinnitus, ear drainage, earaches, nasal congestion, epistaxis, snoring, sore mouth, sore throat, hoarseness and voice change  RESPIRATORY:  negative for  dry cough, cough with sputum, dyspnea, wheezing, hemoptysis, chest pain, pleuritic pain and cyanosis  CARDIOVASCULAR:  negative for  chest pain, palpitations, orthopnea, exertional chest pressure/discomfort, edema  GASTROINTESTINAL:  negative for nausea, vomiting, change in bowel habits, diarrhea, constipation, abdominal pain, jaundice, dysphagia, regurgitation, hematemesis and hemtochezia  GENITOURINARY:  negative for frequency, dysuria, nocturia, hesitancy and hematuria  INTEGUMENT/BREAST:  negative for rash, skin lesion(s), dryness, skin color change, pruritus, changes in hair and changes in nails  HEMATOLOGIC/LYMPHATIC:  negative for easy bruising, bleeding, lymphadenopathy, petechiae and swelling/edema  ALLERGIC/IMMUNOLOGIC:  negative for recurrent infections and urticaria  ENDOCRINE:  negative for heat intolerance, cold intolerance, tremor, weight changes, hair loss and diabetic symptoms including neither polyuria nor polydipsia  MUSCULOSKELETAL:  See HPI  NEUROLOGICAL:  negative for headaches, dizziness, seizures, memory problems, speech problems, visual  "disturbance, coordination problems, gait problems, tremor, syncope and near syncope  BEHAVIOR/PSYCH:  negative for depressed mood, elated mood, increased agitation and anxiety      Vitals:  /78 (BP Location: Right arm, Patient Position: Lying)   Pulse 104   Temp 97.3 °F (36.3 °C) (Oral)   Resp 20   Ht 152.4 cm (60\")   Wt 43.4 kg (95 lb 10.9 oz)   SpO2 96%   BMI 18.69 kg/m²   Temp (24hrs), Av.5 °F (36.4 °C), Min:97.3 °F (36.3 °C), Max:97.8 °F (36.6 °C)      I/O (24Hr):    Intake/Output Summary (Last 24 hours) at 2022 1105  Last data filed at 2022 0844  Gross per 24 hour   Intake 1971 ml   Output --   Net 1971 ml       Labs:  Lab Results (last 72 hours)     Procedure Component Value Units Date/Time    Comprehensive Metabolic Panel [[id number]]  (Abnormal) Collected: 22    Specimen: Blood Updated: 22     Glucose 109 mg/dL      BUN 22 mg/dL      Creatinine 0.68 mg/dL      Sodium 133 mmol/L      Potassium 4.2 mmol/L      Comment: Slight hemolysis detected by analyzer. Results may be affected.        Chloride 97 mmol/L      CO2 10.0 mmol/L      Calcium 9.7 mg/dL      Total Protein 6.8 g/dL      Albumin 3.80 g/dL      ALT (SGPT) 13 U/L      AST (SGOT) 24 U/L      Alkaline Phosphatase 80 U/L      Total Bilirubin 0.7 mg/dL      Globulin 3.0 gm/dL      A/G Ratio 1.3 g/dL      BUN/Creatinine Ratio 32.4     Anion Gap 26.0 mmol/L      eGFR 87.6 mL/min/1.73      Comment: National Kidney Foundation and American Society of Nephrology (ASN) Task Force recommended calculation based on the Chronic Kidney Disease Epidemiology Collaboration (CKD-EPI) equation refit without adjustment for race.       Narrative:      GFR Normal >60  Chronic Kidney Disease <60  Kidney Failure <15      Magnesium [[id number]]  (Normal) Collected: 22    Specimen: Blood Updated: 22     Magnesium 1.6 mg/dL     CBC Auto Differential [[id number]]  (Abnormal) Collected: 22 0459    Specimen: " Blood Updated: 05/06/22 0515     WBC 20.00 10*3/mm3      RBC 4.08 10*6/mm3      Hemoglobin 14.1 g/dL      Hematocrit 40.5 %      MCV 99.3 fL      MCH 34.6 pg      MCHC 34.8 g/dL      RDW 14.6 %      RDW-SD 53.4 fl      MPV 9.4 fL      Platelets 321 10*3/mm3      Neutrophil % 89.0 %      Lymphocyte % 4.4 %      Monocyte % 5.4 %      Eosinophil % 0.0 %      Basophil % 0.2 %      Immature Grans % 1.0 %      Neutrophils, Absolute 17.82 10*3/mm3      Lymphocytes, Absolute 0.88 10*3/mm3      Monocytes, Absolute 1.07 10*3/mm3      Eosinophils, Absolute 0.00 10*3/mm3      Basophils, Absolute 0.04 10*3/mm3      Immature Grans, Absolute 0.19 10*3/mm3      nRBC 0.0 /100 WBC     Blood Gas, Arterial - [[id number]]  (Abnormal) Collected: 05/06/22 0252    Specimen: Arterial Blood Updated: 05/06/22 0239     Site Right Brachial     Meliton's Test N/A     pH, Arterial 7.319 pH units      Comment: 84 Value below reference range        pCO2, Arterial 22.0 mm Hg      Comment: 84 Value below reference range        pO2, Arterial 60.8 mm Hg      Comment: 84 Value below reference range        HCO3, Arterial 11.3 mmol/L      Comment: 84 Value below reference range        Base Excess, Arterial -12.7 mmol/L      Comment: 84 Value below reference range        O2 Saturation, Arterial 89.7 %      Comment: 84 Value below reference range        Temperature 37.0 C      Barometric Pressure for Blood Gas 743 mmHg      Modality Room Air     FIO2 21 %      Ventilator Mode NA     Collected by [id number]     Comment: Meter: [id number]     :  [id number]        pCO2, Temperature Corrected 22.0 mm Hg      pH, Temp Corrected 7.319 pH Units      pO2, Temperature Corrected 60.8 mm Hg     STAT Lactic Acid, Reflex [[id number]]  (Normal) Collected: 05/06/22 0132    Specimen: Blood Updated: 05/06/22 0152     Lactate 1.9 mmol/L     Procalcitonin [[id number]]  (Abnormal) Collected: 05/05/22 3990    Specimen: Blood Updated: 05/06/22 0130     Procalcitonin 0.34  "ng/mL     Narrative:      As a Marker for Sepsis (Non-Neonates):    1. <0.5 ng/mL represents a low risk of severe sepsis and/or septic shock.  2. >2 ng/mL represents a high risk of severe sepsis and/or septic shock.    As a Marker for Lower Respiratory Tract Infections that require antibiotic therapy:    PCT on Admission    Antibiotic Therapy       6-12 Hrs later    >0.5                Strongly Recommended  >0.25 - <0.5        Recommended   0.1 - 0.25          Discouraged              Remeasure/reassess PCT  <0.1                Strongly Discouraged     Remeasure/reassess PCT    As 28 day mortality risk marker: \"Change in Procalcitonin Result\" (>80% or <=80%) if Day 0 (or Day 1) and Day 4 values are available. Refer to [email or web address]    Change in PCT <=80%  A decrease of PCT levels below or equal to 80% defines a positive change in PCT test result representing a higher risk for 28-day all-cause mortality of patients diagnosed with severe sepsis for septic shock.    Change in PCT >80%  A decrease of PCT levels of more than 80% defines a negative change in PCT result representing a lower risk for 28-day all-cause mortality of patients diagnosed with severe sepsis or septic shock.       TSH [[id number]]  (Normal) Collected: 05/05/22 2353    Specimen: Blood Updated: 05/06/22 0028     TSH 0.676 uIU/mL     T4, Free [[id number]]  (Abnormal) Collected: 05/05/22 2353    Specimen: Blood Updated: 05/06/22 0027     Free T4 1.81 ng/dL     Narrative:      Results may be falsely increased if patient taking Biotin.      C-reactive Protein [[id number]]  (Abnormal) Collected: 05/05/22 2353    Specimen: Blood Updated: 05/06/22 0025     C-Reactive Protein 11.90 mg/dL     Comprehensive Metabolic Panel [[id number]]  (Abnormal) Collected: 05/05/22 2353    Specimen: Blood Updated: 05/06/22 0023     Glucose 103 mg/dL      BUN 24 mg/dL      Creatinine 0.65 mg/dL      Sodium 134 mmol/L      Potassium 4.4 mmol/L      Chloride " 97 mmol/L      CO2 15.0 mmol/L      Calcium 9.9 mg/dL      Total Protein 7.2 g/dL      Albumin 3.80 g/dL      ALT (SGPT) 14 U/L      AST (SGOT) 23 U/L      Alkaline Phosphatase 83 U/L      Total Bilirubin 0.7 mg/dL      Globulin 3.4 gm/dL      A/G Ratio 1.1 g/dL      BUN/Creatinine Ratio 36.9     Anion Gap 22.0 mmol/L      eGFR 88.6 mL/min/1.73      Comment: National Kidney Foundation and American Society of Nephrology (ASN) Task Force recommended calculation based on the Chronic Kidney Disease Epidemiology Collaboration (CKD-EPI) equation refit without adjustment for race.       Narrative:      GFR Normal >60  Chronic Kidney Disease <60  Kidney Failure <15      Troponin [[id number]]  (Abnormal) Collected: 05/05/22 2353    Specimen: Blood Updated: 05/06/22 0020     Troponin T 0.032 ng/mL     Narrative:      Troponin T Reference Range:  <= 0.03 ng/mL-   Negative for AMI  >0.03 ng/mL-     Abnormal for myocardial necrosis.  Clinicians would have to utilize clinical acumen, EKG, Troponin and serial changes to determine if it is an Acute Myocardial Infarction or myocardial injury due to an underlying chronic condition.       Results may be falsely decreased if patient taking Biotin.      Lipase [[id number]]  (Normal) Collected: 05/05/22 2353    Specimen: Blood Updated: 05/06/22 0018     Lipase 18 U/L     Troponin [[id number]]  (Normal) Collected: 05/05/22 2353    Specimen: Blood Updated: 05/06/22 0017     Troponin T 0.026 ng/mL     Narrative:      Troponin T Reference Range:  <= 0.03 ng/mL-   Negative for AMI  >0.03 ng/mL-     Abnormal for myocardial necrosis.  Clinicians would have to utilize clinical acumen, EKG, Troponin and serial changes to determine if it is an Acute Myocardial Infarction or myocardial injury due to an underlying chronic condition.       Results may be falsely decreased if patient taking Biotin.      POC Creatinine [[id number]]  (Normal) Collected: 05/05/22 2355    Specimen: Blood Updated: 05/06/22  0013     Creatinine 0.70 mg/dL      Comment: Serial Number: [id number]:  [id number]       Blood Culture - Blood, Wrist, Right [[id number]] Collected: 05/05/22 2317    Specimen: Blood from Wrist, Right Updated: 05/05/22 2326    Blood Culture - Blood, Arm, Right [[id number]] Collected: 05/05/22 2317    Specimen: Blood from Arm, Right Updated: 05/05/22 2326    Magnesium [[id number]]  (Normal) Collected: 05/05/22 2155    Specimen: Blood Updated: 05/05/22 2312     Magnesium 1.8 mg/dL     BNP [[id number]]  (Abnormal) Collected: 05/05/22 2155    Specimen: Blood Updated: 05/05/22 2312     proBNP 6,919.0 pg/mL     Narrative:      Among patients with dyspnea, NT-proBNP is highly sensitive for the detection of acute congestive heart failure. In addition NT-proBNP of <300 pg/ml effectively rules out acute congestive heart failure with 99% negative predictive value.    Results may be falsely decreased if patient taking Biotin.      Respiratory Panel PCR w/COVID-19(SARS-CoV-2) [facility]GRA/PAD/COR/MAD/EZRA In-House, NP Swab in UTM/VTM, 3-4 HR TAT - Swab, Nasopharynx [[id number]]  (Normal) Collected: 05/05/22 2155    Specimen: Swab from Nasopharynx Updated: 05/05/22 2302     ADENOVIRUS, PCR Not Detected     Coronavirus 229E Not Detected     Coronavirus HKU1 Not Detected     Coronavirus NL63 Not Detected     Coronavirus OC43 Not Detected     COVID19 Not Detected     Human Metapneumovirus Not Detected     Human Rhinovirus/Enterovirus Not Detected     Influenza A PCR Not Detected     Influenza B PCR Not Detected     Parainfluenza Virus 1 Not Detected     Parainfluenza Virus 2 Not Detected     Parainfluenza Virus 3 Not Detected     Parainfluenza Virus 4 Not Detected     RSV, PCR Not Detected     Bordetella pertussis pcr Not Detected     Bordetella parapertussis PCR Not Detected     Chlamydophila pneumoniae PCR Not Detected     Mycoplasma pneumo by PCR Not Detected    Narrative:      In the setting of a positive respiratory panel with a viral  infection PLUS a negative procalcitonin without other underlying concern for bacterial infection, consider observing off antibiotics or discontinuation of antibiotics and continue supportive care. If the respiratory panel is positive for atypical bacterial infection (Bordetella pertussis, Chlamydophila pneumoniae, or Mycoplasma pneumoniae), consider antibiotic de-escalation to target atypical bacterial infection.    Urinalysis With Culture If Indicated - Urine, Catheter [[id number]]  (Abnormal) Collected: 05/05/22 2223    Specimen: Urine, Catheter Updated: 05/05/22 2250     Color, UA Yellow     Appearance, UA Clear     pH, UA 6.0     Specific Gravity, UA 1.025     Glucose, UA Negative     Ketones, UA 15 mg/dL (1+)     Bilirubin, UA Negative     Blood, UA Moderate (2+)     Protein,  mg/dL (2+)     Leuk Esterase, UA Negative     Nitrite, UA Negative     Urobilinogen, UA 0.2 E.U./dL    Urinalysis, Microscopic Only - Urine, Catheter [[id number]]  (Abnormal) Collected: 05/05/22 2223    Specimen: Urine, Catheter Updated: 05/05/22 2250     RBC, UA 0-2 /HPF      WBC, UA 3-5 /HPF      Comment: Urine culture not indicated.        Bacteria, UA 2+ /HPF      Squamous Epithelial Cells, UA 0-2 /HPF      Hyaline Casts, UA 0-2 /LPF      Methodology Automated Microscopy    Protime-INR [[id number]]  (Abnormal) Collected: 05/05/22 2155    Specimen: Blood Updated: 05/05/22 2246     Protime 15.0 Seconds      INR 1.23    aPTT [[id number]]  (Normal) Collected: 05/05/22 2155    Specimen: Blood Updated: 05/05/22 2246     PTT 34.7 seconds     D-dimer, Quantitative [[id number]]  (Abnormal) Collected: 05/05/22 2155    Specimen: Blood Updated: 05/05/22 2246     D-Dimer, Quantitative 8.97 MCGFEU/mL     Narrative:      Reference Range is 0-0.50 MCGFEU/mL. However, results <0.50 MCGFEU/mL tends to rule out DVT or PE. Results >0.50 MCGFEU/mL are not useful in predicting absence or presence of DVT or PE.      Urine Drug Screen - Urine, Catheter  [[id number]]  (Abnormal) Collected: 05/05/22 2223    Specimen: Urine, Catheter Updated: 05/05/22 2240     THC, Screen, Urine Negative     Phencyclidine (PCP), Urine Negative     Cocaine Screen, Urine Negative     Methamphetamine, Ur Negative     Opiate Screen Negative     Amphetamine Screen, Urine Negative     Benzodiazepine Screen, Urine Positive     Tricyclic Antidepressants Screen Negative     Methadone Screen, Urine Negative     Barbiturates Screen, Urine Negative     Oxycodone Screen, Urine Negative     Propoxyphene Screen Negative     Buprenorphine, Screen, Urine Negative    Narrative:      Cutoff For Drugs Screened:    Amphetamines               500 ng/ml  Barbiturates               200 ng/ml  Benzodiazepines            150 ng/ml  Cocaine                    150 ng/ml  Methadone                  200 ng/ml  Opiates                    100 ng/ml  Phencyclidine               25 ng/ml  THC                            50 ng/ml  Methamphetamine            500 ng/ml  Tricyclic Antidepressants  300 ng/ml  Oxycodone                  100 ng/ml  Propoxyphene               300 ng/ml  Buprenorphine               10 ng/ml    The normal value for all drugs tested is negative. This report includes unconfirmed screening results, with the cutoff values listed, to be used for medical treatment purposes only.  Unconfirmed results must not be used for non-medical purposes such as employment or legal testing.  Clinical consideration should be applied to any drug of abuse test, particularly when unconfirmed results are used.      Lactic Acid, Plasma [[id number]]  (Abnormal) Collected: 05/05/22 2155    Specimen: Blood Updated: 05/05/22 2227     Lactate 2.3 mmol/L     CBC & Differential [[id number]]  (Abnormal) Collected: 05/05/22 2155    Specimen: Blood Updated: 05/05/22 2207    Narrative:      The following orders were created for panel order CBC & Differential.  Procedure                               Abnormality         Status                      ---------                               -----------         ------                     CBC Auto Differential[[id number]]        Abnormal            Final result                 Please view results for these tests on the individual orders.    CBC Auto Differential [[id number]]  (Abnormal) Collected: 05/05/22 2155    Specimen: Blood Updated: 05/05/22 2207     WBC 17.69 10*3/mm3      RBC 4.49 10*6/mm3      Hemoglobin 15.8 g/dL      Hematocrit 44.6 %      MCV 99.3 fL      MCH 35.2 pg      MCHC 35.4 g/dL      RDW 14.4 %      RDW-SD 52.1 fl      MPV 9.5 fL      Platelets 392 10*3/mm3      Neutrophil % 89.1 %      Lymphocyte % 5.0 %      Monocyte % 4.7 %      Eosinophil % 0.0 %      Basophil % 0.2 %      Immature Grans % 1.0 %      Neutrophils, Absolute 15.77 10*3/mm3      Lymphocytes, Absolute 0.88 10*3/mm3      Monocytes, Absolute 0.84 10*3/mm3      Eosinophils, Absolute 0.00 10*3/mm3      Basophils, Absolute 0.03 10*3/mm3      Immature Grans, Absolute 0.17 10*3/mm3      nRBC 0.0 /100 WBC           Imaging:  CT Head Without Contrast    Result Date: 5/6/2022  Mild cerebral and cerebellar volume loss with chronic microvascular disease but no evidence of acute intracranial process.   This report was finalized on 05/06/2022 06:58 by Dr. Filiberto Franco MD.    CT Cervical Spine Without Contrast    Result Date: 5/6/2022  1. Osteopenia. Similar alignment with chronic changes as described above. No acute cervical vertebral fracture or traumatic malalignment  This report was finalized on 05/06/2022 08:47 by Dr. Adeola Lawrence MD.    CT Lumbar Spine Without Contrast    Result Date: 5/6/2022   Acute 2 column compression fracture of L2 with up to 50% vertebral body height loss and 6 mm osseous retropulsion causing mild central canal narrowing.  These findings are in agreement with the critical and emergent findings from the [facility] preliminary report. This report was finalized on 05/06/2022 08:53 by Dr. Sonny Mtz  MD.    CT Abdomen Pelvis With Contrast    Result Date: 5/6/2022   1.  Large rectal stool conglomerate with mild perirectal fat stranding. Findings are concerning for stercoral colitis.  2.  Distended urinary bladder. No hydronephrosis or urinary tract calculi.  3.  L2 compression fracture, described in a separate same-day CT lumbar spine report. This report was finalized on 05/06/2022 08:49 by Dr. Sonny Mtz MD.    Medical Data Review: Imaging reviewed personally by myself and Dr. Simons.       Physical Exam:     UNABLE TO EXAMINE PATIENT AS SHE IS GETTING AN MRI.     Assessment:     Primary Orthopaedic Problem  Colitis  Left impacted, displaced, subcapital femoral neck fracture  S/p Fall  Compression fractures, thoracic and lumbar  Sepsis  Fecal impaction  Metabolic Acidosis    Plan:     · Surgical Plan - Left hip hemiarthroplasty  · Continue NPO - patient last had a sip of juice around 0800.   · Due to comorbidities this patient has increased surgical risk and increased risk of post operative complications including, but not limited to infection, wound dehiscence, bleeding, and wound healing. Comorbidities may also increase overall post operative recovery time for this patient. This has all been discussed with patient and family and they voiced understanding.          Electronically signed by Hugo Rosenthal PA-C on 5/6/2022 at 11:05 CDT

## 2022-05-06 NOTE — ANESTHESIA PROCEDURE NOTES
Airway  Urgency: elective    Date/Time: 5/6/2022 3:27 PM  Airway not difficult    General Information and Staff    Patient location during procedure: OR  CRNA/CAA: Tootie Maier CRNA    Indications and Patient Condition  Indications for airway management: airway protection    Preoxygenated: yes  Mask difficulty assessment: 1 - vent by mask    Final Airway Details  Final airway type: endotracheal airway      Successful airway: ETT  Cuffed: yes   Successful intubation technique: direct laryngoscopy  Facilitating devices/methods: intubating stylet  Endotracheal tube insertion site: oral  Blade: Monico  Blade size: 3.5  ETT size (mm): 7.5  Cormack-Lehane Classification: grade I - full view of glottis  Placement verified by: chest auscultation and capnometry   Cuff volume (mL): 1  Measured from: lips  ETT/EBT  to lips (cm): 22  Number of attempts at approach: 1  Assessment: lips, teeth, and gum same as pre-op and atraumatic intubation    Additional Comments  Atraumatic

## 2022-05-07 LAB
ANION GAP SERPL CALCULATED.3IONS-SCNC: 14 MMOL/L (ref 5–15)
ANION GAP SERPL CALCULATED.3IONS-SCNC: 16 MMOL/L (ref 5–15)
BASOPHILS # BLD AUTO: 0.01 10*3/MM3 (ref 0–0.2)
BASOPHILS NFR BLD AUTO: 0.1 % (ref 0–1.5)
BUN SERPL-MCNC: 21 MG/DL (ref 8–23)
BUN SERPL-MCNC: 24 MG/DL (ref 8–23)
BUN/CREAT SERPL: 33.3 (ref 7–25)
BUN/CREAT SERPL: 38.7 (ref 7–25)
CALCIUM SPEC-SCNC: 9.1 MG/DL (ref 8.6–10.5)
CALCIUM SPEC-SCNC: 9.5 MG/DL (ref 8.6–10.5)
CHLORIDE SERPL-SCNC: 97 MMOL/L (ref 98–107)
CHLORIDE SERPL-SCNC: 99 MMOL/L (ref 98–107)
CO2 SERPL-SCNC: 21 MMOL/L (ref 22–29)
CO2 SERPL-SCNC: 24 MMOL/L (ref 22–29)
CREAT SERPL-MCNC: 0.62 MG/DL (ref 0.57–1)
CREAT SERPL-MCNC: 0.63 MG/DL (ref 0.57–1)
DEPRECATED RDW RBC AUTO: 54.5 FL (ref 37–54)
EGFRCR SERPLBLD CKD-EPI 2021: 89.3 ML/MIN/1.73
EGFRCR SERPLBLD CKD-EPI 2021: 89.6 ML/MIN/1.73
EOSINOPHIL # BLD AUTO: 0 10*3/MM3 (ref 0–0.4)
EOSINOPHIL NFR BLD AUTO: 0 % (ref 0.3–6.2)
ERYTHROCYTE [DISTWIDTH] IN BLOOD BY AUTOMATED COUNT: 14.9 % (ref 12.3–15.4)
GLUCOSE SERPL-MCNC: 112 MG/DL (ref 65–99)
GLUCOSE SERPL-MCNC: 140 MG/DL (ref 65–99)
HCT VFR BLD AUTO: 29.9 % (ref 34–46.6)
HGB BLD-MCNC: 10.1 G/DL (ref 12–15.9)
IMM GRANULOCYTES # BLD AUTO: 0.09 10*3/MM3 (ref 0–0.05)
IMM GRANULOCYTES NFR BLD AUTO: 0.8 % (ref 0–0.5)
LYMPHOCYTES # BLD AUTO: 1.02 10*3/MM3 (ref 0.7–3.1)
LYMPHOCYTES NFR BLD AUTO: 9 % (ref 19.6–45.3)
MCH RBC QN AUTO: 34.4 PG (ref 26.6–33)
MCHC RBC AUTO-ENTMCNC: 33.8 G/DL (ref 31.5–35.7)
MCV RBC AUTO: 101.7 FL (ref 79–97)
MONOCYTES # BLD AUTO: 1.29 10*3/MM3 (ref 0.1–0.9)
MONOCYTES NFR BLD AUTO: 11.4 % (ref 5–12)
NEUTROPHILS NFR BLD AUTO: 78.7 % (ref 42.7–76)
NEUTROPHILS NFR BLD AUTO: 8.89 10*3/MM3 (ref 1.7–7)
NRBC BLD AUTO-RTO: 0 /100 WBC (ref 0–0.2)
PLATELET # BLD AUTO: 246 10*3/MM3 (ref 140–450)
PMV BLD AUTO: 10.1 FL (ref 6–12)
POTASSIUM SERPL-SCNC: 3.8 MMOL/L (ref 3.5–5.2)
POTASSIUM SERPL-SCNC: 3.9 MMOL/L (ref 3.5–5.2)
RBC # BLD AUTO: 2.94 10*6/MM3 (ref 3.77–5.28)
SODIUM SERPL-SCNC: 134 MMOL/L (ref 136–145)
SODIUM SERPL-SCNC: 137 MMOL/L (ref 136–145)
WBC NRBC COR # BLD: 11.3 10*3/MM3 (ref 3.4–10.8)

## 2022-05-07 PROCEDURE — 97167 OT EVAL HIGH COMPLEX 60 MIN: CPT | Performed by: OCCUPATIONAL THERAPIST

## 2022-05-07 PROCEDURE — 85025 COMPLETE CBC W/AUTO DIFF WBC: CPT | Performed by: ORTHOPAEDIC SURGERY

## 2022-05-07 PROCEDURE — 99231 SBSQ HOSP IP/OBS SF/LOW 25: CPT | Performed by: NEUROLOGICAL SURGERY

## 2022-05-07 PROCEDURE — 94799 UNLISTED PULMONARY SVC/PX: CPT

## 2022-05-07 PROCEDURE — 25010000002 ENOXAPARIN PER 10 MG: Performed by: ORTHOPAEDIC SURGERY

## 2022-05-07 PROCEDURE — 97535 SELF CARE MNGMENT TRAINING: CPT | Performed by: OCCUPATIONAL THERAPIST

## 2022-05-07 PROCEDURE — 97162 PT EVAL MOD COMPLEX 30 MIN: CPT

## 2022-05-07 PROCEDURE — 80048 BASIC METABOLIC PNL TOTAL CA: CPT | Performed by: ORTHOPAEDIC SURGERY

## 2022-05-07 PROCEDURE — 97530 THERAPEUTIC ACTIVITIES: CPT

## 2022-05-07 PROCEDURE — 94640 AIRWAY INHALATION TREATMENT: CPT

## 2022-05-07 PROCEDURE — 25010000002 PIPERACILLIN SOD-TAZOBACTAM PER 1 G: Performed by: ORTHOPAEDIC SURGERY

## 2022-05-07 RX ORDER — FENOFIBRATE 48 MG/1
48 TABLET, COATED ORAL DAILY
Status: DISCONTINUED | OUTPATIENT
Start: 2022-05-08 | End: 2022-05-10 | Stop reason: HOSPADM

## 2022-05-07 RX ADMIN — LORAZEPAM 0.5 MG: 0.5 TABLET ORAL at 16:58

## 2022-05-07 RX ADMIN — SODIUM CHLORIDE, POTASSIUM CHLORIDE, SODIUM LACTATE AND CALCIUM CHLORIDE 100 ML/HR: 600; 310; 30; 20 INJECTION, SOLUTION INTRAVENOUS at 06:48

## 2022-05-07 RX ADMIN — OXYCODONE HYDROCHLORIDE AND ACETAMINOPHEN 1 TABLET: 5; 325 TABLET ORAL at 21:51

## 2022-05-07 RX ADMIN — IPRATROPIUM BROMIDE AND ALBUTEROL SULFATE 3 ML: .5; 3 SOLUTION RESPIRATORY (INHALATION) at 19:38

## 2022-05-07 RX ADMIN — FAMOTIDINE 20 MG: 20 TABLET, FILM COATED ORAL at 09:39

## 2022-05-07 RX ADMIN — LORAZEPAM 0.5 MG: 0.5 TABLET ORAL at 09:38

## 2022-05-07 RX ADMIN — IPRATROPIUM BROMIDE AND ALBUTEROL SULFATE 3 ML: .5; 3 SOLUTION RESPIRATORY (INHALATION) at 14:12

## 2022-05-07 RX ADMIN — OXYCODONE HYDROCHLORIDE AND ACETAMINOPHEN 1 TABLET: 5; 325 TABLET ORAL at 13:55

## 2022-05-07 RX ADMIN — MEGESTROL ACETATE 400 MG: 40 SUSPENSION ORAL at 09:38

## 2022-05-07 RX ADMIN — GABAPENTIN 100 MG: 100 CAPSULE ORAL at 16:58

## 2022-05-07 RX ADMIN — GABAPENTIN 100 MG: 100 CAPSULE ORAL at 09:38

## 2022-05-07 RX ADMIN — Medication 10 ML: at 09:39

## 2022-05-07 RX ADMIN — ACEBUTOLOL HYDROCHLORIDE 200 MG: 200 CAPSULE ORAL at 09:39

## 2022-05-07 RX ADMIN — ASPIRIN 81 MG: 81 TABLET, CHEWABLE ORAL at 09:40

## 2022-05-07 RX ADMIN — LORAZEPAM 0.5 MG: 0.5 TABLET ORAL at 21:51

## 2022-05-07 RX ADMIN — FENOFIBRATE 145 MG: 145 TABLET ORAL at 09:39

## 2022-05-07 RX ADMIN — Medication 10 ML: at 21:51

## 2022-05-07 RX ADMIN — PAROXETINE 40 MG: 20 TABLET, FILM COATED ORAL at 09:39

## 2022-05-07 RX ADMIN — GABAPENTIN 100 MG: 100 CAPSULE ORAL at 21:51

## 2022-05-07 RX ADMIN — IPRATROPIUM BROMIDE AND ALBUTEROL SULFATE 3 ML: .5; 3 SOLUTION RESPIRATORY (INHALATION) at 06:25

## 2022-05-07 RX ADMIN — TAZOBACTAM SODIUM AND PIPERACILLIN SODIUM 3.38 G: 375; 3 INJECTION, SOLUTION INTRAVENOUS at 16:58

## 2022-05-07 RX ADMIN — ACEBUTOLOL HYDROCHLORIDE 200 MG: 200 CAPSULE ORAL at 21:51

## 2022-05-07 RX ADMIN — TAZOBACTAM SODIUM AND PIPERACILLIN SODIUM 3.38 G: 375; 3 INJECTION, SOLUTION INTRAVENOUS at 00:51

## 2022-05-07 RX ADMIN — TAZOBACTAM SODIUM AND PIPERACILLIN SODIUM 3.38 G: 375; 3 INJECTION, SOLUTION INTRAVENOUS at 08:56

## 2022-05-07 RX ADMIN — OXYCODONE HYDROCHLORIDE AND ACETAMINOPHEN 1 TABLET: 5; 325 TABLET ORAL at 01:03

## 2022-05-07 RX ADMIN — Medication 2000 UNITS: at 09:39

## 2022-05-07 RX ADMIN — ENOXAPARIN SODIUM 30 MG: 100 INJECTION SUBCUTANEOUS at 09:38

## 2022-05-07 NOTE — THERAPY EVALUATION
Patient Name: Zelalem Hung  : 1940    MRN: [id number]                              Today's Date: 2022       Admit Date: 2022    Visit Dx:     ICD-10-CM ICD-9-CM   1. Colitis  K52.9 558.9   2. Impaired functional mobility and activity tolerance  Z74.09 V49.89     Patient Active Problem List   Diagnosis   • Cholecystitis   • Encephalopathy   • Unknown when patient last well, possible stroke    • Hyponatremia   • Failure to thrive in adult   • Severe malnutrition (HCC)   • History of migraines   • Closed fracture of nasal bone with routine healing   • Traumatic ecchymosis of face   • Acquired deviated nasal septum   • Epistaxis   • Colitis   • Fall   • Compression fracture of body of thoracic vertebra (HCC)   • Compression fracture of L2 lumbar vertebra (HCC)   • Sepsis (HCC)   • Metabolic acidosis   • Fecal impaction (HCC)   • Closed left hip fracture (HCC)     Past Medical History:   Diagnosis Date   • Chronic hyponatremia    • Frequent UTI    • Migraines    • SVT (supraventricular tachycardia) (HCC)      Past Surgical History:   Procedure Laterality Date   • CHOLECYSTECTOMY WITH INTRAOPERATIVE CHOLANGIOGRAM N/A 2019    Procedure: CHOLECYSTECTOMY LAPAROSCOPIC INTRAOPERATIVE CHOLANGIOGRAM;  Surgeon: Chandan Bravo MD;  Location: [facility] OR;  Service: General      General Information     Row Name 22 0945          OT Time and Intention    Document Type evaluation  -     Mode of Treatment occupational therapy  -     Row Name 22 0945          General Information    Patient Profile Reviewed yes  -CH     Prior Level of Function independent:;all household mobility;community mobility;ADL's;cooking  -     Existing Precautions/Restrictions fall;spinal;left;hip, posterior;oxygen therapy device and L/min  -     Barriers to Rehab medically complex;physical barrier  -     Row Name 22 0945          Occupational Profile    Reason for Services/Referral (Occupational Profile) Left  subcapital displaced femoral neck fracture s/p Left hip hemiarthroplasty, fracture of T7 and a new compression fracture of L2  -     Environmental Supports and Barriers (Occupational Profile) tub shower with grab bars  -[facility] Name 05/07/22 0945          Living Environment    People in Home alone  -[facility] Name 05/07/22 0945          Home Main Entrance    Number of Stairs, Main Entrance four  -     Stair Railings, Main Entrance railing on left side (ascending)  -[facility] Name 05/07/22 0945          Cognition    Orientation Status (Cognition) oriented to;person;place  -[facility] Name 05/07/22 0945          Safety Issues, Functional Mobility    Safety Issues Affecting Function (Mobility) at risk behavior observed;insight into deficits/self-awareness;judgment;safety precaution awareness;safety precautions follow-through/compliance;sequencing abilities  -     Impairments Affecting Function (Mobility) balance;pain;endurance/activity tolerance;strength  -           User Key  (r) = Recorded By, (t) = Taken By, (c) = Cosigned By    Initials Name Provider Type     Autumn Singh, OTR/L Occupational Therapist                 Mobility/ADL's     Row Name 05/07/22 0945          Bed Mobility    Bed Mobility rolling right;sidelying-sit  -     Sidelying-Sit [place] (Bed Mobility) maximum assist (25% patient effort);2 person assist  -     Bed Mobility, Safety Issues decreased use of legs for bridging/pushing;cognitive deficits limit understanding  -[facility] Name 05/07/22 0945          Transfers    Transfers sit-stand transfer;stand-sit transfer  -     Sit-Stand [place] (Transfers) moderate assist (50% patient effort);2 person assist;verbal cues;nonverbal cues (demo/gesture)  [facility]     Stand-Sit [place] (Transfers) minimum assist (75% patient effort);2 person assist;verbal cues;nonverbal cues (demo/gesture)  -[facility] Name 05/07/22 0945          Sit-Stand Transfer    Assistive Device  (Sit-Stand Transfers) walker, front-wheeled  -     Row Name 05/07/22 0945          Stand-Sit Transfer    Assistive Device (Stand-Sit Transfers) walker, front-wheeled  -     Row Name 05/07/22 0945          Activities of Daily Living    BADL Assessment/Intervention upper body dressing;feeding;lower body dressing  -     Row Name 05/07/22 0957 05/07/22 0945       Mobility    Extremity Weight-bearing Status --  Continue FWB left lower extremity.  - left lower extremity  -    Left Lower Extremity (Weight-bearing Status) --  -CH full weight-bearing (FWB)  -    Row Name 05/07/22 0945          Upper Body Dressing Assessment/Training    [place] Level (Upper Body Dressing) dependent (less than 25% patient effort);don  -     Position (Upper Body Dressing) edge of bed sitting  -     Comment, (Upper Body Dressing) TLSO  -     Row Name 05/07/22 0945          Self-Feeding Assessment/Training    [place] Level (Feeding) independent;finger foods;liquids to mouth;scoop food and bring to mouth  -     Position (Self-Feeding) edge of bed sitting  -     Row Name 05/07/22 0945          Lower Body Dressing Assessment/Training    [place] Level (Lower Body Dressing) dependent (less than 25% patient effort);don;socks  -     Position (Lower Body Dressing) supine  -           User Key  (r) = Recorded By, (t) = Taken By, (c) = Cosigned By    Initials Name Provider Type     Autumn Singh, OTR/L Occupational Therapist               Obj/Interventions     Row Name 05/07/22 0945          Sensory Assessment (Somatosensory)    Sensory Assessment (Somatosensory) UE sensation intact  -     Row Name 05/07/22 0945          Vision Assessment/Intervention    Visual Impairment/Limitations WFL  -     Row Name 05/07/22 0945          Range of Motion Comprehensive    General Range of Motion bilateral upper extremity ROM WFL  -     Row Name 05/07/22 0945          Strength Comprehensive (MMT)    Comment, General  Manual Muscle Testing (MMT) Assessment grossly 3+/5  -     Row Name 05/07/22 0945          Balance    Balance Assessment sitting static balance;sit to stand dynamic balance;standing static balance;standing dynamic balance;sitting dynamic balance  -     Static Sitting Balance supervision  -     Dynamic Sitting Balance supervision  -     Position, Sitting Balance sitting edge of bed  -     Sit to Stand Dynamic Balance 2-person assist;verbal cues;moderate assist;minimal assist  -     Static Standing Balance moderate assist;2-person assist;minimal assist  -     Position/Device Used, Standing Balance supported;unsupported;walker, front-wheeled  -     Balance Interventions sitting;standing;supported;sit to stand;static  -           User Key  (r) = Recorded By, (t) = Taken By, (c) = Cosigned By    Initials Name Provider Type     Autumn Singh, OTR/L Occupational Therapist               Goals/Plan     Row Name 05/07/22 0945          Transfer Goal 1 (OT)    Activity/Assistive Device (Transfer Goal 1, OT) sit-to-stand/stand-to-sit;bed-to-chair/chair-to-bed;commode, 3-in-1  -     [place] Level/Cues Needed (Transfer Goal 1, OT) minimum assist (75% or more patient effort)  -     Time Frame (Transfer Goal 1, OT) long term goal (LTG);by discharge  -     Progress/Outcome (Transfer Goal 1, OT) goal ongoing  -     Row Name 05/07/22 0945          Dressing Goal 1 (OT)    Activity/Device (Dressing Goal 1, OT) upper body dressing  -     [place]/Cues Needed (Dressing Goal 1, OT) minimum assist (75% or more patient effort);verbal cues required  -     Time Frame (Dressing Goal 1, OT) long term goal (LTG);by discharge  -     Progress/Outcome (Dressing Goal 1, OT) discharged from facility  -     Row Name 05/07/22 0945          Therapy Assessment/Plan (OT)    Planned Therapy Interventions (OT) activity tolerance training;adaptive equipment training;BADL retraining;functional balance  retraining;patient/caregiver education/training;ROM/therapeutic exercise;occupation/activity based interventions;strengthening exercise;transfer/mobility retraining;orthotic fabrication/fitting/training  -           User Key  (r) = Recorded By, (t) = Taken By, (c) = Cosigned By    Initials Name Provider Type     Autumn Singh OTR/L Occupational Therapist               Clinical Impression     Row Name 05/07/22 0945          Pain Assessment    Additional Documentation Pain Scale: FACES Pre/Post-Treatment (Group)  -     Row Name 05/07/22 0945          Pain Scale: FACES Pre/Post-Treatment    Pain: FACES Scale, Pretreatment 4-->hurts little more  -     Posttreatment Pain Rating 8-->hurts whole lot  -     Pain Location - Side/Orientation Left  -     Pain Location - hip  -     Row Name 05/07/22 0945          Plan of Care Review    Plan of Care Reviewed With patient  -     Progress no change  -     Outcome Evaluation OT eval completed.  Pt. is AxO x 2 & extremely pleasant.  Ms. Hung indicates no pain at rest.  OTR & PT provided significant assist x 2 to bring pt. to EOB whilst following spinal & posterior hip precautions.  Ms. Hung was able to sit EOB to feed self her AM meal & be fitted for a TLSO with good balance.  During sitting her pain increased and she could express pain but did not rate on a 0-10 scale.  After achieving the most appropriate TLSO fit,  OT & PT assisted her to standing with rwx.  Ms. Chu was unable to bring her L heel to the floor 2' fear & pain and leaned to the R.  Ms. Hung demo's decreased act marlene, imbalnce, deficits in generalized strength & overall debility.  She has spinal precautions & posterior hip precautions and will require cont'd rehab.  OT to address all aforementioned deficits & I rec Ms. Hung DC to [facility]  -     Row Name 05/07/22 0945          Therapy Assessment/Plan (OT)    Patient/Family Therapy Goal Statement (OT) improve fxn & reduce pain  -      Criteria for Skilled Therapeutic Interventions Met (OT) yes;skilled treatment is necessary  -     Therapy Frequency (OT) 5 times/wk  -     Predicted Duration of Therapy Intervention (OT) until DC  -     Row Name 05/07/22 0945          Therapy Plan Review/Discharge Plan (OT)    Equipment Needs Upon Discharge (OT) walker, rolling  -     Anticipated Discharge Disposition (OT) skilled nursing facility  -     Row Name 05/07/22 0945          Positioning and Restraints    Pre-Treatment Position in bed  -     Post Treatment Position bed  -     In Bed fowlers;call light within reach;encouraged to call for assist;side rails up x2;ABD pillow   -           User Key  (r) = Recorded By, (t) = Taken By, (c) = Cosigned By    Initials Name Provider Type    Autumn Oneal, OTR/L Occupational Therapist               Outcome Measures     Row Name 05/07/22 0945          How much help from another is currently needed...    Putting on and taking off regular lower body clothing? 1  -CH     Bathing (including washing, rinsing, and drying) 1  -CH     Toileting (which includes using toilet bed pan or urinal) 1  -CH     Putting on and taking off regular upper body clothing 1  -CH     Taking care of personal grooming (such as brushing teeth) 4  -CH     Eating meals 4  -CH     AM-PAC 6 Clicks Score (OT) 12  -     Row Name 05/07/22 0945          Functional Assessment    Outcome Measure Options AM-PAC 6 Clicks Daily Activity (OT)  -           User Key  (r) = Recorded By, (t) = Taken By, (c) = Cosigned By    Initials Name Provider Type    Autumn Oneal OTR/L Occupational Therapist                Occupational Therapy Education                 Title: PT OT SLP Therapies (In Progress)     Topic: Occupational Therapy (In Progress)     Point: ADL training (Done)     Description:   Instruct learner(s) on proper safety adaptation and remediation techniques during self care or transfers.   Instruct in proper use of assistive  devices.              Learning Progress Summary           Patient Acceptance, E,D, VU,NR by  at 5/7/2022 1305                   Point: Home exercise program (Not Started)     Description:   Instruct learner(s) on appropriate technique for monitoring, assisting and/or progressing therapeutic exercises/activities.              Learner Progress:  Not documented in this visit.          Point: Precautions (Done)     Description:   Instruct learner(s) on prescribed precautions during self-care and functional transfers.              Learning Progress Summary           Patient Acceptance, E,D, VU,NR by  at 5/7/2022 1305                   Point: Body mechanics (Done)     Description:   Instruct learner(s) on proper positioning and spine alignment during self-care, functional mobility activities and/or exercises.              Learning Progress Summary           Patient Acceptance, E,D, VU,NR by  at 5/7/2022 1305                               User Key     Initials Effective Dates Name Provider Type Discipline     06/16/21 -  Autumn Singh, OTR/L Occupational Therapist OT              OT Recommendation and Plan  Planned Therapy Interventions (OT): activity tolerance training, adaptive equipment training, BADL retraining, functional balance retraining, patient/caregiver education/training, ROM/therapeutic exercise, occupation/activity based interventions, strengthening exercise, transfer/mobility retraining, orthotic fabrication/fitting/training  Therapy Frequency (OT): 5 times/wk  Plan of Care Review  Plan of Care Reviewed With: patient  Progress: no change  Outcome Evaluation: OT eval completed.  Pt. is AxO x 2 & extremely pleasant.  Ms. Hung indicates no pain at rest.  OTR & PT provided significant assist x 2 to bring pt. to EOB whilst following spinal & posterior hip precautions.  Ms. Hung was able to sit EOB to feed self her AM meal & be fitted for a TLSO with good balance.  During sitting her pain increased  and she could express pain but did not rate on a 0-10 scale.  After achieving the most appropriate TLSO fit,  OT & PT assisted her to standing with rwx.  Ms. Chu was unable to bring her L heel to the floor 2' fear & pain and leaned to the R.  Ms. Hung demo's decreased act marlene, imbalnce, deficits in generalized strength & overall debility.  She has spinal precautions & posterior hip precautions and will require cont'd rehab.  OT to address all aforementioned deficits & I rec Ms. Hung DC to SNF     Time Calculation:    Time Calculation- OT     Row Name 05/07/22 0945             Time Calculation- OT    OT Start Time 0945  -CH      OT Stop Time 1118  -CH      OT Time Calculation (min) 93 min  -CH      Total Timed Code Minutes- OT 33 minute(s)  -CH      OT Received On 05/07/22  -      OT Goal Re-Cert Due Date 05/17/22  -              Timed Charges    38244 - OT Self Care/Mgmt Minutes 33  -CH              Untimed Charges    OT Eval/Re-eval Minutes 60  -CH              Total Minutes    Timed Charges Total Minutes 33  -CH      Untimed Charges Total Minutes 60  -CH       Total Minutes 93  -CH            User Key  (r) = Recorded By, (t) = Taken By, (c) = Cosigned By    Initials Name Provider Type     Autumn Singh OTR/L Occupational Therapist              Therapy Charges for Today     Code Description Service Date Service Provider Modifiers Qty    [id number]  OT SELF CARE/MGMT/TRAIN EA 15 MIN 5/7/2022 Autumn Singh OTR/L GO 2    [id number]  OT EVAL HIGH COMPLEXITY 4 5/7/2022 Autumn Singh OTR/L GO 1               Autumn Singh OTR/LATONYA  5/7/2022

## 2022-05-07 NOTE — PLAN OF CARE
Goal Outcome Evaluation:  Plan of Care Reviewed With: patient        Progress: no change  Outcome Evaluation: A & O, VSS, O2 continues @ 3L, dressing to left hip dry and intact, c/o left hip pain, prn pain meds given with relief, abduction pillow in place, turning, aponte catheter inplace to be removed this am, IV ABX continue q8h

## 2022-05-07 NOTE — THERAPY TREATMENT NOTE
Acute Care - Physical Therapy Treatment Note  [facility]     Patient Name: Zelalem Hung  : 1940  MRN: [id number]  Today's Date: 2022      Visit Dx:     ICD-10-CM ICD-9-CM   1. Colitis  K52.9 558.9   2. Impaired functional mobility and activity tolerance  Z74.09 V49.89   3. Impaired mobility  Z74.09 799.89     Patient Active Problem List   Diagnosis   • Cholecystitis   • Encephalopathy   • Unknown when patient last well, possible stroke    • Hyponatremia   • Failure to thrive in adult   • Severe malnutrition (HCC)   • History of migraines   • Closed fracture of nasal bone with routine healing   • Traumatic ecchymosis of face   • Acquired deviated nasal septum   • Epistaxis   • Colitis   • Fall   • Compression fracture of body of thoracic vertebra (HCC)   • Compression fracture of L2 lumbar vertebra (HCC)   • Sepsis (HCC)   • Metabolic acidosis   • Fecal impaction (HCC)   • Closed left hip fracture (HCC)     Past Medical History:   Diagnosis Date   • Chronic hyponatremia    • Frequent UTI    • Migraines    • SVT (supraventricular tachycardia) (HCC)      Past Surgical History:   Procedure Laterality Date   • CHOLECYSTECTOMY WITH INTRAOPERATIVE CHOLANGIOGRAM N/A 2019    Procedure: CHOLECYSTECTOMY LAPAROSCOPIC INTRAOPERATIVE CHOLANGIOGRAM;  Surgeon: Chandan Bravo MD;  Location: [facility];  Service: General     PT Assessment (last 12 hours)     PT Evaluation and Treatment     Row Name 22 1310 22 0889       Physical Therapy Time and Intention    Subjective Information complains of;pain  -KJ --    Document Type therapy note (daily note)  -KJ --    Mode of Treatment physical therapy  -KJ --    Session Not Performed -- unable to treat, medical status change  -JE    Comment, Session Not Performed -- worsening T7 fx and acute L2 fx, awaiting neuros recommendations after review of MRI, will await neuro orders for activity  -JE    Patient Effort adequate  -KJ --    Row Name 22 4022           General Information    Existing Precautions/Restrictions fall;brace worn when out of bed;spinal;hip, posterior;left  -KJ     Row Name 05/07/22 1310          Pain    Pretreatment Pain Rating 10/10  -KJ     Posttreatment Pain Rating 10/10  -KJ     Pain Location - Side/Orientation Left  -KJ     Pain Location lower  -KJ     Pain Location - hip  -KJ     Row Name 05/07/22 1310          Mobility    Extremity Weight-bearing Status left lower extremity  -KJ     Left Lower Extremity (Weight-bearing Status) weight-bearing as tolerated (WBAT)  -KJ     Row Name 05/07/22 1310          Bed Mobility    Sidelying-Sit [place] (Bed Mobility) verbal cues;maximum assist (25% patient effort);2 person assist  -KJ     Bed Mobility, Safety Issues decreased use of legs for bridging/pushing  -KJ     Row Name 05/07/22 1310          Transfers    Sit-Stand [place] (Transfers) verbal cues;moderate assist (50% patient effort);2 person assist  -KJ     Stand-Sit [place] (Transfers) verbal cues;moderate assist (50% patient effort);2 person assist  -KJ     Row Name 05/07/22 1310          Sit-Stand Transfer    Assistive Device (Sit-Stand Transfers) walker, front-wheeled  -KJ     Row Name 05/07/22 1310          Stand-Sit Transfer    Assistive Device (Stand-Sit Transfers) walker, front-wheeled  -KJ     Row Name 05/07/22 1310          Gait/Stairs (Locomotion)    Comment, (Gait/Stairs) pivot to BSC  -KJ     Row Name             Wound 05/06/22 1557 Left anterior greater trochanter Incision    Wound - Properties Group Placement Date: 05/06/22  -KT Placement Time: 1557  -KT Present on Hospital Admission: N  -KT Side: Left  -KT Orientation: anterior  -KT Location: greater trochanter  -KT Primary Wound Type: Incision  -KT     Retired Wound - Properties Group Placement Date: 05/06/22  -KT Placement Time: 1557  -KT Present on Hospital Admission: N  -KT Side: Left  -KT Orientation: anterior  -KT Location: greater trochanter  -KT Primary Wound Type:  Incision  -KT     Retired Wound - Properties Group Date first assessed: 05/06/22  -KT Time first assessed: 1557  -KT Present on Hospital Admission: N  -KT Side: Left  -KT Location: greater trochanter  -KT Primary Wound Type: Incision  -KT     Row Name 05/07/22 1310          Positioning and Restraints    Pre-Treatment Position in bed  -KJ     Post Treatment Position bed  -KJ     In Bed call light within reach;exit alarm on  -KJ           User Key  (r) = Recorded By, (t) = Taken By, (c) = Cosigned By    Initials Name Provider Type    Laura Lee PTA Physical Therapist Assistant    Fiona Quinones, PT Physical Therapist    KT David Rice, RN Registered Nurse                  PT Recommendation and Plan             Time Calculation:    PT Charges     Row Name 05/07/22 1404 05/07/22 1330          Time Calculation    Start Time 1310  -KJ 0938  -     Stop Time 1340  -KJ 1115  -JE     Time Calculation (min) 30 min  -KJ 97 min  -JE     PT Received On -- 05/07/22  -     PT Goal Re-Cert Due Date -- 05/17/22  -            Time Calculation- PT    Total Timed Code Minutes- PT 30 minute(s)  -KJ --           User Key  (r) = Recorded By, (t) = Taken By, (c) = Cosigned By    Initials Name Provider Type    Laura Lee PTA Physical Therapist Assistant    Fiona Quinones, PT Physical Therapist              Therapy Charges for Today     Code Description Service Date Service Provider Modifiers Qty    [id number] HC PT THERAPEUTIC ACT EA 15 MIN 5/7/2022 Laura Hensley PTA GP 2          PT G-Codes  Outcome Measure Options: AM-PAC 6 Clicks Daily Activity (OT)  AM-PAC 6 Clicks Score (OT): 12    Laura Hensley PTA  5/7/2022

## 2022-05-07 NOTE — PLAN OF CARE
Goal Outcome Evaluation:   AOX4. VSS. 2LNC, cont SPO2 monintoring in place.   Refusing SCD's.  Spinal Precautions maintained (TLSO brace on when OOB)  Ambulated to [facility]- pivot c assist of 2, GB, and walker.  C/o pain treated c prn percocet.   IV Antibiotics given per order, SL otherwise.   Sm mucus BM today, Dr. Rollins notified.   Blanchable erythema noted on buttock area, mepilex placed.   Currently resting in bed c call light in reach.

## 2022-05-07 NOTE — PROGRESS NOTES
Zelalem Hung  81 y.o.      Chief complaint:   Back pain    Subjective  81-year-old female who presents with a acute compression fracture of lumbar spine and a left hip fracture.  She was taken to the operating room by Dr. Simons yesterday for repair of the left hip.  She was mobilized in physical therapy today where they did sit to stand with a walker.  She did not take any steps.  Per report she was not complaining of any back pain during the therapy session.  The family did know of the thoracic compression fracture earlier in the year from a previous hospital visit.  She did have pain associated with that but it gradually improved.    Temp:  [97.2 °F (36.2 °C)-98.4 °F (36.9 °C)] 97.8 °F (36.6 °C)  Heart Rate:  [] 85  Resp:  [14-20] 16  BP: (106-168)/(47-92) 118/47      Objective    Neurologic Exam     Mental Status   Oriented to person, place, and time.   Attention: normal.   Speech: speech is normal   Level of consciousness: alert  Knowledge: good.     Cranial Nerves     CN II   Visual fields full to confrontation.     CN III, IV, VI   Pupils are equal, round, and reactive to light.  Extraocular motions are normal.     CN V   Facial sensation intact.     CN VII   Facial expression full, symmetric.     CN VIII   CN VIII normal.     CN IX, X   CN IX normal.   CN X normal.     CN XI   CN XI normal.     CN XII   CN XII normal.     Motor Exam   Muscle bulk: normal  Overall muscle tone: normal  Right arm pronator drift: absent  Left arm pronator drift: absent    Strength   Strength 5/5 throughout.     Sensory Exam   Light touch normal.   Pinprick normal.     Gait, Coordination, and Reflexes     Gait  Gait: normal    Coordination   Finger to nose coordination: normal    Tremor   Resting tremor: absent  Intention tremor: absent  Action tremor: absent    Reflexes   Reflexes 2+ except as noted.       Lab Results (last 24 hours)     Procedure Component Value Units Date/Time    Basic Metabolic Panel [[id number]]   (Abnormal) Collected: 05/07/22 0620    Specimen: Blood Updated: 05/07/22 0749     Glucose 112 mg/dL      BUN 21 mg/dL      Creatinine 0.63 mg/dL      Sodium 137 mmol/L      Potassium 3.9 mmol/L      Chloride 99 mmol/L      CO2 24.0 mmol/L      Calcium 9.5 mg/dL      BUN/Creatinine Ratio 33.3     Anion Gap 14.0 mmol/L      eGFR 89.3 mL/min/1.73      Comment: National Kid[employer] Foundation and American Society of Nephrology (ASN) Task Force recommended calculation based on the Chronic Kidney Disease Epidemiology Collaboration (CKD-EPI) equation refit without adjustment for race.       Narrative:      GFR Normal >60  Chronic Kidney Disease <60  Kidney Failure <15      CBC & Differential [[id number]]  (Abnormal) Collected: 05/07/22 0620    Specimen: Blood Updated: 05/07/22 0736    Narrative:      The following orders were created for panel order CBC & Differential.  Procedure                               Abnormality         Status                     ---------                               -----------         ------                     CBC Auto Differential[[id number]]        Abnormal            Final result                 Please view results for these tests on the individual orders.    CBC Auto Differential [[id number]]  (Abnormal) Collected: 05/07/22 0620    Specimen: Blood Updated: 05/07/22 0736     WBC 11.30 10*3/mm3      RBC 2.94 10*6/mm3      Hemoglobin 10.1 g/dL      Hematocrit 29.9 %      .7 fL      MCH 34.4 pg      MCHC 33.8 g/dL      RDW 14.9 %      RDW-SD 54.5 fl      MPV 10.1 fL      Platelets 246 10*3/mm3      Neutrophil % 78.7 %      Lymphocyte % 9.0 %      Monocyte % 11.4 %      Eosinophil % 0.0 %      Basophil % 0.1 %      Immature Grans % 0.8 %      Neutrophils, Absolute 8.89 10*3/mm3      Lymphocytes, Absolute 1.02 10*3/mm3      Monocytes, Absolute 1.29 10*3/mm3      Eosinophils, Absolute 0.00 10*3/mm3      Basophils, Absolute 0.01 10*3/mm3      Immature Grans, Absolute 0.09 10*3/mm3      nRBC 0.0  /100 WBC     Basic Metabolic Panel [[id number]]  (Abnormal) Collected: 05/06/22 2333    Specimen: Blood Updated: 05/07/22 0009     Glucose 140 mg/dL      BUN 24 mg/dL      Creatinine 0.62 mg/dL      Sodium 134 mmol/L      Potassium 3.8 mmol/L      Chloride 97 mmol/L      CO2 21.0 mmol/L      Calcium 9.1 mg/dL      BUN/Creatinine Ratio 38.7     Anion Gap 16.0 mmol/L      eGFR 89.6 mL/min/1.73      Comment: National Kidney Foundation and American Society of Nephrology (ASN) Task Force recommended calculation based on the Chronic Kidney Disease Epidemiology Collaboration (CKD-EPI) equation refit without adjustment for race.       Narrative:      GFR Normal >60  Chronic Kidney Disease <60  Kidney Failure <15      Blood Culture - Blood, Wrist, Right [[id number]]  (Normal) Collected: 05/05/22 2317    Specimen: Blood from Wrist, Right Updated: 05/06/22 2333     Blood Culture No growth at 24 hours    Blood Culture - Blood, Arm, Right [[id number]]  (Normal) Collected: 05/05/22 2317    Specimen: Blood from Arm, Right Updated: 05/06/22 2333     Blood Culture No growth at 24 hours    Basic Metabolic Panel [[id number]]  (Abnormal) Collected: 05/06/22 1932    Specimen: Blood Updated: 05/06/22 1956     Glucose 156 mg/dL      BUN 25 mg/dL      Creatinine 0.67 mg/dL      Sodium 138 mmol/L      Potassium 3.9 mmol/L      Chloride 99 mmol/L      CO2 21.0 mmol/L      Calcium 9.6 mg/dL      BUN/Creatinine Ratio 37.3     Anion Gap 18.0 mmol/L      eGFR 87.9 mL/min/1.73      Comment: National Kidney Foundation and American Society of Nephrology (ASN) Task Force recommended calculation based on the Chronic Kidney Disease Epidemiology Collaboration (CKD-EPI) equation refit without adjustment for race.       Narrative:      GFR Normal >60  Chronic Kidney Disease <60  Kidney Failure <15      SCANNED - LABS [[id number]] Resulted: 05/05/22     Updated: 05/06/22 1656    Basic Metabolic Panel [[id number]]  (Abnormal) Collected: 05/06/22 1313     Specimen: Blood Updated: 05/06/22 1344     Glucose 113 mg/dL      BUN 23 mg/dL      Creatinine 0.70 mg/dL      Sodium 138 mmol/L      Potassium 3.8 mmol/L      Chloride 98 mmol/L      CO2 18.0 mmol/L      Calcium 9.8 mg/dL      BUN/Creatinine Ratio 32.9     Anion Gap 22.0 mmol/L      eGFR 87.0 mL/min/1.73      Comment: National Kidney Foundation and American Society of Nephrology (ASN) Task Force recommended calculation based on the Chronic Kidney Disease Epidemiology Collaboration (CKD-EPI) equation refit without adjustment for race.       Narrative:      GFR Normal >60  Chronic Kidney Disease <60  Kidney Failure <15      Troponin [[id number]]  (Abnormal) Collected: 05/06/22 1313    Specimen: Blood Updated: 05/06/22 1341     Troponin T 0.040 ng/mL     Narrative:      Troponin T Reference Range:  <= 0.03 ng/mL-   Negative for AMI  >0.03 ng/mL-     Abnormal for myocardial necrosis.  Clinicians would have to utilize clinical acumen, EKG, Troponin and serial changes to determine if it is an Acute Myocardial Infarction or myocardial injury due to an underlying chronic condition.       Results may be falsely decreased if patient taking Biotin.                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  More acute compression deformity at L2 probably elated to the patient's hip fracture.  Working have her continue to work with physical therapy this weekend and assess her pain level in the lumbar spine.  Should the lumbar spine pain be a significant problem then we would probably recommend kyphoplasty before discharge.      Colitis    Failure to thrive in adult    Fall    Compression fracture of body of thoracic vertebra (HCC)    Compression fracture of L2 lumbar vertebra (HCC)    Sepsis (HCC)    Metabolic acidosis    Fecal impaction (HCC)    Closed left hip fracture (HCC)        Tha Davidson MD

## 2022-05-07 NOTE — THERAPY EVALUATION
Patient Name: Zelalem Hung  : 1940    MRN: [id number]                              Today's Date: 2022       Admit Date: 2022    Visit Dx:     ICD-10-CM ICD-9-CM   1. Colitis  K52.9 558.9   2. Impaired functional mobility and activity tolerance  Z74.09 V49.89   3. Impaired mobility  Z74.09 799.89     Patient Active Problem List   Diagnosis   • Cholecystitis   • Encephalopathy   • Unknown when patient last well, possible stroke    • Hyponatremia   • Failure to thrive in adult   • Severe malnutrition (HCC)   • History of migraines   • Closed fracture of nasal bone with routine healing   • Traumatic ecchymosis of face   • Acquired deviated nasal septum   • Epistaxis   • Colitis   • Fall   • Compression fracture of body of thoracic vertebra (HCC)   • Compression fracture of L2 lumbar vertebra (HCC)   • Sepsis (HCC)   • Metabolic acidosis   • Fecal impaction (HCC)   • Closed left hip fracture (HCC)     Past Medical History:   Diagnosis Date   • Chronic hyponatremia    • Frequent UTI    • Migraines    • SVT (supraventricular tachycardia) (HCC)      Past Surgical History:   Procedure Laterality Date   • CHOLECYSTECTOMY WITH INTRAOPERATIVE CHOLANGIOGRAM N/A 2019    Procedure: CHOLECYSTECTOMY LAPAROSCOPIC INTRAOPERATIVE CHOLANGIOGRAM;  Surgeon: Chandan Bravo MD;  Location: [facility] OR;  Service: General      General Information     Row Name 2238          Physical Therapy Time and Intention    Document Type evaluation;other (see comments)  see MAR  -JE     Mode of Treatment physical therapy  -JE     Row Name 22          General Information    Patient Profile Reviewed yes  -JE     Prior Level of Function independent:;all household mobility;community mobility;ADL's;cooking  -JE     Existing Precautions/Restrictions fall;brace worn when out of bed;spinal;hip, posterior;left  -JE     Barriers to Rehab medically complex;physical barrier  -JE     Row Name 22          Living  Environment    People in Home alone  -     Row Name 05/07/22 0938          Home Main Entrance    Number of Stairs, Main Entrance four  -     Stair Railings, Main Entrance railing on left side (ascending)  -     Row Name 05/07/22 0938          Stairs Within Home, Primary    Number of Stairs, Within Home, Primary none  -     Row Name 05/07/22 0938          Cognition    Orientation Status (Cognition) oriented to;person;place  -     Row Name 05/07/22 0938          Safety Issues, Functional Mobility    Safety Issues Affecting Function (Mobility) friction/shear risk;safety precaution awareness  -     Impairments Affecting Function (Mobility) balance;endurance/activity tolerance;pain;strength  -           User Key  (r) = Recorded By, (t) = Taken By, (c) = Cosigned By    Initials Name Provider Type    Fiona Quinones, PT Physical Therapist               Mobility     Row Name 05/07/22 0938          Bed Mobility    Bed Mobility rolling right;sidelying-sit;sit-sidelying;scooting/bridging  -     Rolling Right [place] (Bed Mobility) maximum assist (25% patient effort);2 person assist;verbal cues  -     Scooting/Bridging [place] (Bed Mobility) maximum assist (25% patient effort);2 person assist;verbal cues  -     Sidelying-Sit [place] (Bed Mobility) verbal cues;maximum assist (25% patient effort);2 person assist  -     Sit-Sidelying [place] (Bed Mobility) maximum assist (25% patient effort);2 person assist;verbal cues  -     Assistive Device (Bed Mobility) bed rails;draw sheet;head of bed elevated  -     Row Name 05/07/22 0938          Sit-Stand Transfer    Sit-Stand [place] (Transfers) moderate assist (50% patient effort);2 person assist;verbal cues  -     Assistive Device (Sit-Stand Transfers) walker, front-wheeled  -     Row Name 05/07/22 0938          Gait/Stairs (Locomotion)    Comment, (Gait/Stairs) pt w/ posterior lean in standing and unable to achieve upright to  safely take steps  -JE     Row Name 05/07/22 0938          Mobility    Extremity Weight-bearing Status left lower extremity  -     Left Lower Extremity (Weight-bearing Status) full weight-bearing (FWB)  -           User Key  (r) = Recorded By, (t) = Taken By, (c) = Cosigned By    Initials Name Provider Type    Fiona Quinones, PT Physical Therapist               Obj/Interventions     Row Name 05/07/22 0938          Range of Motion Comprehensive    Comment, General Range of Motion gaurded mvmt L LE, w/ noted decrease L knee/ankle ROM  -JE     Row Name 05/07/22 0938          Strength Comprehensive (MMT)    Comment, General Manual Muscle Testing (MMT) Assessment no formal assessment at this time, pt is able to actively, although gaurded, DF B ankles and extend B knees partially against gravity while in sitting  -JE     Row Name 05/07/22 0938          Balance    Balance Assessment sitting static balance;sitting dynamic balance;sit to stand dynamic balance;standing static balance  -     Static Sitting Balance standby assist  -     Dynamic Sitting Balance standby assist  -     Position, Sitting Balance supported;unsupported;sitting edge of bed  -     Sit to Stand Dynamic Balance moderate assist;2-person assist;verbal cues  -     Static Standing Balance moderate assist;2-person assist;verbal cues  -     Position/Device Used, Standing Balance supported;walker, rolling  -JE     Row Name 05/07/22 0938          Sensory Assessment (Somatosensory)    Sensory Assessment (Somatosensory) sensation intact  -           User Key  (r) = Recorded By, (t) = Taken By, (c) = Cosigned By    Initials Name Provider Type    Fiona Quinones, PT Physical Therapist               Goals/Plan     Row Name 05/07/22 0938          Bed Mobility Goal 1 (PT)    Activity/Assistive Device (Bed Mobility Goal 1, PT) rolling to right;scooting;sidelying to sit/sit to sidelying  -     [place] Level/Cues Needed (Bed Mobility Goal  1, PT) minimum assist (75% or more patient effort)  -JE     Time Frame (Bed Mobility Goal 1, PT) long term goal (LTG);10 days  -JE     Progress/Outcomes (Bed Mobility Goal 1, PT) goal ongoing  -[place]     Row Name 05/07/22 0938          Transfer Goal 1 (PT)    Activity/Assistive Device (Transfer Goal 1, PT) sit-to-stand/stand-to-sit;bed-to-chair/chair-to-bed;walker, rolling  -JE     [place] Level/Cues Needed (Transfer Goal 1, PT) minimum assist (75% or more patient effort)  -JE     Time Frame (Transfer Goal 1, PT) long term goal (LTG);10 days  -JE     Progress/Outcome (Transfer Goal 1, PT) goal ongoing  -[place]     Row Name 05/07/22 0938          Gait Training Goal 1 (PT)    Activity/Assistive Device (Gait Training Goal 1, PT) gait (walking locomotion);assistive device use;decrease fall risk;improve balance and speed;increase endurance/gait distance;walker, rolling  -JE     [place] Level (Gait Training Goal 1, PT) minimum assist (75% or more patient effort);contact guard required  -JE     Distance (Gait Training Goal 1, PT) 8-10 ft  -JE     Time Frame (Gait Training Goal 1, PT) long term goal (LTG);10 days  -JE     Progress/Outcome (Gait Training Goal 1, PT) goal ongoing  -[place]     Row Name 05/07/22 0938          Problem Specific Goal 1 (PT)    Problem Specific Goal 1 (PT) angel/doff TLSO brace w/ set up  -JE     Time Frame (Problem Specific Goal 1, PT) long-term goal (LTG);other (see comments)  10 days  -JE     Progress/Outcome (Problem Specific Goal 1, PT) goal ongoing  -[place]     Row Name 05/07/22 0938          Patient Education Goal (PT)    Activity (Patient Education Goal, PT) I verbalize and demonstrate knowledge of posterior hip precautions and spinal precautions  -JE     [place]/Cues/Accuracy (Memory Goal 2, PT) demonstrates adequately;independent;verbalizes understanding  -JE     Time Frame (Patient Education Goal, PT) long term goal (LTG);10 days  -JE     Progress/Outcome (Patient Education Goal, PT) goal  ongoing  -     Row Name 05/07/22 0938          Therapy Assessment/Plan (PT)    Planned Therapy Interventions (PT) balance training;bed mobility training;gait training;home exercise program;orthotic fitting/training;patient/family education;ROM (range of motion);strengthening;transfer training;other (see comments)  safety/falls prevention, edema/pain mgmt, spinal precautions, TLSO brace mgmt  -           User Key  (r) = Recorded By, (t) = Taken By, (c) = Cosigned By    Initials Name Provider Type    Fiona Quinones, PT Physical Therapist               Clinical Impression     Row Name 05/07/22 0938          Pain    Pain Intervention(s) Repositioned;Rest  -     Row Name 05/07/22 0938          Pain Scale: FACES Pre/Post-Treatment    Pain: FACES Scale, Pretreatment 4-->hurts little more  -     Posttreatment Pain Rating 8-->hurts whole lot  -     Pain Location - Side/Orientation Left  -     Pain Location - hip  back  -     Row Name 05/07/22 0938          Plan of Care Review    Plan of Care Reviewed With patient;daughter  -     Progress no change  -     Outcome Evaluation PT eval completed.  Pt pleasant and agreeable  to therapy, however w/ increase c/os pain L hip.  Pt gaurded w/ mvmt.  S/p injury and repair L hip now w/ posterior hip precautions and w/ chronic compression deformity at T7 as well as acute compression deformity at L2 requiring TLSO brace.  Brace was fit to pt and donned during sitting.  Pt required max assist of 2 for tfer sidelying to sit and mod assist of 2 for sit to stand.  Pt w/ posterior lean in standing and unable to safely attempt to take steps.  Pt returned to bed w/ assist of 2.  TLSO brace removed prior to returning to supine.  Abduction pillow placed and L heel off loaded.  Pt will benefit from continued PT to improve knowledge of precautions, to improve strength, ROM, balance, activity  tolerance, to assist w/ pain/edema mgmt, to improve safety awareness and to reduce  fall risk improving I w/ functional mobility.  Recommend continued skilled care at discharge.  Pt would prefer to go to [facility] at discharge.  Will follow for progress and needs.  -     Row Name 05/07/22 0938          Therapy Assessment/Plan (PT)    Patient/Family Therapy Goals Statement (PT) decrease pain, improve mobility  -     Rehab Potential (PT) fair, will monitor progress closely  -     Criteria for Skilled Interventions Met (PT) yes;meets criteria;skilled treatment is necessary  -     Therapy Frequency (PT) 2 times/day  -     Predicted Duration of Therapy Intervention (PT) until discharge or goals are met  -     Row Name 05/07/22 0938          Vital Signs    O2 Delivery Pre Treatment nasal cannula  -     O2 Delivery Intra Treatment nasal cannula  -     O2 Delivery Post Treatment nasal cannula  -     Pre Patient Position Supine  -     Intra Patient Position Standing  -     Post Patient Position Supine  -     Row Name 05/07/22 0938          Positioning and Restraints    Pre-Treatment Position in bed  -JE     Post Treatment Position bed  -     In Bed fowlers;call light within reach;encouraged to call for assist;with family/caregiver;side rails up x2;SCD pump applied;ABD pillow;LLE elevated  L heel floating  -           User Key  (r) = Recorded By, (t) = Taken By, (c) = Cosigned By    Initials Name Provider Type    Fiona Quinones, PT Physical Therapist               Outcome Measures     Row Name 05/07/22 0938          How much help from another person do you currently need...    Turning from your back to your side while in flat bed without using bedrails? 2  -JE     Moving from lying on back to sitting on the side of a flat bed without bedrails? 1  -JE     Moving to and from a bed to a chair (including a wheelchair)? 2  -JE     Standing up from a chair using your arms (e.g., wheelchair, bedside chair)? 2  -JE     Climbing 3-5 steps with a railing? 1  -JE     To walk in  hospital room? 2  -     AM-PAC 6 Clicks Score (PT) 10  -     Highest level of mobility 4 --> Transferred to chair/commode  -     Row Name 05/07/22 0945 05/07/22 0938       Functional Assessment    Outcome Measure Options AM-PAC 6 Clicks Daily Activity (OT)  - AM-PAC 6 Clicks Basic Mobility (PT)  -          User Key  (r) = Recorded By, (t) = Taken By, (c) = Cosigned By    Initials Name Provider Type     Autumn Singh, OTR/L Occupational Therapist    Fiona Quinones, PT Physical Therapist                             Physical Therapy Education                 Title: PT OT SLP Therapies (In Progress)     Topic: Physical Therapy (Done)     Point: Mobility training (Done)     Learning Progress Summary           Patient Acceptance, E,TB,D, VU,DU,NR by  at 5/7/2022 1626    Comment: Education re: purpose of PT/importance of activity, for safety/falls prevention, for posterior hip precautions, spinal precautions, improved tech w/ bed mobility, tfers, use of rwx, purpose of brace and brace wearing schedule   Family Acceptance, E,TB,D, VU,DU,NR by  at 5/7/2022 1626    Comment: Education re: purpose of PT/importance of activity, for safety/falls prevention, for posterior hip precautions, spinal precautions, improved tech w/ bed mobility, tfers, use of rwx, purpose of brace and brace wearing schedule                   Point: Body mechanics (Done)     Learning Progress Summary           Patient Acceptance, E,TB,D, VU,DU,NR by  at 5/7/2022 1626    Comment: Education re: purpose of PT/importance of activity, for safety/falls prevention, for posterior hip precautions, spinal precautions, improved tech w/ bed mobility, tfers, use of rwx, purpose of brace and brace wearing schedule   Family Acceptance, E,TB,D, VU,DU,NR by  at 5/7/2022 1626    Comment: Education re: purpose of PT/importance of activity, for safety/falls prevention, for posterior hip precautions, spinal precautions, improved tech w/ bed mobility,  tfers, use of rwx, purpose of brace and brace wearing schedule                   Point: Precautions (Done)     Learning Progress Summary           Patient Acceptance, E,TB,D, TIM,DU,NR by RODRIGUE at 5/7/2022 1626    Comment: Education re: purpose of PT/importance of activity, for safety/falls prevention, for posterior hip precautions, spinal precautions, improved tech w/ bed mobility, tfers, use of rwx, purpose of brace and brace wearing schedule   Family Acceptance, E,TB,D, TIM,AMANUEL,NR by RODRIGUE at 5/7/2022 1626    Comment: Education re: purpose of PT/importance of activity, for safety/falls prevention, for posterior hip precautions, spinal precautions, improved tech w/ bed mobility, tfers, use of rwx, purpose of brace and brace wearing schedule                               User Key     Initials Effective Dates Name Provider Type Discipline    RODRIGUE 08/02/18 -  Fiona Chicas, PT Physical Therapist PT              PT Recommendation and Plan  Planned Therapy Interventions (PT): balance training, bed mobility training, gait training, home exercise program, orthotic fitting/training, patient/family education, ROM (range of motion), strengthening, transfer training, other (see comments) (safety/falls prevention, edema/pain mgmt, spinal precautions, TLSO brace mgmt)  Plan of Care Reviewed With: patient, daughter  Progress: no change  Outcome Evaluation: PT eval completed.  Pt pleasant and agreeable  to therapy, however w/ increase c/os pain L hip.  Pt gaurded w/ mvmt.  S/p injury and repair L hip now w/ posterior hip precautions and w/ chronic compression deformity at T7 as well as acute compression deformity at L2 requiring TLSO brace.  Brace was fit to pt and donned during sitting.  Pt required max assist of 2 for tfer sidelying to sit and mod assist of 2 for sit to stand.  Pt w/ posterior lean in standing and unable to safely attempt to take steps.  Pt returned to bed w/ assist of 2.  TLSO brace removed prior to returning to  supine.  Abduction pillow placed and L heel off loaded.  Pt will benefit from continued PT to improve knowledge of precautions, to improve strength, ROM, balance, activity  tolerance, to assist w/ pain/edema mgmt, to improve safety awareness and to reduce fall risk improving I w/ functional mobility.  Recommend continued skilled care at discharge.  Pt would prefer to go to [facility] at discharge.  Will follow for progress and needs.     Time Calculation:    PT Charges     Row Name 05/07/22 1404 05/07/22 1330          Time Calculation    Start Time 1310  -KJ 0938  -JE     Stop Time 1340  -KJ 1115  -JE     Time Calculation (min) 30 min  -KJ 97 min  -JE     PT Received On -- 05/07/22  -     PT Goal Re-Cert Due Date -- 05/17/22  -            Time Calculation- PT    Total Timed Code Minutes- PT 30 minute(s)  -KJ --           User Key  (r) = Recorded By, (t) = Taken By, (c) = Cosigned By    Initials Name Provider Type    Laura Lee, PTA Physical Therapist Assistant    Fiona Quinones, PT Physical Therapist              Therapy Charges for Today     Code Description Service Date Service Provider Modifiers Qty    [id number]  PT EVAL MOD COMPLEXITY 4 5/7/2022 Fiona Chicas, PT GP 1    [id number] HC PT THERAPEUTIC ACT EA 15 MIN 5/7/2022 Fiona Chicas, PT GP 2          PT G-Codes  Outcome Measure Options: AM-PAC 6 Clicks Daily Activity (OT)  AM-PAC 6 Clicks Score (PT): 10  AM-PAC 6 Clicks Score (OT): 12    Fiona Chicas PT  5/7/2022

## 2022-05-07 NOTE — PROGRESS NOTES
[facility] Medicine Services  INPATIENT PROGRESS NOTE    Patient Name: Zelalem Hung  Date of Admission: 5/5/2022  Today's Date: 05/07/22  Length of Stay: 1  Primary Care Physician: Garth Amezcua APRN    Subjective   Chief Complaint: Follow-up hip fracture/compression fracture/stercoral colitis     HPI   Patient seen and examined resting comfortably in bed, no family at bedside.  She says she feels okay currently.  Denies any significant pain at rest.  No abdominal pain.  No nausea.  Tolerating p.o. per report.  Afebrile overnight.        Review of Systems   All pertinent negatives and positives are as above. All other systems have been reviewed and are negative unless otherwise stated.     Objective    Temp:  [97.2 °F (36.2 °C)-98.4 °F (36.9 °C)] 98.4 °F (36.9 °C)  Heart Rate:  [] 87  Resp:  [14-20] 16  BP: (106-168)/(53-92) 128/62  Physical Exam  GEN: Awake, alert, interactive, in NAD  HEENT:  PERRLA, EOMI, Anicteric, Trachea midline  Lungs: no wheezing/rales  Heart: RRR, +S1/s2, no rub  ABD: soft, nt/nd, +BS, no guarding/rebound  Extremities: no cyanosis, no pitting edema, scds in place  Skin: no rashes or petechiae  Neuro: AAOx3, no focal deficits  Psych: normal mood & affect        Results Review:  I have reviewed the labs, radiology results, and diagnostic studies.    Laboratory Data:   Results from last 7 days   Lab Units 05/07/22  0620 05/06/22  0459 05/05/22  2155   WBC 10*3/mm3 11.30* 20.00* 17.69*   HEMOGLOBIN g/dL 10.1* 14.1 15.8   HEMATOCRIT % 29.9* 40.5 44.6   PLATELETS 10*3/mm3 246 321 392        Results from last 7 days   Lab Units 05/07/22  0620 05/06/22  2333 05/06/22  1932 05/06/22  1313 05/06/22  0459 05/05/22  2355 05/05/22  2353   SODIUM mmol/L 137 134* 138   < > 133*  --  134*   POTASSIUM mmol/L 3.9 3.8 3.9   < > 4.2  --  4.4   CHLORIDE mmol/L 99 97* 99   < > 97*  --  97*   CO2 mmol/L 24.0 21.0* 21.0*   < > 10.0*  --  15.0*   BUN mg/dL 21 24*  25*   < > 22  --  24*   CREATININE mg/dL 0.63 0.62 0.67   < > 0.68   < > 0.65   CALCIUM mg/dL 9.5 9.1 9.6   < > 9.7  --  9.9   BILIRUBIN mg/dL  --   --   --   --  0.7  --  0.7   ALK PHOS U/L  --   --   --   --  80  --  83   ALT (SGPT) U/L  --   --   --   --  13  --  14   AST (SGOT) U/L  --   --   --   --  24  --  23   GLUCOSE mg/dL 112* 140* 156*   < > 109*  --  103*    < > = values in this interval not displayed.       Culture Data:   Blood Culture   Date Value Ref Range Status   05/05/2022 No growth at 24 hours  Preliminary   05/05/2022 No growth at 24 hours  Preliminary       Radiology Data:   Imaging Results (Last 24 Hours)     Procedure Component Value Units Date/Time    XR Chest 1 View [[id number]] Collected: 05/06/22 1339     Updated: 05/06/22 1343    Narrative:      EXAMINATION: XR CHEST 1 VW-     5/6/2022 1:12 PM CDT     HISTORY: hypoxia; K52.9-Noninfective gastroenteritis and colitis,  unspecified     1 view chest x-ray.     Comparison is made with January 26, 2022.     Increased interstitial prominence raises question of early edema.     No focal infiltrate to indicate pneumonia.     No pneumothorax or pleural fluid.     Heart size is within normal limits.     The bones are osteopenic and there is a high-grade compression fracture  of T7.     Summary:  1. Mild diffuse interstitial prominence.  2. No focal infiltrate or pneumothorax.  This report was finalized on 05/06/2022 13:40 by Dr. Chan Pelaez MD.    MRI Thoracic Spine Without Contrast [[id number]] Collected: 05/06/22 1254     Updated: 05/06/22 1302    Narrative:      EXAMINATION: MRI THORACIC SPINE WO CONTRAST-     5/6/2022 10:45 AM CDT     HISTORY: T7 fracture; K52.9-Noninfective gastroenteritis and colitis,  unspecified.     Noncontrast MR imaging of the thoracic spine.     Axial, sagittal, and coronal imaging.     High-grade anterior wedge compression of T7 with 75-80% loss of height  and prominent thoracic kyphosis.     Minimal marrow edema  within the compressed vertebra indicates residual  from previous injury or possibly acute injury superimposed on a chronic  compression fracture.     Minimal retropulsion of the T7 vertebral body inferior corner.  Based on the kyphosis this results in effacement of the ventral thecal  sac and there is no significant spinal canal stenosis or cord  compression.     No disc herniation.     Summary:  1. High-grade anterior wedge compression of T7 with mild marrow edema.  Acute superimposed on chronic injury suspected.  2. Other thoracic vertebrae are intact.     This report was finalized on 05/06/2022 12:59 by Dr. Chan Pelaez MD.    MRI Lumbar Spine Without Contrast [[id number]] Collected: 05/06/22 1250     Updated: 05/06/22 1256    Narrative:      EXAMINATION: MRI LUMBAR SPINE WO CONTRAST-     5/6/2022 11:37 AM CDT     HISTORY: L2 fracture; K52.9-Noninfective gastroenteritis and colitis,  unspecified     Noncontrast MR imaging of the lumbar spine.  Axial, sagittal, and coronal sequences.     9 degrees right convex scoliosis.  Acute or subacute superior endplate compression of L2 with 50% loss of  height.  Mild superior corner retropulsion.  Marrow edema present.     L1, L3, L4, and L5 are intact.     No posterior element disruption is seen.     There is no disc herniation.     Only mild narrowing of the spinal canal based on the superior corner L2  retropulsion.     Summary:  1. The 50% L2 compression fracture is acute or subacute based on marrow  edema.  This report was finalized on 05/06/2022 12:53 by Dr. Chan Pelaez MD.          I have reviewed the patient's current medications.     Assessment/Plan     Active Hospital Problems    Diagnosis    • **Closed left hip fracture (HCC)    • Colitis    • Fall    • Compression fracture of body of thoracic vertebra (HCC)    • Compression fracture of L2 lumbar vertebra (HCC)    • Sepsis (HCC)    • Metabolic acidosis    • Fecal impaction (HCC)    • Failure to thrive in adult         Plan:  #1 left hip fracture -status post OR on 5/6.  Doing well postoperatively.  Pain control.  Plan for PT and OT.    #2 compression fractures -T7/L2, not complaining of overt pain.  Plans to get up and work with therapies.  Neurosurgery following.  Evaluating for any potential interventions if needed.    #3 stercoral colitis -positive BM.  On Zosyn.  Likely short course of therapy.    #4 metabolic acidosis -improved/resolved with bicarb fluids today and overnight, CO2 24 on this morning's labs.  Anion gap closed.  Will DC bicarb fluids.  Monitor with p.o. intake.    #5 sepsis -likely combination from her fractures, acidosis, colitis.  She has improved with fluids and antibiotics.  White count has gone from 20-11.  Afebrile.  Blood pressure stable.  Blood culture so far negative.    Discharge Planning: Continue PT OT.  Continue antibiotics.  Plan for SNF when ready for discharge.  Plus or minus interventions for compression fractures if warranted pending pain and neurosurgical evaluation.    Electronically signed by Garth Rollins DO, 05/07/22, 11:17 CDT.

## 2022-05-07 NOTE — PROGRESS NOTES
MD Kalyan Carter PA-C, [facility]       Orthopaedic Surgery Progress Note      5/7/2022   08:12 CDT    Name:  Zelalem Hung  MRN:    [id number]     Acct:     [id number]   Room:  [address] [place] [address] Day: 1  POD:    1 Day Post-Op  Procedure: HIP HEMIARTHROPLASTY (Left)     Admit Date: 5/5/2022  PCP: Garth Amezcua APRN        Subjective:     Interval: Doing well this am.  No complaints of pain.      Medications:     Allergies:   Allergies   Allergen Reactions   • Codeine Anaphylaxis       Current Meds:   Current Facility-Administered Medications   Medication Dose Route Frequency Provider Last Rate Last Admin   • acebutolol (SECTRAL) capsule 200 mg  200 mg Oral Q12H Barrington Simons MD   200 mg at 05/06/22 2153   • acetaminophen (TYLENOL) tablet 650 mg  650 mg Oral Q4H PRN Barrington Simons MD   650 mg at 05/06/22 1235    Or   • acetaminophen (TYLENOL) 160 MG/5ML solution 650 mg  650 mg Oral Q4H PRN Barrington Simons MD        Or   • acetaminophen (TYLENOL) suppository 650 mg  650 mg Rectal Q4H PRN Barrington Simons MD       • aspirin chewable tablet 81 mg  81 mg Oral Daily Barrington Simons MD       • cholecalciferol (VITAMIN D3) tablet 2,000 Units  2,000 Units Oral Daily Barrington Simons MD   2,000 Units at 05/06/22 0840   • diazePAM (VALIUM) tablet 5 mg  5 mg Oral Q6H PRN Barrington Simons MD       • diphenhydrAMINE (BENADRYL) capsule 25 mg  25 mg Oral Q6H PRN Barrington Simons MD       • Enoxaparin Sodium (LOVENOX) syringe 30 mg  30 mg Subcutaneous Daily Barrington Simons MD       • famotidine (PEPCID) tablet 20 mg  20 mg Oral Daily Barrington Simons MD   20 mg at 05/06/22 2153   • fenofibrate (TRICOR) tablet 145 mg  145 mg Oral Daily Barrington Simons MD   145 mg at 05/06/22 0840   • gabapentin (NEURONTIN) capsule 100 mg  100 mg Oral TID Barrington Simons MD   100 mg at 05/06/22 2153   • ipratropium-albuterol (DUO-NEB) nebulizer solution 3 mL  3 mL Nebulization 4x Daily - RT Barrington Simons MD   3 mL at 05/07/22 5588   • LORazepam (ATIVAN) tablet 0.5 mg  0.5 mg  "Oral TID Barrington Simons MD   0.5 mg at 22   • magnesium hydroxide (MILK OF MAGNESIA) suspension 10 mL  10 mL Oral Daily PRN Barrington Simons MD       • megestrol (MEGACE) 40 MG/ML suspension 400 mg  400 mg Oral Daily Barrington Simons MD   400 mg at 22   • ondansetron (ZOFRAN) tablet 4 mg  4 mg Oral Q6H PRN Barrington Simons MD        Or   • ondansetron (ZOFRAN) injection 4 mg  4 mg Intravenous Q6H PRN Barrington Simons MD       • oxyCODONE-acetaminophen (PERCOCET) 5-325 MG per tablet 1 tablet  1 tablet Oral Q8H PRN Barrington Simons MD   1 tablet at 22 0103   • PARoxetine (PAXIL) tablet 40 mg  40 mg Oral Daily Barrington Simons MD   40 mg at 22 0840   • piperacillin-tazobactam (ZOSYN) 3.375 g in iso-osmotic dextrose 50 ml (premix)  3.375 g Intravenous Q8H Barrington Simons MD   3.375 g at 22 0051   • promethazine (PHENERGAN) tablet 12.5 mg  12.5 mg Oral Q4H PRN Barrington Simons MD        Or   • promethazine (PHENERGAN) suppository 12.5 mg  12.5 mg Rectal Q4H PRN Barrington Simons MD       • sodium chloride 0.9 % flush 10 mL  10 mL Intravenous Q12H Barrington Simons MD   10 mL at 22   • sodium chloride 0.9 % flush 10 mL  10 mL Intravenous PRN Barrington Simons MD       • sodium chloride nasal spray 2 spray  2 spray Nasal PRN Barrington Simons MD             Data:     Vitals:  /62 (BP Location: Left arm, Patient Position: Lying)   Pulse 87   Temp 98.4 °F (36.9 °C) (Oral)   Resp 16   Ht 152.4 cm (60\")   Wt 43.4 kg (95 lb 10.9 oz)   SpO2 96%   BMI 18.69 kg/m²   Temp (24hrs), Av.6 °F (36.4 °C), Min:97.2 °F (36.2 °C), Max:98.4 °F (36.9 °C)      I/O (24Hr):    Intake/Output Summary (Last 24 hours) at 2022 0812  Last data filed at 2022 0648  Gross per 24 hour   Intake 1571 ml   Output 800 ml   Net 771 ml       Labs:  Lab Results (last 72 hours)     Procedure Component Value Units Date/Time    Basic Metabolic Panel [[id number]]  (Abnormal) Collected: 22    Specimen: Blood Updated: 22 0749     " Glucose 112 mg/dL      BUN 21 mg/dL      Creatinine 0.63 mg/dL      Sodium 137 mmol/L      Potassium 3.9 mmol/L      Chloride 99 mmol/L      CO2 24.0 mmol/L      Calcium 9.5 mg/dL      BUN/Creatinine Ratio 33.3     Anion Gap 14.0 mmol/L      eGFR 89.3 mL/min/1.73      Comment: National Kidney Foundation and American Society of Nephrology (ASN) Task Force recommended calculation based on the Chronic Kidney Disease Epidemiology Collaboration (CKD-EPI) equation refit without adjustment for race.       Narrative:      GFR Normal >60  Chronic Kidney Disease <60  Kidney Failure <15      CBC & Differential [[id number]]  (Abnormal) Collected: 05/07/22 0620    Specimen: Blood Updated: 05/07/22 0736    Narrative:      The following orders were created for panel order CBC & Differential.  Procedure                               Abnormality         Status                     ---------                               -----------         ------                     CBC Auto Differential[[id number]]        Abnormal            Final result                 Please view results for these tests on the individual orders.    CBC Auto Differential [[id number]]  (Abnormal) Collected: 05/07/22 0620    Specimen: Blood Updated: 05/07/22 0736     WBC 11.30 10*3/mm3      RBC 2.94 10*6/mm3      Hemoglobin 10.1 g/dL      Hematocrit 29.9 %      .7 fL      MCH 34.4 pg      MCHC 33.8 g/dL      RDW 14.9 %      RDW-SD 54.5 fl      MPV 10.1 fL      Platelets 246 10*3/mm3      Neutrophil % 78.7 %      Lymphocyte % 9.0 %      Monocyte % 11.4 %      Eosinophil % 0.0 %      Basophil % 0.1 %      Immature Grans % 0.8 %      Neutrophils, Absolute 8.89 10*3/mm3      Lymphocytes, Absolute 1.02 10*3/mm3      Monocytes, Absolute 1.29 10*3/mm3      Eosinophils, Absolute 0.00 10*3/mm3      Basophils, Absolute 0.01 10*3/mm3      Immature Grans, Absolute 0.09 10*3/mm3      nRBC 0.0 /100 WBC     Basic Metabolic Panel [[id number]]  (Abnormal) Collected: 05/06/22 3377     Specimen: Blood Updated: 05/07/22 0009     Glucose 140 mg/dL      BUN 24 mg/dL      Creatinine 0.62 mg/dL      Sodium 134 mmol/L      Potassium 3.8 mmol/L      Chloride 97 mmol/L      CO2 21.0 mmol/L      Calcium 9.1 mg/dL      BUN/Creatinine Ratio 38.7     Anion Gap 16.0 mmol/L      eGFR 89.6 mL/min/1.73      Comment: National Kidney Foundation and American Society of Nephrology (ASN) Task Force recommended calculation based on the Chronic Kidney Disease Epidemiology Collaboration (CKD-EPI) equation refit without adjustment for race.       Narrative:      GFR Normal >60  Chronic Kidney Disease <60  Kidney Failure <15      Blood Culture - Blood, Wrist, Right [[id number]]  (Normal) Collected: 05/05/22 2317    Specimen: Blood from Wrist, Right Updated: 05/06/22 2333     Blood Culture No growth at 24 hours    Blood Culture - Blood, Arm, Right [[id number]]  (Normal) Collected: 05/05/22 2317    Specimen: Blood from Arm, Right Updated: 05/06/22 2333     Blood Culture No growth at 24 hours    Basic Metabolic Panel [[id number]]  (Abnormal) Collected: 05/06/22 1932    Specimen: Blood Updated: 05/06/22 1956     Glucose 156 mg/dL      BUN 25 mg/dL      Creatinine 0.67 mg/dL      Sodium 138 mmol/L      Potassium 3.9 mmol/L      Chloride 99 mmol/L      CO2 21.0 mmol/L      Calcium 9.6 mg/dL      BUN/Creatinine Ratio 37.3     Anion Gap 18.0 mmol/L      eGFR 87.9 mL/min/1.73      Comment: National Kidney Foundation and American Society of Nephrology (ASN) Task Force recommended calculation based on the Chronic Kidney Disease Epidemiology Collaboration (CKD-EPI) equation refit without adjustment for race.       Narrative:      GFR Normal >60  Chronic Kidney Disease <60  Kidney Failure <15      SCANNED - LABS [[id number]] Resulted: 05/05/22     Updated: 05/06/22 1656    Basic Metabolic Panel [[id number]]  (Abnormal) Collected: 05/06/22 1313    Specimen: Blood Updated: 05/06/22 1344     Glucose 113 mg/dL      BUN 23 mg/dL       Creatinine 0.70 mg/dL      Sodium 138 mmol/L      Potassium 3.8 mmol/L      Chloride 98 mmol/L      CO2 18.0 mmol/L      Calcium 9.8 mg/dL      BUN/Creatinine Ratio 32.9     Anion Gap 22.0 mmol/L      eGFR 87.0 mL/min/1.73      Comment: National Kidney Foundation and American Society of Nephrology (ASN) Task Force recommended calculation based on the Chronic Kidney Disease Epidemiology Collaboration (CKD-EPI) equation refit without adjustment for race.       Narrative:      GFR Normal >60  Chronic Kidney Disease <60  Kidney Failure <15      Troponin [[id number]]  (Abnormal) Collected: 05/06/22 1313    Specimen: Blood Updated: 05/06/22 1341     Troponin T 0.040 ng/mL     Narrative:      Troponin T Reference Range:  <= 0.03 ng/mL-   Negative for AMI  >0.03 ng/mL-     Abnormal for myocardial necrosis.  Clinicians would have to utilize clinical acumen, EKG, Troponin and serial changes to determine if it is an Acute Myocardial Infarction or myocardial injury due to an underlying chronic condition.       Results may be falsely decreased if patient taking Biotin.      Comprehensive Metabolic Panel [[id number]]  (Abnormal) Collected: 05/06/22 0459    Specimen: Blood Updated: 05/06/22 0535     Glucose 109 mg/dL      BUN 22 mg/dL      Creatinine 0.68 mg/dL      Sodium 133 mmol/L      Potassium 4.2 mmol/L      Comment: Slight hemolysis detected by analyzer. Results may be affected.        Chloride 97 mmol/L      CO2 10.0 mmol/L      Calcium 9.7 mg/dL      Total Protein 6.8 g/dL      Albumin 3.80 g/dL      ALT (SGPT) 13 U/L      AST (SGOT) 24 U/L      Alkaline Phosphatase 80 U/L      Total Bilirubin 0.7 mg/dL      Globulin 3.0 gm/dL      A/G Ratio 1.3 g/dL      BUN/Creatinine Ratio 32.4     Anion Gap 26.0 mmol/L      eGFR 87.6 mL/min/1.73      Comment: National Kidney Foundation and American Society of Nephrology (ASN) Task Force recommended calculation based on the Chronic Kidney Disease Epidemiology Collaboration (CKD-EPI)  equation refit without adjustment for race.       Narrative:      GFR Normal >60  Chronic Kidney Disease <60  Kidney Failure <15      Magnesium [[id number]]  (Normal) Collected: 05/06/22 0459    Specimen: Blood Updated: 05/06/22 0535     Magnesium 1.6 mg/dL     CBC Auto Differential [[id number]]  (Abnormal) Collected: 05/06/22 0459    Specimen: Blood Updated: 05/06/22 0515     WBC 20.00 10*3/mm3      RBC 4.08 10*6/mm3      Hemoglobin 14.1 g/dL      Hematocrit 40.5 %      MCV 99.3 fL      MCH 34.6 pg      MCHC 34.8 g/dL      RDW 14.6 %      RDW-SD 53.4 fl      MPV 9.4 fL      Platelets 321 10*3/mm3      Neutrophil % 89.0 %      Lymphocyte % 4.4 %      Monocyte % 5.4 %      Eosinophil % 0.0 %      Basophil % 0.2 %      Immature Grans % 1.0 %      Neutrophils, Absolute 17.82 10*3/mm3      Lymphocytes, Absolute 0.88 10*3/mm3      Monocytes, Absolute 1.07 10*3/mm3      Eosinophils, Absolute 0.00 10*3/mm3      Basophils, Absolute 0.04 10*3/mm3      Immature Grans, Absolute 0.19 10*3/mm3      nRBC 0.0 /100 WBC     Blood Gas, Arterial - [[id number]]  (Abnormal) Collected: 05/06/22 0252    Specimen: Arterial Blood Updated: 05/06/22 0239     Site Right Brachial     Meliton's Test N/A     pH, Arterial 7.319 pH units      Comment: 84 Value below reference range        pCO2, Arterial 22.0 mm Hg      Comment: 84 Value below reference range        pO2, Arterial 60.8 mm Hg      Comment: 84 Value below reference range        HCO3, Arterial 11.3 mmol/L      Comment: 84 Value below reference range        Base Excess, Arterial -12.7 mmol/L      Comment: 84 Value below reference range        O2 Saturation, Arterial 89.7 %      Comment: 84 Value below reference range        Temperature 37.0 C      Barometric Pressure for Blood Gas 743 mmHg      Modality Room Air     FIO2 21 %      Ventilator Mode NA     Collected by [id number]     Comment: Meter: [id number]     :  [id number]        pCO2, Temperature Corrected 22.0 mm Hg      pH, Temp  "Corrected 7.319 pH Units      pO2, Temperature Corrected 60.8 mm Hg     STAT Lactic Acid, Reflex [[id number]]  (Normal) Collected: 05/06/22 0132    Specimen: Blood Updated: 05/06/22 0152     Lactate 1.9 mmol/L     Procalcitonin [[id number]]  (Abnormal) Collected: 05/05/22 2353    Specimen: Blood Updated: 05/06/22 0130     Procalcitonin 0.34 ng/mL     Narrative:      As a Marker for Sepsis (Non-Neonates):    1. <0.5 ng/mL represents a low risk of severe sepsis and/or septic shock.  2. >2 ng/mL represents a high risk of severe sepsis and/or septic shock.    As a Marker for Lower Respiratory Tract Infections that require antibiotic therapy:    PCT on Admission    Antibiotic Therapy       6-12 Hrs later    >0.5                Strongly Recommended  >0.25 - <0.5        Recommended   0.1 - 0.25          Discouraged              Remeasure/reassess PCT  <0.1                Strongly Discouraged     Remeasure/reassess PCT    As 28 day mortality risk marker: \"Change in Procalcitonin Result\" (>80% or <=80%) if Day 0 (or Day 1) and Day 4 values are available. Refer to [email or web address]    Change in PCT <=80%  A decrease of PCT levels below or equal to 80% defines a positive change in PCT test result representing a higher risk for 28-day all-cause mortality of patients diagnosed with severe sepsis for septic shock.    Change in PCT >80%  A decrease of PCT levels of more than 80% defines a negative change in PCT result representing a lower risk for 28-day all-cause mortality of patients diagnosed with severe sepsis or septic shock.       TSH [[id number]]  (Normal) Collected: 05/05/22 2353    Specimen: Blood Updated: 05/06/22 0028     TSH 0.676 uIU/mL     T4, Free [[id number]]  (Abnormal) Collected: 05/05/22 2353    Specimen: Blood Updated: 05/06/22 0027     Free T4 1.81 ng/dL     Narrative:      Results may be falsely increased if patient taking Biotin.      C-reactive Protein [[id number]]  (Abnormal) Collected: " 05/05/22 2353    Specimen: Blood Updated: 05/06/22 0025     C-Reactive Protein 11.90 mg/dL     Comprehensive Metabolic Panel [[id number]]  (Abnormal) Collected: 05/05/22 2353    Specimen: Blood Updated: 05/06/22 0023     Glucose 103 mg/dL      BUN 24 mg/dL      Creatinine 0.65 mg/dL      Sodium 134 mmol/L      Potassium 4.4 mmol/L      Chloride 97 mmol/L      CO2 15.0 mmol/L      Calcium 9.9 mg/dL      Total Protein 7.2 g/dL      Albumin 3.80 g/dL      ALT (SGPT) 14 U/L      AST (SGOT) 23 U/L      Alkaline Phosphatase 83 U/L      Total Bilirubin 0.7 mg/dL      Globulin 3.4 gm/dL      A/G Ratio 1.1 g/dL      BUN/Creatinine Ratio 36.9     Anion Gap 22.0 mmol/L      eGFR 88.6 mL/min/1.73      Comment: National Kidney Foundation and American Society of Nephrology (ASN) Task Force recommended calculation based on the Chronic Kidney Disease Epidemiology Collaboration (CKD-EPI) equation refit without adjustment for race.       Narrative:      GFR Normal >60  Chronic Kidney Disease <60  Kidney Failure <15      Troponin [[id number]]  (Abnormal) Collected: 05/05/22 2353    Specimen: Blood Updated: 05/06/22 0020     Troponin T 0.032 ng/mL     Narrative:      Troponin T Reference Range:  <= 0.03 ng/mL-   Negative for AMI  >0.03 ng/mL-     Abnormal for myocardial necrosis.  Clinicians would have to utilize clinical acumen, EKG, Troponin and serial changes to determine if it is an Acute Myocardial Infarction or myocardial injury due to an underlying chronic condition.       Results may be falsely decreased if patient taking Biotin.      Lipase [[id number]]  (Normal) Collected: 05/05/22 2353    Specimen: Blood Updated: 05/06/22 0018     Lipase 18 U/L     Troponin [[id number]]  (Normal) Collected: 05/05/22 2353    Specimen: Blood Updated: 05/06/22 0017     Troponin T 0.026 ng/mL     Narrative:      Troponin T Reference Range:  <= 0.03 ng/mL-   Negative for AMI  >0.03 ng/mL-     Abnormal for myocardial necrosis.  Clinicians would have  to utilize clinical acumen, EKG, Troponin and serial changes to determine if it is an Acute Myocardial Infarction or myocardial injury due to an underlying chronic condition.       Results may be falsely decreased if patient taking Biotin.      POC Creatinine [[id number]]  (Normal) Collected: 05/05/22 2355    Specimen: Blood Updated: 05/06/22 0013     Creatinine 0.70 mg/dL      Comment: Serial Number: [id number]:  [id number]       Magnesium [[id number]]  (Normal) Collected: 05/05/22 2155    Specimen: Blood Updated: 05/05/22 2312     Magnesium 1.8 mg/dL     BNP [[id number]]  (Abnormal) Collected: 05/05/22 2155    Specimen: Blood Updated: 05/05/22 2312     proBNP 6,919.0 pg/mL     Narrative:      Among patients with dyspnea, NT-proBNP is highly sensitive for the detection of acute congestive heart failure. In addition NT-proBNP of <300 pg/ml effectively rules out acute congestive heart failure with 99% negative predictive value.    Results may be falsely decreased if patient taking Biotin.      Respiratory Panel PCR w/COVID-19(SARS-CoV-2) [facility]/MAD/EZRA In-House, NP Swab in UTM/VTM, 3-4 HR TAT - Swab, Nasopharynx [[id number]]  (Normal) Collected: 05/05/22 2155    Specimen: Swab from Nasopharynx Updated: 05/05/22 2302     ADENOVIRUS, PCR Not Detected     Coronavirus 229E Not Detected     Coronavirus HKU1 Not Detected     Coronavirus NL63 Not Detected     Coronavirus OC43 Not Detected     COVID19 Not Detected     Human Metapneumovirus Not Detected     Human Rhinovirus/Enterovirus Not Detected     Influenza A PCR Not Detected     Influenza B PCR Not Detected     Parainfluenza Virus 1 Not Detected     Parainfluenza Virus 2 Not Detected     Parainfluenza Virus 3 Not Detected     Parainfluenza Virus 4 Not Detected     RSV, PCR Not Detected     Bordetella pertussis pcr Not Detected     Bordetella parapertussis PCR Not Detected     Chlamydophila pneumoniae PCR Not Detected     Mycoplasma pneumo by PCR Not Detected     Narrative:      In the setting of a positive respiratory panel with a viral infection PLUS a negative procalcitonin without other underlying concern for bacterial infection, consider observing off antibiotics or discontinuation of antibiotics and continue supportive care. If the respiratory panel is positive for atypical bacterial infection (Bordetella pertussis, Chlamydophila pneumoniae, or Mycoplasma pneumoniae), consider antibiotic de-escalation to target atypical bacterial infection.    Urinalysis With Culture If Indicated - Urine, Catheter [[id number]]  (Abnormal) Collected: 05/05/22 2223    Specimen: Urine, Catheter Updated: 05/05/22 2250     Color, UA Yellow     Appearance, UA Clear     pH, UA 6.0     Specific Gravity, UA 1.025     Glucose, UA Negative     Ketones, UA 15 mg/dL (1+)     Bilirubin, UA Negative     Blood, UA Moderate (2+)     Protein,  mg/dL (2+)     Leuk Esterase, UA Negative     Nitrite, UA Negative     Urobilinogen, UA 0.2 E.U./dL    Urinalysis, Microscopic Only - Urine, Catheter [[id number]]  (Abnormal) Collected: 05/05/22 2223    Specimen: Urine, Catheter Updated: 05/05/22 2250     RBC, UA 0-2 /HPF      WBC, UA 3-5 /HPF      Comment: Urine culture not indicated.        Bacteria, UA 2+ /HPF      Squamous Epithelial Cells, UA 0-2 /HPF      Hyaline Casts, UA 0-2 /LPF      Methodology Automated Microscopy    Protime-INR [[id number]]  (Abnormal) Collected: 05/05/22 2155    Specimen: Blood Updated: 05/05/22 2246     Protime 15.0 Seconds      INR 1.23    aPTT [[id number]]  (Normal) Collected: 05/05/22 2155    Specimen: Blood Updated: 05/05/22 2246     PTT 34.7 seconds     D-dimer, Quantitative [[id number]]  (Abnormal) Collected: 05/05/22 2155    Specimen: Blood Updated: 05/05/22 2246     D-Dimer, Quantitative 8.97 MCGFEU/mL     Narrative:      Reference Range is 0-0.50 MCGFEU/mL. However, results <0.50 MCGFEU/mL tends to rule out DVT or PE. Results >0.50 MCGFEU/mL are not useful in predicting  absence or presence of DVT or PE.      Urine Drug Screen - Urine, Catheter [[id number]]  (Abnormal) Collected: 05/05/22 2223    Specimen: Urine, Catheter Updated: 05/05/22 2240     THC, Screen, Urine Negative     Phencyclidine (PCP), Urine Negative     Cocaine Screen, Urine Negative     Methamphetamine, Ur Negative     Opiate Screen Negative     Amphetamine Screen, Urine Negative     Benzodiazepine Screen, Urine Positive     Tricyclic Antidepressants Screen Negative     Methadone Screen, Urine Negative     Barbiturates Screen, Urine Negative     Oxycodone Screen, Urine Negative     Propoxyphene Screen Negative     Buprenorphine, Screen, Urine Negative    Narrative:      Cutoff For Drugs Screened:    Amphetamines               500 ng/ml  Barbiturates               200 ng/ml  Benzodiazepines            150 ng/ml  Cocaine                    150 ng/ml  Methadone                  200 ng/ml  Opiates                    100 ng/ml  Phencyclidine               25 ng/ml  THC                            50 ng/ml  Methamphetamine            500 ng/ml  Tricyclic Antidepressants  300 ng/ml  Oxycodone                  100 ng/ml  Propoxyphene               300 ng/ml  Buprenorphine               10 ng/ml    The normal value for all drugs tested is negative. This report includes unconfirmed screening results, with the cutoff values listed, to be used for medical treatment purposes only.  Unconfirmed results must not be used for non-medical purposes such as employment or legal testing.  Clinical consideration should be applied to any drug of abuse test, particularly when unconfirmed results are used.      Lactic Acid, Plasma [[id number]]  (Abnormal) Collected: 05/05/22 2155    Specimen: Blood Updated: 05/05/22 2227     Lactate 2.3 mmol/L     CBC & Differential [[id number]]  (Abnormal) Collected: 05/05/22 2155    Specimen: Blood Updated: 05/05/22 2207    Narrative:      The following orders were created for panel order CBC &  Differential.  Procedure                               Abnormality         Status                     ---------                               -----------         ------                     CBC Auto Differential[[id number]]        Abnormal            Final result                 Please view results for these tests on the individual orders.    CBC Auto Differential [[id number]]  (Abnormal) Collected: 05/05/22 2155    Specimen: Blood Updated: 05/05/22 2207     WBC 17.69 10*3/mm3      RBC 4.49 10*6/mm3      Hemoglobin 15.8 g/dL      Hematocrit 44.6 %      MCV 99.3 fL      MCH 35.2 pg      MCHC 35.4 g/dL      RDW 14.4 %      RDW-SD 52.1 fl      MPV 9.5 fL      Platelets 392 10*3/mm3      Neutrophil % 89.1 %      Lymphocyte % 5.0 %      Monocyte % 4.7 %      Eosinophil % 0.0 %      Basophil % 0.2 %      Immature Grans % 1.0 %      Neutrophils, Absolute 15.77 10*3/mm3      Lymphocytes, Absolute 0.88 10*3/mm3      Monocytes, Absolute 0.84 10*3/mm3      Eosinophils, Absolute 0.00 10*3/mm3      Basophils, Absolute 0.03 10*3/mm3      Immature Grans, Absolute 0.17 10*3/mm3      nRBC 0.0 /100 WBC             Physical Exam:     Incisions are CDI, Dressing is CDI. Moderate tenderness to palpation, compartments soft. NVI distally throughout.      Assessment:     Primary Problem  Colitis  POD 1 Day Post-Op HIP HEMIARTHROPLASTY (Left)          Plan:       1. Continue PT/OT.  2. Continue DVT prophylaxis.  3. Continue FWB left lower extremity.  4. Anticipate discharge monday if pain well controlled.      Electronically signed by Barrington Simons MD on 5/7/2022 at 08:12 CDT

## 2022-05-07 NOTE — CASE MANAGEMENT/SOCIAL WORK
Continued Stay Note  DELON Rondon     Patient Name: Zelalem Hung  MRN: [id number]  Today's Date: 5/7/2022    Admit Date: 5/5/2022     Discharge Plan     Row Name 05/07/22 0849       Plan    Patient/Family in Agreement with Plan unable to assess    Plan Comments Awaiting PT/OT evaluations to determine appropriate discharge plan.  Pt hopes to be able to return home with home health.  SW will continue to follow for therapy evals.               Discharge Codes    No documentation.               Expected Discharge Date and Time     Expected Discharge Date Expected Discharge Time    May 9, 2022             KAMRAN Brandt

## 2022-05-07 NOTE — PLAN OF CARE
Problem: Adult Inpatient Plan of Care  Goal: Plan of Care Review  Recent Flowsheet Documentation  Taken 5/7/2022 0945 by Autumn Singh, OTR/L  Progress: no change  Plan of Care Reviewed With: patient  Outcome Evaluation: OT catalinaal completed.  Pt. is AxO x 2 & extremely pleasant.  Ms. Hung indicates no pain at rest.  OTR & PT provided significant assist x 2 to bring pt. to EOB whilst following spinal & posterior hip precautions.  Ms. Hung was able to sit EOB to feed self her AM meal & be fitted for a TLSO with good balance.  During sitting her pain increased and she could express pain but did not rate on a 0-10 scale.  After achieving the most appropriate TLSO fit,  OT & PT assisted her to standing with rwx.  Ms. Chu was unable to bring her L heel to the floor 2' fear & pain and leaned to the R.  Ms. Hung demo's decreased act marlene, imbalnce, deficits in generalized strength & overall debility.  She has spinal precautions & posterior hip precautions and will require cont'd rehab.  OT to address all aforementioned deficits & I rec Ms. Hung DC to SNF   Goal Outcome Evaluation:  Plan of Care Reviewed With: patient        Progress: no change

## 2022-05-07 NOTE — PLAN OF CARE
Goal Outcome Evaluation:  Plan of Care Reviewed With: patient, daughter        Progress: no change  Outcome Evaluation: PT eval completed.  Pt pleasant and agreeable  to therapy, however w/ increase c/os pain L hip.  Pt gaurded w/ mvmt.  S/p injury and repair L hip now w/ posterior hip precautions and w/ chronic compression deformity at T7 as well as acute compression deformity at L2 requiring TLSO brace.  Brace was fit to pt and donned during sitting.  Pt required max assist of 2 for tfer sidelying to sit and mod assist of 2 for sit to stand.  Pt w/ posterior lean in standing and unable to safely attempt to take steps.  Pt returned to bed w/ assist of 2.  TLSO brace removed prior to returning to supine.  Abduction pillow placed and L heel off loaded.  Pt will benefit from continued PT to improve knowledge of precautions, to improve strength, ROM, balance, activity  tolerance, to assist w/ pain/edema mgmt, to improve safety awareness and to reduce fall risk improving I w/ functional mobility.  Recommend continued skilled care at discharge.  Pt would prefer to go to [facility] at discharge.  Will follow for progress and needs.

## 2022-05-08 LAB
ANION GAP SERPL CALCULATED.3IONS-SCNC: 10 MMOL/L (ref 5–15)
BASOPHILS # BLD AUTO: 0.03 10*3/MM3 (ref 0–0.2)
BASOPHILS NFR BLD AUTO: 0.3 % (ref 0–1.5)
BUN SERPL-MCNC: 22 MG/DL (ref 8–23)
BUN/CREAT SERPL: 34.4 (ref 7–25)
CALCIUM SPEC-SCNC: 9.3 MG/DL (ref 8.6–10.5)
CHLORIDE SERPL-SCNC: 101 MMOL/L (ref 98–107)
CO2 SERPL-SCNC: 28 MMOL/L (ref 22–29)
CREAT SERPL-MCNC: 0.64 MG/DL (ref 0.57–1)
DEPRECATED RDW RBC AUTO: 56.5 FL (ref 37–54)
EGFRCR SERPLBLD CKD-EPI 2021: 88.9 ML/MIN/1.73
EOSINOPHIL # BLD AUTO: 0.02 10*3/MM3 (ref 0–0.4)
EOSINOPHIL NFR BLD AUTO: 0.2 % (ref 0.3–6.2)
ERYTHROCYTE [DISTWIDTH] IN BLOOD BY AUTOMATED COUNT: 14.8 % (ref 12.3–15.4)
GLUCOSE SERPL-MCNC: 114 MG/DL (ref 65–99)
HCT VFR BLD AUTO: 33 % (ref 34–46.6)
HGB BLD-MCNC: 10.8 G/DL (ref 12–15.9)
IMM GRANULOCYTES # BLD AUTO: 0.08 10*3/MM3 (ref 0–0.05)
IMM GRANULOCYTES NFR BLD AUTO: 0.8 % (ref 0–0.5)
IRON 24H UR-MRATE: 40 MCG/DL (ref 37–145)
IRON SATN MFR SERPL: 12 % (ref 20–50)
LYMPHOCYTES # BLD AUTO: 1.7 10*3/MM3 (ref 0.7–3.1)
LYMPHOCYTES NFR BLD AUTO: 16.5 % (ref 19.6–45.3)
MAGNESIUM SERPL-MCNC: 2.1 MG/DL (ref 1.6–2.4)
MCH RBC QN AUTO: 34 PG (ref 26.6–33)
MCHC RBC AUTO-ENTMCNC: 32.7 G/DL (ref 31.5–35.7)
MCV RBC AUTO: 103.8 FL (ref 79–97)
MONOCYTES # BLD AUTO: 1.15 10*3/MM3 (ref 0.1–0.9)
MONOCYTES NFR BLD AUTO: 11.2 % (ref 5–12)
NEUTROPHILS NFR BLD AUTO: 7.31 10*3/MM3 (ref 1.7–7)
NEUTROPHILS NFR BLD AUTO: 71 % (ref 42.7–76)
NRBC BLD AUTO-RTO: 0 /100 WBC (ref 0–0.2)
PHOSPHATE SERPL-MCNC: 2 MG/DL (ref 2.5–4.5)
PLATELET # BLD AUTO: 270 10*3/MM3 (ref 140–450)
PMV BLD AUTO: 9.7 FL (ref 6–12)
POTASSIUM SERPL-SCNC: 3.7 MMOL/L (ref 3.5–5.2)
RBC # BLD AUTO: 3.18 10*6/MM3 (ref 3.77–5.28)
SODIUM SERPL-SCNC: 139 MMOL/L (ref 136–145)
TIBC SERPL-MCNC: 334 MCG/DL (ref 298–536)
TRANSFERRIN SERPL-MCNC: 224 MG/DL (ref 200–360)
WBC NRBC COR # BLD: 10.29 10*3/MM3 (ref 3.4–10.8)

## 2022-05-08 PROCEDURE — 83540 ASSAY OF IRON: CPT | Performed by: INTERNAL MEDICINE

## 2022-05-08 PROCEDURE — 99231 SBSQ HOSP IP/OBS SF/LOW 25: CPT | Performed by: NEUROLOGICAL SURGERY

## 2022-05-08 PROCEDURE — 25010000002 ENOXAPARIN PER 10 MG: Performed by: ORTHOPAEDIC SURGERY

## 2022-05-08 PROCEDURE — 97530 THERAPEUTIC ACTIVITIES: CPT

## 2022-05-08 PROCEDURE — 80048 BASIC METABOLIC PNL TOTAL CA: CPT | Performed by: INTERNAL MEDICINE

## 2022-05-08 PROCEDURE — 97110 THERAPEUTIC EXERCISES: CPT

## 2022-05-08 PROCEDURE — 85025 COMPLETE CBC W/AUTO DIFF WBC: CPT | Performed by: INTERNAL MEDICINE

## 2022-05-08 PROCEDURE — 84100 ASSAY OF PHOSPHORUS: CPT | Performed by: INTERNAL MEDICINE

## 2022-05-08 PROCEDURE — 97535 SELF CARE MNGMENT TRAINING: CPT

## 2022-05-08 PROCEDURE — 83735 ASSAY OF MAGNESIUM: CPT | Performed by: INTERNAL MEDICINE

## 2022-05-08 PROCEDURE — 25010000002 PIPERACILLIN SOD-TAZOBACTAM PER 1 G: Performed by: ORTHOPAEDIC SURGERY

## 2022-05-08 PROCEDURE — 94799 UNLISTED PULMONARY SVC/PX: CPT

## 2022-05-08 PROCEDURE — 84466 ASSAY OF TRANSFERRIN: CPT | Performed by: INTERNAL MEDICINE

## 2022-05-08 RX ADMIN — TAZOBACTAM SODIUM AND PIPERACILLIN SODIUM 3.38 G: 375; 3 INJECTION, SOLUTION INTRAVENOUS at 16:04

## 2022-05-08 RX ADMIN — LORAZEPAM 0.5 MG: 0.5 TABLET ORAL at 08:26

## 2022-05-08 RX ADMIN — PAROXETINE 40 MG: 20 TABLET, FILM COATED ORAL at 08:27

## 2022-05-08 RX ADMIN — FAMOTIDINE 20 MG: 20 TABLET, FILM COATED ORAL at 08:23

## 2022-05-08 RX ADMIN — POTASSIUM PHOSPHATE, MONOBASIC AND POTASSIUM PHOSPHATE, DIBASIC 21 MMOL: 224; 236 INJECTION, SOLUTION, CONCENTRATE INTRAVENOUS at 11:53

## 2022-05-08 RX ADMIN — GABAPENTIN 100 MG: 100 CAPSULE ORAL at 16:04

## 2022-05-08 RX ADMIN — IPRATROPIUM BROMIDE AND ALBUTEROL SULFATE 3 ML: .5; 3 SOLUTION RESPIRATORY (INHALATION) at 14:23

## 2022-05-08 RX ADMIN — OXYCODONE HYDROCHLORIDE AND ACETAMINOPHEN 1 TABLET: 5; 325 TABLET ORAL at 16:48

## 2022-05-08 RX ADMIN — Medication 2000 UNITS: at 08:26

## 2022-05-08 RX ADMIN — TAZOBACTAM SODIUM AND PIPERACILLIN SODIUM 3.38 G: 375; 3 INJECTION, SOLUTION INTRAVENOUS at 01:16

## 2022-05-08 RX ADMIN — Medication 10 ML: at 08:27

## 2022-05-08 RX ADMIN — ENOXAPARIN SODIUM 30 MG: 100 INJECTION SUBCUTANEOUS at 08:24

## 2022-05-08 RX ADMIN — FENOFIBRATE 48 MG: 48 TABLET, FILM COATED ORAL at 08:24

## 2022-05-08 RX ADMIN — ACEBUTOLOL HYDROCHLORIDE 200 MG: 200 CAPSULE ORAL at 08:26

## 2022-05-08 RX ADMIN — ASPIRIN 81 MG: 81 TABLET, CHEWABLE ORAL at 08:24

## 2022-05-08 RX ADMIN — ACEBUTOLOL HYDROCHLORIDE 200 MG: 200 CAPSULE ORAL at 20:05

## 2022-05-08 RX ADMIN — MEGESTROL ACETATE 400 MG: 40 SUSPENSION ORAL at 08:23

## 2022-05-08 RX ADMIN — GABAPENTIN 100 MG: 100 CAPSULE ORAL at 20:05

## 2022-05-08 RX ADMIN — LORAZEPAM 0.5 MG: 0.5 TABLET ORAL at 20:05

## 2022-05-08 RX ADMIN — IPRATROPIUM BROMIDE AND ALBUTEROL SULFATE 3 ML: .5; 3 SOLUTION RESPIRATORY (INHALATION) at 21:29

## 2022-05-08 RX ADMIN — IPRATROPIUM BROMIDE AND ALBUTEROL SULFATE 3 ML: .5; 3 SOLUTION RESPIRATORY (INHALATION) at 06:29

## 2022-05-08 RX ADMIN — Medication 10 ML: at 20:05

## 2022-05-08 RX ADMIN — TAZOBACTAM SODIUM AND PIPERACILLIN SODIUM 3.38 G: 375; 3 INJECTION, SOLUTION INTRAVENOUS at 08:27

## 2022-05-08 RX ADMIN — GABAPENTIN 100 MG: 100 CAPSULE ORAL at 08:26

## 2022-05-08 RX ADMIN — OXYCODONE HYDROCHLORIDE AND ACETAMINOPHEN 1 TABLET: 5; 325 TABLET ORAL at 08:25

## 2022-05-08 RX ADMIN — LORAZEPAM 0.5 MG: 0.5 TABLET ORAL at 16:04

## 2022-05-08 NOTE — PROGRESS NOTES
Zelalem Hung  81 y.o.      Chief complaint:   Hip pain    Subjective  Some complaint of back and hip pain on the left.  Worked with physical therapy yesterday but was not ambulatory.  Tolerating p.o.    Temp:  [97.8 °F (36.6 °C)-98.1 °F (36.7 °C)] 98.1 °F (36.7 °C)  Heart Rate:  [81-99] 84  Resp:  [14-16] 16  BP: (118-122)/(47-56) 122/56      Objective    Neurologic Exam     Mental Status   Attention: normal.   Speech: speech is normal   Level of consciousness: alert  Knowledge: good.   Oriented to person.  Confused at times.  Speech is clear fluent and mostly appropriate.     Cranial Nerves     CN II   Visual fields full to confrontation.     CN III, IV, VI   Pupils are equal, round, and reactive to light.  Extraocular motions are normal.     CN V   Facial sensation intact.     CN VII   Facial expression full, symmetric.     CN VIII   CN VIII normal.     CN IX, X   CN IX normal.   CN X normal.     CN XI   CN XI normal.     CN XII   CN XII normal.     Motor Exam   Muscle bulk: normal  Overall muscle tone: normal  Right arm pronator drift: absent  Left arm pronator drift: absent    Strength   Strength 5/5 throughout.     Sensory Exam   Light touch normal.   Pinprick normal.     Gait, Coordination, and Reflexes     Gait  Gait: normal    Coordination   Finger to nose coordination: normal    Tremor   Resting tremor: absent  Intention tremor: absent  Action tremor: absent    Reflexes   Reflexes 2+ except as noted.       Lab Results (last 24 hours)     Procedure Component Value Units Date/Time    Magnesium [[id number]]  (Normal) Collected: 05/08/22 0605    Specimen: Blood Updated: 05/08/22 0700     Magnesium 2.1 mg/dL     Basic Metabolic Panel [[id number]]  (Abnormal) Collected: 05/08/22 0605    Specimen: Blood Updated: 05/08/22 0651     Glucose 114 mg/dL      BUN 22 mg/dL      Creatinine 0.64 mg/dL      Sodium 139 mmol/L      Potassium 3.7 mmol/L      Chloride 101 mmol/L      CO2 28.0 mmol/L      Calcium 9.3 mg/dL       BUN/Creatinine Ratio 34.4     Anion Gap 10.0 mmol/L      eGFR 88.9 mL/min/1.73      Comment: National Kidney Foundation and American Society of Nephrology (ASN) Task Force recommended calculation based on the Chronic Kidney Disease Epidemiology Collaboration (CKD-EPI) equation refit without adjustment for race.       Narrative:      GFR Normal >60  Chronic Kidney Disease <60  Kidney Failure <15      Iron Profile [[id number]]  (Abnormal) Collected: 05/08/22 0605    Specimen: Blood Updated: 05/08/22 0651     Iron 40 mcg/dL      Iron Saturation 12 %      Transferrin 224 mg/dL      TIBC 334 mcg/dL     Phosphorus [[id number]]  (Abnormal) Collected: 05/08/22 0605    Specimen: Blood Updated: 05/08/22 0651     Phosphorus 2.0 mg/dL     CBC & Differential [[id number]]  (Abnormal) Collected: 05/08/22 0605    Specimen: Blood Updated: 05/08/22 0630    Narrative:      The following orders were created for panel order CBC & Differential.  Procedure                               Abnormality         Status                     ---------                               -----------         ------                     CBC Auto Differential[[id number]]        Abnormal            Final result                 Please view results for these tests on the individual orders.    CBC Auto Differential [[id number]]  (Abnormal) Collected: 05/08/22 0605    Specimen: Blood Updated: 05/08/22 0630     WBC 10.29 10*3/mm3      RBC 3.18 10*6/mm3      Hemoglobin 10.8 g/dL      Hematocrit 33.0 %      .8 fL      MCH 34.0 pg      MCHC 32.7 g/dL      RDW 14.8 %      RDW-SD 56.5 fl      MPV 9.7 fL      Platelets 270 10*3/mm3      Neutrophil % 71.0 %      Lymphocyte % 16.5 %      Monocyte % 11.2 %      Eosinophil % 0.2 %      Basophil % 0.3 %      Immature Grans % 0.8 %      Neutrophils, Absolute 7.31 10*3/mm3      Lymphocytes, Absolute 1.70 10*3/mm3      Monocytes, Absolute 1.15 10*3/mm3      Eosinophils, Absolute 0.02 10*3/mm3      Basophils, Absolute 0.03  10*3/mm3      Immature Grans, Absolute 0.08 10*3/mm3      nRBC 0.0 /100 WBC     Blood Culture - Blood, Wrist, Right [[id number]]  (Normal) Collected: 05/05/22 2317    Specimen: Blood from Wrist, Right Updated: 05/07/22 2330     Blood Culture No growth at 2 days    Blood Culture - Blood, Arm, Right [[id number]]  (Normal) Collected: 05/05/22 2317    Specimen: Blood from Arm, Right Updated: 05/07/22 2330     Blood Culture No growth at 2 days    Basic Metabolic Panel [[id number]]  (Abnormal) Collected: 05/07/22 0620    Specimen: Blood Updated: 05/07/22 0749     Glucose 112 mg/dL      BUN 21 mg/dL      Creatinine 0.63 mg/dL      Sodium 137 mmol/L      Potassium 3.9 mmol/L      Chloride 99 mmol/L      CO2 24.0 mmol/L      Calcium 9.5 mg/dL      BUN/Creatinine Ratio 33.3     Anion Gap 14.0 mmol/L      eGFR 89.3 mL/min/1.73      Comment: National Kidney Foundation and American Society of Nephrology (ASN) Task Force recommended calculation based on the Chronic Kidney Disease Epidemiology Collaboration (CKD-EPI) equation refit without adjustment for race.       Narrative:      GFR Normal >60  Chronic Kidney Disease <60  Kidney Failure <15                Plan:   Lumbar compression fracture and left hip fracture status post surgery.  Continue with therapy.  Evaluate for back pain when being mobilized.      Closed left hip fracture (HCC)    Failure to thrive in adult    Colitis    Fall    Compression fracture of body of thoracic vertebra (HCC)    Compression fracture of L2 lumbar vertebra (HCC)    Sepsis (HCC)    Metabolic acidosis    Fecal impaction (HCC)        Tha Davidson MD

## 2022-05-08 NOTE — THERAPY TREATMENT NOTE
Acute Care - Physical Therapy Treatment Note  [facility]     Patient Name: Zelalem Hung  : 1940  MRN: [id number]  Today's Date: 2022      Visit Dx:     ICD-10-CM ICD-9-CM   1. Colitis  K52.9 558.9   2. Impaired functional mobility and activity tolerance  Z74.09 V49.89   3. Impaired mobility  Z74.09 799.89   4. Decreased activities of daily living (ADL)  Z78.9 V49.89     Patient Active Problem List   Diagnosis   • Cholecystitis   • Encephalopathy   • Unknown when patient last well, possible stroke    • Hyponatremia   • Failure to thrive in adult   • Severe malnutrition (HCC)   • History of migraines   • Closed fracture of nasal bone with routine healing   • Traumatic ecchymosis of face   • Acquired deviated nasal septum   • Epistaxis   • Colitis   • Fall   • Compression fracture of body of thoracic vertebra (HCC)   • Compression fracture of L2 lumbar vertebra (HCC)   • Sepsis (HCC)   • Metabolic acidosis   • Fecal impaction (HCC)   • Closed left hip fracture (HCC)     Past Medical History:   Diagnosis Date   • Chronic hyponatremia    • Frequent UTI    • Migraines    • SVT (supraventricular tachycardia) (HCC)      Past Surgical History:   Procedure Laterality Date   • CHOLECYSTECTOMY WITH INTRAOPERATIVE CHOLANGIOGRAM N/A 2019    Procedure: CHOLECYSTECTOMY LAPAROSCOPIC INTRAOPERATIVE CHOLANGIOGRAM;  Surgeon: Chandan Bravo MD;  Location: [facility];  Service: General     PT Assessment (last 12 hours)     PT Evaluation and Treatment     Row Name 22 1354 22 0745       Physical Therapy Time and Intention    Subjective Information no complaints  -TB complains of;pain  -KJ    Document Type therapy note (daily note)  -TB therapy note (daily note)  -KJ    Mode of Treatment physical therapy  -TB physical therapy  -KJ    Patient Effort good  -TB adequate  -KJ    Row Name 22 1354 22 0745       General Information    Existing Precautions/Restrictions fall;brace worn when out of  bed;spinal;left;hip, posterior  -TB fall;brace worn when out of bed;hip, posterior;left;spinal  TLSO and abuction pillow when in bed  -KJ    Row Name 05/08/22 0745          Pain    Pretreatment Pain Rating 9/10  -KJ     Posttreatment Pain Rating 9/10  -KJ     Pain Location - Side/Orientation Left  -KJ     Pain Location lower  -KJ     Pain Location - back  -KJ     Row Name 05/08/22 0745          Bed Mobility    Rolling Right [place] (Bed Mobility) moderate assist (50% patient effort);2 person assist;verbal cues  -KJ     Sidelying-Sit [place] (Bed Mobility) verbal cues;maximum assist (25% patient effort);2 person assist  -KJ     Sit-Sidelying [place] (Bed Mobility) maximum assist (25% patient effort);2 person assist;verbal cues  -KJ     Bed Mobility, Safety Issues decreased use of legs for bridging/pushing;decreased use of arms for pushing/pulling  -KJ     Assistive Device (Bed Mobility) draw sheet  -KJ     Row Name 05/08/22 0745          Transfers    Sit-Stand [place] (Transfers) verbal cues;moderate assist (50% patient effort);2 person assist  -KJ     Stand-Sit [place] (Transfers) verbal cues;minimum assist (75% patient effort);2 person assist  -KJ     Row Name 05/08/22 0745          Sit-Stand Transfer    Assistive Device (Sit-Stand Transfers) walker, front-wheeled  -KJ     Row Name 05/08/22 0745          Stand-Sit Transfer    Assistive Device (Stand-Sit Transfers) walker, front-wheeled  -KJ     Row Name 05/08/22 0745          Gait/Stairs (Locomotion)    Comment, (Gait/Stairs) pivot to chair by couple steps  -KJ     Row Name 05/08/22 0745          Balance    Static Sitting Balance supervision  -KJ     Dynamic Sitting Balance supervision  -KJ     Position, Sitting Balance sitting edge of bed  -KJ     Row Name 05/08/22 1354          Motor Skills    Therapeutic Exercise other (see comments)  PROM-AAROM BLE exercises w/ increased time  -TB     Row Name             Wound 05/06/22 1557 Left  anterior greater trochanter Incision    Wound - Properties Group Placement Date: 05/06/22  -KT Placement Time: 1557  -KT Present on Hospital Admission: N  -KT Side: Left  -KT Orientation: anterior  -KT Location: greater trochanter  -KT Primary Wound Type: Incision  -KT     Retired Wound - Properties Group Placement Date: 05/06/22  -KT Placement Time: 1557  -KT Present on Hospital Admission: N  -KT Side: Left  -KT Orientation: anterior  -KT Location: greater trochanter  -KT Primary Wound Type: Incision  -KT     Retired Wound - Properties Group Date first assessed: 05/06/22  -KT Time first assessed: 1557  -KT Present on Hospital Admission: N  -KT Side: Left  -KT Location: greater trochanter  -KT Primary Wound Type: Incision  -KT     Row Name 05/08/22 1354 05/08/22 0745       Positioning and Restraints    Pre-Treatment Position in bed  -TB in bed  -KJ    Post Treatment Position bed  -TB chair  -KJ    In Bed fowlers;call light within reach;encouraged to call for assist;exit alarm on;side rails up x2  -TB call light within reach  -KJ          User Key  (r) = Recorded By, (t) = Taken By, (c) = Cosigned By    Initials Name Provider Type    Laura Lee, PTA Physical Therapist Assistant    TB Melanie Butler, THIAGO Physical Therapist Assistant    KT David Rice, RN Registered Nurse                Physical Therapy Education                 Title: PT OT SLP Therapies (In Progress)     Topic: Physical Therapy (Done)     Point: Mobility training (Done)     Learning Progress Summary           Patient Acceptance, E,TB,BRICE, TIM,AMANUEL,NR by RODRIGUE at 5/7/2022 1626    Comment: Education re: purpose of PT/importance of activity, for safety/falls prevention, for posterior hip precautions, spinal precautions, improved tech w/ bed mobility, tfers, use of rwx, purpose of brace and brace wearing schedule   Family Acceptance, E,TB,BRICE, TIM,AMANUEL,NR by RODRIGUE at 5/7/2022 1626    Comment: Education re: purpose of PT/importance of activity, for  safety/falls prevention, for posterior hip precautions, spinal precautions, improved tech w/ bed mobility, tfers, use of rwx, purpose of brace and brace wearing schedule                   Point: Body mechanics (Done)     Learning Progress Summary           Patient Acceptance, E,TB,D, VU,DU,NR by  at 5/7/2022 1626    Comment: Education re: purpose of PT/importance of activity, for safety/falls prevention, for posterior hip precautions, spinal precautions, improved tech w/ bed mobility, tfers, use of rwx, purpose of brace and brace wearing schedule   Family Acceptance, E,TB,D, VU,DU,NR by  at 5/7/2022 1626    Comment: Education re: purpose of PT/importance of activity, for safety/falls prevention, for posterior hip precautions, spinal precautions, improved tech w/ bed mobility, tfers, use of rwx, purpose of brace and brace wearing schedule                   Point: Precautions (Done)     Learning Progress Summary           Patient Acceptance, E,TB,D, VU,DU,NR by  at 5/7/2022 1626    Comment: Education re: purpose of PT/importance of activity, for safety/falls prevention, for posterior hip precautions, spinal precautions, improved tech w/ bed mobility, tfers, use of rwx, purpose of brace and brace wearing schedule   Family Acceptance, E,TB,D, VU,DU,NR by  at 5/7/2022 1626    Comment: Education re: purpose of PT/importance of activity, for safety/falls prevention, for posterior hip precautions, spinal precautions, improved tech w/ bed mobility, tfers, use of rwx, purpose of brace and brace wearing schedule                               User Key     Initials Effective Dates Name Provider Type Discipline     08/02/18 -  Fiona Chicas, PT Physical Therapist PT              PT Recommendation and Plan             Time Calculation:    PT Charges     Row Name 05/08/22 1416 05/08/22 0919          Time Calculation    Start Time 1354  -TB 0745  -KJ     Stop Time 1417  -TB 0810  -KJ     Time Calculation (min) 23 min   -TB 25 min  -KJ     PT Received On 05/08/22  -TB 05/08/22  -KJ     PT Goal Re-Cert Due Date -- 05/17/22  -KJ            Time Calculation- PT    Total Timed Code Minutes- PT 23 minute(s)  -TB 25 minute(s)  -KJ           User Key  (r) = Recorded By, (t) = Taken By, (c) = Cosigned By    Initials Name Provider Type    Laura Lee, THIAGO Physical Therapist Assistant    TB Melanie Butler PTA Physical Therapist Assistant              Therapy Charges for Today     Code Description Service Date Service Provider Modifiers Qty    85461258775 HC PT THER PROC EA 15 MIN 5/8/2022 Melanie Butler PTA GP 2          PT G-Codes  Outcome Measure Options: AM-PAC 6 Clicks Daily Activity (OT)  AM-PAC 6 Clicks Score (PT): 10  AM-PAC 6 Clicks Score (OT): 12    Melanie Butler PTA  5/8/2022

## 2022-05-08 NOTE — PROGRESS NOTES
[facility] Medicine Services  INPATIENT PROGRESS NOTE    Patient Name: Zelalem Hung  Date of Admission: 5/5/2022  Today's Date: 05/08/22  Length of Stay: 2  Primary Care Physician: Garth Amezcua APRN    Subjective   Chief Complaint: Follow-up hip fracture/compression fracture/stercoral colitis     HPI   Patient seen in chair at bedside. Wearing TLSO brace. Family at bedside. No complaints. Denies chest pain or sob. No nausea. Tolerating po.       ROS:  All pertinent negatives and positives are as above. All other systems have been reviewed and are negative unless otherwise stated.     Objective    Temp:  [97.4 °F (36.3 °C)-98.1 °F (36.7 °C)] 97.4 °F (36.3 °C)  Heart Rate:  [81-99] 82  Resp:  [14-16] 16  BP: (111-122)/(47-97) 111/97  Physical Exam  GEN: Awake, alert, interactive, in NAD  HEENT:  PERRLA, EOMI, Anicteric, Trachea midline  Lungs: no wheezing/rales  Heart: RRR, +S1/s2, no rub  ABD: soft, nt/nd, +BS, no guarding/rebound  Extremities: no cyanosis, no pitting edema, scds in place  Skin: no rashes or petechiae  Neuro: AAOx3, no focal deficits  Psych: normal mood & affect        Results Review:  I have reviewed the labs, radiology results, and diagnostic studies.    Laboratory Data:   Results from last 7 days   Lab Units 05/08/22  0605 05/07/22  0620 05/06/22  0459   WBC 10*3/mm3 10.29 11.30* 20.00*   HEMOGLOBIN g/dL 10.8* 10.1* 14.1   HEMATOCRIT % 33.0* 29.9* 40.5   PLATELETS 10*3/mm3 270 246 321        Results from last 7 days   Lab Units 05/08/22  0605 05/07/22  0620 05/06/22  2333 05/06/22  1313 05/06/22  0459 05/05/22  2355 05/05/22  2353   SODIUM mmol/L 139 137 134*   < > 133*  --  134*   POTASSIUM mmol/L 3.7 3.9 3.8   < > 4.2  --  4.4   CHLORIDE mmol/L 101 99 97*   < > 97*  --  97*   CO2 mmol/L 28.0 24.0 21.0*   < > 10.0*  --  15.0*   BUN mg/dL 22 21 24*   < > 22  --  24*   CREATININE mg/dL 0.64 0.63 0.62   < > 0.68   < > 0.65   CALCIUM mg/dL 9.3 9.5 9.1    < > 9.7  --  9.9   BILIRUBIN mg/dL  --   --   --   --  0.7  --  0.7   ALK PHOS U/L  --   --   --   --  80  --  83   ALT (SGPT) U/L  --   --   --   --  13  --  14   AST (SGOT) U/L  --   --   --   --  24  --  23   GLUCOSE mg/dL 114* 112* 140*   < > 109*  --  103*    < > = values in this interval not displayed.       Culture Data:   Blood Culture   Date Value Ref Range Status   05/05/2022 No growth at 24 hours  Preliminary   05/05/2022 No growth at 24 hours  Preliminary       Radiology Data:   Imaging Results (Last 24 Hours)     ** No results found for the last 24 hours. **          I have reviewed the patient's current medications.     Assessment/Plan     Active Hospital Problems    Diagnosis    • **Closed left hip fracture (HCC)    • Colitis    • Fall    • Compression fracture of body of thoracic vertebra (HCC)    • Compression fracture of L2 lumbar vertebra (HCC)    • Sepsis (HCC)    • Metabolic acidosis    • Fecal impaction (HCC)    • Failure to thrive in adult        Plan:  #1 left hip fracture -status post OR on 5/6.  Doing well postoperatively.  Pain control.  Plan for PT and OT.    #2 compression fractures -T7/L2, not complaining of overt pain.  Plans to get up and work with therapies.  Neurosurgery following.  Evaluating for any potential interventions if needed.  Currently wearing a TLSO brace.    #3 stercoral colitis -positive BM.  On Zosyn.  Likely short course of therapy.    #4 metabolic acidosis -improved/resolved with bicarb fluids    #5 sepsis -likely combination from her fractures, acidosis, colitis.  She has improved with fluids and antibiotics.  White count has normalized.  Afebrile.  Blood pressure stable.  Blood culture so far negative.    Discharge Planning: Continue PT OT.  Continue antibiotics.  Plan for SNF when ready for discharge.  Plus or minus interventions for compression fractures if warranted pending pain and neurosurgical evaluation.    Electronically signed by Garth Rollins DO,  05/08/22, 10:15 CDT.

## 2022-05-08 NOTE — THERAPY TREATMENT NOTE
Acute Care - Physical Therapy Treatment Note  [facility]     Patient Name: Zelalem Hung  : 1940  MRN: [id number]  Today's Date: 2022      Visit Dx:     ICD-10-CM ICD-9-CM   1. Colitis  K52.9 558.9   2. Impaired functional mobility and activity tolerance  Z74.09 V49.89   3. Impaired mobility  Z74.09 799.89     Patient Active Problem List   Diagnosis   • Cholecystitis   • Encephalopathy   • Unknown when patient last well, possible stroke    • Hyponatremia   • Failure to thrive in adult   • Severe malnutrition (HCC)   • History of migraines   • Closed fracture of nasal bone with routine healing   • Traumatic ecchymosis of face   • Acquired deviated nasal septum   • Epistaxis   • Colitis   • Fall   • Compression fracture of body of thoracic vertebra (HCC)   • Compression fracture of L2 lumbar vertebra (HCC)   • Sepsis (HCC)   • Metabolic acidosis   • Fecal impaction (HCC)   • Closed left hip fracture (HCC)     Past Medical History:   Diagnosis Date   • Chronic hyponatremia    • Frequent UTI    • Migraines    • SVT (supraventricular tachycardia) (HCC)      Past Surgical History:   Procedure Laterality Date   • CHOLECYSTECTOMY WITH INTRAOPERATIVE CHOLANGIOGRAM N/A 2019    Procedure: CHOLECYSTECTOMY LAPAROSCOPIC INTRAOPERATIVE CHOLANGIOGRAM;  Surgeon: Chandan Bravo MD;  Location: [facility];  Service: General     PT Assessment (last 12 hours)     PT Evaluation and Treatment     Row Name 22 0745          Physical Therapy Time and Intention    Subjective Information complains of;pain  -KJ     Document Type therapy note (daily note)  -KJ     Mode of Treatment physical therapy  -KJ     Patient Effort adequate  -KJ     Row Name 22 0745          General Information    Existing Precautions/Restrictions fall;brace worn when out of bed;hip, posterior;left;spinal  TLSO and abuction pillow when in bed  -KJ     Row Name 22 0745          Pain    Pretreatment Pain Rating 9/10  -KJ     Posttreatment  Pain Rating 9/10  -KJ     Pain Location - Side/Orientation Left  -KJ     Pain Location lower  -KJ     Pain Location - back  -KJ     Row Name 05/08/22 0745          Bed Mobility    Rolling Right [place] (Bed Mobility) moderate assist (50% patient effort);2 person assist;verbal cues  -KJ     Sidelying-Sit [place] (Bed Mobility) verbal cues;maximum assist (25% patient effort);2 person assist  -KJ     Sit-Sidelying [place] (Bed Mobility) maximum assist (25% patient effort);2 person assist;verbal cues  -KJ     Bed Mobility, Safety Issues decreased use of legs for bridging/pushing;decreased use of arms for pushing/pulling  -KJ     Assistive Device (Bed Mobility) draw sheet  -KJ     Row Name 05/08/22 0745          Transfers    Sit-Stand [place] (Transfers) verbal cues;moderate assist (50% patient effort);2 person assist  -KJ     Stand-Sit [place] (Transfers) verbal cues;minimum assist (75% patient effort);2 person assist  -KJ     Row Name 05/08/22 0745          Sit-Stand Transfer    Assistive Device (Sit-Stand Transfers) walker, front-wheeled  -KJ     Row Name 05/08/22 0745          Stand-Sit Transfer    Assistive Device (Stand-Sit Transfers) walker, front-wheeled  -KJ     Row Name 05/08/22 0745          Gait/Stairs (Locomotion)    Comment, (Gait/Stairs) pivot to chair by couple steps  -KJ     Row Name 05/08/22 0745          Balance    Static Sitting Balance supervision  -KJ     Dynamic Sitting Balance supervision  -KJ     Position, Sitting Balance sitting edge of bed  -KJ     Row Name             Wound 05/06/22 1557 Left anterior greater trochanter Incision    Wound - Properties Group Placement Date: 05/06/22  -KT Placement Time: 1557 -KT Present on Hospital Admission: N  -KT Side: Left  -KT Orientation: anterior  -KT Location: greater trochanter  -KT Primary Wound Type: Incision  -KT     Retired Wound - Properties Group Placement Date: 05/06/22  -KT Placement Time: 1557 -KT Present on Hospital  Admission: N  -KT Side: Left  -KT Orientation: anterior  -KT Location: greater trochanter  -KT Primary Wound Type: Incision  -KT     Retired Wound - Properties Group Date first assessed: 05/06/22  -KT Time first assessed: 1557 -KT Present on Hospital Admission: N  -KT Side: Left  -KT Location: greater trochanter  -KT Primary Wound Type: Incision  -KT     Row Name 05/08/22 0745          Positioning and Restraints    Pre-Treatment Position in bed  -KJ     Post Treatment Position chair  -KJ     In Bed call light within reach  -KJ           User Key  (r) = Recorded By, (t) = Taken By, (c) = Cosigned By    Initials Name Provider Type    Laura Lee PTA Physical Therapist Assistant    David Keane RN Registered Nurse                Physical Therapy Education                 Title: PT OT SLP Therapies (In Progress)     Topic: Physical Therapy (Done)     Point: Mobility training (Done)     Learning Progress Summary           Patient Acceptance, E,TB,D, VU,DU,NR by  at 5/7/2022 1626    Comment: Education re: purpose of PT/importance of activity, for safety/falls prevention, for posterior hip precautions, spinal precautions, improved tech w/ bed mobility, tfers, use of rwx, purpose of brace and brace wearing schedule   Family Acceptance, E,TB,D, VU,DU,NR by  at 5/7/2022 1626    Comment: Education re: purpose of PT/importance of activity, for safety/falls prevention, for posterior hip precautions, spinal precautions, improved tech w/ bed mobility, tfers, use of rwx, purpose of brace and brace wearing schedule                   Point: Body mechanics (Done)     Learning Progress Summary           Patient Acceptance, E,TB,D, VU,DU,NR by  at 5/7/2022 1626    Comment: Education re: purpose of PT/importance of activity, for safety/falls prevention, for posterior hip precautions, spinal precautions, improved tech w/ bed mobility, tfers, use of rwx, purpose of brace and brace wearing schedule   Family Acceptance,  E,TB,BRICE, TIM,AMANUEL,NR by RODRIGUE at 5/7/2022 1626    Comment: Education re: purpose of PT/importance of activity, for safety/falls prevention, for posterior hip precautions, spinal precautions, improved tech w/ bed mobility, tfers, use of rwx, purpose of brace and brace wearing schedule                   Point: Precautions (Done)     Learning Progress Summary           Patient Acceptance, E,TB,D, TIM,AMANUEL,NR by RODRIGUE at 5/7/2022 1626    Comment: Education re: purpose of PT/importance of activity, for safety/falls prevention, for posterior hip precautions, spinal precautions, improved tech w/ bed mobility, tfers, use of rwx, purpose of brace and brace wearing schedule   Family Acceptance, E,TB,BRICE, TIM,AMANUEL,NR by RODRIGUE at 5/7/2022 1626    Comment: Education re: purpose of PT/importance of activity, for safety/falls prevention, for posterior hip precautions, spinal precautions, improved tech w/ bed mobility, tfers, use of rwx, purpose of brace and brace wearing schedule                               User Key     Initials Effective Dates Name Provider Type Discipline     08/02/18 -  Fiona Chicas, PT Physical Therapist PT              PT Recommendation and Plan     Plan of Care Reviewed With: patient  Progress: improving  Outcome Evaluation: PT tx completed. Pt facially does not look like she is in pain, although with sitting she rates L lower back 9/10. She places her hand and points to L lower back around waistline. MaxA x 2 bed mobility, ModA x 2 sit<>stand transfers, able to take small steps to chair with wx ModA x 2. MaxA don/doffing TLSO. Recommend rehab or SNF.       Time Calculation:    PT Charges     Row Name 05/08/22 0919             Time Calculation    Start Time 0745  -KJ      Stop Time 0810  -KJ      Time Calculation (min) 25 min  -KJ      PT Received On 05/08/22  -KJ      PT Goal Re-Cert Due Date 05/17/22  -KJ              Time Calculation- PT    Total Timed Code Minutes- PT 25 minute(s)  -KJ            User Key  (r) =  Recorded By, (t) = Taken By, (c) = Cosigned By    Initials Name Provider Type    Laura Lee PTA Physical Therapist Assistant              Therapy Charges for Today     Code Description Service Date Service Provider Modifiers Qty    11845216407 HC PT THERAPEUTIC ACT EA 15 MIN 5/7/2022 Laura Hensley, THIAGO GP 2    44134259833 HC PT THERAPEUTIC ACT EA 15 MIN 5/8/2022 Laura Hensley, THIAGO GP 2          PT G-Codes  Outcome Measure Options: AM-PAC 6 Clicks Daily Activity (OT)  AM-PAC 6 Clicks Score (PT): 10  AM-PAC 6 Clicks Score (OT): 12    Laura Hensley PTA  5/8/2022

## 2022-05-08 NOTE — PLAN OF CARE
Goal Outcome Evaluation:  Plan of Care Reviewed With: patient        Progress: improving  Outcome Evaluation: PT tx completed. Pt facially does not look like she is in pain, although with sitting she rates L lower back 9/10. She places her hand and points to L lower back around waistline. MaxA x 2 bed mobility, ModA x 2 sit<>stand transfers, able to take small steps to chair with wx ModA x 2. MaxA don/doffing TLSO. Recommend rehab or SNF.

## 2022-05-08 NOTE — PLAN OF CARE
Goal Outcome Evaluation:  Plan of Care Reviewed With: patient        Progress: improving  Outcome Evaluation: Pt alert/awake and agreeable to therapy. Pt initially 9/10 pain but pain decreases with movement. Pt mod A for LB dressing at EOB with supported standing and RW. Pt compelted grooming tasks and self feeding in recliner with set up only. Pt sat EOB and donned TLSO with max A. Pt would benefit from continued rehab at discharge. Continue OT POC.

## 2022-05-08 NOTE — THERAPY TREATMENT NOTE
Acute Care - Occupational Therapy Treatment Note  [facility]     Patient Name: Zelalem Hung  : 1940  MRN: [id number]  Today's Date: 2022             Admit Date: 2022       ICD-10-CM ICD-9-CM   1. Colitis  K52.9 558.9   2. Impaired functional mobility and activity tolerance  Z74.09 V49.89   3. Impaired mobility  Z74.09 799.89   4. Decreased activities of daily living (ADL)  Z78.9 V49.89     Patient Active Problem List   Diagnosis   • Cholecystitis   • Encephalopathy   • Unknown when patient last well, possible stroke    • Hyponatremia   • Failure to thrive in adult   • Severe malnutrition (HCC)   • History of migraines   • Closed fracture of nasal bone with routine healing   • Traumatic ecchymosis of face   • Acquired deviated nasal septum   • Epistaxis   • Colitis   • Fall   • Compression fracture of body of thoracic vertebra (HCC)   • Compression fracture of L2 lumbar vertebra (HCC)   • Sepsis (HCC)   • Metabolic acidosis   • Fecal impaction (HCC)   • Closed left hip fracture (HCC)     Past Medical History:   Diagnosis Date   • Chronic hyponatremia    • Frequent UTI    • Migraines    • SVT (supraventricular tachycardia) (HCC)      Past Surgical History:   Procedure Laterality Date   • CHOLECYSTECTOMY WITH INTRAOPERATIVE CHOLANGIOGRAM N/A 2019    Procedure: CHOLECYSTECTOMY LAPAROSCOPIC INTRAOPERATIVE CHOLANGIOGRAM;  Surgeon: Chandan Bravo MD;  Location: [facility];  Service: General         OT ASSESSMENT FLOWSHEET (last 12 hours)     OT Evaluation and Treatment     Row Name 22 0800                   OT Time and Intention    Subjective Information complains of;pain  -TS        Document Type therapy note (daily note)  -TS        Mode of Treatment occupational therapy  -TS        Patient Effort good  -TS                  General Information    Existing Precautions/Restrictions fall;brace worn when out of bed;spinal;left;hip, posterior  -TS                  Pain Assessment    Pretreatment  Pain Rating 9/10  -TS        Posttreatment Pain Rating 4/10  -TS        Pain Location - Side/Orientation Left  -TS        Pain Location lower  -TS        Pain Location - back  -TS        Pain Intervention(s) Repositioned  -TS                  Cognition    Personal Safety Interventions fall prevention program maintained;gait belt;nonskid shoes/slippers when out of bed  -TS                  Activities of Daily Living    BADL Assessment/Intervention lower body dressing;bathing;toileting;feeding;grooming  -TS                  Bathing Assessment/Intervention    [place] Level (Bathing) distal lower extremities/feet;set up;maximum assist (25% patient effort)  -TS        Position (Bathing) unsupported sitting  -TS                  Upper Body Dressing Assessment/Training    [place] Level (Upper Body Dressing) don;maximum assist (25% patient effort)  -TS        Position (Upper Body Dressing) edge of bed sitting  -TS        Comment, (Upper Body Dressing) TLSO  -TS                  Lower Body Dressing Assessment/Training    [place] Level (Lower Body Dressing) don;doff;socks;maximum assist (25% patient effort);pants/bottoms  -TS        Position (Lower Body Dressing) edge of bed sitting;supported standing  -TS                  Grooming Assessment/Training    [place] Level (Grooming) wash face, hands;hair care, combing/brushing;set up  -TS        Position (Grooming) unsupported sitting  -TS                  Self-Feeding Assessment/Training    [place] Level (Feeding) set up;liquids to mouth;feeding skills  -TS        Position (Self-Feeding) unsupported sitting  -TS                  Toileting Assessment/Training    [place] Level (Toileting) toileting skills;standby assist;perform perineal hygiene;adjust/manage clothing;maximum assist (25% patient effort)  -TS        Assistive Devices (Toileting) commode, bedside without drop arms  -TS        Position (Toileting) supported sitting;supported standing  -TS                   Transfers    Sit-Stand [place] (Transfers) verbal cues;moderate assist (50% patient effort);2 person assist  -TS        Stand-Sit [place] (Transfers) verbal cues;minimum assist (75% patient effort);2 person assist  -TS                  Sit-Stand Transfer    Assistive Device (Sit-Stand Transfers) walker, front-wheeled  -TS                  Stand-Sit Transfer    Assistive Device (Stand-Sit Transfers) walker, front-wheeled  -TS                  Wound 05/06/22 1557 Left anterior greater trochanter Incision    Wound - Properties Group Placement Date: 05/06/22  -KT Placement Time: 1557  -KT Present on Hospital Admission: N  -KT Side: Left  -KT Orientation: anterior  -KT Location: greater trochanter  -KT Primary Wound Type: Incision  -KT        Retired Wound - Properties Group Placement Date: 05/06/22  -KT Placement Time: 1557  -KT Present on Hospital Admission: N  -KT Side: Left  -KT Orientation: anterior  -KT Location: greater trochanter  -KT Primary Wound Type: Incision  -KT        Retired Wound - Properties Group Date first assessed: 05/06/22  -KT Time first assessed: 1557  -KT Present on Hospital Admission: N  -KT Side: Left  -KT Location: greater trochanter  -KT Primary Wound Type: Incision  -KT                  Plan of Care Review    Plan of Care Reviewed With patient  -TS        Progress improving  -TS        Outcome Evaluation Pt alert/awake and agreeable to therapy. Pt initially 9/10 pain but pain decreases with movement. Pt mod A for LB dressing at EOB with supported standing and RW. Pt compelted grooming tasks and self feeding in recliner with set up only. Pt sat EOB and donned TLSO with max A. Pt would benefit from continued rehab at discharge. Continue OT POC.  -TS                  Positioning and Restraints    Pre-Treatment Position in bed  -TS        Post Treatment Position chair  -TS        In Chair sitting;call light within reach;encouraged to call for assist;with  family/caregiver  -              User Key  (r) = Recorded By, (t) = Taken By, (c) = Cosigned By    Initials Name Effective Dates     Indira Borja COTA 06/16/21 -     David Keane RN 02/17/22 -                  Occupational Therapy Education                 Title: PT OT SLP Therapies (In Progress)     Topic: Occupational Therapy (In Progress)     Point: ADL training (Done)     Description:   Instruct learner(s) on proper safety adaptation and remediation techniques during self care or transfers.   Instruct in proper use of assistive devices.              Learning Progress Summary           Patient Acceptance, E,D, VU,NR by  at 5/7/2022 1305                   Point: Home exercise program (Not Started)     Description:   Instruct learner(s) on appropriate technique for monitoring, assisting and/or progressing therapeutic exercises/activities.              Learner Progress:  Not documented in this visit.          Point: Precautions (Done)     Description:   Instruct learner(s) on prescribed precautions during self-care and functional transfers.              Learning Progress Summary           Patient Acceptance, E,D, VU,NR by  at 5/7/2022 1305                   Point: Body mechanics (Done)     Description:   Instruct learner(s) on proper positioning and spine alignment during self-care, functional mobility activities and/or exercises.              Learning Progress Summary           Patient Acceptance, E,D, VU,NR by  at 5/7/2022 1305                               User Key     Initials Effective Dates Name Provider Type Discipline     06/16/21 -  Autumn Singh, OTR/L Occupational Therapist OT                  OT Recommendation and Plan     Plan of Care Review  Plan of Care Reviewed With: patient  Progress: improving  Outcome Evaluation: Pt alert/awake and agreeable to therapy. Pt initially 9/10 pain but pain decreases with movement. Pt mod A for LB dressing at EOB with supported standing and RW.  Pt compelted grooming tasks and self feeding in recliner with set up only. Pt sat EOB and donned TLSO with max A. Pt would benefit from continued rehab at discharge. Continue OT POC.  Plan of Care Reviewed With: patient  Outcome Evaluation: Pt alert/awake and agreeable to therapy. Pt initially 9/10 pain but pain decreases with movement. Pt mod A for LB dressing at EOB with supported standing and RW. Pt compelted grooming tasks and self feeding in recliner with set up only. Pt sat EOB and donned TLSO with max A. Pt would benefit from continued rehab at discharge. Continue OT POC.        Time Calculation:    Time Calculation- OT     Row Name 05/08/22 1305             Time Calculation- OT    OT Start Time 0800  -TS      OT Stop Time 0855  -TS      OT Time Calculation (min) 55 min  -TS      Total Timed Code Minutes- OT 55 minute(s)  -TS      OT Received On 05/08/22  -TS              Timed Charges    23870 - OT Self Care/Mgmt Minutes 55  -TS              Total Minutes    Timed Charges Total Minutes 55  -TS       Total Minutes 55  -TS            User Key  (r) = Recorded By, (t) = Taken By, (c) = Cosigned By    Initials Name Provider Type    TS Indira Borja COTA Occupational Therapist Assistant              Therapy Charges for Today     Code Description Service Date Service Provider Modifiers Qty    69603630185 HC OT SELF CARE/MGMT/TRAIN EA 15 MIN 5/8/2022 Indira Borja COTA GO 4               Indira KANG. JAN Borja  5/8/2022

## 2022-05-09 PROCEDURE — 97530 THERAPEUTIC ACTIVITIES: CPT

## 2022-05-09 PROCEDURE — 94664 DEMO&/EVAL PT USE INHALER: CPT

## 2022-05-09 PROCEDURE — 97530 THERAPEUTIC ACTIVITIES: CPT | Performed by: OCCUPATIONAL THERAPIST

## 2022-05-09 PROCEDURE — 25010000002 PIPERACILLIN SOD-TAZOBACTAM PER 1 G: Performed by: ORTHOPAEDIC SURGERY

## 2022-05-09 PROCEDURE — 99231 SBSQ HOSP IP/OBS SF/LOW 25: CPT | Performed by: NURSE PRACTITIONER

## 2022-05-09 PROCEDURE — 25010000002 ENOXAPARIN PER 10 MG: Performed by: ORTHOPAEDIC SURGERY

## 2022-05-09 PROCEDURE — 97110 THERAPEUTIC EXERCISES: CPT

## 2022-05-09 PROCEDURE — 94799 UNLISTED PULMONARY SVC/PX: CPT

## 2022-05-09 RX ADMIN — FAMOTIDINE 20 MG: 20 TABLET, FILM COATED ORAL at 08:23

## 2022-05-09 RX ADMIN — TAZOBACTAM SODIUM AND PIPERACILLIN SODIUM 3.38 G: 375; 3 INJECTION, SOLUTION INTRAVENOUS at 15:59

## 2022-05-09 RX ADMIN — ACEBUTOLOL HYDROCHLORIDE 200 MG: 200 CAPSULE ORAL at 08:23

## 2022-05-09 RX ADMIN — OXYCODONE HYDROCHLORIDE AND ACETAMINOPHEN 1 TABLET: 5; 325 TABLET ORAL at 06:08

## 2022-05-09 RX ADMIN — FENOFIBRATE 48 MG: 48 TABLET, FILM COATED ORAL at 08:24

## 2022-05-09 RX ADMIN — LORAZEPAM 0.5 MG: 0.5 TABLET ORAL at 15:42

## 2022-05-09 RX ADMIN — GABAPENTIN 100 MG: 100 CAPSULE ORAL at 15:42

## 2022-05-09 RX ADMIN — ENOXAPARIN SODIUM 30 MG: 100 INJECTION SUBCUTANEOUS at 08:25

## 2022-05-09 RX ADMIN — IPRATROPIUM BROMIDE AND ALBUTEROL SULFATE 3 ML: .5; 3 SOLUTION RESPIRATORY (INHALATION) at 14:54

## 2022-05-09 RX ADMIN — MEGESTROL ACETATE 400 MG: 40 SUSPENSION ORAL at 08:22

## 2022-05-09 RX ADMIN — ASPIRIN 81 MG: 81 TABLET, CHEWABLE ORAL at 08:23

## 2022-05-09 RX ADMIN — ACETAMINOPHEN 650 MG: 325 TABLET ORAL at 10:46

## 2022-05-09 RX ADMIN — GABAPENTIN 100 MG: 100 CAPSULE ORAL at 20:16

## 2022-05-09 RX ADMIN — GABAPENTIN 100 MG: 100 CAPSULE ORAL at 08:22

## 2022-05-09 RX ADMIN — TAZOBACTAM SODIUM AND PIPERACILLIN SODIUM 3.38 G: 375; 3 INJECTION, SOLUTION INTRAVENOUS at 23:28

## 2022-05-09 RX ADMIN — TAZOBACTAM SODIUM AND PIPERACILLIN SODIUM 3.38 G: 375; 3 INJECTION, SOLUTION INTRAVENOUS at 00:11

## 2022-05-09 RX ADMIN — IPRATROPIUM BROMIDE AND ALBUTEROL SULFATE 3 ML: .5; 3 SOLUTION RESPIRATORY (INHALATION) at 10:38

## 2022-05-09 RX ADMIN — LORAZEPAM 0.5 MG: 0.5 TABLET ORAL at 20:16

## 2022-05-09 RX ADMIN — OXYCODONE HYDROCHLORIDE AND ACETAMINOPHEN 1 TABLET: 5; 325 TABLET ORAL at 20:16

## 2022-05-09 RX ADMIN — ACETAMINOPHEN 650 MG: 325 TABLET ORAL at 22:31

## 2022-05-09 RX ADMIN — TAZOBACTAM SODIUM AND PIPERACILLIN SODIUM 3.38 G: 375; 3 INJECTION, SOLUTION INTRAVENOUS at 08:24

## 2022-05-09 RX ADMIN — Medication 10 ML: at 08:25

## 2022-05-09 RX ADMIN — LORAZEPAM 0.5 MG: 0.5 TABLET ORAL at 08:24

## 2022-05-09 RX ADMIN — Medication 2000 UNITS: at 08:23

## 2022-05-09 RX ADMIN — Medication 10 ML: at 20:16

## 2022-05-09 RX ADMIN — ACEBUTOLOL HYDROCHLORIDE 200 MG: 200 CAPSULE ORAL at 20:16

## 2022-05-09 RX ADMIN — IPRATROPIUM BROMIDE AND ALBUTEROL SULFATE 3 ML: .5; 3 SOLUTION RESPIRATORY (INHALATION) at 19:23

## 2022-05-09 RX ADMIN — PAROXETINE 40 MG: 20 TABLET, FILM COATED ORAL at 08:23

## 2022-05-09 RX ADMIN — IPRATROPIUM BROMIDE AND ALBUTEROL SULFATE 3 ML: .5; 3 SOLUTION RESPIRATORY (INHALATION) at 06:54

## 2022-05-09 NOTE — THERAPY TREATMENT NOTE
Patient Name: Zelalem Hung  : 1940    MRN: [id number]                              Today's Date: 2022       Admit Date: 2022    Visit Dx:     ICD-10-CM ICD-9-CM   1. Colitis  K52.9 558.9   2. Impaired functional mobility and activity tolerance  Z74.09 V49.89   3. Impaired mobility  Z74.09 799.89   4. Decreased activities of daily living (ADL)  Z78.9 V49.89     Patient Active Problem List   Diagnosis   • Cholecystitis   • Encephalopathy   • Unknown when patient last well, possible stroke    • Hyponatremia   • Failure to thrive in adult   • Severe malnutrition (HCC)   • History of migraines   • Closed fracture of nasal bone with routine healing   • Traumatic ecchymosis of face   • Acquired deviated nasal septum   • Epistaxis   • Colitis   • Fall   • Compression fracture of body of thoracic vertebra (HCC)   • Compression fracture of L2 lumbar vertebra (HCC)   • Sepsis (HCC)   • Metabolic acidosis   • Fecal impaction (HCC)   • Closed left hip fracture (HCC)     Past Medical History:   Diagnosis Date   • Chronic hyponatremia    • Frequent UTI    • Migraines    • SVT (supraventricular tachycardia) ([facility])      Past Surgical History:   Procedure Laterality Date   • CHOLECYSTECTOMY WITH INTRAOPERATIVE CHOLANGIOGRAM N/A 2019    Procedure: CHOLECYSTECTOMY LAPAROSCOPIC INTRAOPERATIVE CHOLANGIOGRAM;  Surgeon: Chandan Bravo MD;  Location: [facility] OR;  Service: General   • HIP HEMIARTHROPLASTY Left 2022    Procedure: HIP HEMIARTHROPLASTY;  Surgeon: Barrington Simons MD;  Location: [facility] OR;  Service: Orthopedics;  Laterality: Left;      General Information     Row Name 22 1122          OT Time and Intention    Document Type therapy note (daily note)  -MM     Mode of Treatment occupational therapy  -MM     Row Name 22 1122          General Information    Patient Profile Reviewed yes  -MM     Existing Precautions/Restrictions fall;brace worn when out of bed;spinal;left;hip, posterior  -MM      Barriers to Rehab medically complex;physical barrier  -MM     Row Name 05/09/22 1122          Cognition    Orientation Status (Cognition) oriented to;person;place  -MM     Row Name 05/09/22 1122          Safety Issues, Functional Mobility    Safety Issues Affecting Function (Mobility) at risk behavior observed;insight into deficits/self-awareness;judgment;safety precaution awareness;safety precautions follow-through/compliance;sequencing abilities  -MM     Impairments Affecting Function (Mobility) balance;pain;endurance/activity tolerance;strength  -MM           User Key  (r) = Recorded By, (t) = Taken By, (c) = Cosigned By    Initials Name Provider Type    MM Joseph Ramos, OTR/L Occupational Therapist                 Mobility/ADL's     Row Name 05/09/22 1122          Bed Mobility    Bed Mobility scooting/bridging  -MM     Scooting/Bridging [place] (Bed Mobility) maximum assist (25% patient effort);dependent (less than 25% patient effort);verbal cues  -MM     Assistive Device (Bed Mobility) draw sheet  -MM     Comment, (Bed Mobility) up in chair  -MM     Row Name 05/09/22 1122          Transfers    Transfers sit-stand transfer;stand-sit transfer  -MM     Comment, (Transfers) attempted 2x unable to stand fully upright  -MM     Sit-Stand [place] (Transfers) maximum assist (25% patient effort);verbal cues  -MM     Stand-Sit [place] (Transfers) maximum assist (25% patient effort);verbal cues  -MM     Row Name 05/09/22 1122          Sit-Stand Transfer    Assistive Device (Sit-Stand Transfers) walker, front-wheeled  -MM     Row Name 05/09/22 1122          Stand-Sit Transfer    Assistive Device (Stand-Sit Transfers) walker, front-wheeled  -MM     Row Name 05/09/22 1122          Activities of Daily Living    BADL Assessment/Intervention grooming  -MM     Row Name 05/09/22 1122          Mobility    Extremity Weight-bearing Status left lower extremity  -MM     Left Lower Extremity (Weight-bearing Status)  full weight-bearing (FWB)  -MM     Row Name 05/09/22 1122          Lower Body Dressing Assessment/Training    [place] (Lower Body Dressing) socks;maximum assist (25% patient effort)  adjust socks  -MM     Position (Lower Body Dressing) edge of bed sitting;supported standing  -MM     Row Name 05/09/22 1122          Grooming Assessment/Training    [place] (Grooming) wash face, hands;hair care, combing/brushing;set up;supervision;verbal cues  -MM     Position (Grooming) supported sitting  in chair  -MM           User Key  (r) = Recorded By, (t) = Taken By, (c) = Cosigned By    Initials Name Provider Type    Joseph Figueroa, OTR/L Occupational Therapist               Obj/Interventions     Row Name 05/09/22 1122          Balance    Static Sitting Balance independent  -MM     Position, Sitting Balance supported;sitting in chair  -MM           User Key  (r) = Recorded By, (t) = Taken By, (c) = Cosigned By    Initials Name Provider Type    Joseph Figueroa, OTR/L Occupational Therapist               Goals/Plan    No documentation.                Clinical Impression     [facility] Name 05/09/22 1122          Pain Assessment    Pretreatment Pain Rating 8/10  -MM     Posttreatment Pain Rating 8/10  -MM     Pain Location - Side/Orientation Left  -MM     Pain Location - hip  -MM     Pain Intervention(s) Medication (See MAR);Repositioned  -MM     Row Name 05/09/22 1122          Plan of Care Review    Plan of Care Reviewed With patient  -MM     Progress no change  -MM     Outcome Evaluation OT tx completed. Pt reports L hip pain 8/10 pre and post tx. Pt was up in chair. Pt attempt sit to stand two times, max A x1 with RW. Unable to stand fully upright. Pt was max-dep for scooting to reposition in chair utilizing draw sheet. pt was max A to adjust socks. Pt was supervision with set up to wash face and comb hair. Pt requests to rest at this time. Continue OT POC.  -MM     [facility] Name 05/09/22 1122           Therapy Plan Review/Discharge Plan (OT)    Anticipated Discharge Disposition (OT) skilled nursing facility  -MM     Row Name 05/09/22 1122          Positioning and Restraints    Pre-Treatment Position sitting in chair/recliner  -MM     Post Treatment Position chair  -MM     In Chair reclined;call light within reach;encouraged to call for assist  -MM           User Key  (r) = Recorded By, (t) = Taken By, (c) = Cosigned By    Initials Name Provider Type    Joseph Figueroa, OTR/L Occupational Therapist               Outcome Measures     Row Name 05/09/22 1122          How much help from another is currently needed...    Putting on and taking off regular lower body clothing? 2  -MM     Bathing (including washing, rinsing, and drying) 2  -MM     Toileting (which includes using toilet bed pan or urinal) 2  -MM     Putting on and taking off regular upper body clothing 2  -MM     Taking care of personal grooming (such as brushing teeth) 4  -MM     Eating meals 4  -MM     AM-PAC 6 Clicks Score (OT) 16  -MM     Row Name 05/09/22 1122          Functional Assessment    Outcome Measure Options AM-PAC 6 Clicks Daily Activity (OT)  -MM           User Key  (r) = Recorded By, (t) = Taken By, (c) = Cosigned By    Initials Name Provider Type    Joseph Figueroa, OTR/L Occupational Therapist                Occupational Therapy Education                 Title: PT OT SLP Therapies (In Progress)     Topic: Occupational Therapy (In Progress)     Point: ADL training (In Progress)     Description:   Instruct learner(s) on proper safety adaptation and remediation techniques during self care or transfers.   Instruct in proper use of assistive devices.              Learning Progress Summary           Patient Acceptance, E, NR by MM at 5/9/2022 1645    Comment: benefits of activity, t/f safety    Acceptance, E,D, VU,NR by  at 5/7/2022 1305                   Point: Home exercise program (Not Started)     Description:   Instruct  learner(s) on appropriate technique for monitoring, assisting and/or progressing therapeutic exercises/activities.              Learner Progress:  Not documented in this visit.          Point: Precautions (In Progress)     Description:   Instruct learner(s) on prescribed precautions during self-care and functional transfers.              Learning Progress Summary           Patient Acceptance, E, NR by  at 5/9/2022 1645    Comment: benefits of activity, t/f safety    Acceptance, E,D, VU,NR by  at 5/7/2022 1305                   Point: Body mechanics (In Progress)     Description:   Instruct learner(s) on proper positioning and spine alignment during self-care, functional mobility activities and/or exercises.              Learning Progress Summary           Patient Acceptance, E, NR by  at 5/9/2022 1645    Comment: benefits of activity, t/f safety    Acceptance, E,D, VU,NR by  at 5/7/2022 1305                               User Key     Initials Effective Dates Name Provider Type Discipline     06/16/21 -  Autumn Singh, OTR/L Occupational Therapist OT     06/16/21 -  Joseph Ramos, OTR/L Occupational Therapist OT              OT Recommendation and Plan     Plan of Care Review  Plan of Care Reviewed With: patient  Progress: no change  Outcome Evaluation: OT tx completed. Pt reports L hip pain 8/10 pre and post tx. Pt was up in chair. Pt attempt sit to stand two times, max A x1 with RW. Unable to stand fully upright. Pt was max-dep for scooting to reposition in chair utilizing draw sheet. pt was max A to adjust socks. Pt was supervision with set up to wash face and comb hair. Pt requests to rest at this time. Continue OT POC.     Time Calculation:    Time Calculation- OT     Row Name 05/09/22 1122             Time Calculation- OT    OT Start Time 1122  -MM      OT Stop Time 1138  -MM      OT Time Calculation (min) 16 min  -MM      Total Timed Code Minutes- OT 16 minute(s)  -MM      OT Received On  05/09/22  -MM              Timed Charges    76563 - OT Therapeutic Activity Minutes 16  -MM              Total Minutes    Timed Charges Total Minutes 16  -MM       Total Minutes 16  -MM            User Key  (r) = Recorded By, (t) = Taken By, (c) = Cosigned By    Initials Name Provider Type    Joseph Figueroa OTR/L Occupational Therapist              Therapy Charges for Today     Code Description Service Date Service Provider Modifiers Qty    96702994884  OT THERAPEUTIC ACT EA 15 MIN 5/9/2022 Joseph Ramos OTR/L GO 1               AURORA Rosales/LATONYA  5/9/2022

## 2022-05-09 NOTE — PLAN OF CARE
Goal Outcome Evaluation:  Patient is alert and oriented to self and place. Dressing to left hip dry and intact. Swelling and blanchable redness to surrounding skin. Antibiotics as ordered. Incontinent wearing briefs or uses bedpan. Pain controlled with prn po medications. Fall precautions in place with bed alarm on.

## 2022-05-09 NOTE — THERAPY TREATMENT NOTE
Acute Care - Physical Therapy Treatment Note  [facility]     Patient Name: Zelalem Hung  : 1940  MRN: [id number]  Today's Date: 2022      Visit Dx:     ICD-10-CM ICD-9-CM   1. Colitis  K52.9 558.9   2. Impaired functional mobility and activity tolerance  Z74.09 V49.89   3. Impaired mobility  Z74.09 799.89   4. Decreased activities of daily living (ADL)  Z78.9 V49.89     Patient Active Problem List   Diagnosis   • Cholecystitis   • Encephalopathy   • Unknown when patient last well, possible stroke    • Hyponatremia   • Failure to thrive in adult   • Severe malnutrition (HCC)   • History of migraines   • Closed fracture of nasal bone with routine healing   • Traumatic ecchymosis of face   • Acquired deviated nasal septum   • Epistaxis   • Colitis   • Fall   • Compression fracture of body of thoracic vertebra (HCC)   • Compression fracture of L2 lumbar vertebra (HCC)   • Sepsis (HCC)   • Metabolic acidosis   • Fecal impaction (HCC)   • Closed left hip fracture (HCC)     Past Medical History:   Diagnosis Date   • Chronic hyponatremia    • Frequent UTI    • Migraines    • SVT [facility]supra[facility]ricular tachycardia) ([facility])      Past Surgical History:   Procedure Laterality Date   • CHOLECYSTECTOMY WITH INTRAOPERATIVE CHOLANGIOGRAM N/A 2019    Procedure: CHOLECYSTECTOMY LAPAROSCOPIC INTRAOPERATIVE CHOLANGIOGRAM;  Surgeon: Chandan Bravo MD;  Location: [facility];  Service: General   • HIP HEMIARTHROPLASTY Left 2022    Procedure: HIP HEMIARTHROPLASTY;  Surgeon: Barrington Simons MD;  Location: [facility];  Service: Orthopedics;  Laterality: Left;     PT Assessment (last 12 hours)     PT Evaluation and Treatment     Row Name 22 103          Physical Therapy Time and Intention    Subjective Information complains of;pain  -KJ     Document Type therapy note (daily note)  -KJ     Mode of Treatment physical therapy  -KJ     Patient Effort good  -KJ     Comment TLSO/LSO  -KJ     Row Name 22 7636           General Information    Existing Precautions/Restrictions fall;brace worn when out of bed;spinal;left;hip, posterior  -KJ     Row Name 05/09/22 1030          Pain    Pretreatment Pain Rating 8/10  -KJ     Posttreatment Pain Rating 8/10  -KJ     Pain Location - Side/Orientation Left  -KJ     Pain Location lower  -KJ     Pain Location - back  -KJ     Row Name 05/09/22 1030          Bed Mobility    Sidelying-Sit [place] (Bed Mobility) verbal cues;maximum assist (25% patient effort);2 person assist  -KJ     Assistive Device (Bed Mobility) draw sheet;bed rails  -KJ     Row Name 05/09/22 1030          Transfers    Sit-Stand [place] (Transfers) verbal cues;moderate assist (50% patient effort);2 person assist  -KJ     Stand-Sit [place] (Transfers) moderate assist (50% patient effort);2 person assist  -KJ     Row Name 05/09/22 1030          Sit-Stand Transfer    Assistive Device (Sit-Stand Transfers) walker, front-wheeled  -KJ     Row Name 05/09/22 1030          Stand-Sit Transfer    Assistive Device (Stand-Sit Transfers) walker, front-wheeled  -KJ     Row Name 05/09/22 1030          Gait/Stairs (Locomotion)    Comment, (Gait/Stairs) stood x 2; steps to chair and unable to put heels on floor  -KJ     Row Name             Wound 05/06/22 1557 Left anterior greater trochanter Incision    Wound - Properties Group Placement Date: 05/06/22  -KT Placement Time: 1557  -KT Present on Hospital Admission: N  -KT Side: Left  -KT Orientation: anterior  -KT Location: greater trochanter  -KT Primary Wound Type: Incision  -KT     Retired Wound - Properties Group Placement Date: 05/06/22  -KT Placement Time: 1557 -KT Present on Hospital Admission: N  -KT Side: Left  -KT Orientation: anterior  -KT Location: greater trochanter  -KT Primary Wound Type: Incision  -KT     Retired Wound - Properties Group Date first assessed: 05/06/22  -KT Time first assessed: 1557 -KT Present on Hospital Admission: N  -KT Side: Left  -KT Location:  greater trochanter  -KT Primary Wound Type: Incision  -KT     Row Name 05/09/22 1030          Positioning and Restraints    Pre-Treatment Position in bed  -KJ     Post Treatment Position chair  -KJ     In Bed call light within reach  -KJ           User Key  (r) = Recorded By, (t) = Taken By, (c) = Cosigned By    Initials Name Provider Type    Laura Lee PTA Physical Therapist Assistant    David Keane RN Registered Nurse                Physical Therapy Education                 Title: PT OT SLP Therapies (In Progress)     Topic: Physical Therapy (Done)     Point: Mobility training (Done)     Learning Progress Summary           Patient Acceptance, E,TB,D, VU,DU,NR by  at 5/7/2022 1626    Comment: Education re: purpose of PT/importance of activity, for safety/falls prevention, for posterior hip precautions, spinal precautions, improved tech w/ bed mobility, tfers, use of rwx, purpose of brace and brace wearing schedule   Family Acceptance, E,TB,D, VU,DU,NR by  at 5/7/2022 1626    Comment: Education re: purpose of PT/importance of activity, for safety/falls prevention, for posterior hip precautions, spinal precautions, improved tech w/ bed mobility, tfers, use of rwx, purpose of brace and brace wearing schedule                   Point: Body mechanics (Done)     Learning Progress Summary           Patient Acceptance, E,TB,D, VU,DU,NR by  at 5/7/2022 1626    Comment: Education re: purpose of PT/importance of activity, for safety/falls prevention, for posterior hip precautions, spinal precautions, improved tech w/ bed mobility, tfers, use of rwx, purpose of brace and brace wearing schedule   Family Acceptance, E,TB,D, VU,DU,NR by  at 5/7/2022 1626    Comment: Education re: purpose of PT/importance of activity, for safety/falls prevention, for posterior hip precautions, spinal precautions, improved tech w/ bed mobility, tfers, use of rwx, purpose of brace and brace wearing schedule                    Point: Precautions (Done)     Learning Progress Summary           Patient Acceptance, E,TB,D, VU,DU,NR by  at 5/7/2022 1626    Comment: Education re: purpose of PT/importance of activity, for safety/falls prevention, for posterior hip precautions, spinal precautions, improved tech w/ bed mobility, tfers, use of rwx, purpose of brace and brace wearing schedule   Family Acceptance, E,TB,D, VU,DU,NR by  at 5/7/2022 1626    Comment: Education re: purpose of PT/importance of activity, for safety/falls prevention, for posterior hip precautions, spinal precautions, improved tech w/ bed mobility, tfers, use of rwx, purpose of brace and brace wearing schedule                               User Key     Initials Effective Dates Name Provider Type [facility] 08/02/18 -  Fiona Chicas, PT Physical Therapist PT              PT Recommendation and Plan     Plan of Care Reviewed With: patient  Progress: improving  Outcome Evaluation: PT tx completed. Pt c/o L low back pn, rates 8/10. MaxA x 2 bed mobility, Static sitting SBA, sit<>stand modA x 2 with r wx. Small steps to chair mod/maxA x 2. TLSO don/doffed EOB. Pt walking on toes, she states she is unable to put her heels down. Recommend SNF.       Time Calculation:    PT Charges     Row Name 05/09/22 1118             Time Calculation    Start Time 1030  -KJ      Stop Time 1055  -KJ      Time Calculation (min) 25 min  -KJ      PT Received On 05/09/22  -KJ      PT Goal Re-Cert Due Date 05/17/22  -KJ              Time Calculation- PT    Total Timed Code Minutes- PT 25 minute(s)  -KJ            User Key  (r) = Recorded By, (t) = Taken By, (c) = Cosigned By    Initials Name Provider Type    Laura Lee PTA Physical Therapist Assistant              Therapy Charges for Today     Code Description Service Date Service Provider Modifiers Qty    92523029102 HC PT THERAPEUTIC ACT EA 15 MIN 5/8/2022 Laura Hensley PTA GP 2    52207353890 HC PT THER PROC EA 15 MIN 5/9/2022  Laura Hensley, PTA GP 1    [id number]  PT THERAPEUTIC ACT EA 15 MIN 5/9/2022 Laura Hensley, PTA GP 1          PT G-Codes  Outcome Measure Options: AM-PAC 6 Clicks Daily Activity (OT)  AM-PAC 6 Clicks Score (PT): 10  AM-PAC 6 Clicks Score (OT): 12    Laura Hensley PTA  5/9/2022

## 2022-05-09 NOTE — PLAN OF CARE
Goal Outcome Evaluation:  Plan of Care Reviewed With: patient        Progress: improving  Outcome Evaluation: Pt is able to answer orientation questions but has moments of confusion.  Pt tolerating RA w/ sats above 90%.  Dressing to left hip CDI.  Abduction pillow in place while in bed.  PRN Tylenol given w/ fair results.  Incontinent, barrier cream applied.  Abx infused per order.  Safety maintained.

## 2022-05-09 NOTE — THERAPY TREATMENT NOTE
Acute Care - Physical Therapy Treatment Note  [facility]     Patient Name: Zelalem Hung  : 1940  MRN: [id number]  Today's Date: 2022      Visit Dx:     ICD-10-CM ICD-9-CM   1. Colitis  K52.9 558.9   2. Impaired functional mobility and activity tolerance  Z74.09 V49.89   3. Impaired mobility  Z74.09 799.89   4. Decreased activities of daily living (ADL)  Z78.9 V49.89     Patient Active Problem List   Diagnosis   • Cholecystitis   • Encephalopathy   • Unknown when patient last well, possible stroke    • Hyponatremia   • Failure to thrive in adult   • Severe malnutrition (HCC)   • History of migraines   • Closed fracture of nasal bone with routine healing   • Traumatic ecchymosis of face   • Acquired deviated nasal septum   • Epistaxis   • Colitis   • Fall   • Compression fracture of body of thoracic vertebra (HCC)   • Compression fracture of L2 lumbar vertebra (HCC)   • Sepsis (HCC)   • Metabolic acidosis   • Fecal impaction (HCC)   • Closed left hip fracture (HCC)     Past Medical History:   Diagnosis Date   • Chronic hyponatremia    • Frequent UTI    • Migraines    • SVT (supraventricular tachycardia) (Formerly [facility])      Past Surgical History:   Procedure Laterality Date   • CHOLECYSTECTOMY WITH INTRAOPERATIVE CHOLANGIOGRAM N/A 2019    Procedure: CHOLECYSTECTOMY LAPAROSCOPIC INTRAOPERATIVE CHOLANGIOGRAM;  Surgeon: Chandan Bravo MD;  Location: [facility];  Service: General   • HIP HEMIARTHROPLASTY Left 2022    Procedure: HIP HEMIARTHROPLASTY;  Surgeon: Barrington Simons MD;  Location: [facility];  Service: Orthopedics;  Laterality: Left;     PT Assessment (last 12 hours)     PT Evaluation and Treatment     Row Name 22 1405 22 1030       Physical Therapy Time and Intention    Subjective Information no complaints  -KJ complains of;pain  -KJ    Document Type therapy note (daily note)  -KJ therapy note (daily note)  -KJ    Mode of Treatment physical therapy  -KJ physical therapy  -KJ    Patient  Effort good  -KJ good  -KJ    Comment pleasantly confused; follows all directions.  -KJ TLSO/LSO  -KJ    Row Name 05/09/22 1405 05/09/22 1030       General Information    Existing Precautions/Restrictions fall;brace worn when out of bed;spinal;left;hip, posterior  -KJ fall;brace worn when out of bed;spinal;left;hip, posterior  -KJ    Row Name 05/09/22 1405 05/09/22 1030       Pain    Pretreatment Pain Rating 8/10  -KJ 8/10  -KJ    Posttreatment Pain Rating 8/10  -KJ 8/10  -KJ    Pain Location - Side/Orientation Left  -KJ Left  -KJ    Pain Location lower  -KJ lower  -KJ    Pain Location - back  -KJ back  -KJ    Row Name 05/09/22 1405 05/09/22 1030       Bed Mobility    Sidelying-Sit [place] (Bed Mobility) -- verbal cues;maximum assist (25% patient effort);2 person assist  -KJ    Sit-Sidelying [place] (Bed Mobility) verbal cues;maximum assist (25% patient effort)  -KJ --    Assistive Device (Bed Mobility) draw sheet;bed rails  -KJ draw sheet;bed rails  -KJ    Row Name 05/09/22 1405 05/09/22 1030       Transfers    Sit-Stand [place] (Transfers) verbal cues;moderate assist (50% patient effort);2 person assist  -KJ verbal cues;moderate assist (50% patient effort);2 person assist  -KJ    Stand-Sit [place] (Transfers) moderate assist (50% patient effort);2 person assist  -KJ moderate assist (50% patient effort);2 person assist  -KJ    Row Name 05/09/22 1405 05/09/22 1030       Sit-Stand Transfer    Assistive Device (Sit-Stand Transfers) walker, front-wheeled  -KJ walker, front-wheeled  -KJ    Row Name 05/09/22 1405 05/09/22 1030       Stand-Sit Transfer    Assistive Device (Stand-Sit Transfers) walker, front-wheeled  -KJ walker, front-wheeled  -KJ    Row Name 05/09/22 1405 05/09/22 1030       Gait/Stairs (Locomotion)    Comment, (Gait/Stairs) stood x 3; pivot from chair>bed. Cues to stand erect and place heels on floor. Heelcord stretches prior to standing  -KJ stood x 2; steps to chair and unable to  put heels on floor  -KJ    Row Name 05/09/22 1405          Motor Skills    Therapeutic Exercise aerobic  -KJ     Row Name 05/09/22 1405          Aerobic Exercise    Comment, Aerobic Exercise (Therapeutic Exercise) AROM  -KJ     Row Name             Wound 05/06/22 1557 Left anterior greater trochanter Incision    Wound - Properties Group Placement Date: 05/06/22  -KT Placement Time: 1557  -KT Present on Hospital Admission: N  -KT Side: Left  -KT Orientation: anterior  -KT Location: greater trochanter  -KT Primary Wound Type: Incision  -KT     Retired Wound - Properties Group Placement Date: 05/06/22  -KT Placement Time: 1557  -KT Present on Hospital Admission: N  -KT Side: Left  -KT Orientation: anterior  -KT Location: greater trochanter  -KT Primary Wound Type: Incision  -KT     Retired Wound - Properties Group Date first assessed: 05/06/22  -KT Time first assessed: 1557  -KT Present on Hospital Admission: N  -KT Side: Left  -KT Location: greater trochanter  -KT Primary Wound Type: Incision  -KT     Row Name 05/09/22 1405 05/09/22 1030       Positioning and Restraints    Pre-Treatment Position sitting in chair/recliner  -KJ in bed  -KJ    Post Treatment Position bed  -KJ chair  -KJ    In Bed call light within reach;pillow between legs;exit alarm on  -KJ call light within reach  -KJ          User Key  (r) = Recorded By, (t) = Taken By, (c) = Cosigned By    Initials Name Provider Type    Laura Lee PTA Physical Therapist Assistant    Daivd Keane RN Registered Nurse                Physical Therapy Education                 Title: PT OT SLP Therapies (In Progress)     Topic: Physical Therapy (Done)     Point: Mobility training (Done)     Learning Progress Summary           Patient Acceptance, E,TB,D, VU,DU,NR by RODRIGUE at 5/7/2022 0066    Comment: Education re: purpose of PT/importance of activity, for safety/falls prevention, for posterior hip precautions, spinal precautions, improved tech w/ bed mobility,  tfers, use of rwx, purpose of brace and brace wearing schedule   Family Acceptance, E,TB,D, VU,DU,NR by  at 5/7/2022 1626    Comment: Education re: purpose of PT/importance of activity, for safety/falls prevention, for posterior hip precautions, spinal precautions, improved tech w/ bed mobility, tfers, use of rwx, purpose of brace and brace wearing schedule                   Point: Body mechanics (Done)     Learning Progress Summary           Patient Acceptance, E,TB,D, VU,DU,NR by  at 5/7/2022 1626    Comment: Education re: purpose of PT/importance of activity, for safety/falls prevention, for posterior hip precautions, spinal precautions, improved tech w/ bed mobility, tfers, use of rwx, purpose of brace and brace wearing schedule   Family Acceptance, E,TB,D, VU,DU,NR by  at 5/7/2022 1626    Comment: Education re: purpose of PT/importance of activity, for safety/falls prevention, for posterior hip precautions, spinal precautions, improved tech w/ bed mobility, tfers, use of rwx, purpose of brace and brace wearing schedule                   Point: Precautions (Done)     Learning Progress Summary           Patient Acceptance, E,TB,D, VU,DU,NR by  at 5/7/2022 1626    Comment: Education re: purpose of PT/importance of activity, for safety/falls prevention, for posterior hip precautions, spinal precautions, improved tech w/ bed mobility, tfers, use of rwx, purpose of brace and brace wearing schedule   Family Acceptance, E,TB,D, VU,DU,NR by  at 5/7/2022 1626    Comment: Education re: purpose of PT/importance of activity, for safety/falls prevention, for posterior hip precautions, spinal precautions, improved tech w/ bed mobility, tfers, use of rwx, purpose of brace and brace wearing schedule                               User Key     Initials Effective Dates Name Provider Type Discipline     08/02/18 -  Fiona Chicas, PT Physical Therapist PT              PT Recommendation and Plan     Plan of Care  Reviewed With: patient  Progress: improving  Outcome Evaluation: PT tx completed. Pt c/o L low back pn, rates 8/10. MaxA x 2 bed mobility, Static sitting SBA, sit<>stand modA x 2 with r wx. Small steps to chair mod/maxA x 2. TLSO don/doffed EOB. Pt walking on toes, she states she is unable to put her heels down. Recommend SNF.       Time Calculation:    PT Charges     Row Name 05/09/22 1445 05/09/22 1118          Time Calculation    Start Time 1405  -KJ 1030  -KJ     Stop Time 1445  -KJ 1055  -KJ     Time Calculation (min) 40 min  -KJ 25 min  -KJ     PT Received On -- 05/09/22  -KJ     PT Goal Re-Cert Due Date -- 05/17/22  -KJ            Time Calculation- PT    Total Timed Code Minutes- PT 40 minute(s)  -KJ 25 minute(s)  -KJ           User Key  (r) = Recorded By, (t) = Taken By, (c) = Cosigned By    Initials Name Provider Type    Laura Lee, THIAGO Physical Therapist Assistant              Therapy Charges for Today     Code Description Service Date Service Provider Modifiers Qty    37637310512 HC PT THERAPEUTIC ACT EA 15 MIN 5/8/2022 Laura Hensley, THIAGO GP 2    92225087122 HC PT THER PROC EA 15 MIN 5/9/2022 Laura Hensley, PTA GP 1    94063741841 HC PT THERAPEUTIC ACT EA 15 MIN 5/9/2022 Laura Hensley, PTA GP 1    10289160822 HC PT THER PROC EA 15 MIN 5/9/2022 Laura Hensley, THIAGO GP 1    35680494168 HC PT THERAPEUTIC ACT EA 15 MIN 5/9/2022 Laura Hensley, THIAGO GP 2          PT G-Codes  Outcome Measure Options: AM-PAC 6 Clicks Daily Activity (OT)  AM-PAC 6 Clicks Score (PT): 10  AM-PAC 6 Clicks Score (OT): 12    Laura Hensley PTA  5/9/2022

## 2022-05-09 NOTE — PROGRESS NOTES
1           [facility] Medicine Services  INPATIENT PROGRESS NOTE    Patient Name: Zelalem Hung  Date of Admission: 5/5/2022  Today's Date: 05/09/22  Length of Stay: 3  Primary Care Physician: Garth Amezcua APRN    Subjective   Chief Complaint: Follow-up  HPI   Day 3 hospitalization.  Admitted May 5, 2022 patient reportedly had a fall resulting to left subcapital displaced femoral neck fracture that is post left hip hemiarthroplasty by Dr. Simons on May 6.  Patient has anemia in the setting of recent hip hemiarthroplasty.    In addition to this, neurosurgery had seen the patient.  Their impression includes a worsening fracture of T7 and a new compression fracture of L2 .  As of today, the service recommended to continue LSO brace and notify neurosurgery if any significant amount of upper lumbar spine pain while working with therapy.  Per PT evaluation today patient has slowly back pain rated as 8 out of 10.  Patient has maximum assist of 2 bed mobility.  Patient walking on toes.  Patient is anticipated to go to skilled nursing facility.    When I asked the patient earlier where her pain is she actually points to her left hip rather than her back.  She still claims to have pain about 6 at the time of visit.    Review of Systems     All pertinent negatives and positives are as above. All other systems have been reviewed and are negative unless otherwise stated.     Objective    Temp:  [97.8 °F (36.6 °C)-98.1 °F (36.7 °C)] 98.1 °F (36.7 °C)  Heart Rate:  [81-95] 90  Resp:  [16] 16  BP: (137-148)/(66-89) 137/66  Physical Exam   She is seated on the recliner.  She is awaiting a TLSO brace.  She appears comfortable  Hemodynamically stable  GEN: Awake, alert, interactive, in NAD  HEENT:  PERRLA, EOMI, Anicteric, Trachea midline  Lungs: Nonlabored breathing.  Exam limited by patient's TLSO brace  Heart: Pulses are full.  Heart exam limited by patient's TLSO brace   ABD: soft, nt/nd,  +BS, no guarding/rebound  Extremities: no cyanosis, no pitting edema, scds in place, clean operative site left hip.  No gross ecchymosis.  Skin: no rashes or petechiae  Neuro: AAOx3, no focal deficits  Psych: normal mood & affect    Results Review:  I have reviewed the labs, radiology results, and diagnostic studies.    Laboratory Data:   Results from last 7 days   Lab Units 05/08/22  0605 05/07/22 0620 05/06/22  0459   WBC 10*3/mm3 10.29 11.30* 20.00*   HEMOGLOBIN g/dL 10.8* 10.1* 14.1   HEMATOCRIT % 33.0* 29.9* 40.5   PLATELETS 10*3/mm3 270 246 321        Results from last 7 days   Lab Units 05/08/22  0605 05/07/22  0620 05/06/22  2333 05/06/22  1313 05/06/22  0459 05/05/22  2355 05/05/22  2353   SODIUM mmol/L 139 137 134*   < > 133*  --  134*   POTASSIUM mmol/L 3.7 3.9 3.8   < > 4.2  --  4.4   CHLORIDE mmol/L 101 99 97*   < > 97*  --  97*   CO2 mmol/L 28.0 24.0 21.0*   < > 10.0*  --  15.0*   BUN mg/dL 22 21 24*   < > 22  --  24*   CREATININE mg/dL 0.64 0.63 0.62   < > 0.68   < > 0.65   CALCIUM mg/dL 9.3 9.5 9.1   < > 9.7  --  9.9   BILIRUBIN mg/dL  --   --   --   --  0.7  --  0.7   ALK PHOS U/L  --   --   --   --  80  --  83   ALT (SGPT) U/L  --   --   --   --  13  --  14   AST (SGOT) U/L  --   --   --   --  24  --  23   GLUCOSE mg/dL 114* 112* 140*   < > 109*  --  103*    < > = values in this interval not displayed.       Culture Data:   Blood Culture   Date Value Ref Range Status   05/05/2022 No growth at 3 days  Preliminary   05/05/2022 No growth at 3 days  Preliminary       Radiology Data:   Imaging Results (Last 24 Hours)     ** No results found for the last 24 hours. **          I have reviewed the patient's current medications.     Assessment/Plan     Active Hospital Problems    Diagnosis    • **Closed left hip fracture (HCC)    • Colitis    • Fall    • Compression fracture of body of thoracic vertebra (HCC)    • Compression fracture of L2 lumbar vertebra (HCC)    • Sepsis (HCC)    • Metabolic acidosis     • Fecal impaction (HCC)    • Failure to thrive in adult      · Status post left hip hemiarthroplasty on May 6.  Continue PT OT, pain control.  Looking towards rehab facility placement  · L2 compression fracture and worsening T7.  Noted PT evaluation during exercise.  Informed Jesse Rudolph.  Otherwise continue to wear TLSO brace.  Continue pain med.  · Stercoral colitis.  On antibiotic  · Metabolic acidosis-resolved  · Sepsis/colitis patient on antibiotic (Zosyn day 4 of 7).  We will plan to transition to oral antibiotic upon discharge.  · Anemia in the setting of recent left hip surgery.  · Elevated troponin.  From reviewing the initial follow-up progress note.  Patient has no acute ST-T wave changes.  Patient has not complained of any chest pain.  I am not sure whether this was elevated due to concern of sepsis.  Plan:    Discussed with  Leny at bedside.  Awaiting placement  Stable hemoglobin  acebutolol, 200 mg, Oral, Q12H  aspirin, 81 mg, Oral, Daily  cholecalciferol, 2,000 Units, Oral, Daily  enoxaparin, 30 mg, Subcutaneous, Daily  famotidine, 20 mg, Oral, Daily  fenofibrate, 48 mg, Oral, Daily  gabapentin, 100 mg, Oral, TID  ipratropium-albuterol, 3 mL, Nebulization, 4x Daily - RT  LORazepam, 0.5 mg, Oral, TID  megestrol, 400 mg, Oral, Daily  PARoxetine, 40 mg, Oral, Daily  piperacillin-tazobactam, 3.375 g, Intravenous, Q8H  sodium chloride, 10 mL, Intravenous, Q12H                    Discharge Planning: Awaiting bed offer.  Hemodynamically stable.  Electronically signed by William Turner MD, 05/09/22, 11:48 CDT.

## 2022-05-09 NOTE — PROGRESS NOTES
"Zelalem Abhilash  81 y.o.      Chief complaint:   Thoracic and lumbar compression fractures    Subjective:  Symptoms:  Stable.    Diet:  Adequate intake.    Activity level: Returning to normal.    Pain:  She complains of pain that is mild.  She reports pain is improving.  Pain is well controlled.      No events overnight.  Patient is not complaining of lumbar spine pain referable to the compression fracture    Temp:  [97.8 °F (36.6 °C)-98.1 °F (36.7 °C)] 98.1 °F (36.7 °C)  Heart Rate:  [81-95] 95  Resp:  [16] 16  BP: (137-148)/(66-89) 137/66      Objective:  General Appearance:  Comfortable, well-appearing, in no acute distress and in pain.    Vital signs: (most recent): Blood pressure 137/66, pulse 95, temperature 98.1 °F (36.7 °C), temperature source Oral, resp. rate 16, height 152.4 cm (60\"), weight 43.4 kg (95 lb 10.9 oz), SpO2 97 %, not currently breastfeeding.  Vital signs are normal.  No fever.    Output: Producing urine.    HEENT: Normal HEENT exam.    Lungs:  Normal effort and normal respiratory rate.  She is not in respiratory distress.    Heart: Normal rate.  Regular rhythm.    Chest: Symmetric chest wall expansion.   Extremities: Decreased range of motion.    Skin:  Warm and dry.    Abdomen: Abdomen is soft and non-distended.  Bowel sounds are normal.   There is no abdominal tenderness.     Pulses: Distal pulses are intact.        Neurologic Exam     Mental Status   Oriented to person, place, and time.   Attention: normal. Concentration: normal.   Speech: speech is normal   Level of consciousness: alert  Normal comprehension.     Cranial Nerves     CN II   Visual fields full to confrontation.     CN III, IV, VI   Pupils are equal, round, and reactive to light.  Extraocular motions are normal.     CN V   Facial sensation intact.     CN VII   Facial expression full, symmetric.     CN VIII   CN VIII normal.     CN IX, X   CN IX normal.   CN X normal.     CN XI   CN XI normal.     CN XII   CN XII normal. "     Motor Exam   Muscle bulk: normal    Strength   Strength 5/5 throughout.     Sensory Exam   Light touch normal.     Gait, Coordination, and Reflexes     Reflexes   Reflexes 2+ except as noted.       Lab Results (last 24 hours)     Procedure Component Value Units Date/Time    Blood Culture - Blood, Wrist, Right [[id number]]  (Normal) Collected: 05/05/22 2317    Specimen: Blood from Wrist, Right Updated: 05/08/22 2332     Blood Culture No growth at 3 days    Blood Culture - Blood, Arm, Right [[id number]]  (Normal) Collected: 05/05/22 2317    Specimen: Blood from Arm, Right Updated: 05/08/22 2332     Blood Culture No growth at 3 days              Plan:   CV: Heart rate and blood pressure stable  Respiratory: Oxygen level stable  GI: Tolerating p.o.  : Adequate urine output  Neuro: Exam stable  Patient to continue work with physical and Occupational Therapy.  If patient does have a significant amount of upper lumbar spine pain while working with therapy please notify neurosurgery.  Continue with LSO brace.  If patient's pain remains under control okay to discharge from neurosurgical standpoint and we will plan for follow-up post hospital stay.      Closed left hip fracture (HCC)    Failure to thrive in adult    Colitis    Fall    Compression fracture of body of thoracic vertebra (HCC)    Compression fracture of L2 lumbar vertebra (HCC)    Sepsis (HCC)    Metabolic acidosis    Fecal impaction (HCC)        Jesse Rudolph, APRN

## 2022-05-09 NOTE — PLAN OF CARE
Goal Outcome Evaluation:  Plan of Care Reviewed With: patient        Progress: improving  Outcome Evaluation: PT tx completed. Pt c/o L low back pn, rates 8/10. MaxA x 2 bed mobility, Static sitting SBA, sit<>stand modA x 2 with r wx. Small steps to chair mod/maxA x 2. TLSO don/doffed EOB. Pt walking on toes, she states she is unable to put her heels down. Recommend SNF.

## 2022-05-09 NOTE — CASE MANAGEMENT/SOCIAL WORK
Continued Stay Note  [facility]     Patient Name: Zelalem Hung  MRN: [id number]  Today's Date: 5/9/2022    Admit Date: 5/5/2022     Discharge Plan     Row Name 05/09/22 1156       Plan    Plan CM spoke with Madie ROSEN Requested referral to [facility]. SW updated and to follow with referral. Pending bed offer.    Row Name 05/09/22 1143       Plan    Plan SHAUN spoke with Madie               Discharge Codes    No documentation.               Expected Discharge Date and Time     Expected Discharge Date Expected Discharge Time    May 9, 2022             Kamala Mccoy RN

## 2022-05-09 NOTE — CASE MANAGEMENT/SOCIAL WORK
Continued Stay Note   [facility]     Patient Name: Zelalem Hung  MRN: [id number]  Today's Date: 5/9/2022    Admit Date: 5/5/2022     Discharge Plan     Row Name 05/09/22 1556       Plan    Plan [facility]- bed available tomorrow 5/10    Patient/Family in Agreement with Plan yes    Plan Comments Gwendolyn from [facility] offered pt a bed for tomorrow 5/10. Will follow. [phone]               Discharge Codes    No documentation.               Expected Discharge Date and Time     Expected Discharge Date Expected Discharge Time    May 9, 2022             STELLA Alvarez

## 2022-05-09 NOTE — ANESTHESIA POSTPROCEDURE EVALUATION
Patient: Zelalem Hung    Procedure Summary     Date: 05/06/22 Room / Location: [facility] OR  /  [facility] OR    Anesthesia Start: 1521 Anesthesia Stop: 1702    Procedure: HIP HEMIARTHROPLASTY (Left Hip) Diagnosis:     Surgeons: Barrington Simons MD Provider: Lucian Richards CRNA    Anesthesia Type: general ASA Status: 3 - Emergent          Anesthesia Type: general    Vitals  Vitals Value Taken Time   /69 05/06/22 1801   Temp 97.2 °F (36.2 °C) 05/06/22 1800   Pulse 92 05/06/22 1809   Resp 16 05/06/22 1800   SpO2 97 % 05/06/22 1809   Vitals shown include unvalidated device data.        Post Anesthesia Care and Evaluation      Comments: Discharged per PACU protocol

## 2022-05-09 NOTE — PLAN OF CARE
Problem: Adult Inpatient Plan of Care  Goal: Plan of Care Review  Recent Flowsheet Documentation  Taken 5/9/2022 1122 by Joseph Ramos, OTR/L  Progress: no change  Plan of Care Reviewed With: patient  Outcome Evaluation: OT tx completed. Pt reports L hip pain 8/10 pre and post tx. Pt was up in chair. Pt attempt sit to stand two times, max A x1 with RW. Unable to stand fully upright. Pt was max-dep for scooting to reposition in chair utilizing draw sheet. pt was max A to adjust socks. Pt was supervision with set up to wash face and comb hair. Pt requests to rest at this time. Continue OT POC.

## 2022-05-10 VITALS
DIASTOLIC BLOOD PRESSURE: 66 MMHG | BODY MASS INDEX: 18.78 KG/M2 | HEART RATE: 106 BPM | WEIGHT: 95.68 LBS | HEIGHT: 60 IN | OXYGEN SATURATION: 93 % | TEMPERATURE: 97.9 F | RESPIRATION RATE: 18 BRPM | SYSTOLIC BLOOD PRESSURE: 139 MMHG

## 2022-05-10 LAB
BACTERIA SPEC AEROBE CULT: NORMAL
BACTERIA SPEC AEROBE CULT: NORMAL
SARS-COV-2 RNA PNL SPEC NAA+PROBE: NOT DETECTED

## 2022-05-10 PROCEDURE — 94799 UNLISTED PULMONARY SVC/PX: CPT

## 2022-05-10 PROCEDURE — 97530 THERAPEUTIC ACTIVITIES: CPT

## 2022-05-10 PROCEDURE — 94664 DEMO&/EVAL PT USE INHALER: CPT

## 2022-05-10 PROCEDURE — 99231 SBSQ HOSP IP/OBS SF/LOW 25: CPT | Performed by: NURSE PRACTITIONER

## 2022-05-10 PROCEDURE — 87635 SARS-COV-2 COVID-19 AMP PRB: CPT | Performed by: INTERNAL MEDICINE

## 2022-05-10 PROCEDURE — 25010000002 PIPERACILLIN SOD-TAZOBACTAM PER 1 G: Performed by: ORTHOPAEDIC SURGERY

## 2022-05-10 PROCEDURE — 97110 THERAPEUTIC EXERCISES: CPT

## 2022-05-10 PROCEDURE — 25010000002 ENOXAPARIN PER 10 MG: Performed by: ORTHOPAEDIC SURGERY

## 2022-05-10 RX ORDER — DOCUSATE SODIUM 100 MG/1
100 CAPSULE, LIQUID FILLED ORAL DAILY
Status: ON HOLD
Start: 2022-05-10 | End: 2023-02-03

## 2022-05-10 RX ORDER — GABAPENTIN 100 MG/1
100 CAPSULE ORAL 3 TIMES DAILY
Qty: 6 CAPSULE | Refills: 0 | Status: ON HOLD | OUTPATIENT
Start: 2022-05-10 | End: 2022-11-08

## 2022-05-10 RX ORDER — MELATONIN
2000 DAILY
Status: ON HOLD
Start: 2022-05-11 | End: 2023-01-10

## 2022-05-10 RX ORDER — OXYCODONE HYDROCHLORIDE AND ACETAMINOPHEN 5; 325 MG/1; MG/1
1 TABLET ORAL EVERY 8 HOURS PRN
Qty: 6 TABLET | Refills: 0 | Status: ON HOLD | OUTPATIENT
Start: 2022-05-10 | End: 2022-11-08

## 2022-05-10 RX ORDER — LORAZEPAM 0.5 MG/1
0.5 TABLET ORAL 3 TIMES DAILY
Qty: 6 TABLET | Refills: 0 | Status: ON HOLD | OUTPATIENT
Start: 2022-05-10 | End: 2022-11-08

## 2022-05-10 RX ORDER — AMOXICILLIN AND CLAVULANATE POTASSIUM 875; 125 MG/1; MG/1
1 TABLET, FILM COATED ORAL EVERY 12 HOURS SCHEDULED
Status: DISCONTINUED | OUTPATIENT
Start: 2022-05-10 | End: 2022-05-10 | Stop reason: HOSPADM

## 2022-05-10 RX ORDER — AMOXICILLIN AND CLAVULANATE POTASSIUM 875; 125 MG/1; MG/1
1 TABLET, FILM COATED ORAL EVERY 12 HOURS SCHEDULED
Qty: 2 TABLET | Refills: 0 | Status: SHIPPED | OUTPATIENT
Start: 2022-05-10 | End: 2022-05-11

## 2022-05-10 RX ADMIN — ENOXAPARIN SODIUM 30 MG: 100 INJECTION SUBCUTANEOUS at 08:22

## 2022-05-10 RX ADMIN — Medication 10 ML: at 08:22

## 2022-05-10 RX ADMIN — FENOFIBRATE 48 MG: 48 TABLET, FILM COATED ORAL at 08:21

## 2022-05-10 RX ADMIN — Medication 2000 UNITS: at 08:21

## 2022-05-10 RX ADMIN — AMOXICILLIN AND CLAVULANATE POTASSIUM 1 TABLET: 875; 125 TABLET, FILM COATED ORAL at 11:09

## 2022-05-10 RX ADMIN — IPRATROPIUM BROMIDE AND ALBUTEROL SULFATE 3 ML: .5; 3 SOLUTION RESPIRATORY (INHALATION) at 10:42

## 2022-05-10 RX ADMIN — OXYCODONE HYDROCHLORIDE AND ACETAMINOPHEN 1 TABLET: 5; 325 TABLET ORAL at 16:59

## 2022-05-10 RX ADMIN — IPRATROPIUM BROMIDE AND ALBUTEROL SULFATE 3 ML: .5; 3 SOLUTION RESPIRATORY (INHALATION) at 06:50

## 2022-05-10 RX ADMIN — GABAPENTIN 100 MG: 100 CAPSULE ORAL at 08:21

## 2022-05-10 RX ADMIN — ASPIRIN 81 MG: 81 TABLET, CHEWABLE ORAL at 08:21

## 2022-05-10 RX ADMIN — PAROXETINE 40 MG: 20 TABLET, FILM COATED ORAL at 08:21

## 2022-05-10 RX ADMIN — LORAZEPAM 0.5 MG: 0.5 TABLET ORAL at 08:21

## 2022-05-10 RX ADMIN — TAZOBACTAM SODIUM AND PIPERACILLIN SODIUM 3.38 G: 375; 3 INJECTION, SOLUTION INTRAVENOUS at 08:36

## 2022-05-10 RX ADMIN — IPRATROPIUM BROMIDE AND ALBUTEROL SULFATE 3 ML: .5; 3 SOLUTION RESPIRATORY (INHALATION) at 14:34

## 2022-05-10 RX ADMIN — MEGESTROL ACETATE 400 MG: 40 SUSPENSION ORAL at 08:22

## 2022-05-10 RX ADMIN — FAMOTIDINE 20 MG: 20 TABLET, FILM COATED ORAL at 08:21

## 2022-05-10 RX ADMIN — LORAZEPAM 0.5 MG: 0.5 TABLET ORAL at 16:59

## 2022-05-10 RX ADMIN — OXYCODONE HYDROCHLORIDE AND ACETAMINOPHEN 1 TABLET: 5; 325 TABLET ORAL at 08:21

## 2022-05-10 RX ADMIN — ACEBUTOLOL HYDROCHLORIDE 200 MG: 200 CAPSULE ORAL at 08:21

## 2022-05-10 RX ADMIN — GABAPENTIN 100 MG: 100 CAPSULE ORAL at 16:59

## 2022-05-10 NOTE — CASE MANAGEMENT/SOCIAL WORK
Continued Stay Note  [facility]     Patient Name: Zelalem Hung  MRN: [id number]  Today's Date: 5/10/2022    Admit Date: 5/5/2022     Discharge Plan     Row Name 05/10/22 1042       Plan    Plan [facility]    Patient/Family in Agreement with Plan yes    Final Discharge Disposition Code 03 - skilled nursing facility (SNF)    Final Note Pt is being discharged to [facility] today, Gwendolyn from facility aware [phone]. Pt will be admitted at SNF level care.  Informed shannon aldridge Daughter [phone], pt will be transported via [facility] ambulance [phone].    [facility]  [phone]  Fax: [phone]  Gwendolyn’s Cell [phone]                   Discharge Codes    No documentation.               Expected Discharge Date and Time     Expected Discharge Date Expected Discharge Time    May 10, 2022             STELLA Alvarez

## 2022-05-10 NOTE — PLAN OF CARE
Goal Outcome Evaluation:  Plan of Care Reviewed With: patient        Progress: improving  Outcome Evaluation: Pt d/c to [place] pt. D/c instructions given to pt. Dressing to left hip CDI. N/v check WNL. PPP. Abduction pillow in place. Voiding incont via brief. Up x2 with brace/walker. Stable at d/c.

## 2022-05-10 NOTE — DISCHARGE SUMMARY
[facility] Medicine Services  DISCHARGE SUMMARY       Date of Admission: 5/5/2022  Date of Discharge:  5/10/2022  Primary Care Physician: Garth Amezuca APRN    Discharge Diagnoses:  Active Hospital Problems    Diagnosis    • **Closed left hip fracture (HCC)    • Colitis    • Fall    • Compression fracture of body of thoracic vertebra (HCC)    • Compression fracture of L2 lumbar vertebra (HCC)    • Sepsis (HCC)    • Metabolic acidosis    • Fecal impaction (HCC)    • Failure to thrive in adult    In addition to above  Hypoxia -6 supplemental oxygen  Postoperative pain -continue on pain medication  Sepsis from colitis    Presenting Problem/History of Present Illness:  Colitis [K52.9]         Hospital Course   I started following her on day 3 of hospitalization.  She was admitted on May 5, 2022. patient reportedly had a fall resulting to left subcapital displaced femoral neck fracture that is post left hip hemiarthroplasty by Dr. Simons on May 6.  Patient has anemia in the setting of recent hip hemiarthroplasty.  Hemoglobin been stable for the past 2 days postoperative.  Patient been on Lovenox for DVT prophylaxis.  I will transition her to Eliquis 2.5 mg  through Yazmin 10, 2022.  I recommend rechecking CBC with primary care provider through skilled nursing facility.  I will hold off on aspirin while on Eliquis.  I reviewed past medical history and did not find anything pertaining to coronary artery disease.  Also, reading through the admitting H&P patient has prior history of intracranial bleed following a fall.  Therefore risks of combined aspirin and  DOAC felt at higher risk than benefit.  Confirmed with pharmacist regarding use of Eliquis and dose for DVT prophylaxis.    During this hospitalization patient also seen in consult by neurosurgery service.  Their impression includes worsening fracture of T7 and a new compression fracture of L2.  Neurosurgery recommends continuing  LSO brace and follow-up in clinic in 2 weeks.  Patient has not complained of any back pain.  Her pain mostly on left hip.      She is on pain medication (oxycodone 3 times daily as needed).  She is only getting twice a day in the past couple of days.    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  She had 4 days out of the 7 days initiated from admit.  She is afebrile.  White count is back to normal.  Blood culture and GI PCR are negative I think it is reasonable to shorten to 5 days of antibiotic.  I will be completing this using Augmentin x2 doses.    Patient noted also has desaturation in the upper 80s off oxygen.  Patient encouraged on incentive spirometry.  Chest x-ray on May 6 showed mild diffuse interstitial prominence.  No focal infiltrate or pneumothorax.  On physical exam, she has no crackles or wheezes.  Adequate air exchange.  She needs oxygen to maintain saturation above 90s.    Per review of progress note by Dr. Rollins, she also was treating the patient for possible sepsis from colitis.    Overall patient is doing better and felt medically stable and appropriate for discharge    Procedures Performed:   Left hemiarthroplasty for a left subcapital displaced femoral neck fracture by Dr. Simons on May 6, 2022  Consults:   Dr. Zuleika Davidson      Pertinent Test Results:   Lab Results (all)     Procedure Component Value Units Date/Time    Blood Culture - Blood, Wrist, Right [[id number]]  (Normal) Collected: 05/05/22 2317    Specimen: Blood from Wrist, Right Updated: 05/09/22 2331     Blood Culture No growth at 4 days    Blood Culture - Blood, Arm, Right [[id number]]  (Normal) Collected: 05/05/22 2317    Specimen: Blood from Arm, Right Updated: 05/09/22 2331     Blood Culture No growth at 4 days    Magnesium [[id number]]  (Normal) Collected: 05/08/22 0605    Specimen: Blood Updated: 05/08/22 0700     Magnesium 2.1 mg/dL     Basic Metabolic Panel [[id number]]  (Abnormal) Collected: 05/08/22 0605     Specimen: Blood Updated: 05/08/22 0651     Glucose 114 mg/dL      BUN 22 mg/dL      Creatinine 0.64 mg/dL      Sodium 139 mmol/L      Potassium 3.7 mmol/L      Chloride 101 mmol/L      CO2 28.0 mmol/L      Calcium 9.3 mg/dL      BUN/Creatinine Ratio 34.4     Anion Gap 10.0 mmol/L      eGFR 88.9 mL/min/1.73      Comment: National Kidney Foundation and American Society of Nephrology (ASN) Task Force recommended calculation based on the Chronic Kidney Disease Epidemiology Collaboration (CKD-EPI) equation refit without adjustment for race.       Narrative:      GFR Normal >60  Chronic Kidney Disease <60  Kidney Failure <15      Iron Profile [[id number]]  (Abnormal) Collected: 05/08/22 0605    Specimen: Blood Updated: 05/08/22 0651     Iron 40 mcg/dL      Iron Saturation 12 %      Transferrin 224 mg/dL      TIBC 334 mcg/dL     Phosphorus [[id number]]  (Abnormal) Collected: 05/08/22 0605    Specimen: Blood Updated: 05/08/22 0651     Phosphorus 2.0 mg/dL     CBC & Differential [[id number]]  (Abnormal) Collected: 05/08/22 0605    Specimen: Blood Updated: 05/08/22 0630    Narrative:      The following orders were created for panel order CBC & Differential.  Procedure                               Abnormality         Status                     ---------                               -----------         ------                     CBC Auto Differential[[id number]]        Abnormal            Final result                 Please view results for these tests on the individual orders.    CBC Auto Differential [[id number]]  (Abnormal) Collected: 05/08/22 0605    Specimen: Blood Updated: 05/08/22 0630     WBC 10.29 10*3/mm3      RBC 3.18 10*6/mm3      Hemoglobin 10.8 g/dL      Hematocrit 33.0 %      .8 fL      MCH 34.0 pg      MCHC 32.7 g/dL      RDW 14.8 %      RDW-SD 56.5 fl      MPV 9.7 fL      Platelets 270 10*3/mm3      Neutrophil % 71.0 %      Lymphocyte % 16.5 %      Monocyte % 11.2 %      Eosinophil % 0.2 %      Basophil %  0.3 %      Immature Grans % 0.8 %      Neutrophils, Absolute 7.31 10*3/mm3      Lymphocytes, Absolute 1.70 10*3/mm3      Monocytes, Absolute 1.15 10*3/mm3      Eosinophils, Absolute 0.02 10*3/mm3      Basophils, Absolute 0.03 10*3/mm3      Immature Grans, Absolute 0.08 10*3/mm3      nRBC 0.0 /100 WBC     Basic Metabolic Panel [[id number]]  (Abnormal) Collected: 05/07/22 0620    Specimen: Blood Updated: 05/07/22 0749     Glucose 112 mg/dL      BUN 21 mg/dL      Creatinine 0.63 mg/dL      Sodium 137 mmol/L      Potassium 3.9 mmol/L      Chloride 99 mmol/L      CO2 24.0 mmol/L      Calcium 9.5 mg/dL      BUN/Creatinine Ratio 33.3     Anion Gap 14.0 mmol/L      eGFR 89.3 mL/min/1.73      Comment: National Kidney Foundation and American Society of Nephrology (ASN) Task Force recommended calculation based on the Chronic Kidney Disease Epidemiology Collaboration (CKD-EPI) equation refit without adjustment for race.       Narrative:      GFR Normal >60  Chronic Kidney Disease <60  Kidney Failure <15      CBC & Differential [[id number]]  (Abnormal) Collected: 05/07/22 0620    Specimen: Blood Updated: 05/07/22 0736    Narrative:      The following orders were created for panel order CBC & Differential.  Procedure                               Abnormality         Status                     ---------                               -----------         ------                     CBC Auto Differential[[id number]]        Abnormal            Final result                 Please view results for these tests on the individual orders.    CBC Auto Differential [[id number]]  (Abnormal) Collected: 05/07/22 0620    Specimen: Blood Updated: 05/07/22 0736     WBC 11.30 10*3/mm3      RBC 2.94 10*6/mm3      Hemoglobin 10.1 g/dL      Hematocrit 29.9 %      .7 fL      MCH 34.4 pg      MCHC 33.8 g/dL      RDW 14.9 %      RDW-SD 54.5 fl      MPV 10.1 fL      Platelets 246 10*3/mm3      Neutrophil % 78.7 %      Lymphocyte % 9.0 %      Monocyte %  11.4 %      Eosinophil % 0.0 %      Basophil % 0.1 %      Immature Grans % 0.8 %      Neutrophils, Absolute 8.89 10*3/mm3      Lymphocytes, Absolute 1.02 10*3/mm3      Monocytes, Absolute 1.29 10*3/mm3      Eosinophils, Absolute 0.00 10*3/mm3      Basophils, Absolute 0.01 10*3/mm3      Immature Grans, Absolute 0.09 10*3/mm3      nRBC 0.0 /100 WBC     Basic Metabolic Panel [[id number]]  (Abnormal) Collected: 05/06/22 2333    Specimen: Blood Updated: 05/07/22 0009     Glucose 140 mg/dL      BUN 24 mg/dL      Creatinine 0.62 mg/dL      Sodium 134 mmol/L      Potassium 3.8 mmol/L      Chloride 97 mmol/L      CO2 21.0 mmol/L      Calcium 9.1 mg/dL      BUN/Creatinine Ratio 38.7     Anion Gap 16.0 mmol/L      eGFR 89.6 mL/min/1.73      Comment: National Kidney Foundation and American Society of Nephrology (ASN) Task Force recommended calculation based on the Chronic Kidney Disease Epidemiology Collaboration (CKD-EPI) equation refit without adjustment for race.       Narrative:      GFR Normal >60  Chronic Kidney Disease <60  Kidney Failure <15      Basic Metabolic Panel [[id number]]  (Abnormal) Collected: 05/06/22 1932    Specimen: Blood Updated: 05/06/22 1956     Glucose 156 mg/dL      BUN 25 mg/dL      Creatinine 0.67 mg/dL      Sodium 138 mmol/L      Potassium 3.9 mmol/L      Chloride 99 mmol/L      CO2 21.0 mmol/L      Calcium 9.6 mg/dL      BUN/Creatinine Ratio 37.3     Anion Gap 18.0 mmol/L      eGFR 87.9 mL/min/1.73      Comment: National Kidney Foundation and American Society of Nephrology (ASN) Task Force recommended calculation based on the Chronic Kidney Disease Epidemiology Collaboration (CKD-EPI) equation refit without adjustment for race.       Narrative:      GFR Normal >60  Chronic Kidney Disease <60  Kidney Failure <15      SCANNED - LABS [[id number]] Resulted: 05/05/22     Updated: 05/06/22 1656    Basic Metabolic Panel [[id number]]  (Abnormal) Collected: 05/06/22 1313    Specimen: Blood Updated: 05/06/22  1344     Glucose 113 mg/dL      BUN 23 mg/dL      Creatinine 0.70 mg/dL      Sodium 138 mmol/L      Potassium 3.8 mmol/L      Chloride 98 mmol/L      CO2 18.0 mmol/L      Calcium 9.8 mg/dL      BUN/Creatinine Ratio 32.9     Anion Gap 22.0 mmol/L      eGFR 87.0 mL/min/1.73      Comment: National Kidney Foundation and American Society of Nephrology (ASN) Task Force recommended calculation based on the Chronic Kidney Disease Epidemiology Collaboration (CKD-EPI) equation refit without adjustment for race.       Narrative:      GFR Normal >60  Chronic Kidney Disease <60  Kidney Failure <15      Troponin [[id number]]  (Abnormal) Collected: 05/06/22 1313    Specimen: Blood Updated: 05/06/22 1341     Troponin T 0.040 ng/mL     Narrative:      Troponin T Reference Range:  <= 0.03 ng/mL-   Negative for AMI  >0.03 ng/mL-     Abnormal for myocardial necrosis.  Clinicians would have to utilize clinical acumen, EKG, Troponin and serial changes to determine if it is an Acute Myocardial Infarction or myocardial injury due to an underlying chronic condition.       Results may be falsely decreased if patient taking Biotin.      Comprehensive Metabolic Panel [[id number]]  (Abnormal) Collected: 05/06/22 0459    Specimen: Blood Updated: 05/06/22 0535     Glucose 109 mg/dL      BUN 22 mg/dL      Creatinine 0.68 mg/dL      Sodium 133 mmol/L      Potassium 4.2 mmol/L      Comment: Slight hemolysis detected by analyzer. Results may be affected.        Chloride 97 mmol/L      CO2 10.0 mmol/L      Calcium 9.7 mg/dL      Total Protein 6.8 g/dL      Albumin 3.80 g/dL      ALT (SGPT) 13 U/L      AST (SGOT) 24 U/L      Alkaline Phosphatase 80 U/L      Total Bilirubin 0.7 mg/dL      Globulin 3.0 gm/dL      A/G Ratio 1.3 g/dL      BUN/Creatinine Ratio 32.4     Anion Gap 26.0 mmol/L      eGFR 87.6 mL/min/1.73      Comment: National Kidney Foundation and American Society of Nephrology (ASN) Task Force recommended calculation based on the Chronic Kidney  Disease Epidemiology Collaboration (CKD-EPI) equation refit without adjustment for race.       Narrative:      GFR Normal >60  Chronic Kidney Disease <60  Kidney Failure <15      Magnesium [[id number]]  (Normal) Collected: 05/06/22 0459    Specimen: Blood Updated: 05/06/22 0535     Magnesium 1.6 mg/dL     CBC Auto Differential [[id number]]  (Abnormal) Collected: 05/06/22 0459    Specimen: Blood Updated: 05/06/22 0515     WBC 20.00 10*3/mm3      RBC 4.08 10*6/mm3      Hemoglobin 14.1 g/dL      Hematocrit 40.5 %      MCV 99.3 fL      MCH 34.6 pg      MCHC 34.8 g/dL      RDW 14.6 %      RDW-SD 53.4 fl      MPV 9.4 fL      Platelets 321 10*3/mm3      Neutrophil % 89.0 %      Lymphocyte % 4.4 %      Monocyte % 5.4 %      Eosinophil % 0.0 %      Basophil % 0.2 %      Immature Grans % 1.0 %      Neutrophils, Absolute 17.82 10*3/mm3      Lymphocytes, Absolute 0.88 10*3/mm3      Monocytes, Absolute 1.07 10*3/mm3      Eosinophils, Absolute 0.00 10*3/mm3      Basophils, Absolute 0.04 10*3/mm3      Immature Grans, Absolute 0.19 10*3/mm3      nRBC 0.0 /100 WBC     Blood Gas, Arterial - [[id number]]  (Abnormal) Collected: 05/06/22 0252    Specimen: Arterial Blood Updated: 05/06/22 0239     Site Right Brachial     Meliton's Test N/A     pH, Arterial 7.319 pH units      Comment: 84 Value below reference range        pCO2, Arterial 22.0 mm Hg      Comment: 84 Value below reference range        pO2, Arterial 60.8 mm Hg      Comment: 84 Value below reference range        HCO3, Arterial 11.3 mmol/L      Comment: 84 Value below reference range        Base Excess, Arterial -12.7 mmol/L      Comment: 84 Value below reference range        O2 Saturation, Arterial 89.7 %      Comment: 84 Value below reference range        Temperature 37.0 C      Barometric Pressure for Blood Gas 743 mmHg      Modality Room Air     FIO2 21 %      Ventilator Mode NA     Collected by [id number]     Comment: Meter: [id number]     :  [id number]        pCO2,  "Temperature Corrected 22.0 mm Hg      pH, Temp Corrected 7.319 pH Units      pO2, Temperature Corrected 60.8 mm Hg     STAT Lactic Acid, Reflex [[id number]]  (Normal) Collected: 05/06/22 0132    Specimen: Blood Updated: 05/06/22 0152     Lactate 1.9 mmol/L     Procalcitonin [[id number]]  (Abnormal) Collected: 05/05/22 2353    Specimen: Blood Updated: 05/06/22 0130     Procalcitonin 0.34 ng/mL     Narrative:      As a Marker for Sepsis (Non-Neonates):    1. <0.5 ng/mL represents a low risk of severe sepsis and/or septic shock.  2. >2 ng/mL represents a high risk of severe sepsis and/or septic shock.    As a Marker for Lower Respiratory Tract Infections that require antibiotic therapy:    PCT on Admission    Antibiotic Therapy       6-12 Hrs later    >0.5                Strongly Recommended  >0.25 - <0.5        Recommended   0.1 - 0.25          Discouraged              Remeasure/reassess PCT  <0.1                Strongly Discouraged     Remeasure/reassess PCT    As 28 day mortality risk marker: \"Change in Procalcitonin Result\" (>80% or <=80%) if Day 0 (or Day 1) and Day 4 values are available. Refer to http://[email or web address]-[email or web address]    Change in PCT <=80%  A decrease of PCT levels below or equal to 80% defines a positive change in PCT test result representing a higher risk for 28-day all-cause mortality of patients diagnosed with severe sepsis for septic shock.    Change in PCT >80%  A decrease of PCT levels of more than 80% defines a negative change in PCT result representing a lower risk for 28-day all-cause mortality of patients diagnosed with severe sepsis or septic shock.       TSH [[id number]]  (Normal) Collected: 05/05/22 2353    Specimen: Blood Updated: 05/06/22 0028     TSH 0.676 uIU/mL     T4, Free [[id number]]  (Abnormal) Collected: 05/05/22 2353    Specimen: Blood Updated: 05/06/22 0027     Free T4 1.81 ng/dL     Narrative:      Results may be falsely increased if patient taking Biotin.      C-reactive " Protein [[id number]]  (Abnormal) Collected: 05/05/22 2353    Specimen: Blood Updated: 05/06/22 0025     C-Reactive Protein 11.90 mg/dL     Comprehensive Metabolic Panel [[id number]]  (Abnormal) Collected: 05/05/22 2353    Specimen: Blood Updated: 05/06/22 0023     Glucose 103 mg/dL      BUN 24 mg/dL      Creatinine 0.65 mg/dL      Sodium 134 mmol/L      Potassium 4.4 mmol/L      Chloride 97 mmol/L      CO2 15.0 mmol/L      Calcium 9.9 mg/dL      Total Protein 7.2 g/dL      Albumin 3.80 g/dL      ALT (SGPT) 14 U/L      AST (SGOT) 23 U/L      Alkaline Phosphatase 83 U/L      Total Bilirubin 0.7 mg/dL      Globulin 3.4 gm/dL      A/G Ratio 1.1 g/dL      BUN/Creatinine Ratio 36.9     Anion Gap 22.0 mmol/L      eGFR 88.6 mL/min/1.73      Comment: National Kidney Foundation and American Society of Nephrology (ASN) Task Force recommended calculation based on the Chronic Kidney Disease Epidemiology Collaboration (CKD-EPI) equation refit without adjustment for race.       Narrative:      GFR Normal >60  Chronic Kidney Disease <60  Kidney Failure <15      Troponin [[id number]]  (Abnormal) Collected: 05/05/22 2353    Specimen: Blood Updated: 05/06/22 0020     Troponin T 0.032 ng/mL     Narrative:      Troponin T Reference Range:  <= 0.03 ng/mL-   Negative for AMI  >0.03 ng/mL-     Abnormal for myocardial necrosis.  Clinicians would have to utilize clinical acumen, EKG, Troponin and serial changes to determine if it is an Acute Myocardial Infarction or myocardial injury due to an underlying chronic condition.       Results may be falsely decreased if patient taking Biotin.      Lipase [[id number]]  (Normal) Collected: 05/05/22 2353    Specimen: Blood Updated: 05/06/22 0018     Lipase 18 U/L     Troponin [[id number]]  (Normal) Collected: 05/05/22 2353    Specimen: Blood Updated: 05/06/22 0017     Troponin T 0.026 ng/mL     Narrative:      Troponin T Reference Range:  <= 0.03 ng/mL-   Negative for AMI  >0.03 ng/mL-     Abnormal for  myocardial necrosis.  Clinicians would have to utilize clinical acumen, EKG, Troponin and serial changes to determine if it is an Acute Myocardial Infarction or myocardial injury due to an underlying chronic condition.       Results may be falsely decreased if patient taking Biotin.      POC Creatinine [[id number]]  (Normal) Collected: 05/05/22 2355    Specimen: Blood Updated: 05/06/22 0013     Creatinine 0.70 mg/dL      Comment: Serial Number: [id number]:  [id number]       Magnesium [[id number]]  (Normal) Collected: 05/05/22 2155    Specimen: Blood Updated: 05/05/22 2312     Magnesium 1.8 mg/dL     BNP [[id number]]  (Abnormal) Collected: 05/05/22 2155    Specimen: Blood Updated: 05/05/22 2312     proBNP 6,919.0 pg/mL     Narrative:      Among patients with dyspnea, NT-proBNP is highly sensitive for the detection of acute congestive heart failure. In addition NT-proBNP of <300 pg/ml effectively rules out acute congestive heart failure with 99% negative predictive value.    Results may be falsely decreased if patient taking Biotin.      Respiratory Panel PCR w/COVID-19(SARS-CoV-2) [facility] In-House, NP Swab in UTM/VTM, 3-4 HR TAT - Swab, Nasopharynx [[id number]]  (Normal) Collected: 05/05/22 2155    Specimen: Swab from Nasopharynx Updated: 05/05/22 2302     ADENOVIRUS, PCR Not Detected     Coronavirus 229E Not Detected     Coronavirus HKU1 Not Detected     Coronavirus NL63 Not Detected     Coronavirus OC43 Not Detected     COVID19 Not Detected     Human Metapneumovirus Not Detected     Human Rhinovirus/Enterovirus Not Detected     Influenza A PCR Not Detected     Influenza B PCR Not Detected     Parainfluenza Virus 1 Not Detected     Parainfluenza Virus 2 Not Detected     Parainfluenza Virus 3 Not Detected     Parainfluenza Virus 4 Not Detected     RSV, PCR Not Detected     Bordetella pertussis pcr Not Detected     Bordetella parapertussis PCR Not Detected     Chlamydophila pneumoniae PCR Not Detected      Mycoplasma pneumo by PCR Not Detected    Narrative:      In the setting of a positive respiratory panel with a viral infection PLUS a negative procalcitonin without other underlying concern for bacterial infection, consider observing off antibiotics or discontinuation of antibiotics and continue supportive care. If the respiratory panel is positive for atypical bacterial infection (Bordetella pertussis, Chlamydophila pneumoniae, or Mycoplasma pneumoniae), consider antibiotic de-escalation to target atypical bacterial infection.    Urinalysis With Culture If Indicated - Urine, Catheter [[id number]]  (Abnormal) Collected: 05/05/22 2223    Specimen: Urine, Catheter Updated: 05/05/22 2250     Color, UA Yellow     Appearance, UA Clear     pH, UA 6.0     Specific Gravity, UA 1.025     Glucose, UA Negative     Ketones, UA 15 mg/dL (1+)     Bilirubin, UA Negative     Blood, UA Moderate (2+)     Protein,  mg/dL (2+)     Leuk Esterase, UA Negative     Nitrite, UA Negative     Urobilinogen, UA 0.2 E.U./dL    Urinalysis, Microscopic Only - Urine, Catheter [[id number]]  (Abnormal) Collected: 05/05/22 2223    Specimen: Urine, Catheter Updated: 05/05/22 2250     RBC, UA 0-2 /HPF      WBC, UA 3-5 /HPF      Comment: Urine culture not indicated.        Bacteria, UA 2+ /HPF      Squamous Epithelial Cells, UA 0-2 /HPF      Hyaline Casts, UA 0-2 /LPF      Methodology Automated Microscopy    Protime-INR [[id number]]  (Abnormal) Collected: 05/05/22 2155    Specimen: Blood Updated: 05/05/22 2246     Protime 15.0 Seconds      INR 1.23    aPTT [[id number]]  (Normal) Collected: 05/05/22 2155    Specimen: Blood Updated: 05/05/22 2246     PTT 34.7 seconds     D-dimer, Quantitative [[id number]]  (Abnormal) Collected: 05/05/22 2155    Specimen: Blood Updated: 05/05/22 2246     D-Dimer, Quantitative 8.97 MCGFEU/mL     Narrative:      Reference Range is 0-0.50 MCGFEU/mL. However, results <0.50 MCGFEU/mL tends to rule out DVT or PE. Results  >0.50 MCGFEU/mL are not useful in predicting absence or presence of DVT or PE.      Urine Drug Screen - Urine, Catheter [[id number]]  (Abnormal) Collected: 05/05/22 2223    Specimen: Urine, Catheter Updated: 05/05/22 2240     THC, Screen, Urine Negative     Phencyclidine (PCP), Urine Negative     Cocaine Screen, Urine Negative     Methamphetamine, Ur Negative     Opiate Screen Negative     Amphetamine Screen, Urine Negative     Benzodiazepine Screen, Urine Positive     Tricyclic Antidepressants Screen Negative     Methadone Screen, Urine Negative     Barbiturates Screen, Urine Negative     Oxycodone Screen, Urine Negative     Propoxyphene Screen Negative     Buprenorphine, Screen, Urine Negative    Narrative:      Cutoff For Drugs Screened:    Amphetamines               500 ng/ml  Barbiturates               200 ng/ml  Benzodiazepines            150 ng/ml  Cocaine                    150 ng/ml  Methadone                  200 ng/ml  Opiates                    100 ng/ml  Phencyclidine               25 ng/ml  THC                            50 ng/ml  Methamphetamine            500 ng/ml  Tricyclic Antidepressants  300 ng/ml  Oxycodone                  100 ng/ml  Propoxyphene               300 ng/ml  Buprenorphine               10 ng/ml    The normal value for all drugs tested is negative. This report includes unconfirmed screening results, with the cutoff values listed, to be used for medical treatment purposes only.  Unconfirmed results must not be used for non-medical purposes such as employment or legal testing.  Clinical consideration should be applied to any drug of abuse test, particularly when unconfirmed results are used.      Lactic Acid, Plasma [[id number]]  (Abnormal) Collected: 05/05/22 2155    Specimen: Blood Updated: 05/05/22 2227     Lactate 2.3 mmol/L     CBC & Differential [[id number]]  (Abnormal) Collected: 05/05/22 2155    Specimen: Blood Updated: 05/05/22 2207    Narrative:      The following orders were  created for panel order CBC & Differential.  Procedure                               Abnormality         Status                     ---------                               -----------         ------                     CBC Auto Differential[[id number]]        Abnormal            Final result                 Please view results for these tests on the individual orders.    CBC Auto Differential [[id number]]  (Abnormal) Collected: 05/05/22 2155    Specimen: Blood Updated: 05/05/22 2207     WBC 17.69 10*3/mm3      RBC 4.49 10*6/mm3      Hemoglobin 15.8 g/dL      Hematocrit 44.6 %      MCV 99.3 fL      MCH 35.2 pg      MCHC 35.4 g/dL      RDW 14.4 %      RDW-SD 52.1 fl      MPV 9.5 fL      Platelets 392 10*3/mm3      Neutrophil % 89.1 %      Lymphocyte % 5.0 %      Monocyte % 4.7 %      Eosinophil % 0.0 %      Basophil % 0.2 %      Immature Grans % 1.0 %      Neutrophils, Absolute 15.77 10*3/mm3      Lymphocytes, Absolute 0.88 10*3/mm3      Monocytes, Absolute 0.84 10*3/mm3      Eosinophils, Absolute 0.00 10*3/mm3      Basophils, Absolute 0.03 10*3/mm3      Immature Grans, Absolute 0.17 10*3/mm3      nRBC 0.0 /100 WBC           Imaging Results (Last 7 Days)     Procedure Component Value Units Date/Time    XR Chest 1 View [[id number]] Collected: 05/06/22 1339     Updated: 05/06/22 1343    Narrative:      EXAMINATION: XR CHEST 1 VW-     5/6/2022 1:12 PM CDT     HISTORY: hypoxia; K52.9-Noninfective gastroenteritis and colitis,  unspecified     1 view chest x-ray.     Comparison is made with January 26, 2022.     Increased interstitial prominence raises question of early edema.     No focal infiltrate to indicate pneumonia.     No pneumothorax or pleural fluid.     Heart size is within normal limits.     The bones are osteopenic and there is a high-grade compression fracture  of T7.     Summary:  1. Mild diffuse interstitial prominence.  2. No focal infiltrate or pneumothorax.  This report was finalized on 05/06/2022 13:40 by  Dr. Chan Pelaez MD.    MRI Thoracic Spine Without Contrast [[id number]] Collected: 05/06/22 1254     Updated: 05/06/22 1302    Narrative:      EXAMINATION: MRI THORACIC SPINE WO CONTRAST-     5/6/2022 10:45 AM CDT     HISTORY: T7 fracture; K52.9-Noninfective gastroenteritis and colitis,  unspecified.     Noncontrast MR imaging of the thoracic spine.     Axial, sagittal, and coronal imaging.     High-grade anterior wedge compression of T7 with 75-80% loss of height  and prominent thoracic kyphosis.     Minimal marrow edema within the compressed vertebra indicates residual  from previous injury or possibly acute injury superimposed on a chronic  compression fracture.     Minimal retropulsion of the T7 vertebral body inferior corner.  Based on the kyphosis this results in effacement of the ventral thecal  sac and there is no significant spinal canal stenosis or cord  compression.     No disc herniation.     Summary:  1. High-grade anterior wedge compression of T7 with mild marrow edema.  Acute superimposed on chronic injury suspected.  2. Other thoracic vertebrae are intact.     This report was finalized on 05/06/2022 12:59 by Dr. Chan Pelaez MD.    MRI Lumbar Spine Without Contrast [[id number]] Collected: 05/06/22 1250     Updated: 05/06/22 1256    Narrative:      EXAMINATION: MRI LUMBAR SPINE WO CONTRAST-     5/6/2022 11:37 AM CDT     HISTORY: L2 fracture; K52.9-Noninfective gastroenteritis and colitis,  unspecified     Noncontrast MR imaging of the lumbar spine.  Axial, sagittal, and coronal sequences.     9 degrees right convex scoliosis.  Acute or subacute superior endplate compression of L2 with 50% loss of  height.  Mild superior corner retropulsion.  Marrow edema present.     L1, L3, L4, and L5 are intact.     No posterior element disruption is seen.     There is no disc herniation.     Only mild narrowing of the spinal canal based on the superior corner L2  retropulsion.     Summary:  1. The 50% L2  compression fracture is acute or subacute based on marrow  edema.  This report was finalized on 05/06/2022 12:53 by Dr. Chan Pelaez MD.    CT Lumbar Spine Without Contrast [[id number]] Collected: 05/06/22 0849     Updated: 05/06/22 0857    Narrative:      EXAM/TECHNIQUE: CT LUMBAR SPINE WO CONTRAST-     INDICATION: Fall, low back pain     COMPARISON: None recent.     DLP: 208 mGy cm. Automated exposure control was also utilized to  decrease patient radiation dose.     FINDINGS:     There are 5 lumbar type vertebral bodies. No acute subluxations.     There is a 2 column compression fracture of L2 with up to 50% vertebral  body height loss and 6 mm osseous retropulsion causing at least mild  central canal stenosis.     Remaining vertebral body heights are maintained. No other fracture  identified.     Mild multilevel lumbar spine degenerative change with mild multilevel  neural foraminal narrowing secondary to facet arthropathy. No area of  severe central canal or neural foraminal osseous stenosis.     Paravertebral soft tissues are unremarkable.       Impression:         Acute 2 column compression fracture of L2 with up to 50% vertebral body  height loss and 6 mm osseous retropulsion causing mild central canal  narrowing.     These findings are in agreement with the critical and emergent findings  from the [facility] preliminary report.  This report was finalized on 05/06/2022 08:53 by Dr. Sonny Mtz MD.    CT Thoracic Spine Without Contrast [[id number]] Collected: 05/06/22 0846     Updated: 05/06/22 0854    Narrative:      EXAMINATION: CT THORACIC SPINE WO CONTRAST-      5/5/2022 11:57 PM CDT     HISTORY: Fall, back pain     In order to have a CT radiation dose as low as reasonably achievable  Automated Exposure Control was utilized for adjustment of the mA and/or  KV according to patient size.     DLP in mGycm= 398.     Axial, sagittal, and coronal noncontrast thoracic spine CT.     Comparison is made with January  26, 2022.     A preliminary [facility] report was faxed to the Emergency Department  immediately after this study was interpreted at 1:07 AM.     Diffuse osteopenia.  8-10 degrees left convex scoliosis.     Exaggerated thoracic kyphosis.     High-grade anterior wedge compression of T7 now with 75-80% loss of  height compared with 30-35% 4 months ago.     Superior endplate compression of L2 with 50-55% loss of height is a new  finding compared with 4 months ago.     The age of these fractures is indeterminate as there is no adjacent soft  tissue edema.     Vertebral bodies and posterior elements are otherwise intact.     Summary:  1. Interval thoracic spine fracture at T7 and lumbar fracture at L2. New  compared with 4 months ago. No retropulsion or spinal canal stenosis is  seen.                                   This report was finalized on 05/06/2022 08:51 by Dr. Chan Pelaez MD.    CT Abdomen Pelvis With Contrast [[id number]] Collected: 05/06/22 0838     Updated: 05/06/22 0852    Narrative:      EXAM/TECHNIQUE: CT ABDOMEN PELVIS W CONTRAST-     INDICATION: Generalized malaise, abdominal tenderness on exam     COMPARISON: 6/10/2019     DLP: 191 mGy cm. Automated exposure control was also utilized to  decrease patient radiation dose.     FINDINGS:     No acute lung base finding.     No focal liver lesion. Prior cholecystectomy. No biliary ductal  dilatation. The pancreas appears normal. Spleen and adrenal glands are  unremarkable.     Small RIGHT lower pole renal cyst. 1 cm LEFT lower pole renal lesion is  indeterminate by CT criteria but is similar in size compared to studies  dating back to 2005, supporting benign proteinaceous cyst. No  urolithiasis or hydronephrosis. Urinary bladder is distended. No focal  urinary bladder wall thickening.     Large rectal stool conglomerate. Perirectal fat stranding is present.  Normal appendix. No colonic wall thickening or evidence of abnormal  colonic distention. No small bowel  obstruction or evidence of active  small bowel inflammation.     No ascites or free pelvic fluid. No pelvic mass or pelvic collection.  Uterus and adnexa appear unremarkable.     Nonaneurysmal abdominal aorta with heavy vascular calcification. No  enlarged retroperitoneal, mesenteric, pelvic, or inguinal lymph nodes.  Portal vein is slightly diminutive in appearance.     No acute abdominal wall soft tissue abnormality. No aggressive osseous  lesion. Lumbar spine including compression fracture of L2 to be  described in a separate same-day report.       Impression:         1.  Large rectal stool conglomerate with mild perirectal fat stranding.  Findings are concerning for stercoral colitis.     2.  Distended urinary bladder. No hydronephrosis or urinary tract  calculi.     3.  L2 compression fracture, described in a separate same-day CT lumbar  spine report.  This report was finalized on 05/06/2022 08:49 by Dr. Sonny Mtz MD.    CT Cervical Spine Without Contrast [[id number]] Collected: 05/06/22 0837     Updated: 05/06/22 0850    Narrative:      CT CERVICAL SPINE WO CONTRAST- 5/5/2022 11:57 PM CDT     HISTORY: Fall      COMPARISON: 3/30/2021     DOSE LENGTH PRODUCT: 325 mGy cm. Automated exposure control was also  utilized to decrease patient radiation dose.     TECHNIQUE: Axial images of the cervical spine obtained without contrast.  Sagittal coronal images reformatted     FINDINGS:  There is diffuse osteopenia. There is loss of normal cervical  lordosis. There is anterolisthesis of C2 over C3 measuring 1 to 2 mm.  There is anterolisthesis of C7 over T1 measuring 2 mm. There is chronic  loss of height of C4 and to lesser extent C5. There is uncinate process  hypertrophy at C4-C7. There is mild facet osteoarthropathy. There is  mild to moderate degenerative disc change. No acute cervical vertebral  fracture. No prominent central cervical canal stenosis. There is  moderate right C4/C5 neural foramen narrowing with  mild narrowing above  and below this level.        Emphysema within the visible lung apices.        Impression:      1. Osteopenia. Similar alignment with chronic changes as described  above. No acute cervical vertebral fracture or traumatic malalignment     This report was finalized on 05/06/2022 08:47 by Dr. Adeola Lawrence MD.    CT Angiogram Chest [[id number]] Collected: 05/06/22 0839     Updated: 05/06/22 0847    Narrative:      EXAMINATION: CT ANGIOGRAM CHEST-      5/5/2022 11:57 PM CDT     HISTORY: hypoxia, tachycardia, elevated dimer     In order to have a CT radiation dose as low as reasonably achievable  Automated Exposure Control was utilized for adjustment of the mA and/or  KV according to patient size.     DLP in mGycm= 246.     CT angiogram chest.  CT angiography protocol.   CT imaging with bolus IV contrast injection.   Under concurrent supervision axial, sagittal, coronal, and  three-dimensional data sets were constructed.     A preliminary [facility] report was faxed to the Emergency Department  immediately after this study was interpreted at 1:49 AM.     Heart size is within normal limits.  Coronary artery calcification is present.     Normal-sized thoracic aorta with no aneurysm or dissection.     Symmetric and normally opacified pulmonary arteries.  No pulmonary embolism.     Chronic lung changes.  Hyperexpansion.  Diffuse emphysema.  No pneumonia, pneumothorax, or pleural effusion.     Osteopenic bones.  Mild chronic appearing L2 superior endplate compression fracture.  High-grade anterior wedge compression of T7.  Both of these levels have progressed since a CT from January 26, 2022.     Summary:  1. Diffuse chronic lung changes.  2. No pulmonary embolism.  3. T7 and L2 compression fractures are new compared with 4 months ago.                                   This report was finalized on 05/06/2022 08:44 by Dr. Chan Pelaez MD.    XR Hip With or Without Pelvis 2 - 3 View Left [[id number]]  Collected: 05/06/22 0722     Updated: 05/06/22 0727    Narrative:      EXAMINATION:   XR HIP W OR WO PELVIS 2-3 VIEW LEFT-  5/6/2022 7:22 AM  CDT     HISTORY: Patient fell     Single AP view of the left hip is obtained. There is an impacted left  hip fracture. This is a limited exam.     IMPRESSION single view the left hip demonstrate impacted left hip  fracture  This report was finalized on 05/06/2022 07:24 by Dr. Filiberto Franco MD.    CT Head Without Contrast [[id number]] Collected: 05/06/22 0656     Updated: 05/06/22 0701    Narrative:      EXAMINATION:   CT HEAD WO CONTRAST-  5/6/2022 6:56 AM CDT     HISTORY: CT BRAIN without contrast 5/5/2022 11:57 PM CDT     HISTORY: Patient had a bleed last year patient fell     COMPARISON: April 7, 2021      DLP: 5 5 mGy cm. In order to have a CT radiation dose as low as  reasonably achievable, Automated Exposure Control was utilized for  adjustment of the mA and/or KV according to patient size.     TECHNIQUE: Serial axial tomographic images of the brain were obtained  without the use of intravenous contrast.      FINDINGS:   The midline structures are nondisplaced. There is mild cerebral and  cerebellar volume loss, with an associated increase in the prominence of  the ventricles and sulci. The basilar cisterns are normal in size and  configuration. There is no evidence of intracranial hemorrhage or  mass-effect. There is low attenuation in the periventricular white  matter, consistent with chronic ischemic change. There are no abnormal  extra-axial fluid collections. There is no evidence of tonsillar  herniation.      The included orbits and their contents are unremarkable. The visualized  paranasal sinuses, mastoid air cells and middle ear cavities are clear.  The visualized osseous structures and overlying soft tissues of the  skull and face are intact.        Impression:      Mild cerebral and cerebellar volume loss with chronic microvascular  disease but no evidence of  "acute intracranial process.        This report was finalized on 05/06/2022 06:58 by Dr. Filiberto Franco MD.          Condition on Discharge:   Stable  Physical Exam on Discharge:  /65 (BP Location: Left arm, Patient Position: Lying)   Pulse 86   Temp 98 °F (36.7 °C) (Oral)   Resp 18   Ht 152.4 cm (60\")   Wt 43.4 kg (95 lb 10.9 oz)   SpO2 94%   BMI 18.69 kg/m²   Physical Exam  She is seated on the recliner.  She is wearing LSO brace.  She appears comfortable  Hemodynamically stable  GEN: Awake, alert, interactive, in NAD  HEENT:  PERRLA, EOMI, Anicteric, Trachea midline  Lungs: Nonlabored breathing.  Exam limited by patient's LSO brace  Heart: Pulses are full.  Heart exam limited by patient's LSO brace   ABD: soft, nt/nd, +BS, no guarding/rebound  Extremities: no cyanosis, no pitting edema, scds in place, clean operative site left hip.  No gross ecchymosis.  Skin: no rashes or petechiae  Neuro: AAOx3, no focal deficits  Psych: normal mood & affect    Discharge Disposition:      Discharge Medications:     Discharge Medications      New Medications      Instructions Start Date   amoxicillin-clavulanate 875-125 MG per tablet  Commonly known as: AUGMENTIN   1 tablet, Oral, Every 12 Hours Scheduled      apixaban 2.5 MG tablet tablet  Commonly known as: ELIQUIS   2.5 mg, Oral, 2 Times Daily      docusate sodium 100 MG capsule  Commonly known as: Colace   100 mg, Oral, Daily      oxyCODONE-acetaminophen 5-325 MG per tablet  Commonly known as: PERCOCET   1 tablet, Oral, Every 8 Hours PRN         Changes to Medications      Instructions Start Date   cholecalciferol 25 MCG (1000 UT) tablet  Commonly known as: VITAMIN D3  What changed: how much to take   2,000 Units, Oral, Daily   Start Date: May 11, 2022        Continue These Medications      Instructions Start Date   acebutolol 200 MG capsule  Commonly known as: SECTRAL   200 mg, Oral, 2 Times Daily      acetaminophen 325 MG tablet  Commonly known as: TYLENOL   " 650 mg, Oral, Every 4 Hours PRN      famotidine 20 MG tablet  Commonly known as: PEPCID   20 mg, Oral, Daily      fenofibrate 145 MG tablet  Commonly known as: TRICOR   145 mg, Oral, Daily      gabapentin 100 MG capsule  Commonly known as: NEURONTIN   100 mg, Oral, 3 Times Daily      LORazepam 0.5 MG tablet  Commonly known as: ATIVAN   0.5 mg, Oral, 3 Times Daily      megestrol 40 MG/ML suspension  Commonly known as: MEGACE   400 mg, Oral, Daily      PARoxetine 40 MG tablet  Commonly known as: PAXIL   40 mg, Oral, Every Morning         Stop These Medications    aspirin 81 MG chewable tablet     OLANZapine 5 MG tablet  Commonly known as: zyPREXA            Discharge Diet:   Diet Instructions     Diet: Regular      Discharge Diet: Regular          Discharge Care Plan / Instructions:   FWB LLE    Activity at Discharge:   Activity Instructions     Gradually Increase Activity Until at Pre-Hospitalization Level            Follow-up Appointments:   Dr. Simons per his recommendation    Dr. Davidson/Jesse Rudolph NP in 2 wks    Test Results Pending at Discharge:   Pending Labs     Order Current Status    Blood Culture - Blood, Arm, Right Preliminary result    Blood Culture - Blood, Wrist, Right Preliminary result           Electronically signed by William Turner MD, 5/10/2022, 10:33 CDT.    Time:> 30 mins      Part of this note may be an electronic transcription/translation of spoken language to printed text using the Dragon Dictation System.

## 2022-05-10 NOTE — THERAPY TREATMENT NOTE
Acute Care - Physical Therapy Treatment Note  [facility]     Patient Name: Zelalem Hung  : 1940  MRN: [id number]  Today's Date: 5/10/2022      Visit Dx:     ICD-10-CM ICD-9-CM   1. Colitis  K52.9 558.9   2. Impaired functional mobility and activity tolerance  Z74.09 V49.89   3. Impaired mobility  Z74.09 799.89   4. Decreased activities of daily living (ADL)  Z78.9 V49.89   5. Pleurisy  R09.1 511.0   6. Closed fracture of left hip, initial encounter ([facility])  S72.002A 820.8     Patient Active Problem List   Diagnosis   • Cholecystitis   • Encephalopathy   • Unknown when patient last well, possible stroke    • Hyponatremia   • Failure to thrive in adult   • Severe malnutrition ([facility])   • History of migraines   • Closed fracture of nasal bone with routine healing   • Traumatic ecchymosis of face   • Acquired deviated nasal septum   • Epistaxis   • Colitis   • Fall   • Compression fracture of body of thoracic vertebra ([facility])   • Compression fracture of L2 lumbar vertebra ([facility])   • Sepsis ([facility])   • Metabolic acidosis   • Fecal impaction ([facility])   • Closed left hip fracture ([facility])     Past Medical History:   Diagnosis Date   • Chronic hyponatremia    • Frequent UTI    • Migraines    • SVT (supraventricular tachycardia) ([facility])      Past Surgical History:   Procedure Laterality Date   • CHOLECYSTECTOMY WITH INTRAOPERATIVE CHOLANGIOGRAM N/A 2019    Procedure: CHOLECYSTECTOMY LAPAROSCOPIC INTRAOPERATIVE CHOLANGIOGRAM;  Surgeon: Chandan Bravo MD;  Location: [facility];  Service: General   • HIP HEMIARTHROPLASTY Left 2022    Procedure: HIP HEMIARTHROPLASTY;  Surgeon: Barrington Simons MD;  Location: [facility];  Service: Orthopedics;  Laterality: Left;     PT Assessment (last 12 hours)     PT Evaluation and Treatment     Row Name 05/10/22 0949          Physical Therapy Time and Intention    Subjective Information complains of;pain  -     Document Type therapy note (daily note)  -     Mode of Treatment physical therapy   -     Patient Effort good  -     Row Name 05/10/22 0949          General Information    Existing Precautions/Restrictions fall;brace worn when out of bed;spinal;left;hip, posterior  -     Row Name 05/10/22 0949          Pain    Pretreatment Pain Rating 8/10  -     Posttreatment Pain Rating 8/10  -     Pain Location - Side/Orientation Left  -     Pain Location lower  -     Pain Location - back;hip  -     Pain Intervention(s) Medication (See MAR);Repositioned  -     Row Name 05/10/22 0949          Bed Mobility    Sit-Sidelying [place] (Bed Mobility) verbal cues;maximum assist (25% patient effort)  -     Assistive Device (Bed Mobility) draw sheet;bed rails  -     Row Name 05/10/22 0949          Transfers    Sit-Stand [place] (Transfers) verbal cues;moderate assist (50% patient effort);2 person assist  -     Stand-Sit [place] (Transfers) moderate assist (50% patient effort);2 person assist  -     Row Name 05/10/22 0949          Sit-Stand Transfer    Assistive Device (Sit-Stand Transfers) walker, front-wheeled  -     Row Name 05/10/22 0949          Stand-Sit Transfer    Assistive Device (Stand-Sit Transfers) walker, front-wheeled  -     Row Name 05/10/22 0949          Motor Skills    Comments, Therapeutic Exercise BLE HEP X 10 reps P-AAROM  -     Additional Documentation Comments, Therapeutic Exercise (Row)  -     Row Name             Wound 05/06/22 1557 Left anterior greater trochanter Incision    Wound - Properties Group Placement Date: 05/06/22  -KT Placement Time: 1557  -KT Present on Hospital Admission: N  -KT Side: Left  -KT Orientation: anterior  -KT Location: greater trochanter  -KT Primary Wound Type: Incision  -KT     Retired Wound - Properties Group Placement Date: 05/06/22  -KT Placement Time: 1557  -KT Present on Hospital Admission: N  -KT Side: Left  -KT Orientation: anterior  -KT Location: greater trochanter  -KT Primary Wound Type: Incision  -KT      Retired Wound - Properties Group Date first assessed: 05/06/22  -KT Time first assessed: 1557  -KT Present on Hospital Admission: N  -KT Side: Left  -KT Location: greater trochanter  -KT Primary Wound Type: Incision  -KT     Row Name 05/10/22 0949          Positioning and Restraints    Pre-Treatment Position in bed  -     Post Treatment Position bed  -     In Bed fowlers;call light within reach;encouraged to call for assist;exit alarm on  -           User Key  (r) = Recorded By, (t) = Taken By, (c) = Cosigned By    Initials Name Provider Type     Liz Don PTA Physical Therapist Assistant    David Keane RN Registered Nurse                Physical Therapy Education                 Title: PT OT SLP Therapies (In Progress)     Topic: Physical Therapy (Done)     Point: Mobility training (Done)     Learning Progress Summary           Patient Acceptance, E,TB,D, VU,DU,NR by  at 5/7/2022 1626    Comment: Education re: purpose of PT/importance of activity, for safety/falls prevention, for posterior hip precautions, spinal precautions, improved tech w/ bed mobility, tfers, use of rwx, purpose of brace and brace wearing schedule   Family Acceptance, E,TB,D, VU,DU,NR by  at 5/7/2022 1626    Comment: Education re: purpose of PT/importance of activity, for safety/falls prevention, for posterior hip precautions, spinal precautions, improved tech w/ bed mobility, tfers, use of rwx, purpose of brace and brace wearing schedule                   Point: Home exercise program (Done)     Learning Progress Summary           Patient Acceptance, E,TB,D,H, VU by  at 5/10/2022 1037    Comment: THP HEP                   Point: Body mechanics (Done)     Learning Progress Summary           Patient Acceptance, E,TB,D, VU,DU,NR by  at 5/7/2022 1626    Comment: Education re: purpose of PT/importance of activity, for safety/falls prevention, for posterior hip precautions, spinal precautions, improved tech w/ bed  mobility, tfers, use of rwx, purpose of brace and brace wearing schedule   Family Acceptance, E,TB,D, VU,DU,NR by  at 5/7/2022 1626    Comment: Education re: purpose of PT/importance of activity, for safety/falls prevention, for posterior hip precautions, spinal precautions, improved tech w/ bed mobility, tfers, use of rwx, purpose of brace and brace wearing schedule                   Point: Precautions (Done)     Learning Progress Summary           Patient Acceptance, E,TB,D, VU,DU,NR by  at 5/7/2022 1626    Comment: Education re: purpose of PT/importance of activity, for safety/falls prevention, for posterior hip precautions, spinal precautions, improved tech w/ bed mobility, tfers, use of rwx, purpose of brace and brace wearing schedule   Family Acceptance, E,TB,D, TIM,DU,NR by  at 5/7/2022 1626    Comment: Education re: purpose of PT/importance of activity, for safety/falls prevention, for posterior hip precautions, spinal precautions, improved tech w/ bed mobility, tfers, use of rwx, purpose of brace and brace wearing schedule                               User Key     Initials Effective Dates Name Provider Type Discipline     06/16/21 -  Liz Don PTA Physical Therapist Assistant PT     08/02/18 -  Fiona Chicas, PT Physical Therapist PT              PT Recommendation and Plan             Time Calculation:    PT Charges     Row Name 05/10/22 1038             Time Calculation    Start Time 0949  -      Stop Time 1029  -      Time Calculation (min) 40 min  -      PT Received On 05/10/22  -              Time Calculation- PT    Total Timed Code Minutes- PT 40 minute(s)  -              Timed Charges    79846 - PT Therapeutic Exercise Minutes 20  -AH      55574 - PT Therapeutic Activity Minutes 20  -AH              Total Minutes    Timed Charges Total Minutes 40  -AH       Total Minutes 40  -AH            User Key  (r) = Recorded By, (t) = Taken By, (c) = Cosigned By    Initials Name  Provider Type     Liz Don PTA Physical Therapist Assistant              Therapy Charges for Today     Code Description Service Date Service Provider Modifiers Qty    02520039213 HC PT THER PROC EA 15 MIN 5/10/2022 Liz Don PTA GP 2    01185252815 HC PT THERAPEUTIC ACT EA 15 MIN 5/10/2022 Liz Dno PTA GP 1          PT G-Codes  Outcome Measure Options: AM-PAC 6 Clicks Daily Activity (OT)  AM-PAC 6 Clicks Score (PT): 10  AM-PAC 6 Clicks Score (OT): 16    Liz Don PTA  5/10/2022

## 2022-05-10 NOTE — DISCHARGE PLACEMENT REQUEST
"Zelalem Henderson (81 y.o. Female)             Date of Birth   1940    Social Security Number       Address   [address] [place] [address]    Home Phone   [phone]    MRN   [id number]       [employer]   [facility]    Marital Status                               Admission Date   5/5/22    Admission Type   Emergency    Admitting Provider   William Turner MD    Attending Provider   William Turner MD    Department, Room/Bed   [facility] 3A, 346/1       Discharge Date       Discharge Disposition   Skilled Nursing Facility (DC - External)    Discharge Destination                               Attending Provider: William Turner MD    Allergies: Codeine    Isolation: None   Infection: COVID Screen (preop/placement) (05/10/22)   Code Status: No CPR   Advance Care Planning Activity    Ht: 152.4 cm (60\")   Wt: 43.4 kg (95 lb 10.9 oz)    Admission Cmt: None   Principal Problem: Closed left hip fracture (HCC) [S72.002A]                 Active Insurance as of 5/5/2022     Primary Coverage     Payor Plan Insurance Group Employer/Plan Group    MEDICARE MEDICARE A & B      Payor Plan Address Payor Plan Phone Number Payor Plan Fax Number Effective Dates    [address] [phone]  12/1/2005 - None Entered    [facility] [address]       Subscriber Name Subscriber Birth Date Member ID       ZELALEM HENDERSON 1940 [id number]           Secondary Coverage     Payor Plan Insurance Group Employer/Plan Group    [place]      Payor Plan Address Payor Plan Phone Number Payor Plan Fax Number Effective Dates    [address] [phone]  5/8/2019 - None Entered    [place] [address]       Subscriber Name Subscriber Birth Date Member ID       ZELALEM HENDERSON 1940 [id number]                 Emergency Contacts      (Rel.) Home Phone Work Phone Mobile Phone    shannon aldridge (Daughter) [phone] -- --              "

## 2022-05-10 NOTE — PROGRESS NOTES
"Zelalem Hung  81 y.o.      Chief complaint:   L2 compression fracture, T7 compression fracture    Subjective:  Symptoms:  Stable.    Diet:  Adequate intake.    Activity level: Returning to normal.    Pain:  She complains of pain that is mild.  She reports pain is improving.  Pain is well controlled.      No events overnight    Temp:  [97.6 °F (36.4 °C)-98 °F (36.7 °C)] 98 °F (36.7 °C)  Heart Rate:  [] 86  Resp:  [16-18] 18  BP: (130-148)/(45-65) 148/65      Objective:  General Appearance:  Comfortable, well-appearing, in no acute distress and in pain.    Vital signs: (most recent): Blood pressure 148/65, pulse 86, temperature 98 °F (36.7 °C), temperature source Oral, resp. rate 18, height 152.4 cm (60\"), weight 43.4 kg (95 lb 10.9 oz), SpO2 94 %, not currently breastfeeding.  Vital signs are normal.  No fever.    Output: Producing urine.    HEENT: Normal HEENT exam.    Lungs:  Normal effort and normal respiratory rate.  She is not in respiratory distress.    Heart: Normal rate.  Regular rhythm.    Chest: Symmetric chest wall expansion.   Extremities: Normal range of motion.    Skin:  Warm and dry.    Abdomen: Abdomen is soft and non-distended.  Bowel sounds are normal.   There is no abdominal tenderness.     Pulses: Distal pulses are intact.        Neurologic Exam     Mental Status   Oriented to person, place, and time.   Attention: normal. Concentration: normal.   Speech: speech is normal   Level of consciousness: alert  Normal comprehension.     Cranial Nerves     CN II   Visual fields full to confrontation.     CN III, IV, VI   Pupils are equal, round, and reactive to light.  Extraocular motions are normal.     CN V   Facial sensation intact.     CN VII   Facial expression full, symmetric.     CN VIII   CN VIII normal.     CN IX, X   CN IX normal.   CN X normal.     CN XI   CN XI normal.     CN XII   CN XII normal.     Motor Exam   Muscle bulk: normal    Strength   Strength 5/5 throughout.     Sensory " Exam   Light touch normal.     Gait, Coordination, and Reflexes     Reflexes   Reflexes 2+ except as noted.       Lab Results (last 24 hours)     Procedure Component Value Units Date/Time    Blood Culture - Blood, Wrist, Right [[id number]]  (Normal) Collected: 05/05/22 2317    Specimen: Blood from Wrist, Right Updated: 05/09/22 2331     Blood Culture No growth at 4 days    Blood Culture - Blood, Arm, Right [[id number]]  (Normal) Collected: 05/05/22 2317    Specimen: Blood from Arm, Right Updated: 05/09/22 2331     Blood Culture No growth at 4 days              Plan:   CV: Heart rate and blood pressure stable  Respiratory: Oxygen level stable  GI: Tolerating p.o.  : Adequate urine output  Neuro: Exam stable  Continue with physical and Occupational Therapy.  Plan is for patient to go to SNF today.  Patient is okay to go from a neurosurgical standpoint.  She is to continue wearing the LSO whenever she is out of bed.  She is okay to work with physical and Occupational Therapy.  We will plan to follow-up with her in the office in 2 weeks and reevaluate her back issues and see if they have been improving.  Her questions and concerns were addressed.      Closed left hip fracture (HCC)    Failure to thrive in adult    Colitis    Fall    Compression fracture of body of thoracic vertebra (HCC)    Compression fracture of L2 lumbar vertebra (HCC)    Sepsis (HCC)    Metabolic acidosis    Fecal impaction (HCC)        Jesse Rudolph, APRN

## 2022-05-10 NOTE — PLAN OF CARE
Goal Outcome Evaluation:  Plan of Care Reviewed With: patient        Progress: no change  Outcome Evaluation: A&Ox4 with occasional confusion noted. VSS. O2 on at 2L/NC this shift. Medicated for c/o pain with relief noted. Drsg to left hip CDI. PPP. Abduction pillow in place. Con't IV abx per orders. Voiding, incontinent. Turn and reposition every 2hrs. Safety maintained.

## 2022-05-11 PROCEDURE — 87493 C DIFF AMPLIFIED PROBE: CPT

## 2022-05-11 NOTE — THERAPY DISCHARGE NOTE
Acute Care - Physical Therapy Discharge Summary  [facility]       Patient Name: Zelalem Hung  : 1940  MRN: [id number]    Today's Date: 2022                 Admit Date: 2022      PT Recommendation and Plan    Visit Dx:    ICD-10-CM ICD-9-CM   1. Colitis  K52.9 558.9   2. Impaired functional mobility and activity tolerance  Z74.09 V49.89   3. Impaired mobility  Z74.09 799.89   4. Decreased activities of daily living (ADL)  Z78.9 V49.89   5. Pleurisy  R09.1 511.0   6. Closed fracture of left hip, initial encounter ([facility])  S72.002A 820.8                PT Rehab Goals     Row Name 22 1142             Bed Mobility Goal 1 (PT)    Activity/Assistive Device (Bed Mobility Goal 1, PT) rolling to right;scooting;sidelying to sit/sit to sidelying  -AB      [place] Level/Cues Needed (Bed Mobility Goal 1, PT) minimum assist (75% or more patient effort)  -AB      Time Frame (Bed Mobility Goal 1, PT) long term goal (LTG);10 days  -AB      Progress/Outcomes (Bed Mobility Goal 1, PT) goal not met  -AB              Transfer Goal 1 (PT)    Activity/Assistive Device (Transfer Goal 1, PT) sit-to-stand/stand-to-sit;bed-to-chair/chair-to-bed;walker, rolling  -AB      [place] Level/Cues Needed (Transfer Goal 1, PT) minimum assist (75% or more patient effort)  -AB      Time Frame (Transfer Goal 1, PT) long term goal (LTG);10 days  -AB      Progress/Outcome (Transfer Goal 1, PT) goal not met  -AB              Gait Training Goal 1 (PT)    Activity/Assistive Device (Gait Training Goal 1, PT) gait (walking locomotion);assistive device use;decrease fall risk;improve balance and speed;increase endurance/gait distance;walker, rolling  -AB      [place] Level (Gait Training Goal 1, PT) minimum assist (75% or more patient effort);contact guard required  -AB      Distance (Gait Training Goal 1, PT) 8-10 ft  -AB      Time Frame (Gait Training Goal 1, PT) long term goal (LTG);10 days  -AB      Progress/Outcome (Gait  Training Goal 1, PT) goal not met  -AB              Problem Specific Goal 1 (PT)    Problem Specific Goal 1 (PT) angel/doff TLSO brace w/ set up  -AB      Time Frame (Problem Specific Goal 1, PT) long-term goal (LTG);other (see comments)  10 days  -AB      Progress/Outcome (Problem Specific Goal 1, PT) goal not met  -AB              Patient Education Goal (PT)    Activity (Patient Education Goal, PT) I verbalize and demonstrate knowledge of posterior hip precautions and spinal precautions  -AB      [place]/Cues/Accuracy (Memory Goal 2, PT) demonstrates adequately;independent;verbalizes understanding  -AB      Time Frame (Patient Education Goal, PT) long term goal (LTG);10 days  -AB      Progress/Outcome (Patient Education Goal, PT) goal not met  -AB            User Key  (r) = Recorded By, (t) = Taken By, (c) = Cosigned By    Initials Name Provider Type Discipline    Sandra Allen, PTA Physical Therapist Assistant PT                    PT Discharge Summary  Anticipated Discharge Disposition (PT): skilled nursing facility  Reason for Discharge: Discharge from facility  Outcomes Achieved: Refer to plan of care for updates on goals achieved  Discharge Destination: SNF      Sandra Ferrara PTA   5/11/2022

## 2022-05-11 NOTE — THERAPY DISCHARGE NOTE
Acute Care - Occupational Therapy Discharge Summary  [facility]     Patient Name: Zelalem Hung  : 1940  MRN: [id number]    Today's Date: 2022                 Admit Date: 2022        OT Recommendation and Plan    Visit Dx:    ICD-10-CM ICD-9-CM   1. Colitis  K52.9 558.9   2. Impaired functional mobility and activity tolerance  Z74.09 V49.89   3. Impaired mobility  Z74.09 799.89   4. Decreased activities of daily living (ADL)  Z78.9 V49.89   5. Pleurisy  R09.1 511.0   6. Closed fracture of left hip, initial encounter ([facility])  S72.002A 820.8                OT Rehab Goals     Row Name 22 0700             Transfer Goal 1 (OT)    Activity/Assistive Device (Transfer Goal 1, OT) sit-to-stand/stand-to-sit;bed-to-chair/chair-to-bed;commode, 3-in-1  -TS      [place] Level/Cues Needed (Transfer Goal 1, OT) minimum assist (75% or more patient effort)  -TS      Time Frame (Transfer Goal 1, OT) long term goal (LTG);by discharge  -TS      Progress/Outcome (Transfer Goal 1, OT) goal not met  -TS              Dressing Goal 1 (OT)    Activity/Device (Dressing Goal 1, OT) upper body dressing  -TS      [place]/Cues Needed (Dressing Goal 1, OT) minimum assist (75% or more patient effort);verbal cues required  -TS      Time Frame (Dressing Goal 1, OT) long term goal (LTG);by discharge  -TS      Progress/Outcome (Dressing Goal 1, OT) goal not met  -TS            User Key  (r) = Recorded By, (t) = Taken By, (c) = Cosigned By    Initials Name Provider Type Discipline    TS Indira Borja COTA Occupational Therapist Assistant OT                    Timed Therapy Charges  Total Units: 1    Charges  Total Units: 1    Procedure Name Documented Minutes Units Code    HC OT THERAPEUTIC ACT EA 15 MIN 16  1    15536 (CPT®)               Documented Minutes  Total Minutes: 16    Therapy Provided Minutes    99333 - OT Therapeutic Activity Minutes 16                    OT Discharge Summary  Anticipated Discharge  Disposition (OT): skilled nursing facility  Reason for Discharge: Discharge from facility  Outcomes Achieved: Refer to plan of care for updates on goals achieved  Discharge Destination: SNF      JAN Gu  5/11/2022

## 2022-05-12 ENCOUNTER — LAB REQUISITION (OUTPATIENT)
Dept: LAB | Facility: HOSPITAL | Age: 82
End: 2022-05-12

## 2022-05-12 DIAGNOSIS — Z00.00 ENCOUNTER FOR GENERAL ADULT MEDICAL EXAMINATION WITHOUT ABNORMAL FINDINGS: ICD-10-CM

## 2022-05-12 LAB — C DIFF TOX GENS STL QL NAA+PROBE: NEGATIVE

## 2022-05-26 ENCOUNTER — OFFICE VISIT (OUTPATIENT)
Dept: NEUROSURGERY | Facility: CLINIC | Age: 82
End: 2022-05-26

## 2022-05-26 VITALS — BODY MASS INDEX: 18.65 KG/M2 | HEIGHT: 60 IN | WEIGHT: 95 LBS

## 2022-05-26 DIAGNOSIS — S32.020D COMPRESSION FRACTURE OF L2 VERTEBRA WITH ROUTINE HEALING, SUBSEQUENT ENCOUNTER: ICD-10-CM

## 2022-05-26 DIAGNOSIS — S22.000A COMPRESSION FRACTURE OF BODY OF THORACIC VERTEBRA: Primary | ICD-10-CM

## 2022-05-26 DIAGNOSIS — Z78.9 NONSMOKER: ICD-10-CM

## 2022-05-26 PROCEDURE — 99213 OFFICE O/P EST LOW 20 MIN: CPT | Performed by: NURSE PRACTITIONER

## 2022-05-26 NOTE — PROGRESS NOTES
Chief complaint:   Chief Complaint   Patient presents with   • Back Pain     Zelalem is returning after a recent hospital stay and imaging of the lumbar and thoracic for follow up, daughter is present with Zelalem today and does state they are concerned with left foot, she cannot bear weight on it.  She also sees Dr. Simons for a hip fracture.        Subjective     HPI: This is a 81-year-old female patient who we saw in the hospital in early May 2022 after she presented after a fall.  The patient did have a fracture of the left hip and was taken to the operating room by orthopedics.  The patient also did sustain a T7 and L2 compression fracture.  The patient was mobilizing with therapy but was not complaining of any significant back pain.  The patient was discharged to a nursing facility.  She comes in today and has been to see Dr. Bright office.  They state that there has not been any issues with the hip surgery and that it is healing appropriately.  The patient is having some difficulty with ambulating as she does keep her feet bent down.  She does have decreased range of motion in her ankles bilaterally.  She is not complaining of any pain radiating into her legs.  She does complain of some back pain but overall this is not a big factor in her day-to-day life.  She has been wearing the brace.  She is working with therapy.      Review of Systems   Musculoskeletal: Positive for arthralgias, back pain and gait problem.   Psychiatric/Behavioral: Negative.         Past Medical History:   Diagnosis Date   • Chronic hyponatremia    • Frequent UTI    • Migraines    • SVT (supraventricular tachycardia) (HCC)      Past Surgical History:   Procedure Laterality Date   • CHOLECYSTECTOMY WITH INTRAOPERATIVE CHOLANGIOGRAM N/A 5/9/2019    Procedure: CHOLECYSTECTOMY LAPAROSCOPIC INTRAOPERATIVE CHOLANGIOGRAM;  Surgeon: Chandan Bravo MD;  Location: [facility];  Service: General   • HIP HEMIARTHROPLASTY Left 5/6/2022     "Procedure: HIP HEMIARTHROPLASTY;  Surgeon: Barrington Simons MD;  Location: [facility];  Service: Orthopedics;  Laterality: Left;     Family History   Problem Relation Age of Onset   • Stroke Mother    • Anemia Mother    • Heart disease Father    • Heart attack Father    • Colon cancer Father      Social History     Tobacco Use   • Smoking status: Never Smoker   • Smokeless tobacco: Never Used   Vaping Use   • Vaping Use: Never used   Substance Use Topics   • Alcohol use: No   • Drug use: No     (Not in a hospital admission)    Allergies:  Codeine    Objective      Vital Signs  Ht 152.4 cm (60\")   Wt 43.1 kg (95 lb)   BMI 18.55 kg/m²     Physical Exam  Constitutional:       Appearance: Normal appearance. She is well-developed.   HENT:      Head: Normocephalic.   Eyes:      General: Lids are normal.      Conjunctiva/sclera: Conjunctivae normal.      Pupils: Pupils are equal, round, and reactive to light.   Cardiovascular:      Rate and Rhythm: Normal rate and regular rhythm.   Pulmonary:      Effort: Pulmonary effort is normal.      Breath sounds: Normal breath sounds.   Musculoskeletal:      Cervical back: Normal range of motion.      Right ankle: Decreased range of motion.      Left ankle: Decreased range of motion.      Comments: Thoracic and lumbar spine pain   Skin:     General: Skin is warm.   Neurological:      Mental Status: She is alert and oriented to person, place, and time.      GCS: GCS eye subscore is 4. GCS verbal subscore is 5. GCS motor subscore is 6.      Cranial Nerves: No cranial nerve deficit.      Sensory: No sensory deficit.      Gait: Gait abnormal.      Deep Tendon Reflexes: Reflexes are normal and symmetric. Reflexes normal.      Comments: Patient does require assistance to help ambulate.  She keeps her feet in a plantarflexed posture with limited dorsiflexion   Psychiatric:         Speech: Speech normal.         Behavior: Behavior normal.         Thought Content: Thought content normal. "         Neurologic Exam     Mental Status   Oriented to person, place, and time.   Speech: speech is normal     Cranial Nerves     CN III, IV, VI   Pupils are equal, round, and reactive to light.      Imaging review: No new imaging        Assessment/Plan: Patient does have some back pain but this is not a constant thing and she does not make a lot of complaints about back issues.  At this point when I have the patient remain in the brace.  We will see her back in the office in 4 weeks.  At that time we may consider doing an x-ray of the lumbar spine to ensure that the fracture is stable.  They were told to call us if she had any worsening pain issues in her back would be happy to see her back before then.  Her questions and concerns were addressed.  Patient was accompanied with her daughter  Patient is a nonsmoker  The patient's Body mass index is 18.55 kg/m².. BMI is within normal parameters. No follow-up required.  Advance Care Planning   ACP discussion was held with the patient during this visit. Patient does not have an advance directive, information provided.  STEADI Fall Risk Assessment has not been completed.       Diagnoses and all orders for this visit:    1. Compression fracture of body of thoracic vertebra (HCC) (Primary)    2. Compression fracture of L2 vertebra with routine healing, subsequent encounter    3. Nonsmoker    4. Body mass index (BMI) 19.9 or less, adult          I discussed the patients findings and my recommendations with patient    Jesse Rudolph, APRN  05/26/22  14:54 CDT

## 2022-06-03 ENCOUNTER — APPOINTMENT (OUTPATIENT)
Dept: CT IMAGING | Facility: HOSPITAL | Age: 82
End: 2022-06-03

## 2022-06-03 ENCOUNTER — APPOINTMENT (OUTPATIENT)
Dept: GENERAL RADIOLOGY | Facility: HOSPITAL | Age: 82
End: 2022-06-03

## 2022-06-03 ENCOUNTER — HOSPITAL ENCOUNTER (EMERGENCY)
Facility: HOSPITAL | Age: 82
Discharge: HOME | End: 2022-06-03
Admitting: EMERGENCY MEDICINE

## 2022-06-03 VITALS
BODY MASS INDEX: 18.65 KG/M2 | RESPIRATION RATE: 16 BRPM | TEMPERATURE: 97.6 F | WEIGHT: 95 LBS | HEART RATE: 85 BPM | SYSTOLIC BLOOD PRESSURE: 133 MMHG | HEIGHT: 60 IN | OXYGEN SATURATION: 91 % | DIASTOLIC BLOOD PRESSURE: 70 MMHG
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
## 2022-06-03 DIAGNOSIS — S09.90XA INJURY OF HEAD, INITIAL ENCOUNTER: Primary | ICD-10-CM

## 2022-06-03 DIAGNOSIS — M25.572 LEFT ANKLE PAIN, UNSPECIFIED CHRONICITY: ICD-10-CM

## 2022-06-03 PROCEDURE — 70450 CT HEAD/BRAIN W/O DYE: CPT

## 2022-06-03 PROCEDURE — 70486 CT MAXILLOFACIAL W/O DYE: CPT

## 2022-06-03 PROCEDURE — 99283 EMERGENCY DEPT VISIT LOW MDM: CPT

## 2022-06-03 PROCEDURE — 73610 X-RAY EXAM OF ANKLE: CPT

## 2022-06-03 PROCEDURE — 72125 CT NECK SPINE W/O DYE: CPT

## 2022-06-03 RX ORDER — POLYETHYLENE GLYCOL 3350 17 G/17G
17 POWDER, FOR SOLUTION ORAL DAILY
COMMUNITY

## 2022-06-03 RX ORDER — ANORECTAL OINTMENT 15.7; .44; 24; 20.6 G/100G; G/100G; G/100G; G/100G
1 OINTMENT TOPICAL AS NEEDED
COMMUNITY

## 2022-06-03 RX ORDER — OXYCODONE HYDROCHLORIDE AND ACETAMINOPHEN 5; 325 MG/1; MG/1
1 TABLET ORAL ONCE
Status: COMPLETED | OUTPATIENT
Start: 2022-06-03 | End: 2022-06-03

## 2022-06-03 RX ORDER — BISACODYL 5 MG/1
10 TABLET, DELAYED RELEASE ORAL
COMMUNITY

## 2022-06-03 RX ORDER — CHOLECALCIFEROL (VITAMIN D3) 125 MCG
5 CAPSULE ORAL NIGHTLY
COMMUNITY

## 2022-06-03 RX ADMIN — OXYCODONE HYDROCHLORIDE AND ACETAMINOPHEN 1 TABLET: 5; 325 TABLET ORAL at 11:33

## 2022-06-03 NOTE — ED PROVIDER NOTES
Subjective   History of Present Illness    Patient is a pleasant 81-year-old female who presents to ED with daughter arriving by EMS.  Chief complaint is head injury due to mechanical fall that occurred about an hour prior to ER arrival.  Patient and daughter are both historians.  The patient recently endured a left hip fracture with surgical intervention.  She is undergoing physical therapy.  With her pain, her Percocet recently has been increased but because of this, her daughter reports she is hallucinating due to the strong dose.  The patient feels better so she thinks she can walk.  She describes that she moved from the bed to the chair this morning without any problems but then she tried to move back to the bed.  Upon doing so, she fell forward hitting the left side of her head and face.  EMS was notified.  Patient placed in a c-collar and brought to the ER to be further evaluated.  Patient reports she has been having neck pain since her previous fall.  She is on an anticoagulant as a empiric treatment for thrombus.  The patient denies any head pain but does have a small abrasion to left lateral forehead.  She denies any visual changes.  She denies any chest pain, abdominal pain, back pain, or upper extremity issues.  Her daughter reports she has been complaining of left ankle pain during physical therapy and request an ankle x-ray while here.  She denies any obvious injury.  She did endure two lumbar fractures with her hip fracture as well.    Review of Systems   Constitutional: Positive for activity change.   HENT: Negative.    Eyes: Negative.    Respiratory: Negative.    Cardiovascular: Negative.    Gastrointestinal: Negative.    Genitourinary: Negative.    Musculoskeletal: Positive for neck pain.   Skin: Negative.    All other systems reviewed and are negative.      Past Medical History:   Diagnosis Date   • Chronic hyponatremia    • Frequent UTI    • Migraines    • SVT (supraventricular tachycardia) ([facility])         Allergies   Allergen Reactions   • Codeine Anaphylaxis       Past Surgical History:   Procedure Laterality Date   • CHOLECYSTECTOMY WITH INTRAOPERATIVE CHOLANGIOGRAM N/A 5/9/2019    Procedure: CHOLECYSTECTOMY LAPAROSCOPIC INTRAOPERATIVE CHOLANGIOGRAM;  Surgeon: Chandan Bravo MD;  Location:  [facility] OR;  Service: General   • HIP HEMIARTHROPLASTY Left 5/6/2022    Procedure: HIP HEMIARTHROPLASTY;  Surgeon: Barrington Simons MD;  Location:  [facility] OR;  Service: Orthopedics;  Laterality: Left;       Family History   Problem Relation Age of Onset   • Stroke Mother    • Anemia Mother    • Heart disease Father    • Heart attack Father    • Colon cancer Father        Social History     Socioeconomic History   • Marital status:    Tobacco Use   • Smoking status: Never Smoker   • Smokeless tobacco: Never Used   Vaping Use   • Vaping Use: Never used   Substance and Sexual Activity   • Alcohol use: No   • Drug use: No   • Sexual activity: Defer       Prior to Admission medications    Medication Sig Start Date End Date Taking? Authorizing Provider   acebutolol (SECTRAL) 200 MG capsule Take 200 mg by mouth 2 (Two) Times a Day.   Yes ProviderLevy MD   acetaminophen (TYLENOL) 325 MG tablet Take 2 tablets by mouth Every 4 (Four) Hours As Needed for Mild Pain . 4/9/21  Yes Jesse Castillo MD   apixaban (ELIQUIS) 2.5 MG tablet tablet Take 1 tablet by mouth 2 (Two) Times a Day for 31 days. 5/10/22 6/10/22 Yes William Turner MD   bisacodyl (DULCOLAX) 5 MG EC tablet Take 10 mg by mouth every night at bedtime.   Yes ProviderLevy MD   cholecalciferol (VITAMIN D3) 25 MCG (1000 UT) tablet Take 2 tablets by mouth Daily. 5/11/22  Yes William Turner MD   docusate sodium (Colace) 100 MG capsule Take 1 capsule by mouth Daily. 5/10/22  Yes William Turner MD   famotidine (PEPCID) 20 MG tablet Take 20 mg by mouth Daily.   Yes Levy Lo MD   fenofibrate (TRICOR) 145 MG  "tablet Take 145 mg by mouth Daily.   Yes Levy Lo MD   gabapentin (NEURONTIN) 100 MG capsule Take 1 capsule by mouth 3 (Three) Times a Day. 5/10/22  Yes William Turner MD   LORazepam (ATIVAN) 0.5 MG tablet Take 1 tablet by mouth 3 (Three) Times a Day. 5/10/22  Yes William Turner MD   megestrol (MEGACE) 40 MG/ML suspension Take 400 mg by mouth Daily.   Yes Levy Lo MD   melatonin 5 MG tablet tablet Take 5 mg by mouth Every Night.   Yes Levy Lo MD   Menthol-Zinc Oxide (Calmoseptine) 0.44-20.6 % ointment Apply 1 application topically to the appropriate area as directed As Needed.   Yes Levy Lo MD   oxyCODONE-acetaminophen (PERCOCET) 5-325 MG per tablet Take 1 tablet by mouth Every 8 (Eight) Hours As Needed for Moderate Pain . 5/10/22  Yes William Turner MD   PARoxetine (PAXIL) 40 MG tablet Take 40 mg by mouth Every Morning.   Yes Levy Lo MD   polyethylene glycol (MiraLax) 17 GM/SCOOP powder Take 17 g by mouth Daily.   Yes Levy Lo MD   tuberculin (Tubersol) 5 UNIT/0.1ML injection Inject  into the appropriate area of the skin as directed by provider 1 (One) Time.    Levy Lo MD       Medications   oxyCODONE-acetaminophen (PERCOCET) 5-325 MG per tablet 1 tablet (has no administration in time range)       /79   Pulse 87   Temp 97.6 °F (36.4 °C) (Oral)   Resp 16   Ht 152.4 cm (60\")   Wt 43.1 kg (95 lb)   SpO2 95%   BMI 18.55 kg/m²       Objective   Physical Exam  Vitals and nursing note reviewed.   Constitutional:       General: She is not in acute distress.     Appearance: Normal appearance. She is well-developed. She is not diaphoretic.      Interventions: Cervical collar in place.   HENT:      Head:      Comments: Patient does have a small abrasion to the left lateral forehead with no evidence of bleeding.  She is tender to touch on palpation throughout the entirety of her face " without any gross abnormality.  No malocclusion.  Eyes:      Extraocular Movements: Extraocular movements intact.      Conjunctiva/sclera: Conjunctivae normal.      Pupils: Pupils are equal, round, and reactive to light.   Neck:      Trachea: No tracheal deviation.   Cardiovascular:      Rate and Rhythm: Normal rate and regular rhythm.      Heart sounds: Normal heart sounds. No murmur heard.  Pulmonary:      Effort: Pulmonary effort is normal.      Breath sounds: Normal breath sounds.   Abdominal:      General: Bowel sounds are normal. There is no distension.      Palpations: Abdomen is soft. There is no mass.      Tenderness: There is no abdominal tenderness. There is no guarding or rebound.   Musculoskeletal:         General: Tenderness present. Normal range of motion.      Cervical back: Normal range of motion and neck supple.      Left lower leg: Normal.      Left ankle: Tenderness present.      Comments: Patient does demonstrate tenderness throughout her entire left ankle without any gross abnormality.  Achilles tendon is intact but patient has worsening pain upon dorsiflexion and extension.  Palpable pulses bilaterally.   Skin:     General: Skin is warm and dry.      Capillary Refill: Capillary refill takes less than 2 seconds.   Neurological:      Mental Status: She is alert and oriented to person, place, and time.      Deep Tendon Reflexes: Reflexes are normal and symmetric.   Psychiatric:         Mood and Affect: Mood normal.         Behavior: Behavior normal. Behavior is cooperative.         Thought Content: Thought content normal.         Judgment: Judgment normal.         Procedures         Lab Results (last 24 hours)     ** No results found for the last 24 hours. **          XR Ankle 3+ View Left    Result Date: 6/3/2022  Narrative: LEFT ANKLE, 3 views 6/3/2022 10:00 AM CDT  HISTORY: Ankle pain  COMPARISON: None  Findings:  Frontal, lateral and oblique radiographs of the left ankle were provided for  review.  There is no evidence of acute fracture or dislocation. The medial lateral malleolar are intact. Dome of the talus is normal. Subtalar joint is unremarkable.  The base of the fifth metatarsal is beyond the field-of-view on this exam.. The joint spaces are maintained.      Impression: 1. There is no evidence of fracture. However the base of the fifth metatarsal is beyond the field-of-view on this exam..   This report was finalized on 06/03/2022 11:01 by Dr. Filiberto Franco MD.    CT Head Without Contrast    Result Date: 6/3/2022  Narrative: EXAMINATION:   CT HEAD WO CONTRAST-  6/3/2022 10:22 AM CDT  HISTORY: CT BRAIN without contrast 6/3/2022 10:04 AM CDT  HISTORY: Patient fell  COMPARISON: None  DLP: 704 mGy cm. In order to have a CT radiation dose as low as reasonably achievable, Automated Exposure Control was utilized for adjustment of the mA and/or KV according to patient size.  TECHNIQUE: Serial axial tomographic images of the brain were obtained without the use of intravenous contrast.  FINDINGS: The midline structures are nondisplaced. There is moderate cerebral and cerebellar volume loss, with an associated increase in the prominence of the ventricles and sulci. The basilar cisterns are normal in size and configuration. There is no evidence of intracranial hemorrhage or mass-effect. There is low attenuation in the periventricular white matter, consistent with chronic ischemic change. There are no abnormal extra-axial fluid collections. There is no evidence of tonsillar herniation.  The included orbits and their contents are unremarkable. The visualized paranasal sinuses, mastoid air cells and middle ear cavities are clear. The visualized osseous structures and overlying soft tissues of the skull and face are intact.      Impression: Moderate cerebral and cerebellar volume loss with chronic microvascular disease but no evidence of acute intracranial process.   This report was finalized on 06/03/2022  10:43 by Dr. Filiberto Franco MD.    CT Head Without Contrast    Result Date: 5/6/2022  Narrative: EXAMINATION:   CT HEAD WO CONTRAST-  5/6/2022 6:56 AM CDT  HISTORY: CT BRAIN without contrast 5/5/2022 11:57 PM CDT  HISTORY: Patient had a bleed last year patient fell  COMPARISON: April 7, 2021  DLP: 5 5 mGy cm. In order to have a CT radiation dose as low as reasonably achievable, Automated Exposure Control was utilized for adjustment of the mA and/or KV according to patient size.  TECHNIQUE: Serial axial tomographic images of the brain were obtained without the use of intravenous contrast.  FINDINGS: The midline structures are nondisplaced. There is mild cerebral and cerebellar volume loss, with an associated increase in the prominence of the ventricles and sulci. The basilar cisterns are normal in size and configuration. There is no evidence of intracranial hemorrhage or mass-effect. There is low attenuation in the periventricular white matter, consistent with chronic ischemic change. There are no abnormal extra-axial fluid collections. There is no evidence of tonsillar herniation.  The included orbits and their contents are unremarkable. The visualized paranasal sinuses, mastoid air cells and middle ear cavities are clear. The visualized osseous structures and overlying soft tissues of the skull and face are intact.      Impression: Mild cerebral and cerebellar volume loss with chronic microvascular disease but no evidence of acute intracranial process.   This report was finalized on 05/06/2022 06:58 by Dr. Filiberto Franco MD.    CT Cervical Spine Without Contrast    Result Date: 6/3/2022  Narrative: EXAMINATION: CT CERVICAL SPINE WO CONTRAST-   6/3/2022 10:04 AM CDT  HISTORY: mechanical fall  In order to have a CT radiation dose as low as reasonably achievable Automated Exposure Control was utilized for adjustment of the mA and/or KV according to patient size.  DLP in mGycm= 231  The CT scan of the cervical spine is  performed without intravenous or intrathecal contrast enhancement. The images are acquired in axial plane with subsequent reconstruction in coronal and sagittal planes.  Comparison is made with the previous study dated 5/5/2022.  There is no evidence of a fracture or compression. No acute displacement or malalignment.  There is severe demineralization/osteoporosis, similar to the previous study.  There is a mild anterolisthesis of C2 over C3. There is mild retrolisthesis of C4 over C5.  There is mild chronic appearing endplate compression deformities of C4-C5 and C6 which is similar to the previous study.  There are chronic degenerative changes in the form of osteophytes, insinuates parts, loss of disc heights representing degenerative disc disease and facet arthropathy. There is moderate asymmetric anatomy with the right neural foramen at C4-5 and C5-6. The remaining neural foramina or spinal canal are patent.  The limited visualized lungs show chronic emphysematous changes.  Prevertebral soft tissues appear unremarkable.      Impression: 1. No evidence of acute fracture or malalignment. 2. Osteoporosis. 3. Cervical spondylosis. Disruption of alignment at several levels. 4. Right neural foraminal stenosis at C4-5 and C5-6 as detailed above.  The report on CT cervical spine and CT facial bone was called to ER at 11:03 AM.          This report was finalized on 06/03/2022 11:03 by Dr. Miko Cohen MD.    CT Cervical Spine Without Contrast    Result Date: 5/6/2022  Narrative: CT CERVICAL SPINE WO CONTRAST- 5/5/2022 11:57 PM CDT  HISTORY: Fall  COMPARISON: 3/30/2021  DOSE LENGTH PRODUCT: 325 mGy cm. Automated exposure control was also utilized to decrease patient radiation dose.  TECHNIQUE: Axial images of the cervical spine obtained without contrast. Sagittal coronal images reformatted  FINDINGS:  There is diffuse osteopenia. There is loss of normal cervical lordosis. There is anterolisthesis of C2 over C3 measuring  1 to 2 mm. There is anterolisthesis of C7 over T1 measuring 2 mm. There is chronic loss of height of C4 and to lesser extent C5. There is uncinate process hypertrophy at C4-C7. There is mild facet osteoarthropathy. There is mild to moderate degenerative disc change. No acute cervical vertebral fracture. No prominent central cervical canal stenosis. There is moderate right C4/C5 neural foramen narrowing with mild narrowing above and below this level.   Emphysema within the visible lung apices.      Impression: 1. Osteopenia. Similar alignment with chronic changes as described above. No acute cervical vertebral fracture or traumatic malalignment  This report was finalized on 05/06/2022 08:47 by Dr. Adeola Lawrence MD.    CT Thoracic Spine Without Contrast    Result Date: 5/6/2022  Narrative: EXAMINATION: CT THORACIC SPINE WO CONTRAST-   5/5/2022 11:57 PM CDT  HISTORY: Fall, back pain  In order to have a CT radiation dose as low as reasonably achievable Automated Exposure Control was utilized for adjustment of the mA and/or KV according to patient size.  DLP in mGycm= 398.  Axial, sagittal, and coronal noncontrast thoracic spine CT.  Comparison is made with January 26, 2022.  A preliminary [facility] report was faxed to the Emergency Department immediately after this study was interpreted at 1:07 AM.  Diffuse osteopenia. 8-10 degrees left convex scoliosis.  Exaggerated thoracic kyphosis.  High-grade anterior wedge compression of T7 now with 75-80% loss of height compared with 30-35% 4 months ago.  Superior endplate compression of L2 with 50-55% loss of height is a new finding compared with 4 months ago.  The age of these fractures is indeterminate as there is no adjacent soft tissue edema.  Vertebral bodies and posterior elements are otherwise intact.  Summary: 1. Interval thoracic spine fracture at T7 and lumbar fracture at L2. New compared with 4 months ago. No retropulsion or spinal canal stenosis is seen.             This report was finalized on 05/06/2022 08:51 by Dr. Chan Pelaez MD.    CT Lumbar Spine Without Contrast    Result Date: 5/6/2022  Narrative: EXAM/TECHNIQUE: CT LUMBAR SPINE WO CONTRAST-  INDICATION: Fall, low back pain  COMPARISON: None recent.  DLP: 208 mGy cm. Automated exposure control was also utilized to decrease patient radiation dose.  FINDINGS:  There are 5 lumbar type vertebral bodies. No acute subluxations.  There is a 2 column compression fracture of L2 with up to 50% vertebral body height loss and 6 mm osseous retropulsion causing at least mild central canal stenosis.  Remaining vertebral body heights are maintained. No other fracture identified.  Mild multilevel lumbar spine degenerative change with mild multilevel neural foraminal narrowing secondary to facet arthropathy. No area of severe central canal or neural foraminal osseous stenosis.  Paravertebral soft tissues are unremarkable.      Impression:  Acute 2 column compression fracture of L2 with up to 50% vertebral body height loss and 6 mm osseous retropulsion causing mild central canal narrowing.  These findings are in agreement with the critical and emergent findings from the [facility] preliminary report. This report was finalized on 05/06/2022 08:53 by Dr. Sonny Mtz MD.    MRI Thoracic Spine Without Contrast    Result Date: 5/6/2022  Narrative: EXAMINATION: MRI THORACIC SPINE WO CONTRAST-  5/6/2022 10:45 AM CDT  HISTORY: T7 fracture; K52.9-Noninfective gastroenteritis and colitis, unspecified.  Noncontrast MR imaging of the thoracic spine.  Axial, sagittal, and coronal imaging.  High-grade anterior wedge compression of T7 with 75-80% loss of height and prominent thoracic kyphosis.  Minimal marrow edema within the compressed vertebra indicates residual from previous injury or possibly acute injury superimposed on a chronic compression fracture.  Minimal retropulsion of the T7 vertebral body inferior corner. Based on the kyphosis this  results in effacement of the ventral thecal sac and there is no significant spinal canal stenosis or cord compression.  No disc herniation.  Summary: 1. High-grade anterior wedge compression of T7 with mild marrow edema. Acute superimposed on chronic injury suspected. 2. Other thoracic vertebrae are intact.  This report was finalized on 05/06/2022 12:59 by Dr. Chan Pelaez MD.    MRI Lumbar Spine Without Contrast    Result Date: 5/6/2022  Narrative: EXAMINATION: MRI LUMBAR SPINE WO CONTRAST-  5/6/2022 11:37 AM CDT  HISTORY: L2 fracture; K52.9-Noninfective gastroenteritis and colitis, unspecified  Noncontrast MR imaging of the lumbar spine. Axial, sagittal, and coronal sequences.  9 degrees right convex scoliosis. Acute or subacute superior endplate compression of L2 with 50% loss of height. Mild superior corner retropulsion. Marrow edema present.  L1, L3, L4, and L5 are intact.  No posterior element disruption is seen.  There is no disc herniation.  Only mild narrowing of the spinal canal based on the superior corner L2 retropulsion.  Summary: 1. The 50% L2 compression fracture is acute or subacute based on marrow edema. This report was finalized on 05/06/2022 12:53 by Dr. Chan Pelaez MD.    CT Abdomen Pelvis With Contrast    Result Date: 5/6/2022  Narrative: EXAM/TECHNIQUE: CT ABDOMEN PELVIS W CONTRAST-  INDICATION: Generalized malaise, abdominal tenderness on exam  COMPARISON: 6/10/2019  DLP: 191 mGy cm. Automated exposure control was also utilized to decrease patient radiation dose.  FINDINGS:  No acute lung base finding.  No focal liver lesion. Prior cholecystectomy. No biliary ductal dilatation. The pancreas appears normal. Spleen and adrenal glands are unremarkable.  Small RIGHT lower pole renal cyst. 1 cm LEFT lower pole renal lesion is indeterminate by CT criteria but is similar in size compared to studies dating back to 2005, supporting benign proteinaceous cyst. No urolithiasis or hydronephrosis.  Urinary bladder is distended. No focal urinary bladder wall thickening.  Large rectal stool conglomerate. Perirectal fat stranding is present. Normal appendix. No colonic wall thickening or evidence of abnormal colonic distention. No small bowel obstruction or evidence of active small bowel inflammation.  No ascites or free pelvic fluid. No pelvic mass or pelvic collection. Uterus and adnexa appear unremarkable.  Nonaneurysmal abdominal aorta with heavy vascular calcification. No enlarged retroperitoneal, mesenteric, pelvic, or inguinal lymph nodes. Portal vein is slightly diminutive in appearance.  No acute abdominal wall soft tissue abnormality. No aggressive osseous lesion. Lumbar spine including compression fracture of L2 to be described in a separate same-day report.      Impression:  1.  Large rectal stool conglomerate with mild perirectal fat stranding. Findings are concerning for stercoral colitis.  2.  Distended urinary bladder. No hydronephrosis or urinary tract calculi.  3.  L2 compression fracture, described in a separate same-day CT lumbar spine report. This report was finalized on 05/06/2022 08:49 by Dr. Sonny Mtz MD.    XR Chest 1 View    Result Date: 5/6/2022  Narrative: EXAMINATION: XR CHEST 1 VW-  5/6/2022 1:12 PM CDT  HISTORY: hypoxia; K52.9-Noninfective gastroenteritis and colitis, unspecified  1 view chest x-ray.  Comparison is made with January 26, 2022.  Increased interstitial prominence raises question of early edema.  No focal infiltrate to indicate pneumonia.  No pneumothorax or pleural fluid.  Heart size is within normal limits.  The bones are osteopenic and there is a high-grade compression fracture of T7.  Summary: 1. Mild diffuse interstitial prominence. 2. No focal infiltrate or pneumothorax. This report was finalized on 05/06/2022 13:40 by Dr. Chan Pelaez MD.    CT Angiogram Chest    Result Date: 5/6/2022  Narrative: EXAMINATION: CT ANGIOGRAM CHEST-   5/5/2022 11:57 PM CDT   HISTORY: hypoxia, tachycardia, elevated dimer  In order to have a CT radiation dose as low as reasonably achievable Automated Exposure Control was utilized for adjustment of the mA and/or KV according to patient size.  DLP in mGycm= 246.  CT angiogram chest. CT angiography protocol. CT imaging with bolus IV contrast injection. Under concurrent supervision axial, sagittal, coronal, and three-dimensional data sets were constructed.  A preliminary [facility] report was faxed to the Emergency Department immediately after this study was interpreted at 1:49 AM.  Heart size is within normal limits. Coronary artery calcification is present.  Normal-sized thoracic aorta with no aneurysm or dissection.  Symmetric and normally opacified pulmonary arteries. No pulmonary embolism.  Chronic lung changes. Hyperexpansion. Diffuse emphysema. No pneumonia, pneumothorax, or pleural effusion.  Osteopenic bones. Mild chronic appearing L2 superior endplate compression fracture. High-grade anterior wedge compression of T7. Both of these levels have progressed since a CT from January 26, 2022.  Summary: 1. Diffuse chronic lung changes. 2. No pulmonary embolism. 3. T7 and L2 compression fractures are new compared with 4 months ago.            This report was finalized on 05/06/2022 08:44 by Dr. Chan Pelaez MD.    CT Facial Bones Without Contrast    Result Date: 6/3/2022  Narrative: EXAMINATION: CT FACIAL BONES WO CONTRAST-   6/3/2022 10:04 AM CDT  HISTORY: fall left side face  In order to have a CT radiation dose as low as reasonably achievable Automated Exposure Control was utilized for adjustment of the mA and/or KV according to patient size.  DLP in mGycm= 198  CT Scan of the facial bones was performed without intravenous contrast enhancement. Images were acquired in axial plane with subsequent reconstruction in coronal and sagittal planes.  There are no previous similar study for comparison. The correlation made with CT scan of the head  dated 5/5/2022.  There is no evidence of an acute fracture of the facial bones.  The orbits, the maxilla, and the mandible and zygomatic arches are normal and intact. The nasal bone and anterior nasal spine are intact.  There is marked diffuse osteopenia similar to the previous study.  The visualized paranasal sinuses and mastoid air cells are unremarkable.  Temporomandibular joints bilaterally are normal and intact.      Impression: 1. No evidence of facial bone fracture.          This report was finalized on 06/03/2022 10:55 by Dr. Miko Cohen MD.    XR Hip With or Without Pelvis 2 - 3 View Left    Result Date: 5/6/2022  Narrative: EXAMINATION:   XR HIP W OR WO PELVIS 2-3 VIEW LEFT-  5/6/2022 7:22 AM CDT  HISTORY: Patient fell  Single AP view of the left hip is obtained. There is an impacted left hip fracture. This is a limited exam.  IMPRESSION single view the left hip demonstrate impacted left hip fracture This report was finalized on 05/06/2022 07:24 by Dr. Filiberto Franco MD.      ED Course  ED Course as of 06/03/22 1118   Fri Jun 03, 2022   1116 I discussed the imaging results with the patient and daughter.  Head injury precautions given.  Patient does request to dose pain medicine prior to discharge.  Strict return precaution advised.  Daughter and patient voiced understanding.  She will be discharge stable condition. [TK]      ED Course User Index  [TK] Staci Hobbs PA          [facility]    Final diagnoses:   Injury of head, initial encounter   Fall, initial encounter   Left ankle pain, unspecified chronicity          Staci Hobbs PA  06/03/22 1118

## 2022-06-07 DIAGNOSIS — S22.000A COMPRESSION FRACTURE OF BODY OF THORACIC VERTEBRA: Primary | ICD-10-CM

## 2022-06-07 DIAGNOSIS — S32.020D COMPRESSION FRACTURE OF L2 VERTEBRA WITH ROUTINE HEALING, SUBSEQUENT ENCOUNTER: ICD-10-CM

## 2022-06-23 ENCOUNTER — OFFICE VISIT (OUTPATIENT)
Dept: NEUROSURGERY | Facility: CLINIC | Age: 82
End: 2022-06-23

## 2022-06-23 VITALS — BODY MASS INDEX: 18.65 KG/M2 | TEMPERATURE: 97.5 F | HEIGHT: 60 IN | WEIGHT: 95 LBS

## 2022-06-23 DIAGNOSIS — S22.000A COMPRESSION FRACTURE OF BODY OF THORACIC VERTEBRA: Primary | ICD-10-CM

## 2022-06-23 DIAGNOSIS — S32.020D COMPRESSION FRACTURE OF L2 VERTEBRA WITH ROUTINE HEALING, SUBSEQUENT ENCOUNTER: ICD-10-CM

## 2022-06-23 PROCEDURE — 99213 OFFICE O/P EST LOW 20 MIN: CPT | Performed by: NURSE PRACTITIONER

## 2022-06-23 NOTE — PROGRESS NOTES
Chief complaint:   Chief Complaint   Patient presents with   • Follow-up     Pt is here for a follow up. Her daughter came with her. Her daughter thinks she is doing a little better. She still isn't walking.        Subjective     HPI: This is a 81-year-old female patient who we have been following for a T7 and L2 compression fracture.  The patient did have a fall in early May 2022 did have a left hip fracture and did have surgery for the fracture.  At the last appointment it was noted the patient was having a decline in her ambulatory status and was keeping her feet in a plantarflexed posture with limited dorsiflexion.  The patient was not complaining of any lower extremity pain.  She said that she did continue to have back pain but it was severe compared to how she was when she was in the hospital.  She comes in today states that she does feel like she is having more back pain now than what she was having at her last appointment.  She remains in the LSO brace.  She is no longer able to ambulate due to her feet being in a contracted position.  She is not complaining of any leg pain.  Denies any bowel or bladder incontinence.  I was not able to help the patient into a fully standing position due to her feet being so contracted.  She presents in a wheelchair today.    Review of Systems   Musculoskeletal: Positive for arthralgias and back pain.   Neurological: Positive for weakness.   Psychiatric/Behavioral: Negative.         Past Medical History:   Diagnosis Date   • Chronic hyponatremia    • Frequent UTI    • Migraines    • SVT (supraventricular tachycardia) (HCC)      Past Surgical History:   Procedure Laterality Date   • CHOLECYSTECTOMY WITH INTRAOPERATIVE CHOLANGIOGRAM N/A 5/9/2019    Procedure: CHOLECYSTECTOMY LAPAROSCOPIC INTRAOPERATIVE CHOLANGIOGRAM;  Surgeon: Chandan Bravo MD;  Location: [facility];  Service: General   • HIP HEMIARTHROPLASTY Left 5/6/2022    Procedure: HIP HEMIARTHROPLASTY;  Surgeon:  "Barrington Simons MD;  Location: [facility] OR;  Service: Orthopedics;  Laterality: Left;     Family History   Problem Relation Age of Onset   • Stroke Mother    • Anemia Mother    • Heart disease Father    • Heart attack Father    • Colon cancer Father      Social History     Tobacco Use   • Smoking status: Never Smoker   • Smokeless tobacco: Never Used   Vaping Use   • Vaping Use: Never used   Substance Use Topics   • Alcohol use: No   • Drug use: No     (Not in a hospital admission)    Allergies:  Codeine    Objective      Vital Signs  Temp 97.5 °F (36.4 °C)   Ht 152.4 cm (60\")   Wt 43.1 kg (95 lb)   BMI 18.55 kg/m²     Physical Exam  Constitutional:       Appearance: Normal appearance. She is well-developed.   HENT:      Head: Normocephalic.   Eyes:      General: Lids are normal.      Extraocular Movements: EOM normal.      Conjunctiva/sclera: Conjunctivae normal.      Pupils: Pupils are equal, round, and reactive to light.   Cardiovascular:      Rate and Rhythm: Normal rate and regular rhythm.   Pulmonary:      Effort: Pulmonary effort is normal.      Breath sounds: Normal breath sounds.   Musculoskeletal:      Cervical back: Normal range of motion.      Right ankle: Decreased range of motion.      Left ankle: Decreased range of motion.      Comments: Thoracic and lumbar spine pain   Skin:     General: Skin is warm.   Neurological:      Mental Status: She is alert and oriented to person, place, and time.      GCS: GCS eye subscore is 4. GCS verbal subscore is 5. GCS motor subscore is 6.      Cranial Nerves: No cranial nerve deficit.      Sensory: No sensory deficit.      Motor: Weakness present.      Gait: Gait abnormal.      Deep Tendon Reflexes: Reflexes normal.      Reflex Scores:       Tricep reflexes are 2+ on the right side and 2+ on the left side.       Bicep reflexes are 3+ on the right side and 3+ on the left side.       Brachioradialis reflexes are 3+ on the right side and 3+ on the left side.       " Patellar reflexes are 3+ on the right side and 3+ on the left side.     Comments: Unable to ambulate.  Difficulty with standing..  She keeps her feet in a plantarflexed posture with limited dorsiflexion    Elder score    Bilateral upper extremities 1 out of 5  Bilateral lower extremities 2 out of 5   Psychiatric:         Speech: Speech normal.         Behavior: Behavior normal.         Thought Content: Thought content normal.         Neurologic Exam     Mental Status   Oriented to person, place, and time.   Attention: normal. Concentration: normal.   Speech: speech is normal   Level of consciousness: alert  Normal comprehension.     Cranial Nerves     CN II   Visual fields full to confrontation.     CN III, IV, VI   Pupils are equal, round, and reactive to light.  Extraocular motions are normal.     CN V   Facial sensation intact.     CN VII   Facial expression full, symmetric.     CN VIII   CN VIII normal.     CN IX, X   CN IX normal.   CN X normal.     CN XI   CN XI normal.     CN XII   CN XII normal.     Motor Exam   Muscle bulk: normal    Strength   Strength 5/5 except as noted. Left psoas 4 out of 5    Trace movements in left toes    Contracture noted in bilateral ankles causing feet to be in a plantarflexed position.  The left foot is worse than the right as the patient does still have some dorsiflexion and plantarflexion ability in the right     Sensory Exam   Light touch normal.     Gait, Coordination, and Reflexes     Reflexes   Reflexes 2+ except as noted.   Right brachioradialis: 3+  Left brachioradialis: 3+  Right biceps: 3+  Left biceps: 3+  Right triceps: 2+  Left triceps: 2+  Right patellar: 3+  Left patellar: 3+  Right Alves: absent  Left Alves: absentUnable to evaluate patient for clonus       Imaging review: No new imaging        Assessment/Plan: I did discuss this patient with Dr. Davidson.  I am going to repeat the MRI of the thoracic and the lumbar spine.  We may also need to consider  further imaging of the neck and brain she does not have a good explanation for the contracted feet and inability to ambulate.  We will see the patient after the imaging is completed and make further recommendation at that time.  Patient is a nonsmoker  The patient's Body mass index is 18.55 kg/m².. BMI is within normal parameters. No follow-up required.  Advance Care Planning   ACP discussion was held with the patient during this visit. Patient has an advance directive in EMR which is still valid.   STEADI Fall Risk Assessment has not been completed.       Diagnoses and all orders for this visit:    1. Compression fracture of body of thoracic vertebra (HCC) (Primary)  -     MRI Lumbar Spine Without Contrast; Future  -     MRI Thoracic Spine Without Contrast; Future    2. Compression fracture of L2 vertebra with routine healing, subsequent encounter  -     MRI Lumbar Spine Without Contrast; Future  -     MRI Thoracic Spine Without Contrast; Future          I discussed the patients findings and my recommendations with patient    Jesse Rudolph, APRN  06/23/22  13:29 CDT

## 2022-07-28 ENCOUNTER — OFFICE VISIT (OUTPATIENT)
Dept: NEUROSURGERY | Facility: CLINIC | Age: 82
End: 2022-07-28

## 2022-07-28 ENCOUNTER — HOSPITAL ENCOUNTER (OUTPATIENT)
Dept: MRI IMAGING | Facility: HOSPITAL | Age: 82
Discharge: HOME | End: 2022-07-28
Admitting: NURSE PRACTITIONER

## 2022-07-28 VITALS — BODY MASS INDEX: 16.69 KG/M2 | WEIGHT: 85 LBS | HEIGHT: 60 IN

## 2022-07-28 DIAGNOSIS — Z78.9 NONSMOKER: ICD-10-CM

## 2022-07-28 DIAGNOSIS — S32.020D COMPRESSION FRACTURE OF L2 VERTEBRA WITH ROUTINE HEALING, SUBSEQUENT ENCOUNTER: ICD-10-CM

## 2022-07-28 DIAGNOSIS — S22.000A COMPRESSION FRACTURE OF BODY OF THORACIC VERTEBRA: Primary | ICD-10-CM

## 2022-07-28 DIAGNOSIS — S22.000A COMPRESSION FRACTURE OF BODY OF THORACIC VERTEBRA: ICD-10-CM

## 2022-07-28 PROCEDURE — 99213 OFFICE O/P EST LOW 20 MIN: CPT | Performed by: NURSE PRACTITIONER

## 2022-07-28 PROCEDURE — 72146 MRI CHEST SPINE W/O DYE: CPT

## 2022-07-28 NOTE — PATIENT INSTRUCTIONS
Advance Care Planning and Advance Directives     You make decisions on a daily basis - decisions about where you want to live, your career, your home, your life. Perhaps one of the most important decisions you face is your choice for future medical care. Take time to talk with your family and your healthcare team and start planning today.  Advance Care Planning is a process that can help you:  Understand possible future healthcare decisions in light of your own experiences  Reflect on those decision in light of your goals and values  Discuss your decisions with those closest to you and the healthcare professionals that care for you  Make a plan by creating a document that reflects your wishes    Surrogate Decision Maker  In the event of a medical emergency, which has left you unable to communicate or to make your own decisions, you would need someone to make decisions for you.  It is important to discuss your preferences for medical treatment with this person while you are in good health.     Qualities of a surrogate decision maker:  Willing to take on this role and responsibility  Knows what you want for future medical care  Willing to follow your wishes even if they don't agree with them  Able to make difficult medical decisions under stressful circumstances    Advance Directives  These are legal documents you can create that will guide your healthcare team and decision maker(s) when needed. These documents can be stored in the electronic medical record.    Living Will - a legal document to guide your care if you have a terminal condition or a serious illness and are unable to communicate. States vary by statute in document names/types, but most forms may include one or more of the following:        -  Directions regarding life-prolonging treatments        -  Directions regarding artificially provided nutrition/hydration        -  Choosing a healthcare decision maker        -  Direction regarding organ/tissue  donation    Durable Power of  for Healthcare - this document names an -in-fact to make medical decisions for you, but it may also allow this person to make personal and financial decisions for you. Please seek the advice of an  if you need this type of document.    **Advance Directives are not required and no one may discriminate against you if you do not sign one.    Medical Orders  Many states allow specific forms/orders signed by your physician to record your wishes for medical treatment in your current state of health. This form, signed in personal communication with your physician, addresses resuscitation and other medical interventions that you may or may not want.      For more information or to schedule a time with a [facility] Advance Care Planning Facilitator contact: [facility] or call [phone] and someone will contact you directly.High-Protein and High-Calorie Diet  Eating high-protein and high-calorie foods can help you to gain weight, heal after an injury, and recover after an illness or surgery. The specific amount of daily protein and calories you need depends on:  Your body weight.  The reason this diet is recommended for you.  What is my plan?  Generally, a high-protein, high-calorie diet involves:  Eating 250-500 extra calories each day.  Making sure that you get enough of your daily calories from protein. Ask your health care provider how many of your calories should come from protein.  Talk with a health care provider, such as a diet and nutrition specialist (dietitian), about how much protein and how many calories you need each day. Follow the diet as directed by your health care provider.  What are tips for following this plan?    Preparing meals  Add whole milk, half-and-half, or heavy cream to cereal, pudding, soup, or hot cocoa.  Add whole milk to instant breakfast drinks.  Add peanut butter to oatmeal or smoothies.  Add powdered milk to baked  goods, smoothies, or milkshakes.  Add powdered milk, cream, or butter to mashed potatoes.  Add cheese to cooked vegetables.  Make whole-milk yogurt parfaits. Top them with granola, fruit, or nuts.  Add cottage cheese to your fruit.  Add avocado, cheese, or both to sandwiches or salads.  Add meat, poultry, or seafood to rice, pasta, casseroles, salads, and soups.  Use mayonnaise when making egg salad, chicken salad, or tuna salad.  Use peanut butter as a dip for vegetables or as a topping for pretzels, celery, or crackers.  Add beans to casseroles, dips, and spreads.  Add pureed beans to sauces and soups.  Replace calorie-free drinks with calorie-containing drinks, such as milk and fruit juice.  Replace water with milk or heavy cream when making foods such as oatmeal, pudding, or cocoa.  General instructions  Ask your health care provider if you should take a nutritional supplement.  Try to eat six small meals each day instead of three large meals.  Eat a balanced diet. In each meal, include one food that is high in protein.  Keep nutritious snacks available, such as nuts, trail mixes, dried fruit, and yogurt.  If you have kidney disease or diabetes, talk with your health care provider about how much protein is safe for you. Too much protein may put extra stress on your kidneys.  Drink your calories. Choose high-calorie drinks and have them after your meals.  What high-protein foods should I eat?    Vegetables  Soybeans. Peas.  Grains  Quinoa. Bulgur wheat.  Meats and other proteins  Beef, pork, and poultry. Fish and seafood. Eggs. Tofu. Textured vegetable protein (TVP). Peanut butter. Nuts and seeds. Dried beans. Protein powders.  Dairy  Whole milk. Whole-milk yogurt. Powdered milk. Cheese. Cottage Cheese. Eggnog.  Beverages  High-protein supplement drinks. Soy milk.  Other foods  Protein bars.  The items listed above may not be a complete list of high-protein foods and beverages. Contact a dietitian for more  options.  What high-calorie foods should I eat?  Fruits  Dried fruit. Fruit leather. Canned fruit in syrup. Fruit juice. Avocado.  Vegetables  Vegetables cooked in oil or butter. Fried potatoes.  Grains  Pasta. Quick breads. Muffins. Pancakes. Ready-to-eat cereal.  Meats and other proteins  Peanut butter. Nuts and seeds.  Dairy  Heavy cream. Whipped cream. Cream cheese. Sour cream. Ice cream. Custard. Pudding.  Beverages  Meal-replacement beverages. Nutrition shakes. Fruit juice. Sugar-sweetened soft drinks.  Seasonings and condiments  Salad dressing. Mayonnaise. Burt sauce. Fruit preserves or jelly. Honey. Syrup.  Sweets and desserts  Cake. Cookies. Pie. Pastries. Candy bars. Chocolate.  Fats and oils  Butter or margarine. Oil. Gravy.  Other foods  Meal-replacement bars.  The items listed above may not be a complete list of high-calorie foods and beverages. Contact a dietitian for more options.  Summary  A high-protein, high-calorie diet can help you gain weight or heal faster after an injury, illness, or surgery.  To increase your protein and calories, add ingredients such as whole milk, peanut butter, cheese, beans, meat, or seafood to meal items.  To get enough extra calories each day, include high-calorie foods and beverages at each meal.  Adding a high-calorie drink or shake can be an easy way to help you get enough calories each day. Talk with your healthcare provider or dietitian about the best options for you.  This information is not intended to replace advice given to you by your health care provider. Make sure you discuss any questions you have with your health care provider.  Document Revised: 11/30/2018 Document Reviewed: 10/30/2018  [employer] Patient Education © 2021 [employer]

## 2022-07-28 NOTE — PROGRESS NOTES
"    Chief complaint:   Chief Complaint   Patient presents with   • Back Pain     Pt here for F/L/E: Pt states her back still hurts. Pt states when she goes to turn it hurts or when she is sitting up for long periods at a time. Pt states she has L leg pain that goes down to her foot and she has finished her therapy but pt states it was unsuccessful.         Subjective     HPI: This is an 81-year-old female patient who we have been following for compression fractures.  She did have a fall in May 2022 and did sustain a left hip fracture and did have surgery from the fracture.  The patient has had progressive decline in her ambulation.  Currently she does keep her feet in a plantarflexed posture and does have limited dorsiflexion.  She is to the point where she is nonambulatory at this time.  She has been complaining of back pain.  We did send her for repeat imaging of the thoracic and lumbar spine however the thoracic MRI was only completed today.  They are going to work and get the patient's MRI of the lumbar spine completed.  The patient is accompanied by family today and she states that she did have a fall at the nursing facility and does have some increasing back pain.  She says she notices the pain more whenever she is turning over in bed.  She is not complaining of any pain radiating into her legs.  Denies any bowel or bladder incontinence.  She remains in a wheelchair    Review of Systems   Musculoskeletal: Positive for back pain and gait problem.   Neurological: Positive for weakness. Negative for numbness.   Psychiatric/Behavioral: Negative.          Objective      Vital Signs  Ht 152.4 cm (60\")   Wt 38.6 kg (85 lb)   BMI 16.60 kg/m²     Physical Exam  Constitutional:       Appearance: Normal appearance. She is well-developed.   HENT:      Head: Normocephalic.   Eyes:      General: Lids are normal.      Extraocular Movements: EOM normal.      Conjunctiva/sclera: Conjunctivae normal.      Pupils: Pupils are " equal, round, and reactive to light.   Cardiovascular:      Rate and Rhythm: Normal rate and regular rhythm.   Pulmonary:      Effort: Pulmonary effort is normal.      Breath sounds: Normal breath sounds.   Musculoskeletal:      Cervical back: Normal range of motion.      Right ankle: Decreased range of motion.      Left ankle: Decreased range of motion.      Comments: Thoracic and lumbar spine pain   Skin:     General: Skin is warm.   Neurological:      Mental Status: She is alert and oriented to person, place, and time.      GCS: GCS eye subscore is 4. GCS verbal subscore is 5. GCS motor subscore is 6.      Cranial Nerves: No cranial nerve deficit.      Sensory: No sensory deficit.      Motor: Weakness present.      Gait: Gait abnormal.      Deep Tendon Reflexes: Reflexes normal.      Reflex Scores:       Tricep reflexes are 2+ on the right side and 2+ on the left side.       Bicep reflexes are 3+ on the right side and 3+ on the left side.       Brachioradialis reflexes are 3+ on the right side and 3+ on the left side.       Patellar reflexes are 3+ on the right side and 3+ on the left side.     Comments: Unable to ambulate.  Difficulty with standing..  She keeps her feet in a plantarflexed posture with limited dorsiflexion    Elder score    Bilateral upper extremities 1 out of 5  Bilateral lower extremities 2 out of 5   Psychiatric:         Speech: Speech normal.         Behavior: Behavior normal.         Thought Content: Thought content normal.         Neurologic Exam     Mental Status   Oriented to person, place, and time.   Attention: normal. Concentration: normal.   Speech: speech is normal   Level of consciousness: alert  Normal comprehension.     Cranial Nerves     CN II   Visual fields full to confrontation.     CN III, IV, VI   Pupils are equal, round, and reactive to light.  Extraocular motions are normal.     CN V   Facial sensation intact.     CN VII   Facial expression full, symmetric.     CN VIII    CN VIII normal.     CN IX, X   CN IX normal.   CN X normal.     CN XI   CN XI normal.     CN XII   CN XII normal.     Motor Exam   Muscle bulk: normal    Strength   Strength 5/5 except as noted. Left psoas 4 out of 5    Trace movements in left toes    Contracture noted in bilateral ankles causing feet to be in a plantarflexed position.  The left foot is worse than the right as the patient does still have some dorsiflexion and plantarflexion ability in the right     Sensory Exam   Light touch normal.     Gait, Coordination, and Reflexes     Reflexes   Reflexes 2+ except as noted.   Right brachioradialis: 3+  Left brachioradialis: 3+  Right biceps: 3+  Left biceps: 3+  Right triceps: 2+  Left triceps: 2+  Right patellar: 3+  Left patellar: 3+  Right Alves: absent  Left Alves: absentUnable to evaluate patient for clonus        Imaging review: MRI of the thoracic spine shows well-healed T7 compression fracture.  The fracture at L2 is still broken.  There are additional fractures noted at L3 and T12.        Assessment/Plan: The patient does have additional fractures noted on the thoracic MRI.  She is going to go for MRI of the lumbar spine.  We will plan to see her back in follow-up after the imaging is completed and make further recommendations at that time.  Her questions and concerns were addressed    Patient is a nonsmoker  The patient's Body mass index is 16.6 kg/m².. BMI is below normal parameters. Recommendations include: Education material  Advance Care Planning   ACP discussion was held with the patient during this visit. Patient has an advance directive in EMR which is still valid.    STEADI Fall Risk Assessment has not been completed.     Diagnoses and all orders for this visit:    1. Compression fracture of body of thoracic vertebra (HCC) (Primary)    2. Compression fracture of L2 vertebra with routine healing, subsequent encounter    3. Nonsmoker    4. Body mass index (BMI) 19.9 or less, adult        I  discussed the patients findings and my recommendations with patient  Jesse Rudolph, APRN  07/28/22  09:51 CDT

## 2022-09-08 ENCOUNTER — APPOINTMENT (OUTPATIENT)
Dept: MRI IMAGING | Facility: HOSPITAL | Age: 82
End: 2022-09-08

## 2022-09-15 ENCOUNTER — HOSPITAL ENCOUNTER (OUTPATIENT)
Dept: MRI IMAGING | Facility: HOSPITAL | Age: 82
Discharge: HOME | End: 2022-09-15
Admitting: NURSE PRACTITIONER

## 2022-09-15 ENCOUNTER — OFFICE VISIT (OUTPATIENT)
Dept: NEUROSURGERY | Facility: CLINIC | Age: 82
End: 2022-09-15

## 2022-09-15 VITALS — WEIGHT: 85 LBS | HEIGHT: 60 IN | BODY MASS INDEX: 16.69 KG/M2

## 2022-09-15 DIAGNOSIS — Z78.9 NONSMOKER: ICD-10-CM

## 2022-09-15 DIAGNOSIS — S32.020D COMPRESSION FRACTURE OF L2 VERTEBRA WITH ROUTINE HEALING, SUBSEQUENT ENCOUNTER: ICD-10-CM

## 2022-09-15 DIAGNOSIS — S32.030D CLOSED COMPRESSION FRACTURE OF L3 LUMBAR VERTEBRA WITH ROUTINE HEALING, SUBSEQUENT ENCOUNTER: ICD-10-CM

## 2022-09-15 DIAGNOSIS — S22.000A COMPRESSION FRACTURE OF BODY OF THORACIC VERTEBRA: Primary | ICD-10-CM

## 2022-09-15 DIAGNOSIS — S22.000A COMPRESSION FRACTURE OF BODY OF THORACIC VERTEBRA: ICD-10-CM

## 2022-09-15 PROBLEM — S32.030A CLOSED COMPRESSION FRACTURE OF THIRD LUMBAR VERTEBRA: Status: ACTIVE | Noted: 2022-05-06

## 2022-09-15 PROCEDURE — 99213 OFFICE O/P EST LOW 20 MIN: CPT | Performed by: NURSE PRACTITIONER

## 2022-09-15 PROCEDURE — 72148 MRI LUMBAR SPINE W/O DYE: CPT

## 2022-09-15 NOTE — PROGRESS NOTES
Chief complaint:   Chief Complaint   Patient presents with   • Follow-up     Pt is here for a follow up. She also is here for MRI results. She's still doing therapy.        Subjective     HPI: This is a 81-year-old female patient who we have been upon for compression fractures.  She did have a fall in May 2022 and sustained a left hip fracture and did have surgery on the hip fracture.  She did sustain compression fractures but she was given time to recover from the hip fracture.  She has not ambulated since her hip fracture.  She has been dealing with back pain.  We did repeat imaging and she is here in follow-up today.  The patient says that her pain is under good control with Percocet.  There are some days according to the daughter that she does not even require pain medication.  She is not in a brace.  She presents today in a wheelchair.  She is either in bed or in a chair in her room at the nursing facility.    Review of Systems   Musculoskeletal: Positive for back pain.   Neurological: Negative.    Psychiatric/Behavioral: Negative.         Past Medical History:   Diagnosis Date   • Chronic hyponatremia    • Frequent UTI    • Low back pain    • Migraines    • SVT (supraventricular tachycardia) (HCC)      Past Surgical History:   Procedure Laterality Date   • CHOLECYSTECTOMY WITH INTRAOPERATIVE CHOLANGIOGRAM N/A 5/9/2019    Procedure: CHOLECYSTECTOMY LAPAROSCOPIC INTRAOPERATIVE CHOLANGIOGRAM;  Surgeon: Chandan Bravo MD;  Location: [facility] OR;  Service: General   • HIP HEMIARTHROPLASTY Left 5/6/2022    Procedure: HIP HEMIARTHROPLASTY;  Surgeon: Barrington Simons MD;  Location: [facility] OR;  Service: Orthopedics;  Laterality: Left;     Family History   Problem Relation Age of Onset   • Stroke Mother    • Anemia Mother    • Heart disease Father    • Heart attack Father    • Colon cancer Father      Social History     Tobacco Use   • Smoking status: Never Smoker   • Smokeless tobacco: Never Used   Vaping Use   •  "Vaping Use: Never used   Substance Use Topics   • Alcohol use: No   • Drug use: No     (Not in a hospital admission)    Allergies:  Codeine    Objective      Vital Signs  Ht 152.4 cm (60\")   Wt 38.6 kg (85 lb)   BMI 16.60 kg/m²     Physical Exam  Constitutional:       Appearance: Normal appearance. She is well-developed.   HENT:      Head: Normocephalic.   Eyes:      General: Lids are normal.      Extraocular Movements: EOM normal.      Conjunctiva/sclera: Conjunctivae normal.      Pupils: Pupils are equal, round, and reactive to light.   Cardiovascular:      Rate and Rhythm: Normal rate and regular rhythm.   Pulmonary:      Effort: Pulmonary effort is normal.      Breath sounds: Normal breath sounds.   Musculoskeletal:         General: Normal range of motion.      Cervical back: Normal range of motion.      Comments: Back pain   Skin:     General: Skin is warm.   Neurological:      Mental Status: She is alert and oriented to person, place, and time.      GCS: GCS eye subscore is 4. GCS verbal subscore is 5. GCS motor subscore is 6.      Cranial Nerves: No cranial nerve deficit.      Sensory: No sensory deficit.      Motor: Weakness present.      Gait: Gait is intact.      Deep Tendon Reflexes: Strength normal and reflexes are normal and symmetric. Reflexes normal.      Reflex Scores:       Tricep reflexes are 2+ on the right side and 2+ on the left side.     Comments: Unable to ambulate.  Difficulty with standing..  She keeps her feet in a plantarflexed posture with limited dorsiflexion    Elder score    Bilateral upper extremities 1 out of 5  Bilateral lower extremities 2 out of 5   Psychiatric:         Speech: Speech normal.         Behavior: Behavior normal.         Thought Content: Thought content normal.         Neurologic Exam     Mental Status   Oriented to person, place, and time.   Attention: normal. Concentration: normal.   Speech: speech is normal   Level of consciousness: alert  Normal " comprehension.     Cranial Nerves     CN II   Visual fields full to confrontation.     CN III, IV, VI   Pupils are equal, round, and reactive to light.  Extraocular motions are normal.     CN V   Facial sensation intact.     CN VII   Facial expression full, symmetric.     CN VIII   CN VIII normal.     CN IX, X   CN IX normal.   CN X normal.     CN XI   CN XI normal.     CN XII   CN XII normal.     Motor Exam   Muscle bulk: normal    Strength   Strength 5/5 throughout. Left psoas 4 out of 5    Trace movements in left toes    Contracture noted in bilateral ankles causing feet to be in a plantarflexed position.  The left foot is worse than the right as the patient does still have some dorsiflexion and plantarflexion ability in the right     Sensory Exam   Light touch normal.     Gait, Coordination, and Reflexes     Gait  Gait: normal    Reflexes   Reflexes 2+ except as noted.   Right triceps: 2+  Left triceps: 2+  Right Alves: absent  Left Alves: absentUnable to evaluate patient for clonus       Imaging review: MRI of the lumbar spine that was done on September 15, 2022 here at [facility] does show compression deformities of L3, L2 and T12 that are healing.  There is retropulsion of L2 however no significant central canal or foraminal stenosis noted.        Assessment/Plan: The patient is making progress with just pain medication in regards to her pain from the compression fractures.  At this point the patient and the daughter do not wish to proceed with any surgical intervention.  They were told that if the pain did get worse to give us a call and we will be happy to see her back in the office.      Patient is a nonsmoker  The patient's Body mass index is 16.6 kg/m².. BMI is above normal parameters. Recommendations include: educational material and nutrition counseling  Advance Care Planning   ACP discussion was held with the patient during this visit. Patient does not have an advance directive, information  provided.  KIN Fall Risk Assessment has not been completed.       Diagnoses and all orders for this visit:    1. Compression fracture of body of thoracic vertebra (HCC) (Primary)    2. Compression fracture of L2 vertebra with routine healing, subsequent encounter    3. Closed compression fracture of L3 lumbar vertebra with routine healing, subsequent encounter    4. Body mass index (BMI) less than 16.5    5. Nonsmoker          I discussed the patients findings and my recommendations with patient    Jesse Rudolph, APRN  09/15/22  10:26 CDT

## 2022-09-17 ENCOUNTER — HOSPITAL ENCOUNTER (EMERGENCY)
Facility: HOSPITAL | Age: 82
Discharge: HOME | End: 2022-09-17
Attending: STUDENT IN AN ORGANIZED HEALTH CARE EDUCATION/TRAINING PROGRAM | Admitting: STUDENT IN AN ORGANIZED HEALTH CARE EDUCATION/TRAINING PROGRAM

## 2022-09-17 VITALS
HEIGHT: 60 IN | RESPIRATION RATE: 18 BRPM | BODY MASS INDEX: 17.28 KG/M2 | HEART RATE: 78 BPM | DIASTOLIC BLOOD PRESSURE: 60 MMHG | SYSTOLIC BLOOD PRESSURE: 116 MMHG | WEIGHT: 88 LBS | TEMPERATURE: 98 F | OXYGEN SATURATION: 90 %

## 2022-09-17 DIAGNOSIS — I47.1 SUPRAVENTRICULAR TACHYCARDIA: Primary | ICD-10-CM

## 2022-09-17 LAB
ANION GAP SERPL CALCULATED.3IONS-SCNC: 10 MMOL/L (ref 5–15)
BASOPHILS # BLD AUTO: 0.07 10*3/MM3 (ref 0–0.2)
BASOPHILS NFR BLD AUTO: 0.7 % (ref 0–1.5)
BUN SERPL-MCNC: 19 MG/DL (ref 8–23)
BUN/CREAT SERPL: 24.7 (ref 7–25)
CALCIUM SPEC-SCNC: 9.5 MG/DL (ref 8.6–10.5)
CHLORIDE SERPL-SCNC: 105 MMOL/L (ref 98–107)
CO2 SERPL-SCNC: 22 MMOL/L (ref 22–29)
CREAT SERPL-MCNC: 0.77 MG/DL (ref 0.57–1)
DEPRECATED RDW RBC AUTO: 56.1 FL (ref 37–54)
EGFRCR SERPLBLD CKD-EPI 2021: 77.6 ML/MIN/1.73
EOSINOPHIL # BLD AUTO: 0.24 10*3/MM3 (ref 0–0.4)
EOSINOPHIL NFR BLD AUTO: 2.4 % (ref 0.3–6.2)
ERYTHROCYTE [DISTWIDTH] IN BLOOD BY AUTOMATED COUNT: 15.8 % (ref 12.3–15.4)
GLUCOSE SERPL-MCNC: 97 MG/DL (ref 65–99)
HCT VFR BLD AUTO: 42.3 % (ref 34–46.6)
HGB BLD-MCNC: 14.2 G/DL (ref 12–15.9)
IMM GRANULOCYTES # BLD AUTO: 0.08 10*3/MM3 (ref 0–0.05)
IMM GRANULOCYTES NFR BLD AUTO: 0.8 % (ref 0–0.5)
LYMPHOCYTES # BLD AUTO: 3.14 10*3/MM3 (ref 0.7–3.1)
LYMPHOCYTES NFR BLD AUTO: 31.8 % (ref 19.6–45.3)
MAGNESIUM SERPL-MCNC: 1.8 MG/DL (ref 1.6–2.4)
MCH RBC QN AUTO: 32.6 PG (ref 26.6–33)
MCHC RBC AUTO-ENTMCNC: 33.6 G/DL (ref 31.5–35.7)
MCV RBC AUTO: 97.2 FL (ref 79–97)
MONOCYTES # BLD AUTO: 1.01 10*3/MM3 (ref 0.1–0.9)
MONOCYTES NFR BLD AUTO: 10.2 % (ref 5–12)
NEUTROPHILS NFR BLD AUTO: 5.33 10*3/MM3 (ref 1.7–7)
NEUTROPHILS NFR BLD AUTO: 54.1 % (ref 42.7–76)
NRBC BLD AUTO-RTO: 0 /100 WBC (ref 0–0.2)
PLATELET # BLD AUTO: 358 10*3/MM3 (ref 140–450)
PMV BLD AUTO: 9.7 FL (ref 6–12)
POTASSIUM SERPL-SCNC: 4.7 MMOL/L (ref 3.5–5.2)
RBC # BLD AUTO: 4.35 10*6/MM3 (ref 3.77–5.28)
SODIUM SERPL-SCNC: 137 MMOL/L (ref 136–145)
WBC NRBC COR # BLD: 9.87 10*3/MM3 (ref 3.4–10.8)

## 2022-09-17 PROCEDURE — 25010000002 ADENOSINE PER 6 MG: Performed by: STUDENT IN AN ORGANIZED HEALTH CARE EDUCATION/TRAINING PROGRAM

## 2022-09-17 PROCEDURE — 83735 ASSAY OF MAGNESIUM: CPT | Performed by: STUDENT IN AN ORGANIZED HEALTH CARE EDUCATION/TRAINING PROGRAM

## 2022-09-17 PROCEDURE — 93005 ELECTROCARDIOGRAM TRACING: CPT | Performed by: STUDENT IN AN ORGANIZED HEALTH CARE EDUCATION/TRAINING PROGRAM

## 2022-09-17 PROCEDURE — 93010 ELECTROCARDIOGRAM REPORT: CPT | Performed by: INTERNAL MEDICINE

## 2022-09-17 PROCEDURE — 96374 THER/PROPH/DIAG INJ IV PUSH: CPT

## 2022-09-17 PROCEDURE — 99283 EMERGENCY DEPT VISIT LOW MDM: CPT

## 2022-09-17 PROCEDURE — 85025 COMPLETE CBC W/AUTO DIFF WBC: CPT | Performed by: STUDENT IN AN ORGANIZED HEALTH CARE EDUCATION/TRAINING PROGRAM

## 2022-09-17 PROCEDURE — 36415 COLL VENOUS BLD VENIPUNCTURE: CPT

## 2022-09-17 PROCEDURE — 80048 BASIC METABOLIC PNL TOTAL CA: CPT | Performed by: STUDENT IN AN ORGANIZED HEALTH CARE EDUCATION/TRAINING PROGRAM

## 2022-09-17 RX ORDER — ADENOSINE 3 MG/ML
6 INJECTION, SOLUTION INTRAVENOUS ONCE
Status: COMPLETED | OUTPATIENT
Start: 2022-09-17 | End: 2022-09-17

## 2022-09-17 RX ADMIN — ADENOSINE 6 MG: 3 INJECTION INTRAVENOUS at 07:15

## 2022-09-17 RX ADMIN — SODIUM CHLORIDE, POTASSIUM CHLORIDE, SODIUM LACTATE AND CALCIUM CHLORIDE 1000 ML: 600; 310; 30; 20 INJECTION, SOLUTION INTRAVENOUS at 07:07

## 2022-09-17 NOTE — DISCHARGE INSTRUCTIONS
Please return if you have chest pain, new concerning symptoms, or if you develop other emergent concerns. Otherwise please follow-up with your primary care doctor regarding your ER visit today.

## 2022-09-17 NOTE — ED PROVIDER NOTES
Subjective   History of Present Illness  Patient presents with symptoms of racing heart.  Started yesterday but stopped after dinner and she felt better and went to bed.  Noticed it again this morning.  She has a history of SVT which has been resolved in the past with adenosine.  She does not have any other symptoms; no chest pain, lightheadedness, weakness, trouble breathing.  She confirms that she is a DNR but she is not sure whether she would want intubation or other extreme measures.    Review of Systems   Constitutional: Negative for chills and fever.   HENT: Negative for congestion and sore throat.    Eyes: Negative for pain and redness.   Respiratory: Negative for cough and shortness of breath.    Cardiovascular: Positive for palpitations. Negative for chest pain.   Gastrointestinal: Negative for abdominal pain and vomiting.   Genitourinary: Negative for difficulty urinating and dysuria.   Musculoskeletal: Negative for gait problem and joint swelling.   Skin: Negative for rash and wound.   Neurological: Negative for syncope and light-headedness.       Past Medical History:   Diagnosis Date   • Chronic hyponatremia    • Frequent UTI    • Low back pain    • Migraines    • SVT (supraventricular tachycardia) (HCC)        Allergies   Allergen Reactions   • Codeine Anaphylaxis       Past Surgical History:   Procedure Laterality Date   • CHOLECYSTECTOMY WITH INTRAOPERATIVE CHOLANGIOGRAM N/A 5/9/2019    Procedure: CHOLECYSTECTOMY LAPAROSCOPIC INTRAOPERATIVE CHOLANGIOGRAM;  Surgeon: Chandan Bravo MD;  Location: [facility] OR;  Service: General   • HIP HEMIARTHROPLASTY Left 5/6/2022    Procedure: HIP HEMIARTHROPLASTY;  Surgeon: Barrington Simons MD;  Location: [facility] OR;  Service: Orthopedics;  Laterality: Left;       Family History   Problem Relation Age of Onset   • Stroke Mother    • Anemia Mother    • Heart disease Father    • Heart attack Father    • Colon cancer Father        Social History     Socioeconomic History    • Marital status:    Tobacco Use   • Smoking status: Never Smoker   • Smokeless tobacco: Never Used   Vaping Use   • Vaping Use: Never used   Substance and Sexual Activity   • Alcohol use: No   • Drug use: No   • Sexual activity: Defer           Objective   Physical Exam  Vitals reviewed.   Constitutional:       General: She is not in acute distress.  HENT:      Head: Normocephalic and atraumatic.   Eyes:      Extraocular Movements: Extraocular movements intact.      Conjunctiva/sclera: Conjunctivae normal.   Cardiovascular:      Rate and Rhythm: Regular rhythm. Tachycardia present.      Pulses: Normal pulses.      Heart sounds: Normal heart sounds.   Pulmonary:      Effort: Pulmonary effort is normal. No respiratory distress.   Abdominal:      General: Abdomen is flat. There is no distension.   Musculoskeletal:      Cervical back: Normal range of motion and neck supple.      Right lower leg: No edema.      Left lower leg: No edema.   Skin:     General: Skin is warm and dry.   Neurological:      General: No focal deficit present.      Mental Status: She is alert. Mental status is at baseline.   Psychiatric:         Behavior: Behavior normal.         Thought Content: Thought content normal.         Chemical Cardioversion    Date/Time: 9/17/2022 7:19 AM  Performed by: Lefty Borja MD  Authorized by: Lefty Borja MD     Consent:     Consent obtained:  Verbal    Consent given by:  Patient    Risks discussed:  Death and induced arrhythmia  Pre-procedure details:     Cardioversion basis:  Urgent    Rhythm:  Supraventricular tachycardia  Attempt one:     Cardioversion medication:  Adenosine 6mg      Medication outcome:  Conversion to normal sinus rhythm  Post-procedure details:     Patient status:  Awake    Patient tolerance of procedure:  Tolerated well, no immediate complications               ED Course                                           CHINYERE Hung is a 81 y.o. female  with PMH above who presents to the Emergency Department with tachyardia.  Very regular rate.  No discernible P waves on bedside EKG which was performed at normal rate as well as double speed.  EKGs did not show any focal ischemic changes or irregular rhythm.  I monitored her telemetry and her heart rate stayed between 149 and 151.  Her presenting blood pressure was 90/64 with a MAP of 72.  She did not have any hypotension in route with EMS.  She had an echo a year ago that confirmed a normal ejection fraction. She does not have any physical exam findings concerning for CHF. Vagal maneuvers attempted x2 unsuccessfully. Risk/benefit of adenosine discussed, pt opted for adenosine. Converted to NSR with adenosine with no issues. Repeat ECG EKG reviewed by me; shows sinus rhythm, no focal ischemic changes, no arrhythmia. Labs showed no acute abnormality. Pt stable on repeat eval, no further arrhythmia. No further workup indicated at this time. Patient is stable and appropriate for discharge. Patient received strict return precautions per discharge instructions and was instructed to follow-up with their primary care provider regarding their ER visit.      Final diagnosis: SVT    All questions answered. Patient/family was understanding and in agreement with today's assessment and plan. The patient was monitored during their stay in the ED and dispositioned without acute event.    Electronically signed by:  Lefty Borja MD 9/17/2022 08:27 CDT      Note: Dragon medical dictation software was used in the creation of this note.        Final diagnoses:   Supraventricular tachycardia (HCC)       ED Disposition  ED Disposition     ED Disposition   Discharge    Condition   Stable    Comment   --             Garth Amezcua, APRN  [address] [facility] [id number] [phone]               Medication List      No changes were made to your prescriptions during this visit.          Lefty Borja,  MD  09/17/22 0827

## 2022-09-17 NOTE — ED NOTES
Attempted to call [facility] four times ([phone], [phone], [phone], [phone]). Pt daughter at bedside also states she attempted to call but nobody has answered. EMS called by Jessie at this time to assist patient back.

## 2022-09-18 LAB
QT INTERVAL: 286 MS
QTC INTERVAL: 503 MS

## 2022-09-21 ENCOUNTER — HOSPITAL ENCOUNTER (EMERGENCY)
Facility: HOSPITAL | Age: 82
Discharge: HOME | End: 2022-09-21
Attending: EMERGENCY MEDICINE | Admitting: EMERGENCY MEDICINE

## 2022-09-21 VITALS
HEART RATE: 74 BPM | SYSTOLIC BLOOD PRESSURE: 117 MMHG | TEMPERATURE: 98.5 F | DIASTOLIC BLOOD PRESSURE: 70 MMHG | OXYGEN SATURATION: 96 % | RESPIRATION RATE: 28 BRPM | WEIGHT: 88 LBS | BODY MASS INDEX: 17.28 KG/M2 | HEIGHT: 60 IN

## 2022-09-21 DIAGNOSIS — I47.1 PAROXYSMAL SUPRAVENTRICULAR TACHYCARDIA: Primary | ICD-10-CM

## 2022-09-21 LAB
ANION GAP SERPL CALCULATED.3IONS-SCNC: 11 MMOL/L (ref 5–15)
BASOPHILS # BLD AUTO: 0.07 10*3/MM3 (ref 0–0.2)
BASOPHILS NFR BLD AUTO: 0.8 % (ref 0–1.5)
BUN SERPL-MCNC: 28 MG/DL (ref 8–23)
BUN/CREAT SERPL: 31.1 (ref 7–25)
CALCIUM SPEC-SCNC: 9.1 MG/DL (ref 8.6–10.5)
CHLORIDE SERPL-SCNC: 104 MMOL/L (ref 98–107)
CO2 SERPL-SCNC: 23 MMOL/L (ref 22–29)
CREAT SERPL-MCNC: 0.9 MG/DL (ref 0.57–1)
DEPRECATED RDW RBC AUTO: 58.7 FL (ref 37–54)
EGFRCR SERPLBLD CKD-EPI 2021: 64.4 ML/MIN/1.73
EOSINOPHIL # BLD AUTO: 0.25 10*3/MM3 (ref 0–0.4)
EOSINOPHIL NFR BLD AUTO: 3 % (ref 0.3–6.2)
ERYTHROCYTE [DISTWIDTH] IN BLOOD BY AUTOMATED COUNT: 15.9 % (ref 12.3–15.4)
GLUCOSE SERPL-MCNC: 108 MG/DL (ref 65–99)
HCT VFR BLD AUTO: 44.1 % (ref 34–46.6)
HGB BLD-MCNC: 14.1 G/DL (ref 12–15.9)
HOLD SPECIMEN: NORMAL
HOLD SPECIMEN: NORMAL
IMM GRANULOCYTES # BLD AUTO: 0.06 10*3/MM3 (ref 0–0.05)
IMM GRANULOCYTES NFR BLD AUTO: 0.7 % (ref 0–0.5)
LYMPHOCYTES # BLD AUTO: 2.42 10*3/MM3 (ref 0.7–3.1)
LYMPHOCYTES NFR BLD AUTO: 28.7 % (ref 19.6–45.3)
MCH RBC QN AUTO: 32 PG (ref 26.6–33)
MCHC RBC AUTO-ENTMCNC: 32 G/DL (ref 31.5–35.7)
MCV RBC AUTO: 100 FL (ref 79–97)
MONOCYTES # BLD AUTO: 0.91 10*3/MM3 (ref 0.1–0.9)
MONOCYTES NFR BLD AUTO: 10.8 % (ref 5–12)
NEUTROPHILS NFR BLD AUTO: 4.72 10*3/MM3 (ref 1.7–7)
NEUTROPHILS NFR BLD AUTO: 56 % (ref 42.7–76)
NRBC BLD AUTO-RTO: 0 /100 WBC (ref 0–0.2)
PLATELET # BLD AUTO: 354 10*3/MM3 (ref 140–450)
PMV BLD AUTO: 9.6 FL (ref 6–12)
POTASSIUM SERPL-SCNC: 4.3 MMOL/L (ref 3.5–5.2)
RBC # BLD AUTO: 4.41 10*6/MM3 (ref 3.77–5.28)
SODIUM SERPL-SCNC: 138 MMOL/L (ref 136–145)
TROPONIN T SERPL-MCNC: 0.02 NG/ML (ref 0–0.03)
WBC NRBC COR # BLD: 8.43 10*3/MM3 (ref 3.4–10.8)
WHOLE BLOOD HOLD COAG: NORMAL
WHOLE BLOOD HOLD SPECIMEN: NORMAL

## 2022-09-21 PROCEDURE — 93005 ELECTROCARDIOGRAM TRACING: CPT | Performed by: EMERGENCY MEDICINE

## 2022-09-21 PROCEDURE — 36415 COLL VENOUS BLD VENIPUNCTURE: CPT

## 2022-09-21 PROCEDURE — 84484 ASSAY OF TROPONIN QUANT: CPT | Performed by: EMERGENCY MEDICINE

## 2022-09-21 PROCEDURE — 96375 TX/PRO/DX INJ NEW DRUG ADDON: CPT

## 2022-09-21 PROCEDURE — 85025 COMPLETE CBC W/AUTO DIFF WBC: CPT | Performed by: EMERGENCY MEDICINE

## 2022-09-21 PROCEDURE — 96374 THER/PROPH/DIAG INJ IV PUSH: CPT

## 2022-09-21 PROCEDURE — 80048 BASIC METABOLIC PNL TOTAL CA: CPT | Performed by: EMERGENCY MEDICINE

## 2022-09-21 PROCEDURE — 99285 EMERGENCY DEPT VISIT HI MDM: CPT

## 2022-09-21 PROCEDURE — 25010000002 ADENOSINE PER 6 MG

## 2022-09-21 RX ORDER — ONDANSETRON 4 MG/1
4 TABLET, FILM COATED ORAL EVERY 8 HOURS PRN
COMMUNITY

## 2022-09-21 RX ORDER — SODIUM CHLORIDE 0.9 % (FLUSH) 0.9 %
10 SYRINGE (ML) INJECTION AS NEEDED
Status: DISCONTINUED | OUTPATIENT
Start: 2022-09-21 | End: 2022-09-21 | Stop reason: HOSPADM

## 2022-09-21 RX ORDER — ADENOSINE 3 MG/ML
INJECTION, SOLUTION INTRAVENOUS
Status: COMPLETED
Start: 2022-09-21 | End: 2022-09-21

## 2022-09-21 RX ORDER — METOPROLOL TARTRATE 5 MG/5ML
5 INJECTION INTRAVENOUS ONCE
Status: COMPLETED | OUTPATIENT
Start: 2022-09-21 | End: 2022-09-21

## 2022-09-21 RX ADMIN — METOPROLOL TARTRATE 5 MG: 5 INJECTION INTRAVENOUS at 16:33

## 2022-09-21 RX ADMIN — ADENOSINE 6 MG: 3 INJECTION INTRAVENOUS at 16:20

## 2022-09-21 NOTE — ED PROVIDER NOTES
Subjective   History of Present Illness  Patient is a 81-year-old lady who is got history SVT.    Palpitations  Palpitations quality:  Regular  Onset quality:  Sudden  Timing:  Constant  Chronicity:  Recurrent  Context: not anxiety, not appetite suppressants, not blood loss, not dehydration, not exercise and not nicotine    Relieved by:  Nothing  Worsened by:  Nothing  Ineffective treatments:  None tried  Associated symptoms: chest pain    Associated symptoms: no back pain, no chest pressure, no cough, no diaphoresis, no dizziness, no malaise/fatigue, no nausea, no near-syncope, no numbness, no PND, no shortness of breath, no syncope and no vomiting    Risk factors: no diabetes mellitus, no heart disease, no hx of thyroid disease and no OTC sinus medications        Review of Systems   Constitutional: Negative.  Negative for diaphoresis and malaise/fatigue.   HENT: Negative.    Eyes: Negative.    Respiratory: Negative.  Negative for cough and shortness of breath.    Cardiovascular: Positive for chest pain and palpitations. Negative for syncope, PND and near-syncope.   Gastrointestinal: Negative.  Negative for nausea and vomiting.   Musculoskeletal: Negative.  Negative for back pain and neck pain.   Skin: Negative.    Neurological: Negative.  Negative for dizziness and numbness.   All other systems reviewed and are negative.      Past Medical History:   Diagnosis Date   • Chronic hyponatremia    • Frequent UTI    • Low back pain    • Migraines    • SVT (supraventricular tachycardia) (HCC)        Allergies   Allergen Reactions   • Codeine Anaphylaxis       Past Surgical History:   Procedure Laterality Date   • CHOLECYSTECTOMY WITH INTRAOPERATIVE CHOLANGIOGRAM N/A 5/9/2019    Procedure: CHOLECYSTECTOMY LAPAROSCOPIC INTRAOPERATIVE CHOLANGIOGRAM;  Surgeon: Chandan Bravo MD;  Location: [facility];  Service: General   • HIP HEMIARTHROPLASTY Left 5/6/2022    Procedure: HIP HEMIARTHROPLASTY;  Surgeon: Barrington Simons MD;   Location: [facility] OR;  Service: Orthopedics;  Laterality: Left;       Family History   Problem Relation Age of Onset   • Stroke Mother    • Anemia Mother    • Heart disease Father    • Heart attack Father    • Colon cancer Father        Social History     Socioeconomic History   • Marital status:    Tobacco Use   • Smoking status: Never Smoker   • Smokeless tobacco: Never Used   Vaping Use   • Vaping Use: Never used   Substance and Sexual Activity   • Alcohol use: No   • Drug use: No   • Sexual activity: Defer           Objective   Physical Exam  Vitals and nursing note reviewed. Exam conducted with a chaperone present.   Constitutional:       General: She is not in acute distress.     Appearance: Normal appearance. She is well-developed. She is not toxic-appearing or diaphoretic.   HENT:      Head: Normocephalic and atraumatic.      Right Ear: External ear normal.      Nose: Nose normal.      Mouth/Throat:      Mouth: Mucous membranes are moist.   Eyes:      Conjunctiva/sclera: Conjunctivae normal.      Pupils: Pupils are equal, round, and reactive to light.   Cardiovascular:      Rate and Rhythm: Regular rhythm. Tachycardia present.      Chest Wall: PMI is not displaced.      Pulses: Normal pulses. No decreased pulses.      Heart sounds: Normal heart sounds. No murmur heard.  Pulmonary:      Effort: Pulmonary effort is normal. No tachypnea, accessory muscle usage or respiratory distress.      Breath sounds: Normal breath sounds. No stridor. No decreased breath sounds, wheezing, rhonchi or rales.   Chest:      Chest wall: No tenderness or crepitus.   Abdominal:      General: Bowel sounds are normal. There is no distension.      Palpations: Abdomen is soft.      Tenderness: There is no abdominal tenderness.   Musculoskeletal:         General: No swelling or tenderness. Normal range of motion.      Cervical back: Normal range of motion and neck supple. No rigidity.      Right lower leg: Edema present.       Left lower leg: Edema present.      Comments: Lower extremity exam bilaterally is unremarkable.  There is no right or left calf tenderness .  There is no palpable venous cord.  No obvious difference in the size of the legs.  No pitting edema.  The dorsalis pedis and posterior tibial femoral and popliteal pulses are palpable and +2 bilaterally.  Homans sign is negative   Skin:     General: Skin is warm.      Capillary Refill: Capillary refill takes less than 2 seconds.      Coloration: Skin is not jaundiced.      Findings: No erythema or rash.   Neurological:      General: No focal deficit present.      Mental Status: She is alert and oriented to person, place, and time. Mental status is at baseline.      Cranial Nerves: No cranial nerve deficit.      Motor: No weakness.      Coordination: Coordination normal.      Deep Tendon Reflexes: Reflexes are normal and symmetric. Reflexes normal.   Psychiatric:         Mood and Affect: Mood normal.         Chemical Cardioversion    Date/Time: 9/21/2022 5:33 PM  Performed by: Leonardo Jin MD  Authorized by: Leonardo Jin MD     Consent:     Consent obtained:  Written    Consent given by:  Patient and healthcare agent    Risks discussed:  Induced arrhythmia, death and pain    Alternatives discussed:  Electrical cardioversion  Pre-procedure details:     Cardioversion basis:  Emergent    Rhythm:  Supraventricular tachycardia  Attempt one:     Cardioversion medication:  Adenosine 6mg      Medication outcome:  Conversion to normal sinus rhythm  Post-procedure details:     Patient status:  Awake    Patient tolerance of procedure:  Tolerated well, no immediate complications               ED Course  ED Course as of 09/21/22 1809   Wed Sep 21, 2022   1731 Pt in PSVT adenosine cardioverted to nsr  [TS]   1756 Case discussed the patient wants to go home the family is agreeable with this.  She is in normal sinus rhythm at this time we will place her on some rate limiting drug to use  regularly. [TS]   1756 Risks and benefits of treatments given and alternative treatment options discussed with patient/family. I answered all the questions in simple, plain language, and there was voiced understanding and agreement with plan of care. There were no further questions. Differential diagnosis discussed. Patient/family was advised that the practice of medicine is not always an exact science, and sometimes tests, physical exam, or history may not show the underlying conditions with certainty. Additionally, the condition may change or show itself later after initial presentation. There was also expressed understanding and agreement with this limitation of emergency medicine practice. Patient/family was asked to return to ED if any problem or issues or if condition worsens or does not improved. Patient/family agreed to follow up with PCP/specialist as advised, or return to ED if unable to see a provider in a timely fashion for continued symptoms.  [TS]   1808 Scarlet at length she already is on a beta-blocker which is approximately at a max dose for age abnormal to add anything right now to it she needs to follow-up with the cardiologist for evaluation.  Patient and the family agreed with this plan of care [TS]      ED Course User Index  [TS] Leonardo Jin MD                                           [facility]      Final diagnoses:   Paroxysmal supraventricular tachycardia (HCC)       ED Disposition  ED Disposition     ED Disposition   Discharge    Condition   Stable    Comment   --             No follow-up provider specified.       Medication List      No changes were made to your prescriptions during this visit.          Leonardo Jin MD  09/21/22 9823

## 2022-09-22 LAB
QT INTERVAL: 356 MS
QTC INTERVAL: 423 MS

## 2022-11-08 ENCOUNTER — HOSPITAL ENCOUNTER (INPATIENT)
Facility: HOSPITAL | Age: 82
LOS: 3 days | Discharge: NURSING, REHAB OR LONG-TERM CARE | End: 2022-11-11
Attending: FAMILY MEDICINE | Admitting: FAMILY MEDICINE

## 2022-11-08 ENCOUNTER — APPOINTMENT (OUTPATIENT)
Dept: ULTRASOUND IMAGING | Facility: HOSPITAL | Age: 82
End: 2022-11-08

## 2022-11-08 ENCOUNTER — APPOINTMENT (OUTPATIENT)
Dept: CARDIOLOGY | Facility: HOSPITAL | Age: 82
End: 2022-11-08

## 2022-11-08 ENCOUNTER — APPOINTMENT (OUTPATIENT)
Dept: CT IMAGING | Facility: HOSPITAL | Age: 82
End: 2022-11-08

## 2022-11-08 ENCOUNTER — APPOINTMENT (OUTPATIENT)
Dept: GENERAL RADIOLOGY | Facility: HOSPITAL | Age: 82
End: 2022-11-08

## 2022-11-08 DIAGNOSIS — I50.9 CONGESTIVE HEART FAILURE, UNSPECIFIED HF CHRONICITY, UNSPECIFIED HEART FAILURE TYPE: ICD-10-CM

## 2022-11-08 DIAGNOSIS — Z74.09 IMPAIRED MOBILITY: ICD-10-CM

## 2022-11-08 DIAGNOSIS — Z78.9 DECREASED ACTIVITIES OF DAILY LIVING (ADL): ICD-10-CM

## 2022-11-08 DIAGNOSIS — I26.93 SINGLE SUBSEGMENTAL PULMONARY EMBOLISM WITHOUT ACUTE COR PULMONALE: Primary | ICD-10-CM

## 2022-11-08 DIAGNOSIS — J18.9 PNEUMONIA DUE TO INFECTIOUS ORGANISM, UNSPECIFIED LATERALITY, UNSPECIFIED PART OF LUNG: ICD-10-CM

## 2022-11-08 PROBLEM — I26.99 ACUTE PULMONARY EMBOLISM WITHOUT ACUTE COR PULMONALE: Status: ACTIVE | Noted: 2022-11-08

## 2022-11-08 PROBLEM — J81.0 ACUTE PULMONARY EDEMA (HCC): Status: ACTIVE | Noted: 2022-11-08

## 2022-11-08 PROBLEM — I27.20 PULMONARY HTN: Status: ACTIVE | Noted: 2022-11-08

## 2022-11-08 PROBLEM — R00.0 SINUS TACHYCARDIA: Status: ACTIVE | Noted: 2022-11-08

## 2022-11-08 PROBLEM — I82.402 ACUTE DEEP VEIN THROMBOSIS (DVT) OF LEFT LOWER EXTREMITY: Status: ACTIVE | Noted: 2022-11-08

## 2022-11-08 LAB
ALBUMIN SERPL-MCNC: 3.5 G/DL (ref 3.5–5.2)
ALBUMIN/GLOB SERPL: 1 G/DL
ALP SERPL-CCNC: 46 U/L (ref 39–117)
ALT SERPL W P-5'-P-CCNC: 14 U/L (ref 1–33)
ANION GAP SERPL CALCULATED.3IONS-SCNC: 12 MMOL/L (ref 5–15)
APTT PPP: 35.6 SECONDS (ref 24.1–35)
ARTERIAL PATENCY WRIST A: ABNORMAL
AST SERPL-CCNC: 21 U/L (ref 1–32)
ATMOSPHERIC PRESS: 761 MMHG
BASE EXCESS BLDA CALC-SCNC: -4.6 MMOL/L (ref 0–2)
BASOPHILS # BLD AUTO: 0.1 10*3/MM3 (ref 0–0.2)
BASOPHILS NFR BLD AUTO: 0.5 % (ref 0–1.5)
BDY SITE: ABNORMAL
BH CV ECHO MEAS - AO MAX PG: 17.5 MMHG
BH CV ECHO MEAS - AO MEAN PG: 8 MMHG
BH CV ECHO MEAS - AO ROOT DIAM: 2.7 CM
BH CV ECHO MEAS - AO V2 MAX: 209 CM/SEC
BH CV ECHO MEAS - AO V2 VTI: 25.9 CM
BH CV ECHO MEAS - AVA(I,D): 0.57 CM2
BH CV ECHO MEAS - EDV(CUBED): 22 ML
BH CV ECHO MEAS - EDV(MOD-SP2): 41.6 ML
BH CV ECHO MEAS - EDV(MOD-SP4): 42.5 ML
BH CV ECHO MEAS - EF(MOD-SP2): 64.9 %
BH CV ECHO MEAS - EF(MOD-SP4): 67.8 %
BH CV ECHO MEAS - ESV(CUBED): 7.2 ML
BH CV ECHO MEAS - ESV(MOD-SP2): 14.6 ML
BH CV ECHO MEAS - ESV(MOD-SP4): 13.7 ML
BH CV ECHO MEAS - FS: 31.1 %
BH CV ECHO MEAS - IVS/LVPW: 1.02 CM
BH CV ECHO MEAS - IVSD: 0.89 CM
BH CV ECHO MEAS - LA DIMENSION: 3.8 CM
BH CV ECHO MEAS - LAT PEAK E' VEL: 10.3 CM/SEC
BH CV ECHO MEAS - LV DIASTOLIC VOL/BSA (35-75): 31.2 CM2
BH CV ECHO MEAS - LV MASS(C)D: 61.4 GRAMS
BH CV ECHO MEAS - LV MAX PG: 1.87 MMHG
BH CV ECHO MEAS - LV MEAN PG: 1 MMHG
BH CV ECHO MEAS - LV SYSTOLIC VOL/BSA (12-30): 10 CM2
BH CV ECHO MEAS - LV V1 MAX: 68.1 CM/SEC
BH CV ECHO MEAS - LV V1 VTI: 7.3 CM
BH CV ECHO MEAS - LVIDD: 2.8 CM
BH CV ECHO MEAS - LVIDS: 1.93 CM
BH CV ECHO MEAS - LVOT AREA: 2.01 CM2
BH CV ECHO MEAS - LVOT DIAM: 1.6 CM
BH CV ECHO MEAS - LVPWD: 0.88 CM
BH CV ECHO MEAS - MED PEAK E' VEL: 7.8 CM/SEC
BH CV ECHO MEAS - MV DEC SLOPE: 846 CM/SEC2
BH CV ECHO MEAS - MV E MAX VEL: 58.4 CM/SEC
BH CV ECHO MEAS - MV P1/2T: 25.2 MSEC
BH CV ECHO MEAS - MVA(P1/2T): 8.7 CM2
BH CV ECHO MEAS - RAP SYSTOLE: 5 MMHG
BH CV ECHO MEAS - RVSP: 41.2 MMHG
BH CV ECHO MEAS - SI(MOD-SP2): 19.8 ML/M2
BH CV ECHO MEAS - SI(MOD-SP4): 21.1 ML/M2
BH CV ECHO MEAS - SV(LVOT): 14.6 ML
BH CV ECHO MEAS - SV(MOD-SP2): 27 ML
BH CV ECHO MEAS - SV(MOD-SP4): 28.8 ML
BH CV ECHO MEAS - TR MAX PG: 36.2 MMHG
BH CV ECHO MEAS - TR MAX VEL: 301 CM/SEC
BH CV ECHO MEASUREMENTS AVERAGE E/E' RATIO: 6.45
BH CV VAS BP LEFT ARM: NORMAL MMHG
BH CV XLRA - RV BASE: 4.5 CM
BH CV XLRA - RV MID: 3.1 CM
BH CV XLRA - TDI S': 6.9 CM/SEC
BILIRUB SERPL-MCNC: 0.5 MG/DL (ref 0–1.2)
BILIRUB UR QL STRIP: NEGATIVE
BODY TEMPERATURE: 37 C
BUN SERPL-MCNC: 25 MG/DL (ref 8–23)
BUN/CREAT SERPL: 30.9 (ref 7–25)
CALCIUM SPEC-SCNC: 9.4 MG/DL (ref 8.6–10.5)
CHLORIDE SERPL-SCNC: 102 MMOL/L (ref 98–107)
CLARITY UR: CLEAR
CO2 SERPL-SCNC: 22 MMOL/L (ref 22–29)
COLOR UR: ABNORMAL
CREAT SERPL-MCNC: 0.81 MG/DL (ref 0.57–1)
CRP SERPL-MCNC: 20.25 MG/DL (ref 0–0.5)
D DIMER PPP FEU-MCNC: 1.93 MCGFEU/ML (ref 0–0.5)
D-LACTATE SERPL-SCNC: 1.9 MMOL/L (ref 0.5–2)
DEPRECATED RDW RBC AUTO: 60.4 FL (ref 37–54)
EGFRCR SERPLBLD CKD-EPI 2021: 73 ML/MIN/1.73
EOSINOPHIL # BLD AUTO: 0.11 10*3/MM3 (ref 0–0.4)
EOSINOPHIL NFR BLD AUTO: 0.6 % (ref 0.3–6.2)
ERYTHROCYTE [DISTWIDTH] IN BLOOD BY AUTOMATED COUNT: 16.2 % (ref 12.3–15.4)
FLUAV RNA RESP QL NAA+PROBE: NOT DETECTED
FLUBV RNA RESP QL NAA+PROBE: NOT DETECTED
GAS FLOW AIRWAY: 3 LPM
GLOBULIN UR ELPH-MCNC: 3.4 GM/DL
GLUCOSE SERPL-MCNC: 121 MG/DL (ref 65–99)
GLUCOSE UR STRIP-MCNC: NEGATIVE MG/DL
HCO3 BLDA-SCNC: 20.5 MMOL/L (ref 20–26)
HCT VFR BLD AUTO: 42.9 % (ref 34–46.6)
HGB BLD-MCNC: 14.3 G/DL (ref 12–15.9)
HGB UR QL STRIP.AUTO: NEGATIVE
IMM GRANULOCYTES # BLD AUTO: 0.25 10*3/MM3 (ref 0–0.05)
IMM GRANULOCYTES NFR BLD AUTO: 1.3 % (ref 0–0.5)
INR PPP: 1.35 (ref 0.91–1.09)
KETONES UR QL STRIP: NEGATIVE
LEFT ATRIUM VOLUME INDEX: 23.4 ML/M2
LEFT ATRIUM VOLUME: 37.4 ML
LEUKOCYTE ESTERASE UR QL STRIP.AUTO: NEGATIVE
LIPASE SERPL-CCNC: 11 U/L (ref 13–60)
LYMPHOCYTES # BLD AUTO: 2.28 10*3/MM3 (ref 0.7–3.1)
LYMPHOCYTES NFR BLD AUTO: 11.6 % (ref 19.6–45.3)
Lab: ABNORMAL
MAGNESIUM SERPL-MCNC: 1.8 MG/DL (ref 1.6–2.4)
MAXIMAL PREDICTED HEART RATE: 139 BPM
MCH RBC QN AUTO: 33.3 PG (ref 26.6–33)
MCHC RBC AUTO-ENTMCNC: 33.3 G/DL (ref 31.5–35.7)
MCV RBC AUTO: 100 FL (ref 79–97)
MODALITY: ABNORMAL
MONOCYTES # BLD AUTO: 2.22 10*3/MM3 (ref 0.1–0.9)
MONOCYTES NFR BLD AUTO: 11.3 % (ref 5–12)
NEUTROPHILS NFR BLD AUTO: 14.77 10*3/MM3 (ref 1.7–7)
NEUTROPHILS NFR BLD AUTO: 74.7 % (ref 42.7–76)
NITRITE UR QL STRIP: NEGATIVE
NRBC BLD AUTO-RTO: 0 /100 WBC (ref 0–0.2)
NT-PROBNP SERPL-MCNC: 8603 PG/ML (ref 0–1800)
PCO2 BLDA: 36.9 MM HG (ref 35–45)
PCO2 TEMP ADJ BLD: 36.9 MM HG (ref 35–45)
PH BLDA: 7.35 PH UNITS (ref 7.35–7.45)
PH UR STRIP.AUTO: 6 [PH] (ref 5–8)
PH, TEMP CORRECTED: 7.35 PH UNITS (ref 7.35–7.45)
PLATELET # BLD AUTO: 330 10*3/MM3 (ref 140–450)
PMV BLD AUTO: 9.5 FL (ref 6–12)
PO2 BLDA: 70.8 MM HG (ref 83–108)
PO2 TEMP ADJ BLD: 70.8 MM HG (ref 83–108)
POTASSIUM SERPL-SCNC: 4.8 MMOL/L (ref 3.5–5.2)
PROCALCITONIN SERPL-MCNC: 0.26 NG/ML (ref 0–0.25)
PROT SERPL-MCNC: 6.9 G/DL (ref 6–8.5)
PROT UR QL STRIP: ABNORMAL
PROTHROMBIN TIME: 16.1 SECONDS (ref 11.9–14.6)
RBC # BLD AUTO: 4.29 10*6/MM3 (ref 3.77–5.28)
RSV RNA NPH QL NAA+NON-PROBE: NOT DETECTED
SAO2 % BLDCOA: 93.5 % (ref 94–99)
SARS-COV-2 RNA RESP QL NAA+PROBE: NOT DETECTED
SODIUM SERPL-SCNC: 136 MMOL/L (ref 136–145)
SP GR UR STRIP: 1.02 (ref 1–1.03)
STRESS TARGET HR: 118 BPM
TROPONIN T SERPL-MCNC: 0.01 NG/ML (ref 0–0.03)
TROPONIN T SERPL-MCNC: <0.01 NG/ML (ref 0–0.03)
UROBILINOGEN UR QL STRIP: ABNORMAL
VENTILATOR MODE: ABNORMAL
WBC NRBC COR # BLD: 19.73 10*3/MM3 (ref 3.4–10.8)

## 2022-11-08 PROCEDURE — 85379 FIBRIN DEGRADATION QUANT: CPT | Performed by: FAMILY MEDICINE

## 2022-11-08 PROCEDURE — 36600 WITHDRAWAL OF ARTERIAL BLOOD: CPT

## 2022-11-08 PROCEDURE — 25010000002 FUROSEMIDE PER 20 MG: Performed by: FAMILY MEDICINE

## 2022-11-08 PROCEDURE — 25010000002 AZITHROMYCIN PER 500 MG: Performed by: FAMILY MEDICINE

## 2022-11-08 PROCEDURE — 94799 UNLISTED PULMONARY SVC/PX: CPT

## 2022-11-08 PROCEDURE — 71275 CT ANGIOGRAPHY CHEST: CPT

## 2022-11-08 PROCEDURE — 93306 TTE W/DOPPLER COMPLETE: CPT

## 2022-11-08 PROCEDURE — 87637 SARSCOV2&INF A&B&RSV AMP PRB: CPT | Performed by: FAMILY MEDICINE

## 2022-11-08 PROCEDURE — 83605 ASSAY OF LACTIC ACID: CPT | Performed by: FAMILY MEDICINE

## 2022-11-08 PROCEDURE — 93005 ELECTROCARDIOGRAM TRACING: CPT | Performed by: FAMILY MEDICINE

## 2022-11-08 PROCEDURE — 82803 BLOOD GASES ANY COMBINATION: CPT

## 2022-11-08 PROCEDURE — 36415 COLL VENOUS BLD VENIPUNCTURE: CPT

## 2022-11-08 PROCEDURE — 93970 EXTREMITY STUDY: CPT

## 2022-11-08 PROCEDURE — 74177 CT ABD & PELVIS W/CONTRAST: CPT

## 2022-11-08 PROCEDURE — 99222 1ST HOSP IP/OBS MODERATE 55: CPT | Performed by: INTERNAL MEDICINE

## 2022-11-08 PROCEDURE — 99285 EMERGENCY DEPT VISIT HI MDM: CPT

## 2022-11-08 PROCEDURE — 85730 THROMBOPLASTIN TIME PARTIAL: CPT | Performed by: FAMILY MEDICINE

## 2022-11-08 PROCEDURE — 87040 BLOOD CULTURE FOR BACTERIA: CPT | Performed by: FAMILY MEDICINE

## 2022-11-08 PROCEDURE — 84484 ASSAY OF TROPONIN QUANT: CPT | Performed by: FAMILY MEDICINE

## 2022-11-08 PROCEDURE — 93010 ELECTROCARDIOGRAM REPORT: CPT | Performed by: INTERNAL MEDICINE

## 2022-11-08 PROCEDURE — 94640 AIRWAY INHALATION TREATMENT: CPT

## 2022-11-08 PROCEDURE — 25010000002 CEFTRIAXONE PER 250 MG: Performed by: FAMILY MEDICINE

## 2022-11-08 PROCEDURE — 83880 ASSAY OF NATRIURETIC PEPTIDE: CPT | Performed by: FAMILY MEDICINE

## 2022-11-08 PROCEDURE — 84145 PROCALCITONIN (PCT): CPT | Performed by: FAMILY MEDICINE

## 2022-11-08 PROCEDURE — 25010000002 FENTANYL CITRATE (PF) 50 MCG/ML SOLUTION: Performed by: FAMILY MEDICINE

## 2022-11-08 PROCEDURE — 83690 ASSAY OF LIPASE: CPT | Performed by: FAMILY MEDICINE

## 2022-11-08 PROCEDURE — 25010000002 ENOXAPARIN PER 10 MG: Performed by: FAMILY MEDICINE

## 2022-11-08 PROCEDURE — 85025 COMPLETE CBC W/AUTO DIFF WBC: CPT | Performed by: FAMILY MEDICINE

## 2022-11-08 PROCEDURE — 25010000002 PERFLUTREN 6.52 MG/ML SUSPENSION: Performed by: FAMILY MEDICINE

## 2022-11-08 PROCEDURE — P9612 CATHETERIZE FOR URINE SPEC: HCPCS

## 2022-11-08 PROCEDURE — 83735 ASSAY OF MAGNESIUM: CPT | Performed by: FAMILY MEDICINE

## 2022-11-08 PROCEDURE — 80053 COMPREHEN METABOLIC PANEL: CPT | Performed by: FAMILY MEDICINE

## 2022-11-08 PROCEDURE — 81003 URINALYSIS AUTO W/O SCOPE: CPT | Performed by: FAMILY MEDICINE

## 2022-11-08 PROCEDURE — 86140 C-REACTIVE PROTEIN: CPT | Performed by: FAMILY MEDICINE

## 2022-11-08 PROCEDURE — 94664 DEMO&/EVAL PT USE INHALER: CPT

## 2022-11-08 PROCEDURE — 93306 TTE W/DOPPLER COMPLETE: CPT | Performed by: INTERNAL MEDICINE

## 2022-11-08 PROCEDURE — 0 IOPAMIDOL PER 1 ML: Performed by: FAMILY MEDICINE

## 2022-11-08 PROCEDURE — 71045 X-RAY EXAM CHEST 1 VIEW: CPT

## 2022-11-08 PROCEDURE — 93970 EXTREMITY STUDY: CPT | Performed by: SURGERY

## 2022-11-08 PROCEDURE — 85610 PROTHROMBIN TIME: CPT | Performed by: FAMILY MEDICINE

## 2022-11-08 RX ORDER — BUDESONIDE AND FORMOTEROL FUMARATE DIHYDRATE 160; 4.5 UG/1; UG/1
2 AEROSOL RESPIRATORY (INHALATION)
Status: DISCONTINUED | OUTPATIENT
Start: 2022-11-08 | End: 2022-11-11 | Stop reason: HOSPADM

## 2022-11-08 RX ORDER — SODIUM CHLORIDE 9 MG/ML
125 INJECTION, SOLUTION INTRAVENOUS CONTINUOUS
Status: DISCONTINUED | OUTPATIENT
Start: 2022-11-08 | End: 2022-11-08

## 2022-11-08 RX ORDER — ACEBUTOLOL HYDROCHLORIDE 200 MG/1
200 CAPSULE ORAL EVERY 12 HOURS SCHEDULED
Status: DISCONTINUED | OUTPATIENT
Start: 2022-11-08 | End: 2022-11-11 | Stop reason: HOSPADM

## 2022-11-08 RX ORDER — BISACODYL 5 MG/1
10 TABLET, DELAYED RELEASE ORAL DAILY
Status: DISCONTINUED | OUTPATIENT
Start: 2022-11-08 | End: 2022-11-11 | Stop reason: HOSPADM

## 2022-11-08 RX ORDER — DILTIAZEM HYDROCHLORIDE 360 MG/1
360 CAPSULE, EXTENDED RELEASE ORAL DAILY
COMMUNITY

## 2022-11-08 RX ORDER — PAROXETINE HYDROCHLORIDE 20 MG/1
40 TABLET, FILM COATED ORAL EVERY MORNING
Status: DISCONTINUED | OUTPATIENT
Start: 2022-11-09 | End: 2022-11-11 | Stop reason: HOSPADM

## 2022-11-08 RX ORDER — FUROSEMIDE 10 MG/ML
40 INJECTION INTRAMUSCULAR; INTRAVENOUS EVERY 12 HOURS
Status: DISCONTINUED | OUTPATIENT
Start: 2022-11-08 | End: 2022-11-11 | Stop reason: HOSPADM

## 2022-11-08 RX ORDER — BUDESONIDE AND FORMOTEROL FUMARATE DIHYDRATE 160; 4.5 UG/1; UG/1
2 AEROSOL RESPIRATORY (INHALATION)
COMMUNITY

## 2022-11-08 RX ORDER — OXYCODONE HYDROCHLORIDE AND ACETAMINOPHEN 5; 325 MG/1; MG/1
1 TABLET ORAL EVERY 4 HOURS PRN
COMMUNITY
End: 2022-11-11 | Stop reason: HOSPADM

## 2022-11-08 RX ORDER — IPRATROPIUM BROMIDE AND ALBUTEROL SULFATE 2.5; .5 MG/3ML; MG/3ML
3 SOLUTION RESPIRATORY (INHALATION) EVERY 4 HOURS PRN
COMMUNITY

## 2022-11-08 RX ORDER — ENOXAPARIN SODIUM 100 MG/ML
1 INJECTION SUBCUTANEOUS EVERY 12 HOURS
Status: COMPLETED | OUTPATIENT
Start: 2022-11-09 | End: 2022-11-09

## 2022-11-08 RX ORDER — DILTIAZEM HYDROCHLORIDE 240 MG/1
240 CAPSULE, COATED, EXTENDED RELEASE ORAL DAILY
Status: DISCONTINUED | OUTPATIENT
Start: 2022-11-08 | End: 2022-11-11 | Stop reason: HOSPADM

## 2022-11-08 RX ORDER — GABAPENTIN 100 MG/1
100 CAPSULE ORAL 3 TIMES DAILY
Status: DISCONTINUED | OUTPATIENT
Start: 2022-11-08 | End: 2022-11-11 | Stop reason: HOSPADM

## 2022-11-08 RX ORDER — MEGESTROL ACETATE 40 MG/ML
400 SUSPENSION ORAL DAILY
Status: DISCONTINUED | OUTPATIENT
Start: 2022-11-08 | End: 2022-11-11 | Stop reason: HOSPADM

## 2022-11-08 RX ORDER — FAMOTIDINE 20 MG/1
20 TABLET, FILM COATED ORAL DAILY
Status: DISCONTINUED | OUTPATIENT
Start: 2022-11-08 | End: 2022-11-11 | Stop reason: HOSPADM

## 2022-11-08 RX ORDER — LORAZEPAM 0.5 MG/1
0.5 TABLET ORAL EVERY 8 HOURS PRN
COMMUNITY
End: 2022-11-11 | Stop reason: HOSPADM

## 2022-11-08 RX ORDER — GABAPENTIN 100 MG/1
100 CAPSULE ORAL EVERY 8 HOURS SCHEDULED
COMMUNITY
End: 2022-11-11 | Stop reason: HOSPADM

## 2022-11-08 RX ORDER — FENTANYL CITRATE 50 UG/ML
25 INJECTION, SOLUTION INTRAMUSCULAR; INTRAVENOUS ONCE
Status: COMPLETED | OUTPATIENT
Start: 2022-11-08 | End: 2022-11-08

## 2022-11-08 RX ORDER — ACETAMINOPHEN 325 MG/1
650 TABLET ORAL EVERY 4 HOURS PRN
Status: DISCONTINUED | OUTPATIENT
Start: 2022-11-08 | End: 2022-11-11 | Stop reason: HOSPADM

## 2022-11-08 RX ORDER — ENOXAPARIN SODIUM 100 MG/ML
1 INJECTION SUBCUTANEOUS ONCE
Status: COMPLETED | OUTPATIENT
Start: 2022-11-08 | End: 2022-11-08

## 2022-11-08 RX ORDER — ALUMINA, MAGNESIA, AND SIMETHICONE 2400; 2400; 240 MG/30ML; MG/30ML; MG/30ML
15 SUSPENSION ORAL EVERY 6 HOURS PRN
Status: DISCONTINUED | OUTPATIENT
Start: 2022-11-08 | End: 2022-11-11 | Stop reason: HOSPADM

## 2022-11-08 RX ORDER — DOCUSATE SODIUM 100 MG/1
100 CAPSULE, LIQUID FILLED ORAL DAILY
Status: DISCONTINUED | OUTPATIENT
Start: 2022-11-08 | End: 2022-11-11 | Stop reason: HOSPADM

## 2022-11-08 RX ORDER — ONDANSETRON 2 MG/ML
4 INJECTION INTRAMUSCULAR; INTRAVENOUS EVERY 6 HOURS PRN
Status: DISCONTINUED | OUTPATIENT
Start: 2022-11-08 | End: 2022-11-11 | Stop reason: HOSPADM

## 2022-11-08 RX ORDER — IPRATROPIUM BROMIDE AND ALBUTEROL SULFATE 2.5; .5 MG/3ML; MG/3ML
3 SOLUTION RESPIRATORY (INHALATION)
Status: DISCONTINUED | OUTPATIENT
Start: 2022-11-08 | End: 2022-11-11 | Stop reason: HOSPADM

## 2022-11-08 RX ORDER — SODIUM CHLORIDE 0.9 % (FLUSH) 0.9 %
10 SYRINGE (ML) INJECTION AS NEEDED
Status: DISCONTINUED | OUTPATIENT
Start: 2022-11-08 | End: 2022-11-11 | Stop reason: HOSPADM

## 2022-11-08 RX ORDER — ENOXAPARIN SODIUM 100 MG/ML
1 INJECTION SUBCUTANEOUS EVERY 12 HOURS
Status: DISCONTINUED | OUTPATIENT
Start: 2022-11-09 | End: 2022-11-09

## 2022-11-08 RX ORDER — SODIUM CHLORIDE 0.9 % (FLUSH) 0.9 %
10 SYRINGE (ML) INJECTION EVERY 12 HOURS SCHEDULED
Status: DISCONTINUED | OUTPATIENT
Start: 2022-11-08 | End: 2022-11-11 | Stop reason: HOSPADM

## 2022-11-08 RX ADMIN — IPRATROPIUM BROMIDE AND ALBUTEROL SULFATE 3 ML: 2.5; .5 SOLUTION RESPIRATORY (INHALATION) at 19:08

## 2022-11-08 RX ADMIN — FUROSEMIDE 40 MG: 10 INJECTION, SOLUTION INTRAMUSCULAR; INTRAVENOUS at 14:51

## 2022-11-08 RX ADMIN — PERFLUTREN 13.04 MG: 6.52 INJECTION, SUSPENSION INTRAVENOUS at 15:42

## 2022-11-08 RX ADMIN — GABAPENTIN 100 MG: 100 CAPSULE ORAL at 20:42

## 2022-11-08 RX ADMIN — Medication 10 ML: at 14:52

## 2022-11-08 RX ADMIN — Medication 10 ML: at 20:42

## 2022-11-08 RX ADMIN — IOPAMIDOL 100 ML: 755 INJECTION, SOLUTION INTRAVENOUS at 09:56

## 2022-11-08 RX ADMIN — GABAPENTIN 100 MG: 100 CAPSULE ORAL at 16:57

## 2022-11-08 RX ADMIN — DILTIAZEM HYDROCHLORIDE 240 MG: 240 CAPSULE, EXTENDED RELEASE ORAL at 14:52

## 2022-11-08 RX ADMIN — MEGESTROL ACETATE 400 MG: 40 SUSPENSION ORAL at 14:51

## 2022-11-08 RX ADMIN — SODIUM CHLORIDE 500 ML: 9 INJECTION, SOLUTION INTRAVENOUS at 08:13

## 2022-11-08 RX ADMIN — BISACODYL 10 MG: 5 TABLET ORAL at 14:52

## 2022-11-08 RX ADMIN — AZITHROMYCIN DIHYDRATE 500 MG: 500 INJECTION, POWDER, LYOPHILIZED, FOR SOLUTION INTRAVENOUS at 12:25

## 2022-11-08 RX ADMIN — FENTANYL CITRATE 25 MCG: 50 INJECTION INTRAMUSCULAR; INTRAVENOUS at 08:14

## 2022-11-08 RX ADMIN — ACEBUTOLOL HYDROCHLORIDE 200 MG: 200 CAPSULE ORAL at 20:42

## 2022-11-08 RX ADMIN — FAMOTIDINE 20 MG: 20 TABLET, FILM COATED ORAL at 14:52

## 2022-11-08 RX ADMIN — ACEBUTOLOL HYDROCHLORIDE 200 MG: 200 CAPSULE ORAL at 14:52

## 2022-11-08 RX ADMIN — DOCUSATE SODIUM 100 MG: 100 CAPSULE, LIQUID FILLED ORAL at 14:52

## 2022-11-08 RX ADMIN — BUDESONIDE AND FORMOTEROL FUMARATE DIHYDRATE 2 PUFF: 160; 4.5 AEROSOL RESPIRATORY (INHALATION) at 19:08

## 2022-11-08 RX ADMIN — SODIUM CHLORIDE 1 G: 9 INJECTION, SOLUTION INTRAVENOUS at 12:11

## 2022-11-08 RX ADMIN — ENOXAPARIN SODIUM 50 MG: 100 INJECTION SUBCUTANEOUS at 12:10

## 2022-11-08 NOTE — ED PROVIDER NOTES
Subjective   History of Present Illness  This patient is an 81-year-old female who is transferred from the nursing home because of a 3-day history of left upper quadrant abdominal pain and worsening shortness of breath with tachycardia.  The patient is unable to contribute anything else to the history.        Review of Systems   Respiratory: Positive for shortness of breath.    Gastrointestinal: Positive for abdominal pain.   All other systems reviewed and are negative.      Past Medical History:   Diagnosis Date   • Chronic hyponatremia    • Frequent UTI    • Low back pain    • Migraines    • SVT (supraventricular tachycardia) (HCC)        Allergies   Allergen Reactions   • Codeine Anaphylaxis       Past Surgical History:   Procedure Laterality Date   • CHOLECYSTECTOMY WITH INTRAOPERATIVE CHOLANGIOGRAM N/A 5/9/2019    Procedure: CHOLECYSTECTOMY LAPAROSCOPIC INTRAOPERATIVE CHOLANGIOGRAM;  Surgeon: Chandan Bravo MD;  Location: [facility] OR;  Service: General   • HIP HEMIARTHROPLASTY Left 5/6/2022    Procedure: HIP HEMIARTHROPLASTY;  Surgeon: Barrington Simons MD;  Location: [facility] OR;  Service: Orthopedics;  Laterality: Left;       Family History   Problem Relation Age of Onset   • Stroke Mother    • Anemia Mother    • Heart disease Father    • Heart attack Father    • Colon cancer Father        Social History     Socioeconomic History   • Marital status:    Tobacco Use   • Smoking status: Never   • Smokeless tobacco: Never   Vaping Use   • Vaping Use: Never used   Substance and Sexual Activity   • Alcohol use: No   • Drug use: No   • Sexual activity: Defer           Objective   Physical Exam  Vitals and nursing note reviewed.   Constitutional:       General: She is in acute distress.      Appearance: Normal appearance. She is well-developed and underweight. She is ill-appearing and toxic-appearing.   HENT:      Head: Normocephalic and atraumatic.      Right Ear: External ear normal.      Left Ear: External ear  normal.      Nose: Nose normal.      Mouth/Throat:      Mouth: Mucous membranes are moist.      Pharynx: Oropharynx is clear.   Eyes:      Extraocular Movements: Extraocular movements intact.      Conjunctiva/sclera: Conjunctivae normal.   Cardiovascular:      Rate and Rhythm: Normal rate and regular rhythm.      Pulses: Normal pulses.      Heart sounds: Normal heart sounds.   Pulmonary:      Effort: Tachypnea and respiratory distress present.      Breath sounds: Normal breath sounds.   Abdominal:      General: Bowel sounds are normal.      Palpations: Abdomen is soft.   Musculoskeletal:         General: Normal range of motion.      Cervical back: Normal range of motion and neck supple.   Skin:     General: Skin is warm and dry.      Capillary Refill: Capillary refill takes less than 2 seconds.   Neurological:      General: No focal deficit present.      Mental Status: She is alert and oriented to person, place, and time.   Psychiatric:         Behavior: Behavior normal.         Thought Content: Thought content normal.         Judgment: Judgment normal.         Procedures           ED Course                                           MDM  Number of Diagnoses or Management Options     Amount and/or Complexity of Data Reviewed  Clinical lab tests: reviewed and ordered  Tests in the radiology section of CPT®: reviewed and ordered  Tests in the medicine section of CPT®: reviewed and ordered  Decide to obtain previous medical records or to obtain history from someone other than the patient: yes    Patient Progress  Patient progress: stable      Final diagnoses:   Single subsegmental pulmonary embolism without acute cor pulmonale (HCC)   Pneumonia due to infectious organism, unspecified laterality, unspecified part of lung   Congestive heart failure, unspecified HF chronicity, unspecified heart failure type (HCC)       ED Disposition  ED Disposition     ED Disposition   Decision to Admit    Condition   --    Comment    Level of Care: Critical Care [6]   Diagnosis: Single subsegmental pulmonary embolism without acute cor pulmonale (HCC) [[id number]]   Admitting Physician: CHASITY BOLTON [[id number]]   Attending Physician: CHASITY BOLTON [[id number]]   Certification: I Certify That Inpatient Hospital Services Are Medically Necessary For Greater Than 2 Midnights               No follow-up provider specified.       Medication List      No changes were made to your prescriptions during this visit.       1.  The patient's initial EKG showed an accelerated junctional rhythm with the rate in the 120s to 130s.  She reverted to normal sinus rhythm spontaneously.    2.  The patient CT angiogram of the chest revealed a solitary pulmonary embolus which is not causing acute heart strain.  Lovenox was started in the ED.    3.  The patient CT also showed pneumonia so antibiotics were started.    4.  The patient is CT also demonstrated some pulmonary edema.  She may need some Lasix however with her heart back in normal sinus rhythm she may not.  I will leave this up to the hospitalist.    I discussed the whole case with Dr. Arroyo and we agreed that she should go to the ICU.  She is DNR which we confirmed today.     Mark Castro MD  11/08/22 1222       Mark Castro MD  11/08/22 1222

## 2022-11-08 NOTE — PROGRESS NOTES
"Pharmacy Dosing Service  Anticoagulant  Enoxaparin     Assessment/Action/Plan:  Pharmacy to dose active DVT/PE. CrCL = 34.7 ml/min, 1mg/Kg q 12 hrs.      Subjective:  Zelalem Hung is a 81 y.o. female on Enoxaparin 40 mg SQ every 12 hours for indication of DVT.  Objective:  [Ht: 157.5 cm (62.01\"); Wt: 40.3 kg (88 lb 12.8 oz); BMI: Body mass index is 16.24 kg/m².]  Estimated Creatinine Clearance: 34.7 mL/min (by C-G formula based on SCr of 0.81 mg/dL).         Lab Results   Component Value Date     DDIMER 0.25 01/26/2022            Lab Results   Component Value Date     INR 1.35 (H) 11/08/2022     INR 1.23 (H) 05/05/2022     INR 1.05 01/26/2022     PROTIME 16.1 (H) 11/08/2022     PROTIME 15.0 (H) 05/05/2022     PROTIME 13.3 01/26/2022            Lab Results   Component Value Date     HGB 14.3 11/08/2022     HGB 14.1 09/21/2022     HGB 14.2 09/17/2022            Lab Results   Component Value Date      11/08/2022      09/21/2022      09/17/2022         Lucian Bui [facility]  11/08/22 14:19 CST      "

## 2022-11-08 NOTE — PLAN OF CARE
Goal Outcome Evaluation:  Plan of Care Reviewed With: patient        Progress: no change  Outcome Evaluation: C/o LUQ pain. Echo pending. Sinus tach 140-150 MAP >65. Disoriented to situation. DVT found in LLE. Turned.

## 2022-11-08 NOTE — CONSULTS
"[facility] CONSULT NOTE    Referring Provider: Dr. Wenceslao Brown, DO    Reason for Consultation: \"acute PEs, pulm edema\"    Chief Complaint   Patient presents with   • Shortness of Breath   • Rapid Heart Rate   • Abdominal Pain       Subjective .     History of present illness:  Zelalem Hung is a 81 y.o. female who presented from the nursing home with complaints of left-sided abdominal pain and worsening shortness of breath with elevated heart rates.  She lives in a nursing home has had significant history of falls and fractures and suffered a subdural hematoma.  She has a history of supraventricular tachycardia and is managed on medications.      She was evaluated in the emergency department for complaints.  She had CT abdomen pelvis and CT of the chest.  Bilateral pulmonary emboli were noted on CT of chest.  Concern for consolidation as well as interstitial edema were noted.  Lab values were significant for elevated BNP, elevated white count, elevated procalcitonin, elevated CRP.  Her troponin levels were normal.  She is oxygenating with supplemental oxygen on Vapotherm.  She has stable blood pressure.  Her heart rates are in the 120s.    CT chest imaging did not indicate RV strain.  She is alert and oriented lying in the bed.  She is hard of hearing and sometimes does not respond to questions asked.  She has had multiple previous hospitalizations noting encephalopathy and altered mental status.  There is no family at the bedside.  She denies being short of breath currently but is tachypneic on exam.  She is oxygenating appropriately.  She reports chest pain reproducible with palpation and had just had an echocardiogram where she noted pain at the placement of the probe.  She tells me she has worsening left-sided chest pain with deep breathing.    She has previously suffered a hip fracture as well as acute foot pain and has been nonweightbearing and sedentary for several months.  She " has had concern for failure to thrive.  Her BMI is 16.  She has a DNR/DNI.    Cardiology was consulted to evaluate the patient in the setting of acute pulmonary emboli for consideration of intervention.    History  Past Medical History:   Diagnosis Date   • Chronic hyponatremia    • Frequent UTI    • Low back pain    • Migraines    • SVT (supraventricular tachycardia) (HCC)    ,   Past Surgical History:   Procedure Laterality Date   • CHOLECYSTECTOMY WITH INTRAOPERATIVE CHOLANGIOGRAM N/A 5/9/2019    Procedure: CHOLECYSTECTOMY LAPAROSCOPIC INTRAOPERATIVE CHOLANGIOGRAM;  Surgeon: Chandan Bravo MD;  Location:  [facility] OR;  Service: General   • HIP HEMIARTHROPLASTY Left 5/6/2022    Procedure: HIP HEMIARTHROPLASTY;  Surgeon: Barrington Simons MD;  Location:  [facility] OR;  Service: Orthopedics;  Laterality: Left;   ,   Family History   Problem Relation Age of Onset   • Stroke Mother    • Anemia Mother    • Heart disease Father    • Heart attack Father    • Colon cancer Father    ,   Social History     Tobacco Use   • Smoking status: Never   • Smokeless tobacco: Never   Vaping Use   • Vaping Use: Never used   Substance Use Topics   • Alcohol use: No   • Drug use: No   ,     Medications  Current Facility-Administered Medications   Medication Dose Route Frequency Provider Last Rate Last Admin   • acebutolol (SECTRAL) capsule 200 mg  200 mg Oral Q12H Wenceslao Brown DO   200 mg at 11/08/22 1452   • acetaminophen (TYLENOL) tablet 650 mg  650 mg Oral Q4H PRN Wenceslao Brown DO       • aluminum-magnesium hydroxide-simethicone (MAALOX MAX) 400-400-40 MG/5ML suspension 15 mL  15 mL Oral Q6H PRN Wenceslao Brown DO       • bisacodyl (DULCOLAX) EC tablet 10 mg  10 mg Oral Daily Wenceslao Brown DO   10 mg at 11/08/22 1452   • budesonide-formoterol (SYMBICORT) 160-4.5 MCG/ACT inhaler 2 puff  2 puff Inhalation BID - RT Wenceslao Brown DO       • dilTIAZem CD (CARDIZEM CD) 24 hr capsule 240 mg  240 mg Oral Daily  Wenceslao Brown DO   240 mg at 11/08/22 1452   • docusate sodium (COLACE) capsule 100 mg  100 mg Oral Daily Wenceslao Brown DO   100 mg at 11/08/22 1452   • [START ON 11/9/2022] Enoxaparin Sodium (LOVENOX) syringe 40 mg  1 mg/kg Subcutaneous Q12H Wenceslao Brown DO       • [START ON 11/9/2022] Enoxaparin Sodium (LOVENOX) syringe 40 mg  1 mg/kg Subcutaneous Q12H Wenceslao Brown DO       • famotidine (PEPCID) tablet 20 mg  20 mg Oral Daily Wenceslao Brown DO   20 mg at 11/08/22 1452   • furosemide (LASIX) injection 40 mg  40 mg Intravenous Q12H Wenceslao Brown DO   40 mg at 11/08/22 1451   • gabapentin (NEURONTIN) capsule 100 mg  100 mg Oral TID Wenceslao Brown DO       • ipratropium-albuterol (DUO-NEB) nebulizer solution 3 mL  3 mL Nebulization 4x Daily - RT Wenceslao Brown DO       • megestrol (MEGACE) 40 MG/ML suspension 400 mg  400 mg Oral Daily Wenceslao Brown DO   400 mg at 11/08/22 1451   • ondansetron (ZOFRAN) injection 4 mg  4 mg Intravenous Q6H PRN Wenceslao Brown DO       • [START ON 11/9/2022] PARoxetine (PAXIL) tablet 40 mg  40 mg Oral QAM Wenceslao Brown DO       • Pharmacy to Dose enoxaparin (LOVENOX)   Does not apply Continuous PRN Wenceslao Brown DO       • sodium chloride 0.9 % flush 10 mL  10 mL Intravenous Q12H Wenceslao Brown DO   10 mL at 11/08/22 1452   • sodium chloride 0.9 % flush 10 mL  10 mL Intravenous PRN Wenceslao Brown DO           Allergies:  Codeine    Review of Systems  Review of Systems   Unable to perform ROS: acuity of condition   HENT: Positive for hearing loss.    Cardiovascular: Positive for chest pain. Negative for leg swelling.   Respiratory: Negative for cough and wheezing.        Objective     Physical Exam:  Patient Vitals for the past 24 hrs:   BP Temp Temp src Pulse Resp SpO2 Height Weight   11/08/22 1515 121/90 -- -- (!) 143 -- -- -- --   11/08/22 1500 108/81 -- -- (!) 147 -- 93 % -- --   11/08/22 1445  "97/68 -- -- (!) 143 -- 94 % -- --   11/08/22 1430 95/68 -- -- (!) 146 -- 95 % -- --   11/08/22 1425 -- -- -- (!) 148 -- 93 % -- --   11/08/22 1415 121/50 -- -- 93 -- 95 % -- --   11/08/22 1400 109/59 -- -- 93 -- 95 % -- --   11/08/22 1345 99/61 -- -- 93 -- 95 % -- --   11/08/22 1330 90/58 -- -- 93 -- -- -- --   11/08/22 1316 115/73 -- -- 88 -- 93 % -- --   11/08/22 1306 123/71 -- -- 92 -- 97 % -- --   11/08/22 1256 -- 97.5 °F (36.4 °C) Axillary -- -- -- 157.5 cm (62.01\") 40.3 kg (88 lb 12.8 oz)   11/08/22 1246 120/76 98.7 °F (37.1 °C) Oral 95 20 95 % -- --   11/08/22 1233 126/65 -- -- 94 -- 94 % -- --   11/08/22 1218 121/73 -- -- 91 -- 94 % -- --   11/08/22 1212 -- -- -- 90 23 94 % -- --   11/08/22 1202 119/65 -- -- 92 -- 92 % -- --   11/08/22 1147 119/76 -- -- 91 -- 90 % -- --   11/08/22 1132 109/85 -- -- 92 -- 92 % -- --   11/08/22 1117 123/73 -- -- 91 -- -- -- --   11/08/22 1059 111/74 -- -- 91 -- 94 % -- --   11/08/22 1044 118/75 -- -- 89 -- 92 % -- --   11/08/22 1029 116/79 -- -- 92 -- 91 % -- --   11/08/22 1014 112/73 -- -- 93 -- 91 % -- --   11/08/22 1001 123/64 -- -- 94 -- 94 % -- --   11/08/22 0931 122/71 -- -- 88 -- 92 % -- --   11/08/22 0916 115/87 -- -- 88 -- -- -- --   11/08/22 0901 116/76 -- -- 87 -- 93 % -- --   11/08/22 0846 118/77 -- -- 85 -- 93 % -- --   11/08/22 0816 104/78 -- -- (!) 132 -- 94 % -- --   11/08/22 0756 120/97 -- -- (!) 130 -- 95 % -- --   11/08/22 0755 120/97 97.7 °F (36.5 °C) Oral (!) 129 20 97 % 157.5 cm (62\") 54.4 kg (120 lb)     Vitals reviewed.   Constitutional:       General: Awake.      Appearance: Underweight. Ill-appearing and acutely ill-appearing.      Interventions: Nasal cannula in place.   HENT:      Head: Normocephalic and atraumatic.   Pulmonary:      Breath sounds: Examination of the right-lower field reveals rales. Examination of the left-lower field reveals rales. Bilateral to the midchest Rales present.   Cardiovascular:      Tachycardia present. Regular rhythm. "   Edema:     Peripheral edema absent.   Musculoskeletal:      Cervical back: Normal range of motion and neck supple. Skin:     General: Skin is warm.   Neurological:      Mental Status: Alert, oriented to person, place, and time and oriented to person, place and time.   Psychiatric:         Speech: Speech normal.         Behavior: Behavior normal. Behavior is cooperative.         Results Review:   I reviewed the patient's new clinical results.    Lab Results (last 72 hours)     Procedure Component Value Units Date/Time    Troponin [[id number]]  (Normal) Collected: 11/08/22 1015    Specimen: Blood Updated: 11/08/22 1041     Troponin T <0.010 ng/mL     Narrative:      Troponin T Reference Range:  <= 0.03 ng/mL-   Negative for AMI  >0.03 ng/mL-     Abnormal for myocardial necrosis.  Clinicians would have to utilize clinical acumen, EKG, Troponin and serial changes to determine if it is an Acute Myocardial Infarction or myocardial injury due to an underlying chronic condition.       Results may be falsely decreased if patient taking Biotin.      Blood Culture - Blood, Arm, Right [[id number]] Collected: 11/08/22 0835    Specimen: Blood from Arm, Right Updated: 11/08/22 1020    COVID-19, FLU A/B, RSV PCR - Swab, Nasopharynx [[id number]]  (Normal) Collected: 11/08/22 0838    Specimen: Swab from Nasopharynx Updated: 11/08/22 0949     COVID19 Not Detected     Influenza A PCR Not Detected     Influenza B PCR Not Detected     RSV, PCR Not Detected    Narrative:      Fact sheet for providers: https://www.fda.gov/media/744819/download    Fact sheet for patients: https://www.fda.gov/media/459441/download    Test performed by PCR.    Blood Gas, Arterial - [[id number]]  (Abnormal) Collected: 11/08/22 0920    Specimen: Arterial Blood Updated: 11/08/22 0917     Site Right Brachial     Meliton's Test N/A     pH, Arterial 7.352 pH units      pCO2, Arterial 36.9 mm Hg      pO2, Arterial 70.8 mm Hg      Comment: 84 Value below reference range  "       HCO3, Arterial 20.5 mmol/L      Base Excess, Arterial -4.6 mmol/L      Comment: 84 Value below reference range        O2 Saturation, Arterial 93.5 %      Comment: 84 Value below reference range        Temperature 37.0 C      Barometric Pressure for Blood Gas 761 mmHg      Modality Nasal Cannula     Flow Rate 3.0 lpm      Ventilator Mode NA     Collected by [id number]     Comment: Meter: [id number]     :  [id number]        pCO2, Temperature Corrected 36.9 mm Hg      pH, Temp Corrected 7.352 pH Units      pO2, Temperature Corrected 70.8 mm Hg     Procalcitonin [[id number]]  (Abnormal) Collected: 11/08/22 0838    Specimen: Blood Updated: 11/08/22 0917     Procalcitonin 0.26 ng/mL     Narrative:      As a Marker for Sepsis (Non-Neonates):    1. <0.5 ng/mL represents a low risk of severe sepsis and/or septic shock.  2. >2 ng/mL represents a high risk of severe sepsis and/or septic shock.    As a Marker for Lower Respiratory Tract Infections that require antibiotic therapy:    PCT on Admission    Antibiotic Therapy       6-12 Hrs later    >0.5                Strongly Recommended  >0.25 - <0.5        Recommended   0.1 - 0.25          Discouraged              Remeasure/reassess PCT  <0.1                Strongly Discouraged     Remeasure/reassess PCT    As 28 day mortality risk marker: \"Change in Procalcitonin Result\" (>80% or <=80%) if Day 0 (or Day 1) and Day 4 values are available. Refer to http://www.[employer]-pct-calculator.com    Change in PCT <=80%  A decrease of PCT levels below or equal to 80% defines a positive change in PCT test result representing a higher risk for 28-day all-cause mortality of patients diagnosed with severe sepsis for septic shock.    Change in PCT >80%  A decrease of PCT levels of more than 80% defines a negative change in PCT result representing a lower risk for 28-day all-cause mortality of patients diagnosed with severe sepsis or septic shock.       Comprehensive Metabolic Panel " [[id number]]  (Abnormal) Collected: 11/08/22 0838    Specimen: Blood Updated: 11/08/22 0912     Glucose 121 mg/dL      BUN 25 mg/dL      Creatinine 0.81 mg/dL      Sodium 136 mmol/L      Potassium 4.8 mmol/L      Chloride 102 mmol/L      CO2 22.0 mmol/L      Calcium 9.4 mg/dL      Total Protein 6.9 g/dL      Albumin 3.50 g/dL      ALT (SGPT) 14 U/L      AST (SGOT) 21 U/L      Alkaline Phosphatase 46 U/L      Total Bilirubin 0.5 mg/dL      Globulin 3.4 gm/dL      A/G Ratio 1.0 g/dL      BUN/Creatinine Ratio 30.9     Anion Gap 12.0 mmol/L      eGFR 73.0 mL/min/1.73      Comment: National Kidney Foundation and American Society of Nephrology (ASN) Task Force recommended calculation based on the Chronic Kidney Disease Epidemiology Collaboration (CKD-EPI) equation refit without adjustment for race.       Narrative:      GFR Normal >60  Chronic Kidney Disease <60  Kidney Failure <15    The GFR formula is only valid for adults with stable renal function between ages 18 and 70.    C-reactive Protein [[id number]]  (Abnormal) Collected: 11/08/22 0838    Specimen: Blood Updated: 11/08/22 0912     C-Reactive Protein 20.25 mg/dL     BNP [[id number]]  (Abnormal) Collected: 11/08/22 0838    Specimen: Blood Updated: 11/08/22 0910     proBNP 8,603.0 pg/mL     Narrative:      Among patients with dyspnea, NT-proBNP is highly sensitive for the detection of acute congestive heart failure. In addition NT-proBNP of <300 pg/ml effectively rules out acute congestive heart failure with 99% negative predictive value.      Troponin [[id number]]  (Normal) Collected: 11/08/22 0838    Specimen: Blood Updated: 11/08/22 0909     Troponin T 0.010 ng/mL     Narrative:      Troponin T Reference Range:  <= 0.03 ng/mL-   Negative for AMI  >0.03 ng/mL-     Abnormal for myocardial necrosis.  Clinicians would have to utilize clinical acumen, EKG, Troponin and serial changes to determine if it is an Acute Myocardial Infarction or myocardial injury due to an  underlying chronic condition.       Results may be falsely decreased if patient taking Biotin.      Lipase [[id number]]  (Abnormal) Collected: 11/08/22 0838    Specimen: Blood Updated: 11/08/22 0907     Lipase 11 U/L     Magnesium [[id number]]  (Normal) Collected: 11/08/22 0838    Specimen: Blood Updated: 11/08/22 0907     Magnesium 1.8 mg/dL     Urinalysis With Culture If Indicated - Urine, Catheter [[id number]]  (Abnormal) Collected: 11/08/22 0838    Specimen: Urine, Catheter Updated: 11/08/22 0853     Color, UA Dark Yellow     Appearance, UA Clear     pH, UA 6.0     Specific Gravity, UA 1.020     Glucose, UA Negative     Ketones, UA Negative     Bilirubin, UA Negative     Blood, UA Negative     Protein, UA Trace     Leuk Esterase, UA Negative     Nitrite, UA Negative     Urobilinogen, UA 1.0 E.U./dL    Narrative:      In absence of clinical symptoms, the presence of pyuria, bacteria, and/or nitrites on the urinalysis result does not correlate with infection.  Urine microscopic not indicated.    Lactic Acid, Plasma [[id number]]  (Normal) Collected: 11/08/22 0811    Specimen: Blood Updated: 11/08/22 0835     Lactate 1.9 mmol/L     Protime-INR [[id number]]  (Abnormal) Collected: 11/08/22 0811    Specimen: Blood Updated: 11/08/22 0833     Protime 16.1 Seconds      INR 1.35    D-dimer, Quantitative [[id number]]  (Abnormal) Collected: 11/08/22 0811    Specimen: Blood Updated: 11/08/22 0833     D-Dimer, Quantitative 1.93 MCGFEU/mL     Narrative:      Reference Range is 0-0.50 MCGFEU/mL. However, results <0.50 MCGFEU/mL tends to rule out DVT or PE. Results >0.50 MCGFEU/mL are not useful in predicting absence or presence of DVT or PE.      aPTT [[id number]]  (Abnormal) Collected: 11/08/22 0811    Specimen: Blood Updated: 11/08/22 0833     PTT 35.6 seconds     Blood Culture - Blood, Arm, Right [[id number]] Collected: 11/08/22 0811    Specimen: Blood from Arm, Right Updated: 11/08/22 0829    CBC & Differential [[id number]]   (Abnormal) Collected: 11/08/22 0811    Specimen: Blood Updated: 11/08/22 0822    Narrative:      The following orders were created for panel order CBC & Differential.  Procedure                               Abnormality         Status                     ---------                               -----------         ------                     CBC Auto Differential[[id number]]        Abnormal            Final result                 Please view results for these tests on the individual orders.    CBC Auto Differential [[id number]]  (Abnormal) Collected: 11/08/22 0811    Specimen: Blood Updated: 11/08/22 0822     WBC 19.73 10*3/mm3      RBC 4.29 10*6/mm3      Hemoglobin 14.3 g/dL      Hematocrit 42.9 %      .0 fL      MCH 33.3 pg      MCHC 33.3 g/dL      RDW 16.2 %      RDW-SD 60.4 fl      MPV 9.5 fL      Platelets 330 10*3/mm3      Neutrophil % 74.7 %      Lymphocyte % 11.6 %      Monocyte % 11.3 %      Eosinophil % 0.6 %      Basophil % 0.5 %      Immature Grans % 1.3 %      Neutrophils, Absolute 14.77 10*3/mm3      Lymphocytes, Absolute 2.28 10*3/mm3      Monocytes, Absolute 2.22 10*3/mm3      Eosinophils, Absolute 0.11 10*3/mm3      Basophils, Absolute 0.10 10*3/mm3      Immature Grans, Absolute 0.25 10*3/mm3      nRBC 0.0 /100 WBC           Assessment     1.  Bilateral pulmonary emboli: CTA notes no significant right heart strain  2.  Volume overload: elevated BNP with interstitial edema receiving IV diuretics.   3.  Chest pain: worse with inspiration and palpation on exam, normal cardiac markers not consistent with ischemic chest pain  4.  Abnormal CT chest findings with concern for developing consolidation, elevated WBC, procalcitonin  5.  Abnormal CT findings on abd/pelvis - concern for renal neoplasm.  6.  History of SVT: on acebutolol  7.  Malnutrition: BMI 16.24      Plan     Cardiology was consulted for evaluation of the patient with acute pulmonary embolism.  She is currently hemodynamically stable on  supplemental oxygen with adequate oxygenation.  She is alert and oriented on exam.  Cardiac markers are negative.  No significant heart strain noted on CT.  Echocardiogram has been performed at the bedside.  Venous Dopplers are being performed currently.  She has been administered IV diuretics.  She is tachycardic on exam and has a history of SVT.  She is in a narrow complex tachycardia currently appears to be sinus tachycardia.    She is underweight with a BMI of 16.  She has concern for renal neoplasm noted on imaging.  She is currently hemodynamically stable with normal cardiac markers and no evidence of right heart strain.  Would favor with ongoing administration of diuretics and anticoagulation rather than lytic administration or large bore device intervention.     Further recommendations from Dr. Garth Plunkett.      Electronically signed by LISA Live, 11/08/22, 3:44 PM CST.      Please note this cardiology consultation note is the result of a face to face consultation with the patient, in addition to reviewing medical records at length by myself, LISA Live.       Time: 35 minutes

## 2022-11-08 NOTE — H&P
[facility] Medicine Services  HISTORY AND PHYSICAL    Date of Admission: 11/8/2022  Primary Care Physician: Garth Amezcua APRN    Subjective     Chief Complaint:   Left chest pain, shortness of breath    History of Present Illness    This 81-year-old female presents to the emergency department today with a primary complaint of left upper quadrant discomfort, sharp left lower chest pain and shortness of breath beginning this morning.  The patient states that her pain is worse when she takes a deep breath.  She denies cough, sputum production, fever.  She has a nursing home resident and has been so since she incurred a hip fracture in May of this year.  She subsequently developed foot discomfort and has been essentially nonambulatory or minimally ambulatory since her admission to the nursing facility.  Work-up in the emergency department reveals negative troponin, unremarkable CMP, negative COVID and influenza swabs, unremarkable urinalysis.  ABG shows normal pH with PO2 70.8 on 3 L per nasal cannula.  BNP was elevated at 8600 with procalcitonin 0.26, CRP 20.25.  D-dimer elevated 1.93.  PT and PTT elevated but not remarkably so.  White blood cell count 19,700 with a mild left shift.    Currently, the patient is requiring Vapotherm at 20 L with FiO2 0.40 in order to maintain saturations in the mid to low 90s.    Review of Systems   Constitutional: Negative.    HENT: Negative.    Eyes: Negative.    Respiratory: Positive for shortness of breath.    Cardiovascular: Positive for chest pain. Negative for leg swelling.   Gastrointestinal: Negative.    Endocrine: Negative.    Genitourinary: Negative.    Musculoskeletal: Positive for gait problem.   Skin: Negative.    Allergic/Immunologic: Negative.    Hematological: Negative.    Psychiatric/Behavioral: Negative.       Otherwise complete ROS reviewed and negative except as mentioned in the HPI.    Past Medical History:     Past  Medical History:   Diagnosis Date   • Chronic hyponatremia    • Frequent UTI    • Low back pain    • Migraines    • SVT (supraventricular tachycardia) (HCC)        Past Surgical History:  Past Surgical History:   Procedure Laterality Date   • CHOLECYSTECTOMY WITH INTRAOPERATIVE CHOLANGIOGRAM N/A 5/9/2019    Procedure: CHOLECYSTECTOMY LAPAROSCOPIC INTRAOPERATIVE CHOLANGIOGRAM;  Surgeon: Chandan Bravo MD;  Location:  [facility] OR;  Service: General   • HIP HEMIARTHROPLASTY Left 5/6/2022    Procedure: HIP HEMIARTHROPLASTY;  Surgeon: Barrington Simons MD;  Location:  [facility] OR;  Service: Orthopedics;  Laterality: Left;       Family History:   family history includes Anemia in her mother; Colon cancer in her father; Heart attack in her father; Heart disease in her father; Stroke in her mother.    Social History:    reports that she has never smoked. She has never used smokeless tobacco. She reports that she does not drink alcohol and does not use drugs.    Allergies:  Allergies   Allergen Reactions   • Codeine Anaphylaxis       Medications:  Prior to Admission medications    Medication Sig Start Date End Date Taking? Authorizing Provider   acebutolol (SECTRAL) 200 MG capsule Take 200 mg by mouth 2 (Two) Times a Day.   Yes Levy Lo MD   bisacodyl (DULCOLAX) 5 MG EC tablet Take 10 mg by mouth every night at bedtime.   Yes Levy Lo MD   docusate sodium (Colace) 100 MG capsule Take 1 capsule by mouth Daily. 5/10/22  Yes William Turner MD   fenofibrate (TRICOR) 145 MG tablet Take 145 mg by mouth Daily.   Yes Levy Lo MD   gabapentin (NEURONTIN) 100 MG capsule Take 1 capsule by mouth 3 (Three) Times a Day. 5/10/22  Yes William Turner MD   LORazepam (ATIVAN) 0.5 MG tablet Take 1 tablet by mouth 3 (Three) Times a Day. 5/10/22  Yes William Turner MD   melatonin 5 MG tablet tablet Take 5 mg by mouth Every Night.   Yes Levy Lo MD  "  oxyCODONE-acetaminophen (PERCOCET) 5-325 MG per tablet Take 1 tablet by mouth Every 8 (Eight) Hours As Needed for Moderate Pain . 5/10/22  Yes William Turner MD   PARoxetine (PAXIL) 40 MG tablet Take 40 mg by mouth Every Morning.   Yes Levy Lo MD   acetaminophen (TYLENOL) 325 MG tablet Take 2 tablets by mouth Every 4 (Four) Hours As Needed for Mild Pain . 4/9/21   Jesse Castillo MD   budesonide-formoterol (SYMBICORT) 160-4.5 MCG/ACT inhaler Inhale 2 puffs 2 (Two) Times a Day.    Levy Lo MD   cholecalciferol (VITAMIN D3) 25 MCG (1000 UT) tablet Take 2 tablets by mouth Daily. 5/11/22   William Turner MD   dilTIAZem CD (CARDIZEM CD) 240 MG 24 hr capsule Take 1 capsule by mouth Daily.    Levy Lo MD   famotidine (PEPCID) 20 MG tablet Take 20 mg by mouth Daily.    Levy Lo MD   ipratropium-albuterol (COMBIVENT RESPIMAT)  MCG/ACT inhaler Inhale 1 puff 4 (Four) Times a Day As Needed for Wheezing.    Levy Lo MD   megestrol (MEGACE) 40 MG/ML suspension Take 400 mg by mouth Daily.    Levy Lo MD   Menthol-Zinc Oxide (Calmoseptine) 0.44-20.6 % ointment Apply 1 application topically to the appropriate area as directed As Needed.    Levy Lo MD   ondansetron (ZOFRAN) 4 MG tablet Take 4 mg by mouth Every 8 (Eight) Hours As Needed for Nausea or Vomiting.    Levy Lo MD   polyethylene glycol (MIRALAX) 17 GM/SCOOP powder Take 17 g by mouth Daily.    Levy Lo MD   tuberculin 5 UNIT/0.1ML injection Inject  into the appropriate area of the skin as directed by provider 1 (One) Time.    Levy Lo MD       I have utilized all available immediate resources to obtain, update, and review the patient's current medications.    Objective     Vital Signs:   /90   Pulse (!) 143   Temp 97.5 °F (36.4 °C) (Axillary)   Resp 20   Ht 157.5 cm (62.01\")   Wt 40.3 kg (88 lb " 12.8 oz)   SpO2 93%   BMI 16.24 kg/m²     Physical Exam  Constitutional:       General: She is in acute distress.      Appearance: Normal appearance. She is normal weight.   HENT:      Head: Normocephalic and atraumatic.      Right Ear: External ear normal.      Left Ear: External ear normal.      Nose: Nose normal.      Mouth/Throat:      Mouth: Mucous membranes are moist.      Pharynx: Oropharynx is clear.   Eyes:      Extraocular Movements: Extraocular movements intact.      Conjunctiva/sclera: Conjunctivae normal.      Pupils: Pupils are equal, round, and reactive to light.   Cardiovascular:      Rate and Rhythm: Regular rhythm. Tachycardia present.      Pulses: Normal pulses.      Heart sounds: Normal heart sounds. No murmur heard.  Pulmonary:      Effort: Respiratory distress present.      Breath sounds: Normal breath sounds.   Chest:      Chest wall: Tenderness present.   Abdominal:      General: Abdomen is flat. Bowel sounds are normal.      Palpations: Abdomen is soft. There is no mass.      Tenderness: There is no abdominal tenderness.   Musculoskeletal:         General: Normal range of motion.      Right lower leg: No edema.      Left lower leg: No edema.   Skin:     General: Skin is warm and dry.      Coloration: Skin is not pale.   Neurological:      General: No focal deficit present.      Mental Status: She is alert and oriented to person, place, and time. Mental status is at baseline.   Psychiatric:         Mood and Affect: Mood is anxious.         Judgment: Judgment normal.       Results Reviewed:  Lab Results (last 24 hours)     Procedure Component Value Units Date/Time    Troponin [[id number]]  (Normal) Collected: 11/08/22 1015    Specimen: Blood Updated: 11/08/22 1041     Troponin T <0.010 ng/mL     Narrative:      Troponin T Reference Range:  <= 0.03 ng/mL-   Negative for AMI  >0.03 ng/mL-     Abnormal for myocardial necrosis.  Clinicians would have to utilize clinical acumen, EKG, Troponin and  serial changes to determine if it is an Acute Myocardial Infarction or myocardial injury due to an underlying chronic condition.       Results may be falsely decreased if patient taking Biotin.      Blood Culture - Blood, Arm, Right [[id number]] Collected: 11/08/22 0835    Specimen: Blood from Arm, Right Updated: 11/08/22 1020    COVID-19, FLU A/B, RSV PCR - Swab, Nasopharynx [[id number]]  (Normal) Collected: 11/08/22 0838    Specimen: Swab from Nasopharynx Updated: 11/08/22 0949     COVID19 Not Detected     Influenza A PCR Not Detected     Influenza B PCR Not Detected     RSV, PCR Not Detected    Narrative:      Fact sheet for providers: https://www.fda.gov/media/145291/download    Fact sheet for patients: https://www.fda.gov/media/436104/download    Test performed by PCR.    Blood Gas, Arterial - [[id number]]  (Abnormal) Collected: 11/08/22 0920    Specimen: Arterial Blood Updated: 11/08/22 0917     Site Right Brachial     Meliton's Test N/A     pH, Arterial 7.352 pH units      pCO2, Arterial 36.9 mm Hg      pO2, Arterial 70.8 mm Hg      Comment: 84 Value below reference range        HCO3, Arterial 20.5 mmol/L      Base Excess, Arterial -4.6 mmol/L      Comment: 84 Value below reference range        O2 Saturation, Arterial 93.5 %      Comment: 84 Value below reference range        Temperature 37.0 C      Barometric Pressure for Blood Gas 761 mmHg      Modality Nasal Cannula     Flow Rate 3.0 lpm      Ventilator Mode NA     Collected by [id number]     Comment: Meter: [id number]     :  [id number]        pCO2, Temperature Corrected 36.9 mm Hg      pH, Temp Corrected 7.352 pH Units      pO2, Temperature Corrected 70.8 mm Hg     Procalcitonin [[id number]]  (Abnormal) Collected: 11/08/22 0838    Specimen: Blood Updated: 11/08/22 0917     Procalcitonin 0.26 ng/mL     Narrative:      As a Marker for Sepsis (Non-Neonates):    1. <0.5 ng/mL represents a low risk of severe sepsis and/or septic shock.  2. >2 ng/mL  "represents a high risk of severe sepsis and/or septic shock.    As a Marker for Lower Respiratory Tract Infections that require antibiotic therapy:    PCT on Admission    Antibiotic Therapy       6-12 Hrs later    >0.5                Strongly Recommended  >0.25 - <0.5        Recommended   0.1 - 0.25          Discouraged              Remeasure/reassess PCT  <0.1                Strongly Discouraged     Remeasure/reassess PCT    As 28 day mortality risk marker: \"Change in Procalcitonin Result\" (>80% or <=80%) if Day 0 (or Day 1) and Day 4 values are available. Refer to http://www.[facility]way[facility] [email or web address] – [email or web address]-calculator.com    Change in PCT <=80%  A decrease of PCT levels below or equal to 80% defines a positive change in PCT test result representing a higher risk for 28-day all-cause mortality of patients diagnosed with severe sepsis for septic shock.    Change in PCT >80%  A decrease of PCT levels of more than 80% defines a negative change in PCT result representing a lower risk for 28-day all-cause mortality of patients diagnosed with severe sepsis or septic shock.       Comprehensive Metabolic Panel [[id number]]  (Abnormal) Collected: 11/08/22 0838    Specimen: Blood Updated: 11/08/22 0912     Glucose 121 mg/dL      BUN 25 mg/dL      Creatinine 0.81 mg/dL      Sodium 136 mmol/L      Potassium 4.8 mmol/L      Chloride 102 mmol/L      CO2 22.0 mmol/L      Calcium 9.4 mg/dL      Total Protein 6.9 g/dL      Albumin 3.50 g/dL      ALT (SGPT) 14 U/L      AST (SGOT) 21 U/L      Alkaline Phosphatase 46 U/L      Total Bilirubin 0.5 mg/dL      Globulin 3.4 gm/dL      A/G Ratio 1.0 g/dL      BUN/Creatinine Ratio 30.9     Anion Gap 12.0 mmol/L      eGFR 73.0 mL/min/1.73      Comment: National Kidney Foundation and American Society of Nephrology (ASN) Task Force recommended calculation based on the Chronic Kidney Disease Epidemiology Collaboration (CKD-EPI) equation refit without adjustment for race.       Narrative:      GFR Normal >60  Chronic " Kidney Disease <60  Kidney Failure <15    The GFR formula is only valid for adults with stable renal function between ages 18 and 70.    C-reactive Protein [[id number]]  (Abnormal) Collected: 11/08/22 0838    Specimen: Blood Updated: 11/08/22 0912     C-Reactive Protein 20.25 mg/dL     BNP [[id number]]  (Abnormal) Collected: 11/08/22 0838    Specimen: Blood Updated: 11/08/22 0910     proBNP 8,603.0 pg/mL     Narrative:      Among patients with dyspnea, NT-proBNP is highly sensitive for the detection of acute congestive heart failure. In addition NT-proBNP of <300 pg/ml effectively rules out acute congestive heart failure with 99% negative predictive value.      Troponin [[id number]]  (Normal) Collected: 11/08/22 0838    Specimen: Blood Updated: 11/08/22 0909     Troponin T 0.010 ng/mL     Narrative:      Troponin T Reference Range:  <= 0.03 ng/mL-   Negative for AMI  >0.03 ng/mL-     Abnormal for myocardial necrosis.  Clinicians would have to utilize clinical acumen, EKG, Troponin and serial changes to determine if it is an Acute Myocardial Infarction or myocardial injury due to an underlying chronic condition.       Results may be falsely decreased if patient taking Biotin.      Lipase [[id number]]  (Abnormal) Collected: 11/08/22 0838    Specimen: Blood Updated: 11/08/22 0907     Lipase 11 U/L     Magnesium [[id number]]  (Normal) Collected: 11/08/22 0838    Specimen: Blood Updated: 11/08/22 0907     Magnesium 1.8 mg/dL     Urinalysis With Culture If Indicated - Urine, Catheter [[id number]]  (Abnormal) Collected: 11/08/22 0838    Specimen: Urine, Catheter Updated: 11/08/22 0853     Color, UA Dark Yellow     Appearance, UA Clear     pH, UA 6.0     Specific Gravity, UA 1.020     Glucose, UA Negative     Ketones, UA Negative     Bilirubin, UA Negative     Blood, UA Negative     Protein, UA Trace     Leuk Esterase, UA Negative     Nitrite, UA Negative     Urobilinogen, UA 1.0 E.U./dL    Narrative:      In absence of clinical  symptoms, the presence of pyuria, bacteria, and/or nitrites on the urinalysis result does not correlate with infection.  Urine microscopic not indicated.    Lactic Acid, Plasma [[id number]]  (Normal) Collected: 11/08/22 0811    Specimen: Blood Updated: 11/08/22 0835     Lactate 1.9 mmol/L     Protime-INR [[id number]]  (Abnormal) Collected: 11/08/22 0811    Specimen: Blood Updated: 11/08/22 0833     Protime 16.1 Seconds      INR 1.35    D-dimer, Quantitative [[id number]]  (Abnormal) Collected: 11/08/22 0811    Specimen: Blood Updated: 11/08/22 0833     D-Dimer, Quantitative 1.93 MCGFEU/mL     Narrative:      Reference Range is 0-0.50 MCGFEU/mL. However, results <0.50 MCGFEU/mL tends to rule out DVT or PE. Results >0.50 MCGFEU/mL are not useful in predicting absence or presence of DVT or PE.      aPTT [[id number]]  (Abnormal) Collected: 11/08/22 0811    Specimen: Blood Updated: 11/08/22 0833     PTT 35.6 seconds     Blood Culture - Blood, Arm, Right [[id number]] Collected: 11/08/22 0811    Specimen: Blood from Arm, Right Updated: 11/08/22 0829    CBC & Differential [[id number]]  (Abnormal) Collected: 11/08/22 0811    Specimen: Blood Updated: 11/08/22 0822    Narrative:      The following orders were created for panel order CBC & Differential.  Procedure                               Abnormality         Status                     ---------                               -----------         ------                     CBC Auto Differential[[id number]]        Abnormal            Final result                 Please view results for these tests on the individual orders.    CBC Auto Differential [[id number]]  (Abnormal) Collected: 11/08/22 0811    Specimen: Blood Updated: 11/08/22 0822     WBC 19.73 10*3/mm3      RBC 4.29 10*6/mm3      Hemoglobin 14.3 g/dL      Hematocrit 42.9 %      .0 fL      MCH 33.3 pg      MCHC 33.3 g/dL      RDW 16.2 %      RDW-SD 60.4 fl      MPV 9.5 fL      Platelets 330 10*3/mm3      Neutrophil % 74.7  %      Lymphocyte % 11.6 %      Monocyte % 11.3 %      Eosinophil % 0.6 %      Basophil % 0.5 %      Immature Grans % 1.3 %      Neutrophils, Absolute 14.77 10*3/mm3      Lymphocytes, Absolute 2.28 10*3/mm3      Monocytes, Absolute 2.22 10*3/mm3      Eosinophils, Absolute 0.11 10*3/mm3      Basophils, Absolute 0.10 10*3/mm3      Immature Grans, Absolute 0.25 10*3/mm3      nRBC 0.0 /100 WBC           CT Abdomen Pelvis With Contrast    Result Date: 11/8/2022  Narrative: EXAMINATION:  CT ABDOMEN PELVIS W CONTRAST-  11/8/2022 9:46 AM CST  HISTORY: Severe epigastric pain. CT angiogram of the chest was also performed separately.  TECHNIQUE: Spiral CT was performed of the abdomen and pelvis with IV contrast. Multiplanar images were reconstructed.  DLP: 240 mGy-cm. Automated dosage reduction technique was utilized to reduce patient dose.  COMPARISON: 5/6/2022.  LUNG BASES: Please see the chest CT report separately. There is atelectasis in both lung bases. There is a small left pleural effusion. There is cardiomegaly.  LIVER AND SPLEEN: There is breathing motion artifact. There has been prior cholecystectomy. No acute liver abnormality is seen. The spleen is unremarkable.  PANCREAS: There is breathing motion artifact. No acute abnormality is seen.  KIDNEYS AND ADRENALS: There is breathing motion artifact. The adrenal glands are normal in size. There is a 4-5 mm probable cyst upper pole right kidney. This is too small to characterize. There is breathing motion artifact. There is a 1.6 cm possible enhancing lesion lower pole left kidney with a Hounsfield unit measurement of 87. There is a 9 mm probable cyst lower pole right kidney. The urinary bladder is unremarkable.  BOWEL: There is thickening of the gastric wall. The stomach is not very well distended. There is thickening of the wall of the antrum. Small bowel loops are nondilated. There is moderate stool in the colon. There is diffuse breathing motion artifact. There may  be mild fecal impaction of the rectum.  OTHER: There is atheromatous disease of the aortoiliac vessels. There is no free fluid. There has been prior left hip arthroplasty. There is a chronic compression deformity of L2. There is new compression deformity of T12 compared to the prior study that is probably at least subacute in age. There is also new mild compression of the inferior endplate of L3, likely at least subacute in age.      Impression: 1. Probable solid 1.6 cm enhancing lesion lower pole left kidney concerning for neoplasm. This lesion is not fully worked up on this single phase contrast study. Urology consultation recommended. 2. There are couple of probable renal cysts on the right side. One is too small to fully characterize. 3. Prior cholecystectomy. 4. Gastric wall thickening may be an artifact of underdistention. Gastritis and other etiologies are included in the differential. 5. Moderate amount of stool in the colon. There may be mild fecal impaction of the rectum. There is no small bowel distention. 6. Chronic appearing compression deformity of L2. Likely subacute fractures of T12 and L3 that are not present on the previous exam. There are mild degenerative changes of the spine. There is degenerative change of the right hip. Prior left hip arthroplasty. Other nonacute findings, as discussed.  The full report of this exam was immediately signed and available to the emergency room. The patient is currently in the emergency room. This report was finalized on 11/08/2022 10:16 by Dr. Ag Lane MD.    XR Chest 1 View    Result Date: 11/8/2022  Narrative: EXAMINATION: Chest 1 view 11/8/2022  HISTORY: Shortness of breath and hypoxemia  FINDINGS: Today's exam is compared to previous study of 5/6/2022. The lungs are hypoventilated with bibasilar atelectasis. More confluent disease within the left lower lobe may even represent an infiltrate. No effusion or free air present. There is evidence of remote  granulomatous disease.      Impression: 1.. Bibasilar atelectasis. More confluent disease within the left lower lobe may even represent a pulmonary infiltrate. There is no effusion or free air present. This report was finalized on 11/08/2022 08:38 by Dr. Hakan Rg MD.    CT Angiogram Chest    Result Date: 11/8/2022  Narrative: CT ANGIOGRAM CHEST- 11/8/2022 9:46 AM CST  HISTORY: Shortness of breath, hypoxemia, elevated dimer  COMPARISON: CT scan dated 5/6/2022.  DOSE LENGTH PRODUCT: 131 mGy cm. Automated exposure control was also utilized to decrease patient radiation dose.  TECHNIQUE: Helical tomographic images of the chest were obtained after the administration of intravenous contrast following angiogram protocol. Additionally, 3D and multiplanar reformatted images were provided.    FINDINGS:  Pulmonary arteries: There is adequate enhancement of the pulmonary arteries to evaluate for central and segmental pulmonary emboli. Bilateral pulmonary embolus. This includes right upper, right middle and right lower lobe pulmonary embolus identified in the lobar, segmental and subsegmental pulmonary arteries. Quite extensive pulmonary embolus identified in the left lower lobe segmental and subsegmental pulmonary arteries into the basal segments. There is embolus identified in the left upper lobe as well. Main pulmonary artery is nondilated.  Aorta and great vessels: Thoracic aorta is normal in caliber. No dissection identified. No flow-limiting stenosis identified at the great vessel origins.  Visualized neck base: The imaged portion of the base of the neck appears unremarkable.  Lungs: Advanced lung emphysema. New bilateral interlobular septal thickening and peribronchial thickening. Developing consolidation identified in the dependent portion of the left lower lobe. There is a trace layering left pleural effusion as well. The airways are clear. Evidence for tracheobronchomalacia.  Heart: Right ventricle is slightly  larger than the left. This configuration is stable from the earlier comparison CT as well. There is no pericardial effusion. Mild coronary atheromatous calcification.  Mediastinum and lymph nodes: No suspicious hilar or mediastinal adenopathy. Incidental granulomatous calcification..  Skeletal and soft tissues: Chest wall soft tissues are unremarkable. No acute bony abnormality. T12 compression deformity is new from May 2022.  Upper abdomen: The imaged portion of the upper abdomen demonstrates no acute process.      Impression: 1. 5 lobe acute pulmonary embolus. Main pulmonary artery is nondilated. Right ventricle is slightly larger than the left ventricle, however, this is stable from the earlier May 2022 comparison CT at which time there is no pulmonary embolus. No strong CT evidence for significant right heart strain. 2. Interstitial edema is present. There is developing consolidation in the dependent portion of the left lower lobe reflecting either alveolar edema versus pulmonary infarct.   This report was finalized on 11/08/2022 10:29 by Dr Donny Potter, .     I have personally reviewed and interpreted the radiology studies and ECG obtained at time of admission.     Assessment / Plan      Assessment & Plan  Active Hospital Problems    Diagnosis    • **Acute pulmonary embolism without acute cor pulmonale (HCC)    • Pulmonary HTN (HCC)    • Acute pulmonary edema (HCC)    • Sinus tachycardia    • Acute deep vein thrombosis (DVT) of left lower extremity (HCC)    • Body mass index (BMI) less than 16.5    • Failure to thrive in adult        PLAN:   Admit to CCU  Lasix 40 mg IV every 12 hours  Echocardiogram  Venous dopplers BLE  Full anticoagulation Lovenox dosed per pharmacy  Cardiology consultation    Code Status:   DNR/DNI  Surrogate decision maker is her daughter     I discussed the patient's findings and my recommendations with: The patient's daughter    Estimated length of stay: over 2 days    Electronically  signed by Wenceslao Brown DO, 11/08/22, 16:08 CST.    I spent 45 minutes providing critical care management to this patient. This excludes time spent in performing separately billed procedures.

## 2022-11-09 LAB
ANION GAP SERPL CALCULATED.3IONS-SCNC: 14 MMOL/L (ref 5–15)
BASOPHILS # BLD AUTO: 0.06 10*3/MM3 (ref 0–0.2)
BASOPHILS NFR BLD AUTO: 0.4 % (ref 0–1.5)
BUN SERPL-MCNC: 21 MG/DL (ref 8–23)
BUN/CREAT SERPL: 26.9 (ref 7–25)
CALCIUM SPEC-SCNC: 9.4 MG/DL (ref 8.6–10.5)
CHLORIDE SERPL-SCNC: 102 MMOL/L (ref 98–107)
CO2 SERPL-SCNC: 23 MMOL/L (ref 22–29)
CREAT SERPL-MCNC: 0.78 MG/DL (ref 0.57–1)
DEPRECATED RDW RBC AUTO: 58.4 FL (ref 37–54)
EGFRCR SERPLBLD CKD-EPI 2021: 76.4 ML/MIN/1.73
EOSINOPHIL # BLD AUTO: 0.04 10*3/MM3 (ref 0–0.4)
EOSINOPHIL NFR BLD AUTO: 0.3 % (ref 0.3–6.2)
ERYTHROCYTE [DISTWIDTH] IN BLOOD BY AUTOMATED COUNT: 15.7 % (ref 12.3–15.4)
GLUCOSE SERPL-MCNC: 161 MG/DL (ref 65–99)
HCT VFR BLD AUTO: 37.8 % (ref 34–46.6)
HGB BLD-MCNC: 12.2 G/DL (ref 12–15.9)
IMM GRANULOCYTES # BLD AUTO: 0.13 10*3/MM3 (ref 0–0.05)
IMM GRANULOCYTES NFR BLD AUTO: 0.9 % (ref 0–0.5)
LYMPHOCYTES # BLD AUTO: 1.3 10*3/MM3 (ref 0.7–3.1)
LYMPHOCYTES NFR BLD AUTO: 9 % (ref 19.6–45.3)
MCH RBC QN AUTO: 32.3 PG (ref 26.6–33)
MCHC RBC AUTO-ENTMCNC: 32.3 G/DL (ref 31.5–35.7)
MCV RBC AUTO: 100 FL (ref 79–97)
MONOCYTES # BLD AUTO: 1.52 10*3/MM3 (ref 0.1–0.9)
MONOCYTES NFR BLD AUTO: 10.6 % (ref 5–12)
NEUTROPHILS NFR BLD AUTO: 11.35 10*3/MM3 (ref 1.7–7)
NEUTROPHILS NFR BLD AUTO: 78.8 % (ref 42.7–76)
NRBC BLD AUTO-RTO: 0 /100 WBC (ref 0–0.2)
PLATELET # BLD AUTO: 311 10*3/MM3 (ref 140–450)
PMV BLD AUTO: 9.8 FL (ref 6–12)
POTASSIUM SERPL-SCNC: 3.4 MMOL/L (ref 3.5–5.2)
QT INTERVAL: 296 MS
QT INTERVAL: 300 MS
QT INTERVAL: 362 MS
QTC INTERVAL: 433 MS
QTC INTERVAL: 447 MS
QTC INTERVAL: 469 MS
RBC # BLD AUTO: 3.78 10*6/MM3 (ref 3.77–5.28)
SODIUM SERPL-SCNC: 139 MMOL/L (ref 136–145)
UFH PPP CHRO-ACNC: 0.25 IU/ML
WBC NRBC COR # BLD: 14.4 10*3/MM3 (ref 3.4–10.8)

## 2022-11-09 PROCEDURE — 94799 UNLISTED PULMONARY SVC/PX: CPT

## 2022-11-09 PROCEDURE — 97162 PT EVAL MOD COMPLEX 30 MIN: CPT

## 2022-11-09 PROCEDURE — 97166 OT EVAL MOD COMPLEX 45 MIN: CPT | Performed by: OCCUPATIONAL THERAPIST

## 2022-11-09 PROCEDURE — 85025 COMPLETE CBC W/AUTO DIFF WBC: CPT | Performed by: FAMILY MEDICINE

## 2022-11-09 PROCEDURE — 99221 1ST HOSP IP/OBS SF/LOW 40: CPT | Performed by: NURSE PRACTITIONER

## 2022-11-09 PROCEDURE — 25010000002 ENOXAPARIN PER 10 MG: Performed by: FAMILY MEDICINE

## 2022-11-09 PROCEDURE — 94664 DEMO&/EVAL PT USE INHALER: CPT

## 2022-11-09 PROCEDURE — 25010000002 FUROSEMIDE PER 20 MG: Performed by: FAMILY MEDICINE

## 2022-11-09 PROCEDURE — 85520 HEPARIN ASSAY: CPT | Performed by: FAMILY MEDICINE

## 2022-11-09 PROCEDURE — 80048 BASIC METABOLIC PNL TOTAL CA: CPT | Performed by: FAMILY MEDICINE

## 2022-11-09 RX ORDER — CHLORHEXIDINE GLUCONATE 500 MG/1
1 CLOTH TOPICAL EVERY 24 HOURS
Status: DISCONTINUED | OUTPATIENT
Start: 2022-11-10 | End: 2022-11-11 | Stop reason: HOSPADM

## 2022-11-09 RX ORDER — POTASSIUM CHLORIDE 750 MG/1
40 CAPSULE, EXTENDED RELEASE ORAL AS NEEDED
Status: DISCONTINUED | OUTPATIENT
Start: 2022-11-09 | End: 2022-11-11 | Stop reason: HOSPADM

## 2022-11-09 RX ORDER — POTASSIUM CHLORIDE 7.45 MG/ML
10 INJECTION INTRAVENOUS
Status: DISCONTINUED | OUTPATIENT
Start: 2022-11-09 | End: 2022-11-11 | Stop reason: HOSPADM

## 2022-11-09 RX ORDER — ENOXAPARIN SODIUM 100 MG/ML
1 INJECTION SUBCUTANEOUS EVERY 12 HOURS
Status: DISCONTINUED | OUTPATIENT
Start: 2022-11-09 | End: 2022-11-09

## 2022-11-09 RX ORDER — HYDROCODONE BITARTRATE AND ACETAMINOPHEN 5; 325 MG/1; MG/1
1 TABLET ORAL EVERY 6 HOURS PRN
Status: DISCONTINUED | OUTPATIENT
Start: 2022-11-09 | End: 2022-11-11 | Stop reason: HOSPADM

## 2022-11-09 RX ORDER — POTASSIUM CHLORIDE 1.5 G/1.77G
40 POWDER, FOR SOLUTION ORAL AS NEEDED
Status: DISCONTINUED | OUTPATIENT
Start: 2022-11-09 | End: 2022-11-11 | Stop reason: HOSPADM

## 2022-11-09 RX ORDER — CHLORHEXIDINE GLUCONATE 500 MG/1
1 CLOTH TOPICAL ONCE
Status: COMPLETED | OUTPATIENT
Start: 2022-11-09 | End: 2022-11-09

## 2022-11-09 RX ADMIN — BISACODYL 10 MG: 5 TABLET ORAL at 10:09

## 2022-11-09 RX ADMIN — IPRATROPIUM BROMIDE AND ALBUTEROL SULFATE 3 ML: 2.5; .5 SOLUTION RESPIRATORY (INHALATION) at 11:15

## 2022-11-09 RX ADMIN — ENOXAPARIN SODIUM 40 MG: 100 INJECTION SUBCUTANEOUS at 03:43

## 2022-11-09 RX ADMIN — MEGESTROL ACETATE 400 MG: 40 SUSPENSION ORAL at 10:10

## 2022-11-09 RX ADMIN — Medication 1 APPLICATION: at 20:34

## 2022-11-09 RX ADMIN — GABAPENTIN 100 MG: 100 CAPSULE ORAL at 10:09

## 2022-11-09 RX ADMIN — GABAPENTIN 100 MG: 100 CAPSULE ORAL at 15:55

## 2022-11-09 RX ADMIN — FUROSEMIDE 40 MG: 10 INJECTION, SOLUTION INTRAMUSCULAR; INTRAVENOUS at 02:03

## 2022-11-09 RX ADMIN — ACEBUTOLOL HYDROCHLORIDE 200 MG: 200 CAPSULE ORAL at 10:09

## 2022-11-09 RX ADMIN — FUROSEMIDE 40 MG: 10 INJECTION, SOLUTION INTRAMUSCULAR; INTRAVENOUS at 15:55

## 2022-11-09 RX ADMIN — BUDESONIDE AND FORMOTEROL FUMARATE DIHYDRATE 2 PUFF: 160; 4.5 AEROSOL RESPIRATORY (INHALATION) at 19:55

## 2022-11-09 RX ADMIN — Medication 10 ML: at 10:10

## 2022-11-09 RX ADMIN — ACEBUTOLOL HYDROCHLORIDE 200 MG: 200 CAPSULE ORAL at 20:34

## 2022-11-09 RX ADMIN — IPRATROPIUM BROMIDE AND ALBUTEROL SULFATE 3 ML: 2.5; .5 SOLUTION RESPIRATORY (INHALATION) at 15:20

## 2022-11-09 RX ADMIN — DOCUSATE SODIUM 100 MG: 100 CAPSULE, LIQUID FILLED ORAL at 10:09

## 2022-11-09 RX ADMIN — GABAPENTIN 100 MG: 100 CAPSULE ORAL at 20:34

## 2022-11-09 RX ADMIN — FAMOTIDINE 20 MG: 20 TABLET, FILM COATED ORAL at 10:08

## 2022-11-09 RX ADMIN — POTASSIUM CHLORIDE 40 MEQ: 10 CAPSULE, COATED, EXTENDED RELEASE ORAL at 20:34

## 2022-11-09 RX ADMIN — Medication 1 APPLICATION: at 12:07

## 2022-11-09 RX ADMIN — ACETAMINOPHEN 650 MG: 325 TABLET, FILM COATED ORAL at 10:33

## 2022-11-09 RX ADMIN — HYDROCODONE BITARTRATE AND ACETAMINOPHEN 1 TABLET: 5; 325 TABLET ORAL at 16:44

## 2022-11-09 RX ADMIN — IPRATROPIUM BROMIDE AND ALBUTEROL SULFATE 3 ML: 2.5; .5 SOLUTION RESPIRATORY (INHALATION) at 07:00

## 2022-11-09 RX ADMIN — HYDROCODONE BITARTRATE AND ACETAMINOPHEN 1 TABLET: 5; 325 TABLET ORAL at 22:25

## 2022-11-09 RX ADMIN — POTASSIUM CHLORIDE 40 MEQ: 10 CAPSULE, COATED, EXTENDED RELEASE ORAL at 16:44

## 2022-11-09 RX ADMIN — Medication 10 ML: at 20:34

## 2022-11-09 RX ADMIN — PAROXETINE 40 MG: 20 TABLET, FILM COATED ORAL at 07:44

## 2022-11-09 RX ADMIN — DILTIAZEM HYDROCHLORIDE 240 MG: 240 CAPSULE, EXTENDED RELEASE ORAL at 10:09

## 2022-11-09 RX ADMIN — ACETAMINOPHEN 650 MG: 325 TABLET, FILM COATED ORAL at 02:03

## 2022-11-09 RX ADMIN — IPRATROPIUM BROMIDE AND ALBUTEROL SULFATE 3 ML: 2.5; .5 SOLUTION RESPIRATORY (INHALATION) at 19:55

## 2022-11-09 RX ADMIN — BUDESONIDE AND FORMOTEROL FUMARATE DIHYDRATE 2 PUFF: 160; 4.5 AEROSOL RESPIRATORY (INHALATION) at 07:00

## 2022-11-09 RX ADMIN — CHLORHEXIDINE GLUCONATE 1 APPLICATION: 500 CLOTH TOPICAL at 10:10

## 2022-11-09 RX ADMIN — APIXABAN 10 MG: 5 TABLET, FILM COATED ORAL at 20:34

## 2022-11-09 NOTE — PROGRESS NOTES
RT EQUIPMENT DEVICE RELATED - SKIN ASSESSMENT    RT Medical Equipment/Device:     High Flow Nasal Cannula:   Vapotherm    Skin Assessment:      Cheek:  Intact  Ears:  Intact  Nares:  Intact    Device Skin Pressure Protection:  Skin-to skin areas padded

## 2022-11-09 NOTE — CONSULTS
[facility]  INPATIENT WOUND & OSTOMY CONSULTATION    Today's Date: 11/09/22    Patient Name: Zelalem Hung  MRN: [id number]  [facility]: [id number]  PCP: Garth Amezcua APRN  Referring Provider:   Consulting Provider (From admission, onward)    Start Ordered     Status Ordering Provider    11/08/22 1334 11/08/22 1334  Inpatient Wound Care Provider Consult  Once        Specialty:  Wound Care  Provider:  Dhara Veras APRN    Acknowledged CHASITY BOLTON         Attending Provider: Chasity Bolton DO  Length of Stay: 1    SUBJECTIVE   Chief Complaint: DTI on admission    HPI: Zelalem Hung, a 81 y.o.female, presents with a past medical history of SVT, migraines, UTI and low back pain.  A full past medical history as listed below.  Patient [facility] ED [facility] due to left chest pain and shortness of breath.  Patient was admitted for acute pulmonary embolism.      Inpatient wound care consulted due to DTI on admission.  Patient is currently in CCU 12 with daughter at the bedside.  Patient is on Vapotherm.  She answers questions correctly but seems confused during conversation.  She denies pain at this time.  She does not have pressure injury.  Skin is intact of sacral region.  Bilateral heels are red but blanchable.       Visit Dx:    ICD-10-CM ICD-9-CM   1. Single subsegmental pulmonary embolism without acute cor pulmonale ([facility])  I26.93 415.19   2. Pneumonia due to infectious organism, unspecified laterality, unspecified part of lung  J18.9 486   3. Congestive heart failure, unspecified HF chronicity, unspecified heart failure type ([facility])  I50.9 428.0     Patient Active Problem List   Diagnosis   • Cholecystitis   • Encephalopathy   • Unknown when patient last well, possible stroke    • Hyponatremia   • Failure to thrive in adult   • Severe malnutrition (HCC)   • History of migraines   • Closed fracture of nasal bone with routine healing   • Traumatic ecchymosis of face   •  Acquired deviated nasal septum   • Epistaxis   • Colitis   • Fall   • Compression fracture of body of thoracic vertebra (HCC)   • Closed compression fracture of third lumbar vertebra (HCC)   • Sepsis (HCC)   • Metabolic acidosis   • Fecal impaction (HCC)   • Closed left hip fracture (HCC)   • Nonsmoker   • Body mass index (BMI) less than 16.5   • Acute pulmonary embolism without acute cor pulmonale (HCC)   • Pulmonary HTN (HCC)   • Acute pulmonary edema (HCC)   • Sinus tachycardia   • Acute deep vein thrombosis (DVT) of left lower extremity (HCC)       History:   Past Medical History:   Diagnosis Date   • Chronic hyponatremia    • Frequent UTI    • Low back pain    • Migraines    • SVT (supraventricular tachycardia) (HCC)      Past Surgical History:   Procedure Laterality Date   • CHOLECYSTECTOMY WITH INTRAOPERATIVE CHOLANGIOGRAM N/A 5/9/2019    Procedure: CHOLECYSTECTOMY LAPAROSCOPIC INTRAOPERATIVE CHOLANGIOGRAM;  Surgeon: Chandan Bravo MD;  Location: [facility] OR;  Service: General   • HIP HEMIARTHROPLASTY Left 5/6/2022    Procedure: HIP HEMIARTHROPLASTY;  Surgeon: Barrington Simons MD;  Location: [facility] OR;  Service: Orthopedics;  Laterality: Left;     Social History     Socioeconomic History   • Marital status:    Tobacco Use   • Smoking status: Never   • Smokeless tobacco: Never   Vaping Use   • Vaping Use: Never used   Substance and Sexual Activity   • Alcohol use: No   • Drug use: No   • Sexual activity: Defer     Family History   Problem Relation Age of Onset   • Stroke Mother    • Anemia Mother    • Heart disease Father    • Heart attack Father    • Colon cancer Father        Allergies:  Allergies   Allergen Reactions   • Codeine Anaphylaxis       Medications:    Current Facility-Administered Medications:   •  acebutolol (SECTRAL) capsule 200 mg, 200 mg, Oral, Q12H, Wenceslao Brown DO, 200 mg at 11/09/22 1009  •  acetaminophen (TYLENOL) tablet 650 mg, 650 mg, Oral, Q4H PRN, Wenceslao Brown DO,  650 mg at 11/09/22 1033  •  aluminum-magnesium hydroxide-simethicone (MAALOX MAX) 400-400-40 MG/5ML suspension 15 mL, 15 mL, Oral, Q6H PRN, Wenceslao Brown DO  •  bisacodyl (DULCOLAX) EC tablet 10 mg, 10 mg, Oral, Daily, Wenceslao Brown DO, 10 mg at 11/09/22 1009  •  budesonide-formoterol (SYMBICORT) 160-4.5 MCG/ACT inhaler 2 puff, 2 puff, Inhalation, BID - RT, Wenceslao Brown DO, 2 puff at 11/09/22 0700  •  [START ON 11/10/2022] Chlorhexidine Gluconate Cloth 2 % pads 1 application, 1 application, Topical, Q24H, Wenceslao Brown DO  •  dilTIAZem CD (CARDIZEM CD) 24 hr capsule 240 mg, 240 mg, Oral, Daily, Wenceslao Brown DO, 240 mg at 11/09/22 1009  •  docusate sodium (COLACE) capsule 100 mg, 100 mg, Oral, Daily, Wenceslao Brown DO, 100 mg at 11/09/22 1009  •  Enoxaparin Sodium (LOVENOX) syringe 40 mg, 1 mg/kg, Subcutaneous, Q12H, Wenceslao Brown DO  •  famotidine (PEPCID) tablet 20 mg, 20 mg, Oral, Daily, Wenceslao Brown DO, 20 mg at 11/09/22 1008  •  furosemide (LASIX) injection 40 mg, 40 mg, Intravenous, Q12H, Wenceslao Brown DO, 40 mg at 11/09/22 0203  •  gabapentin (NEURONTIN) capsule 100 mg, 100 mg, Oral, TID, Wenceslao Brown DO, 100 mg at 11/09/22 1009  •  ipratropium-albuterol (DUO-NEB) nebulizer solution 3 mL, 3 mL, Nebulization, 4x Daily - RT, Wenceslao Brown DO, 3 mL at 11/09/22 1115  •  megestrol (MEGACE) 40 MG/ML suspension 400 mg, 400 mg, Oral, Daily, Wenceslao Brown DO, 400 mg at 11/09/22 1010  •  mupirocin (BACTROBAN) 2 % nasal ointment 1 application, 1 application, Each Nare, BID, eWnceslao Brown DO, 1 application at 11/09/22 1207  •  ondansetron (ZOFRAN) injection 4 mg, 4 mg, Intravenous, Q6H PRN, Wenceslao Brown DO  •  PARoxetine (PAXIL) tablet 40 mg, 40 mg, Oral, QAM, Wenceslao Brown DO, 40 mg at 11/09/22 0744  •  Pharmacy to Dose enoxaparin (LOVENOX), , Does not apply, Continuous PRN, Wenceslao Brown DO  •  sodium  chloride 0.9 % flush 10 mL, 10 mL, Intravenous, Q12H, Wenceslao Brown DO, 10 mL at 11/09/22 1010  •  sodium chloride 0.9 % flush 10 mL, 10 mL, Intravenous, PRN, Wenceslao Brown DO    Review of Systems:   Review of Systems   Constitutional: Negative for chills and fever.   HENT: Negative for rhinorrhea and sore throat.    Respiratory: Negative for cough and shortness of breath.    Cardiovascular: Negative for chest pain and palpitations.   Gastrointestinal: Negative for diarrhea, nausea and vomiting.   Genitourinary: Negative for flank pain and hematuria.   Musculoskeletal: Negative for arthralgias and myalgias.   Skin: Positive for color change. Negative for wound.   Neurological: Positive for weakness. Negative for dizziness.   Psychiatric/Behavioral: Positive for confusion. Negative for agitation and behavioral problems.         OBJECTIVE     Vitals:    11/09/22 1122   BP:    Pulse: 86   Resp:    Temp:    SpO2: 97%       PHYSICAL EXAM:   Physical Exam  Vitals and nursing note reviewed.   Constitutional:       General: She is awake.      Appearance: She is underweight.      Interventions: Nasal cannula in place.      Comments: Body mass index is 13.92 kg/m².    HENT:      Head: Normocephalic and atraumatic.   Eyes:      General: Lids are normal. Gaze aligned appropriately.   Cardiovascular:      Rate and Rhythm: Normal rate and regular rhythm.   Pulmonary:      Effort: Pulmonary effort is normal. No respiratory distress.   Abdominal:      General: Abdomen is flat.      Palpations: Abdomen is soft.   Genitourinary:     Comments: Care Dry external urinary cath  Musculoskeletal:      Cervical back: Normal range of motion and neck supple.   Feet:      Right foot:      Skin integrity: Erythema present. No ulcer.      Left foot:      Skin integrity: Erythema present. No ulcer.      Comments: Blanchable erythema of bilateral heels.  No broken skin.  Skin:     General: Skin is warm and dry.      Findings:  Erythema (Bilateral heels) present. No wound.      Comments: Area of concern was sacrum which is currently intact.  No erythema.  No wounds.    Neurological:      Mental Status: She is alert and oriented to person, place, and time. She is confused.      Motor: Weakness present.   Psychiatric:         Attention and Perception: Attention normal.         Mood and Affect: Mood normal.         Behavior: Behavior is cooperative.            Results Review:  Lab Results (last 48 hours)     Procedure Component Value Units Date/Time    Blood Culture - Blood, Arm, Right [[id number]]  (Normal) Collected: 11/08/22 0835    Specimen: Blood from Arm, Right Updated: 11/09/22 1030     Blood Culture No growth at 24 hours    Blood Culture - Blood, Arm, Right [[id number]]  (Normal) Collected: 11/08/22 0811    Specimen: Blood from Arm, Right Updated: 11/09/22 0830     Blood Culture No growth at 24 hours    Basic Metabolic Panel [[id number]]  (Abnormal) Collected: 11/09/22 0311    Specimen: Blood Updated: 11/09/22 0402     Glucose 161 mg/dL      BUN 21 mg/dL      Creatinine 0.78 mg/dL      Sodium 139 mmol/L      Potassium 3.4 mmol/L      Chloride 102 mmol/L      CO2 23.0 mmol/L      Calcium 9.4 mg/dL      BUN/Creatinine Ratio 26.9     Anion Gap 14.0 mmol/L      eGFR 76.4 mL/min/1.73      Comment: National Kidney Foundation and American Society of Nephrology (ASN) Task Force recommended calculation based on the Chronic Kidney Disease Epidemiology Collaboration (CKD-EPI) equation refit without adjustment for race.       Narrative:      GFR Normal >60  Chronic Kidney Disease <60  Kidney Failure <15    The GFR formula is only valid for adults with stable renal function between ages 18 and 70.    CBC & Differential [[id number]]  (Abnormal) Collected: 11/09/22 0311    Specimen: Blood Updated: 11/09/22 0344    Narrative:      The following orders were created for panel order CBC & Differential.  Procedure                               Abnormality          Status                     ---------                               -----------         ------                     CBC Auto Differential[[id number]]        Abnormal            Final result                 Please view results for these tests on the individual orders.    CBC Auto Differential [[id number]]  (Abnormal) Collected: 11/09/22 0311    Specimen: Blood Updated: 11/09/22 0344     WBC 14.40 10*3/mm3      RBC 3.78 10*6/mm3      Hemoglobin 12.2 g/dL      Hematocrit 37.8 %      .0 fL      MCH 32.3 pg      MCHC 32.3 g/dL      RDW 15.7 %      RDW-SD 58.4 fl      MPV 9.8 fL      Platelets 311 10*3/mm3      Neutrophil % 78.8 %      Lymphocyte % 9.0 %      Monocyte % 10.6 %      Eosinophil % 0.3 %      Basophil % 0.4 %      Immature Grans % 0.9 %      Neutrophils, Absolute 11.35 10*3/mm3      Lymphocytes, Absolute 1.30 10*3/mm3      Monocytes, Absolute 1.52 10*3/mm3      Eosinophils, Absolute 0.04 10*3/mm3      Basophils, Absolute 0.06 10*3/mm3      Immature Grans, Absolute 0.13 10*3/mm3      nRBC 0.0 /100 WBC     Troponin [[id number]]  (Normal) Collected: 11/08/22 1015    Specimen: Blood Updated: 11/08/22 1041     Troponin T <0.010 ng/mL     Narrative:      Troponin T Reference Range:  <= 0.03 ng/mL-   Negative for AMI  >0.03 ng/mL-     Abnormal for myocardial necrosis.  Clinicians would have to utilize clinical acumen, EKG, Troponin and serial changes to determine if it is an Acute Myocardial Infarction or myocardial injury due to an underlying chronic condition.       Results may be falsely decreased if patient taking Biotin.      COVID-19, FLU A/B, RSV PCR - Swab, Nasopharynx [[id number]]  (Normal) Collected: 11/08/22 0838    Specimen: Swab from Nasopharynx Updated: 11/08/22 0949     COVID19 Not Detected     Influenza A PCR Not Detected     Influenza B PCR Not Detected     RSV, PCR Not Detected    Narrative:      Fact sheet for providers: https://www.fda.gov/media/067503/download    Fact sheet for patients:  "https://www.fda.gov/media/487980/download    Test performed by PCR.    Blood Gas, Arterial - [[id number]]  (Abnormal) Collected: 11/08/22 0920    Specimen: Arterial Blood Updated: 11/08/22 0917     Site Right Brachial     Meliton's Test N/A     pH, Arterial 7.352 pH units      pCO2, Arterial 36.9 mm Hg      pO2, Arterial 70.8 mm Hg      Comment: 84 Value below reference range        HCO3, Arterial 20.5 mmol/L      Base Excess, Arterial -4.6 mmol/L      Comment: 84 Value below reference range        O2 Saturation, Arterial 93.5 %      Comment: 84 Value below reference range        Temperature 37.0 C      Barometric Pressure for Blood Gas 761 mmHg      Modality Nasal Cannula     Flow Rate 3.0 lpm      Ventilator Mode NA     Collected by [id number]     Comment: Meter: [id number]     :  [id number]        pCO2, Temperature Corrected 36.9 mm Hg      pH, Temp Corrected 7.352 pH Units      pO2, Temperature Corrected 70.8 mm Hg     Procalcitonin [[id number]]  (Abnormal) Collected: 11/08/22 0838    Specimen: Blood Updated: 11/08/22 0917     Procalcitonin 0.26 ng/mL     Narrative:      As a Marker for Sepsis (Non-Neonates):    1. <0.5 ng/mL represents a low risk of severe sepsis and/or septic shock.  2. >2 ng/mL represents a high risk of severe sepsis and/or septic shock.    As a Marker for Lower Respiratory Tract Infections that require antibiotic therapy:    PCT on Admission    Antibiotic Therapy       6-12 Hrs later    >0.5                Strongly Recommended  >0.25 - <0.5        Recommended   0.1 - 0.25          Discouraged              Remeasure/reassess PCT  <0.1                Strongly Discouraged     Remeasure/reassess PCT    As 28 day mortality risk marker: \"Change in Procalcitonin Result\" (>80% or <=80%) if Day 0 (or Day 1) and Day 4 values are available. Refer to http://www.[employer]-pct-calculator.com    Change in PCT <=80%  A decrease of PCT levels below or equal to 80% defines a positive change in PCT test " result representing a higher risk for 28-day all-cause mortality of patients diagnosed with severe sepsis for septic shock.    Change in PCT >80%  A decrease of PCT levels of more than 80% defines a negative change in PCT result representing a lower risk for 28-day all-cause mortality of patients diagnosed with severe sepsis or septic shock.       Comprehensive Metabolic Panel [[id number]]  (Abnormal) Collected: 11/08/22 0838    Specimen: Blood Updated: 11/08/22 0912     Glucose 121 mg/dL      BUN 25 mg/dL      Creatinine 0.81 mg/dL      Sodium 136 mmol/L      Potassium 4.8 mmol/L      Chloride 102 mmol/L      CO2 22.0 mmol/L      Calcium 9.4 mg/dL      Total Protein 6.9 g/dL      Albumin 3.50 g/dL      ALT (SGPT) 14 U/L      AST (SGOT) 21 U/L      Alkaline Phosphatase 46 U/L      Total Bilirubin 0.5 mg/dL      Globulin 3.4 gm/dL      A/G Ratio 1.0 g/dL      BUN/Creatinine Ratio 30.9     Anion Gap 12.0 mmol/L      eGFR 73.0 mL/min/1.73      Comment: National Kidney Foundation and American Society of Nephrology (ASN) Task Force recommended calculation based on the Chronic Kidney Disease Epidemiology Collaboration (CKD-EPI) equation refit without adjustment for race.       Narrative:      GFR Normal >60  Chronic Kidney Disease <60  Kidney Failure <15    The GFR formula is only valid for adults with stable renal function between ages 18 and 70.    C-reactive Protein [[id number]]  (Abnormal) Collected: 11/08/22 0838    Specimen: Blood Updated: 11/08/22 0912     C-Reactive Protein 20.25 mg/dL     BNP [[id number]]  (Abnormal) Collected: 11/08/22 0838    Specimen: Blood Updated: 11/08/22 0910     proBNP 8,603.0 pg/mL     Narrative:      Among patients with dyspnea, NT-proBNP is highly sensitive for the detection of acute congestive heart failure. In addition NT-proBNP of <300 pg/ml effectively rules out acute congestive heart failure with 99% negative predictive value.      Troponin [[id number]]  (Normal) Collected: 11/08/22  0838    Specimen: Blood Updated: 11/08/22 0909     Troponin T 0.010 ng/mL     Narrative:      Troponin T Reference Range:  <= 0.03 ng/mL-   Negative for AMI  >0.03 ng/mL-     Abnormal for myocardial necrosis.  Clinicians would have to utilize clinical acumen, EKG, Troponin and serial changes to determine if it is an Acute Myocardial Infarction or myocardial injury due to an underlying chronic condition.       Results may be falsely decreased if patient taking Biotin.      Lipase [[id number]]  (Abnormal) Collected: 11/08/22 0838    Specimen: Blood Updated: 11/08/22 0907     Lipase 11 U/L     Magnesium [[id number]]  (Normal) Collected: 11/08/22 0838    Specimen: Blood Updated: 11/08/22 0907     Magnesium 1.8 mg/dL     Urinalysis With Culture If Indicated - Urine, Catheter [[id number]]  (Abnormal) Collected: 11/08/22 0838    Specimen: Urine, Catheter Updated: 11/08/22 0853     Color, UA Dark Yellow     Appearance, UA Clear     pH, UA 6.0     Specific Gravity, UA 1.020     Glucose, UA Negative     Ketones, UA Negative     Bilirubin, UA Negative     Blood, UA Negative     Protein, UA Trace     Leuk Esterase, UA Negative     Nitrite, UA Negative     Urobilinogen, UA 1.0 E.U./dL    Narrative:      In absence of clinical symptoms, the presence of pyuria, bacteria, and/or nitrites on the urinalysis result does not correlate with infection.  Urine microscopic not indicated.    Lactic Acid, Plasma [[id number]]  (Normal) Collected: 11/08/22 0811    Specimen: Blood Updated: 11/08/22 0835     Lactate 1.9 mmol/L     Protime-INR [[id number]]  (Abnormal) Collected: 11/08/22 0811    Specimen: Blood Updated: 11/08/22 0833     Protime 16.1 Seconds      INR 1.35    D-dimer, Quantitative [[id number]]  (Abnormal) Collected: 11/08/22 0811    Specimen: Blood Updated: 11/08/22 0833     D-Dimer, Quantitative 1.93 MCGFEU/mL     Narrative:      Reference Range is 0-0.50 MCGFEU/mL. However, results <0.50 MCGFEU/mL tends to rule out DVT or PE. Results  >0.50 MCGFEU/mL are not useful in predicting absence or presence of DVT or PE.      aPTT [[id number]]  (Abnormal) Collected: 11/08/22 0811    Specimen: Blood Updated: 11/08/22 0833     PTT 35.6 seconds     CBC & Differential [[id number]]  (Abnormal) Collected: 11/08/22 0811    Specimen: Blood Updated: 11/08/22 0822    Narrative:      The following orders were created for panel order CBC & Differential.  Procedure                               Abnormality         Status                     ---------                               -----------         ------                     CBC Auto Differential[[id number]]        Abnormal            Final result                 Please view results for these tests on the individual orders.    CBC Auto Differential [[id number]]  (Abnormal) Collected: 11/08/22 0811    Specimen: Blood Updated: 11/08/22 0822     WBC 19.73 10*3/mm3      RBC 4.29 10*6/mm3      Hemoglobin 14.3 g/dL      Hematocrit 42.9 %      .0 fL      MCH 33.3 pg      MCHC 33.3 g/dL      RDW 16.2 %      RDW-SD 60.4 fl      MPV 9.5 fL      Platelets 330 10*3/mm3      Neutrophil % 74.7 %      Lymphocyte % 11.6 %      Monocyte % 11.3 %      Eosinophil % 0.6 %      Basophil % 0.5 %      Immature Grans % 1.3 %      Neutrophils, Absolute 14.77 10*3/mm3      Lymphocytes, Absolute 2.28 10*3/mm3      Monocytes, Absolute 2.22 10*3/mm3      Eosinophils, Absolute 0.11 10*3/mm3      Basophils, Absolute 0.10 10*3/mm3      Immature Grans, Absolute 0.25 10*3/mm3      nRBC 0.0 /100 WBC         Imaging Results (Last 72 Hours)     Procedure Component Value Units Date/Time    US Venous Doppler Lower Extremity Bilateral (duplex) [[id number]] Resulted: 11/08/22 1546     Updated: 11/08/22 1621    CT Angiogram Chest [[id number]] Collected: 11/08/22 1017     Updated: 11/08/22 1032    Narrative:      CT ANGIOGRAM CHEST- 11/8/2022 9:46 AM CST      HISTORY: Shortness of breath, hypoxemia, elevated dimer      COMPARISON: CT scan dated 5/6/2022.       DOSE LENGTH PRODUCT: 131 mGy cm. Automated exposure control was also  utilized to decrease patient radiation dose.     TECHNIQUE: Helical tomographic images of the chest were obtained after  the administration of intravenous contrast following angiogram protocol.  Additionally, 3D and multiplanar reformatted images were provided.        FINDINGS:    Pulmonary arteries: There is adequate enhancement of the pulmonary  arteries to evaluate for central and segmental pulmonary emboli.  Bilateral pulmonary embolus. This includes right upper, right middle and  right lower lobe pulmonary embolus identified in the lobar, segmental  and subsegmental pulmonary arteries. Quite extensive pulmonary embolus  identified in the left lower lobe segmental and subsegmental pulmonary  arteries into the basal segments. There is embolus identified in the  left upper lobe as well. Main pulmonary artery is nondilated.     Aorta and great vessels: Thoracic aorta is normal in caliber. No  dissection identified. No flow-limiting stenosis identified at the great  vessel origins.     Visualized neck base: The imaged portion of the base of the neck appears  unremarkable.      Lungs: Advanced lung emphysema. New bilateral interlobular septal  thickening and peribronchial thickening. Developing consolidation  identified in the dependent portion of the left lower lobe. There is a  trace layering left pleural effusion as well. The airways are clear.  Evidence for tracheobronchomalacia.     Heart: Right ventricle is slightly larger than the left. This  configuration is stable from the earlier comparison CT as well. There is  no pericardial effusion. Mild coronary atheromatous calcification.     Mediastinum and lymph nodes: No suspicious hilar or mediastinal  adenopathy. Incidental granulomatous calcification..     Skeletal and soft tissues: Chest wall soft tissues are unremarkable. No  acute bony abnormality. T12 compression deformity is new from  May 2022.      Upper abdomen: The imaged portion of the upper abdomen demonstrates no  acute process.        Impression:      1. 5 lobe acute pulmonary embolus. Main pulmonary artery is nondilated.  Right ventricle is slightly larger than the left ventricle, however,  this is stable from the earlier May 2022 comparison CT at which time  there is no pulmonary embolus. No strong CT evidence for significant  right heart strain.  2. Interstitial edema is present. There is developing consolidation in  the dependent portion of the left lower lobe reflecting either alveolar  edema versus pulmonary infarct.        This report was finalized on 11/08/2022 10:29 by Dr Donny Potter, .    CT Abdomen Pelvis With Contrast [[id number]] Collected: 11/08/22 1008     Updated: 11/08/22 1019    Narrative:      EXAMINATION:  CT ABDOMEN PELVIS W CONTRAST-  11/8/2022 9:46 AM CST     HISTORY: Severe epigastric pain. CT angiogram of the chest was also  performed separately.     TECHNIQUE: Spiral CT was performed of the abdomen and pelvis with IV  contrast. Multiplanar images were reconstructed.     DLP: 240 mGy-cm. Automated dosage reduction technique was utilized to  reduce patient dose.     COMPARISON: 5/6/2022.      LUNG BASES: Please see the chest CT report separately. There is  atelectasis in both lung bases. There is a small left pleural effusion.  There is cardiomegaly.     LIVER AND SPLEEN: There is breathing motion artifact. There has been  prior cholecystectomy. No acute liver abnormality is seen. The spleen is  unremarkable.     PANCREAS: There is breathing motion artifact. No acute abnormality is  seen.     KIDNEYS AND ADRENALS: There is breathing motion artifact. The adrenal  glands are normal in size. There is a 4-5 mm probable cyst upper pole  right kidney. This is too small to characterize. There is breathing  motion artifact. There is a 1.6 cm possible enhancing lesion lower pole  left kidney with a Hounsfield unit  measurement of 87. There is a 9 mm  probable cyst lower pole right kidney. The urinary bladder is  unremarkable.     BOWEL: There is thickening of the gastric wall. The stomach is not very  well distended. There is thickening of the wall of the antrum. Small  bowel loops are nondilated. There is moderate stool in the colon. There  is diffuse breathing motion artifact. There may be mild fecal impaction  of the rectum.     OTHER: There is atheromatous disease of the aortoiliac vessels. There is  no free fluid. There has been prior left hip arthroplasty. There is a  chronic compression deformity of L2. There is new compression deformity  of T12 compared to the prior study that is probably at least subacute in  age. There is also new mild compression of the inferior endplate of L3,  likely at least subacute in age.       Impression:      1. Probable solid 1.6 cm enhancing lesion lower pole left kidney  concerning for neoplasm. This lesion is not fully worked up on this  single phase contrast study. Urology consultation recommended.  2. There are couple of probable renal cysts on the right side. One is  too small to fully characterize.  3. Prior cholecystectomy.  4. Gastric wall thickening may be an artifact of underdistention.  Gastritis and other etiologies are included in the differential.  5. Moderate amount of stool in the colon. There may be mild fecal  impaction of the rectum. There is no small bowel distention.  6. Chronic appearing compression deformity of L2. Likely subacute  fractures of T12 and L3 that are not present on the previous exam. There  are mild degenerative changes of the spine. There is degenerative change  of the right hip. Prior left hip arthroplasty. Other nonacute findings,  as discussed.     The full report of this exam was immediately signed and available to the  emergency room. The patient is currently in the emergency room.  This report was finalized on 11/08/2022 10:16 by Dr. Luong  MD Domingo.    XR Chest 1 View [[id number]] Collected: 11/08/22 0828     Updated: 11/08/22 0841    Narrative:      EXAMINATION: Chest 1 view 11/8/2022     HISTORY: Shortness of breath and hypoxemia     FINDINGS: Today's exam is compared to previous study of 5/6/2022. The  lungs are hypoventilated with bibasilar atelectasis. More confluent  disease within the left lower lobe may even represent an infiltrate. No  effusion or free air present. There is evidence of remote granulomatous  disease.       Impression:      1.. Bibasilar atelectasis. More confluent disease within the left lower  lobe may even represent a pulmonary infiltrate. There is no effusion or  free air present.  This report was finalized on 11/08/2022 08:38 by Dr. Hakan Rg MD.             ASSESSMENT/PLAN       Examination and evaluation of wound(s) was performed.    DIAGNOSIS:     At high risk for skin breakdown    Acute pulmonary embolism without acute cor pulmonale (HCC)    Failure to thrive in adult    Body mass index (BMI) less than 16.5    Pulmonary HTN (HCC)    Acute pulmonary edema (HCC)    Sinus tachycardia    Acute deep vein thrombosis (DVT) of left lower extremity (HCC)       PLAN:   Patient does not have any wounds currently.  She is at high risk for skin breakdown.  Orders placed and listed below for moisture management and pressure prevention.          Start     Ordered    11/09/22 1308  Use Seat Cushion When Up In Chair  Continuous         11/09/22 1307    11/09/22 1307  Elevate Heels Off of Bed  Until Discontinued         11/09/22 1307    11/09/22 1307  Turn Patient  Now Then Every 2 Hours         11/09/22 1307    11/09/22 1307  Use Repositioning Wedge to Position Patient  Continuous        Comments: Use Comfort Glide repositioning sheet and wedges to position patient.    11/09/22 1307    Unscheduled  Apply Moisture Barrier After Any Incontinence  As Needed      Comments: Hydraguard or zguard   Question:  Wound Care Instructions   Answer:  Apply Moisture Barrier After Any Incontinence    11/09/22 9447                 Discussed findings and treatment plan including risks, benefits, and treatment options with patient in detail. Patient agreed with treatment plan.      This document has been electronically signed by LISA Krishna on 11/9/2022 13:08 CST

## 2022-11-09 NOTE — PLAN OF CARE
Goal Outcome Evaluation:  Plan of Care Reviewed With: patient           Outcome Evaluation: OT eval completed. She presents alert and oriented x4, on 20L/40% vapotherm. She reports at baseline residing at NH and reports that she is Max A for all adls except self feeding and is Dep for t/f to w/c. Today she demonstrates decreased strength, balance, activity tolerance, ROM and postrual control. She required Mod A to come to sitting at EOB and to maintain static sitting balance while seated at EOB. Max A to don socks d/t decreased strength and limited ROM. Attempted sit <> stand t/f with Max Ax2 but d/t ROM and strength deficits she was unable to achieve full upright position. Max A to return to folwers in bed. She would benefit form skilled OT services to address these deficits. Recommend d/c to SNF.

## 2022-11-09 NOTE — THERAPY EVALUATION
Patient Name: Zelalem Hung  : 1940    MRN: [id number]                              Today's Date: 2022       Admit Date: 2022    Visit Dx:     ICD-10-CM ICD-9-CM   1. Single subsegmental pulmonary embolism without acute cor pulmonale (HCC)  I26.93 415.19   2. Pneumonia due to infectious organism, unspecified laterality, unspecified part of lung  J18.9 486   3. Congestive heart failure, unspecified HF chronicity, unspecified heart failure type (HCC)  I50.9 428.0   4. Impaired mobility  Z74.09 799.89   5. Decreased activities of daily living (ADL)  Z78.9 V49.89     Patient Active Problem List   Diagnosis   • Cholecystitis   • Encephalopathy   • Unknown when patient last well, possible stroke    • Hyponatremia   • Failure to thrive in adult   • Severe malnutrition (HCC)   • History of migraines   • Closed fracture of nasal bone with routine healing   • Traumatic ecchymosis of face   • Acquired deviated nasal septum   • Epistaxis   • Colitis   • Fall   • Compression fracture of body of thoracic vertebra (HCC)   • Closed compression fracture of third lumbar vertebra (HCC)   • Sepsis (HCC)   • Metabolic acidosis   • Fecal impaction (HCC)   • Closed left hip fracture (HCC)   • Nonsmoker   • Body mass index (BMI) less than 16.5   • Acute pulmonary embolism without acute cor pulmonale (HCC)   • Pulmonary HTN (HCC)   • Acute pulmonary edema (HCC)   • Sinus tachycardia   • Acute deep vein thrombosis (DVT) of left lower extremity (HCC)     Past Medical History:   Diagnosis Date   • Chronic hyponatremia    • Frequent UTI    • Low back pain    • Migraines    • SVT (supraventricular tachycardia) (HCC)      Past Surgical History:   Procedure Laterality Date   • CHOLECYSTECTOMY WITH INTRAOPERATIVE CHOLANGIOGRAM N/A 2019    Procedure: CHOLECYSTECTOMY LAPAROSCOPIC INTRAOPERATIVE CHOLANGIOGRAM;  Surgeon: Chandan Bravo MD;  Location: [facility];  Service: General   • HIP HEMIARTHROPLASTY Left 2022     Procedure: HIP HEMIARTHROPLASTY;  Surgeon: Barrington Simons MD;  Location: [facility];  Service: Orthopedics;  Laterality: Left;      General Information     Row Name 11/09/22 1047          OT Time and Intention    Document Type evaluation  -     Mode of Treatment occupational therapy  -     Row Name 11/09/22 1047          General Information    Patient Profile Reviewed yes  -     Prior Level of Function --  Patient reports she is a dependent t/f to the chair at [facility]. She reports that staff physically pick her up to transfer her to the chair.  -     Existing Precautions/Restrictions fall;oxygen therapy device and L/min  vapotherm 40%/20L  -     Barriers to Rehab physical barrier;medically complex;previous functional deficit  -     Row Name 11/09/22 1047          Living Environment    People in Home facility resident  -     Row Name 11/09/22 1047          Cognition    Orientation Status (Cognition) oriented x 4  -     Row Name 11/09/22 1047          Safety Issues, Functional Mobility    Impairments Affecting Function (Mobility) endurance/activity tolerance;strength;pain;shortness of breath;balance;postural/trunk control  -           User Key  (r) = Recorded By, (t) = Taken By, (c) = Cosigned By    Initials Name Provider Type     Adeola Abreu, ABBYR/L, CSRS Occupational Therapist                 Mobility/ADL's     Row Name 11/09/22 1047          Bed Mobility    Bed Mobility sit-supine;supine-sit  -     Supine-Sit [place] (Bed Mobility) moderate assist (50% patient effort);2 person assist  -     Sit-Supine [place] (Bed Mobility) maximum assist (25% patient effort);2 person assist  -     Bed Mobility, Safety Issues decreased use of arms for pushing/pulling;decreased use of legs for bridging/pushing  -     Assistive Device (Bed Mobility) bed rails;head of bed elevated;draw sheet  -     Row Name 11/09/22 1047          Transfers    Transfers sit-stand transfer;stand-sit transfer  -      Comment, (Transfers) unable to achieve full upright position  -     Row Name 11/09/22 1047          Sit-Stand Transfer    Sit-Stand [place] (Transfers) maximum assist (25% patient effort);2 person assist  -     Row Name 11/09/22 1047          Stand-Sit Transfer    Stand-Sit [place] (Transfers) maximum assist (25% patient effort);2 person assist  -     Row Name 11/09/22 1047          Activities of Daily Living    BADL Assessment/Intervention lower body dressing  -     Row Name 11/09/22 1047          Lower Body Dressing Assessment/Training    [place] Level (Lower Body Dressing) don;socks;maximum assist (25% patient effort)  -     Position (Lower Body Dressing) edge of bed sitting  -           User Key  (r) = Recorded By, (t) = Taken By, (c) = Cosigned By    Initials Name Provider Type    Adeola Fuentes OTR/L, ALEXS Occupational Therapist               Obj/Interventions     Row Name 11/09/22 1047          Sensory Assessment (Somatosensory)    Sensory Assessment (Somatosensory) UE sensation intact  -     Row Name 11/09/22 1047          Vision Assessment/Intervention    Visual Impairment/Limitations WFL  -     Row Name 11/09/22 1047          Range of Motion Comprehensive    General Range of Motion bilateral upper extremity ROM L  -     Row Name 11/09/22 1047          Strength Comprehensive (MMT)    Comment, General Manual Muscle Testing (MMT) Assessment B UE strength grossly 3/5  -     Row Name 11/09/22 1047          Balance    Balance Assessment sitting static balance;sitting dynamic balance  -JJ     Static Sitting Balance moderate assist  -J     Dynamic Sitting Balance moderate assist  -     Position, Sitting Balance supported;sitting edge of bed  -           User Key  (r) = Recorded By, (t) = Taken By, (c) = Cosigned By    Initials Name Provider Type    Adeola Fuentes OTR/L, ALEXS Occupational Therapist               Goals/Plan     Row Name 11/09/22 1047           Dressing Goal 1 (OT)    Activity/Device (Dressing Goal 1, OT) upper body dressing  -JJ     [place]/Cues Needed (Dressing Goal 1, OT) minimum assist (75% or more patient effort)  -JJ     Time Frame (Dressing Goal 1, OT) long term goal (LTG);by discharge  -JJ     Progress/Outcome (Dressing Goal 1, OT) new goal  -JJ     Row Name 11/09/22 1047          Grooming Goal 1 (OT)    Activity/Device (Grooming Goal 1, OT) grooming skills, all  -JJ     [place] (Grooming Goal 1, OT) minimum assist (75% or more patient effort)  -JJ     Time Frame (Grooming Goal 1, OT) long term goal (LTG);by discharge  -JJ     Progress/Outcome (Grooming Goal 1, OT) new goal  -JJ     Row Name 11/09/22 1047          Self-Feeding Goal 1 (OT)    Activity/Device (Self-Feeding Goal 1, OT) self-feeding skills, all  -JJ     [place] Level/Cues Needed (Self-Feeding Goal 1, OT) independent  -JJ     Time Frame (Self-Feeding Goal 1, OT) long term goal (LTG);by discharge  -JJ     Progress/Outcomes (Self-Feeding Goal 1, OT) new goal  -     Row Name 11/09/22 1047          Therapy Assessment/Plan (OT)    Planned Therapy Interventions (OT) activity tolerance training;adaptive equipment training;BADL retraining;functional balance retraining;transfer/mobility retraining;strengthening exercise;occupation/activity based interventions;patient/caregiver education/training;ROM/therapeutic exercise  -JJ           User Key  (r) = Recorded By, (t) = Taken By, (c) = Cosigned By    Initials Name Provider Type    Adeola Fuentes OTR/L, CSRS Occupational Therapist               Clinical Impression     Row Name 11/09/22 1047          Pain Assessment    Pretreatment Pain Rating 3/10  -JJ     Pain Location lower  -JJ     Pain Location - back  -JJ     Pre/Posttreatment Pain Comment increased pain with movement  -JJ     Pain Intervention(s) Medication (See MAR);Repositioned;Ambulation/increased activity  -J     Row Name 11/09/22 1047          Plan of Care  Review    Plan of Care Reviewed With patient  -     Outcome Evaluation OT eval completed. She presents alert and oriented x4, on 20L/40% vapotherm. She reports at baseline residing at NH and reports that she is Max A for all adls except self feeding and is Dep for t/f to w/c. Today she demonstrates decreased strength, balance, activity tolerance, ROM and postrual control. She required Mod A to come to sitting at EOB and to maintain static sitting balance while seated at EOB. Max A to don socks d/t decreased strength and limited ROM. Attempted sit <> stand t/f with Max Ax2 but d/t ROM and strength deficits she was unable to achieve full upright position. [facility]. She would benefit form skilled OT services to address these deficits. Recommend d/c to SNF.  -     Row Name 11/09/22 1047          Therapy Assessment/Plan (OT)    Rehab Potential (OT) fair, will monitor progress closely  -     Criteria for Skilled Therapeutic Interventions Met (OT) yes;skilled treatment is necessary  -     Therapy Frequency (OT) 3 times/wk  -     Row Name 11/09/22 1047          Therapy Plan Review/Discharge Plan (OT)    Anticipated Discharge Disposition (OT) skilled nursing facility  -     Row Name 11/09/22 1047          Positioning and Restraints    Pre-Treatment Position in bed  -     Post Treatment Position bed  -     In Bed notified nsg;fowlers;call light within reach;encouraged to call for assist;side rails up x3;LUE elevated;RUE elevated;LLE elevated;patient within staff view;RLE elevated  -           User Key  (r) = Recorded By, (t) = Taken By, (c) = Cosigned By    Initials Name Provider Type    Adeola Fuentes, OTR/L, CSRS Occupational Therapist               Outcome Measures     Row Name 11/09/22 1047          How much help from another is currently needed...    Putting on and taking off regular lower body clothing? 1  -JJ     Bathing (including washing, rinsing, and drying) 2  -JJ      Toileting (which includes using toilet bed pan or urinal) 2  -JJ     Putting on and taking off regular upper body clothing 2  -JJ     Taking care of personal grooming (such as brushing teeth) 2  -JJ     Eating meals 2  -JJ     AM-PAC 6 Clicks Score (OT) 11  -JJ     Row Name 11/09/22 1036          How much help from another person do you currently need...    Turning from your back to your side while in flat bed without using bedrails? 2  -LETA     Moving from lying on back to sitting on the side of a flat bed without bedrails? 2  -LETA     Moving to and from a bed to a chair (including a wheelchair)? 1  -LETA     Standing up from a chair using your arms (e.g., wheelchair, bedside chair)? 1  -LETA     Climbing 3-5 steps with a railing? 1  -LETA     To walk in hospital room? 1  -LETA     AM-PAC 6 Clicks Score (PT) 8  -LETA     Highest level of mobility 3 --> Sat at edge of bed  -LETA     Row Name 11/09/22 1047 11/09/22 1036       Functional Assessment    Outcome Measure Options AM-PAC 6 Clicks Daily Activity (OT)  -J AM-PAC 6 Clicks Basic Mobility (PT)  -LETA          User Key  (r) = Recorded By, (t) = Taken By, (c) = Cosigned By    Initials Name Provider Type    Foster Bourne, PT DPT Physical Therapist    Adeola Fuentes, OTR/L, CSRS Occupational Therapist                Occupational Therapy Education     Title: PT OT SLP Therapies (In Progress)     Topic: Occupational Therapy (In Progress)     Point: ADL training (Done)     Description:   Instruct learner(s) on proper safety adaptation and remediation techniques during self care or transfers.   Instruct in proper use of assistive devices.              Learning Progress Summary           Patient Acceptance, E, VU by CORNELIUS at 11/9/2022 1420                   Point: Home exercise program (Not Started)     Description:   Instruct learner(s) on appropriate technique for monitoring, assisting and/or progressing therapeutic exercises/activities.              Learner Progress:   Not documented in this visit.          Point: Precautions (Done)     Description:   Instruct learner(s) on prescribed precautions during self-care and functional transfers.              Learning Progress Summary           Patient Nallely, MADI, VU by CORNELIUS at 11/9/2022 1420                   Point: Body mechanics (Not Started)     Description:   Instruct learner(s) on proper positioning and spine alignment during self-care, functional mobility activities and/or exercises.              Learner Progress:  Not documented in this visit.                      User Key     Initials Effective Dates Name Provider Type Discipline    CORNELIUS 11/10/21 -  Adeola Abreu, OTANIKA/L, CSRS Occupational Therapist OT              OT Recommendation and Plan  Planned Therapy Interventions (OT): activity tolerance training, adaptive equipment training, BADL retraining, functional balance retraining, transfer/mobility retraining, strengthening exercise, occupation/activity based interventions, patient/caregiver education/training, ROM/therapeutic exercise  Therapy Frequency (OT): 3 times/wk  Plan of Care Review  Plan of Care Reviewed With: patient  Outcome Evaluation: OT eval completed. She presents alert and oriented x4, on 20L/40% vapotherm. She reports at baseline residing at NH and reports that she is Max A for all adls except self feeding and is Dep for t/f to w/c. Today she demonstrates decreased strength, balance, activity tolerance, ROM and postrual control. She required Mod A to come to sitting at EOB and to maintain static sitting balance while seated at EOB. Max A to don socks d/t decreased strength and limited ROM. Attempted sit <> stand t/f with Max Ax2 but d/t ROM and strength deficits she was unable to achieve full upright position. Max A to return to folwers in bed. She would benefit form skilled OT services to address these deficits. Recommend d/c to SNF.     Time Calculation:    Time Calculation- OT     Row Name 11/09/22 1047              Time Calculation- OT    OT Start Time 1030  -      OT Stop Time 1124  -      OT Time Calculation (min) 54 min  -      OT Received On 11/09/22  -      OT Goal Re-Cert Due Date 11/19/22  -            User Key  (r) = Recorded By, (t) = Taken By, (c) = Cosigned By    Initials Name Provider Type    Adeola Fuentes OTR/L, CSRS Occupational Therapist              Therapy Charges for Today     Code Description Service Date Service Provider Modifiers Qty    [id number] HC OT EVAL MOD COMPLEXITY 4 11/9/2022 Adeola Abreu OTR/L, CSRS GO 1               AURORA Marmolejo/L, ALEXS  11/9/2022

## 2022-11-09 NOTE — CASE MANAGEMENT/SOCIAL WORK
Discharge Planning Assessment  [facility]     Patient Name: Zelalem Hung  MRN: [id number]  Today's Date: 11/9/2022    Admit Date: 11/8/2022        Discharge Needs Assessment     Row Name 11/09/22 1413       Living Environment    People in Home facility resident    Name(s) of People in Home [facility]    Current Living Arrangements extended care facility       Resource/Environmental Concerns    Resource/Environmental Concerns none    Transportation Concerns none       Transition Planning    Patient/Family Anticipates Transition to long-term care facility    Patient/Family Anticipated Services at Transition skilled nursing    Transportation Anticipated family or friend will provide;health plan transportation       Discharge Needs Assessment    Readmission Within the Last 30 Days no previous admission in last 30 days    Current Outpatient/Agency/Support Group skilled nursing facility    Outpatient/Agency/Support Group Needs skilled nursing facility    Discharge Facility/Level of Care Needs nursing facility, skilled    Discharge Coordination/Progress Patient is from [facility] and has a bed hold.  Patient will return to this facility upon discharge [phone].               Discharge Plan    No documentation.               Continued Care and Services - Admitted Since 11/8/2022    Coordination has not been started for this encounter.          Demographic Summary    No documentation.                Functional Status    No documentation.                Psychosocial    No documentation.                Abuse/Neglect    No documentation.                Legal    No documentation.                Substance Abuse    No documentation.                Patient Forms    No documentation.                   KAMRAN Garcia

## 2022-11-09 NOTE — PROGRESS NOTES
[facility] Medicine Services  INPATIENT PROGRESS NOTE    Length of Stay: 1  Date of Admission: 11/8/2022  Primary Care Physician: Garth Amezcua APRN    Subjective     Chief Complaint:     Shortness of breath    HPI     Patient remains short of breath and on Vapotherm at 20 L and FiO2 0.40.  Hopefully she can transition to high flow per nasal cannula today.  She will remain in the intensive care unit until transitioned to high flow.  She remains on full anticoagulation doses of Lovenox and will be transitioned to Eliquis today.  BMP this a.m. is unremarkable except potassium slightly low at 3.4 and will be replaced.  White blood cell count improved to 14,400.  Echocardiogram performed yesterday shows EF of greater than 70% with mild RV dilation and borderline low RV systolic function.    Review of Systems     All pertinent negatives and positives are as above. All other systems have been reviewed and are negative unless otherwise stated.     Objective    Temp:  [98.4 °F (36.9 °C)-99.1 °F (37.3 °C)] 99.1 °F (37.3 °C)  Heart Rate:  [] 86  Resp:  [18-27] 25  BP: ()/(49-90) 117/71    Lab Results (last 24 hours)     Procedure Component Value Units Date/Time    Blood Culture - Blood, Arm, Right [[id number]]  (Normal) Collected: 11/08/22 0835    Specimen: Blood from Arm, Right Updated: 11/09/22 1030     Blood Culture No growth at 24 hours    Blood Culture - Blood, Arm, Right [[id number]]  (Normal) Collected: 11/08/22 0811    Specimen: Blood from Arm, Right Updated: 11/09/22 0830     Blood Culture No growth at 24 hours    Basic Metabolic Panel [[id number]]  (Abnormal) Collected: 11/09/22 0311    Specimen: Blood Updated: 11/09/22 0402     Glucose 161 mg/dL      BUN 21 mg/dL      Creatinine 0.78 mg/dL      Sodium 139 mmol/L      Potassium 3.4 mmol/L      Chloride 102 mmol/L      CO2 23.0 mmol/L      Calcium 9.4 mg/dL      BUN/Creatinine Ratio 26.9     Anion Gap 14.0 mmol/L       eGFR 76.4 mL/min/1.73      Comment: National Kidney Foundation and American Society of Nephrology (ASN) Task Force recommended calculation based on the Chronic Kidney Disease Epidemiology Collaboration (CKD-EPI) equation refit without adjustment for race.       Narrative:      GFR Normal >60  Chronic Kidney Disease <60  Kidney Failure <15    The GFR formula is only valid for adults with stable renal function between ages 18 and 70.    CBC & Differential [[id number]]  (Abnormal) Collected: 11/09/22 0311    Specimen: Blood Updated: 11/09/22 0344    Narrative:      The following orders were created for panel order CBC & Differential.  Procedure                               Abnormality         Status                     ---------                               -----------         ------                     CBC Auto Differential[[id number]]        Abnormal            Final result                 Please view results for these tests on the individual orders.    CBC Auto Differential [[id number]]  (Abnormal) Collected: 11/09/22 0311    Specimen: Blood Updated: 11/09/22 0344     WBC 14.40 10*3/mm3      RBC 3.78 10*6/mm3      Hemoglobin 12.2 g/dL      Hematocrit 37.8 %      .0 fL      MCH 32.3 pg      MCHC 32.3 g/dL      RDW 15.7 %      RDW-SD 58.4 fl      MPV 9.8 fL      Platelets 311 10*3/mm3      Neutrophil % 78.8 %      Lymphocyte % 9.0 %      Monocyte % 10.6 %      Eosinophil % 0.3 %      Basophil % 0.4 %      Immature Grans % 0.9 %      Neutrophils, Absolute 11.35 10*3/mm3      Lymphocytes, Absolute 1.30 10*3/mm3      Monocytes, Absolute 1.52 10*3/mm3      Eosinophils, Absolute 0.04 10*3/mm3      Basophils, Absolute 0.06 10*3/mm3      Immature Grans, Absolute 0.13 10*3/mm3      nRBC 0.0 /100 WBC           Imaging Results (Last 24 Hours)     Procedure Component Value Units Date/Time    US Venous Doppler Lower Extremity Bilateral (duplex) [[id number]] Resulted: 11/08/22 1546     Updated: 11/08/22 1621              Intake/Output Summary (Last 24 hours) at 11/9/2022 1402  Last data filed at 11/9/2022 1200  Gross per 24 hour   Intake 700 ml   Output 400 ml   Net 300 ml       Physical Exam  Constitutional:       General: She is in acute distress.      Appearance: Normal appearance. She is normal weight.   HENT:      Head: Normocephalic and atraumatic.      Right Ear: External ear normal.      Left Ear: External ear normal.      Nose: Nose normal.      Mouth: Mucous membranes are moist.   Eyes:      Conjunctiva/sclera: Conjunctivae normal.   Cardiovascular:      Rate and Rhythm: Regular rhythm.      Pulses: Normal pulses.      Heart sounds: Normal heart sounds. No murmur heard.  Pulmonary:      Effort: No respiratory distress present.     Breath sounds: Normal breath sounds.   Chest:      Chest wall: Tenderness present.   Abdominal:      General: Abdomen is flat. Bowel sounds are normal.      Palpations: Abdomen is soft. There is no mass.      Tenderness: There is no abdominal tenderness.   Musculoskeletal:         General: Normal range of motion.      Right lower leg: No edema.      Left lower leg: No edema.   Skin:     General: Skin is warm and dry.      Coloration: Skin is not pale.   Neurological:      General: No focal deficit present.      Mental Status: She is alert and oriented to person, place, and time. Mental status is at baseline.   Psychiatric:         Mood and Affect: Mood is anxious.         Judgment: Judgment normal.     Results Review:  I have reviewed the labs, radiology results, and diagnostic studies since my last progress note and made treatment changes reflective of the results.   I have reviewed the current medications.    Assessment/Plan     Active Hospital Problems    Diagnosis    • **Acute pulmonary embolism without acute cor pulmonale (HCC)    • Pulmonary HTN (HCC)    • Acute pulmonary edema (HCC)    • Sinus tachycardia    • Acute deep vein thrombosis (DVT) of left lower extremity (HCC)    • Body  mass index (BMI) less than 16.5    • Failure to thrive in adult        PLAN:  Continue diuresis  Replace potassium per protocol  Continue oxygen supplementation with attempt to wean to high flow nasal O2  Continue daily BMP  Discontinue Lovenox in favor of Eliquis for anticoagulation    Electronically signed by Wenceslao Brown DO, 11/09/22, 14:02 CST.

## 2022-11-09 NOTE — THERAPY EVALUATION
Patient Name: Zelalem Hung  : 1940    MRN: [id number]                              Today's Date: 2022       Admit Date: 2022    Visit Dx:     ICD-10-CM ICD-9-CM   1. Single subsegmental pulmonary embolism without acute cor pulmonale (HCC)  I26.93 415.19   2. Pneumonia due to infectious organism, unspecified laterality, unspecified part of lung  J18.9 486   3. Congestive heart failure, unspecified HF chronicity, unspecified heart failure type (HCC)  I50.9 428.0   4. Impaired mobility  Z74.09 799.89     Patient Active Problem List   Diagnosis   • Cholecystitis   • Encephalopathy   • Unknown when patient last well, possible stroke    • Hyponatremia   • Failure to thrive in adult   • Severe malnutrition (HCC)   • History of migraines   • Closed fracture of nasal bone with routine healing   • Traumatic ecchymosis of face   • Acquired deviated nasal septum   • Epistaxis   • Colitis   • Fall   • Compression fracture of body of thoracic vertebra (HCC)   • Closed compression fracture of third lumbar vertebra (HCC)   • Sepsis (HCC)   • Metabolic acidosis   • Fecal impaction (HCC)   • Closed left hip fracture (HCC)   • Nonsmoker   • Body mass index (BMI) less than 16.5   • Acute pulmonary embolism without acute cor pulmonale (HCC)   • Pulmonary HTN (HCC)   • Acute pulmonary edema (HCC)   • Sinus tachycardia   • Acute deep vein thrombosis (DVT) of left lower extremity (HCC)     Past Medical History:   Diagnosis Date   • Chronic hyponatremia    • Frequent UTI    • Low back pain    • Migraines    • SVT (supraventricular tachycardia) (HCC)      Past Surgical History:   Procedure Laterality Date   • CHOLECYSTECTOMY WITH INTRAOPERATIVE CHOLANGIOGRAM N/A 2019    Procedure: CHOLECYSTECTOMY LAPAROSCOPIC INTRAOPERATIVE CHOLANGIOGRAM;  Surgeon: Chandan Bravo MD;  Location: [facility];  Service: General   • HIP HEMIARTHROPLASTY Left 2022    Procedure: HIP HEMIARTHROPLASTY;  Surgeon: Barrington Simons MD;   Location: [facility] OR;  Service: Orthopedics;  Laterality: Left;      General Information     Row Name 11/09/22 1036          Physical Therapy Time and Intention    Document Type evaluation  -LETA (r) JACY (t) LETA (c)     Mode of Treatment physical therapy  -LETA (r) MF (t) LETA (c)     Row Name 11/09/22 1036          General Information    Patient Profile Reviewed yes  Pt presented to [facility] w/ c/o chest pain, SOB. Dx: acute B PE, pulmonary HTN, acute pulmonary edema, acute L LE DVT, acute hypoxic RF. PMH: frequent UTIs, chronic hyponatremia, L hip hemiarthroplasty May 2022. Subacute Fxs at T12, L3.  -LETA (r) JACY (t) LETA (c)     Prior Level of Function --  Patient reports she is a dependent t/f to the chair at [facility]. She reports that staff physically pick her up to transfer her to the chair.  -LETA     Existing Precautions/Restrictions fall;oxygen therapy device and L/min  vapotherm  -LETA     Barriers to Rehab medically complex;previous functional deficit;physical barrier  -ELTA     Row Name 11/09/22 1036          Living Environment    People in Home facility resident  -LETA     Row Name 11/09/22 1036          Cognition    Orientation Status (Cognition) oriented x 4  pt unsure of name of facility  -LETA     Row Name 11/09/22 1036          Safety Issues, Functional Mobility    Safety Issues Affecting Function (Mobility) friction/shear risk  -LETA     Impairments Affecting Function (Mobility) endurance/activity tolerance;strength;pain;shortness of breath;balance;muscle tone abnormal;postural/trunk control;range of motion (ROM)  -LETA           User Key  (r) = Recorded By, (t) = Taken By, (c) = Cosigned By    Initials Name Provider Type    LETA Foster Prado, PT DPT Physical Therapist    Hamida Montague, PT Student PT Student               Mobility     Row Name 11/09/22 1036          Bed Mobility    Bed Mobility sit-supine;supine-sit  -LETA     Supine-Sit [place] (Bed Mobility) moderate assist (50% patient effort);2 person assist  -LETA      Sit-Supine [place] (Bed Mobility) maximum assist (25% patient effort);2 person assist  -LETA     Assistive Device (Bed Mobility) bed rails;head of bed elevated;draw sheet  -LETA     Row Name 11/09/22 1036          Transfers    Comment, (Transfers) attempted stance, unable to fully stand erect  -LETA     Row Name 11/09/22 1036          Sit-Stand Transfer    Sit-Stand [place] (Transfers) maximum assist (25% patient effort);2 person assist  -LETA           User Key  (r) = Recorded By, (t) = Taken By, (c) = Cosigned By    Initials Name Provider Type    Foster Bourne PT DPT Physical Therapist               Obj/Interventions     Row Name 11/09/22 1036          Range of Motion Comprehensive    Comment, General Range of Motion R hip/knee AROM impaired ~ 50%,. R ankle into plantarflexion with little PROM/degrees into Dorsiflexion. L hip/knee AROM impaired ~ 50-75%, L ankle into full plantarflexion with no PROM into Dorsiflexion  -LETA     Row Name 11/09/22 1036          Strength Comprehensive (MMT)    Comment, General Manual Muscle Testing (MMT) Assessment R hip/knee grossly 3-/5, R ankle dorsiflexion 1/5. L hip/knee grossly 2/5, L ankle dorsiflexion 0/5  -LETA     Row Name 11/09/22 1036          Balance    Balance Assessment sitting static balance  -LETA     Static Sitting Balance moderate assist  -LETA     Position, Sitting Balance supported;sitting edge of bed  -LETA     Row Name 11/09/22 1036          Sensory Assessment (Somatosensory)    Sensory Assessment (Somatosensory) LE sensation intact  -LETA           User Key  (r) = Recorded By, (t) = Taken By, (c) = Cosigned By    Initials Name Provider Type    Foster Bourne PT DPT Physical Therapist               Goals/Plan     Row Name 11/09/22 1036          Bed Mobility Goal 1 (PT)    Activity/Assistive Device (Bed Mobility Goal 1, PT) sit to supine/supine to sit  -LETA     [place] Level/Cues Needed (Bed Mobility Goal 1, PT) moderate assist (50-74% patient  effort)  -LETA     Time Frame (Bed Mobility Goal 1, PT) long term goal (LTG);10 days  -LETA     Progress/Outcomes (Bed Mobility Goal 1, PT) new goal  -LETA     Row Name 11/09/22 1036          Problem Specific Goal 1 (PT)    Problem Specific Goal 1 (PT) Static sitting balance at EOB x 10-15 minutes, CGA.  -LETA     Time Frame (Problem Specific Goal 1, PT) long-term goal (LTG)  -LETA     Progress/Outcome (Problem Specific Goal 1, PT) new goal  -LETA     Row Name 11/09/22 1036          Therapy Assessment/Plan (PT)    Planned Therapy Interventions (PT) bed mobility training;transfer training;balance training;home exercise program;patient/family education;ROM (range of motion);strengthening  -LETA           User Key  (r) = Recorded By, (t) = Taken By, (c) = Cosigned By    Initials Name Provider Type    Foster Bourne, PT DPT Physical Therapist               Clinical Impression     Row Name 11/09/22 1036          Pain    Pretreatment Pain Rating 3/10  -LETA     Pain Location lower  -LETA     Pain Location - back  -LETA     Pain Intervention(s) Repositioned;Ambulation/increased activity  -LETA     Row Name 11/09/22 1036          Plan of Care Review    Plan of Care Reviewed With patient  -LETA     Outcome Evaluation PT eval complete. She is alert and oriented, unable to state where she resides. She reports that staff at the SNF physically pick her up to transfer to a chair. She needs mod assist x 2 for supine to sit bed mobility. We attempted to stand with max assist x 2 but unable to stand erect. She demos B plantarflexion flexion contractures with some active dorsiflexion in R ankle and no dorsiflexion in L LE. PT will cont to progress mobility and strength. Anticipate she will d.c back to SNF.  -LETA     Row Name 11/09/22 1036          Therapy Assessment/Plan (PT)    Patient/Family Therapy Goals Statement (PT) pt did not state  -LETA     Rehab Potential (PT) fair, will monitor progress closely  -LETA     Criteria for Skilled Interventions Met  (PT) yes;meets criteria  -LETA     Therapy Frequency (PT) 2 times/day  -LETA     Predicted Duration of Therapy Intervention (PT) until d.c  -LETA     Row Name 11/09/22 1036          Positioning and Restraints    Pre-Treatment Position in bed  -LETA     Post Treatment Position bed  -LETA     In Bed notified nsg;fowlers;call light within reach;encouraged to call for assist  -LETA           User Key  (r) = Recorded By, (t) = Taken By, (c) = Cosigned By    Initials Name Provider Type    Foster Bourne, PT DPT Physical Therapist               Outcome Measures     Row Name 11/09/22 1036          How much help from another person do you currently need...    Turning from your back to your side while in flat bed without using bedrails? 2  -LETA     Moving from lying on back to sitting on the side of a flat bed without bedrails? 2  -LETA     Moving to and from a bed to a chair (including a wheelchair)? 1  -LETA     Standing up from a chair using your arms (e.g., wheelchair, bedside chair)? 1  -LETA     Climbing 3-5 steps with a railing? 1  -LETA     To walk in hospital room? 1  -LETA     AM-PAC 6 Clicks Score (PT) 8  -LETA     Highest level of mobility 3 --> Sat at edge of bed  -LETA     Row Name 11/09/22 1036          Functional Assessment    Outcome Measure Options AM-PAC 6 Clicks Basic Mobility (PT)  -LETA           User Key  (r) = Recorded By, (t) = Taken By, (c) = Cosigned By    Initials Name Provider Type    Foster Bourne, PT DPT Physical Therapist                             Physical Therapy Education     Title: PT OT SLP Therapies (In Progress)     Topic: Physical Therapy (In Progress)     Point: Mobility training (In Progress)     Learning Progress Summary           Patient Acceptance, E, NR by LETA at 11/9/2022 1036    Comment: benefits of activity, progression of PT POC                   Point: Home exercise program (Not Started)     Learner Progress:  Not documented in this visit.          Point: Body mechanics (Not Started)      Learner Progress:  Not documented in this visit.          Point: Precautions (Not Started)     Learner Progress:  Not documented in this visit.                      User Key     Initials Effective Dates Name Provider Type Discipline    LETA 08/02/16 -  Foster Prado PT DPKRISTINE Physical Therapist PT              PT Recommendation and Plan  Planned Therapy Interventions (PT): bed mobility training, transfer training, balance training, home exercise program, patient/family education, ROM (range of motion), strengthening  Plan of Care Reviewed With: patient  Outcome Evaluation: PT eval complete. She is alert and oriented, unable to state where she resides. She reports that staff at the SNF physically pick her up to transfer to a chair. She needs mod assist x 2 for supine to sit bed mobility. We attempted to stand with max assist x 2 but unable to stand erect. She demos B plantarflexion flexion contractures with some active dorsiflexion in R ankle and no dorsiflexion in L LE. PT will cont to progress mobility and strength. Anticipate she will d.c back to SNF.     Time Calculation:    PT Charges     Row Name 11/09/22 1312             Time Calculation    Start Time 1036  -LETA      Stop Time 1120  -LETA      Time Calculation (min) 44 min  -LETA      PT Received On 11/09/22  -LETA      PT Goal Re-Cert Due Date 11/19/22  -LETA            User Key  (r) = Recorded By, (t) = Taken By, (c) = Cosigned By    Initials Name Provider Type    Foster Bourne PT DPT Physical Therapist              Therapy Charges for Today     Code Description Service Date Service Provider Modifiers Qty    [id number] HC PT MAORAL MOD COMPLEXITY 3 11/9/2022 Foster Prado PT DPT GP 1          PT G-Codes  Outcome Measure Options: AM-PAC 6 Clicks Basic Mobility (PT)  AM-PAC 6 Clicks Score (PT): 8    Foster Prado PT DPT  11/9/2022

## 2022-11-09 NOTE — PLAN OF CARE
Goal Outcome Evaluation:              Outcome Evaluation: Pt rested well this shift. Vapotherm at 20L/40% with oxygen saturations in upper 90's.  Sinus rhythm.  Voiding well, pt is incontinent.  Afebrile.

## 2022-11-09 NOTE — PLAN OF CARE
Goal Outcome Evaluation:  Plan of Care Reviewed With: patient           Outcome Evaluation: PT eval complete. She is alert and oriented, unable to state where she resides. She reports that staff at the SNF physically pick her up to transfer to a chair. She needs mod assist x 2 for supine to sit bed mobility. We attempted to stand with max assist x 2 but unable to stand erect. She demos B plantarflexion flexion contractures with some active dorsiflexion in R ankle and no dorsiflexion in L LE. PT will cont to progress mobility and strength. Anticipate she will d.c back to SNF.

## 2022-11-09 NOTE — PROGRESS NOTES
Malnutrition Severity Assessment    Patient Name:  Zelalem Hung  YOB: 1940  MRN: [id number]  Admit Date:  11/8/2022    Patient meets criteria for : Severe Malnutrition (secondary sign of malnutrition - pressure injuries to coccyx and thoracic spine)        Malnutrition Severity Assessment  Malnutrition Type: Chronic Disease - Related Malnutrition  Malnutrition Type (last 8 hours)     Malnutrition Severity Assessment     Row Name 11/09/22 1022       Malnutrition Severity Assessment    Malnutrition Type Chronic Disease - Related Malnutrition    Row Name 11/09/22 1022       Muscle Loss    Loss of Muscle Mass Findings Severe    [place] Region Severe - deep hollowing/scooping, lack of muscle to touch, facial bones well defined    Clavicle Bone Region Severe - protruding prominent bone    Acromion Bone Region Severe - squared shoulders, bones, and acromion process protrusion prominent    Scapular Bone Region Severe - prominent bones, depressions easily visible between ribs, scapula, spine, shoulders    Dorsal Hand Region Severe - prominent depression    Row Name 11/09/22 1022       Fat Loss    Subcutaneous Fat Loss Findings Severe    Orbital Region  Severe - pronounced hollowness/depression, dark circles, loose saggy skin    Upper Arm Region Severe - mostly skin, very little space between folds, fingers touch    Thoracic & Lumbar Region Severe - ribs visible with prominent depressions, iliac crest very prominent    Row Name 11/09/22 1022       Declining Functional Status    Declining Functional Status Findings Measurably Reduced    Row Name 11/09/22 1022       Criteria Met (Must meet criteria for severity in at least 2 of these categories: M Wasting, Fat Loss, Fluid, Secondary Signs, Wt. Status, Intake)    Patient meets criteria for  Severe Malnutrition  secondary sign of malnutrition - pressure injuries to coccyx and thoracic spine                Electronically signed by:  Marianela Hilliard RDN,  AAKASH  11/09/22 10:30 CST

## 2022-11-09 NOTE — PROGRESS NOTES
"Pharmacy Dosing Service  Anticoagulant  Enoxaparin    Assessment/Action/Plan:  Pharmacy to dose Enoxaparin for the indication of PE and DVT. Patient weight updated = 34.5 kg Enoxaparin dose adjusted to 30mg (1mg/kg) sq every 12h. Anti Xa levels ordered per guidance policy 4 hours post scheduled dose at 1800.  Pharmacy will continue to monitor and adjust dose accordingly.      Subjective:  Zelalem Hung is a 81 y.o. female on Enoxaparin for indication of PE and DVT.  Objective:  [Ht: 157.5 cm (62.01\"); Wt: 34.5 kg (76 lb 1.6 oz); BMI: Body mass index is 13.92 kg/m².]  Estimated Creatinine Clearance: 30.8 mL/min (by C-G formula based on SCr of 0.78 mg/dL).   Lab Results   Component Value Date    DDIMER 0.25 01/26/2022      Lab Results   Component Value Date    INR 1.35 (H) 11/08/2022    INR 1.23 (H) 05/05/2022    INR 1.05 01/26/2022    PROTIME 16.1 (H) 11/08/2022    PROTIME 15.0 (H) 05/05/2022    PROTIME 13.3 01/26/2022      Lab Results   Component Value Date    HGB 12.2 11/09/2022    HGB 14.3 11/08/2022    HGB 14.1 09/21/2022      Lab Results   Component Value Date     11/09/2022     11/08/2022     09/21/2022       Sonny Sanchez PharmD  11/09/22 14:03 CST     "

## 2022-11-09 NOTE — CASE MANAGEMENT/SOCIAL WORK
Continued Stay Note   [place]     Patient Name: Zelalem Hung  MRN: [id number]  Today's Date: 11/9/2022    Admit Date: 11/8/2022        Discharge Plan     Row Name 11/09/22 1102       Plan    Plan Comments spoke to daughterMadie; she wants patient to return to [place] [place]               Discharge Codes    No documentation.                     Laura Chaudhari RN

## 2022-11-09 NOTE — PLAN OF CARE
Goal Outcome Evaluation:  Plan of Care Reviewed With: patient        Progress: no change  Outcome Evaluation: Initial Nutr eval. Malnutrition assessment completed. Interveiwed for food preferences and encouraged intake. Added Magic Cup BID.

## 2022-11-10 LAB
ANION GAP SERPL CALCULATED.3IONS-SCNC: 9 MMOL/L (ref 5–15)
BUN SERPL-MCNC: 24 MG/DL (ref 8–23)
BUN/CREAT SERPL: 32.9 (ref 7–25)
CALCIUM SPEC-SCNC: 9.7 MG/DL (ref 8.6–10.5)
CHLORIDE SERPL-SCNC: 101 MMOL/L (ref 98–107)
CO2 SERPL-SCNC: 25 MMOL/L (ref 22–29)
CREAT SERPL-MCNC: 0.73 MG/DL (ref 0.57–1)
EGFRCR SERPLBLD CKD-EPI 2021: 82.7 ML/MIN/1.73
GLUCOSE SERPL-MCNC: 111 MG/DL (ref 65–99)
POTASSIUM SERPL-SCNC: 4.4 MMOL/L (ref 3.5–5.2)
SODIUM SERPL-SCNC: 135 MMOL/L (ref 136–145)

## 2022-11-10 PROCEDURE — 94664 DEMO&/EVAL PT USE INHALER: CPT

## 2022-11-10 PROCEDURE — 94799 UNLISTED PULMONARY SVC/PX: CPT

## 2022-11-10 PROCEDURE — 25010000002 FUROSEMIDE PER 20 MG: Performed by: FAMILY MEDICINE

## 2022-11-10 PROCEDURE — 97535 SELF CARE MNGMENT TRAINING: CPT | Performed by: OCCUPATIONAL THERAPIST

## 2022-11-10 PROCEDURE — 80048 BASIC METABOLIC PNL TOTAL CA: CPT | Performed by: FAMILY MEDICINE

## 2022-11-10 PROCEDURE — 97530 THERAPEUTIC ACTIVITIES: CPT

## 2022-11-10 PROCEDURE — 94761 N-INVAS EAR/PLS OXIMETRY MLT: CPT

## 2022-11-10 PROCEDURE — 94760 N-INVAS EAR/PLS OXIMETRY 1: CPT

## 2022-11-10 RX ADMIN — GABAPENTIN 100 MG: 100 CAPSULE ORAL at 16:03

## 2022-11-10 RX ADMIN — HYDROCODONE BITARTRATE AND ACETAMINOPHEN 1 TABLET: 5; 325 TABLET ORAL at 08:55

## 2022-11-10 RX ADMIN — ACEBUTOLOL HYDROCHLORIDE 200 MG: 200 CAPSULE ORAL at 20:28

## 2022-11-10 RX ADMIN — Medication 10 ML: at 08:41

## 2022-11-10 RX ADMIN — DILTIAZEM HYDROCHLORIDE 240 MG: 240 CAPSULE, EXTENDED RELEASE ORAL at 08:39

## 2022-11-10 RX ADMIN — Medication 1 APPLICATION: at 08:40

## 2022-11-10 RX ADMIN — Medication 10 ML: at 22:06

## 2022-11-10 RX ADMIN — IPRATROPIUM BROMIDE AND ALBUTEROL SULFATE 3 ML: 2.5; .5 SOLUTION RESPIRATORY (INHALATION) at 19:25

## 2022-11-10 RX ADMIN — CHLORHEXIDINE GLUCONATE 1 APPLICATION: 500 CLOTH TOPICAL at 04:34

## 2022-11-10 RX ADMIN — APIXABAN 10 MG: 5 TABLET, FILM COATED ORAL at 20:28

## 2022-11-10 RX ADMIN — PAROXETINE 40 MG: 20 TABLET, FILM COATED ORAL at 07:07

## 2022-11-10 RX ADMIN — MEGESTROL ACETATE 400 MG: 40 SUSPENSION ORAL at 08:40

## 2022-11-10 RX ADMIN — FUROSEMIDE 40 MG: 10 INJECTION, SOLUTION INTRAMUSCULAR; INTRAVENOUS at 14:57

## 2022-11-10 RX ADMIN — IPRATROPIUM BROMIDE AND ALBUTEROL SULFATE 3 ML: 2.5; .5 SOLUTION RESPIRATORY (INHALATION) at 14:27

## 2022-11-10 RX ADMIN — IPRATROPIUM BROMIDE AND ALBUTEROL SULFATE 3 ML: 2.5; .5 SOLUTION RESPIRATORY (INHALATION) at 10:31

## 2022-11-10 RX ADMIN — ACEBUTOLOL HYDROCHLORIDE 200 MG: 200 CAPSULE ORAL at 08:39

## 2022-11-10 RX ADMIN — FAMOTIDINE 20 MG: 20 TABLET, FILM COATED ORAL at 08:39

## 2022-11-10 RX ADMIN — BUDESONIDE AND FORMOTEROL FUMARATE DIHYDRATE 2 PUFF: 160; 4.5 AEROSOL RESPIRATORY (INHALATION) at 07:06

## 2022-11-10 RX ADMIN — DOCUSATE SODIUM 100 MG: 100 CAPSULE, LIQUID FILLED ORAL at 08:39

## 2022-11-10 RX ADMIN — APIXABAN 10 MG: 5 TABLET, FILM COATED ORAL at 08:39

## 2022-11-10 RX ADMIN — GABAPENTIN 100 MG: 100 CAPSULE ORAL at 08:40

## 2022-11-10 RX ADMIN — BISACODYL 10 MG: 5 TABLET ORAL at 08:40

## 2022-11-10 RX ADMIN — GABAPENTIN 100 MG: 100 CAPSULE ORAL at 20:27

## 2022-11-10 RX ADMIN — HYDROCODONE BITARTRATE AND ACETAMINOPHEN 1 TABLET: 5; 325 TABLET ORAL at 20:27

## 2022-11-10 RX ADMIN — IPRATROPIUM BROMIDE AND ALBUTEROL SULFATE 3 ML: 2.5; .5 SOLUTION RESPIRATORY (INHALATION) at 07:06

## 2022-11-10 RX ADMIN — FUROSEMIDE 40 MG: 10 INJECTION, SOLUTION INTRAMUSCULAR; INTRAVENOUS at 02:28

## 2022-11-10 RX ADMIN — BUDESONIDE AND FORMOTEROL FUMARATE DIHYDRATE 2 PUFF: 160; 4.5 AEROSOL RESPIRATORY (INHALATION) at 21:17

## 2022-11-10 NOTE — PLAN OF CARE
Goal Outcome Evaluation:           Progress: improving  Outcome Evaluation: Pt alert and oriented to person, place, and time. Intermittently confused. 20L/30% on vapotherm. UOP great, still wearing purewick. No acute changes over night. VSS. All safety measures maintained.

## 2022-11-10 NOTE — PLAN OF CARE
Goal Outcome Evaluation:  Plan of Care Reviewed With: (P) patient        Progress: (P) no change  Outcome Evaluation: (P) Pt agreeable to PT with pain in the upper back and L hip, as well as fatigue. She demonstrates intermittent confusion to place but was alert throughout session. She requires max assist for bed mobility and transfers and is unable to tolerate any standing due to weakness. Able to perfrom some exercise though required multiple cues to stay on task. Consider performing exercise in bed to improve strength and activity tolerance.

## 2022-11-10 NOTE — PLAN OF CARE
Goal Outcome Evaluation:  Plan of Care Reviewed With: patient           Outcome Evaluation: OT tx completed this am. She presents alert and orineted x4, but does display some intermittent confusion throughout session. She was placed in upright position in bed for self feeding. Improved independence with self feeding completed. Pt was able to complete all set-up, prepare open/packages, scoop food and bring to mouth. She was able to don/[facility] gown with Min A and set-up. Set-up to wash face/hands and brush teeth. Pt initially requested to get up in chair, but then asked to lay back down. Will continue to OT POC.

## 2022-11-10 NOTE — THERAPY TREATMENT NOTE
Acute Care - Physical Therapy Treatment Note  [facility]     Patient Name: Zelalem Hung  : 1940  MRN: [id number]  Today's Date: 11/10/2022      Visit Dx:     ICD-10-CM ICD-9-CM   1. Single subsegmental pulmonary embolism without acute cor pulmonale (HCC)  I26.93 415.19   2. Pneumonia due to infectious organism, unspecified laterality, unspecified part of lung  J18.9 486   3. Congestive heart failure, unspecified HF chronicity, unspecified heart failure type (HCC)  I50.9 428.0   4. Impaired mobility  Z74.09 799.89   5. Decreased activities of daily living (ADL)  Z78.9 V49.89     Patient Active Problem List   Diagnosis   • Cholecystitis   • Encephalopathy   • Unknown when patient last well, possible stroke    • Hyponatremia   • Failure to thrive in adult   • Severe malnutrition (HCC)   • History of migraines   • Closed fracture of nasal bone with routine healing   • Traumatic ecchymosis of face   • Acquired deviated nasal septum   • Epistaxis   • Colitis   • Fall   • Compression fracture of body of thoracic vertebra (HCC)   • Closed compression fracture of third lumbar vertebra (HCC)   • Sepsis (HCC)   • Metabolic acidosis   • Fecal impaction (HCC)   • Closed left hip fracture (HCC)   • Nonsmoker   • Body mass index (BMI) less than 16.5   • Acute pulmonary embolism without acute cor pulmonale (HCC)   • Pulmonary HTN (HCC)   • Acute pulmonary edema (HCC)   • Sinus tachycardia   • Acute deep vein thrombosis (DVT) of left lower extremity (HCC)     Past Medical History:   Diagnosis Date   • Chronic hyponatremia    • Frequent UTI    • Low back pain    • Migraines    • SVT (supraventricular tachycardia) (HCC)      Past Surgical History:   Procedure Laterality Date   • CHOLECYSTECTOMY WITH INTRAOPERATIVE CHOLANGIOGRAM N/A 2019    Procedure: CHOLECYSTECTOMY LAPAROSCOPIC INTRAOPERATIVE CHOLANGIOGRAM;  Surgeon: Chandan Bravo MD;  Location: [facility];  Service: General   • HIP HEMIARTHROPLASTY Left 2022     Procedure: HIP HEMIARTHROPLASTY;  Surgeon: Barrington Simons MD;  Location: [facility] OR;  Service: Orthopedics;  Laterality: Left;     PT Assessment (last 12 hours)     PT Evaluation and Treatment     Row Name 11/10/22 1403          Physical Therapy Time and Intention    Subjective Information complains of;pain (P)   -AS     Document Type therapy note (daily note) (P)   -AS     Mode of Treatment physical therapy (P)   -AS     Row Name 11/10/22 1403          General Information    Existing Precautions/Restrictions fall;oxygen therapy device and L/min (P)   3 L/min  -AS     Row Name 11/10/22 1403          Pain    Pretreatment Pain Rating 4/10 (P)   -AS     Posttreatment Pain Rating 4/10 (P)   -AS     Pain Location upper (P)   -AS     Pain Location - back (P)   -AS     Pain Intervention(s) Repositioned (P)   -AS     Row Name 11/10/22 1403          Bed Mobility    Bed Mobility rolling left;supine-sit-supine (P)   -AS     Rolling Left [place] (Bed Mobility) maximum assist (25% patient effort) (P)   -AS     Supine-Sit [place] (Bed Mobility) 2 person assist;maximum assist (25% patient effort) (P)   -AS     Sit-Supine [place] (Bed Mobility) maximum assist (25% patient effort) (P)   -AS     Supine-Sit-Supine [place] (Bed Mobility) maximum assist (25% patient effort);2 person assist (P)   -AS     Bed Mobility, Safety Issues decreased use of arms for pushing/pulling;decreased use of legs for bridging/pushing;impaired trunk control for bed mobility (P)   -AS     Assistive Device (Bed Mobility) bed rails;head of bed elevated;draw sheet (P)   -AS     Row Name 11/10/22 1403          Sit-Stand Transfer    Sit-Stand [place] (Transfers) dependent (less than 25% patient effort);2 person assist (P)   Attempted x1  -AS     Row Name 11/10/22 1403          Stand-Sit Transfer    Stand-Sit [place] (Transfers) maximum assist (25% patient effort);2 person assist (P)   -AS     Row Name 11/10/22 1403          Motor Skills     Therapeutic Exercise knee;ankle (P)   -AS     Row Name 11/10/22 1403          Knee (Therapeutic Exercise)    Knee (Therapeutic Exercise) AROM (active range of motion) (P)   -AS     Knee AROM (Therapeutic Exercise) bilateral;LAQ (long arc quad);10 repetitions (P)   Limited ROM and control  -AS     Row Name 11/10/22 1403          Ankle (Therapeutic Exercise)    Ankle (Therapeutic Exercise) AROM (active range of motion) (P)   -AS     Ankle AROM (Therapeutic Exercise) bilateral;dorsiflexion;plantarflexion;10 repetitions (P)   limited ROM  -AS     Row Name             Wound 11/08/22 1311 coccyx Pressure Injury    Wound - Properties Group Placement Date: 11/08/22  -MJ Placement Time: 1311  -MJ Present on Hospital Admission: Y  -MJ Location: coccyx  -MJ Primary Wound Type: Pressure inj  -MJ    Retired Wound - Properties Group Placement Date: 11/08/22  -MJ Placement Time: 1311  -MJ Present on Hospital Admission: Y  -MJ Location: coccyx  -MJ Primary Wound Type: Pressure inj  -MJ    Retired Wound - Properties Group Date first assessed: 11/08/22  -MJ Time first assessed: 1311  -MJ Present on Hospital Admission: Y  -MJ Location: coccyx  -MJ Primary Wound Type: Pressure inj  -MJ    Row Name             Wound 11/08/22 1313 thoracic spine Pressure Injury    Wound - Properties Group Placement Date: 11/08/22  -MJ Placement Time: 1313  -MJ Present on Hospital Admission: Y  -MJ Location: thoracic spine  -MJ Primary Wound Type: Pressure inj  -MJ    Retired Wound - Properties Group Placement Date: 11/08/22  -MJ Placement Time: 1313  -MJ Present on Hospital Admission: Y  -MJ Location: thoracic spine  -MJ Primary Wound Type: Pressure inj  -MJ    Retired Wound - Properties Group Date first assessed: 11/08/22  -MJ Time first assessed: 1313  -MJ Present on Hospital Admission: Y  -MJ Location: thoracic spine  -MJ Primary Wound Type: Pressure inj  -MJ    Row Name 11/10/22 1403          Plan of Care Review    Plan of Care Reviewed With  patient (P)   -AS     Progress no change (P)   -AS     Outcome Evaluation Pt agreeable to PT with pain in the upper back and L hip, as well as fatigue. She demonstrates intermittent confusion to place but was alert throughout session. She requires max assist for bed mobility and transfers and is unable to tolerate any standing due to weakness. Able to perfrom some exercise though required multiple cues to stay on task. Consider performing exercise in bed to improve strength and activity tolerance. (P)   -AS     Row Name 11/10/22 1403          Positioning and Restraints    Pre-Treatment Position in bed (P)   -AS     Post Treatment Position bed (P)   -AS     In Bed notified nsg;fowlers;call light within reach;encouraged to call for assist (P)   -AS           User Key  (r) = Recorded By, (t) = Taken By, (c) = Cosigned By    Initials Name Provider Type    Dorothy Lloyd, RN Registered Nurse    AS Parker Anand, PTA Student PTA Student                Physical Therapy Education     Title: PT OT SLP Therapies (In Progress)     Topic: Physical Therapy (Done)     Point: Mobility training (Done)     Learning Progress Summary           Patient Acceptance, E, VU by AS at 11/10/2022 1503    Comment: Bed mobility.    Acceptance, E, NR by LETA at 11/9/2022 1036    Comment: benefits of activity, progression of PT POC                   Point: Home exercise program (Done)     Learning Progress Summary           Patient Acceptance, E, VU by AS at 11/10/2022 1503    Comment: Bed mobility.                   Point: Body mechanics (Done)     Learning Progress Summary           Patient Acceptance, E, VU by AS at 11/10/2022 1503    Comment: Bed mobility.                   Point: Precautions (Done)     Learning Progress Summary           Patient Acceptance, E, VU by AS at 11/10/2022 1503    Comment: Bed mobility.                               User Key     Initials Effective Dates Name Provider Type Suha GILLETTE 08/02/16 -  Johan  Foster STEWART, PT DPT Physical Therapist PT    AS 09/12/22 -  Parker Anand, PTA Student PTA Student PT              PT Recommendation and Plan     Plan of Care Reviewed With: (P) patient  Progress: (P) no change  Outcome Evaluation: (P) Pt agreeable to PT with pain in the upper back and L hip, as well as fatigue. She demonstrates intermittent confusion to place but was alert throughout session. She requires max assist for bed mobility and transfers and is unable to tolerate any standing due to weakness. Able to perfrom some exercise though required multiple cues to stay on task. Consider performing exercise in bed to improve strength and activity tolerance.   Outcome Measures     Row Name 11/10/22 1403             How much help from another person do you currently need...    Turning from your back to your side while in flat bed without using bedrails? 2 (P)   -AS      Moving from lying on back to sitting on the side of a flat bed without bedrails? 2 (P)   -AS      Moving to and from a bed to a chair (including a wheelchair)? 1 (P)   -AS      Standing up from a chair using your arms (e.g., wheelchair, bedside chair)? 1 (P)   -AS      Climbing 3-5 steps with a railing? 1 (P)   -AS      To walk in hospital room? 1 (P)   -AS      AM-PAC 6 Clicks Score (PT) 8 (P)   -AS         Functional Assessment    Outcome Measure Options AM-PAC 6 Clicks Basic Mobility (PT) (P)   -AS            User Key  (r) = Recorded By, (t) = Taken By, (c) = Cosigned By    Initials Name Provider Type    AS Parker Anand PTA Student PTA Student                 Time Calculation:    PT Charges     Row Name 11/10/22 1503             Time Calculation    Start Time 1403 (P)   -AS      Stop Time 1420 (P)   -AS      Time Calculation (min) 17 min (P)   -AS         Time Calculation- PT    Total Timed Code Minutes- PT 17 minute(s) (P)   -AS         Timed Charges    36714 - PT Therapeutic Exercise Minutes 8 (P)   -AS      68240 - PT Therapeutic Activity Minutes 9  (P)   -AS         Total Minutes    Timed Charges Total Minutes 17 (P)   -AS       Total Minutes 17 (P)   -AS            User Key  (r) = Recorded By, (t) = Taken By, (c) = Cosigned By    Initials Name Provider Type    AS Parker Anand, PTA Student PTA Student                  PT G-Codes  Outcome Measure Options: (P) AM-PAC 6 Clicks Basic Mobility (PT)  AM-PAC 6 Clicks Score (PT): (P) 8  AM-PAC 6 Clicks Score (OT): 15    Parker Anand PTA Student  11/10/2022

## 2022-11-10 NOTE — PROGRESS NOTES
[facility]  INPATIENT PROGRESS NOTE    Length of Stay: 2  Date of Admission: 11/8/2022  Primary Care Physician: Garth Amezcua APRN    Subjective     Chief Complaint:     Shortness of breath    HPI     The patient's dyspnea has improved significantly.  She is on 2 L per nasal cannula and currently is receiving a DuoNeb treatment.  She is more alert and talkative today that she has been since admission.  She is stable for transfer to the medical surgical floor.  The patient has a bed hold at [facility] and I am hopeful that she can be discharged there tomorrow.  BMP this a.m. is unremarkable.      Review of Systems     All pertinent negatives and positives are as above. All other systems have been reviewed and are negative unless otherwise stated.     Objective    Temp:  [97.6 °F (36.4 °C)-98.5 °F (36.9 °C)] 97.6 °F (36.4 °C)  Heart Rate:  [67-95] 75  Resp:  [15-21] 15  BP: ()/(49-88) 113/69    Lab Results (last 24 hours)     Procedure Component Value Units Date/Time    Blood Culture - Blood, Arm, Right [[id number]]  (Normal) Collected: 11/08/22 0835    Specimen: Blood from Arm, Right Updated: 11/10/22 1030     Blood Culture No growth at 2 days    Blood Culture - Blood, Arm, Right [[id number]]  (Normal) Collected: 11/08/22 0811    Specimen: Blood from Arm, Right Updated: 11/10/22 0831     Blood Culture No growth at 2 days    Basic Metabolic Panel [[id number]]  (Abnormal) Collected: 11/10/22 0406    Specimen: Blood Updated: 11/10/22 0521     Glucose 111 mg/dL      BUN 24 mg/dL      Creatinine 0.73 mg/dL      Sodium 135 mmol/L      Potassium 4.4 mmol/L      Chloride 101 mmol/L      CO2 25.0 mmol/L      Calcium 9.7 mg/dL      BUN/Creatinine Ratio 32.9     Anion Gap 9.0 mmol/L      eGFR 82.7 mL/min/1.73      Comment: National Kidney Foundation and American Society of Nephrology (ASN) Task Force recommended calculation based on the Chronic Kidney  Disease Epidemiology Collaboration (CKD-EPI) equation refit without adjustment for race.       Narrative:      GFR Normal >60  Chronic Kidney Disease <60  Kidney Failure <15    The GFR formula is only valid for adults with stable renal function between ages 18 and 70.    Heparin Anti-Xa LMWH [[id number]]  (Normal) Collected: 11/09/22 2136    Specimen: Blood Updated: 11/09/22 2242     Heparin Anti-Xa (LMWH) 0.25 IU/ml     Narrative:      For Therapeutic Doses of Low Molecular Weight Heparin   These levels represent target drug concentrations collected 4 hours after at least 4 doses = Steady state LMWH levels.    Anti-Xa LMWH ranges:   ·Treatment of VTE (full anticoagulation): 0.6-1 units/ml for TWICE daily dosing   ·Treatment of VTE (full anticoagulation): 1-2 units/ml for ONCE daily dosing   ·Prevention of VTE: less than 0.5 units/ml            Imaging Results (Last 24 Hours)     Procedure Component Value Units Date/Time    US Venous Doppler Lower Extremity Bilateral (duplex) [[id number]] Collected: 11/09/22 1739     Updated: 11/09/22 1744    Narrative:      History: PE       Impression:      Impression: There is evidence of deep venous thrombosis in the left  lower extremity.     Comments: Bilateral lower extremity venous duplex exam was performed  using color Doppler flow, Doppler waveform analysis, and grayscale  imaging, with and without compression. There is evidence of loosely  attached deep venous thrombosis in the common femoral vein in the left  lower extremity. There is also chronic appearing thrombus in one of the  paired superficial femoral veins and popliteal veins in the left lower  extremity. There is no evidence of deep venous thrombosis of the right  lower extremity. No thrombus is identified in the saphenofemoral  junctions and greater saphenous veins bilaterally.         This report was finalized on 11/09/2022 17:41 by Dr. Adam Marte MD.             Intake/Output Summary (Last 24 hours) at  11/10/2022 1433  Last data filed at 11/10/2022 1100  Gross per 24 hour   Intake 620 ml   Output 1750 ml   Net -1130 ml       Physical Exam  Constitutional:       General: She is in acute distress.      Appearance: Normal appearance. She is normal weight.   HENT:      Head: Normocephalic and atraumatic.      Right Ear: External ear normal.      Left Ear: External ear normal.      Nose: Nose normal.      Mouth: Mucous membranes are moist.   Eyes:      Conjunctiva/sclera: Conjunctivae normal.   Cardiovascular:      Rate and Rhythm: Regular rhythm.      Pulses: Normal pulses.      Heart sounds: Normal heart sounds. No murmur heard.  Pulmonary:      Effort: No respiratory distress present.     Breath sounds: Normal breath sounds.   Abdominal:      General: Abdomen is flat. Bowel sounds are normal.      Palpations: Abdomen is soft. There is no mass.      Tenderness: There is no abdominal tenderness.   Musculoskeletal:         General: Normal range of motion.      Right lower leg: No edema.      Left lower leg: No edema.   Skin:     General: Skin is warm and dry.      Coloration: Skin is not pale.   Neurological:      General: No focal deficit present.      Mental Status: She is alert and oriented to person, place, and time. Mental status is at baseline.   Psychiatric:         Mood and Affect: Mood is normal.         Judgment: Judgment normal.     Results Review:  I have reviewed the labs, radiology results, and diagnostic studies since my last progress note and made treatment changes reflective of the results.   I have reviewed the current medications.    Assessment/Plan     Active Hospital Problems    Diagnosis    • **Acute pulmonary embolism without acute cor pulmonale (HCC)    • Pulmonary HTN (HCC)    • Acute pulmonary edema (HCC)    • Sinus tachycardia    • Acute deep vein thrombosis (DVT) of left lower extremity (HCC)    • Body mass index (BMI) less than 16.5    • Failure to thrive in adult        PLAN:  Continue to  wean/down titrate O2  Transfer to the medical surgical floor  Continue Eliquis    Electronically signed by Wenceslao Brown DO, 11/10/22, 14:33 CST.

## 2022-11-10 NOTE — THERAPY TREATMENT NOTE
Patient Name: Zelalem Hung  : 1940    MRN: [id number]                              Today's Date: 11/10/2022       Admit Date: 2022    Visit Dx:     ICD-10-CM ICD-9-CM   1. Single subsegmental pulmonary embolism without acute cor pulmonale (HCC)  I26.93 415.19   2. Pneumonia due to infectious organism, unspecified laterality, unspecified part of lung  J18.9 486   3. Congestive heart failure, unspecified HF chronicity, unspecified heart failure type (HCC)  I50.9 428.0   4. Impaired mobility  Z74.09 799.89   5. Decreased activities of daily living (ADL)  Z78.9 V49.89     Patient Active Problem List   Diagnosis   • Cholecystitis   • Encephalopathy   • Unknown when patient last well, possible stroke    • Hyponatremia   • Failure to thrive in adult   • Severe malnutrition (HCC)   • History of migraines   • Closed fracture of nasal bone with routine healing   • Traumatic ecchymosis of face   • Acquired deviated nasal septum   • Epistaxis   • Colitis   • Fall   • Compression fracture of body of thoracic vertebra (HCC)   • Closed compression fracture of third lumbar vertebra (HCC)   • Sepsis (HCC)   • Metabolic acidosis   • Fecal impaction (HCC)   • Closed left hip fracture (HCC)   • Nonsmoker   • Body mass index (BMI) less than 16.5   • Acute pulmonary embolism without acute cor pulmonale (HCC)   • Pulmonary HTN (HCC)   • Acute pulmonary edema (HCC)   • Sinus tachycardia   • Acute deep vein thrombosis (DVT) of left lower extremity (HCC)     Past Medical History:   Diagnosis Date   • Chronic hyponatremia    • Frequent UTI    • Low back pain    • Migraines    • SVT (supraventricular tachycardia) (HCC)      Past Surgical History:   Procedure Laterality Date   • CHOLECYSTECTOMY WITH INTRAOPERATIVE CHOLANGIOGRAM N/A 2019    Procedure: CHOLECYSTECTOMY LAPAROSCOPIC INTRAOPERATIVE CHOLANGIOGRAM;  Surgeon: Chandan Bravo MD;  Location: [facility];  Service: General   • HIP HEMIARTHROPLASTY Left 2022     Procedure: HIP HEMIARTHROPLASTY;  Surgeon: Barrington Simons MD;  Location: [facility];  Service: Orthopedics;  Laterality: Left;      General Information     Row Name 11/10/22 0803          OT Time and Intention    Document Type therapy note (daily note)  -     Mode of Treatment occupational therapy  -     Row Name 11/10/22 0803          General Information    Patient Profile Reviewed yes  -     Existing Precautions/Restrictions fall;oxygen therapy device and L/min  3L O2.NC  -     Barriers to Rehab physical barrier;previous functional deficit;medically complex  -     Row Name 11/10/22 0803          Cognition    Orientation Status (Cognition) oriented x 4  -     Row Name 11/10/22 0803          Safety Issues, Functional Mobility    Impairments Affecting Function (Mobility) endurance/activity tolerance;strength;pain;shortness of breath;balance;postural/trunk control  -           User Key  (r) = Recorded By, (t) = Taken By, (c) = Cosigned By    Initials Name Provider Type     Adeola Abreu, ABBYR/L, CSRS Occupational Therapist                 Mobility/ADL's     Row Name 11/10/22 0841          Bed Mobility    Bed Mobility scooting/bridging  -     Scooting/Bridging [place] (Bed Mobility) dependent (less than 25% patient effort)  -     Row Name 11/10/22 0841          Activities of Daily Living    BADL Assessment/Intervention upper body dressing;feeding;grooming  -     Row Name 11/10/22 0841          Upper Body Dressing Assessment/Training    [place] Level (Upper Body Dressing) don;doff;front opening garment;minimum assist (75% patient effort)  -     Position (Upper Body Dressing) sitting up in bed  -     Row Name 11/10/22 0841          Self-Feeding Assessment/Training    [place] Level (Feeding) feeding skills;independent  -     Position (Self-Feeding) sitting up in bed  -     Row Name 11/10/22 0841          Grooming Assessment/Training    [place] Level (Grooming) wash face,  hands;set up  -JJ           User Key  (r) = Recorded By, (t) = Taken By, (c) = Cosigned By    Initials Name Provider Type    Adeola Fuentes OTR/L, ALEXS Occupational Therapist               Obj/Interventions    No documentation.                Goals/Plan    No documentation.                Clinical Impression     Row Name 11/10/22 0803          Pain Assessment    Pretreatment Pain Rating 4/10  -JJ     Posttreatment Pain Rating 4/10  -JJ     Pain Location upper  -JJ     Pain Location - back  -JJ     Pain Intervention(s) Medication (See MAR);Repositioned;Ambulation/increased activity  -J     Row Name 11/10/22 0803          Plan of Care Review    Plan of Care Reviewed With patient  -     Outcome Evaluation OT tx completed this am. She presents alert and orineted x4, but does display some intermittent confusion throughout session. She was placed in upright position in bed for self feeding. Improved independence with self feeding completed. Pt was able to complete all set-up, prepare open/packages, scoop food and bring to mouth. She was able to don/do hospital gown with Min A and set-up. Set-up to wash face/hands and brush teeth. Pt initially requested to get up in chair, but then asked to lay back down. Will continue to OT POC.  -     Row Name 11/10/22 0803          Therapy Plan Review/Discharge Plan (OT)    Anticipated Discharge Disposition (OT) skilled nursing facility  -     Row Name 11/10/22 0803          Positioning and Restraints    Pre-Treatment Position in bed  -JJ     Post Treatment Position bed  -JJ     In Bed notified nsg;fowlers;call light within reach;encouraged to call for assist;side rails up x3;LUE elevated;RUE elevated;SCD pump applied;R heel elevated;L heel elevated  -JJ           User Key  (r) = Recorded By, (t) = Taken By, (c) = Cosigned By    Initials Name Provider Type    Adeola Fuentes OTR/L, CSRS Occupational Therapist               Outcome Measures     Row Name 11/10/22  0803          How much help from another is currently needed...    Putting on and taking off regular lower body clothing? 1  -JJ     Bathing (including washing, rinsing, and drying) 2  -JJ     Toileting (which includes using toilet bed pan or urinal) 2  -JJ     Putting on and taking off regular upper body clothing 3  -JJ     Taking care of personal grooming (such as brushing teeth) 3  -JJ     Eating meals 4  -JJ     AM-PAC 6 Clicks Score (OT) 15  -JJ     Row Name 11/10/22 0803          Functional Assessment    Outcome Measure Options AM-PAC 6 Clicks Daily Activity (OT)  -J           User Key  (r) = Recorded By, (t) = Taken By, (c) = Cosigned By    Initials Name Provider Type    Adeola Fuentes, AURORA/L, CSRS Occupational Therapist                Occupational Therapy Education     Title: PT OT SLP Therapies (In Progress)     Topic: Occupational Therapy (In Progress)     Point: ADL training (Done)     Description:   Instruct learner(s) on proper safety adaptation and remediation techniques during self care or transfers.   Instruct in proper use of assistive devices.              Learning Progress Summary           Patient Acceptance, E, VU by CORNELIUS at 11/10/2022 0846    Acceptance, E, VU by CORNELIUS at 11/9/2022 1420                   Point: Home exercise program (Not Started)     Description:   Instruct learner(s) on appropriate technique for monitoring, assisting and/or progressing therapeutic exercises/activities.              Learner Progress:  Not documented in this visit.          Point: Precautions (Done)     Description:   Instruct learner(s) on prescribed precautions during self-care and functional transfers.              Learning Progress Summary           Patient Acceptance, E, VU by CORNELIUS at 11/10/2022 0846    Acceptance, E, VU by CORNELIUS at 11/9/2022 1420                   Point: Body mechanics (Not Started)     Description:   Instruct learner(s) on proper positioning and spine alignment during self-care, functional  mobility activities and/or exercises.              Learner Progress:  Not documented in this visit.                      User Key     Initials Effective Dates Name Provider Type Discipline     11/10/21 -  Adeola Abreu OTR/L, CSRS Occupational Therapist OT              OT Recommendation and Plan  Planned Therapy Interventions (OT): activity tolerance training, adaptive equipment training, BADL retraining, functional balance retraining, transfer/mobility retraining, strengthening exercise, occupation/activity based interventions, patient/caregiver education/training, ROM/therapeutic exercise  Therapy Frequency (OT): 3 times/wk  Plan of Care Review  Plan of Care Reviewed With: patient  Outcome Evaluation: OT tx completed this am. She presents alert and orineted x4, but does display some intermittent confusion throughout session. She was placed in upright position in bed for self feeding. Improved independence with self feeding completed. Pt was able to complete all set-up, prepare open/packages, scoop food and bring to mouth. She was able to don/do hospital gown with Min A and set-up. Set-up to wash face/hands and brush teeth. Pt initially requested to get up in chair, but then asked to lay back down. Will continue to OT POC.     Time Calculation:    Time Calculation- OT     Row Name 11/10/22 0803             Time Calculation- OT    OT Start Time 0803  -      OT Stop Time 0830  -      OT Time Calculation (min) 27 min  -      Total Timed Code Minutes- OT 27 minute(s)  -      OT Received On 11/10/22  -            User Key  (r) = Recorded By, (t) = Taken By, (c) = Cosigned By    Initials Name Provider Type     Abreu, Adeola, OTR/L, CSRS Occupational Therapist              Therapy Charges for Today     Code Description Service Date Service Provider Modifiers Qty    19716322306  OT EVAL MOD COMPLEXITY 4 11/9/2022 Adeola Abreu OTR/L, CSRS GO 1    [id number]  OT SELF CARE/MGMT/TRAIN EA  15 MIN 11/10/2022 Adeola Abreu, OTR/L, CSRS GO 2               AURORA Marmolejo/L, CSRS  11/10/2022

## 2022-11-10 NOTE — PLAN OF CARE
Goal Outcome Evaluation:  PT improving now on NC at 2L. Confused at times but at baseline per daughter. Pt will transfer out to regular room today.

## 2022-11-11 VITALS
BODY MASS INDEX: 15.88 KG/M2 | SYSTOLIC BLOOD PRESSURE: 101 MMHG | RESPIRATION RATE: 18 BRPM | HEART RATE: 75 BPM | HEIGHT: 62 IN | TEMPERATURE: 97.7 F | WEIGHT: 86.3 LBS | DIASTOLIC BLOOD PRESSURE: 69 MMHG | OXYGEN SATURATION: 96 %

## 2022-11-11 LAB
ANION GAP SERPL CALCULATED.3IONS-SCNC: 9 MMOL/L (ref 5–15)
BUN SERPL-MCNC: 33 MG/DL (ref 8–23)
BUN/CREAT SERPL: 41.3 (ref 7–25)
CALCIUM SPEC-SCNC: 10 MG/DL (ref 8.6–10.5)
CHLORIDE SERPL-SCNC: 99 MMOL/L (ref 98–107)
CO2 SERPL-SCNC: 27 MMOL/L (ref 22–29)
CREAT SERPL-MCNC: 0.8 MG/DL (ref 0.57–1)
EGFRCR SERPLBLD CKD-EPI 2021: 74.1 ML/MIN/1.73
GLUCOSE SERPL-MCNC: 122 MG/DL (ref 65–99)
POTASSIUM SERPL-SCNC: 4.5 MMOL/L (ref 3.5–5.2)
SARS-COV-2 AG RESP QL IA.RAPID: NORMAL
SODIUM SERPL-SCNC: 135 MMOL/L (ref 136–145)

## 2022-11-11 PROCEDURE — 94799 UNLISTED PULMONARY SVC/PX: CPT

## 2022-11-11 PROCEDURE — 80048 BASIC METABOLIC PNL TOTAL CA: CPT | Performed by: FAMILY MEDICINE

## 2022-11-11 PROCEDURE — 87426 SARSCOV CORONAVIRUS AG IA: CPT | Performed by: FAMILY MEDICINE

## 2022-11-11 PROCEDURE — 97535 SELF CARE MNGMENT TRAINING: CPT

## 2022-11-11 PROCEDURE — 25010000002 FUROSEMIDE PER 20 MG: Performed by: FAMILY MEDICINE

## 2022-11-11 RX ORDER — FUROSEMIDE 40 MG/1
40 TABLET ORAL DAILY
Status: ON HOLD
Start: 2022-11-11 | End: 2023-01-09

## 2022-11-11 RX ADMIN — BISACODYL 10 MG: 5 TABLET ORAL at 09:09

## 2022-11-11 RX ADMIN — IPRATROPIUM BROMIDE AND ALBUTEROL SULFATE 3 ML: 2.5; .5 SOLUTION RESPIRATORY (INHALATION) at 10:46

## 2022-11-11 RX ADMIN — FUROSEMIDE 40 MG: 10 INJECTION, SOLUTION INTRAMUSCULAR; INTRAVENOUS at 03:22

## 2022-11-11 RX ADMIN — ACEBUTOLOL HYDROCHLORIDE 200 MG: 200 CAPSULE ORAL at 09:09

## 2022-11-11 RX ADMIN — MEGESTROL ACETATE 400 MG: 40 SUSPENSION ORAL at 09:09

## 2022-11-11 RX ADMIN — BUDESONIDE AND FORMOTEROL FUMARATE DIHYDRATE 2 PUFF: 160; 4.5 AEROSOL RESPIRATORY (INHALATION) at 06:57

## 2022-11-11 RX ADMIN — Medication 1 APPLICATION: at 09:10

## 2022-11-11 RX ADMIN — DOCUSATE SODIUM 100 MG: 100 CAPSULE, LIQUID FILLED ORAL at 09:09

## 2022-11-11 RX ADMIN — APIXABAN 10 MG: 5 TABLET, FILM COATED ORAL at 09:08

## 2022-11-11 RX ADMIN — DILTIAZEM HYDROCHLORIDE 240 MG: 240 CAPSULE, EXTENDED RELEASE ORAL at 09:09

## 2022-11-11 RX ADMIN — Medication 10 ML: at 09:10

## 2022-11-11 RX ADMIN — PAROXETINE 40 MG: 20 TABLET, FILM COATED ORAL at 09:09

## 2022-11-11 RX ADMIN — IPRATROPIUM BROMIDE AND ALBUTEROL SULFATE 3 ML: 2.5; .5 SOLUTION RESPIRATORY (INHALATION) at 06:57

## 2022-11-11 RX ADMIN — GABAPENTIN 100 MG: 100 CAPSULE ORAL at 09:10

## 2022-11-11 RX ADMIN — FAMOTIDINE 20 MG: 20 TABLET, FILM COATED ORAL at 09:09

## 2022-11-11 RX ADMIN — HYDROCODONE BITARTRATE AND ACETAMINOPHEN 1 TABLET: 5; 325 TABLET ORAL at 11:55

## 2022-11-11 NOTE — PLAN OF CARE
Goal Outcome Evaluation:  Plan of Care Reviewed With: patient        Progress: improving  Outcome Evaluation: Bed mobility MaxA. Squat pivot to chair depA. Pt does put weight through LEs and hold MONTOYA/L's arms to attempt to pull up and assist. Pt demo'd weakness and decreased postural control. Upright in chair pt self feeds after s/u of tray/items and containers opened. Pt fatigues at times needing RBs, provided pillows under BUEs with increased success. Would benefit from SNF at d/c and continued rehab

## 2022-11-11 NOTE — PLAN OF CARE
Goal Outcome Evaluation:  The patient has been taking nebulized medication as prescribed.  Her SPO2 running in the mid 90's on 2 lpm.

## 2022-11-11 NOTE — THERAPY TREATMENT NOTE
Patient Name: Zelalem Hung  : 1940    MRN: [id number]                              Today's Date: 2022       Admit Date: 2022    Visit Dx: Therapist utilized gait belt, applied non-slipped socks, provided fall risk education/prevention, & facilitated muscle strengthening PRN to reduce patient falls risk during this session.      ICD-10-CM ICD-9-CM   1. Single subsegmental pulmonary embolism without acute cor pulmonale (HCC)  I26.93 415.19   2. Pneumonia due to infectious organism, unspecified laterality, unspecified part of lung  J18.9 486   3. Congestive heart failure, unspecified HF chronicity, unspecified heart failure type (HCC)  I50.9 428.0   4. Impaired mobility  Z74.09 799.89   5. Decreased activities of daily living (ADL)  Z78.9 V49.89     Patient Active Problem List   Diagnosis   • Cholecystitis   • Encephalopathy   • Unknown when patient last well, possible stroke    • Hyponatremia   • Failure to thrive in adult   • Severe malnutrition (HCC)   • History of migraines   • Closed fracture of nasal bone with routine healing   • Traumatic ecchymosis of face   • Acquired deviated nasal septum   • Epistaxis   • Colitis   • Fall   • Compression fracture of body of thoracic vertebra (HCC)   • Closed compression fracture of third lumbar vertebra (HCC)   • Sepsis (HCC)   • Metabolic acidosis   • Fecal impaction (HCC)   • Closed left hip fracture (HCC)   • Nonsmoker   • Body mass index (BMI) less than 16.5   • Acute pulmonary embolism without acute cor pulmonale (HCC)   • Pulmonary HTN (HCC)   • Acute pulmonary edema (HCC)   • Sinus tachycardia   • Acute deep vein thrombosis (DVT) of left lower extremity (HCC)     Past Medical History:   Diagnosis Date   • Chronic hyponatremia    • Frequent UTI    • Low back pain    • Migraines    • SVT (supraventricular tachycardia) (HCC)      Past Surgical History:   Procedure Laterality Date   • CHOLECYSTECTOMY WITH INTRAOPERATIVE CHOLANGIOGRAM N/A 2019     Procedure: CHOLECYSTECTOMY LAPAROSCOPIC INTRAOPERATIVE CHOLANGIOGRAM;  Surgeon: Chandan Bravo MD;  Location:  [facility];  Service: General   • HIP HEMIARTHROPLASTY Left 5/6/2022    Procedure: HIP HEMIARTHROPLASTY;  Surgeon: Barrington Simons MD;  Location:  [facility];  Service: Orthopedics;  Laterality: Left;      General Information     Row Name 11/11/22 0826          OT Time and Intention    Document Type therapy note (daily note)  -MT     Mode of Treatment occupational therapy  -MT     Row Name 11/11/22 0826          General Information    Patient Profile Reviewed yes  -MT     Existing Precautions/Restrictions fall;oxygen therapy device and L/min  -MT     Row Name 11/11/22 0826          Safety Issues, Functional Mobility    Impairments Affecting Function (Mobility) endurance/activity tolerance;strength;pain;shortness of breath;balance;postural/trunk control  -MT           User Key  (r) = Recorded By, (t) = Taken By, (c) = Cosigned By    Initials Name Provider Type    MT Loraine Rice COTA Occupational Therapist Assistant                 Mobility/ADL's     Row Name 11/11/22 0826          Bed Mobility    Supine-Sit [place] (Bed Mobility) moderate assist (50% patient effort)  -MT     Bed Mobility, Safety Issues decreased use of arms for pushing/pulling;decreased use of legs for bridging/pushing;impaired trunk control for bed mobility  -MT     Assistive Device (Bed Mobility) bed rails;head of bed elevated;draw sheet  -MT     Comment, (Bed Mobility) pt with posterior lean  -MT     Row Name 11/11/22 0826          Transfers    Transfers bed-chair transfer  -MT     Row Name 11/11/22 0826          Bed-Chair Transfer    Bed-Chair [place] (Transfers) dependent (less than 25% patient effort)  -MT     Comment, (Bed-Chair Transfer) squat pivot to chair depA. Pt does put weight through LEs and hold MONTOYA/L's arms to attempt to pull up and assist. Pt demo'd weakness and decreased postural control  -MT     Row Name  11/11/22 0826          Sit-Stand Transfer    Sit-Stand [place] (Transfers) dependent (less than 25% patient effort)  -MT     Row Name 11/11/22 0826          Activities of Daily Living    BADL Assessment/Intervention feeding  -MT     Row Name 11/11/22 0826          Self-Feeding Assessment/Training    [place] Level (Feeding) feeding skills;set up  -MT     Comment, (Feeding) pt self feeds after s/u of tray/items and containers opened. Pt fatigues at times needing RBs, provided pillows under BUEs with increased success  -MT           User Key  (r) = Recorded By, (t) = Taken By, (c) = Cosigned By    Initials Name Provider Type    Loraine Hunter COTA Occupational Therapist Assistant               Obj/Interventions    No documentation.                Goals/Plan    No documentation.                Clinical Impression     [facility] Name 11/11/22 0826          Pain Assessment    Pretreatment Pain Rating 0/10 - no pain  -MT     Posttreatment Pain Rating 0/10 - no pain  -MT     Row Name 11/11/22 0826          Therapy Plan Review/Discharge Plan (OT)    Anticipated Discharge Disposition (OT) skilled nursing facility  -MT     Row Name 11/11/22 0826          Vital Signs    O2 Delivery Pre Treatment supplemental O2  -MT     Row Name 11/11/22 0826          Positioning and Restraints    Pre-Treatment Position in bed  -MT     Post Treatment Position chair  -MT     In Chair notified nsg;with nsg;reclined;call light within reach;encouraged to call for assist  -MT           User Key  (r) = Recorded By, (t) = Taken By, (c) = Cosigned By    Initials Name Provider Type    Loraine Hunter COTA Occupational Therapist Assistant               Outcome Measures     Row Name 11/11/22 0826          How much help from another is currently needed...    Putting on and taking off regular lower body clothing? 1  -MT     Bathing (including washing, rinsing, and drying) 2  -MT     Toileting (which includes using toilet bed pan or urinal) 2   -MT     Putting on and taking off regular upper body clothing 3  -MT     Taking care of personal grooming (such as brushing teeth) 3  -MT     Eating meals 3  -MT     AM-PAC 6 Clicks Score (OT) 14  -MT           User Key  (r) = Recorded By, (t) = Taken By, (c) = Cosigned By    Initials Name Provider Type    MT Loraine Rice COTA Occupational Therapist Assistant                Occupational Therapy Education     Title: PT OT SLP Therapies (In Progress)     Topic: Occupational Therapy (In Progress)     Point: ADL training (Done)     Description:   Instruct learner(s) on proper safety adaptation and remediation techniques during self care or transfers.   Instruct in proper use of assistive devices.              Learning Progress Summary           Patient Acceptance, E, VU by  at 11/10/2022 0846    Acceptance, E, VU by  at 11/9/2022 1420                   Point: Home exercise program (Not Started)     Description:   Instruct learner(s) on appropriate technique for monitoring, assisting and/or progressing therapeutic exercises/activities.              Learner Progress:  Not documented in this visit.          Point: Precautions (Done)     Description:   Instruct learner(s) on prescribed precautions during self-care and functional transfers.              Learning Progress Summary           Patient Acceptance, E, VU by CORNELIUS at 11/10/2022 0846    Acceptance, E, VU by BALBIR at 11/9/2022 1420                   Point: Body mechanics (Not Started)     Description:   Instruct learner(s) on proper positioning and spine alignment during self-care, functional mobility activities and/or exercises.              Learner Progress:  Not documented in this visit.                      User Key     Initials Effective Dates Name Provider Type Discipline     11/10/21 -  Adeola bAreu OTR/L, CSRS Occupational Therapist OT              OT Recommendation and Plan     Plan of Care Review  Plan of Care Reviewed With: patient  Progress:  improving  Outcome Evaluation: Bed mobility MaxA. Squat pivot to chair depA. Pt does put weight through LEs and hold MONTOYA/L's arms to attempt to pull up and assist. Pt demo'd weakness and decreased postural control. Upright in chair pt self feeds after s/u of tray/items and containers opened. Pt fatigues at times needing RBs, provided pillows under BUEs with increased success. Would benefit from SNF at d/c and continued rehab     Time Calculation:    Time Calculation- OT     Row Name 11/11/22 0826             Time Calculation- OT    OT Start Time 0826  -MT      OT Stop Time 0850  -MT      OT Time Calculation (min) 24 min  -MT      Total Timed Code Minutes- OT 24 minute(s)  -MT      OT Received On 11/11/22  -MT         Timed Charges    87997 - OT Self Care/Mgmt Minutes 24  -MT         Total Minutes    Timed Charges Total Minutes 24  -MT       Total Minutes 24  -MT            User Key  (r) = Recorded By, (t) = Taken By, (c) = Cosigned By    Initials Name Provider Type    MT Loraine Rice COTA Occupational Therapist Assistant              Therapy Charges for Today     Code Description Service Date Service Provider Modifiers Qty    [id number] HC OT SELF CARE/MGMT/TRAIN EA 15 MIN 11/11/2022 Loraine Rice COTA GO 2               JAN Hunt  11/11/2022

## 2022-11-11 NOTE — DISCHARGE SUMMARY
[facility]  DISCHARGE SUMMARY       Date of Admission: 11/8/2022  Date of Discharge:  11/11/2022  Primary Care Physician: Garth Amezcua APRN    Discharge Diagnoses:  Active Hospital Problems    Diagnosis    • **Acute pulmonary embolism without acute cor pulmonale (HCC)    • Pulmonary HTN (HCC)    • Acute pulmonary edema (HCC)    • Sinus tachycardia    • Acute deep vein thrombosis (DVT) of left lower extremity (HCC)    • Body mass index (BMI) less than 16.5    • Failure to thrive in adult          Presenting Problem/History of Present Illness:  Single subsegmental pulmonary embolism without acute cor pulmonale (HCC) [I26.93]     Chief Complaint on Day of Discharge:   No complaint    History of Present Illness on Day of Discharge:   The patient is doing better today.  Oxygen requirement is down to 2 L.  She is at her cognitive baseline and is currently being treated with Eliquis.  She has tolerated initiation of Eliquis and is stable for discharge back to skilled nursing facility.    Hospital Course  This 81-year-old female presents to the emergency department today with a primary complaint of left upper quadrant discomfort, sharp left lower chest pain and shortness of breath beginning this morning.  The patient states that her pain is worse when she takes a deep breath.  She denies cough, sputum production, fever.  She has a nursing home resident and has been so since she incurred a hip fracture in May of this year.  She subsequently developed foot discomfort and has been essentially nonambulatory or minimally ambulatory since her admission to the nursing facility.  Work-up in the emergency department reveals negative troponin, unremarkable CMP, negative COVID and influenza swabs, unremarkable urinalysis.  ABG shows normal pH with PO2 70.8 on 3 L per nasal cannula.  BNP was elevated at 8600 with procalcitonin 0.26, CRP 20.25.  D-dimer elevated 1.93.  PT  and PTT elevated but not remarkably so.  White blood cell count 19,700 with a mild left shift.     Currently, the patient is requiring Vapotherm at 20 L with FiO2 0.40 in order to maintain saturations in the mid to low 90s.    PLAN:   Admit to CCU  Lasix 40 mg IV every 12 hours  Echocardiogram  Venous dopplers BLE  Full anticoagulation Lovenox dosed per pharmacy  Cardiology consultation    Cardiology evaluated the patient noting that the patient was not a candidate for interventional therapies such as EKOS.  Recommendation was to treat conservatively with oral anticoagulation.  Patient remained on Vapotherm and was gradually transitioned to nasal O2.  Echocardiogram was performed showing EF greater than 70% with mild RV dilatation, borderline RV systolic function with no evidence of right heart strain.  She was converted from Lovenox to oral Eliquis on 11/9.  Oxygen was de-escalated as noted above and the patient is stable for transfer to skilled nursing facility to continue de-escalation with eventual weaning of oxygen as she improves.  She should continue on anticoagulation for 12 months.    Consults:   Cardiology:  ASSESSMENT/PLAN:     1.  Bilateral pulmonary emboli with no definitive evidence of right heart strain  2.  Acute hypoxemic respiratory failure  3.  Interstitial pulmonary edema  4.  Consolidation in the left lower lobe: Radiologist suggest either edema versus pulmonary infarction  5.  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  Failure to thrive  7.  Prior subdural hematoma  8.  Tachycardia  9.  Leukocytosis  10.  Left lower extremity DVT (final report of duplex pending)     -We are asked to evaluate this patient with acute PEs and pulmonary edema.  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  In addition, given her malnourished status with BMI of 16 and current weight of 88 pounds, this patient would not be a candidate for extraction thrombectomy given the extremely large caliber of the device used and the potential risk of adverse events with a large caliber catheter such as this in an extremely small malnourished patient.  Therefore, the patient will have to undergo standard therapy for pulmonary emboli with anticoagulation without any invasive procedures being offered.  -In regards to the patient's pulmonary edema, she is being administered diuretics which seems appropriate at this time.  The patient's echocardiogram is pending a final read at this time however review of the images shows normal left ventricular systolic function.  The right ventricular cavity appears to be borderline to mildly dilated with low normal systolic function.  The final report of the echocardiogram will follow.  -At this time, the patient's tachycardia is likely compensatory given the above medical problems.  I would not recommend rapidly trying to lower the patient's heart rate as this is a compensatory mechanism for her overall critical illness.  -At this time, I would have no further recommendations for this patient other than to continue current therapies.  She is not a candidate for any invasive cardiac procedures.    Result Review    Result Review:  I have personally reviewed the results from the time of this admission to 11/11/2022 10:26 CST and agree with these findings:  []  Laboratory  []  Microbiology  []  Radiology  []  EKG/Telemetry   []  Cardiology/Vascular   []  Pathology  []  Old records  []  Other:    Most notable findings include:   Impression: There is evidence of deep venous thrombosis in the left   lower extremity.       Comments: Bilateral lower extremity venous duplex exam was performed   using color Doppler flow, Doppler waveform analysis, and grayscale   imaging, with  "and without compression. There is evidence of loosely   attached deep venous thrombosis in the common femoral vein in the left   lower extremity. There is also chronic appearing thrombus in one of the   paired superficial femoral veins and popliteal veins in the left lower   extremity. There is no evidence of deep venous thrombosis of the right   lower extremity. No thrombus is identified in the saphenofemoral   junctions and greater saphenous veins bilaterally.     Condition on Discharge:    Stable and improved    Physical Exam on Discharge:  /53 (BP Location: Right arm, Patient Position: Lying)   Pulse 80   Temp 97.4 °F (36.3 °C) (Oral)   Resp 18   Ht 157.5 cm (62.01\")   Wt 39.1 kg (86 lb 4.8 oz)   SpO2 94%   BMI 15.78 kg/m²   Physical Exam     Constitutional:       General: She is in no acute distress.      Appearance: Normal appearance. She is normal weight.   HENT:      Head: Normocephalic and atraumatic.      Right Ear: External ear normal.      Left Ear: External ear normal.      Nose: Nose normal.      Mouth: Mucous membranes are moist.   Eyes:      Conjunctiva/sclera: Conjunctivae normal.   Cardiovascular:      Rate and Rhythm: Regular rhythm.      Pulses: Normal pulses.      Heart sounds: Normal heart sounds. No murmur heard.  Pulmonary:      Effort: No respiratory distress present.     Breath sounds: Normal breath sounds.   Abdominal:      General: Abdomen is flat. Bowel sounds are normal.      Palpations: Abdomen is soft. There is no mass.      Tenderness: There is no abdominal tenderness.   Musculoskeletal:         General: Normal range of motion.      Right lower leg: No edema.      Left lower leg: No edema.   Skin:     General: Skin is warm and dry.      Coloration: Skin is not pale.   Neurological:      General: No focal deficit present.      Mental Status: She is alert and oriented to person, place, and time. Mental status is at baseline.   Psychiatric:         Mood and Affect: Mood is " normal.         Judgment: Judgment normal.     Discharge Disposition:  Skilled Nursing Facility (DC - External)    Discharge Medications:     Discharge Medications      New Medications      Instructions Start Date   apixaban 5 MG tablet tablet  Commonly known as: ELIQUIS   10 mg, Oral, Every 12 Hours Scheduled      apixaban 5 MG tablet tablet  Commonly known as: ELIQUIS   5 mg, Oral, Every 12 Hours Scheduled   Start Date: November 16, 2022     furosemide 40 MG tablet  Commonly known as: Lasix   40 mg, Oral, Daily         Continue These Medications      Instructions Start Date   acebutolol 200 MG capsule  Commonly known as: SECTRAL   200 mg, Oral, 2 Times Daily      acetaminophen 325 MG tablet  Commonly known as: TYLENOL   650 mg, Oral, Every 4 Hours PRN      bisacodyl 5 MG EC tablet  Commonly known as: DULCOLAX   10 mg, Oral, Every Night at Bedtime      budesonide-formoterol 160-4.5 MCG/ACT inhaler  Commonly known as: SYMBICORT   2 puffs, Inhalation, 2 Times Daily - RT      Calmoseptine 0.44-20.6 % ointment  Generic drug: Menthol-Zinc Oxide   1 application, Topical, As Needed, Apply to kathi-area after incontinent episodes      cholecalciferol 25 MCG (1000 UT) tablet  Commonly known as: VITAMIN D3   2,000 Units, Oral, Daily      dilTIAZem  MG 24 hr capsule  Commonly known as: CARDIZEM CD   240 mg, Oral, Daily      docusate sodium 100 MG capsule  Commonly known as: Colace   100 mg, Oral, Daily      famotidine 20 MG tablet  Commonly known as: PEPCID   20 mg, Oral, Daily      fenofibrate 145 MG tablet  Commonly known as: TRICOR   145 mg, Oral, Daily      ipratropium-albuterol  MCG/ACT inhaler  Commonly known as: COMBIVENT RESPIMAT   1 puff, Inhalation, 4 Times Daily PRN      ipratropium-albuterol 0.5-2.5 mg/3 ml nebulizer  Commonly known as: DUO-NEB   3 mL, Nebulization, Every 4 Hours PRN      megestrol 40 MG/ML suspension  Commonly known as: MEGACE   400 mg, Oral, 2 Times Daily      melatonin 5 MG tablet  tablet   5 mg, Oral, Nightly      ondansetron 4 MG tablet  Commonly known as: ZOFRAN   4 mg, Oral, Every 8 Hours PRN      PARoxetine 40 MG tablet  Commonly known as: PAXIL   40 mg, Oral, Every Morning      polyethylene glycol 17 GM/SCOOP powder  Commonly known as: MIRALAX   17 g, Oral, Daily         Stop These Medications    gabapentin 100 MG capsule  Commonly known as: NEURONTIN     LORazepam 0.5 MG tablet  Commonly known as: ATIVAN     oxyCODONE-acetaminophen 5-325 MG per tablet  Commonly known as: PERCOCET            Discharge Diet:   Diet Instructions     Diet: Regular; Thin      Discharge Diet: Regular    Fluid Consistency: Thin          Discharge Care Plan / Instructions:   Discharge to nursing facility    Activity at Discharge:   Activity Instructions     Activity as Tolerated             Electronically signed by Wenceslao Bronw DO, 11/11/22, 10:26 CST.    Time: Discharge over 30 min    Part of this note may be an electronic transcription/translation of spoken language to printed text using the Dragon Dictation system.

## 2022-11-11 NOTE — PLAN OF CARE
Goal Outcome Evaluation:           Progress: no change  Outcome Evaluation: VSS, pt c/o back pain, PRN pain medication given. turned q2h. heels red/blanchable, elevated off mattress. no acute changes overnight. safety maintained

## 2022-11-11 NOTE — CASE MANAGEMENT/SOCIAL WORK
Continued Stay Note   N[facility]     Patient Name: Zelalem Hung  MRN: [id number]  Today's Date: 11/11/2022    Admit Date: 11/8/2022    Plan: [facility]   Discharge Plan     Row Name 11/11/22 1020       Plan    Plan [facility]    Patient/Family in Agreement with Plan yes    Final Discharge Disposition Code 03 - skilled nursing facility (SNF)    Final Note Pt to return to [facility] today.  Pt will need covid test.  [phone]               Discharge Codes    No documentation.               Expected Discharge Date and Time     Expected Discharge Date Expected Discharge Time    Nov 11, 2022             KAMRAN Brandt

## 2022-11-11 NOTE — PLAN OF CARE
Goal Outcome Evaluation:  Plan of Care Reviewed With: patient        Progress: improving  Outcome Evaluation: patient to return to SNF today. awaiting EMS.

## 2022-11-12 NOTE — THERAPY DISCHARGE NOTE
Acute Care - Physical Therapy Discharge Summary  [facility]       Patient Name: Zelalem Hung  : 1940  MRN: [id number]    Today's Date: 2022                 Admit Date: 2022      PT Recommendation and Plan    Visit Dx:    ICD-10-CM ICD-9-CM   1. Single subsegmental pulmonary embolism without acute cor pulmonale (HCC)  I26.93 415.19   2. Pneumonia due to infectious organism, unspecified laterality, unspecified part of lung  J18.9 486   3. Congestive heart failure, unspecified HF chronicity, unspecified heart failure type (HCC)  I50.9 428.0   4. Impaired mobility  Z74.09 799.89   5. Decreased activities of daily living (ADL)  Z78.9 V49.89        Outcome Measures     Row Name 11/10/22 1403             How much help from another person do you currently need...    Turning from your back to your side while in flat bed without using bedrails? 2  -LETA (r) AS (t) LETA (c)      Moving from lying on back to sitting on the side of a flat bed without bedrails? 2  -LETA (r) AS (t) LETA (c)      Moving to and from a bed to a chair (including a wheelchair)? 1  -LETA (r) AS (t) LETA (c)      Standing up from a chair using your arms (e.g., wheelchair, bedside chair)? 1  -LETA (r) AS (t) LETA (c)      Climbing 3-5 steps with a railing? 1  -LETA (r) AS (t) LETA (c)      To walk in hospital room? 1  -LETA (r) AS (t) LETA (c)      AM-PAC 6 Clicks Score (PT) 8  -LETA (r) AS (t)         Functional Assessment    Outcome Measure Options AM-PAC 6 Clicks Basic Mobility (PT)  -LETA (r) AS (t) LETA (c)            User Key  (r) = Recorded By, (t) = Taken By, (c) = Cosigned By    Initials Name Provider Type    Foster Bourne, PT DPT Physical Therapist    AS Parker Anand, PTA Student PTA Student                     PT Rehab Goals     Row Name 22 1258             Bed Mobility Goal 1 (PT)    Activity/Assistive Device (Bed Mobility Goal 1, PT) sit to supine/supine to sit  -MF      [place] Level/Cues Needed (Bed Mobility Goal 1, PT) moderate  assist (50-74% patient effort)  -MF      Time Frame (Bed Mobility Goal 1, PT) long term goal (LTG);10 days  -MF      Progress/Outcomes (Bed Mobility Goal 1, PT) goal not met  -MF         Problem Specific Goal 1 (PT)    Problem Specific Goal 1 (PT) Static sitting balance at EOB x 10-15 minutes, CGA.  -MF      Time Frame (Problem Specific Goal 1, PT) long-term goal (LTG)  -MF      Progress/Outcome (Problem Specific Goal 1, PT) goal not met  -            User Key  (r) = Recorded By, (t) = Taken By, (c) = Cosigned By    Initials Name Provider Type Discipline     Lisa Koroma, PTA Physical Therapist Assistant PT                    PT Discharge Summary  Anticipated Discharge Disposition (PT): skilled nursing facility  Reason for Discharge: Discharge from facility  Outcomes Achieved: Unable to make functional progress toward goals at this time  Discharge Destination: SNF      Lisa Koroma PTA   11/12/2022

## 2022-11-12 NOTE — THERAPY DISCHARGE NOTE
Acute Care - Occupational Therapy Discharge Summary  [facility]     Patient Name: Zelalem Hung  : 1940  MRN: [id number]    Today's Date: 2022                 Admit Date: 2022        OT Recommendation and Plan    Visit Dx:    ICD-10-CM ICD-9-CM   1. Single subsegmental pulmonary embolism without acute cor pulmonale (HCC)  I26.93 415.19   2. Pneumonia due to infectious organism, unspecified laterality, unspecified part of lung  J18.9 486   3. Congestive heart failure, unspecified HF chronicity, unspecified heart failure type (HCC)  I50.9 428.0   4. Impaired mobility  Z74.09 799.89   5. Decreased activities of daily living (ADL)  Z78.9 V49.89                OT Rehab Goals     Row Name 22 1200             Dressing Goal 1 (OT)    Activity/Device (Dressing Goal 1, OT) upper body dressing  -AC      [place]/Cues Needed (Dressing Goal 1, OT) minimum assist (75% or more patient effort)  -AC      Time Frame (Dressing Goal 1, OT) long term goal (LTG);by discharge  -AC      Progress/Outcome (Dressing Goal 1, OT) goal not met  -AC         Grooming Goal 1 (OT)    Activity/Device (Grooming Goal 1, OT) grooming skills, all  -AC      [place] (Grooming Goal 1, OT) minimum assist (75% or more patient effort)  -AC      Time Frame (Grooming Goal 1, OT) long term goal (LTG);by discharge  -AC      Progress/Outcome (Grooming Goal 1, OT) goal not met  -AC         Self-Feeding Goal 1 (OT)    Activity/Device (Self-Feeding Goal 1, OT) self-feeding skills, all  -AC      [place] Level/Cues Needed (Self-Feeding Goal 1, OT) independent  -AC      Time Frame (Self-Feeding Goal 1, OT) long term goal (LTG);by discharge  -AC      Progress/Outcomes (Self-Feeding Goal 1, OT) goal not met  -AC            User Key  (r) = Recorded By, (t) = Taken By, (c) = Cosigned By    Initials Name Provider Type Discipline    AC Vini Flores, OTR/L, CNT Occupational Therapist OT                 Outcome Measures     Row Name  11/10/22 1403             How much help from another person do you currently need...    Turning from your back to your side while in flat bed without using bedrails? 2  -LETA (r) AS (t) LETA (c)      Moving from lying on back to sitting on the side of a flat bed without bedrails? 2  -LETA (r) AS (t) LETA (c)      Moving to and from a bed to a chair (including a wheelchair)? 1  -LETA (r) AS (t) LETA (c)      Standing up from a chair using your arms (e.g., wheelchair, bedside chair)? 1  -LETA (r) AS (t) LETA (c)      Climbing 3-5 steps with a railing? 1  -LETA (r) AS (t) LETA (c)      To walk in hospital room? 1  -LETA (r) AS (t) LETA (c)      AM-PAC 6 Clicks Score (PT) 8  -LETA (r) AS (t)         Functional Assessment    Outcome Measure Options AM-PAC 6 Clicks Basic Mobility (PT)  -LETA (r) AS (t) LETA (c)            User Key  (r) = Recorded By, (t) = Taken By, (c) = Cosigned By    Initials Name Provider Type    Foster Bourne, PT DPT Physical Therapist    AS Parker Anand, PTA Student PTA Student                Timed Therapy Charges  Total Units: 2    Suggested Charges  Total Units: 2    Procedure Name Documented Minutes Units Code    HC OT SELF CARE/MGMT/TRAIN EA 15 MIN 24  2    04004 (CPT®)               Documented Minutes  Total Minutes: 24    Therapy Provided Minutes    90539 - OT Self Care/Mgmt Minutes 24                    OT Discharge Summary  Anticipated Discharge Disposition (OT): skilled nursing facility  Reason for Discharge: Discharge from facility  Outcomes Achieved: Refer to plan of care for updates on goals achieved  Discharge Destination: SNF      Vini Flores, OTR/L, CNT  11/12/2022

## 2022-11-13 LAB
BACTERIA SPEC AEROBE CULT: NORMAL
BACTERIA SPEC AEROBE CULT: NORMAL

## 2022-11-17 ENCOUNTER — TRANSCRIBE ORDERS (OUTPATIENT)
Dept: ADMINISTRATIVE | Facility: HOSPITAL | Age: 82
End: 2022-11-17

## 2022-11-17 DIAGNOSIS — M79.601 PAIN OF RIGHT UPPER EXTREMITY: Primary | ICD-10-CM

## 2022-11-18 ENCOUNTER — HOSPITAL ENCOUNTER (OUTPATIENT)
Dept: ULTRASOUND IMAGING | Facility: HOSPITAL | Age: 82
Discharge: HOME | End: 2022-11-18

## 2022-11-18 DIAGNOSIS — M79.601 PAIN OF RIGHT UPPER EXTREMITY: ICD-10-CM

## 2022-11-18 PROCEDURE — 93971 EXTREMITY STUDY: CPT

## 2022-11-18 PROCEDURE — 93971 EXTREMITY STUDY: CPT | Performed by: SURGERY

## 2023-01-09 ENCOUNTER — HOSPITAL ENCOUNTER (OUTPATIENT)
Facility: HOSPITAL | Age: 83
Setting detail: OBSERVATION
Discharge: NURSING, REHAB OR LONG-TERM CARE | End: 2023-01-10
Attending: INTERNAL MEDICINE | Admitting: FAMILY MEDICINE
Payer: MEDICARE

## 2023-01-09 ENCOUNTER — APPOINTMENT (OUTPATIENT)
Dept: GENERAL RADIOLOGY | Facility: HOSPITAL | Age: 83
End: 2023-01-09
Payer: MEDICARE

## 2023-01-09 DIAGNOSIS — R77.8 ELEVATED TROPONIN: ICD-10-CM

## 2023-01-09 DIAGNOSIS — S72.002A CLOSED FRACTURE OF LEFT HIP, INITIAL ENCOUNTER: ICD-10-CM

## 2023-01-09 DIAGNOSIS — I47.1 SVT (SUPRAVENTRICULAR TACHYCARDIA): Primary | ICD-10-CM

## 2023-01-09 DIAGNOSIS — S32.030D CLOSED COMPRESSION FRACTURE OF L3 LUMBAR VERTEBRA WITH ROUTINE HEALING, SUBSEQUENT ENCOUNTER: ICD-10-CM

## 2023-01-09 DIAGNOSIS — N30.00 ACUTE CYSTITIS WITHOUT HEMATURIA: ICD-10-CM

## 2023-01-09 PROBLEM — I47.10 SVT (SUPRAVENTRICULAR TACHYCARDIA): Status: ACTIVE | Noted: 2023-01-09

## 2023-01-09 PROBLEM — D72.829 ELEVATED WBC COUNT: Status: ACTIVE | Noted: 2023-01-09

## 2023-01-09 PROBLEM — N39.0 UTI (URINARY TRACT INFECTION), BACTERIAL: Status: ACTIVE | Noted: 2023-01-09

## 2023-01-09 PROBLEM — A49.9 UTI (URINARY TRACT INFECTION), BACTERIAL: Status: ACTIVE | Noted: 2023-01-09

## 2023-01-09 LAB
ALBUMIN SERPL-MCNC: 4 G/DL (ref 3.5–5.2)
ALBUMIN/GLOB SERPL: 1.2 G/DL
ALP SERPL-CCNC: 45 U/L (ref 39–117)
ALT SERPL W P-5'-P-CCNC: 10 U/L (ref 1–33)
ANION GAP SERPL CALCULATED.3IONS-SCNC: 8 MMOL/L (ref 5–15)
AST SERPL-CCNC: 20 U/L (ref 1–32)
BACTERIA UR QL AUTO: ABNORMAL /HPF
BASOPHILS # BLD AUTO: 0.08 10*3/MM3 (ref 0–0.2)
BASOPHILS NFR BLD AUTO: 0.6 % (ref 0–1.5)
BILIRUB SERPL-MCNC: 0.2 MG/DL (ref 0–1.2)
BILIRUB UR QL STRIP: NEGATIVE
BUN SERPL-MCNC: 18 MG/DL (ref 8–23)
BUN/CREAT SERPL: 26.1 (ref 7–25)
CALCIUM SPEC-SCNC: 9.6 MG/DL (ref 8.6–10.5)
CHLORIDE SERPL-SCNC: 104 MMOL/L (ref 98–107)
CLARITY UR: CLEAR
CO2 SERPL-SCNC: 28 MMOL/L (ref 22–29)
COLOR UR: YELLOW
CREAT SERPL-MCNC: 0.69 MG/DL (ref 0.57–1)
D-LACTATE SERPL-SCNC: 1.3 MMOL/L (ref 0.5–2)
DEPRECATED RDW RBC AUTO: 59.6 FL (ref 37–54)
EGFRCR SERPLBLD CKD-EPI 2021: 86.8 ML/MIN/1.73
EOSINOPHIL # BLD AUTO: 0.19 10*3/MM3 (ref 0–0.4)
EOSINOPHIL NFR BLD AUTO: 1.4 % (ref 0.3–6.2)
ERYTHROCYTE [DISTWIDTH] IN BLOOD BY AUTOMATED COUNT: 15.4 % (ref 12.3–15.4)
GLOBULIN UR ELPH-MCNC: 3.4 GM/DL
GLUCOSE SERPL-MCNC: 90 MG/DL (ref 65–99)
GLUCOSE UR STRIP-MCNC: NEGATIVE MG/DL
HCT VFR BLD AUTO: 44.8 % (ref 34–46.6)
HGB BLD-MCNC: 14.1 G/DL (ref 12–15.9)
HGB UR QL STRIP.AUTO: ABNORMAL
HYALINE CASTS UR QL AUTO: ABNORMAL /LPF
IMM GRANULOCYTES # BLD AUTO: 0.17 10*3/MM3 (ref 0–0.05)
IMM GRANULOCYTES NFR BLD AUTO: 1.3 % (ref 0–0.5)
KETONES UR QL STRIP: NEGATIVE
LEUKOCYTE ESTERASE UR QL STRIP.AUTO: ABNORMAL
LYMPHOCYTES # BLD AUTO: 3.35 10*3/MM3 (ref 0.7–3.1)
LYMPHOCYTES NFR BLD AUTO: 25 % (ref 19.6–45.3)
MCH RBC QN AUTO: 32.8 PG (ref 26.6–33)
MCHC RBC AUTO-ENTMCNC: 31.5 G/DL (ref 31.5–35.7)
MCV RBC AUTO: 104.2 FL (ref 79–97)
MONOCYTES # BLD AUTO: 1.3 10*3/MM3 (ref 0.1–0.9)
MONOCYTES NFR BLD AUTO: 9.7 % (ref 5–12)
NEUTROPHILS NFR BLD AUTO: 62 % (ref 42.7–76)
NEUTROPHILS NFR BLD AUTO: 8.33 10*3/MM3 (ref 1.7–7)
NITRITE UR QL STRIP: NEGATIVE
NRBC BLD AUTO-RTO: 0 /100 WBC (ref 0–0.2)
PH UR STRIP.AUTO: 6.5 [PH] (ref 5–8)
PLATELET # BLD AUTO: 387 10*3/MM3 (ref 140–450)
PMV BLD AUTO: 8.9 FL (ref 6–12)
POTASSIUM SERPL-SCNC: 4.4 MMOL/L (ref 3.5–5.2)
PROT SERPL-MCNC: 7.4 G/DL (ref 6–8.5)
PROT UR QL STRIP: NEGATIVE
RBC # BLD AUTO: 4.3 10*6/MM3 (ref 3.77–5.28)
RBC # UR STRIP: ABNORMAL /HPF
REF LAB TEST METHOD: ABNORMAL
SODIUM SERPL-SCNC: 140 MMOL/L (ref 136–145)
SP GR UR STRIP: 1.01 (ref 1–1.03)
SQUAMOUS #/AREA URNS HPF: ABNORMAL /HPF
TROPONIN T SERPL-MCNC: 0.07 NG/ML (ref 0–0.03)
UROBILINOGEN UR QL STRIP: ABNORMAL
WBC # UR STRIP: ABNORMAL /HPF
WBC NRBC COR # BLD: 13.42 10*3/MM3 (ref 3.4–10.8)

## 2023-01-09 PROCEDURE — 87077 CULTURE AEROBIC IDENTIFY: CPT | Performed by: INTERNAL MEDICINE

## 2023-01-09 PROCEDURE — 96365 THER/PROPH/DIAG IV INF INIT: CPT

## 2023-01-09 PROCEDURE — 83605 ASSAY OF LACTIC ACID: CPT | Performed by: INTERNAL MEDICINE

## 2023-01-09 PROCEDURE — 96375 TX/PRO/DX INJ NEW DRUG ADDON: CPT

## 2023-01-09 PROCEDURE — 94640 AIRWAY INHALATION TREATMENT: CPT

## 2023-01-09 PROCEDURE — 93010 ELECTROCARDIOGRAM REPORT: CPT | Performed by: EMERGENCY MEDICINE

## 2023-01-09 PROCEDURE — 94799 UNLISTED PULMONARY SVC/PX: CPT

## 2023-01-09 PROCEDURE — 25010000002 CEFTRIAXONE PER 250 MG: Performed by: INTERNAL MEDICINE

## 2023-01-09 PROCEDURE — G0378 HOSPITAL OBSERVATION PER HR: HCPCS

## 2023-01-09 PROCEDURE — P9612 CATHETERIZE FOR URINE SPEC: HCPCS

## 2023-01-09 PROCEDURE — 87186 SC STD MICRODIL/AGAR DIL: CPT | Performed by: INTERNAL MEDICINE

## 2023-01-09 PROCEDURE — 25010000002 ADENOSINE PER 6 MG

## 2023-01-09 PROCEDURE — 94664 DEMO&/EVAL PT USE INHALER: CPT

## 2023-01-09 PROCEDURE — 71045 X-RAY EXAM CHEST 1 VIEW: CPT

## 2023-01-09 PROCEDURE — 87086 URINE CULTURE/COLONY COUNT: CPT | Performed by: INTERNAL MEDICINE

## 2023-01-09 PROCEDURE — 93005 ELECTROCARDIOGRAM TRACING: CPT | Performed by: INTERNAL MEDICINE

## 2023-01-09 PROCEDURE — 80053 COMPREHEN METABOLIC PANEL: CPT | Performed by: INTERNAL MEDICINE

## 2023-01-09 PROCEDURE — 99285 EMERGENCY DEPT VISIT HI MDM: CPT

## 2023-01-09 PROCEDURE — 36415 COLL VENOUS BLD VENIPUNCTURE: CPT

## 2023-01-09 PROCEDURE — 81001 URINALYSIS AUTO W/SCOPE: CPT | Performed by: INTERNAL MEDICINE

## 2023-01-09 PROCEDURE — 85025 COMPLETE CBC W/AUTO DIFF WBC: CPT | Performed by: INTERNAL MEDICINE

## 2023-01-09 PROCEDURE — 84484 ASSAY OF TROPONIN QUANT: CPT | Performed by: INTERNAL MEDICINE

## 2023-01-09 PROCEDURE — 94761 N-INVAS EAR/PLS OXIMETRY MLT: CPT

## 2023-01-09 RX ORDER — ADENOSINE 3 MG/ML
INJECTION, SOLUTION INTRAVENOUS
Status: COMPLETED
Start: 2023-01-09 | End: 2023-01-09

## 2023-01-09 RX ORDER — LANOLIN ALCOHOL/MO/W.PET/CERES
6 CREAM (GRAM) TOPICAL NIGHTLY
Status: DISCONTINUED | OUTPATIENT
Start: 2023-01-09 | End: 2023-01-10 | Stop reason: HOSPADM

## 2023-01-09 RX ORDER — SODIUM CHLORIDE 0.9 % (FLUSH) 0.9 %
10 SYRINGE (ML) INJECTION AS NEEDED
Status: DISCONTINUED | OUTPATIENT
Start: 2023-01-09 | End: 2023-01-10 | Stop reason: HOSPADM

## 2023-01-09 RX ORDER — ADENOSINE 3 MG/ML
6 INJECTION, SOLUTION INTRAVENOUS ONCE
Status: COMPLETED | OUTPATIENT
Start: 2023-01-09 | End: 2023-01-09

## 2023-01-09 RX ORDER — DOCUSATE SODIUM 100 MG/1
100 CAPSULE, LIQUID FILLED ORAL DAILY
Status: DISCONTINUED | OUTPATIENT
Start: 2023-01-09 | End: 2023-01-10 | Stop reason: HOSPADM

## 2023-01-09 RX ORDER — OXYCODONE AND ACETAMINOPHEN 7.5; 325 MG/1; MG/1
1 TABLET ORAL EVERY 4 HOURS PRN
Status: ON HOLD | COMMUNITY
End: 2023-01-10 | Stop reason: SDUPTHER

## 2023-01-09 RX ORDER — NITROGLYCERIN 0.4 MG/1
0.4 TABLET SUBLINGUAL
Status: DISCONTINUED | OUTPATIENT
Start: 2023-01-09 | End: 2023-01-10 | Stop reason: HOSPADM

## 2023-01-09 RX ORDER — DILTIAZEM HYDROCHLORIDE 240 MG/1
240 CAPSULE, COATED, EXTENDED RELEASE ORAL DAILY
Status: DISCONTINUED | OUTPATIENT
Start: 2023-01-09 | End: 2023-01-10 | Stop reason: HOSPADM

## 2023-01-09 RX ORDER — FUROSEMIDE 40 MG/1
40 TABLET ORAL DAILY
Status: DISCONTINUED | OUTPATIENT
Start: 2023-01-09 | End: 2023-01-10 | Stop reason: HOSPADM

## 2023-01-09 RX ORDER — SODIUM CHLORIDE 0.9 % (FLUSH) 0.9 %
10 SYRINGE (ML) INJECTION EVERY 12 HOURS SCHEDULED
Status: DISCONTINUED | OUTPATIENT
Start: 2023-01-09 | End: 2023-01-10 | Stop reason: HOSPADM

## 2023-01-09 RX ORDER — GABAPENTIN 100 MG/1
100 CAPSULE ORAL 3 TIMES DAILY
Status: ON HOLD | COMMUNITY
End: 2023-01-10 | Stop reason: SDUPTHER

## 2023-01-09 RX ORDER — SODIUM CHLORIDE 9 MG/ML
40 INJECTION, SOLUTION INTRAVENOUS AS NEEDED
Status: DISCONTINUED | OUTPATIENT
Start: 2023-01-09 | End: 2023-01-10 | Stop reason: HOSPADM

## 2023-01-09 RX ORDER — ONDANSETRON 2 MG/ML
4 INJECTION INTRAMUSCULAR; INTRAVENOUS EVERY 6 HOURS PRN
Status: DISCONTINUED | OUTPATIENT
Start: 2023-01-09 | End: 2023-01-10 | Stop reason: HOSPADM

## 2023-01-09 RX ORDER — ACETAMINOPHEN 325 MG/1
650 TABLET ORAL EVERY 4 HOURS PRN
Status: DISCONTINUED | OUTPATIENT
Start: 2023-01-09 | End: 2023-01-10 | Stop reason: HOSPADM

## 2023-01-09 RX ORDER — PAROXETINE HYDROCHLORIDE 20 MG/1
40 TABLET, FILM COATED ORAL DAILY
Status: DISCONTINUED | OUTPATIENT
Start: 2023-01-09 | End: 2023-01-10 | Stop reason: HOSPADM

## 2023-01-09 RX ORDER — ACEBUTOLOL HYDROCHLORIDE 200 MG/1
200 CAPSULE ORAL 2 TIMES DAILY
Status: DISCONTINUED | OUTPATIENT
Start: 2023-01-09 | End: 2023-01-10 | Stop reason: HOSPADM

## 2023-01-09 RX ORDER — BUDESONIDE AND FORMOTEROL FUMARATE DIHYDRATE 160; 4.5 UG/1; UG/1
2 AEROSOL RESPIRATORY (INHALATION)
Status: DISCONTINUED | OUTPATIENT
Start: 2023-01-09 | End: 2023-01-10 | Stop reason: HOSPADM

## 2023-01-09 RX ORDER — FAMOTIDINE 20 MG/1
20 TABLET, FILM COATED ORAL DAILY
Status: DISCONTINUED | OUTPATIENT
Start: 2023-01-09 | End: 2023-01-10 | Stop reason: HOSPADM

## 2023-01-09 RX ORDER — IPRATROPIUM BROMIDE AND ALBUTEROL SULFATE 2.5; .5 MG/3ML; MG/3ML
3 SOLUTION RESPIRATORY (INHALATION) EVERY 4 HOURS PRN
Status: DISCONTINUED | OUTPATIENT
Start: 2023-01-09 | End: 2023-01-10 | Stop reason: HOSPADM

## 2023-01-09 RX ORDER — MEGESTROL ACETATE 40 MG/ML
400 SUSPENSION ORAL 2 TIMES DAILY
Status: DISCONTINUED | OUTPATIENT
Start: 2023-01-09 | End: 2023-01-09

## 2023-01-09 RX ORDER — OXYCODONE AND ACETAMINOPHEN 7.5; 325 MG/1; MG/1
1 TABLET ORAL EVERY 4 HOURS PRN
Status: DISCONTINUED | OUTPATIENT
Start: 2023-01-09 | End: 2023-01-10 | Stop reason: HOSPADM

## 2023-01-09 RX ORDER — LORAZEPAM 1 MG/1
1 TABLET ORAL EVERY 8 HOURS PRN
Status: ON HOLD | COMMUNITY
End: 2023-01-10 | Stop reason: SDUPTHER

## 2023-01-09 RX ORDER — MELATONIN
2000 DAILY
Status: DISCONTINUED | OUTPATIENT
Start: 2023-01-09 | End: 2023-01-10 | Stop reason: HOSPADM

## 2023-01-09 RX ORDER — FENOFIBRATE 145 MG/1
145 TABLET, COATED ORAL DAILY
Status: ON HOLD | COMMUNITY
End: 2023-02-03

## 2023-01-09 RX ORDER — MEGESTROL ACETATE 40 MG/1
40 TABLET ORAL 2 TIMES DAILY
Status: DISCONTINUED | OUTPATIENT
Start: 2023-01-09 | End: 2023-01-10 | Stop reason: CLARIF

## 2023-01-09 RX ADMIN — APIXABAN 5 MG: 5 TABLET, FILM COATED ORAL at 20:15

## 2023-01-09 RX ADMIN — Medication 10 ML: at 20:15

## 2023-01-09 RX ADMIN — MEGESTROL ACETATE 40 MG: 40 TABLET ORAL at 20:15

## 2023-01-09 RX ADMIN — CEFTRIAXONE 1 G: 1 INJECTION, POWDER, FOR SOLUTION INTRAMUSCULAR; INTRAVENOUS at 14:25

## 2023-01-09 RX ADMIN — ACEBUTOLOL HYDROCHLORIDE 200 MG: 200 CAPSULE ORAL at 20:15

## 2023-01-09 RX ADMIN — OXYCODONE HYDROCHLORIDE AND ACETAMINOPHEN 1 TABLET: 7.5; 325 TABLET ORAL at 18:17

## 2023-01-09 RX ADMIN — ADENOSINE 6 MG: 3 INJECTION INTRAVENOUS at 13:15

## 2023-01-09 RX ADMIN — BUDESONIDE AND FORMOTEROL FUMARATE DIHYDRATE 2 PUFF: 160; 4.5 AEROSOL RESPIRATORY (INHALATION) at 20:55

## 2023-01-09 RX ADMIN — Medication 6 MG: at 20:15

## 2023-01-09 RX ADMIN — ADENOSINE 6 MG: 3 INJECTION, SOLUTION INTRAVENOUS at 13:15

## 2023-01-09 NOTE — PLAN OF CARE
Goal Outcome Evaluation:  Plan of Care Reviewed With: patient        Progress: improving  Outcome Evaluation: Admit from ER with SVT. Asenosine was given and HR is now sinus 81. She is on 2L per NC. She has a UTI, rocephin ordered. cont to monitor.

## 2023-01-09 NOTE — H&P
[facility] Medicine Services  HISTORY AND PHYSICAL    Date of Admission: 1/9/2023  Primary Care Physician: Chris Hermosillo MD    Subjective   Primary Historian: Patient, poor.    Chief Complaint:   Patient says she is sent here because her heart rate was fast.  She facility.  She did not feel bad.    History of Present Illness  Patient is a poor historian.  She notes that she lives at a nursing home [facility].  She felt the same today.  Apparently today for her vital signs and found her heart rate to be elevated she denies chest pain, shortness of breath, nausea.    She notes her cardiologist was Dr. Roy but he has retired.  Has a daughter Alejandra Tai.[email or web address].  Primary care provider Leonel peraza.    Review of Systems   Otherwise complete ROS reviewed and negative except as mentioned in the HPI.    Past Medical History:   Past Medical History:   Diagnosis Date   • Chronic hyponatremia    • Frequent UTI    • Low back pain    • Migraines    • SVT (supraventricular tachycardia) (HCC)      Past Surgical History:  Past Surgical History:   Procedure Laterality Date   • CHOLECYSTECTOMY WITH INTRAOPERATIVE CHOLANGIOGRAM N/A 5/9/2019    Procedure: CHOLECYSTECTOMY LAPAROSCOPIC INTRAOPERATIVE CHOLANGIOGRAM;  Surgeon: Chandan Bravo MD;  Location: [facility];  Service: General   • HIP HEMIARTHROPLASTY Left 5/6/2022    Procedure: HIP HEMIARTHROPLASTY;  Surgeon: Barrington Simons MD;  Location: [facility];  Service: Orthopedics;  Laterality: Left;     Social History:  reports that she has never smoked. She has never used smokeless tobacco. She reports that she does not drink alcohol and does not use drugs.    Family History: family history includes Anemia in her mother; Colon cancer in her father; Heart attack in her father; Heart disease in her father; Stroke in her mother.       Allergies:  Allergies   Allergen Reactions   • Codeine Anaphylaxis       Medications:  Prior to Admission  medications    Medication Sig Start Date End Date Taking? Authorizing Provider   acebutolol (SECTRAL) 200 MG capsule Take 1 capsule by mouth 2 (Two) Times a Day.    Levy Lo MD   acetaminophen (TYLENOL) 325 MG tablet Take 2 tablets by mouth Every 4 (Four) Hours As Needed for Mild Pain . 4/9/21   Jesse Castillo MD   apixaban (ELIQUIS) 5 MG tablet tablet Take 1 tablet by mouth Every 12 (Twelve) Hours. Indications: DVT/PE (active thrombosis) 11/16/22   Wenceslao Brown DO   bisacodyl (DULCOLAX) 5 MG EC tablet Take 10 mg by mouth every night at bedtime.    Levy Lo MD   budesonide-formoterol (SYMBICORT) 160-4.5 MCG/ACT inhaler Inhale 2 puffs 2 (Two) Times a Day.    Levy Lo MD   cholecalciferol (VITAMIN D3) 25 MCG (1000 UT) tablet Take 2 tablets by mouth Daily. 5/11/22   William Turner MD   dilTIAZem CD (CARDIZEM CD) 240 MG 24 hr capsule Take 1 capsule by mouth Daily.    Levy Lo MD   docusate sodium (Colace) 100 MG capsule Take 1 capsule by mouth Daily. 5/10/22   William Turnre MD   famotidine (PEPCID) 20 MG tablet Take 20 mg by mouth Daily.    Levy Lo MD   fenofibrate (TRICOR) 145 MG tablet Take 145 mg by mouth Daily.    Levy Lo MD   furosemide (Lasix) 40 MG tablet Take 1 tablet by mouth Daily for 7 days. 11/11/22 11/18/22  Wenceslao Brown DO   ipratropium-albuterol (COMBIVENT RESPIMAT)  MCG/ACT inhaler Inhale 1 puff 4 (Four) Times a Day As Needed for Wheezing or Shortness of Air.    Levy Lo MD   ipratropium-albuterol (DUO-NEB) 0.5-2.5 mg/3 ml nebulizer Take 3 mL by nebulization Every 4 (Four) Hours As Needed for Wheezing or Shortness of Air.    Levy Lo MD   megestrol (MEGACE) 40 MG/ML suspension Take 10 mL by mouth 2 (Two) Times a Day.    Levy Lo MD   melatonin 5 MG tablet tablet Take 5 mg by mouth Every Night.    Provider, Historical, MD   Menthol-Zinc  "Oxide (Calmoseptine) 0.44-20.6 % ointment Apply 1 application topically to the appropriate area as directed As Needed (after incontinent episodes). Apply to kathi-area after incontinent episodes    Levy Lo MD   ondansetron (ZOFRAN) 4 MG tablet Take 4 mg by mouth Every 8 (Eight) Hours As Needed for Nausea or Vomiting.    Levy Lo MD   PARoxetine (PAXIL) 40 MG tablet Take 40 mg by mouth Every Morning.    Levy Lo MD   polyethylene glycol (MIRALAX) 17 GM/SCOOP powder Take 17 g by mouth Daily.    Levy Lo MD     I have utilized all available immediate resources to obtain, update, or review the patient's current medications (including all prescriptions, over-the-counter products, herbals, cannabis/cannabidiol products, and vitamin/mineral/dietary (nutritional) supplements).    Objective     Vital Signs: BP 95/75   Pulse 87   Temp 97.9 °F (36.6 °C)   Resp 23   Ht 152.4 cm (60\")   Wt 42.2 kg (93 lb)   SpO2 98%   BMI 18.16 kg/m²   Physical Exam  Vitals and nursing note reviewed.   Constitutional:       Appearance: Normal appearance. She is well-developed.   HENT:      Head: Normocephalic and atraumatic.      Right Ear: External ear normal.      Left Ear: External ear normal.      Nose: Nose normal.      Mouth/Throat:      Mouth: Mucous membranes are moist.   Eyes:      Extraocular Movements: Extraocular movements intact.      Conjunctiva/sclera: Conjunctivae normal.      Pupils: Pupils are equal, round, and reactive to light.   Neck:      Thyroid: No thyromegaly.      Vascular: No JVD.      Trachea: No tracheal deviation.   Cardiovascular:      Rate and Rhythm: Normal rate and regular rhythm.      Pulses: Normal pulses.      Heart sounds: Normal heart sounds. No murmur heard.    No friction rub. No gallop.   Pulmonary:      Effort: Pulmonary effort is normal.      Breath sounds: Normal breath sounds.   Abdominal:      General: Bowel sounds are normal. There is no " distension.      Palpations: Abdomen is soft.      Tenderness: There is no abdominal tenderness.   Musculoskeletal:         General: Normal range of motion.      Cervical back: Normal range of motion and neck supple.      Comments: Patient relates she is nonambulatory.  They did her up in the wheelchair and she moves around in the that.   Lymphadenopathy:      Cervical: No cervical adenopathy.   Skin:     General: Skin is warm and dry.      Capillary Refill: Capillary refill takes less than 2 seconds.   Neurological:      Mental Status: She is alert.      Cranial Nerves: No cranial nerve deficit.      Coordination: Coordination normal.      Comments: Oriented to person place and season.   Psychiatric:         Mood and Affect: Mood normal.         Behavior: Behavior normal.              Results Reviewed:  Lab Results (last 24 hours)     Procedure Component Value Units Date/Time    Lactic Acid, Plasma [[id number]]  (Normal) Collected: 01/09/23 1425    Specimen: Blood Updated: 01/09/23 1454     Lactate 1.3 mmol/L     Troponin [[id number]]  (Abnormal) Collected: 01/09/23 1214    Specimen: Blood Updated: 01/09/23 1252     Troponin T 0.069 ng/mL     Narrative:      Troponin T Reference Range:  <= 0.03 ng/mL-   Negative for AMI  >0.03 ng/mL-     Abnormal for myocardial necrosis.  Clinicians would have to utilize clinical acumen, EKG, Troponin and serial changes to determine if it is an Acute Myocardial Infarction or myocardial injury due to an underlying chronic condition.       Results may be falsely decreased if patient taking Biotin.      Comprehensive Metabolic Panel [[id number]]  (Abnormal) Collected: 01/09/23 1214    Specimen: Blood Updated: 01/09/23 1246     Glucose 90 mg/dL      BUN 18 mg/dL      Creatinine 0.69 mg/dL      Sodium 140 mmol/L      Potassium 4.4 mmol/L      Comment: Slight hemolysis detected by analyzer. Results may be affected.        Chloride 104 mmol/L      CO2 28.0 mmol/L      Calcium 9.6 mg/dL       Total Protein 7.4 g/dL      Albumin 4.0 g/dL      ALT (SGPT) 10 U/L      AST (SGOT) 20 U/L      Alkaline Phosphatase 45 U/L      Total Bilirubin 0.2 mg/dL      Globulin 3.4 gm/dL      A/G Ratio 1.2 g/dL      BUN/Creatinine Ratio 26.1     Anion Gap 8.0 mmol/L      eGFR 86.8 mL/min/1.73      Comment: National Kidney Foundation and American Society of Nephrology (ASN) Task Force recommended calculation based on the Chronic Kidney Disease Epidemiology Collaboration (CKD-EPI) equation refit without adjustment for race.       Narrative:      GFR Normal >60  Chronic Kidney Disease <60  Kidney Failure <15    The GFR formula is only valid for adults with stable renal function between ages 18 and 70.    Urinalysis, Microscopic Only - Straight Cath [[id number]]  (Abnormal) Collected: 01/09/23 1224    Specimen: Urine from Straight Cath Updated: 01/09/23 1245     RBC, UA 3-5 /HPF      WBC, UA 21-30 /HPF      Bacteria, UA 1+ /HPF      Squamous Epithelial Cells, UA None Seen /HPF      Hyaline Casts, UA 3-6 /LPF      Methodology Automated Microscopy    Urinalysis With Culture If Indicated - Straight Cath [[id number]]  (Abnormal) Collected: 01/09/23 1224    Specimen: Urine from Straight Cath Updated: 01/09/23 1245     Color, UA Yellow     Appearance, UA Clear     pH, UA 6.5     Specific Gravity, UA 1.011     Glucose, UA Negative     Ketones, UA Negative     Bilirubin, UA Negative     Blood, UA Small (1+)     Protein, UA Negative     Leuk Esterase, UA Large (3+)     Nitrite, UA Negative     Urobilinogen, UA 0.2 E.U./dL    Narrative:      In absence of clinical symptoms, the presence of pyuria, bacteria, and/or nitrites on the urinalysis result does not correlate with infection.    Urine Culture - Urine, Straight Cath [[id number]] Collected: 01/09/23 1224    Specimen: Urine from Straight Cath Updated: 01/09/23 1245    CBC & Differential [[id number]]  (Abnormal) Collected: 01/09/23 1214    Specimen: Blood Updated: 01/09/23 1227     Narrative:      The following orders were created for panel order CBC & Differential.  Procedure                               Abnormality         Status                     ---------                               -----------         ------                     CBC Auto Differential[[id number]]        Abnormal            Final result                 Please view results for these tests on the individual orders.    CBC Auto Differential [[id number]]  (Abnormal) Collected: 01/09/23 1214    Specimen: Blood Updated: 01/09/23 1227     WBC 13.42 10*3/mm3      RBC 4.30 10*6/mm3      Hemoglobin 14.1 g/dL      Hematocrit 44.8 %      .2 fL      MCH 32.8 pg      MCHC 31.5 g/dL      RDW 15.4 %      RDW-SD 59.6 fl      MPV 8.9 fL      Platelets 387 10*3/mm3      Neutrophil % 62.0 %      Lymphocyte % 25.0 %      Monocyte % 9.7 %      Eosinophil % 1.4 %      Basophil % 0.6 %      Immature Grans % 1.3 %      Neutrophils, Absolute 8.33 10*3/mm3      Lymphocytes, Absolute 3.35 10*3/mm3      Monocytes, Absolute 1.30 10*3/mm3      Eosinophils, Absolute 0.19 10*3/mm3      Basophils, Absolute 0.08 10*3/mm3      Immature Grans, Absolute 0.17 10*3/mm3      nRBC 0.0 /100 WBC         Imaging Results (Last 24 Hours)     Procedure Component Value Units Date/Time    XR Chest 1 View [[id number]] Collected: 01/09/23 1241     Updated: 01/09/23 1245    Narrative:      EXAMINATION: XR CHEST 1 VW-     1/9/2023 12:25 PM CST     HISTORY: A fib RVR     A frontal projection of the chest is compared with the previous study  dated 11/08/2022.     The lungs are poorly expanded.     Previously seen atelectatic changes and interstitial infiltrate has  resolved.     There is no pleural effusion, pulmonary congestion or pneumothorax.     A small granuloma seen in the right upper lung laterally, similar to the  previous study.     The heart size is in the normal range. Atheromatous changes thoracic  aorta noted.     No acute bony abnormality. There is moderate  diffuse osteopenia.       Impression:      1. No active cardiopulmonary disease.  This report was finalized on 01/09/2023 12:42 by Dr. Miko Cohen MD.        I have personally reviewed and interpreted the radiology studies and ECG obtained at time of admission.     Assessment / Plan   Assessment:   Active Hospital Problems    Diagnosis    • **SVT (supraventricular tachycardia) (HCC)    • UTI (urinary tract infection), bacterial    • Elevated WBC count        Treatment Plan  The patient will be admitted to my service here at [facility].     Medical Decision Making  Number and Complexity of problems: 3/high  Differential Diagnosis: afib RVR    Conditions and Status        Condition is unchanged.     [facility] Data  External documents reviewed: Kirill's note in [employer] system  Cardiac tracing (EKG, telemetry) interpretation: SVT rate 150  Radiology interpretation: Chest x-ray no acute disease process  Labs reviewed: Labs are reviewed and noted elevated WBCs.  Also noted positive leukocyte esterase small amount of blood on urinalysis with 1+ bacteria no squamous cells  Any tests that were considered but not ordered: None     Decision rules/scores evaluated (example [id number], Wells, etc): None     Discussed with: Patient     Care Planning  Shared decision making: Discussed with patient she is agreeable to staying overnight.  Code status and discussions: DNR/DNI    Disposition  Social Determinants of Health that impact treatment or disposition: Patient lives in nursing home  Estimated length of stay is 1 day.     I confirmed that the patient's advanced care plan is present, code status is documented, and a surrogate decision maker is listed in the patient's medical record.     The patient's surrogate decision maker is Shae Tai.     The patient was seen and examined by me on 1/9/2023 at 1500 hrs.  Per social.    Electronically signed by Jessika Rocha DO, 01/09/23, 15:12 CST.

## 2023-01-09 NOTE — ED PROVIDER NOTES
Subjective   History of Present Illness  82-year-old female who presents from a nursing home.  The patient has no complaints.  During vitals last evening, the patient was found to be tachycardic.  They called the patient's PCP, but she converted back to normal sinus.  Today during vitals, the patient was found to be tachycardic again.  It was requested that the patient come to the emergency department.  Family is at bedside and assist with giving history.  It is the patient's daughter who also works in her ultrasound department.  Patient denies any dysuria or diarrhea.  She denies any nausea or vomiting.  She denies any chest pain or palpitations.  She is very thin and frail.  She is able to have a conversation.  The daughter states that this last episode, the patient was given adenosine and converted in the emergency department and discharged back to the nursing facility.    Cardiac echo on 11/8/2022:  Interpretation Summary       •  Left ventricular systolic function is hyperdynamic (EF > 70%).  •  The right ventricular cavity is mildly dilated. Borderline low right ventricular systolic function noted.  •  Cardiac valves are poorly visualized, however no obvious abnormalities identified by Doppler assessment    Review of Systems   Constitutional: Negative for chills and fever.   HENT: Negative for congestion and rhinorrhea.    Respiratory: Negative for cough and shortness of breath.    Cardiovascular: Negative for chest pain, palpitations and leg swelling.   Gastrointestinal: Negative for constipation.   Genitourinary: Negative for dysuria and hematuria.       Past Medical History:   Diagnosis Date   • Chronic hyponatremia    • Frequent UTI    • Low back pain    • Migraines    • SVT (supraventricular tachycardia) (HCC)        Allergies   Allergen Reactions   • Codeine Anaphylaxis       Past Surgical History:   Procedure Laterality Date   • CHOLECYSTECTOMY WITH INTRAOPERATIVE CHOLANGIOGRAM N/A 5/9/2019     Procedure: CHOLECYSTECTOMY LAPAROSCOPIC INTRAOPERATIVE CHOLANGIOGRAM;  Surgeon: Chandan Bravo MD;  Location:  [facility] OR;  Service: General   • HIP HEMIARTHROPLASTY Left 5/6/2022    Procedure: HIP HEMIARTHROPLASTY;  Surgeon: Barrington Simons MD;  Location:  [facility] OR;  Service: Orthopedics;  Laterality: Left;       Family History   Problem Relation Age of Onset   • Stroke Mother    • Anemia Mother    • Heart disease Father    • Heart attack Father    • Colon cancer Father        Social History     Socioeconomic History   • Marital status:    Tobacco Use   • Smoking status: Never   • Smokeless tobacco: Never   Vaping Use   • Vaping Use: Never used   Substance and Sexual Activity   • Alcohol use: No   • Drug use: No   • Sexual activity: Defer           Objective   Physical Exam  Vitals reviewed.   Constitutional:       Comments: Thin and frail   HENT:      Head: Normocephalic and atraumatic.      Right Ear: External ear normal.      Left Ear: External ear normal.      Nose: Nose normal.   Eyes:      General: No scleral icterus.     Conjunctiva/sclera: Conjunctivae normal.   Cardiovascular:      Rate and Rhythm: Tachycardia present. Rhythm irregular.      Heart sounds: Normal heart sounds.   Pulmonary:      Effort: Pulmonary effort is normal.      Breath sounds: Normal breath sounds.   Abdominal:      General: There is no distension.      Palpations: Abdomen is soft.   Musculoskeletal:         General: No swelling or tenderness.      Cervical back: Normal range of motion and neck supple.   Skin:     General: Skin is warm and dry.   Neurological:      General: No focal deficit present.      Mental Status: She is alert.      Cranial Nerves: No cranial nerve deficit.      Comments: Able to give HX and able to speak in full sentences with no issues    Psychiatric:         Mood and Affect: Mood normal.         Behavior: Behavior normal.         Procedures  6 mg of adenosine used to cardiovert the patient. She  successfully cardioverted to NSR at 94 and BP subsequently improved.            ED Course      CXR:  IMPRESSION:  1. No active cardiopulmonary disease.    Elevated troponin, and WBC count. UA.       Called and spoke with hospitalist. Agreeable to admission and admit to Dr. Rocha.   Discussed admission with family and patient and they are also agreeable.                                 MDM   DDX:  Infection. NSTEMI    Final diagnoses:   SVT (supraventricular tachycardia) (HCC)   Elevated troponin   Acute cystitis without hematuria       ED Disposition  ED Disposition     ED Disposition   Decision to Admit    Condition   --    Comment   Level of Care: Telemetry [5]   Diagnosis: SVT (supraventricular tachycardia) (HCC) [202906]   Admitting Physician: ETHAN ROCHA [[id number]]   Attending Physician: ETHAN ROCHA [[id number]]               No follow-up provider specified.       Medication List      No changes were made to your prescriptions during this visit.          Bart Dougherty,   01/09/23 1244       Bart Dougherty, DO  01/09/23 1318       Bart Dougherty,   01/09/23 1336       Bart Dougherty, DO  01/09/23 1345

## 2023-01-10 VITALS
DIASTOLIC BLOOD PRESSURE: 65 MMHG | HEART RATE: 72 BPM | WEIGHT: 97.5 LBS | TEMPERATURE: 98.2 F | OXYGEN SATURATION: 95 % | HEIGHT: 60 IN | RESPIRATION RATE: 16 BRPM | SYSTOLIC BLOOD PRESSURE: 125 MMHG | BODY MASS INDEX: 19.14 KG/M2

## 2023-01-10 LAB
ALBUMIN SERPL-MCNC: 3.7 G/DL (ref 3.5–5.2)
ALBUMIN/GLOB SERPL: 1.4 G/DL
ALP SERPL-CCNC: 42 U/L (ref 39–117)
ALT SERPL W P-5'-P-CCNC: 9 U/L (ref 1–33)
ANION GAP SERPL CALCULATED.3IONS-SCNC: 10 MMOL/L (ref 5–15)
AST SERPL-CCNC: 19 U/L (ref 1–32)
BASOPHILS # BLD AUTO: 0.06 10*3/MM3 (ref 0–0.2)
BASOPHILS NFR BLD AUTO: 0.6 % (ref 0–1.5)
BILIRUB SERPL-MCNC: 0.2 MG/DL (ref 0–1.2)
BUN SERPL-MCNC: 24 MG/DL (ref 8–23)
BUN/CREAT SERPL: 30.4 (ref 7–25)
CALCIUM SPEC-SCNC: 9.2 MG/DL (ref 8.6–10.5)
CHLORIDE SERPL-SCNC: 104 MMOL/L (ref 98–107)
CO2 SERPL-SCNC: 27 MMOL/L (ref 22–29)
CREAT SERPL-MCNC: 0.79 MG/DL (ref 0.57–1)
DEPRECATED RDW RBC AUTO: 58.9 FL (ref 37–54)
EGFRCR SERPLBLD CKD-EPI 2021: 74.8 ML/MIN/1.73
EOSINOPHIL # BLD AUTO: 0.19 10*3/MM3 (ref 0–0.4)
EOSINOPHIL NFR BLD AUTO: 2 % (ref 0.3–6.2)
ERYTHROCYTE [DISTWIDTH] IN BLOOD BY AUTOMATED COUNT: 15.2 % (ref 12.3–15.4)
GLOBULIN UR ELPH-MCNC: 2.7 GM/DL
GLUCOSE SERPL-MCNC: 86 MG/DL (ref 65–99)
HCT VFR BLD AUTO: 39 % (ref 34–46.6)
HGB BLD-MCNC: 12.2 G/DL (ref 12–15.9)
IMM GRANULOCYTES # BLD AUTO: 0.12 10*3/MM3 (ref 0–0.05)
IMM GRANULOCYTES NFR BLD AUTO: 1.2 % (ref 0–0.5)
LYMPHOCYTES # BLD AUTO: 2.13 10*3/MM3 (ref 0.7–3.1)
LYMPHOCYTES NFR BLD AUTO: 21.9 % (ref 19.6–45.3)
MCH RBC QN AUTO: 32.7 PG (ref 26.6–33)
MCHC RBC AUTO-ENTMCNC: 31.3 G/DL (ref 31.5–35.7)
MCV RBC AUTO: 104.6 FL (ref 79–97)
MONOCYTES # BLD AUTO: 1.09 10*3/MM3 (ref 0.1–0.9)
MONOCYTES NFR BLD AUTO: 11.2 % (ref 5–12)
NEUTROPHILS NFR BLD AUTO: 6.15 10*3/MM3 (ref 1.7–7)
NEUTROPHILS NFR BLD AUTO: 63.1 % (ref 42.7–76)
NRBC BLD AUTO-RTO: 0 /100 WBC (ref 0–0.2)
PLATELET # BLD AUTO: 328 10*3/MM3 (ref 140–450)
PMV BLD AUTO: 9.3 FL (ref 6–12)
POTASSIUM SERPL-SCNC: 4.1 MMOL/L (ref 3.5–5.2)
PROT SERPL-MCNC: 6.4 G/DL (ref 6–8.5)
RBC # BLD AUTO: 3.73 10*6/MM3 (ref 3.77–5.28)
SARS-COV-2 AG RESP QL IA.RAPID: NORMAL
SODIUM SERPL-SCNC: 141 MMOL/L (ref 136–145)
WBC NRBC COR # BLD: 9.74 10*3/MM3 (ref 3.4–10.8)

## 2023-01-10 PROCEDURE — 85025 COMPLETE CBC W/AUTO DIFF WBC: CPT | Performed by: FAMILY MEDICINE

## 2023-01-10 PROCEDURE — G0378 HOSPITAL OBSERVATION PER HR: HCPCS

## 2023-01-10 PROCEDURE — 87426 SARSCOV CORONAVIRUS AG IA: CPT | Performed by: FAMILY MEDICINE

## 2023-01-10 PROCEDURE — 25010000002 CEFTRIAXONE PER 250 MG: Performed by: INTERNAL MEDICINE

## 2023-01-10 PROCEDURE — 97165 OT EVAL LOW COMPLEX 30 MIN: CPT

## 2023-01-10 PROCEDURE — 80053 COMPREHEN METABOLIC PANEL: CPT | Performed by: FAMILY MEDICINE

## 2023-01-10 PROCEDURE — 94799 UNLISTED PULMONARY SVC/PX: CPT

## 2023-01-10 PROCEDURE — 96366 THER/PROPH/DIAG IV INF ADDON: CPT

## 2023-01-10 RX ORDER — OXYCODONE AND ACETAMINOPHEN 7.5; 325 MG/1; MG/1
1 TABLET ORAL EVERY 4 HOURS PRN
Qty: 6 TABLET | Refills: 0 | Status: ON HOLD | OUTPATIENT
Start: 2023-01-10 | End: 2023-02-05 | Stop reason: SDUPTHER

## 2023-01-10 RX ORDER — LORAZEPAM 0.5 MG/1
0.5 TABLET ORAL 3 TIMES DAILY
Status: ON HOLD | COMMUNITY
End: 2023-02-05 | Stop reason: SDUPTHER

## 2023-01-10 RX ORDER — CHOLECALCIFEROL (VITAMIN D3) 50 MCG
2000 TABLET ORAL DAILY
COMMUNITY

## 2023-01-10 RX ORDER — MEGESTROL ACETATE 40 MG/ML
400 SUSPENSION ORAL 2 TIMES DAILY
Status: DISCONTINUED | OUTPATIENT
Start: 2023-01-10 | End: 2023-01-10 | Stop reason: HOSPADM

## 2023-01-10 RX ORDER — CEFDINIR 300 MG/1
300 CAPSULE ORAL 2 TIMES DAILY
Qty: 8 CAPSULE | Refills: 0 | Status: ON HOLD | OUTPATIENT
Start: 2023-01-10 | End: 2023-02-02

## 2023-01-10 RX ORDER — GABAPENTIN 100 MG/1
100 CAPSULE ORAL 3 TIMES DAILY
Status: ON HOLD | COMMUNITY
End: 2023-02-03 | Stop reason: SDUPTHER

## 2023-01-10 RX ORDER — GABAPENTIN 100 MG/1
100 CAPSULE ORAL 3 TIMES DAILY
Qty: 3 CAPSULE | Refills: 0 | Status: ON HOLD | OUTPATIENT
Start: 2023-01-10 | End: 2023-02-05 | Stop reason: SDUPTHER

## 2023-01-10 RX ORDER — OXYCODONE AND ACETAMINOPHEN 7.5; 325 MG/1; MG/1
1 TABLET ORAL EVERY 4 HOURS PRN
Status: ON HOLD | COMMUNITY
End: 2023-02-03 | Stop reason: SDUPTHER

## 2023-01-10 RX ORDER — LORAZEPAM 1 MG/1
1 TABLET ORAL EVERY 8 HOURS PRN
Qty: 3 TABLET | Refills: 0 | Status: ON HOLD | OUTPATIENT
Start: 2023-01-10 | End: 2023-02-03 | Stop reason: SDUPTHER

## 2023-01-10 RX ADMIN — CEFTRIAXONE 1 G: 1 INJECTION, POWDER, FOR SOLUTION INTRAMUSCULAR; INTRAVENOUS at 13:14

## 2023-01-10 RX ADMIN — OXYCODONE HYDROCHLORIDE AND ACETAMINOPHEN 1 TABLET: 7.5; 325 TABLET ORAL at 16:13

## 2023-01-10 RX ADMIN — FAMOTIDINE 20 MG: 20 TABLET, FILM COATED ORAL at 08:03

## 2023-01-10 RX ADMIN — ACEBUTOLOL HYDROCHLORIDE 200 MG: 200 CAPSULE ORAL at 08:03

## 2023-01-10 RX ADMIN — Medication 2000 UNITS: at 08:03

## 2023-01-10 RX ADMIN — APIXABAN 5 MG: 5 TABLET, FILM COATED ORAL at 08:03

## 2023-01-10 RX ADMIN — OXYCODONE HYDROCHLORIDE AND ACETAMINOPHEN 1 TABLET: 7.5; 325 TABLET ORAL at 07:25

## 2023-01-10 RX ADMIN — DOCUSATE SODIUM 100 MG: 100 CAPSULE, LIQUID FILLED ORAL at 08:03

## 2023-01-10 RX ADMIN — DILTIAZEM HYDROCHLORIDE 240 MG: 240 CAPSULE, EXTENDED RELEASE ORAL at 08:03

## 2023-01-10 RX ADMIN — Medication 10 ML: at 08:06

## 2023-01-10 RX ADMIN — OXYCODONE HYDROCHLORIDE AND ACETAMINOPHEN 1 TABLET: 7.5; 325 TABLET ORAL at 02:13

## 2023-01-10 RX ADMIN — MEGESTROL ACETATE 40 MG: 40 TABLET ORAL at 08:03

## 2023-01-10 RX ADMIN — PAROXETINE 40 MG: 20 TABLET, FILM COATED ORAL at 08:03

## 2023-01-10 RX ADMIN — FUROSEMIDE 40 MG: 40 TABLET ORAL at 08:03

## 2023-01-10 RX ADMIN — BUDESONIDE AND FORMOTEROL FUMARATE DIHYDRATE 2 PUFF: 160; 4.5 AEROSOL RESPIRATORY (INHALATION) at 07:27

## 2023-01-10 NOTE — PLAN OF CARE
Goal Outcome Evaluation:  Plan of Care Reviewed With: patient        Progress: no change  Outcome Evaluation: OT eval completed. Pt in fowlers upon therapist arrival; A&Ox3; No pain reported. Per Pt's daughter, the Pt required Max A for UB bathing/dressing and Pt was dependent for LB bathing/dressing, toileting, transfers, and fxl mobility in manual w/c. Unable to mobilize B ankles from plantar flexed position; Pt's daughter reports that the Pt has been unable to bear weight on BLE or ambulate for a significant period of time due to this issue. Pt able to self feed I after set-up. Pt performed supine>sit utilizing bedrails with HOB elevated with Max A. Pt able to maintain static sitting at EOB with CGA. Pt dependent for sit<>stand utilizing rwx. Pt dependent for low bottom transfer from bed>chair. While Pt does require significant assistance with BADLs at this time, this is the Pt's baseline function, therefore skilled OT intervention not indicated at this time. OT to sign off. Recommend Pt to return to SNF at discharge.

## 2023-01-10 NOTE — PROGRESS NOTES
Malnutrition Severity Assessment    Patient Name:  Zelalem Hung  YOB: 1940  MRN: [id number]  Admit Date:  1/9/2023    Patient meets criteria for : Severe Malnutrition  Malnutrition Severity Assessment  Malnutrition Type: Chronic Disease - Related Malnutrition  Malnutrition Type (last 8 hours)     Malnutrition Severity Assessment     Row Name 01/10/23 1156       Malnutrition Severity Assessment    Malnutrition Type Chronic Disease - Related Malnutrition    Row Name 01/10/23 1156       Muscle Loss    Loss of Muscle Mass Findings Severe    Muslim Region Moderate - slight depression    Clavicle Bone Region Severe - protruding prominent bone    Acromion Bone Region Severe - squared shoulders, bones, and acromion process protrusion prominent    Dorsal Hand Region Severe - prominent depression    Row Name 01/10/23 1156       Fat Loss    Subcutaneous Fat Loss Findings Severe    Orbital Region  Severe - pronounced hollowness/depression, dark circles, loose saggy skin    Upper Arm Region Severe - mostly skin, very little space between folds, fingers touch    Row Name 01/10/23 1156       Criteria Met (Must meet criteria for severity in at least 2 of these categories: M Wasting, Fat Loss, Fluid, Secondary Signs, Wt. Status, Intake)    Patient meets criteria for  Severe Malnutrition                Electronically signed by:  Jojo Villanueva RD  01/10/23 11:58 CST

## 2023-01-10 NOTE — DISCHARGE SUMMARY
[facility] Medicine Services  DISCHARGE SUMMARY       Date of Admission: 1/9/2023  Date of Discharge:  1/10/2023  Primary Care Physician: Chris Hermosillo MD    Presenting Problem/History of Present Illness:  Rapid heart rate    Final Discharge Diagnoses:  SVT  UTI  Leukocytosis  Type II non-STEMI secondary to SVT    Consults: None    Procedures Performed: None    Pertinent Test Results:   Imaging Results (Last 7 Days)     Procedure Component Value Units Date/Time    XR Chest 1 View [[id number]] Collected: 01/09/23 1241     Updated: 01/09/23 1245    Narrative:      EXAMINATION: XR CHEST 1 VW-     1/9/2023 12:25 PM CST     HISTORY: A fib RVR     A frontal projection of the chest is compared with the previous study  dated 11/08/2022.     The lungs are poorly expanded.     Previously seen atelectatic changes and interstitial infiltrate has  resolved.     There is no pleural effusion, pulmonary congestion or pneumothorax.     A small granuloma seen in the right upper lung laterally, similar to the  previous study.     The heart size is in the normal range. Atheromatous changes thoracic  aorta noted.     No acute bony abnormality. There is moderate diffuse osteopenia.       Impression:      1. No active cardiopulmonary disease.  This report was finalized on 01/09/2023 12:42 by Dr. Miko Cohen MD.        Lab Results (last 7 days)     Procedure Component Value Units Date/Time    Urine Culture - Urine, Straight Cath [[id number]]  (Abnormal) Collected: 01/09/23 1224    Specimen: Urine from Straight Cath Updated: 01/10/23 1119     Urine Culture >100,000 CFU/mL Enterococcus species    Narrative:      Colonization of the urinary tract without infection is common. Treatment is discouraged unless the patient is symptomatic, pregnant, or undergoing an invasive urologic procedure.    Comprehensive Metabolic Panel [[id number]]  (Abnormal) Collected: 01/10/23 8147    Specimen: Blood  Updated: 01/10/23 0512     Glucose 86 mg/dL      BUN 24 mg/dL      Creatinine 0.79 mg/dL      Sodium 141 mmol/L      Potassium 4.1 mmol/L      Chloride 104 mmol/L      CO2 27.0 mmol/L      Calcium 9.2 mg/dL      Total Protein 6.4 g/dL      Albumin 3.7 g/dL      ALT (SGPT) 9 U/L      AST (SGOT) 19 U/L      Alkaline Phosphatase 42 U/L      Total Bilirubin 0.2 mg/dL      Globulin 2.7 gm/dL      A/G Ratio 1.4 g/dL      BUN/Creatinine Ratio 30.4     Anion Gap 10.0 mmol/L      eGFR 74.8 mL/min/1.73      Comment: National Kidney Foundation and American Society of Nephrology (ASN) Task Force recommended calculation based on the Chronic Kidney Disease Epidemiology Collaboration (CKD-EPI) equation refit without adjustment for race.       Narrative:      GFR Normal >60  Chronic Kidney Disease <60  Kidney Failure <15    The GFR formula is only valid for adults with stable renal function between ages 18 and 70.    CBC Auto Differential [[id number]]  (Abnormal) Collected: 01/10/23 0418    Specimen: Blood Updated: 01/10/23 0452     WBC 9.74 10*3/mm3      RBC 3.73 10*6/mm3      Hemoglobin 12.2 g/dL      Hematocrit 39.0 %      .6 fL      MCH 32.7 pg      MCHC 31.3 g/dL      RDW 15.2 %      RDW-SD 58.9 fl      MPV 9.3 fL      Platelets 328 10*3/mm3      Neutrophil % 63.1 %      Lymphocyte % 21.9 %      Monocyte % 11.2 %      Eosinophil % 2.0 %      Basophil % 0.6 %      Immature Grans % 1.2 %      Neutrophils, Absolute 6.15 10*3/mm3      Lymphocytes, Absolute 2.13 10*3/mm3      Monocytes, Absolute 1.09 10*3/mm3      Eosinophils, Absolute 0.19 10*3/mm3      Basophils, Absolute 0.06 10*3/mm3      Immature Grans, Absolute 0.12 10*3/mm3      nRBC 0.0 /100 WBC     Lactic Acid, Plasma [[id number]]  (Normal) Collected: 01/09/23 1425    Specimen: Blood Updated: 01/09/23 1454     Lactate 1.3 mmol/L     Troponin [[id number]]  (Abnormal) Collected: 01/09/23 1214    Specimen: Blood Updated: 01/09/23 1252     Troponin T 0.069 ng/mL      Narrative:      Troponin T Reference Range:  <= 0.03 ng/mL-   Negative for AMI  >0.03 ng/mL-     Abnormal for myocardial necrosis.  Clinicians would have to utilize clinical acumen, EKG, Troponin and serial changes to determine if it is an Acute Myocardial Infarction or myocardial injury due to an underlying chronic condition.       Results may be falsely decreased if patient taking Biotin.      Comprehensive Metabolic Panel [[id number]]  (Abnormal) Collected: 01/09/23 1214    Specimen: Blood Updated: 01/09/23 1246     Glucose 90 mg/dL      BUN 18 mg/dL      Creatinine 0.69 mg/dL      Sodium 140 mmol/L      Potassium 4.4 mmol/L      Comment: Slight hemolysis detected by analyzer. Results may be affected.        Chloride 104 mmol/L      CO2 28.0 mmol/L      Calcium 9.6 mg/dL      Total Protein 7.4 g/dL      Albumin 4.0 g/dL      ALT (SGPT) 10 U/L      AST (SGOT) 20 U/L      Alkaline Phosphatase 45 U/L      Total Bilirubin 0.2 mg/dL      Globulin 3.4 gm/dL      A/G Ratio 1.2 g/dL      BUN/Creatinine Ratio 26.1     Anion Gap 8.0 mmol/L      eGFR 86.8 mL/min/1.73      Comment: National Kidney Foundation and American Society of Nephrology (ASN) Task Force recommended calculation based on the Chronic Kidney Disease Epidemiology Collaboration (CKD-EPI) equation refit without adjustment for race.       Narrative:      GFR Normal >60  Chronic Kidney Disease <60  Kidney Failure <15    The GFR formula is only valid for adults with stable renal function between ages 18 and 70.    Urinalysis, Microscopic Only - Straight Cath [[id number]]  (Abnormal) Collected: 01/09/23 1224    Specimen: Urine from Straight Cath Updated: 01/09/23 1245     RBC, UA 3-5 /HPF      WBC, UA 21-30 /HPF      Bacteria, UA 1+ /HPF      Squamous Epithelial Cells, UA None Seen /HPF      Hyaline Casts, UA 3-6 /LPF      Methodology Automated Microscopy    Urinalysis With Culture If Indicated - Straight Cath [[id number]]  (Abnormal) Collected: 01/09/23 1224     Specimen: Urine from Straight Cath Updated: 01/09/23 1245     Color, UA Yellow     Appearance, UA Clear     pH, UA 6.5     Specific Gravity, UA 1.011     Glucose, UA Negative     Ketones, UA Negative     Bilirubin, UA Negative     Blood, UA Small (1+)     Protein, UA Negative     Leuk Esterase, UA Large (3+)     Nitrite, UA Negative     Urobilinogen, UA 0.2 E.U./dL    Narrative:      In absence of clinical symptoms, the presence of pyuria, bacteria, and/or nitrites on the urinalysis result does not correlate with infection.    CBC & Differential [[id number]]  (Abnormal) Collected: 01/09/23 1214    Specimen: Blood Updated: 01/09/23 1227    Narrative:      The following orders were created for panel order CBC & Differential.  Procedure                               Abnormality         Status                     ---------                               -----------         ------                     CBC Auto Differential[[id number]]        Abnormal            Final result                 Please view results for these tests on the individual orders.    CBC Auto Differential [[id number]]  (Abnormal) Collected: 01/09/23 1214    Specimen: Blood Updated: 01/09/23 1227     WBC 13.42 10*3/mm3      RBC 4.30 10*6/mm3      Hemoglobin 14.1 g/dL      Hematocrit 44.8 %      .2 fL      MCH 32.8 pg      MCHC 31.5 g/dL      RDW 15.4 %      RDW-SD 59.6 fl      MPV 8.9 fL      Platelets 387 10*3/mm3      Neutrophil % 62.0 %      Lymphocyte % 25.0 %      Monocyte % 9.7 %      Eosinophil % 1.4 %      Basophil % 0.6 %      Immature Grans % 1.3 %      Neutrophils, Absolute 8.33 10*3/mm3      Lymphocytes, Absolute 3.35 10*3/mm3      Monocytes, Absolute 1.30 10*3/mm3      Eosinophils, Absolute 0.19 10*3/mm3      Basophils, Absolute 0.08 10*3/mm3      Immature Grans, Absolute 0.17 10*3/mm3      nRBC 0.0 /100 WBC         Hospital Course:  The patient is a 82 y.o. female who presented to [facility] with elevated heart rate.   "Patient notes she was in her usual state of health at the nursing home feeling okay when they told her she had an elevated heart rate and was sending her to the hospital.  She arrived via ambulance.  She was evaluated found to be in SVT.  She was given a dose of Adenocard.  Her heart rate converted back to a normal sinus rhythm.  The ER continue to work-up and found her to have a slightly elevated white blood cell count as well as a UTI.  She was started on IV Rocephin.  She was transitioned to the medical floor for monitoring overnight.  She tolerated this well.  She remained in a normal sinus rhythm without peak.  Today following admission she is feeling back to her normal self.  She was anxious to go back to the nursing facility.  As she is stable this is and  appropriate next move.      Physical Exam on Discharge:  /61 (BP Location: Right arm, Patient Position: Sitting)   Pulse 68   Temp 98.4 °F (36.9 °C) (Oral)   Resp 16   Ht 152.4 cm (60\")   Wt 44.2 kg (97 lb 8 oz)   SpO2 92%   BMI 19.04 kg/m²   Physical Exam  Vitals and nursing note reviewed.   Constitutional:       Appearance: Normal appearance. She is well-developed.   HENT:      Head: Normocephalic and atraumatic.      Right Ear: External ear normal.      Left Ear: External ear normal.      Nose: Nose normal.      Mouth/Throat:      Mouth: Mucous membranes are moist.   Eyes:      Extraocular Movements: Extraocular movements intact.      Conjunctiva/sclera: Conjunctivae normal.      Pupils: Pupils are equal, round, and reactive to light.   Neck:      Thyroid: No thyromegaly.      Vascular: No JVD.      Trachea: No tracheal deviation.   Cardiovascular:      Rate and Rhythm: Normal rate and regular rhythm.      Pulses: Normal pulses.      Heart sounds: Normal heart sounds. No murmur heard.    No friction rub. No gallop.   Pulmonary:      Effort: Pulmonary effort is normal.      Breath sounds: Normal breath sounds.   Abdominal:      General: Bowel " sounds are normal. There is no distension.      Palpations: Abdomen is soft.      Tenderness: There is no abdominal tenderness.   Musculoskeletal:      Cervical back: Normal range of motion and neck supple.      Comments: Patient moves all extremities to command   Lymphadenopathy:      Cervical: No cervical adenopathy.   Skin:     General: Skin is warm and dry.      Capillary Refill: Capillary refill takes less than 2 seconds.   Neurological:      Mental Status: She is alert and oriented to person, place, and time.      Cranial Nerves: No cranial nerve deficit.      Coordination: Coordination normal.   Psychiatric:         Mood and Affect: Mood normal.         Behavior: Behavior normal.           Condition on Discharge: Stable improved    Discharge Disposition:  Skilled Nursing Facility ([place] - External)    Discharge Medications:     Discharge Medications      Changes to Medications      Instructions Start Date   oxyCODONE-acetaminophen 7.5-325 MG per tablet  Commonly known as: PERCOCET  What changed: reasons to take this   1 tablet, Oral, Every 4 Hours PRN         Continue These Medications      Instructions Start Date   acebutolol 200 MG capsule  Commonly known as: SECTRAL   200 mg, Oral, 2 Times Daily      acetaminophen 325 MG tablet  Commonly known as: TYLENOL   650 mg, Oral, Every 4 Hours PRN      apixaban 5 MG tablet tablet  Commonly known as: ELIQUIS   5 mg, Oral, Every 12 Hours Scheduled      bisacodyl 5 MG EC tablet  Commonly known as: DULCOLAX   10 mg, Oral, Every Night at Bedtime      budesonide-formoterol 160-4.5 MCG/ACT inhaler  Commonly known as: SYMBICORT   2 puffs, Inhalation, 2 Times Daily - RT      Calmoseptine 0.44-20.6 % ointment  Generic drug: Menthol-Zinc Oxide   1 application, Topical, As Needed, Apply to kathi-area after incontinent episodes      cholecalciferol 25 MCG (1000 UT) tablet  Commonly known as: VITAMIN D3   2,000 Units, Oral, Daily      dilTIAZem  MG 24 hr capsule  Commonly  known as: CARDIZEM CD   240 mg, Oral, Daily      docusate sodium 100 MG capsule  Commonly known as: Colace   100 mg, Oral, Daily      famotidine 20 MG tablet  Commonly known as: PEPCID   20 mg, Oral, Daily      fenofibrate 145 MG tablet  Commonly known as: TRICOR   145 mg, Oral, Daily      gabapentin 100 MG capsule  Commonly known as: NEURONTIN   100 mg, Oral, 3 Times Daily      ipratropium-albuterol  MCG/ACT inhaler  Commonly known as: COMBIVENT RESPIMAT   1 puff, Inhalation, 4 Times Daily PRN      ipratropium-albuterol 0.5-2.5 mg/3 ml nebulizer  Commonly known as: DUO-NEB   3 mL, Nebulization, Every 4 Hours PRN      LORazepam 1 MG tablet  Commonly known as: ATIVAN   1 mg, Oral, Every 8 Hours PRN      megestrol 40 MG/ML suspension  Commonly known as: MEGACE   400 mg, Oral, 2 Times Daily      melatonin 5 MG tablet tablet   5 mg, Oral, Nightly      ondansetron 4 MG tablet  Commonly known as: ZOFRAN   4 mg, Oral, Every 8 Hours PRN      PARoxetine 40 MG tablet  Commonly known as: PAXIL   40 mg, Oral, Every Morning      polyethylene glycol 17 GM/SCOOP powder  Commonly known as: MIRALAX   17 g, Oral, Daily           Discharge Diet:    Regular diet    Activity at Discharge:   As tolerated    Follow-up Appointments:   1 week with primary care provider  Test Results Pending at Discharge: None    Jessika Rocha DO  01/10/23  12:28 CST    Time: 35 so technically of her minutes.

## 2023-01-10 NOTE — THERAPY DISCHARGE NOTE
Acute Care - Occupational Therapy Discharge  [facility]    Patient Name: Zelalem Hung  : 1940    MRN: [id number]                              Today's Date: 1/10/2023       Admit Date: 2023    Visit Dx:     ICD-10-CM ICD-9-CM   1. SVT (supraventricular tachycardia) (HCC)  I47.1 427.89   2. Elevated troponin  R77.8 790.6   3. Acute cystitis without hematuria  N30.00 595.0     Patient Active Problem List   Diagnosis   • Cholecystitis   • Encephalopathy   • Unknown when patient last well, possible stroke    • Hyponatremia   • Failure to thrive in adult   • Severe malnutrition (HCC)   • History of migraines   • Closed fracture of nasal bone with routine healing   • Traumatic ecchymosis of face   • Acquired deviated nasal septum   • Epistaxis   • Colitis   • Fall   • Compression fracture of body of thoracic vertebra (HCC)   • Closed compression fracture of third lumbar vertebra (HCC)   • Sepsis (HCC)   • Metabolic acidosis   • Fecal impaction (HCC)   • Closed left hip fracture (HCC)   • Nonsmoker   • Body mass index (BMI) less than 16.5   • Acute pulmonary embolism without acute cor pulmonale (HCC)   • Pulmonary HTN (HCC)   • Acute pulmonary edema (HCC)   • Sinus tachycardia   • Acute deep vein thrombosis (DVT) of left lower extremity (HCC)   • SVT (supraventricular tachycardia) (HCC)   • UTI (urinary tract infection), bacterial   • Elevated WBC count     Past Medical History:   Diagnosis Date   • Chronic hyponatremia    • Frequent UTI    • Low back pain    • Migraines    • SVT (supraventricular tachycardia) (HCC)      Past Surgical History:   Procedure Laterality Date   • CHOLECYSTECTOMY WITH INTRAOPERATIVE CHOLANGIOGRAM N/A 2019    Procedure: CHOLECYSTECTOMY LAPAROSCOPIC INTRAOPERATIVE CHOLANGIOGRAM;  Surgeon: Chandan Bravo MD;  Location: [facility] OR;  Service: General   • HIP HEMIARTHROPLASTY Left 2022    Procedure: HIP HEMIARTHROPLASTY;  Surgeon: Barrington Simons MD;  Location: [facility] OR;  Service:  Orthopedics;  Laterality: Left;      General Information     Row Name 01/10/23 1005          OT Time and Intention    Document Type evaluation  Presented to ED with elevated HR. Dx: SVT, UTI, bacterial, Elevated WBC count.  -LS     Mode of Treatment occupational therapy  -     Row Name 01/10/23 1005          General Information    Patient Profile Reviewed yes  -LS     Prior Level of Function max assist:;dependent:;ADL's;transfer;w/c or scooter  Per Pt's daughter, the Pt required Max A for UB bathing/dressing and Pt was dependent for LB bathing/dressing, toileting, transfers, and fxl mobility in manual w/c.  -     Existing Precautions/Restrictions fall  -     Barriers to Rehab previous functional deficit;cognitive status  -     Row Name 01/10/23 1005          Occupational Profile    Environmental Supports and Barriers (Occupational Profile) Pt is a resident at [facility]. Walk in shower with a shower chair and grab bars. Standard height toilet with grab bars beside it. Pt sleeps in a hospital bed.  -     Row Name 01/10/23 1005          Living Environment    People in Home facility resident  -     Row Name 01/10/23 1005          Home Main Entrance    Number of Stairs, Main Entrance none  -     Row Name 01/10/23 1005          Stairs Within Home, Primary    Number of Stairs, Within Home, Primary none  -     Row Name 01/10/23 1005          Cognition    Orientation Status (Cognition) oriented x 3  -     Row Name 01/10/23 1005          Safety Issues, Functional Mobility    Impairments Affecting Function (Mobility) balance;cognition;coordination;endurance/activity tolerance;strength;postural/trunk control  -     Cognitive Impairments, Mobility Safety/Performance attention;safety precaution follow-through  -           User Key  (r) = Recorded By, (t) = Taken By, (c) = Cosigned By    Initials Name Provider Type     Farzana Hutson OTR/L Occupational Therapist               Mobility/ADL's      Row Name 01/10/23 1005          Bed Mobility    Bed Mobility supine-sit  -LS     Supine-Sit [place] (Bed Mobility) maximum assist (25% patient effort)  -LS     Assistive Device (Bed Mobility) bed rails;head of bed elevated  -     Row Name 01/10/23 1005          Transfers    Transfers sit-stand transfer;stand-sit transfer;bed-chair transfer  -     Row Name 01/10/23 1005          Bed-Chair Transfer    Bed-Chair [place] (Transfers) dependent (less than 25% patient effort)  -     Row Name 01/10/23 1005          Sit-Stand Transfer    Sit-Stand [place] (Transfers) dependent (less than 25% patient effort)  -LS     Assistive Device (Sit-Stand Transfers) walker, front-wheeled  -LS     Row Name 01/10/23 1005          Stand-Sit Transfer    Stand-Sit [place] (Transfers) dependent (less than 25% patient effort)  -LS     Assistive Device (Stand-Sit Transfers) walker, front-wheeled  -LS     Row Name 01/10/23 1005          Activities of Daily Living    BADL Assessment/Intervention upper body dressing;lower body dressing;toileting  -     Row Name 01/10/23 1005          Upper Body Dressing Assessment/Training    [place] Level (Upper Body Dressing) upper body dressing skills;maximum assist (25% patient effort)  -LS     Position (Upper Body Dressing) edge of bed sitting  -     Row Name 01/10/23 1005          Lower Body Dressing Assessment/Training    [place] Level (Lower Body Dressing) lower body dressing skills;dependent (less than 25% patient effort)  -LS     Position (Lower Body Dressing) supine  -     Row Name 01/10/23 1005          Toileting Assessment/Training    [place] Level (Toileting) toileting skills;dependent (less than 25% patient effort)  -LS     Position (Toileting) supine  -LS           User Key  (r) = Recorded By, (t) = Taken By, (c) = Cosigned By    Initials Name Provider Type    Farzana Randolph, ABBYR/L Occupational Therapist               Obj/Interventions     Row Name  01/10/23 1005          Sensory Assessment (Somatosensory)    Sensory Assessment (Somatosensory) UE sensation intact  -     Row Name 01/10/23 1005          Vision Assessment/Intervention    Visual Impairment/Limitations WFL;corrective lenses full-time  -     Row Name 01/10/23 1005          Range of Motion Comprehensive    General Range of Motion bilateral upper extremity ROM WFL  -     Row Name 01/10/23 1005          Strength Comprehensive (MMT)    General Manual Muscle Testing (MMT) Assessment upper extremity strength deficits identified  -     Comment, General Manual Muscle Testing (MMT) Assessment BUE shoulder flex/ext 3/5; BUE strength grossly 3+/5 at all other joints in all planes.  -     Row Name 01/10/23 1005          Balance    Balance Assessment sitting static balance;sitting dynamic balance;standing static balance  -     Static Sitting Balance contact guard  -     Dynamic Sitting Balance contact guard;minimal assist  -LS     Position, Sitting Balance sitting edge of bed  -     Static Standing Balance dependent  -LS     Position/Device Used, Standing Balance walker, front-wheeled  -           User Key  (r) = Recorded By, (t) = Taken By, (c) = Cosigned By    Initials Name Provider Type    LS Farzana Hutson, OTR/L Occupational Therapist               Goals/Plan    No documentation.                Clinical Impression     Row Name 01/10/23 1005          Pain Assessment    Pretreatment Pain Rating 0/10 - no pain  -     Posttreatment Pain Rating 0/10 - no pain  -     Row Name 01/10/23 1005          Plan of Care Review    Plan of Care Reviewed With patient  -     Progress no change  -     Outcome Evaluation OT eval completed. Pt in fowlers upon therapist arrival; A&Ox3; No pain reported. Per Pt's daughter, the Pt required Max A for UB bathing/dressing and Pt was dependent for LB bathing/dressing, toileting, transfers, and fxl mobility in manual w/c. Unable to mobilize B ankles from plantar  flexed position; Pt's daughter reports that the Pt has been unable to bear weight on BLE or ambulate for a significant period of time due to this issue. Pt able to self feed I after set-up. Pt performed supine>sit utilizing bedrails with HOB elevated with Max A. Pt able to maintain static sitting at EOB with CGA. Pt dependent for sit<>stand utilizing rwx. Pt dependent for low bottom transfer from bed>chair. While Pt does require significant assistance with BADLs at this time, this is the Pt's baseline function, therefore skilled OT intervention not indicated at this time. OT to sign off. Recommend Pt to return to SNF at discharge.  -     Row Name 01/10/23 1005          Therapy Assessment/Plan (OT)    Criteria for Skilled Therapeutic Interventions Met (OT) no;does not meet criteria for skilled intervention  -     Therapy Frequency (OT) evaluation only  -     Row Name 01/10/23 1005          Therapy Plan Review/Discharge Plan (OT)    Anticipated Discharge Disposition (OT) skilled nursing facility  -     Row Name 01/10/23 1005          Positioning and Restraints    Pre-Treatment Position in bed  -LS     Post Treatment Position chair  -LS     In Chair reclined;call light within reach;encouraged to call for assist;exit alarm on;legs elevated  -LS           User Key  (r) = Recorded By, (t) = Taken By, (c) = Cosigned By    Initials Name Provider Type    Farzana Randolph, OTR/L Occupational Therapist               Outcome Measures     Row Name 01/10/23 1005          How much help from another is currently needed...    Putting on and taking off regular lower body clothing? 1  -LS     Bathing (including washing, rinsing, and drying) 2  -LS     Toileting (which includes using toilet bed pan or urinal) 1  -LS     Putting on and taking off regular upper body clothing 2  -LS     Taking care of personal grooming (such as brushing teeth) 2  -LS     Eating meals 3  -LS     AM-PAC 6 Clicks Score (OT) 11  -LS     Row Name  01/10/23 0800          How much help from another person do you currently need...    Turning from your back to your side while in flat bed without using bedrails? 4  -AA     Moving from lying on back to sitting on the side of a flat bed without bedrails? 3  -AA     Moving to and from a bed to a chair (including a wheelchair)? 2  -AA     Standing up from a chair using your arms (e.g., wheelchair, bedside chair)? 2  -AA     Climbing 3-5 steps with a railing? 1  -AA     To walk in hospital room? 1  -AA     AM-PAC 6 Clicks Score (PT) 13  -AA     Highest level of mobility 4 --> Transferred to chair/commode  -AA     Row Name 01/10/23 1005          Functional Assessment    Outcome Measure Options AM-PAC 6 Clicks Daily Activity (OT)  -LS           User Key  (r) = Recorded By, (t) = Taken By, (c) = Cosigned By    Initials Name Provider Type    Christine Vasquez, RN Registered Nurse    Farzana Randolph, OTR/L Occupational Therapist                OT Recommendation and Plan  Therapy Frequency (OT): evaluation only  Plan of Care Review  Plan of Care Reviewed With: patient  Progress: no change  Outcome Evaluation: OT eval completed. Pt in fowlers upon therapist arrival; A&Ox3; No pain reported. Per Pt's daughter, the Pt required Max A for UB bathing/dressing and Pt was dependent for LB bathing/dressing, toileting, transfers, and fxl mobility in manual w/c. Unable to mobilize B ankles from plantar flexed position; Pt's daughter reports that the Pt has been unable to bear weight on BLE or ambulate for a significant period of time due to this issue. Pt able to self feed I after set-up. Pt performed supine>sit utilizing bedrails with HOB elevated with Max A. Pt able to maintain static sitting at EOB with CGA. Pt dependent for sit<>stand utilizing rwx. Pt dependent for low bottom transfer from bed>chair. While Pt does require significant assistance with BADLs at this time, this is the Pt's baseline function, therefore skilled OT  intervention not indicated at this time. OT to sign off. Recommend Pt to return to SNF at discharge.  Plan of Care Reviewed With: patient  Outcome Evaluation: OT eval completed. Pt in fowlers upon therapist arrival; A&Ox3; No pain reported. Per Pt's daughter, the Pt required Max A for UB bathing/dressing and Pt was dependent for LB bathing/dressing, toileting, transfers, and fxl mobility in manual w/c. Unable to mobilize B ankles from plantar flexed position; Pt's daughter reports that the Pt has been unable to bear weight on BLE or ambulate for a significant period of time due to this issue. Pt able to self feed I after set-up. Pt performed supine>sit utilizing bedrails with HOB elevated with Max A. Pt able to maintain static sitting at EOB with CGA. Pt dependent for sit<>stand utilizing rwx. Pt dependent for low bottom transfer from bed>chair. While Pt does require significant assistance with BADLs at this time, this is the Pt's baseline function, therefore skilled OT intervention not indicated at this time. OT to sign off. Recommend Pt to return to SNF at discharge.     Time Calculation:    Time Calculation- OT     Row Name 01/10/23 1005             Time Calculation- OT    OT Start Time 1005  +10 minutes chart review  -      OT Stop Time 1050  -      OT Time Calculation (min) 45 min  -      OT Non-Billable Time (min) 55 min  -      OT Received On 01/10/23  -            User Key  (r) = Recorded By, (t) = Taken By, (c) = Cosigned By    Initials Name Provider Type     Farzana Hutson OTR/L Occupational Therapist              Therapy Charges for Today     Code Description Service Date Service Provider Modifiers Qty    [id number]  OT EVAL LOW COMPLEXITY 4 1/10/2023 Farzana Hutson OTR/L GO 1             OT Discharge Summary  Anticipated Discharge Disposition (OT): skilled nursing facility  Reason for Discharge: At baseline function  Outcomes Achieved: Other (Eval only.)  Discharge Destination:  SNF    Farzana Hutson, OTR/L  1/10/2023

## 2023-01-10 NOTE — CASE MANAGEMENT/SOCIAL WORK
Discharge Planning Assessment  [facility]     Patient Name: Zelalem Hung  MRN: [id number]  Today's Date: 1/10/2023    Admit Date: 1/9/2023        Discharge Needs Assessment     Row Name 01/10/23 0910       Living Environment    People in Home facility resident    Current Living Arrangements extended care facility    Able to Return to Prior Arrangements yes       Transition Planning    Patient/Family Anticipates Transition to long-term care facility    Patient/Family Anticipated Services at Transition skilled nursing       Discharge Needs Assessment    Current Outpatient/Agency/Support Group skilled nursing facility    Discharge Facility/Level of Care Needs nursing facility, skilled;nursing facility, intermediate    Discharge Coordination/Progress Patient is from [facility]. SW spoke to Gwendolyn in admissions there and confirmed patient has a bed hold. Patient can return to [facility] when medically ready for discharge. Will follow to arrange return.  Phone: [phone]   Fax [phone].                FRANCESCA Davenport

## 2023-01-10 NOTE — CASE MANAGEMENT/SOCIAL WORK
Continued Stay Note  [facility]     Patient Name: Zelalem Hung  MRN: [id number]  Today's Date: 1/10/2023    Admit Date: 1/9/2023    Plan: [facility]   Discharge Plan     Row Name 01/10/23 1118       Plan    Plan [facility]    Final Discharge Disposition Code 04 - intermediate care facility    Final Note Patient is discharged today, back to [facility]. SW notified Gewndolyn in admissions. Discharge summary and covid swab fax to [phone]. RN report [phone].                       Expected Discharge Date and Time     Expected Discharge Date Expected Discharge Time    Jan 11, 2023             FRANCESCA Davenport

## 2023-01-10 NOTE — PLAN OF CARE
Goal Outcome Evaluation:              Outcome Evaluation: NTN assessment and MSA completed. PO 25% dinner last night. Requires tray set up; OT noted pt able to feed self. Megace twice daily noted on MAR. NTN following per protocol.

## 2023-01-11 LAB
BACTERIA SPEC AEROBE CULT: ABNORMAL
QT INTERVAL: 274 MS
QT INTERVAL: 356 MS
QTC INTERVAL: 451 MS
QTC INTERVAL: 459 MS

## 2023-01-11 NOTE — PLAN OF CARE
Goal Outcome Evaluation:  Plan of Care Reviewed With: patient        Progress: no change  Outcome Evaluation: Medication given for pain. She is going back to NH, waiting for EMS.

## 2023-01-30 NOTE — OP NOTE
Orthopedic History and Physical    Pt Name: Zelalem Hung  MRN: [id number]  YOB: 1940  Date: 5/6/2022     PREOPERATIVE DIAGNOSIS: Left subcapital displaced femoral neck fracture.      POSTOPERATIVE DIAGNOSIS: Left subcapital displaced femoral neck fracture.      PROCEDURE: Left hip hemiarthroplasty.      SURGEON: Barrington Simons MD     ASSISTANT:  Hugo Rosenthal PA-C     ANESTHESIA: General endotracheal anesthesia.      ESTIMATED BLOOD LOSS: 150 mL.      COMPLICATIONS: None.      CONDITION: Stable.      NOTE: An assistant surgeon was scrubbed and present throughout the entire procedure. He assisted and aided in limb position, as well as retractor placement. He also aided in implantation of the prosthesis and was responsible for wound closure.      COMPONENTS: DePuy Tri-Lock size 3 stem with a standard neck with +5  head and a 39 mm outer bipolar cup.      HISTORY: This is an 81-year-old female who fell at home. The patient presented to the emergency department with complaints of hip pain. X-rays demonstrated a subcapital femoral neck fracture. Based on this, the decision was made to take the patient to the operating room for the above-mentioned procedure. After the risks and benefits had been discussed with the patient and the patient was medically cleared, the patient was taken to the operative suite.      DESCRIPTION OF PROCEDURE: The patient was interviewed in the preanesthesia area, where the operative hip was marked with a marking pen. The patient was then taken to the operative suite where general endotracheal anesthesia was performed by the anesthesia team. The patient was then positioned in the lateral decubitus position over a pegboard and a VACPAC. Once the pelvis was stabilized patient was then prepped and draped in a standard sterile fashion using ChloraPrep. A standard posterior approach was carried out to the hip. The short external rotators and capsule were then taken down in a T-type fashion  Take Omeprazole 20 mg daily. Increase to twice a day if chest pain comes back. and marked with a #1 Vicryl suture. The femoral head was then removed and sized on the back table for a size.      Attention was then turned to in the femur. A proximal femoral elevator was placed with Hohmann retractors around the femur. A canal finder was used to find the canal, followed by a box cutting osteotome. We then sequentially broached up to a size 3 Tri-Lock placed in about 15° of anteversion. We did use a calcar planer to make our neck cut 1 fingerbreadth above the lesser trochanter. Once this in position and a definitive 3 femur Tri-Lock femur was then placed. Trial reductions demonstrated the +5 head gave us the most stable construct. We then placed a +5 head with a 41 mm outer bipolar cup and reduced the hip.  The 41 did not see correctly and therefore I changed to a 39 mm outer bipolar cup.  It was stable throughout an arc of motion with symmetric limb lengths.      Attention was then turned to the posterior capsule closure. The capsule and short external rotators were closed using drill bit through the trochanter with the Paulie suture passer. The sutures were passed through the trochanter and tied over a bone bridge. Side-to-side figure-of-eight sutures were then used to repair the longitudinal portion of the capsulotomy. The hip was irrigated with pulsatile lavage. The IT band was then closed with #1 Vicryl suture. Skin was approximated with Vicryl and closed with a super glue skin closure system. The patient awoke from anesthesia without difficulty and was transferred to the PACU in stable condition. All sponge, needle and instrument counts were correct at the end of the procedure.            Barrington Simons M.D.

## 2023-02-02 ENCOUNTER — APPOINTMENT (OUTPATIENT)
Dept: GENERAL RADIOLOGY | Facility: HOSPITAL | Age: 83
DRG: 871 | End: 2023-02-02
Payer: MEDICARE

## 2023-02-02 ENCOUNTER — HOSPITAL ENCOUNTER (INPATIENT)
Facility: HOSPITAL | Age: 83
LOS: 3 days | Discharge: NURSING, REHAB OR LONG-TERM CARE | DRG: 871 | End: 2023-02-05
Attending: EMERGENCY MEDICINE | Admitting: INTERNAL MEDICINE
Payer: MEDICARE

## 2023-02-02 DIAGNOSIS — R79.89 ELEVATED BRAIN NATRIURETIC PEPTIDE (BNP) LEVEL: ICD-10-CM

## 2023-02-02 DIAGNOSIS — S72.002A CLOSED FRACTURE OF LEFT HIP, INITIAL ENCOUNTER: ICD-10-CM

## 2023-02-02 DIAGNOSIS — R77.8 TROPONIN I ABOVE REFERENCE RANGE: ICD-10-CM

## 2023-02-02 DIAGNOSIS — I47.1 SVT (SUPRAVENTRICULAR TACHYCARDIA): Primary | ICD-10-CM

## 2023-02-02 DIAGNOSIS — F41.1 GAD (GENERALIZED ANXIETY DISORDER): ICD-10-CM

## 2023-02-02 DIAGNOSIS — S32.030D CLOSED COMPRESSION FRACTURE OF L3 LUMBAR VERTEBRA WITH ROUTINE HEALING, SUBSEQUENT ENCOUNTER: ICD-10-CM

## 2023-02-02 DIAGNOSIS — J18.9 PNEUMONIA DUE TO INFECTIOUS ORGANISM, UNSPECIFIED LATERALITY, UNSPECIFIED PART OF LUNG: ICD-10-CM

## 2023-02-02 DIAGNOSIS — I27.82 OTHER CHRONIC PULMONARY EMBOLISM, UNSPECIFIED WHETHER ACUTE COR PULMONALE PRESENT: ICD-10-CM

## 2023-02-02 DIAGNOSIS — R57.9 SHOCK: ICD-10-CM

## 2023-02-02 LAB
ALBUMIN SERPL-MCNC: 3.6 G/DL (ref 3.5–5.2)
ALBUMIN/GLOB SERPL: 0.9 G/DL
ALP SERPL-CCNC: 59 U/L (ref 39–117)
ALT SERPL W P-5'-P-CCNC: 9 U/L (ref 1–33)
ANION GAP SERPL CALCULATED.3IONS-SCNC: 12 MMOL/L (ref 5–15)
ARTERIAL PATENCY WRIST A: POSITIVE
ARTERIAL PATENCY WRIST A: POSITIVE
AST SERPL-CCNC: 15 U/L (ref 1–32)
ATMOSPHERIC PRESS: 757 MMHG
ATMOSPHERIC PRESS: 757 MMHG
BACTERIA UR QL AUTO: ABNORMAL /HPF
BASE EXCESS BLDA CALC-SCNC: 2.1 MMOL/L (ref 0–2)
BASE EXCESS BLDA CALC-SCNC: 2.1 MMOL/L (ref 0–2)
BASOPHILS # BLD AUTO: 0.14 10*3/MM3 (ref 0–0.2)
BASOPHILS NFR BLD AUTO: 1 % (ref 0–1.5)
BDY SITE: ABNORMAL
BDY SITE: ABNORMAL
BILIRUB SERPL-MCNC: 0.2 MG/DL (ref 0–1.2)
BILIRUB UR QL STRIP: NEGATIVE
BODY TEMPERATURE: 37 C
BODY TEMPERATURE: 37 C
BUN SERPL-MCNC: 27 MG/DL (ref 8–23)
BUN/CREAT SERPL: 32.9 (ref 7–25)
CALCIUM SPEC-SCNC: 9.8 MG/DL (ref 8.6–10.5)
CHLORIDE SERPL-SCNC: 102 MMOL/L (ref 98–107)
CLARITY UR: ABNORMAL
CO2 SERPL-SCNC: 24 MMOL/L (ref 22–29)
COLOR UR: YELLOW
CREAT SERPL-MCNC: 0.82 MG/DL (ref 0.57–1)
CRP SERPL-MCNC: 14.45 MG/DL (ref 0–0.5)
D DIMER PPP FEU-MCNC: 0.95 MCGFEU/ML (ref 0–0.82)
D-LACTATE SERPL-SCNC: 1.5 MMOL/L (ref 0.5–2)
D-LACTATE SERPL-SCNC: 2.1 MMOL/L (ref 0.5–2)
DEPRECATED RDW RBC AUTO: 53.1 FL (ref 37–54)
EGFRCR SERPLBLD CKD-EPI 2021: 71.5 ML/MIN/1.73
EOSINOPHIL # BLD AUTO: 0.24 10*3/MM3 (ref 0–0.4)
EOSINOPHIL NFR BLD AUTO: 1.7 % (ref 0.3–6.2)
ERYTHROCYTE [DISTWIDTH] IN BLOOD BY AUTOMATED COUNT: 14.5 % (ref 12.3–15.4)
GAS FLOW AIRWAY: 2 LPM
GAS FLOW AIRWAY: 2 LPM
GLOBULIN UR ELPH-MCNC: 4.1 GM/DL
GLUCOSE SERPL-MCNC: 103 MG/DL (ref 65–99)
GLUCOSE UR STRIP-MCNC: NEGATIVE MG/DL
HCO3 BLDA-SCNC: 24.3 MMOL/L (ref 20–26)
HCO3 BLDA-SCNC: 24.3 MMOL/L (ref 20–26)
HCT VFR BLD AUTO: 40 % (ref 34–46.6)
HGB BLD-MCNC: 13.2 G/DL (ref 12–15.9)
HGB UR QL STRIP.AUTO: ABNORMAL
HYALINE CASTS UR QL AUTO: ABNORMAL /LPF
IMM GRANULOCYTES # BLD AUTO: 0.38 10*3/MM3 (ref 0–0.05)
IMM GRANULOCYTES NFR BLD AUTO: 2.7 % (ref 0–0.5)
INR PPP: 2.74 (ref 0.91–1.09)
KETONES UR QL STRIP: NEGATIVE
LEUKOCYTE ESTERASE UR QL STRIP.AUTO: ABNORMAL
LYMPHOCYTES # BLD AUTO: 3.21 10*3/MM3 (ref 0.7–3.1)
LYMPHOCYTES NFR BLD AUTO: 22.5 % (ref 19.6–45.3)
Lab: ABNORMAL
Lab: ABNORMAL
MAGNESIUM SERPL-MCNC: 1.8 MG/DL (ref 1.6–2.4)
MCH RBC QN AUTO: 32.5 PG (ref 26.6–33)
MCHC RBC AUTO-ENTMCNC: 33 G/DL (ref 31.5–35.7)
MCV RBC AUTO: 98.5 FL (ref 79–97)
MODALITY: ABNORMAL
MODALITY: ABNORMAL
MONOCYTES # BLD AUTO: 1.56 10*3/MM3 (ref 0.1–0.9)
MONOCYTES NFR BLD AUTO: 10.9 % (ref 5–12)
NEUTROPHILS NFR BLD AUTO: 61.2 % (ref 42.7–76)
NEUTROPHILS NFR BLD AUTO: 8.76 10*3/MM3 (ref 1.7–7)
NITRITE UR QL STRIP: NEGATIVE
NRBC BLD AUTO-RTO: 0 /100 WBC (ref 0–0.2)
NT-PROBNP SERPL-MCNC: ABNORMAL PG/ML (ref 0–1800)
PCO2 BLDA: 30.2 MM HG (ref 35–45)
PCO2 BLDA: 30.2 MM HG (ref 35–45)
PCO2 TEMP ADJ BLD: 30.2 MM HG (ref 35–45)
PCO2 TEMP ADJ BLD: 30.2 MM HG (ref 35–45)
PH BLDA: 7.51 PH UNITS (ref 7.35–7.45)
PH BLDA: 7.51 PH UNITS (ref 7.35–7.45)
PH UR STRIP.AUTO: 6 [PH] (ref 5–8)
PH, TEMP CORRECTED: 7.51 PH UNITS (ref 7.35–7.45)
PH, TEMP CORRECTED: 7.51 PH UNITS (ref 7.35–7.45)
PLATELET # BLD AUTO: 567 10*3/MM3 (ref 140–450)
PMV BLD AUTO: 9.8 FL (ref 6–12)
PO2 BLDA: 76.6 MM HG (ref 83–108)
PO2 BLDA: 76.6 MM HG (ref 83–108)
PO2 TEMP ADJ BLD: 76.6 MM HG (ref 83–108)
PO2 TEMP ADJ BLD: 76.6 MM HG (ref 83–108)
POTASSIUM SERPL-SCNC: 4.2 MMOL/L (ref 3.5–5.2)
PROCALCITONIN SERPL-MCNC: 0.24 NG/ML (ref 0–0.25)
PROT SERPL-MCNC: 7.7 G/DL (ref 6–8.5)
PROT UR QL STRIP: ABNORMAL
PROTHROMBIN TIME: 29.4 SECONDS (ref 11.8–14.8)
RBC # BLD AUTO: 4.06 10*6/MM3 (ref 3.77–5.28)
RBC # UR STRIP: ABNORMAL /HPF
REF LAB TEST METHOD: ABNORMAL
SAO2 % BLDCOA: 96.5 % (ref 94–99)
SAO2 % BLDCOA: 96.5 % (ref 94–99)
SODIUM SERPL-SCNC: 138 MMOL/L (ref 136–145)
SP GR UR STRIP: 1.02 (ref 1–1.03)
SQUAMOUS #/AREA URNS HPF: ABNORMAL /HPF
TROPONIN T SERPL-MCNC: 0.05 NG/ML (ref 0–0.03)
UROBILINOGEN UR QL STRIP: ABNORMAL
VENTILATOR MODE: ABNORMAL
VENTILATOR MODE: ABNORMAL
WBC # UR STRIP: ABNORMAL /HPF
WBC NRBC COR # BLD: 14.29 10*3/MM3 (ref 3.4–10.8)

## 2023-02-02 PROCEDURE — 93005 ELECTROCARDIOGRAM TRACING: CPT | Performed by: EMERGENCY MEDICINE

## 2023-02-02 PROCEDURE — 94640 AIRWAY INHALATION TREATMENT: CPT

## 2023-02-02 PROCEDURE — 87077 CULTURE AEROBIC IDENTIFY: CPT | Performed by: EMERGENCY MEDICINE

## 2023-02-02 PROCEDURE — 81001 URINALYSIS AUTO W/SCOPE: CPT | Performed by: EMERGENCY MEDICINE

## 2023-02-02 PROCEDURE — 25010000002 MAGNESIUM SULFATE 2 GM/50ML SOLUTION: Performed by: EMERGENCY MEDICINE

## 2023-02-02 PROCEDURE — 36415 COLL VENOUS BLD VENIPUNCTURE: CPT

## 2023-02-02 PROCEDURE — 84145 PROCALCITONIN (PCT): CPT | Performed by: EMERGENCY MEDICINE

## 2023-02-02 PROCEDURE — 80053 COMPREHEN METABOLIC PANEL: CPT | Performed by: EMERGENCY MEDICINE

## 2023-02-02 PROCEDURE — 36600 WITHDRAWAL OF ARTERIAL BLOOD: CPT

## 2023-02-02 PROCEDURE — 71045 X-RAY EXAM CHEST 1 VIEW: CPT

## 2023-02-02 PROCEDURE — 83605 ASSAY OF LACTIC ACID: CPT | Performed by: EMERGENCY MEDICINE

## 2023-02-02 PROCEDURE — 87040 BLOOD CULTURE FOR BACTERIA: CPT | Performed by: EMERGENCY MEDICINE

## 2023-02-02 PROCEDURE — 99285 EMERGENCY DEPT VISIT HI MDM: CPT

## 2023-02-02 PROCEDURE — 85379 FIBRIN DEGRADATION QUANT: CPT | Performed by: EMERGENCY MEDICINE

## 2023-02-02 PROCEDURE — 86140 C-REACTIVE PROTEIN: CPT | Performed by: EMERGENCY MEDICINE

## 2023-02-02 PROCEDURE — 83880 ASSAY OF NATRIURETIC PEPTIDE: CPT | Performed by: EMERGENCY MEDICINE

## 2023-02-02 PROCEDURE — 93010 ELECTROCARDIOGRAM REPORT: CPT | Performed by: INTERNAL MEDICINE

## 2023-02-02 PROCEDURE — 84484 ASSAY OF TROPONIN QUANT: CPT | Performed by: EMERGENCY MEDICINE

## 2023-02-02 PROCEDURE — 87186 SC STD MICRODIL/AGAR DIL: CPT | Performed by: EMERGENCY MEDICINE

## 2023-02-02 PROCEDURE — 25010000002 PIPERACILLIN SOD-TAZOBACTAM PER 1 G: Performed by: EMERGENCY MEDICINE

## 2023-02-02 PROCEDURE — 87086 URINE CULTURE/COLONY COUNT: CPT | Performed by: EMERGENCY MEDICINE

## 2023-02-02 PROCEDURE — 93005 ELECTROCARDIOGRAM TRACING: CPT

## 2023-02-02 PROCEDURE — P9612 CATHETERIZE FOR URINE SPEC: HCPCS

## 2023-02-02 PROCEDURE — 25010000002 ADENOSINE PER 6 MG: Performed by: EMERGENCY MEDICINE

## 2023-02-02 PROCEDURE — 83735 ASSAY OF MAGNESIUM: CPT | Performed by: EMERGENCY MEDICINE

## 2023-02-02 PROCEDURE — 82803 BLOOD GASES ANY COMBINATION: CPT

## 2023-02-02 PROCEDURE — 85610 PROTHROMBIN TIME: CPT | Performed by: EMERGENCY MEDICINE

## 2023-02-02 PROCEDURE — 25010000002 PIPERACILLIN SOD-TAZOBACTAM PER 1 G: Performed by: INTERNAL MEDICINE

## 2023-02-02 PROCEDURE — 85025 COMPLETE CBC W/AUTO DIFF WBC: CPT | Performed by: EMERGENCY MEDICINE

## 2023-02-02 RX ORDER — SODIUM CHLORIDE 9 MG/ML
50 INJECTION, SOLUTION INTRAVENOUS CONTINUOUS
Status: DISCONTINUED | OUTPATIENT
Start: 2023-02-02 | End: 2023-02-04

## 2023-02-02 RX ORDER — CHLORHEXIDINE GLUCONATE 500 MG/1
1 CLOTH TOPICAL EVERY 24 HOURS
Status: DISCONTINUED | OUTPATIENT
Start: 2023-02-03 | End: 2023-02-05 | Stop reason: HOSPADM

## 2023-02-02 RX ORDER — LANOLIN ALCOHOL/MO/W.PET/CERES
5 CREAM (GRAM) TOPICAL NIGHTLY
Status: DISCONTINUED | OUTPATIENT
Start: 2023-02-02 | End: 2023-02-05 | Stop reason: HOSPADM

## 2023-02-02 RX ORDER — MAGNESIUM SULFATE HEPTAHYDRATE 40 MG/ML
2 INJECTION, SOLUTION INTRAVENOUS ONCE
Status: COMPLETED | OUTPATIENT
Start: 2023-02-02 | End: 2023-02-02

## 2023-02-02 RX ORDER — FAMOTIDINE 20 MG/1
20 TABLET, FILM COATED ORAL DAILY
Status: DISCONTINUED | OUTPATIENT
Start: 2023-02-03 | End: 2023-02-05 | Stop reason: HOSPADM

## 2023-02-02 RX ORDER — ACEBUTOLOL HYDROCHLORIDE 200 MG/1
200 CAPSULE ORAL 2 TIMES DAILY
Status: DISCONTINUED | OUTPATIENT
Start: 2023-02-02 | End: 2023-02-05 | Stop reason: HOSPADM

## 2023-02-02 RX ORDER — DILTIAZEM HYDROCHLORIDE 240 MG/1
240 CAPSULE, COATED, EXTENDED RELEASE ORAL DAILY
Status: DISCONTINUED | OUTPATIENT
Start: 2023-02-03 | End: 2023-02-05 | Stop reason: HOSPADM

## 2023-02-02 RX ORDER — LORAZEPAM 1 MG/1
1 TABLET ORAL EVERY 8 HOURS PRN
Status: DISCONTINUED | OUTPATIENT
Start: 2023-02-02 | End: 2023-02-05 | Stop reason: HOSPADM

## 2023-02-02 RX ORDER — SODIUM CHLORIDE 0.9 % (FLUSH) 0.9 %
10 SYRINGE (ML) INJECTION AS NEEDED
Status: DISCONTINUED | OUTPATIENT
Start: 2023-02-02 | End: 2023-02-05 | Stop reason: HOSPADM

## 2023-02-02 RX ORDER — ALUMINA, MAGNESIA, AND SIMETHICONE 2400; 2400; 240 MG/30ML; MG/30ML; MG/30ML
15 SUSPENSION ORAL EVERY 6 HOURS PRN
Status: DISCONTINUED | OUTPATIENT
Start: 2023-02-02 | End: 2023-02-05 | Stop reason: HOSPADM

## 2023-02-02 RX ORDER — DOCUSATE SODIUM 100 MG/1
100 CAPSULE, LIQUID FILLED ORAL DAILY
Status: DISCONTINUED | OUTPATIENT
Start: 2023-02-03 | End: 2023-02-05 | Stop reason: HOSPADM

## 2023-02-02 RX ORDER — SODIUM CHLORIDE 9 MG/ML
40 INJECTION, SOLUTION INTRAVENOUS AS NEEDED
Status: DISCONTINUED | OUTPATIENT
Start: 2023-02-02 | End: 2023-02-05 | Stop reason: HOSPADM

## 2023-02-02 RX ORDER — ACETAMINOPHEN 325 MG/1
650 TABLET ORAL EVERY 4 HOURS PRN
Status: DISCONTINUED | OUTPATIENT
Start: 2023-02-02 | End: 2023-02-05 | Stop reason: HOSPADM

## 2023-02-02 RX ORDER — IPRATROPIUM BROMIDE AND ALBUTEROL SULFATE 2.5; .5 MG/3ML; MG/3ML
3 SOLUTION RESPIRATORY (INHALATION) EVERY 4 HOURS PRN
Status: DISCONTINUED | OUTPATIENT
Start: 2023-02-02 | End: 2023-02-05 | Stop reason: HOSPADM

## 2023-02-02 RX ORDER — OXYCODONE AND ACETAMINOPHEN 7.5; 325 MG/1; MG/1
1 TABLET ORAL EVERY 4 HOURS PRN
Status: DISCONTINUED | OUTPATIENT
Start: 2023-02-02 | End: 2023-02-05 | Stop reason: HOSPADM

## 2023-02-02 RX ORDER — SODIUM CHLORIDE 0.9 % (FLUSH) 0.9 %
10 SYRINGE (ML) INJECTION EVERY 12 HOURS SCHEDULED
Status: DISCONTINUED | OUTPATIENT
Start: 2023-02-02 | End: 2023-02-05 | Stop reason: HOSPADM

## 2023-02-02 RX ORDER — MEGESTROL ACETATE 40 MG/ML
400 SUSPENSION ORAL 2 TIMES DAILY
Status: DISCONTINUED | OUTPATIENT
Start: 2023-02-02 | End: 2023-02-05 | Stop reason: HOSPADM

## 2023-02-02 RX ORDER — GABAPENTIN 100 MG/1
100 CAPSULE ORAL 3 TIMES DAILY
Status: DISCONTINUED | OUTPATIENT
Start: 2023-02-02 | End: 2023-02-05 | Stop reason: HOSPADM

## 2023-02-02 RX ORDER — PAROXETINE HYDROCHLORIDE 20 MG/1
40 TABLET, FILM COATED ORAL EVERY MORNING
Status: DISCONTINUED | OUTPATIENT
Start: 2023-02-03 | End: 2023-02-05 | Stop reason: HOSPADM

## 2023-02-02 RX ORDER — BISACODYL 5 MG/1
10 TABLET, DELAYED RELEASE ORAL NIGHTLY
Status: DISCONTINUED | OUTPATIENT
Start: 2023-02-02 | End: 2023-02-05 | Stop reason: HOSPADM

## 2023-02-02 RX ORDER — BUDESONIDE AND FORMOTEROL FUMARATE DIHYDRATE 160; 4.5 UG/1; UG/1
2 AEROSOL RESPIRATORY (INHALATION)
Status: DISCONTINUED | OUTPATIENT
Start: 2023-02-02 | End: 2023-02-05 | Stop reason: HOSPADM

## 2023-02-02 RX ORDER — ADENOSINE 3 MG/ML
6 INJECTION, SOLUTION INTRAVENOUS ONCE
Status: COMPLETED | OUTPATIENT
Start: 2023-02-02 | End: 2023-02-02

## 2023-02-02 RX ORDER — NOREPINEPHRINE BIT/0.9 % NACL 8 MG/250ML
.02-.3 INFUSION BOTTLE (ML) INTRAVENOUS
Status: DISCONTINUED | OUTPATIENT
Start: 2023-02-02 | End: 2023-02-03

## 2023-02-02 RX ORDER — CHLORHEXIDINE GLUCONATE 500 MG/1
1 CLOTH TOPICAL ONCE
Status: COMPLETED | OUTPATIENT
Start: 2023-02-02 | End: 2023-02-02

## 2023-02-02 RX ORDER — POLYETHYLENE GLYCOL 3350 17 G/17G
17 POWDER, FOR SOLUTION ORAL DAILY
Status: DISCONTINUED | OUTPATIENT
Start: 2023-02-03 | End: 2023-02-05 | Stop reason: HOSPADM

## 2023-02-02 RX ORDER — ONDANSETRON 2 MG/ML
4 INJECTION INTRAMUSCULAR; INTRAVENOUS EVERY 6 HOURS PRN
Status: DISCONTINUED | OUTPATIENT
Start: 2023-02-02 | End: 2023-02-05 | Stop reason: HOSPADM

## 2023-02-02 RX ORDER — MELATONIN
2000 DAILY
Status: DISCONTINUED | OUTPATIENT
Start: 2023-02-03 | End: 2023-02-05 | Stop reason: HOSPADM

## 2023-02-02 RX ADMIN — Medication 1 APPLICATION: at 15:27

## 2023-02-02 RX ADMIN — TAZOBACTAM SODIUM AND PIPERACILLIN SODIUM 4.5 G: 500; 4 INJECTION, SOLUTION INTRAVENOUS at 18:51

## 2023-02-02 RX ADMIN — BISACODYL 10 MG: 5 TABLET ORAL at 20:01

## 2023-02-02 RX ADMIN — DOXYCYCLINE 100 MG: 100 INJECTION, POWDER, LYOPHILIZED, FOR SOLUTION INTRAVENOUS at 17:45

## 2023-02-02 RX ADMIN — Medication 1 APPLICATION: at 20:01

## 2023-02-02 RX ADMIN — ADENOSINE 6 MG: 3 INJECTION INTRAVENOUS at 10:45

## 2023-02-02 RX ADMIN — MAGNESIUM SULFATE HEPTAHYDRATE 2 G: 2 INJECTION, SOLUTION INTRAVENOUS at 10:42

## 2023-02-02 RX ADMIN — MEGESTROL ACETATE 400 MG: 40 SUSPENSION ORAL at 20:01

## 2023-02-02 RX ADMIN — SODIUM CHLORIDE, POTASSIUM CHLORIDE, SODIUM LACTATE AND CALCIUM CHLORIDE 1000 ML: 600; 310; 30; 20 INJECTION, SOLUTION INTRAVENOUS at 10:07

## 2023-02-02 RX ADMIN — APIXABAN 5 MG: 5 TABLET, FILM COATED ORAL at 20:01

## 2023-02-02 RX ADMIN — SODIUM CHLORIDE 50 ML/HR: 9 INJECTION, SOLUTION INTRAVENOUS at 16:34

## 2023-02-02 RX ADMIN — ACEBUTOLOL HYDROCHLORIDE 200 MG: 200 CAPSULE ORAL at 20:01

## 2023-02-02 RX ADMIN — CHLORHEXIDINE GLUCONATE 1 APPLICATION: 500 CLOTH TOPICAL at 15:27

## 2023-02-02 RX ADMIN — Medication 10 ML: at 20:03

## 2023-02-02 RX ADMIN — GABAPENTIN 100 MG: 100 CAPSULE ORAL at 20:01

## 2023-02-02 RX ADMIN — BUDESONIDE AND FORMOTEROL FUMARATE DIHYDRATE 2 PUFF: 160; 4.5 AEROSOL RESPIRATORY (INHALATION) at 20:30

## 2023-02-02 RX ADMIN — GABAPENTIN 100 MG: 100 CAPSULE ORAL at 16:35

## 2023-02-02 RX ADMIN — Medication 0.02 MCG/KG/MIN: at 12:01

## 2023-02-02 RX ADMIN — Medication 5.25 MG: at 20:00

## 2023-02-02 RX ADMIN — TAZOBACTAM SODIUM AND PIPERACILLIN SODIUM 4.5 G: 500; 4 INJECTION, SOLUTION INTRAVENOUS at 12:09

## 2023-02-02 NOTE — ED PROVIDER NOTES
Subjective   History of Present Illness  Patient is a 82-year-old lady who lives in a nursing home in the morning they were taking morning vitals and noted her blood pressure to be low and heart rate to be 160s subsequent sent to the ED the patient has got a history of SVT in the past she recently was diagnosed pneumonia was treated with Zithromax for that in the ED I am told the patient is a DNR and no CPR but medical management is appropriate.  Later on I have discussed this case with the patient's daughter told her about the guarded prognosis of the patient and her condition.  Patient also has a history of pulmonary embolus for which she is anticoagulation.    History provided by:  Relative  History limited by:  Age and dementia  Illness  Location:  Morning vitals revealed tachycardia and hypotension after the patient was sent to the ED  Severity:  Moderate  Onset quality:  Gradual  Timing:  Constant  Progression:  Worsening  Chronicity:  New  Associated symptoms: congestion, cough, nausea, shortness of breath and wheezing    Associated symptoms: no abdominal pain, no chest pain, no diarrhea, no fatigue, no fever, no headaches and no rhinorrhea        Review of Systems   Constitutional: Negative.  Negative for fatigue and fever.   HENT: Positive for congestion. Negative for rhinorrhea.    Eyes: Negative.    Respiratory: Positive for cough, shortness of breath and wheezing.    Cardiovascular: Negative.  Negative for chest pain.   Gastrointestinal: Positive for nausea. Negative for abdominal pain and diarrhea.   Musculoskeletal: Negative.  Negative for back pain and neck pain.   Skin: Negative.    Neurological: Negative.  Negative for headaches.   All other systems reviewed and are negative.      Past Medical History:   Diagnosis Date   • Chronic hyponatremia    • Frequent UTI    • Low back pain    • Migraines    • SVT (supraventricular tachycardia) (HCC)        Allergies   Allergen Reactions   • Codeine  Anaphylaxis       Past Surgical History:   Procedure Laterality Date   • CHOLECYSTECTOMY WITH INTRAOPERATIVE CHOLANGIOGRAM N/A 5/9/2019    Procedure: CHOLECYSTECTOMY LAPAROSCOPIC INTRAOPERATIVE CHOLANGIOGRAM;  Surgeon: Chandan Bravo MD;  Location:  [facility] OR;  Service: General   • HIP HEMIARTHROPLASTY Left 5/6/2022    Procedure: HIP HEMIARTHROPLASTY;  Surgeon: Barrington Simons MD;  Location:  [facility] OR;  Service: Orthopedics;  Laterality: Left;       Family History   Problem Relation Age of Onset   • Stroke Mother    • Anemia Mother    • Heart disease Father    • Heart attack Father    • Colon cancer Father        Social History     Socioeconomic History   • Marital status:    Tobacco Use   • Smoking status: Never   • Smokeless tobacco: Never   Vaping Use   • Vaping Use: Never used   Substance and Sexual Activity   • Alcohol use: No   • Drug use: No   • Sexual activity: Defer           Objective   Physical Exam  Vitals and nursing note reviewed. Exam conducted with a chaperone present.   Constitutional:       General: She is awake.      Appearance: Normal appearance. She is well-developed. She is ill-appearing.      Comments: Frail looking lady lethargic but following commands answering questions   HENT:      Head: Normocephalic and atraumatic.   Eyes:      General: Lids are normal.      Conjunctiva/sclera: Conjunctivae normal.      Pupils: Pupils are equal, round, and reactive to light.   Neck:      Meningeal: Brudzinski's sign and Kernig's sign absent.   Cardiovascular:      Rate and Rhythm: Regular rhythm. Tachycardia present. Frequent extrasystoles are present.     Chest Wall: PMI is not displaced.      Pulses: Decreased pulses.      Heart sounds: Murmur heard.    Systolic murmur is present.    No S3 sounds.   Pulmonary:      Effort: Pulmonary effort is normal. Tachypnea present.      Breath sounds: Decreased air movement present. No stridor. Examination of the right-lower field reveals rales. Examination  of the left-lower field reveals rales. Rales present. No decreased breath sounds.   Abdominal:      General: Abdomen is flat. Bowel sounds are normal.      Palpations: Abdomen is soft.      Tenderness: There is no abdominal tenderness. There is no right CVA tenderness or left CVA tenderness.   Musculoskeletal:         General: Normal range of motion.      Cervical back: Full passive range of motion without pain, normal range of motion and neck supple.      Right lower leg: No edema.      Left lower leg: No edema.   Skin:     General: Skin is warm and dry.      Capillary Refill: Capillary refill takes 2 to 3 seconds.   Neurological:      General: No focal deficit present.      Mental Status: She is oriented to person, place, and time. Mental status is at baseline. She is lethargic and confused.      GCS: GCS eye subscore is 4. GCS verbal subscore is 4. GCS motor subscore is 6.      Cranial Nerves: Cranial nerves 2-12 are intact. No cranial nerve deficit or facial asymmetry.      Motor: Motor function is intact. No weakness, tremor or atrophy.      Deep Tendon Reflexes: Reflexes are normal and symmetric.   Psychiatric:         Behavior: Behavior is cooperative.         Chemical Cardioversion    Date/Time: 2/2/2023 12:22 PM  Performed by: Leonardo Jin MD  Authorized by: Leonardo Jin MD     Consent:     Consent obtained:  Verbal and written    Consent given by:  Guardian    Risks discussed:  Death, induced arrhythmia and pain    Alternatives discussed:  Delayed treatment and alternative treatment  Pre-procedure details:     Cardioversion basis:  Emergent    Rhythm:  Supraventricular tachycardia  Attempt one:     Cardioversion medication:  Adenosine 6mg      Medication outcome:  Conversion to normal sinus rhythm  Post-procedure details:     Patient status:  Awake    Patient tolerance of procedure:  Tolerated well, no immediate complications               ED Course  ED Course as of 02/02/23 1236   Thu Feb 02, 2023    1005 Baseline artifact rate 156 could be SVT [TS]   1055 Chemical cardioversion [TS]   1140 Patient did not receive the recommended 30ml/kg fluid bolus for sepsis because it would be harmful or detrimental to the patient. The patient has Heart Failure.   The patient was ordered 1000 of fluids.  [TS]   1142 ·  Left ventricular systolic function is hyperdynamic (EF > 70%).  ·  The right ventricular cavity is mildly dilated. Borderline low right ventricular systolic function noted.  ·  Cardiac valves are poorly visualized, however no obvious abnormalities identified by Doppler assessment  Last echo 2022 [TS]      ED Course User Index  [TS] Leonardo Jin MD                                           Medical Decision Making  Differential Diagnosis:  I considered pulmonary etiology, asthma, chronic obstructive pulmonary disease, pneumonia, pulmonary embolism, adult respiratory distress syndrome, pneumothorax, pleural effusion, pulmonary fibrosis, cardiac etiology, congestive heart failure, myocardial infarction, metabolic etiology, diabetic ketoacidosis, uremia, acidosis, sepsis, anemia, drug related etiology, hyperventilation and CNS disease as a possible cause of dyspnea in this patient. This is a partial list of diagnoses considered.    Patient may have SVT versus a flutter.  We will work her up    Elevated brain natriuretic peptide (BNP) level: acute illness or injury     Details: Patient has elevated BNP level this could be because of CHF her last echo showing EF of 70% right heart pressure increased secondary to history of PE is giving rise to pulmonary hypertension can be a etiology of elevated BNP also chest x-ray shows that she may have pulmonary edema.  Other chronic pulmonary embolism, unspecified whether acute cor pulmonale present (HCC): chronic illness or injury     Details: Patient has got a history of pulmonary embolus which is being treated with Eliquis at this time.  Pneumonia due to infectious  organism, unspecified laterality, unspecified part of lung: acute illness or injury     Details: Newly diagnosed pneumonia lives in a nursing home was given Zosyn.  Curb 65 score is 4  Patient was given IV Zosyn.  ABGs are consistent with acute metabolic and acute respiratory alkalosis with primary acute respiratory alkalosis  Shock (HCC): acute illness or injury     Details: Patient appears to be hypoperfusing and in shock with no significant elevation of lactic acid.  The patient daughter states that her blood pressure usually runs on lower side.  But currently it is soft with qSOFA score of 3 patient has received 1 L IV fluids further IV fluids were not given because of risk of exacerbating CHF I have discussed the pros and cons of Levophed infusion with the family.  They want the patient to be treated but did not want her to get CPR intubated they are okay been getting Levophed but not to a central line Levophed can be utilized for 24 hours for peripheral wall well-functioning  Therefore we will start the infusion Levophed to bring her MAP up slightly  SVT (supraventricular tachycardia) (HCC): acute illness or injury     Details: History of SVT recurrence treated with chemical cardioversion with adenosine 6 mg.  Troponin I above reference range: acute illness or injury     Details: The troponin is elevated this probably is a type II elevation because of sepsis and pneumonia.  The possibility of SVT giving rise to this is also there.  Amount and/or Complexity of Data Reviewed  Labs: ordered.     Details: Lab work-up is consistent with leukocytosis elevated BNP.  And elevated troponin I.  Radiology: ordered.     Details: Chest x-ray patchy infiltrates and possible pulmonary edema  ECG/medicine tests: ordered and independent interpretation performed.     Details: SVT  Reverted to normal sinus rhythm with no evidence of acute ischemic changes  Discussion of management or test interpretation with external  provider(s): Discussed with the hospitalist    Risk  Prescription drug management.  Decision regarding hospitalization.    Risk Details: Patient came in with multitude of symptoms complaining of irregular heartbeat palpitation was in SVT cardioverted.  Also has pneumonia with hypotension her curb 65 score is 4 and her qSOFA score is 3 giving her increased morbidity and mortality and poor prognosis.  The patient is going be admitted to the medicine service.        Final diagnoses:   SVT (supraventricular tachycardia) (HCC)   Shock (HCC)   Troponin I above reference range   Elevated brain natriuretic peptide (BNP) level   Other chronic pulmonary embolism, unspecified whether acute cor pulmonale present (HCC)   Pneumonia due to infectious organism, unspecified laterality, unspecified part of lung       ED Disposition  ED Disposition     ED Disposition   Decision to Admit    Condition   --    Comment   Level of Care: Critical Care [6]   Diagnosis: SVT (supraventricular tachycardia) (HCC) [202906]   Admitting Physician: SCOTT CASTREJON [[id number]]   Attending Physician: SCOTT CASTREJON [[id number]]   Certification: I Certify That Inpatient Hospital Services Are Medically Necessary For Greater Than 2 Midnights               No follow-up provider specified.       Medication List      No changes were made to your prescriptions during this visit.          Leonardo Jin MD  02/02/23 1235       Leonardo Jin MD  02/02/23 1236       Leonardo Jin MD  02/02/23 1300

## 2023-02-02 NOTE — H&P
[facility] Medicine Services  HISTORY AND PHYSICAL    Date of Admission: 2/2/2023  Primary Care Physician: Chris Hermosillo MD    Subjective   Primary Historian: Patient's daughter, ER records    Chief Complaint: Low blood pressure, tachycardia    History of Present Illness  The patient is an 82-year-old woman with a past medical history of mild dementia, SVTs, pulmonary hypertension, pulmonary embolism on anticoagulation, and chronic respiratory failure with hypoxia on 2 L of oxygen.  She is a resident of a skilled nursing facility and she presents with complaints of low blood pressure and fast heart rate.    Patient has been receiving treatment for pneumonia at the skilled nursing facility with Zithromax recently.  She was noted to have low blood pressure this morning at the skilled nurse facility and heart rate as high as 160/min.  She was sent to the emergency room and she was found to be in SVT.  She received 6 mg of adenosine with conversion to sinus rhythm.  She was discussed with her daughter who is her decision-maker and she agreed to starting peripheral pressors with Levophed.  Patient is a DNR/DNI.  She had leukocytosis and chest x-ray showed right infrahilar opacities concerning for pneumonia.  She was recommended for admission.    Review of Systems   Otherwise complete ROS reviewed and negative except as mentioned in the HPI.    Past Medical History:   Past Medical History:   Diagnosis Date   • Chronic hyponatremia    • Frequent UTI    • Low back pain    • Migraines    • SVT (supraventricular tachycardia) (HCC)      Past Surgical History:  Past Surgical History:   Procedure Laterality Date   • CHOLECYSTECTOMY WITH INTRAOPERATIVE CHOLANGIOGRAM N/A 5/9/2019    Procedure: CHOLECYSTECTOMY LAPAROSCOPIC INTRAOPERATIVE CHOLANGIOGRAM;  Surgeon: Chandan Bravo MD;  Location: [facility];  Service: General   • HIP HEMIARTHROPLASTY Left 5/6/2022    Procedure: HIP  HEMIARTHROPLASTY;  Surgeon: Barrington Simons MD;  Location: [facility];  Service: Orthopedics;  Laterality: Left;     Social History:  reports that she has never smoked. She has never used smokeless tobacco. She reports that she does not drink alcohol and does not use drugs.    Family History: family history includes Anemia in her mother; Cancer in her father; Colon cancer in her father; Heart attack in her father; Heart disease in her father; Stroke in her mother.      Allergies:  Allergies   Allergen Reactions   • Codeine Anaphylaxis       Medications:  Prior to Admission medications    Medication Sig Start Date End Date Taking? Authorizing Provider   acebutolol (SECTRAL) 200 MG capsule Take 1 capsule by mouth 2 (Two) Times a Day.  Patient not taking: Reported on 2/2/2023    Levy Lo MD   acetaminophen (TYLENOL) 325 MG tablet Take 2 tablets by mouth Every 4 (Four) Hours As Needed for Mild Pain .  Patient not taking: Reported on 2/2/2023 4/9/21   Jesse Castillo MD   apixaban (ELIQUIS) 5 MG tablet tablet Take 1 tablet by mouth Every 12 (Twelve) Hours. Indications: DVT/PE (active thrombosis) 11/16/22   Wenceslao Brown DO   bisacodyl (DULCOLAX) 5 MG EC tablet Take 10 mg by mouth every night at bedtime.    Levy Lo MD   budesonide-formoterol (SYMBICORT) 160-4.5 MCG/ACT inhaler Inhale 2 puffs 2 (Two) Times a Day.    Levy Lo MD   Cholecalciferol (Vitamin D) 50 MCG (2000 UT) tablet Take 2,000 Units by mouth Daily.    Levy Lo MD   dilTIAZem CD (CARDIZEM CD) 240 MG 24 hr capsule Take 1 capsule by mouth Daily.    Levy Lo MD   docusate sodium (Colace) 100 MG capsule Take 1 capsule by mouth Daily. 5/10/22   William Turner MD   famotidine (PEPCID) 20 MG tablet Take 20 mg by mouth Daily.    Levy Lo MD   fenofibrate (TRICOR) 145 MG tablet Take 145 mg by mouth Daily.    Levy Lo MD   gabapentin (NEURONTIN) 100 MG  capsule Take 1 capsule by mouth 3 (Three) Times a Day. 1/10/23   Jessika Rocha   gabapentin (NEURONTIN) 100 MG capsule Take 100 mg by mouth 3 (Three) Times a Day.    Levy Lo MD   ipratropium-albuterol (COMBIVENT RESPIMAT)  MCG/ACT inhaler Inhale 1 puff 4 (Four) Times a Day As Needed for Wheezing or Shortness of Air.    Levy Lo MD   ipratropium-albuterol (DUO-NEB) 0.5-2.5 mg/3 ml nebulizer Take 3 mL by nebulization Every 4 (Four) Hours As Needed for Wheezing or Shortness of Air.    Levy Lo MD   LORazepam (ATIVAN) 0.5 MG tablet Take 0.5 mg by mouth Every 8 (Eight) Hours As Needed for Anxiety.    Levy Lo MD   LORazepam (ATIVAN) 1 MG tablet Take 1 tablet by mouth Every 8 (Eight) Hours As Needed for Anxiety. 1/10/23   Jessika Rocha   megestrol (MEGACE) 40 MG/ML suspension Take 10 mL by mouth 2 (Two) Times a Day.    Levy Lo MD   melatonin 5 MG tablet tablet Take 5 mg by mouth Every Night.    Levy Lo MD   Menthol-Zinc Oxide (Calmoseptine) 0.44-20.6 % ointment Apply 1 application topically to the appropriate area as directed As Needed (after incontinent episodes). Apply to kathi-area after incontinent episodes    Levy Lo MD   ondansetron (ZOFRAN) 4 MG tablet Take 4 mg by mouth Every 8 (Eight) Hours As Needed for Nausea or Vomiting.    Levy Lo MD   oxyCODONE-acetaminophen (PERCOCET) 7.5-325 MG per tablet Take 1 tablet by mouth Every 4 (Four) Hours As Needed for Moderate Pain. 1/10/23   Jessika Rocha   oxyCODONE-acetaminophen (PERCOCET) 7.5-325 MG per tablet Take 1 tablet by mouth Every 4 (Four) Hours As Needed.    Levy Lo MD   PARoxetine (PAXIL) 40 MG tablet Take 40 mg by mouth Every Morning.    Levy Lo MD   polyethylene glycol (MIRALAX) 17 GM/SCOOP powder Take 17 g by mouth Daily.    Levy Lo MD   cefdinir (OMNICEF) 300 MG capsule Take 1 capsule by  "mouth 2 (Two) Times a Day. 1/10/23 2/2/23  Jessika Rocha have utilized all available immediate resources to obtain, update, or review the patient's current medications (including all prescriptions, over-the-counter products, herbals, cannabis/cannabidiol products, and vitamin/mineral/dietary (nutritional) supplements).    Objective     Vital Signs: /56   Pulse 85   Temp 97.8 °F (36.6 °C) (Axillary)   Resp 22   Ht 152.4 cm (60\")   Wt 43.7 kg (96 lb 4 oz)   SpO2 96%   BMI 18.80 kg/m²   Physical Exam  Constitutional:       General: She is not in acute distress.     Appearance: She is not ill-appearing or diaphoretic.   HENT:      Head: Normocephalic and atraumatic.      Right Ear: External ear normal.      Left Ear: External ear normal.      Nose: No congestion or rhinorrhea.      Mouth/Throat:      Mouth: Mucous membranes are moist.      Pharynx: No oropharyngeal exudate or posterior oropharyngeal erythema.   Eyes:      General: No scleral icterus.     Extraocular Movements: Extraocular movements intact.      Conjunctiva/sclera: Conjunctivae normal.   Cardiovascular:      Rate and Rhythm: Normal rate and regular rhythm.      Heart sounds: Normal heart sounds. No murmur heard.  Pulmonary:      Effort: Pulmonary effort is normal. No respiratory distress.      Breath sounds: Normal breath sounds. No wheezing, rhonchi or rales.   Abdominal:      General: Abdomen is flat. There is no distension.      Palpations: Abdomen is soft.      Tenderness: There is no abdominal tenderness. There is no guarding.   Musculoskeletal:         General: No swelling, tenderness or deformity.      Cervical back: Neck supple. No rigidity. No muscular tenderness.      Right lower leg: No edema.      Left lower leg: No edema.   Lymphadenopathy:      Cervical: No cervical adenopathy.   Skin:     General: Skin is warm and dry.   Neurological:      General: No focal deficit present.      Mental Status: She is alert.      " Cranial Nerves: No cranial nerve deficit.      Motor: No weakness.      Comments: Oriented to person and place but not to time.   Psychiatric:         Mood and Affect: Mood normal.         Behavior: Behavior normal.        Results Reviewed:  Lab Results (last 24 hours)     Procedure Component Value Units Date/Time    STAT Lactic Acid, Reflex [[id number]]  (Normal) Collected: 02/02/23 1257    Specimen: Blood Updated: 02/02/23 1319     Lactate 1.5 mmol/L     Urinalysis, Microscopic Only - Straight Cath [[id number]]  (Abnormal) Collected: 02/02/23 1224    Specimen: Urine from Straight Cath Updated: 02/02/23 1236     RBC, UA 3-5 /HPF      WBC, UA Too Numerous to Count /HPF      Bacteria, UA 2+ /HPF      Squamous Epithelial Cells, UA None Seen /HPF      Hyaline Casts, UA 3-6 /LPF      Methodology Automated Microscopy    Urinalysis With Culture If Indicated - Straight Cath [[id number]]  (Abnormal) Collected: 02/02/23 1224    Specimen: Urine from Straight Cath Updated: 02/02/23 1236     Color, UA Yellow     Appearance, UA Cloudy     pH, UA 6.0     Specific Gravity, UA 1.017     Glucose, UA Negative     Ketones, UA Negative     Bilirubin, UA Negative     Blood, UA Small (1+)     Protein, UA Trace     Leuk Esterase, UA Large (3+)     Nitrite, UA Negative     Urobilinogen, UA 1.0 E.U./dL    Narrative:      In absence of clinical symptoms, the presence of pyuria, bacteria, and/or nitrites on the urinalysis result does not correlate with infection.    Urine Culture - Urine, Straight Cath [[id number]] Collected: 02/02/23 1224    Specimen: Urine from Straight Cath Updated: 02/02/23 1236    Troponin [[id number]]  (Abnormal) Collected: 02/02/23 0946    Specimen: Blood Updated: 02/02/23 1052     Troponin T 0.047 ng/mL     Narrative:      Troponin T Reference Range:  <= 0.03 ng/mL-   Negative for AMI  >0.03 ng/mL-     Abnormal for myocardial necrosis.  Clinicians would have to utilize clinical acumen, EKG, Troponin and serial changes to  determine if it is an Acute Myocardial Infarction or myocardial injury due to an underlying chronic condition.       Results may be falsely decreased if patient taking Biotin.      Lactic Acid, Plasma [[id number]]  (Abnormal) Collected: 02/02/23 0946    Specimen: Blood Updated: 02/02/23 1051     Lactate 2.1 mmol/L     Comprehensive Metabolic Panel [[id number]]  (Abnormal) Collected: 02/02/23 0946    Specimen: Blood Updated: 02/02/23 1039     Glucose 103 mg/dL      BUN 27 mg/dL      Creatinine 0.82 mg/dL      Sodium 138 mmol/L      Potassium 4.2 mmol/L      Chloride 102 mmol/L      CO2 24.0 mmol/L      Calcium 9.8 mg/dL      Total Protein 7.7 g/dL      Albumin 3.6 g/dL      ALT (SGPT) 9 U/L      AST (SGOT) 15 U/L      Alkaline Phosphatase 59 U/L      Total Bilirubin 0.2 mg/dL      Globulin 4.1 gm/dL      A/G Ratio 0.9 g/dL      BUN/Creatinine Ratio 32.9     Anion Gap 12.0 mmol/L      eGFR 71.5 mL/min/1.73     Narrative:      GFR Normal >60  Chronic Kidney Disease <60  Kidney Failure <15    The GFR formula is only valid for adults with stable renal function between ages 18 and 70.    Magnesium [[id number]]  (Normal) Collected: 02/02/23 0946    Specimen: Blood Updated: 02/02/23 1032     Magnesium 1.8 mg/dL     C-reactive Protein [[id number]]  (Abnormal) Collected: 02/02/23 0946    Specimen: Blood Updated: 02/02/23 1032     C-Reactive Protein 14.45 mg/dL     Blood Culture - Blood, Arm, Left [[id number]] Collected: 02/02/23 0959    Specimen: Blood from Arm, Left Updated: 02/02/23 1031    BNP [[id number]]  (Abnormal) Collected: 02/02/23 0946    Specimen: Blood Updated: 02/02/23 1031     proBNP 11,062.0 pg/mL     Narrative:      Among patients with dyspnea, NT-proBNP is highly sensitive for the detection of acute congestive heart failure. In addition NT-proBNP of <300 pg/ml effectively rules out acute congestive heart failure with 99% negative predictive value.    Results may be falsely decreased if patient taking Biotin.       "Procalcitonin [[id number]]  (Normal) Collected: 02/02/23 0946    Specimen: Blood Updated: 02/02/23 1024     Procalcitonin 0.24 ng/mL     Narrative:      As a Marker for Sepsis (Non-Neonates):    1. <0.5 ng/mL represents a low risk of severe sepsis and/or septic shock.  2. >2 ng/mL represents a high risk of severe sepsis and/or septic shock.    As a Marker for Lower Respiratory Tract Infections that require antibiotic therapy:    PCT on Admission    Antibiotic Therapy       6-12 Hrs later    >0.5                Strongly Recommended  >0.25 - <0.5        Recommended   0.1 - 0.25          Discouraged              Remeasure/reassess PCT  <0.1                Strongly Discouraged     Remeasure/reassess PCT    As 28 day mortality risk marker: \"Change in Procalcitonin Result\" (>80% or <=80%) if Day 0 (or Day 1) and Day 4 values are available. Refer to http://www.StageBlocpct-calculator.com    Change in PCT <=80%  A decrease of PCT levels below or equal to 80% defines a positive change in PCT test result representing a higher risk for 28-day all-cause mortality of patients diagnosed with severe sepsis for septic shock.    Change in PCT >80%  A decrease of PCT levels of more than 80% defines a negative change in PCT result representing a lower risk for 28-day all-cause mortality of patients diagnosed with severe sepsis or septic shock.       Protime-INR [[id number]]  (Abnormal) Collected: 02/02/23 0946    Specimen: Blood Updated: 02/02/23 1013     Protime 29.4 Seconds      INR 2.74    D-dimer, Quantitative [[id number]]  (Abnormal) Collected: 02/02/23 0946    Specimen: Blood Updated: 02/02/23 1013     D-Dimer, Quantitative 0.95 MCGFEU/mL     Narrative:      According to the assay 's published package insert, a normal (<0.50 MCGFEU/mL) D-dimer result in conjunction with a non-high clinical probability assessment, excludes deep vein thrombosis (DVT) and pulmonary embolism (PE) with high sensitivity.    D-dimer values " "increase with age and this can make VTE exclusion of an older population difficult. To address this, the [employer], based on best available evidence and recent guidelines, recommends that clinicians use age-adjusted D-dimer thresholds in patients greater than 50 years of age with: a) a low probability of PE who do not meet all Pulmonary Embolism Rule Out Criteria, or b) in those with intermediate probability of PE.   The formula for an age-adjusted D-dimer cut-off is \"age/100\".  For example, a 60 year old patient would have an age-adjusted cut-off of 0.60 MCGFEU/mL and an 80 year old 0.80 MCGFEU/mL.    Blood Culture - Blood, Arm, Right [[id number]] Collected: 02/02/23 0946    Specimen: Blood from Arm, Right Updated: 02/02/23 1003    CBC & Differential [[id number]]  (Abnormal) Collected: 02/02/23 0946    Specimen: Blood Updated: 02/02/23 0959    Narrative:      The following orders were created for panel order CBC & Differential.  Procedure                               Abnormality         Status                     ---------                               -----------         ------                     CBC Auto Differential[[id number]]        Abnormal            Final result                 Please view results for these tests on the individual orders.    CBC Auto Differential [[id number]]  (Abnormal) Collected: 02/02/23 0946    Specimen: Blood Updated: 02/02/23 0959     WBC 14.29 10*3/mm3      RBC 4.06 10*6/mm3      Hemoglobin 13.2 g/dL      Hematocrit 40.0 %      MCV 98.5 fL      MCH 32.5 pg      MCHC 33.0 g/dL      RDW 14.5 %      RDW-SD 53.1 fl      MPV 9.8 fL      Platelets 567 10*3/mm3      Neutrophil % 61.2 %      Lymphocyte % 22.5 %      Monocyte % 10.9 %      Eosinophil % 1.7 %      Basophil % 1.0 %      Immature Grans % 2.7 %      Neutrophils, Absolute 8.76 10*3/mm3      Lymphocytes, Absolute 3.21 10*3/mm3      Monocytes, Absolute 1.56 10*3/mm3      Eosinophils, Absolute 0.24 10*3/mm3      " Basophils, Absolute 0.14 10*3/mm3      Immature Grans, Absolute 0.38 10*3/mm3      nRBC 0.0 /100 WBC     Blood Gas, Arterial - [[id number]]  (Abnormal) Collected: 02/02/23 0956    Specimen: Arterial Blood Updated: 02/02/23 0956     Site Right Radial     Meliton's Test Positive     pH, Arterial 7.515 pH units      Comment: 83 Value above reference range        pCO2, Arterial 30.2 mm Hg      Comment: 84 Value below reference range        pO2, Arterial 76.6 mm Hg      Comment: 84 Value below reference range        HCO3, Arterial 24.3 mmol/L      Base Excess, Arterial 2.1 mmol/L      Comment: 83 Value above reference range        O2 Saturation, Arterial 96.5 %      Temperature 37.0 C      Barometric Pressure for Blood Gas 757 mmHg      Modality Nasal Cannula     Flow Rate 2.0 lpm      Ventilator Mode NA     Collected by [id number]     Comment: Meter: [id number]     :  [id number]        pCO2, Temperature Corrected 30.2 mm Hg      pH, Temp Corrected 7.515 pH Units      pO2, Temperature Corrected 76.6 mm Hg     Blood Gas, Arterial - [[id number]]  (Abnormal) Collected: 02/02/23 0956    Specimen: Arterial Blood Updated: 02/02/23 0956     Site Right Radial     Meliton's Test Positive     pH, Arterial 7.515 pH units      Comment: 83 Value above reference range        pCO2, Arterial 30.2 mm Hg      Comment: 84 Value below reference range        pO2, Arterial 76.6 mm Hg      Comment: 84 Value below reference range        HCO3, Arterial 24.3 mmol/L      Base Excess, Arterial 2.1 mmol/L      Comment: 83 Value above reference range        O2 Saturation, Arterial 96.5 %      Temperature 37.0 C      Barometric Pressure for Blood Gas 757 mmHg      Modality Nasal Cannula     Flow Rate 2.0 lpm      Ventilator Mode NA     Collected by [id number]     Comment: Meter: [id number]     :  [id number]        pCO2, Temperature Corrected 30.2 mm Hg      pH, Temp Corrected 7.515 pH Units      pO2, Temperature Corrected 76.6 mm Hg          Imaging Results (Last 24 Hours)     Procedure Component Value Units Date/Time    XR Chest 1 View [[id number]] Collected: 02/02/23 1041     Updated: 02/02/23 1047    Narrative:      HISTORY: Weakness     CXR: A frontal view the chest obtained.     COMPARISON: 01/09/2023     FINDINGS: Diminished level of inspiration. Mild prominence of the  interstitial markings. Mild cardiomegaly. No pleural effusion or  pneumothorax. Right infrahilar opacities. No acute regional bony  pathology. Compressions suspected of a lower thoracic vertebra.       Impression:      1. Right infrahilar opacities concerning for pneumonia. Prominence of  interstitial markings may relate to underlying emphysema, however mild  superimposed edema/CHF considered.  This report was finalized on 02/02/2023 10:44 by Dr. Adeola Lawrence MD.        I have personally reviewed and interpreted the radiology studies and ECG obtained at time of admission.     Assessment / Plan   Assessment:   Active Hospital Problems    Diagnosis    • **SVT (supraventricular tachycardia) (HCC)    • HCAP (healthcare-associated pneumonia)    • UTI (urinary tract infection), bacterial    • Sepsis (HCC)    Hypotension    Treatment Plan  The patient will be admitted to my service here at [facility].  Start IV antibiotics with Zosyn for healthcare associated pneumonia and acute cystitis/urinary tract infection.  We will add doxycycline for atypical pneumonia coverage.  Continue IV fluids and Levophed for blood pressure support.    DVT prophylaxis with Eliquis    CODE STATUS is DNR/DNI    Medical Decision Making  Number and Complexity of problems: 4 complex acute life-threatening problems  Differential Diagnosis: None    Conditions and Status        Condition is worsening.     [facility] Data  External documents reviewed: ER records  Cardiac tracing (EKG, telemetry) interpretation: EKG reviewed by me  Radiology interpretation: Chest x-ray noted  Labs reviewed: CBC, BMP  Any  tests that were considered but not ordered: None     Decision rules/scores evaluated (example [id number], Wells, etc): N/A      Discussed with: Patient and patient's daughter     Care Planning  Shared decision making: Patient and patient's daughter  Code status and discussions: DNR/DNI    Disposition  Social Determinants of Health that impact treatment or disposition: None  Estimated length of stay is 4 days    I confirmed that the patient's advanced care plan is present, code status is documented, and a surrogate decision maker is listed in the patient's medical record.     The patient's surrogate decision maker is patient's daughter.     The patient was seen and examined by me on 2/2/2023 at 13:20 PM.    Electronically signed by Ny Nicolas MD, 02/02/23, 22:20 CST.

## 2023-02-02 NOTE — PROGRESS NOTES
"Pharmacy Dosing Service  Antimicrobial Dosing  Zosyn    Assessment/Action/Plan:  Based on indication and renal function, Zosyn 4.5 gm IV every 8 hours. Pharmacy will continue to monitor daily and make further adjustment(s) accordingly.     Subjective:  Zelalem Hung is a 82 y.o. female with a  \"Pharmacy to Dose Zosyn\" consult for the treatment of Pneumonia, UTI , day 1 of 7 of treatment.    Objective:  Ht: 152.4 cm (60\"); Wt: 44 kg (97 lb)  Estimated Creatinine Clearance: 36.7 mL/min (by C-G formula based on SCr of 0.82 mg/dL).   Creatinine   Date Value Ref Range Status   02/02/2023 0.82 0.57 - 1.00 mg/dL Final   01/10/2023 0.79 0.57 - 1.00 mg/dL Final   01/09/2023 0.69 0.57 - 1.00 mg/dL Final   05/05/2022 0.70 0.60 - 1.30 mg/dL Final     Comment:     Serial Number: [id number]:  [id number]      Lab Results   Component Value Date    WBC 14.29 (H) 02/02/2023    WBC 9.74 01/10/2023    WBC 13.42 (H) 01/09/2023      Baseline culture results:  Microbiology Results (last 10 days)       ** No results found for the last 240 hours. **            LATONYA Ulrich, [facility]  02/02/23 15:56 CST    "

## 2023-02-03 LAB
ANION GAP SERPL CALCULATED.3IONS-SCNC: 12 MMOL/L (ref 5–15)
BASOPHILS # BLD AUTO: 0.07 10*3/MM3 (ref 0–0.2)
BASOPHILS NFR BLD AUTO: 0.6 % (ref 0–1.5)
BUN SERPL-MCNC: 18 MG/DL (ref 8–23)
BUN/CREAT SERPL: 30 (ref 7–25)
C DIFF TOX GENS STL QL NAA+PROBE: NEGATIVE
CALCIUM SPEC-SCNC: 8.9 MG/DL (ref 8.6–10.5)
CHLORIDE SERPL-SCNC: 107 MMOL/L (ref 98–107)
CO2 SERPL-SCNC: 23 MMOL/L (ref 22–29)
CREAT SERPL-MCNC: 0.6 MG/DL (ref 0.57–1)
DEPRECATED RDW RBC AUTO: 53.3 FL (ref 37–54)
EGFRCR SERPLBLD CKD-EPI 2021: 89.7 ML/MIN/1.73
EOSINOPHIL # BLD AUTO: 0.31 10*3/MM3 (ref 0–0.4)
EOSINOPHIL NFR BLD AUTO: 2.7 % (ref 0.3–6.2)
ERYTHROCYTE [DISTWIDTH] IN BLOOD BY AUTOMATED COUNT: 14.6 % (ref 12.3–15.4)
GLUCOSE SERPL-MCNC: 95 MG/DL (ref 65–99)
HCT VFR BLD AUTO: 35.2 % (ref 34–46.6)
HGB BLD-MCNC: 11.4 G/DL (ref 12–15.9)
IMM GRANULOCYTES # BLD AUTO: 0.24 10*3/MM3 (ref 0–0.05)
IMM GRANULOCYTES NFR BLD AUTO: 2.1 % (ref 0–0.5)
LYMPHOCYTES # BLD AUTO: 1.96 10*3/MM3 (ref 0.7–3.1)
LYMPHOCYTES NFR BLD AUTO: 17.3 % (ref 19.6–45.3)
MCH RBC QN AUTO: 32.6 PG (ref 26.6–33)
MCHC RBC AUTO-ENTMCNC: 32.4 G/DL (ref 31.5–35.7)
MCV RBC AUTO: 100.6 FL (ref 79–97)
MONOCYTES # BLD AUTO: 1.09 10*3/MM3 (ref 0.1–0.9)
MONOCYTES NFR BLD AUTO: 9.6 % (ref 5–12)
NEUTROPHILS NFR BLD AUTO: 67.7 % (ref 42.7–76)
NEUTROPHILS NFR BLD AUTO: 7.68 10*3/MM3 (ref 1.7–7)
NRBC BLD AUTO-RTO: 0 /100 WBC (ref 0–0.2)
PLATELET # BLD AUTO: 474 10*3/MM3 (ref 140–450)
PMV BLD AUTO: 9.7 FL (ref 6–12)
POTASSIUM SERPL-SCNC: 3.5 MMOL/L (ref 3.5–5.2)
RBC # BLD AUTO: 3.5 10*6/MM3 (ref 3.77–5.28)
SODIUM SERPL-SCNC: 142 MMOL/L (ref 136–145)
WBC NRBC COR # BLD: 11.35 10*3/MM3 (ref 3.4–10.8)

## 2023-02-03 PROCEDURE — 25010000002 PIPERACILLIN SOD-TAZOBACTAM PER 1 G: Performed by: INTERNAL MEDICINE

## 2023-02-03 PROCEDURE — 99221 1ST HOSP IP/OBS SF/LOW 40: CPT | Performed by: NURSE PRACTITIONER

## 2023-02-03 PROCEDURE — 94799 UNLISTED PULMONARY SVC/PX: CPT

## 2023-02-03 PROCEDURE — 80048 BASIC METABOLIC PNL TOTAL CA: CPT | Performed by: INTERNAL MEDICINE

## 2023-02-03 PROCEDURE — 87493 C DIFF AMPLIFIED PROBE: CPT | Performed by: INTERNAL MEDICINE

## 2023-02-03 PROCEDURE — 85025 COMPLETE CBC W/AUTO DIFF WBC: CPT | Performed by: INTERNAL MEDICINE

## 2023-02-03 RX ORDER — AZITHROMYCIN 250 MG/1
250 TABLET, FILM COATED ORAL DAILY
COMMUNITY
Start: 2023-02-01 | End: 2023-02-05 | Stop reason: HOSPADM

## 2023-02-03 RX ORDER — AMOXICILLIN AND CLAVULANATE POTASSIUM 875; 125 MG/1; MG/1
1 TABLET, FILM COATED ORAL 2 TIMES DAILY
COMMUNITY
Start: 2023-02-02 | End: 2023-02-05 | Stop reason: HOSPADM

## 2023-02-03 RX ORDER — LACTULOSE 10 G/15ML
20 SOLUTION ORAL DAILY PRN
COMMUNITY

## 2023-02-03 RX ADMIN — TAZOBACTAM SODIUM AND PIPERACILLIN SODIUM 4.5 G: 500; 4 INJECTION, SOLUTION INTRAVENOUS at 09:21

## 2023-02-03 RX ADMIN — ACEBUTOLOL HYDROCHLORIDE 200 MG: 200 CAPSULE ORAL at 09:21

## 2023-02-03 RX ADMIN — GABAPENTIN 100 MG: 100 CAPSULE ORAL at 16:08

## 2023-02-03 RX ADMIN — MEGESTROL ACETATE 400 MG: 40 SUSPENSION ORAL at 09:21

## 2023-02-03 RX ADMIN — Medication 10 ML: at 09:21

## 2023-02-03 RX ADMIN — SODIUM CHLORIDE 50 ML/HR: 9 INJECTION, SOLUTION INTRAVENOUS at 14:27

## 2023-02-03 RX ADMIN — APIXABAN 5 MG: 5 TABLET, FILM COATED ORAL at 09:21

## 2023-02-03 RX ADMIN — TAZOBACTAM SODIUM AND PIPERACILLIN SODIUM 4.5 G: 500; 4 INJECTION, SOLUTION INTRAVENOUS at 02:11

## 2023-02-03 RX ADMIN — APIXABAN 5 MG: 5 TABLET, FILM COATED ORAL at 20:47

## 2023-02-03 RX ADMIN — DOXYCYCLINE 100 MG: 100 INJECTION, POWDER, LYOPHILIZED, FOR SOLUTION INTRAVENOUS at 16:09

## 2023-02-03 RX ADMIN — Medication 1 APPLICATION: at 09:21

## 2023-02-03 RX ADMIN — PAROXETINE 40 MG: 20 TABLET, FILM COATED ORAL at 07:34

## 2023-02-03 RX ADMIN — GABAPENTIN 100 MG: 100 CAPSULE ORAL at 20:48

## 2023-02-03 RX ADMIN — Medication 2000 UNITS: at 09:21

## 2023-02-03 RX ADMIN — MEGESTROL ACETATE 400 MG: 40 SUSPENSION ORAL at 20:47

## 2023-02-03 RX ADMIN — DOXYCYCLINE 100 MG: 100 INJECTION, POWDER, LYOPHILIZED, FOR SOLUTION INTRAVENOUS at 04:59

## 2023-02-03 RX ADMIN — Medication 1 APPLICATION: at 20:48

## 2023-02-03 RX ADMIN — BISACODYL 10 MG: 5 TABLET ORAL at 20:48

## 2023-02-03 RX ADMIN — DILTIAZEM HYDROCHLORIDE 240 MG: 240 CAPSULE, EXTENDED RELEASE ORAL at 09:20

## 2023-02-03 RX ADMIN — ACETAMINOPHEN 650 MG: 325 TABLET, FILM COATED ORAL at 06:11

## 2023-02-03 RX ADMIN — CHLORHEXIDINE GLUCONATE 1 APPLICATION: 500 CLOTH TOPICAL at 04:59

## 2023-02-03 RX ADMIN — TAZOBACTAM SODIUM AND PIPERACILLIN SODIUM 4.5 G: 500; 4 INJECTION, SOLUTION INTRAVENOUS at 17:35

## 2023-02-03 RX ADMIN — GABAPENTIN 100 MG: 100 CAPSULE ORAL at 09:21

## 2023-02-03 RX ADMIN — ACETAMINOPHEN 650 MG: 325 TABLET, FILM COATED ORAL at 16:10

## 2023-02-03 RX ADMIN — BUDESONIDE AND FORMOTEROL FUMARATE DIHYDRATE 2 PUFF: 160; 4.5 AEROSOL RESPIRATORY (INHALATION) at 07:05

## 2023-02-03 RX ADMIN — Medication 5.25 MG: at 20:47

## 2023-02-03 RX ADMIN — FAMOTIDINE 20 MG: 20 TABLET, FILM COATED ORAL at 09:20

## 2023-02-03 RX ADMIN — ACEBUTOLOL HYDROCHLORIDE 200 MG: 200 CAPSULE ORAL at 20:48

## 2023-02-03 NOTE — CASE MANAGEMENT/SOCIAL WORK
Discharge Planning Assessment  [facility]     Patient Name: Zelalem Hung  MRN: [id number]  Today's Date: 2/3/2023    Admit Date: 2/2/2023        Discharge Needs Assessment     Row Name 02/03/23 0731       Living Environment    People in Home facility resident    Current Living Arrangements extended care facility       Discharge Needs Assessment    Discharge Facility/Level of Care Needs nursing facility, skilled    Discharge Coordination/Progress spoke to Gewndolyn at [facility] patient is medicaid with bedhold will follow    Spoke to daughter Madie and she wants patient to return to [facility] at [place]               Discharge Plan    No documentation.               Continued Care and Services - Admitted Since 2/2/2023    Coordination has not been started for this encounter.     Selected Continued Care - Prior Encounters Includes continued care and service providers with selected services from prior encounters from 11/4/2022 to 2/3/2023    Discharged on 1/10/2023 Admission date: 1/9/2023 - Discharge disposition: Intermediate Care     Destination     Service Provider Selected Services Address Phone Fax Patient Preferred    [facility] [address] [facility] [address] [phone] --                Discharged on 11/11/2022 Admission date: 11/8/2022 - Discharge disposition: Skilled Nursing Facility ([place] - External)    Destination     Service Provider Selected Services Address Phone Fax Patient Preferred    [facility] [address] [facility] [address] [phone] --                       Demographic Summary    No documentation.                Functional Status    No documentation.                Psychosocial    No documentation.                Abuse/Neglect    No documentation.                Legal    No documentation.                Substance Abuse    No documentation.                Patient Forms    No documentation.                   Laura DONALDSON  Watson RN

## 2023-02-03 NOTE — CONSULTS
[facility]  INPATIENT WOUND & OSTOMY CONSULTATION    Today's Date: 02/03/23    Patient Name: Zelalem Hung  MRN: [id number]  [facility]: [id number]  PCP: Chris Hermosillo MD  Referring Provider:   Consulting Provider (From admission, onward)    Start Ordered     Status Ordering Provider    02/02/23 1529 02/02/23 1528  Inpatient Wound Care Provider Consult  Once        Specialty:  Wound Care  Provider:  Dhara Veras APRN Acknowledged EBENIBO, SOTONTE E         Attending Provider: Ny Nicolas MD  Length of Stay: 1    SUBJECTIVE   Chief Complaint: Coccyx wound    HPI: Zelalem Hung, a 82 y.o.female, presents with a past medical history of migraines, SVT, chronic hyponatremia, frequent UTIs, and low back pain.  A full past medical history as listed below.  Patient presented to the hospital in 2/2 with low blood pressure and tachycardia.  She is a resident of a skilled nursing facility.  Patient was found to be in SVT and received adenosine with conversion to sinus rhythm.  She is currently receiving care in CCU room 9.    Inpatient wound care consulted due to wound of coccyx.  Patient has had multiple bowel movements and is currently awaiting results for C. difficile rule out.  She is incontinent of bowel and bladder and had care dry external urinary management system in place but due to bowel movements had to discontinue.      Visit Dx:    ICD-10-CM ICD-9-CM   1. SVT (supraventricular tachycardia) ([facility])  I47.1 427.89   2. Shock ([facility])  R57.9 785.50   3. Troponin I above reference range  R77.8 790.6   4. Elevated brain natriuretic peptide (BNP) level  R79.89 790.99   5. Other chronic pulmonary embolism, unspecified whether acute cor pulmonale present ([facility])  I27.82 416.2   6. Pneumonia due to infectious organism, unspecified laterality, unspecified part of lung  J18.9 486     Patient Active Problem List   Diagnosis   • Cholecystitis   • Encephalopathy   • Unknown when patient last well,  possible stroke    • Hyponatremia   • Failure to thrive in adult   • Severe malnutrition (HCC)   • History of migraines   • Closed fracture of nasal bone with routine healing   • Traumatic ecchymosis of face   • Acquired deviated nasal septum   • Epistaxis   • Colitis   • Fall   • Compression fracture of body of thoracic vertebra (HCC)   • Closed compression fracture of third lumbar vertebra (HCC)   • Sepsis (HCC)   • Metabolic acidosis   • Fecal impaction (HCC)   • Closed left hip fracture (HCC)   • Nonsmoker   • Body mass index (BMI) less than 16.5   • Acute pulmonary embolism without acute cor pulmonale (HCC)   • Pulmonary HTN (HCC)   • Acute pulmonary edema (HCC)   • Sinus tachycardia   • Acute deep vein thrombosis (DVT) of left lower extremity (HCC)   • SVT (supraventricular tachycardia) (HCC)   • UTI (urinary tract infection), bacterial   • Elevated WBC count   • HCAP (healthcare-associated pneumonia)       History:   Past Medical History:   Diagnosis Date   • Chronic hyponatremia    • Frequent UTI    • Low back pain    • Migraines    • SVT (supraventricular tachycardia) (HCC)      Past Surgical History:   Procedure Laterality Date   • CHOLECYSTECTOMY WITH INTRAOPERATIVE CHOLANGIOGRAM N/A 5/9/2019    Procedure: CHOLECYSTECTOMY LAPAROSCOPIC INTRAOPERATIVE CHOLANGIOGRAM;  Surgeon: Chandan Bravo MD;  Location: [facility] OR;  Service: General   • HIP HEMIARTHROPLASTY Left 5/6/2022    Procedure: HIP HEMIARTHROPLASTY;  Surgeon: Barrington Simons MD;  Location: [facility] OR;  Service: Orthopedics;  Laterality: Left;     Social History     Socioeconomic History   • Marital status:    Tobacco Use   • Smoking status: Never   • Smokeless tobacco: Never   Vaping Use   • Vaping Use: Never used   Substance and Sexual Activity   • Alcohol use: No   • Drug use: No   • Sexual activity: Defer     Family History   Problem Relation Age of Onset   • Stroke Mother    • Anemia Mother    • Cancer Father         Colon cancer   •  Heart disease Father    • Heart attack Father    • Colon cancer Father        Allergies:  Allergies   Allergen Reactions   • Codeine Anaphylaxis       Medications:    Current Facility-Administered Medications:   •  acebutolol (SECTRAL) capsule 200 mg, 200 mg, Oral, BID, Ny Nicolas MD, 200 mg at 02/03/23 0921  •  acetaminophen (TYLENOL) tablet 650 mg, 650 mg, Oral, Q4H PRN, Ny Nicolas MD, 650 mg at 02/03/23 0611  •  aluminum-magnesium hydroxide-simethicone (MAALOX MAX) 400-400-40 MG/5ML suspension 15 mL, 15 mL, Oral, Q6H PRN, Ny Nicolas MD  •  apixaban (ELIQUIS) tablet 5 mg, 5 mg, Oral, Q12H, Ny Nicolas MD, 5 mg at 02/03/23 0921  •  bisacodyl (DULCOLAX) EC tablet 10 mg, 10 mg, Oral, Nightly, Ny Nicolas MD, 10 mg at 02/02/23 2001  •  budesonide-formoterol (SYMBICORT) 160-4.5 MCG/ACT inhaler 2 puff, 2 puff, Inhalation, BID - RT, Ny Nicolas MD, 2 puff at 02/03/23 0705  •  Chlorhexidine Gluconate Cloth 2 % pads 1 application, 1 application, Topical, Q24H, Ny Nicolas MD, 1 application at 02/03/23 0459  •  cholecalciferol (VITAMIN D3) tablet 2,000 Units, 2,000 Units, Oral, Daily, Ny Nicolas MD, 2,000 Units at 02/03/23 0921  •  dilTIAZem CD (CARDIZEM CD) 24 hr capsule 240 mg, 240 mg, Oral, Daily, Ny Nicolas MD, 240 mg at 02/03/23 0920  •  docusate sodium (COLACE) capsule 100 mg, 100 mg, Oral, Daily, Ny Nicolas MD  •  doxycycline (VIBRAMYCIN) 100 mg/100 mL 0.9% NS MBP, 100 mg, Intravenous, Q12H, Ny Nicolas MD, 100 mg at 02/03/23 0459  •  famotidine (PEPCID) tablet 20 mg, 20 mg, Oral, Daily, Ny Nicolas MD, 20 mg at 02/03/23 0920  •  gabapentin (NEURONTIN) capsule 100 mg, 100 mg, Oral, TID, Ny Nicolas MD, 100 mg at 02/03/23 0921  •  ipratropium-albuterol (DUO-NEB) nebulizer solution 3 mL, 3 mL, Nebulization, Q4H PRN, Ny Nicolas MD  •  LORazepam (ATIVAN) tablet 1 mg, 1 mg, Oral, Q8H PRN, Ny Nicolas  MD MADI  •  megestrol (MEGACE) 40 MG/ML suspension 400 mg, 400 mg, Oral, BID, Ny Nicolas MD, 400 mg at 02/03/23 0921  •  melatonin tablet 5.25 mg, 5.25 mg, Oral, Nightly, Ny Nicolas MD, 5.25 mg at 02/02/23 2000  •  mupirocin (BACTROBAN) 2 % nasal ointment 1 application, 1 application, Each Nare, BID, Ny Nicolas MD, 1 application at 02/03/23 0921  •  norepinephrine (LEVOPHED) 8 mg in 250 mL NS infusion (premix), 0.02-0.3 mcg/kg/min, Intravenous, Titrated, Ny Nicolas MD, Held at 02/03/23 0324  •  ondansetron (ZOFRAN) injection 4 mg, 4 mg, Intravenous, Q6H PRN, Ny Nicolas MD  •  oxyCODONE-acetaminophen (PERCOCET) 7.5-325 MG per tablet 1 tablet, 1 tablet, Oral, Q4H PRN, Ny Nicolas MD  •  PARoxetine (PAXIL) tablet 40 mg, 40 mg, Oral, QAM, Ny Nicolas MD, 40 mg at 02/03/23 0734  •  Pharmacy to Dose Zosyn, , Does not apply, Continuous PRN, Ny Nicolas MD  •  piperacillin-tazobactam (ZOSYN) 4.5 g in iso-osmotic dextrose 100 mL IVPB (premix), 4.5 g, Intravenous, Q8H, Ny Nicolas MD, 4.5 g at 02/03/23 0921  •  polyethylene glycol (MIRALAX) packet 17 g, 17 g, Oral, Daily, Ny Nicolas MD  •  sodium chloride 0.9 % flush 10 mL, 10 mL, Intravenous, Q12H, Ny Nicolas MD, 10 mL at 02/03/23 0921  •  sodium chloride 0.9 % flush 10 mL, 10 mL, Intravenous, PRN, Ny Nicolas MD  •  sodium chloride 0.9 % infusion 40 mL, 40 mL, Intravenous, PRN, Ny Nicolas MD  •  sodium chloride 0.9 % infusion, 50 mL/hr, Intravenous, Continuous, Ny Nicolas MD, Last Rate: 50 mL/hr at 02/02/23 1634, 50 mL/hr at 02/02/23 1634    Review of Systems:   Review of Systems   Constitutional: Negative for chills and fever.   HENT: Negative for sinus pain and sore throat.    Respiratory: Negative for cough and shortness of breath.    Cardiovascular: Negative for chest pain and palpitations.   Gastrointestinal: Positive for diarrhea. Negative for nausea  and vomiting.   Genitourinary: Negative for flank pain and hematuria.   Musculoskeletal: Positive for back pain and myalgias. Negative for arthralgias.   Skin: Positive for color change and wound.   Neurological: Positive for weakness. Negative for dizziness and headaches.   Psychiatric/Behavioral: Positive for confusion (Intermittent). Negative for agitation and behavioral problems.         OBJECTIVE     Vitals:    02/03/23 1000   BP: 121/63   Pulse: 90   Resp:    Temp:    SpO2: 96%       PHYSICAL EXAM:   Physical Exam  Vitals and nursing note reviewed.   Constitutional:       General: She is awake.      Appearance: She is normal weight.      Interventions: Nasal cannula in place.   HENT:      Head: Normocephalic and atraumatic.   Eyes:      General: Lids are normal. Gaze aligned appropriately.   Cardiovascular:      Rate and Rhythm: Normal rate and regular rhythm.   Pulmonary:      Effort: Pulmonary effort is normal. No respiratory distress.   Abdominal:      General: Abdomen is flat.      Palpations: Abdomen is soft.   Musculoskeletal:      Cervical back: Normal range of motion and neck supple.   Skin:     General: Skin is warm and dry.      Findings: Erythema and wound present.      Comments: Patient has stage 2 pressure injury with mixed etiology of pressure and moisture associated skin damage.  On admission appeared to be a deep tissue injury according to picture, currently area is erythemic with blanchable and nonblanchable areas and small open areas.  Scant amount of serous drainage currently.   Neurological:      Mental Status: She is alert and oriented to person, place, and time.      Motor: Weakness present.      Gait: Gait abnormal.   Psychiatric:         Attention and Perception: Attention normal.         Mood and Affect: Mood normal.         Speech: Speech normal.         Behavior: Behavior is cooperative.       Picture taken by nursing staff on admission           Results Review:  Lab Results (last 48  hours)     Procedure Component Value Units Date/Time    Blood Culture - Blood, Arm, Left [[id number]]  (Normal) Collected: 02/02/23 0959    Specimen: Blood from Arm, Left Updated: 02/03/23 1045     Blood Culture No growth at 24 hours    Blood Culture - Blood, Arm, Right [[id number]]  (Normal) Collected: 02/02/23 0946    Specimen: Blood from Arm, Right Updated: 02/03/23 1015     Blood Culture No growth at 24 hours    Basic Metabolic Panel [[id number]]  (Abnormal) Collected: 02/03/23 0524    Specimen: Blood Updated: 02/03/23 0617     Glucose 95 mg/dL      BUN 18 mg/dL      Creatinine 0.60 mg/dL      Sodium 142 mmol/L      Potassium 3.5 mmol/L      Chloride 107 mmol/L      CO2 23.0 mmol/L      Calcium 8.9 mg/dL      BUN/Creatinine Ratio 30.0     Anion Gap 12.0 mmol/L      eGFR 89.7 mL/min/1.73     Narrative:      GFR Normal >60  Chronic Kidney Disease <60  Kidney Failure <15    The GFR formula is only valid for adults with stable renal function between ages 18 and 70.    CBC & Differential [[id number]]  (Abnormal) Collected: 02/03/23 0524    Specimen: Blood Updated: 02/03/23 0558    Narrative:      The following orders were created for panel order CBC & Differential.  Procedure                               Abnormality         Status                     ---------                               -----------         ------                     CBC Auto Differential[[id number]]        Abnormal            Final result                 Please view results for these tests on the individual orders.    CBC Auto Differential [[id number]]  (Abnormal) Collected: 02/03/23 0524    Specimen: Blood Updated: 02/03/23 0558     WBC 11.35 10*3/mm3      RBC 3.50 10*6/mm3      Hemoglobin 11.4 g/dL      Hematocrit 35.2 %      .6 fL      MCH 32.6 pg      MCHC 32.4 g/dL      RDW 14.6 %      RDW-SD 53.3 fl      MPV 9.7 fL      Platelets 474 10*3/mm3      Neutrophil % 67.7 %      Lymphocyte % 17.3 %      Monocyte % 9.6 %      Eosinophil % 2.7 %       Basophil % 0.6 %      Immature Grans % 2.1 %      Neutrophils, Absolute 7.68 10*3/mm3      Lymphocytes, Absolute 1.96 10*3/mm3      Monocytes, Absolute 1.09 10*3/mm3      Eosinophils, Absolute 0.31 10*3/mm3      Basophils, Absolute 0.07 10*3/mm3      Immature Grans, Absolute 0.24 10*3/mm3      nRBC 0.0 /100 WBC     STAT Lactic Acid, Reflex [[id number]]  (Normal) Collected: 02/02/23 1257    Specimen: Blood Updated: 02/02/23 1319     Lactate 1.5 mmol/L     Urinalysis, Microscopic Only - Straight Cath [[id number]]  (Abnormal) Collected: 02/02/23 1224    Specimen: Urine from Straight Cath Updated: 02/02/23 1236     RBC, UA 3-5 /HPF      WBC, UA Too Numerous to Count /HPF      Bacteria, UA 2+ /HPF      Squamous Epithelial Cells, UA None Seen /HPF      Hyaline Casts, UA 3-6 /LPF      Methodology Automated Microscopy    Urinalysis With Culture If Indicated - Straight Cath [[id number]]  (Abnormal) Collected: 02/02/23 1224    Specimen: Urine from Straight Cath Updated: 02/02/23 1236     Color, UA Yellow     Appearance, UA Cloudy     pH, UA 6.0     Specific Gravity, UA 1.017     Glucose, UA Negative     Ketones, UA Negative     Bilirubin, UA Negative     Blood, UA Small (1+)     Protein, UA Trace     Leuk Esterase, UA Large (3+)     Nitrite, UA Negative     Urobilinogen, UA 1.0 E.U./dL    Narrative:      In absence of clinical symptoms, the presence of pyuria, bacteria, and/or nitrites on the urinalysis result does not correlate with infection.    Urine Culture - Urine, Straight Cath [[id number]] Collected: 02/02/23 1224    Specimen: Urine from Straight Cath Updated: 02/02/23 1236    Troponin [[id number]]  (Abnormal) Collected: 02/02/23 0946    Specimen: Blood Updated: 02/02/23 1052     Troponin T 0.047 ng/mL     Narrative:      Troponin T Reference Range:  <= 0.03 ng/mL-   Negative for AMI  >0.03 ng/mL-     Abnormal for myocardial necrosis.  Clinicians would have to utilize clinical acumen, EKG, Troponin and serial changes to  determine if it is an Acute Myocardial Infarction or myocardial injury due to an underlying chronic condition.       Results may be falsely decreased if patient taking Biotin.      Lactic Acid, Plasma [[id number]]  (Abnormal) Collected: 02/02/23 0946    Specimen: Blood Updated: 02/02/23 1051     Lactate 2.1 mmol/L     Comprehensive Metabolic Panel [[id number]]  (Abnormal) Collected: 02/02/23 0946    Specimen: Blood Updated: 02/02/23 1039     Glucose 103 mg/dL      BUN 27 mg/dL      Creatinine 0.82 mg/dL      Sodium 138 mmol/L      Potassium 4.2 mmol/L      Chloride 102 mmol/L      CO2 24.0 mmol/L      Calcium 9.8 mg/dL      Total Protein 7.7 g/dL      Albumin 3.6 g/dL      ALT (SGPT) 9 U/L      AST (SGOT) 15 U/L      Alkaline Phosphatase 59 U/L      Total Bilirubin 0.2 mg/dL      Globulin 4.1 gm/dL      A/G Ratio 0.9 g/dL      BUN/Creatinine Ratio 32.9     Anion Gap 12.0 mmol/L      eGFR 71.5 mL/min/1.73     Narrative:      GFR Normal >60  Chronic Kidney Disease <60  Kidney Failure <15    The GFR formula is only valid for adults with stable renal function between ages 18 and 70.    Magnesium [[id number]]  (Normal) Collected: 02/02/23 0946    Specimen: Blood Updated: 02/02/23 1032     Magnesium 1.8 mg/dL     C-reactive Protein [[id number]]  (Abnormal) Collected: 02/02/23 0946    Specimen: Blood Updated: 02/02/23 1032     C-Reactive Protein 14.45 mg/dL     BNP [[id number]]  (Abnormal) Collected: 02/02/23 0946    Specimen: Blood Updated: 02/02/23 1031     proBNP 11,062.0 pg/mL     Narrative:      Among patients with dyspnea, NT-proBNP is highly sensitive for the detection of acute congestive heart failure. In addition NT-proBNP of <300 pg/ml effectively rules out acute congestive heart failure with 99% negative predictive value.    Results may be falsely decreased if patient taking Biotin.      Procalcitonin [[id number]]  (Normal) Collected: 02/02/23 0946    Specimen: Blood Updated: 02/02/23 1024     Procalcitonin 0.24 ng/mL  "    Narrative:      As a Marker for Sepsis (Non-Neonates):    1. <0.5 ng/mL represents a low risk of severe sepsis and/or septic shock.  2. >2 ng/mL represents a high risk of severe sepsis and/or septic shock.    As a Marker for Lower Respiratory Tract Infections that require antibiotic therapy:    PCT on Admission    Antibiotic Therapy       6-12 Hrs later    >0.5                Strongly Recommended  >0.25 - <0.5        Recommended   0.1 - 0.25          Discouraged              Remeasure/reassess PCT  <0.1                Strongly Discouraged     Remeasure/reassess PCT    As 28 day mortality risk marker: \"Change in Procalcitonin Result\" (>80% or <=80%) if Day 0 (or Day 1) and Day 4 values are available. Refer to http://www.[email or web address]-pct-calculator.[email or web address]    Change in PCT <=80%  A decrease of PCT levels below or equal to 80% defines a positive change in PCT test result representing a higher risk for 28-day all-cause mortality of patients diagnosed with severe sepsis for septic shock.    Change in PCT >80%  A decrease of PCT levels of more than 80% defines a negative change in PCT result representing a lower risk for 28-day all-cause mortality of patients diagnosed with severe sepsis or septic shock.       Protime-INR [[id number]]  (Abnormal) Collected: 02/02/23 0946    Specimen: Blood Updated: 02/02/23 1013     Protime 29.4 Seconds      INR 2.74    D-dimer, Quantitative [[id number]]  (Abnormal) Collected: 02/02/23 0946    Specimen: Blood Updated: 02/02/23 1013     D-Dimer, Quantitative 0.95 MCGFEU/mL     Narrative:      According to the assay 's published package insert, a normal (<0.50 MCGFEU/mL) D-dimer result in conjunction with a non-high clinical probability assessment, excludes deep vein thrombosis (DVT) and pulmonary embolism (PE) with high sensitivity.    D-dimer values increase with age and this can make VTE exclusion of an older population difficult. To address this, the [employer], " "based on best available evidence and recent guidelines, recommends that clinicians use age-adjusted D-dimer thresholds in patients greater than 50 years of age with: a) a low probability of PE who do not meet all Pulmonary Embolism Rule Out Criteria, or b) in those with intermediate probability of PE.   The formula for an age-adjusted D-dimer cut-off is \"age/100\".  For example, a 60 year old patient would have an age-adjusted cut-off of 0.60 MCGFEU/mL and an 80 year old 0.80 MCGFEU/mL.    CBC & Differential [[id number]]  (Abnormal) Collected: 02/02/23 0946    Specimen: Blood Updated: 02/02/23 0959    Narrative:      The following orders were created for panel order CBC & Differential.  Procedure                               Abnormality         Status                     ---------                               -----------         ------                     CBC Auto Differential[[id number]]        Abnormal            Final result                 Please view results for these tests on the individual orders.    CBC Auto Differential [[id number]]  (Abnormal) Collected: 02/02/23 0946    Specimen: Blood Updated: 02/02/23 0959     WBC 14.29 10*3/mm3      RBC 4.06 10*6/mm3      Hemoglobin 13.2 g/dL      Hematocrit 40.0 %      MCV 98.5 fL      MCH 32.5 pg      MCHC 33.0 g/dL      RDW 14.5 %      RDW-SD 53.1 fl      MPV 9.8 fL      Platelets 567 10*3/mm3      Neutrophil % 61.2 %      Lymphocyte % 22.5 %      Monocyte % 10.9 %      Eosinophil % 1.7 %      Basophil % 1.0 %      Immature Grans % 2.7 %      Neutrophils, Absolute 8.76 10*3/mm3      Lymphocytes, Absolute 3.21 10*3/mm3      Monocytes, Absolute 1.56 10*3/mm3      Eosinophils, Absolute 0.24 10*3/mm3      Basophils, Absolute 0.14 10*3/mm3      Immature Grans, Absolute 0.38 10*3/mm3      nRBC 0.0 /100 WBC     Blood Gas, Arterial - [[id number]]  (Abnormal) Collected: 02/02/23 0956    Specimen: Arterial Blood Updated: 02/02/23 0956     Site Right Radial     Meliton's Test " Positive     pH, Arterial 7.515 pH units      Comment: 83 Value above reference range        pCO2, Arterial 30.2 mm Hg      Comment: 84 Value below reference range        pO2, Arterial 76.6 mm Hg      Comment: 84 Value below reference range        HCO3, Arterial 24.3 mmol/L      Base Excess, Arterial 2.1 mmol/L      Comment: 83 Value above reference range        O2 Saturation, Arterial 96.5 %      Temperature 37.0 C      Barometric Pressure for Blood Gas 757 mmHg      Modality Nasal Cannula     Flow Rate 2.0 lpm      Ventilator Mode NA     Collected by [id number]     Comment: Meter: [id number]     :  [id number]        pCO2, Temperature Corrected 30.2 mm Hg      pH, Temp Corrected 7.515 pH Units      pO2, Temperature Corrected 76.6 mm Hg     Blood Gas, Arterial - [[id number]]  (Abnormal) Collected: 02/02/23 0956    Specimen: Arterial Blood Updated: 02/02/23 0956     Site Right Radial     Meliton's Test Positive     pH, Arterial 7.515 pH units      Comment: 83 Value above reference range        pCO2, Arterial 30.2 mm Hg      Comment: 84 Value below reference range        pO2, Arterial 76.6 mm Hg      Comment: 84 Value below reference range        HCO3, Arterial 24.3 mmol/L      Base Excess, Arterial 2.1 mmol/L      Comment: 83 Value above reference range        O2 Saturation, Arterial 96.5 %      Temperature 37.0 C      Barometric Pressure for Blood Gas 757 mmHg      Modality Nasal Cannula     Flow Rate 2.0 lpm      Ventilator Mode NA     Collected by [id number]     Comment: Meter: [id number]     :  [id number]        pCO2, Temperature Corrected 30.2 mm Hg      pH, Temp Corrected 7.515 pH Units      pO2, Temperature Corrected 76.6 mm Hg         Imaging Results (Last 72 Hours)     Procedure Component Value Units Date/Time    XR Chest 1 View [[id number]] Collected: 02/02/23 1041     Updated: 02/02/23 1047    Narrative:      HISTORY: Weakness     CXR: A frontal view the chest obtained.     COMPARISON:  01/09/2023     FINDINGS: Diminished level of inspiration. Mild prominence of the  interstitial markings. Mild cardiomegaly. No pleural effusion or  pneumothorax. Right infrahilar opacities. No acute regional bony  pathology. Compressions suspected of a lower thoracic vertebra.       Impression:      1. Right infrahilar opacities concerning for pneumonia. Prominence of  interstitial markings may relate to underlying emphysema, however mild  superimposed edema/CHF considered.  This report was finalized on 02/02/2023 10:44 by Dr. Adeola Lawrence MD.             ASSESSMENT/PLAN       Examination and evaluation of wound(s) was performed.    DIAGNOSIS:   Pressure injury of sacral region, stage 2  Impaired mobility and ADLs  Bowel and bladder incontinence  Diarrhea    HOSPITAL PROBLEM LIST:    SVT (supraventricular tachycardia) (HCC)    Sepsis (HCC)    UTI (urinary tract infection), bacterial    HCAP (healthcare-associated pneumonia)    PLAN:   Wound appears greatly improved from picture that was taken on admission.  Dark discoloration is not present at this time.  Currently a silicone border foam dressing is in place.  May continue with foam dressings unless interrupted by feces as she is having multiple bowel movements.  At that time we may switch to Calazime barrier cream and discontinue foam dressings.       Ordered   Use Seat Cushion When Up In Chair  Continuous         02/03/23 1647   Elevate Heels Off of Bed  Until Discontinued         02/03/23 1647   Turn Patient  Now Then Every 2 Hours         02/03/23 1647   Use Repositioning Wedge to Position Patient  Continuous        Comments: Use Comfort Glide repositioning sheet and wedges to position patient.    02/03/23 1647   Apply Moisture Barrier After Any Incontinence  As Needed      Comments: Calazime   Question:  Wound Care Instructions  Answer:  Apply Moisture Barrier After Any Incontinence    02/03/23 1647                 Discussed findings and treatment plan  including risks, benefits, and treatment options with patient in detail. Patient agreed with treatment plan.      This document has been electronically signed by LISA Krishna on 2/3/2023 10:50 CST

## 2023-02-03 NOTE — PLAN OF CARE
Goal Outcome Evaluation:      Pt A&Ox4 and afebrile throughout the shift. Pt had one large urinary incontinence episode throughout the shift. I bladder scanned her twice and got 315 and then 242 after she peed. Pt had multiple liquid BMs throughout the shift. Dr. Dougherty was contacted and she put in orders for her to be tested for Cdiff and she is now in precautionary contact precautions until we get results back. VSS. Pt was able to come off of the levo drip. Safety maintained.

## 2023-02-03 NOTE — PLAN OF CARE
Goal Outcome Evaluation:  Plan of Care Reviewed With: patient, daughter        Progress: no change  Outcome Evaluation: Initial nutrition assessment. Pt identified at nutrition risk r/t non-healing wound. 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 She reports a chronic poor appetite. She has a low BMI, but has not had recent weight loss. She has actually gained ~ 10# in 2 months. She resides at a SNF. She dislikes Boost supplements. She does like yogurt and magic cup. She receives megace for appetite stimulation. Obtained specific food preferences. Advised of alternate food selections as needed. Will send yogurt with breakfast and magic cup with lunch and dinner. Will cont to follow per protocol.

## 2023-02-03 NOTE — PLAN OF CARE
Goal Outcome Evaluation:  Plan of Care Reviewed With: patient        Progress: no change  Outcome Evaluation: pt admitted to the unit from ER s/p cardioversion & adenosine. Levo currently on hold. Pt a/o x 4

## 2023-02-04 LAB
ANION GAP SERPL CALCULATED.3IONS-SCNC: 12 MMOL/L (ref 5–15)
BACTERIA SPEC AEROBE CULT: ABNORMAL
BASOPHILS # BLD AUTO: 0.09 10*3/MM3 (ref 0–0.2)
BASOPHILS NFR BLD AUTO: 0.9 % (ref 0–1.5)
BUN SERPL-MCNC: 9 MG/DL (ref 8–23)
BUN/CREAT SERPL: 14.8 (ref 7–25)
CALCIUM SPEC-SCNC: 8.5 MG/DL (ref 8.6–10.5)
CHLORIDE SERPL-SCNC: 108 MMOL/L (ref 98–107)
CO2 SERPL-SCNC: 22 MMOL/L (ref 22–29)
CREAT SERPL-MCNC: 0.61 MG/DL (ref 0.57–1)
DEPRECATED RDW RBC AUTO: 53.1 FL (ref 37–54)
EGFRCR SERPLBLD CKD-EPI 2021: 89.4 ML/MIN/1.73
EOSINOPHIL # BLD AUTO: 0.26 10*3/MM3 (ref 0–0.4)
EOSINOPHIL NFR BLD AUTO: 2.6 % (ref 0.3–6.2)
ERYTHROCYTE [DISTWIDTH] IN BLOOD BY AUTOMATED COUNT: 14.3 % (ref 12.3–15.4)
GLUCOSE SERPL-MCNC: 88 MG/DL (ref 65–99)
HCT VFR BLD AUTO: 35.5 % (ref 34–46.6)
HGB BLD-MCNC: 11.3 G/DL (ref 12–15.9)
IMM GRANULOCYTES # BLD AUTO: 0.38 10*3/MM3 (ref 0–0.05)
IMM GRANULOCYTES NFR BLD AUTO: 3.7 % (ref 0–0.5)
LYMPHOCYTES # BLD AUTO: 1.74 10*3/MM3 (ref 0.7–3.1)
LYMPHOCYTES NFR BLD AUTO: 17.1 % (ref 19.6–45.3)
MCH RBC QN AUTO: 31.7 PG (ref 26.6–33)
MCHC RBC AUTO-ENTMCNC: 31.8 G/DL (ref 31.5–35.7)
MCV RBC AUTO: 99.7 FL (ref 79–97)
MONOCYTES # BLD AUTO: 0.88 10*3/MM3 (ref 0.1–0.9)
MONOCYTES NFR BLD AUTO: 8.7 % (ref 5–12)
NEUTROPHILS NFR BLD AUTO: 6.81 10*3/MM3 (ref 1.7–7)
NEUTROPHILS NFR BLD AUTO: 67 % (ref 42.7–76)
NRBC BLD AUTO-RTO: 0 /100 WBC (ref 0–0.2)
PLATELET # BLD AUTO: 425 10*3/MM3 (ref 140–450)
PMV BLD AUTO: 9.5 FL (ref 6–12)
POTASSIUM SERPL-SCNC: 3.1 MMOL/L (ref 3.5–5.2)
RBC # BLD AUTO: 3.56 10*6/MM3 (ref 3.77–5.28)
SODIUM SERPL-SCNC: 142 MMOL/L (ref 136–145)
WBC NRBC COR # BLD: 10.16 10*3/MM3 (ref 3.4–10.8)

## 2023-02-04 PROCEDURE — 80048 BASIC METABOLIC PNL TOTAL CA: CPT | Performed by: INTERNAL MEDICINE

## 2023-02-04 PROCEDURE — 85025 COMPLETE CBC W/AUTO DIFF WBC: CPT | Performed by: INTERNAL MEDICINE

## 2023-02-04 PROCEDURE — 25010000002 PIPERACILLIN SOD-TAZOBACTAM PER 1 G: Performed by: INTERNAL MEDICINE

## 2023-02-04 PROCEDURE — 94799 UNLISTED PULMONARY SVC/PX: CPT

## 2023-02-04 PROCEDURE — 25010000002 ONDANSETRON PER 1 MG: Performed by: INTERNAL MEDICINE

## 2023-02-04 PROCEDURE — 94760 N-INVAS EAR/PLS OXIMETRY 1: CPT

## 2023-02-04 RX ORDER — POTASSIUM CHLORIDE 750 MG/1
40 CAPSULE, EXTENDED RELEASE ORAL 2 TIMES DAILY WITH MEALS
Status: COMPLETED | OUTPATIENT
Start: 2023-02-04 | End: 2023-02-05

## 2023-02-04 RX ADMIN — GABAPENTIN 100 MG: 100 CAPSULE ORAL at 21:16

## 2023-02-04 RX ADMIN — APIXABAN 5 MG: 5 TABLET, FILM COATED ORAL at 21:12

## 2023-02-04 RX ADMIN — Medication 5.25 MG: at 21:12

## 2023-02-04 RX ADMIN — DILTIAZEM HYDROCHLORIDE 240 MG: 240 CAPSULE, EXTENDED RELEASE ORAL at 08:57

## 2023-02-04 RX ADMIN — APIXABAN 5 MG: 5 TABLET, FILM COATED ORAL at 08:57

## 2023-02-04 RX ADMIN — POTASSIUM CHLORIDE 40 MEQ: 10 CAPSULE, COATED, EXTENDED RELEASE ORAL at 09:05

## 2023-02-04 RX ADMIN — MEGESTROL ACETATE 400 MG: 40 SUSPENSION ORAL at 21:12

## 2023-02-04 RX ADMIN — Medication 10 ML: at 21:13

## 2023-02-04 RX ADMIN — ONDANSETRON 4 MG: 2 INJECTION INTRAMUSCULAR; INTRAVENOUS at 13:39

## 2023-02-04 RX ADMIN — DOCUSATE SODIUM 100 MG: 100 CAPSULE, LIQUID FILLED ORAL at 08:59

## 2023-02-04 RX ADMIN — Medication 2000 UNITS: at 08:58

## 2023-02-04 RX ADMIN — GABAPENTIN 100 MG: 100 CAPSULE ORAL at 16:48

## 2023-02-04 RX ADMIN — MEGESTROL ACETATE 400 MG: 40 SUSPENSION ORAL at 08:59

## 2023-02-04 RX ADMIN — CHLORHEXIDINE GLUCONATE 1 APPLICATION: 500 CLOTH TOPICAL at 06:34

## 2023-02-04 RX ADMIN — Medication 1 APPLICATION: at 21:12

## 2023-02-04 RX ADMIN — OXYCODONE AND ACETAMINOPHEN 1 TABLET: 7.5; 325 TABLET ORAL at 13:29

## 2023-02-04 RX ADMIN — Medication 10 ML: at 09:01

## 2023-02-04 RX ADMIN — POTASSIUM CHLORIDE 40 MEQ: 10 CAPSULE, COATED, EXTENDED RELEASE ORAL at 18:08

## 2023-02-04 RX ADMIN — ACEBUTOLOL HYDROCHLORIDE 200 MG: 200 CAPSULE ORAL at 21:13

## 2023-02-04 RX ADMIN — DOXYCYCLINE 100 MG: 100 INJECTION, POWDER, LYOPHILIZED, FOR SOLUTION INTRAVENOUS at 18:07

## 2023-02-04 RX ADMIN — BISACODYL 10 MG: 5 TABLET ORAL at 21:12

## 2023-02-04 RX ADMIN — FAMOTIDINE 20 MG: 20 TABLET, FILM COATED ORAL at 08:59

## 2023-02-04 RX ADMIN — GABAPENTIN 100 MG: 100 CAPSULE ORAL at 09:05

## 2023-02-04 RX ADMIN — ACEBUTOLOL HYDROCHLORIDE 200 MG: 200 CAPSULE ORAL at 08:57

## 2023-02-04 RX ADMIN — TAZOBACTAM SODIUM AND PIPERACILLIN SODIUM 4.5 G: 500; 4 INJECTION, SOLUTION INTRAVENOUS at 21:13

## 2023-02-04 RX ADMIN — PAROXETINE 40 MG: 20 TABLET, FILM COATED ORAL at 09:00

## 2023-02-04 RX ADMIN — TAZOBACTAM SODIUM AND PIPERACILLIN SODIUM 4.5 G: 500; 4 INJECTION, SOLUTION INTRAVENOUS at 13:57

## 2023-02-04 RX ADMIN — BUDESONIDE AND FORMOTEROL FUMARATE DIHYDRATE 2 PUFF: 160; 4.5 AEROSOL RESPIRATORY (INHALATION) at 22:14

## 2023-02-04 RX ADMIN — TAZOBACTAM SODIUM AND PIPERACILLIN SODIUM 4.5 G: 500; 4 INJECTION, SOLUTION INTRAVENOUS at 02:11

## 2023-02-04 RX ADMIN — LORAZEPAM 1 MG: 1 TABLET ORAL at 16:33

## 2023-02-04 RX ADMIN — Medication 1 APPLICATION: at 08:59

## 2023-02-04 NOTE — NURSING NOTE
Pt to room from icu. Pt is a/o x 4, denies pain. Bedside report done with icu nurse. No questions/concerns from patient at this time.

## 2023-02-04 NOTE — NURSING NOTE
Pt transferred to medical floor. VSS No s/s resp distress. Daughter Madie updated. Report given to Marivel SIMENTAL

## 2023-02-04 NOTE — NURSING NOTE
Called pharmacy tech regarding still not receiving zosyn dose. They are supposed to send another dose now.

## 2023-02-04 NOTE — PROGRESS NOTES
[facility] Medicine Services  INPATIENT PROGRESS NOTE    Patient Name: Zelalem Hung  Date of Admission: 2/2/2023  Today's Date: 02/03/23  Length of Stay: 1  Primary Care Physician: Chris Hermosillo MD    Subjective   Chief Complaint: Low blood pressure, tachycardia    HPI   Patient's hypotension has improved and Levophed has been discontinued.  Heart rate is better controlled.  She complains of low back pain today.    Review of Systems   All pertinent negatives and positives are as above. All other systems have been reviewed and are negative unless otherwise stated.     Objective    Temp:  [97.8 °F (36.6 °C)-99.2 °F (37.3 °C)] 98.8 °F (37.1 °C)  Heart Rate:  [] 100  Resp:  [18-28] 24  BP: ()/() 127/61  Physical Exam  Constitutional:       General: She is not in acute distress.     Appearance: She is not ill-appearing or diaphoretic.   HENT:      Head: Normocephalic and atraumatic.      Right Ear: External ear normal.      Left Ear: External ear normal.      Nose: No congestion or rhinorrhea.      Mouth/Throat:      Mouth: Mucous membranes are moist.      Pharynx: No oropharyngeal exudate or posterior oropharyngeal erythema.   Eyes:      General: No scleral icterus.     Extraocular Movements: Extraocular movements intact.      Conjunctiva/sclera: Conjunctivae normal.   Cardiovascular:      Rate and Rhythm: Normal rate and regular rhythm.      Heart sounds: Normal heart sounds. No murmur heard.  Pulmonary:      Effort: Pulmonary effort is normal. No respiratory distress.      Breath sounds: Normal breath sounds. No wheezing, rhonchi or rales.   Abdominal:      General: Abdomen is flat. There is no distension.      Palpations: Abdomen is soft.      Tenderness: There is no abdominal tenderness. There is no guarding.   Musculoskeletal:         General: No swelling, tenderness or deformity.      Cervical back: Neck supple. No rigidity. No muscular tenderness.       Right lower leg: No edema.      Left lower leg: No edema.   Lymphadenopathy:      Cervical: No cervical adenopathy.   Skin:     General: Skin is warm and dry.   Neurological:      General: No focal deficit present.      Mental Status: She is alert.      Cranial Nerves: No cranial nerve deficit.      Motor: No weakness.      Comments: Oriented to person and place but not to time.   Psychiatric:         Mood and Affect: Mood normal.         Behavior: Behavior normal.     Results Review:  I have reviewed the labs, radiology results, and diagnostic studies.    Laboratory Data:   Results from last 7 days   Lab Units 02/03/23  0524 02/02/23  0946   WBC 10*3/mm3 11.35* 14.29*   HEMOGLOBIN g/dL 11.4* 13.2   HEMATOCRIT % 35.2 40.0   PLATELETS 10*3/mm3 474* 567*        Results from last 7 days   Lab Units 02/03/23  0524 02/02/23  0946   SODIUM mmol/L 142 138   POTASSIUM mmol/L 3.5 4.2   CHLORIDE mmol/L 107 102   CO2 mmol/L 23.0 24.0   BUN mg/dL 18 27*   CREATININE mg/dL 0.60 0.82   CALCIUM mg/dL 8.9 9.8   BILIRUBIN mg/dL  --  0.2   ALK PHOS U/L  --  59   ALT (SGPT) U/L  --  9   AST (SGOT) U/L  --  15   GLUCOSE mg/dL 95 103*       Culture Data:   Blood Culture   Date Value Ref Range Status   02/02/2023 No growth at 24 hours  Preliminary   02/02/2023 No growth at 24 hours  Preliminary     Urine Culture   Date Value Ref Range Status   02/02/2023 >100,000 CFU/mL Gram Positive Cocci (A)  Preliminary       Radiology Data:   Imaging Results (Last 24 Hours)     ** No results found for the last 24 hours. **          I have reviewed the patient's current medications.     Assessment/Plan   Assessment  Active Hospital Problems    Diagnosis    • **SVT (supraventricular tachycardia) (HCC)    • HCAP (healthcare-associated pneumonia)    • UTI (urinary tract infection), bacterial    • Sepsis (HCC)    Sacral decubitus ulcer present on admission    Treatment Plan  The patient appears improved.  Continue IV antibiotics with Zosyn for  healthcare associated pneumoni.  Plan to transition to ceftriaxone in the next 24 hours.  Continue doxycycline for atypical pneumonia coverage.  Continue IV fluids.  Levophed has been discontinued.    Wound care consult input appreciated for sacral decubitus ulcer present on admission     DVT prophylaxis with Eliquis     CODE STATUS is DNR/DNI    Patient stable to be transferred out of the CCU    Medical Decision Making  Number and Complexity of problems: 4 complex acute life-threatening problems  Differential Diagnosis: None    Conditions and Status        Condition is improving.     MDM Data  External documents reviewed: None  Cardiac tracing (EKG, telemetry) interpretation: None  Radiology interpretation: None  Labs reviewed: CMP, BMP  Any tests that were considered but not ordered: None     Decision rules/scores evaluated (example [id number], Wells, etc): N/AA     Discussed with: Patient     Care Planning  Shared decision making: Patient and daughter  Code status and discussions: DNR DNI    Disposition  Social Determinants of Health that impact treatment or disposition: None skilled nurse facility in  I expect the patient to be discharged to skilled nurse facility in 2-3 days.     Electronically signed by Ny Nicolas MD, 02/03/23, 19:07 CST.

## 2023-02-05 ENCOUNTER — READMISSION MANAGEMENT (OUTPATIENT)
Dept: CALL CENTER | Facility: HOSPITAL | Age: 83
End: 2023-02-05
Payer: MEDICARE

## 2023-02-05 VITALS
BODY MASS INDEX: 18.9 KG/M2 | HEART RATE: 93 BPM | TEMPERATURE: 98 F | HEIGHT: 60 IN | OXYGEN SATURATION: 93 % | RESPIRATION RATE: 18 BRPM | DIASTOLIC BLOOD PRESSURE: 66 MMHG | WEIGHT: 96.25 LBS | SYSTOLIC BLOOD PRESSURE: 139 MMHG

## 2023-02-05 LAB
ANION GAP SERPL CALCULATED.3IONS-SCNC: 9 MMOL/L (ref 5–15)
BASOPHILS # BLD AUTO: 0.09 10*3/MM3 (ref 0–0.2)
BASOPHILS NFR BLD AUTO: 1 % (ref 0–1.5)
BUN SERPL-MCNC: 8 MG/DL (ref 8–23)
BUN/CREAT SERPL: 11.9 (ref 7–25)
CALCIUM SPEC-SCNC: 8.9 MG/DL (ref 8.6–10.5)
CHLORIDE SERPL-SCNC: 110 MMOL/L (ref 98–107)
CO2 SERPL-SCNC: 22 MMOL/L (ref 22–29)
CREAT SERPL-MCNC: 0.67 MG/DL (ref 0.57–1)
DEPRECATED RDW RBC AUTO: 53.1 FL (ref 37–54)
EGFRCR SERPLBLD CKD-EPI 2021: 87.4 ML/MIN/1.73
EOSINOPHIL # BLD AUTO: 0.21 10*3/MM3 (ref 0–0.4)
EOSINOPHIL NFR BLD AUTO: 2.4 % (ref 0.3–6.2)
ERYTHROCYTE [DISTWIDTH] IN BLOOD BY AUTOMATED COUNT: 14.5 % (ref 12.3–15.4)
GLUCOSE SERPL-MCNC: 81 MG/DL (ref 65–99)
HCT VFR BLD AUTO: 34.8 % (ref 34–46.6)
HGB BLD-MCNC: 11 G/DL (ref 12–15.9)
IMM GRANULOCYTES # BLD AUTO: 0.35 10*3/MM3 (ref 0–0.05)
IMM GRANULOCYTES NFR BLD AUTO: 3.9 % (ref 0–0.5)
LYMPHOCYTES # BLD AUTO: 1.79 10*3/MM3 (ref 0.7–3.1)
LYMPHOCYTES NFR BLD AUTO: 20.2 % (ref 19.6–45.3)
MCH RBC QN AUTO: 31.7 PG (ref 26.6–33)
MCHC RBC AUTO-ENTMCNC: 31.6 G/DL (ref 31.5–35.7)
MCV RBC AUTO: 100.3 FL (ref 79–97)
MONOCYTES # BLD AUTO: 0.76 10*3/MM3 (ref 0.1–0.9)
MONOCYTES NFR BLD AUTO: 8.6 % (ref 5–12)
NEUTROPHILS NFR BLD AUTO: 5.68 10*3/MM3 (ref 1.7–7)
NEUTROPHILS NFR BLD AUTO: 63.9 % (ref 42.7–76)
NRBC BLD AUTO-RTO: 0 /100 WBC (ref 0–0.2)
PLATELET # BLD AUTO: 409 10*3/MM3 (ref 140–450)
PMV BLD AUTO: 9.4 FL (ref 6–12)
POTASSIUM SERPL-SCNC: 4.5 MMOL/L (ref 3.5–5.2)
RBC # BLD AUTO: 3.47 10*6/MM3 (ref 3.77–5.28)
SARS-COV-2 AG RESP QL IA.RAPID: NORMAL
SODIUM SERPL-SCNC: 141 MMOL/L (ref 136–145)
WBC NRBC COR # BLD: 8.88 10*3/MM3 (ref 3.4–10.8)

## 2023-02-05 PROCEDURE — 94760 N-INVAS EAR/PLS OXIMETRY 1: CPT

## 2023-02-05 PROCEDURE — 94799 UNLISTED PULMONARY SVC/PX: CPT

## 2023-02-05 PROCEDURE — 85025 COMPLETE CBC W/AUTO DIFF WBC: CPT | Performed by: INTERNAL MEDICINE

## 2023-02-05 PROCEDURE — 94664 DEMO&/EVAL PT USE INHALER: CPT

## 2023-02-05 PROCEDURE — 80048 BASIC METABOLIC PNL TOTAL CA: CPT | Performed by: INTERNAL MEDICINE

## 2023-02-05 PROCEDURE — 87426 SARSCOV CORONAVIRUS AG IA: CPT | Performed by: INTERNAL MEDICINE

## 2023-02-05 PROCEDURE — 25010000002 PIPERACILLIN SOD-TAZOBACTAM PER 1 G: Performed by: INTERNAL MEDICINE

## 2023-02-05 RX ORDER — DOXYCYCLINE HYCLATE 100 MG/1
100 CAPSULE ORAL 2 TIMES DAILY
Qty: 8 CAPSULE | Refills: 0 | Status: SHIPPED | OUTPATIENT
Start: 2023-02-05 | End: 2023-02-09

## 2023-02-05 RX ORDER — OXYCODONE AND ACETAMINOPHEN 7.5; 325 MG/1; MG/1
1 TABLET ORAL EVERY 4 HOURS PRN
Qty: 18 TABLET | Refills: 0 | Status: SHIPPED | OUTPATIENT
Start: 2023-02-05

## 2023-02-05 RX ORDER — AMOXICILLIN AND CLAVULANATE POTASSIUM 875; 125 MG/1; MG/1
1 TABLET, FILM COATED ORAL 2 TIMES DAILY
Qty: 8 TABLET | Refills: 0 | Status: SHIPPED | OUTPATIENT
Start: 2023-02-05 | End: 2023-02-09 | Stop reason: SDUPTHER

## 2023-02-05 RX ORDER — ACEBUTOLOL HYDROCHLORIDE 200 MG/1
200 CAPSULE ORAL 2 TIMES DAILY
Qty: 90 CAPSULE | Refills: 0 | Status: SHIPPED | OUTPATIENT
Start: 2023-02-05

## 2023-02-05 RX ORDER — LORAZEPAM 0.5 MG/1
0.5 TABLET ORAL 3 TIMES DAILY
Qty: 15 TABLET | Refills: 0 | Status: SHIPPED | OUTPATIENT
Start: 2023-02-05

## 2023-02-05 RX ORDER — GABAPENTIN 100 MG/1
100 CAPSULE ORAL 3 TIMES DAILY
Qty: 15 CAPSULE | Refills: 0 | Status: SHIPPED | OUTPATIENT
Start: 2023-02-05

## 2023-02-05 RX ADMIN — GABAPENTIN 100 MG: 100 CAPSULE ORAL at 09:09

## 2023-02-05 RX ADMIN — POTASSIUM CHLORIDE 40 MEQ: 10 CAPSULE, COATED, EXTENDED RELEASE ORAL at 09:13

## 2023-02-05 RX ADMIN — DILTIAZEM HYDROCHLORIDE 240 MG: 240 CAPSULE, EXTENDED RELEASE ORAL at 09:11

## 2023-02-05 RX ADMIN — DOXYCYCLINE 100 MG: 100 INJECTION, POWDER, LYOPHILIZED, FOR SOLUTION INTRAVENOUS at 04:05

## 2023-02-05 RX ADMIN — Medication 1 APPLICATION: at 09:11

## 2023-02-05 RX ADMIN — CHLORHEXIDINE GLUCONATE 1 APPLICATION: 500 CLOTH TOPICAL at 04:00

## 2023-02-05 RX ADMIN — Medication 2000 UNITS: at 09:10

## 2023-02-05 RX ADMIN — APIXABAN 5 MG: 5 TABLET, FILM COATED ORAL at 09:11

## 2023-02-05 RX ADMIN — TAZOBACTAM SODIUM AND PIPERACILLIN SODIUM 4.5 G: 500; 4 INJECTION, SOLUTION INTRAVENOUS at 14:01

## 2023-02-05 RX ADMIN — TAZOBACTAM SODIUM AND PIPERACILLIN SODIUM 4.5 G: 500; 4 INJECTION, SOLUTION INTRAVENOUS at 05:39

## 2023-02-05 RX ADMIN — ACEBUTOLOL HYDROCHLORIDE 200 MG: 200 CAPSULE ORAL at 09:12

## 2023-02-05 RX ADMIN — OXYCODONE AND ACETAMINOPHEN 1 TABLET: 7.5; 325 TABLET ORAL at 11:04

## 2023-02-05 RX ADMIN — FAMOTIDINE 20 MG: 20 TABLET, FILM COATED ORAL at 09:10

## 2023-02-05 RX ADMIN — MEGESTROL ACETATE 400 MG: 40 SUSPENSION ORAL at 09:10

## 2023-02-05 RX ADMIN — PAROXETINE 40 MG: 20 TABLET, FILM COATED ORAL at 09:10

## 2023-02-05 RX ADMIN — BUDESONIDE AND FORMOTEROL FUMARATE DIHYDRATE 2 PUFF: 160; 4.5 AEROSOL RESPIRATORY (INHALATION) at 06:05

## 2023-02-05 NOTE — NURSING NOTE
Notified pt's daughter, Madie to let her know that pt was being discharged. She stated that she was on call later today and would check in to see if she was still here, and that she didn't need us to call her when patient leaves with EMS.

## 2023-02-05 NOTE — PROGRESS NOTES
[facility] Medicine Services  INPATIENT PROGRESS NOTE    Patient Name: Zelalem Hung  Date of Admission: 2/2/2023  Today's Date: 02/04/23  Length of Stay: 2  Primary Care Physician: Chris Hermosillo MD    Subjective   Chief Complaint: Low blood pressure, tachycardia    HPI   Patient feels much improved.  She has no complaints.   Review of Systems   All pertinent negatives and positives are as above. All other systems have been reviewed and are negative unless otherwise stated.     Objective    Temp:  [98 °F (36.7 °C)-99.6 °F (37.6 °C)] 98.3 °F (36.8 °C)  Heart Rate:  [65-99] 65  Resp:  [16-20] 16  BP: (119-143)/(47-72) 124/69  Physical Exam  Constitutional:       General: She is not in acute distress.     Appearance: She is not ill-appearing or diaphoretic.   HENT:      Head: Normocephalic and atraumatic.      Right Ear: External ear normal.      Left Ear: External ear normal.      Nose: No congestion or rhinorrhea.      Mouth/Throat:      Mouth: Mucous membranes are moist.      Pharynx: No oropharyngeal exudate or posterior oropharyngeal erythema.   Eyes:      General: No scleral icterus.     Extraocular Movements: Extraocular movements intact.      Conjunctiva/sclera: Conjunctivae normal.   Cardiovascular:      Rate and Rhythm: Normal rate and regular rhythm.      Heart sounds: Normal heart sounds. No murmur heard.  Pulmonary:      Effort: Pulmonary effort is normal. No respiratory distress.      Breath sounds: Normal breath sounds. No wheezing, rhonchi or rales.   Abdominal:      General: Abdomen is flat. There is no distension.      Palpations: Abdomen is soft.      Tenderness: There is no abdominal tenderness. There is no guarding.   Musculoskeletal:         General: No swelling, tenderness or deformity.      Cervical back: Neck supple. No rigidity. No muscular tenderness.      Right lower leg: No edema.      Left lower leg: No edema.   Lymphadenopathy:      Cervical:  No cervical adenopathy.   Skin:     General: Skin is warm and dry.   Neurological:      General: No focal deficit present.      Mental Status: She is alert.      Cranial Nerves: No cranial nerve deficit.      Motor: No weakness.      Comments: Oriented to person and place but not to time.   Psychiatric:         Mood and Affect: Mood normal.         Behavior: Behavior normal.     Results Review:  I have reviewed the labs, radiology results, and diagnostic studies.    Laboratory Data:   Results from last 7 days   Lab Units 02/04/23  0611 02/03/23 0524 02/02/23  0946   WBC 10*3/mm3 10.16 11.35* 14.29*   HEMOGLOBIN g/dL 11.3* 11.4* 13.2   HEMATOCRIT % 35.5 35.2 40.0   PLATELETS 10*3/mm3 425 474* 567*        Results from last 7 days   Lab Units 02/04/23 0611 02/03/23 0524 02/02/23  0946   SODIUM mmol/L 142 142 138   POTASSIUM mmol/L 3.1* 3.5 4.2   CHLORIDE mmol/L 108* 107 102   CO2 mmol/L 22.0 23.0 24.0   BUN mg/dL 9 18 27*   CREATININE mg/dL 0.61 0.60 0.82   CALCIUM mg/dL 8.5* 8.9 9.8   BILIRUBIN mg/dL  --   --  0.2   ALK PHOS U/L  --   --  59   ALT (SGPT) U/L  --   --  9   AST (SGOT) U/L  --   --  15   GLUCOSE mg/dL 88 95 103*       Culture Data:   Blood Culture   Date Value Ref Range Status   02/02/2023 No growth at 24 hours  Preliminary   02/02/2023 No growth at 24 hours  Preliminary     Urine Culture   Date Value Ref Range Status   02/02/2023 >100,000 CFU/mL Gram Positive Cocci (A)  Preliminary       Radiology Data:   Imaging Results (Last 24 Hours)     ** No results found for the last 24 hours. **          I have reviewed the patient's current medications.     Assessment/Plan   Assessment  Active Hospital Problems    Diagnosis    • **SVT (supraventricular tachycardia) (HCC)    • HCAP (healthcare-associated pneumonia)    • UTI (urinary tract infection), bacterial    • Sepsis (HCC)    Sacral decubitus ulcer present on admission    Treatment Plan  The patient appears improved.  Continue IV antibiotics with Zosyn  for healthcare associated pneumonia and transition to Augmentin and p.o doxycycline on discharge in the morning.  Discontinue IV fluids.    Wound care consult input appreciated for sacral decubitus ulcer present on admission    PT/OT consults     DVT prophylaxis with Eliquis     CODE STATUS is DNR/DNI    Plan to discharge patient back to skilled nurse facility in the morning    Medical Decision Making  Number and Complexity of problems: 4 complex acute life-threatening problems  Differential Diagnosis: None    Conditions and Status        Condition is improving.     [facility] Data  External documents reviewed: None  Cardiac tracing (EKG, telemetry) interpretation: None  Radiology interpretation: None  Labs reviewed: CMP, BMP  Any tests that were considered but not ordered: None     Decision rules/scores evaluated (example [id number], Wells, etc): N/AA     Discussed with: Patient     Care Planning  Shared decision making: Patient and daughter  Code status and discussions: DNR DNI    Disposition  Social Determinants of Health that impact treatment or disposition: None skilled nurse facility in  I expect the patient to be discharged to skilled nurse facility in 24 hours.    Electronically signed by Ny Nicolas MD, 02/04/23, 18:38 CST.

## 2023-02-05 NOTE — CASE MANAGEMENT/SOCIAL WORK
Continued Stay Note   [facility]     Patient Name: Zelalem Hung  MRN: [id number]  Today's Date: 2/5/2023    Admit Date: 2/2/2023    Plan: [place] [facility]   Discharge Plan     Row Name 02/05/23 1142       Plan    Plan [facility] [facility]    Plan Comments If patient were to return to [facility] today she will need a COVID test prior to discharge.  [facility]: [phone].  [facility] admissions is aware patient will be discharging today.               Discharge Codes    No documentation.                     KAMRAN Garcia

## 2023-02-05 NOTE — PLAN OF CARE
Goal Outcome Evaluation:  Plan of Care Reviewed With: patient. PT a/o x 4 this shift. Somewhat anxious t/o the day. Received ativan and a pain pill for generalized pain. Appetite has been poor. Pt picked at food during all meals. Pt remains incontinent of bladder and bowel. PT continues to receive doxycycline and zosyn. Oxygen at 2l via n/c. No acute distress this shift.

## 2023-02-05 NOTE — PLAN OF CARE
Goal Outcome Evaluation:  Plan of Care Reviewed With: patient          PT a/o x 4 this shift. Pt remains incontinent of bladder and bowel had 2 large liquid green stool with complaints of tenderness at rectum. Barrier cream applied. PT continues to receive doxycycline and zosyn. Oxygen at 2l via n/c. Tele applied. NSR. VSS. No acute distress or complaints this shift.

## 2023-02-05 NOTE — DISCHARGE SUMMARY
[facility]  DISCHARGE SUMMARY       Date of Admission: 2/2/2023  Date of Discharge:  2/5/2023  Primary Care Physician: Chris Hermosillo MD    Presenting Problem/History of Present Illness:  SVT (supraventricular tachycardia) (HCC) [I47.1]     Final Discharge Diagnoses:  Active Hospital Problems    Diagnosis    • **SVT (supraventricular tachycardia) (HCC)    • HCAP (healthcare-associated pneumonia)    • UTI (urinary tract infection), bacterial    • Sepsis (HCC)        Consults:   Consults     Date and Time Order Name Status Description    2/2/2023  3:28 PM Inpatient Wound Care Provider Consult Completed           Procedures Performed: None                Pertinent Test Results:   02/03/2023 1151 02/03/2023 1254 Clostridioides difficile Toxin, PCR - Stool, Per Rectum [[id number]]    Stool from Per Rectum    Final result Component Value   C. Difficile Toxins by PCR Negative             02/02/2023 1224 02/04/2023 0904 Urine Culture - Urine, Straight Cath [[id number]]     (Abnormal)   Urine from Straight Cath    Final result Component Value   Urine Culture >100,000 CFU/mL Enterococcus faecalis Abnormal        Susceptibility     Enterococcus faecalis     EUGENIO     Ampicillin <=2  Susceptible     Levofloxacin >=8  Resistant     Nitrofurantoin <=16  Susceptible     Tetracycline <=1  Susceptible     Vancomycin 1  Susceptible               Linear View         02/02/2023 0959 02/05/2023 1045 Blood Culture - Blood, Arm, Left [[id number]]   Blood from Arm, Left    Preliminary result Component Value   Blood Culture No growth at 3 days P             02/02/2023 0946 02/05/2023 1016 Blood Culture - Blood, Arm, Right [[id number]]   Blood from Arm, Right    Preliminary result Component Value   Blood Culture No growth at 3 days P                 02/02/2023 0946 02/02/2023 1013 Protime-INR [[id number]]   (Abnormal)   Blood    Final result Component Value Units   Protime 29.4 High   Seconds   INR 2.74 High            02/02/2023 0946 02/02/2023 1013 D-dimer, Quantitative [[id number]]    (Abnormal)   Blood    Final result Component Value Units   D-Dimer, Quantitative 0.95 High  MCGFEU/mL          02/02/2023 0946 02/02/2023 1031 BNP [[id number]]    (Abnormal)   Blood    Final result Component Value Units   proBNP 11,062.0 High  pg/mL          02/02/2023 0946 02/02/2023 1052 Troponin [[id number]]    (Abnormal)   Blood    Final result Component Value Units   Troponin T 0.047 High Critical  ng/mL          02/02/2023 0946 02/05/2023 1016 Blood Culture - Blood, Arm, Right [[id number]]   Blood from Arm, Right    Preliminary result Component Value   Blood Culture No growth at 3 days P             02/02/2023 0946 02/02/2023 1051 Lactic Acid, Plasma [[id number]]   (Abnormal)   Blood    Final result Component Value Units   Lactate 2.1 High Critical  mmol/L          02/02/2023 0946 02/02/2023 1032 C-reactive Protein [[id number]]   (Abnormal)   Blood    Final result Component Value Units   C-Reactive Protein 14.45 High  mg/dL          02/02/2023 0946 02/02/2023 1032 Magnesium [[id number]]   Blood    Final result Component Value Units   Magnesium 1.8 mg/dL          02/02/2023 0946 02/02/2023 1024 Procalcitonin [[id number]]    Blood    Final result Component Value Units   Procalcitonin 0.24 ng/mL          02/02/2023 0946 02/02/2023 0959 CBC Auto Differential [[id number]]   (Abnormal)   Blood    Final result Component Value Units   WBC 14.29 High  10*3/mm3   RBC 4.06 10*6/mm3   Hemoglobin 13.2 g/dL   Hematocrit 40.0 %   MCV 98.5 High  fL   MCH 32.5 pg   MCHC 33.0 g/dL   RDW 14.5 %   RDW-SD 53.1 fl   MPV 9.8 fL   Platelets 567 High  10*3/mm3   Neutrophil % 61.2 %   Lymphocyte % 22.5 %   Monocyte % 10.9 %   Eosinophil % 1.7 %   Basophil % 1.0 %   Immature Grans % 2.7 High  %   Neutrophils, Absolute 8.76 High  10*3/mm3   Lymphocytes, Absolute 3.21 High  10*3/mm3   Monocytes, Absolute 1.56 High  10*3/mm3   Eosinophils,  "Absolute 0.24 10*3/mm3   Basophils, Absolute 0.14 10*3/mm3   Immature Grans, Absolute 0.38 High  10*3/mm3   nRBC 0.0 /100 WBC          Chief Complaint on Day of Discharge: Stable    Hospital Course:  The patient is a 82 y.o. female with a past medical history of mild dementia, SVTs, pulmonary hypertension, pulmonary embolism on chronic anticoagulation with Eliquis, and chronic respiratory failure with hypoxia on 2 L of oxygen.  She is a resident of a skilled nursing facility and she presented with complaints of low blood pressure and fast heart rate.     Patient had been receiving treatment for pneumonia at the skilled nursing facility with Zithromax recently.  She was noted to have low blood pressure on the morning of presentation at the [facility] and heart rate as high as 160/min.  She was sent to the emergency room and she was found to be in SVT. She received 6 mg of adenosine with conversion to sinus rhythm. She had leukocytosis and chest x-ray showed right infrahilar opacities concerning for pneumonia.  She was recommended for admission and started on IV antibiotics with Zosyn and doxycycline.  Urine culture grew Enterococcus faecalis.  Patient improved on above treatments and her blood pressure was within normal limits prior to discharge.  She was discharged to complete a course of oral antibiotics with Augmentin and doxycycline.    Condition on Discharge: Stable    Physical Exam on Discharge:  /66 (BP Location: Right arm, Patient Position: Lying)   Pulse 93   Temp 98 °F (36.7 °C) (Oral)   Resp 18   Ht 152.4 cm (60\")   Wt 43.7 kg (96 lb 4 oz)   SpO2 93%   BMI 18.80 kg/m²   Physical Exam  Constitutional:       General: She is not in acute distress.     Appearance: She is not ill-appearing or diaphoretic.   HENT:      Head: Normocephalic and atraumatic.      Right Ear: External ear normal.      Left Ear: External ear normal.      Nose: No congestion or rhinorrhea.      Mouth/Throat: "      Mouth: Mucous membranes are moist.      Pharynx: No oropharyngeal exudate or posterior oropharyngeal erythema.   Eyes:      General: No scleral icterus.     Extraocular Movements: Extraocular movements intact.      Conjunctiva/sclera: Conjunctivae normal.   Cardiovascular:      Rate and Rhythm: Normal rate and regular rhythm.      Heart sounds: Normal heart sounds. No murmur heard.  Pulmonary:      Effort: Pulmonary effort is normal. No respiratory distress.      Breath sounds: Normal breath sounds. No wheezing, rhonchi or rales.   Abdominal:      General: Abdomen is flat. There is no distension.      Palpations: Abdomen is soft.      Tenderness: There is no abdominal tenderness. There is no guarding.   Musculoskeletal:         General: No swelling, tenderness or deformity.      Cervical back: Neck supple. No rigidity. No muscular tenderness.      Right lower leg: No edema.      Left lower leg: No edema.   Lymphadenopathy:      Cervical: No cervical adenopathy.   Skin:     General: Skin is warm and dry.   Neurological:      General: No focal deficit present.      Mental Status: She is alert.      Cranial Nerves: No cranial nerve deficit.      Motor: No weakness.      Comments: Oriented to person and place but not to time.   Psychiatric:         Mood and Affect: Mood normal.         Behavior: Behavior normal.     Discharge Disposition:  Home or Self Care    Discharge Medications:     Discharge Medications      New Medications      Instructions Start Date   doxycycline 100 MG capsule  Commonly known as: VIBRAMYCIN   100 mg, Oral, 2 Times Daily         Continue These Medications      Instructions Start Date   acebutolol 200 MG capsule  Commonly known as: SECTRAL   200 mg, Oral, 2 Times Daily      amoxicillin-clavulanate 875-125 MG per tablet  Commonly known as: Augmentin   1 tablet, Oral, 2 Times Daily      apixaban 5 MG tablet tablet  Commonly known as: ELIQUIS   5 mg, Oral, Every 12 Hours Scheduled       bisacodyl 5 MG EC tablet  Commonly known as: DULCOLAX   10 mg, Oral, Every Night at Bedtime      budesonide-formoterol 160-4.5 MCG/ACT inhaler  Commonly known as: SYMBICORT   2 puffs, Inhalation, 2 Times Daily - RT      Calmoseptine 0.44-20.6 % ointment  Generic drug: Menthol-Zinc Oxide   1 application, Topical, As Needed, Apply to kathi-area after incontinent episodes      dilTIAZem  MG 24 hr capsule  Commonly known as: CARDIZEM CD   360 mg, Oral, Daily      famotidine 20 MG tablet  Commonly known as: PEPCID   20 mg, Oral, Daily      gabapentin 100 MG capsule  Commonly known as: NEURONTIN   100 mg, Oral, 3 Times Daily      ipratropium-albuterol 0.5-2.5 mg/3 ml nebulizer  Commonly known as: DUO-NEB   3 mL, Nebulization, Every 4 Hours PRN      COMBIVENT RESPIMAT IN   2 puffs, Inhalation, Daily,  MCG/ACT      lactulose 10 GM/15ML solution  Commonly known as: CHRONULAC   20 g, Oral, Daily PRN      LORazepam 0.5 MG tablet  Commonly known as: ATIVAN   0.5 mg, Oral, 3 Times Daily      magic barrier cream   1 application, Topical, 2 Times Daily PRN      megestrol 40 MG/ML suspension  Commonly known as: MEGACE   400 mg, Oral, Daily      melatonin 5 MG tablet tablet   5 mg, Oral, Nightly      ondansetron 4 MG tablet  Commonly known as: ZOFRAN   4 mg, Oral, Every 8 Hours PRN      oxyCODONE-acetaminophen 7.5-325 MG per tablet  Commonly known as: PERCOCET   1 tablet, Oral, Every 4 Hours PRN      polyethylene glycol 17 GM/SCOOP powder  Commonly known as: MIRALAX   17 g, Oral, Daily      Vitamin D 50 MCG (2000 UT) tablet   2,000 Units, Oral, Daily         Stop These Medications    Zithromax Z-Seferino 250 MG tablet  Generic drug: azithromycin            Discharge Diet:   Diet Instructions     Diet: Regular, Soft Texture; Thin Liquids, No Restrictions; Whole      Discharge Diet:  Regular  Soft Texture       Fluid Consistency: Thin Liquids, No Restrictions    Soft Options: Whole    Ground meat.          Activity at  Discharge:   Activity Instructions     Activity as Tolerated            Discharge Care Plan/Instructions:   1.  Follow-up with your primary care provider within 1 week of discharge.   2.  Patient should have physical and occupational therapy at the skilled nursing facility for strengthening   3.  Patient should have oxygen by nasal cannula at 2 L/min at the [facility] nursing [facility] to keep O2 sats greater than 92%.    Follow-up Appointments:   No future appointments.    Test Results Pending at Discharge:   Pending Labs     Order Current Status    Blood Culture - Blood, Arm, Left Preliminary result    Blood Culture - Blood, Arm, Right Preliminary result          Ny Nicolas MD  02/05/23  12:51 CST    Time: 33 minutes of time was spent evaluating patient and planning discharge.      Part of this note may be an electronic transcription/translation of spoken language to printed text using the Dragon Dictation system.

## 2023-02-06 LAB
QT INTERVAL: 288 MS
QT INTERVAL: 368 MS
QTC INTERVAL: 460 MS
QTC INTERVAL: 464 MS

## 2023-02-06 NOTE — OUTREACH NOTE
Prep Survey    Flowsheet Row Responses   [facility] facility patient discharged from? [facility]   Is LACE score < 7 ? No   Eligibility Not Eligible   What are the reasons patient is not eligible? Other  [from SNF]   Does the patient have one of the following disease processes/diagnoses(primary or secondary)? Sepsis  [SVT, HCAP, UTI, sepsis]   Prep survey completed? Yes          KYLER PARKS - Registered Nurse

## 2023-02-07 LAB
BACTERIA SPEC AEROBE CULT: NORMAL
BACTERIA SPEC AEROBE CULT: NORMAL

## 2023-02-09 ENCOUNTER — HOSPITAL ENCOUNTER (EMERGENCY)
Facility: HOSPITAL | Age: 83
Discharge: NURSING, REHAB OR LONG-TERM CARE | End: 2023-02-09
Attending: FAMILY MEDICINE | Admitting: FAMILY MEDICINE
Payer: MEDICARE

## 2023-02-09 ENCOUNTER — APPOINTMENT (OUTPATIENT)
Dept: GENERAL RADIOLOGY | Facility: HOSPITAL | Age: 83
End: 2023-02-09
Payer: MEDICARE

## 2023-02-09 ENCOUNTER — APPOINTMENT (OUTPATIENT)
Dept: CT IMAGING | Facility: HOSPITAL | Age: 83
End: 2023-02-09
Payer: MEDICARE

## 2023-02-09 VITALS
BODY MASS INDEX: 18.85 KG/M2 | HEART RATE: 95 BPM | TEMPERATURE: 98.5 F | HEIGHT: 60 IN | OXYGEN SATURATION: 96 % | WEIGHT: 96 LBS | DIASTOLIC BLOOD PRESSURE: 73 MMHG | RESPIRATION RATE: 30 BRPM | SYSTOLIC BLOOD PRESSURE: 120 MMHG

## 2023-02-09 DIAGNOSIS — I47.1 SVT (SUPRAVENTRICULAR TACHYCARDIA): Primary | ICD-10-CM

## 2023-02-09 LAB
ALBUMIN SERPL-MCNC: 3.2 G/DL (ref 3.5–5.2)
ALBUMIN/GLOB SERPL: 0.9 G/DL
ALP SERPL-CCNC: 51 U/L (ref 39–117)
ALT SERPL W P-5'-P-CCNC: 9 U/L (ref 1–33)
ANION GAP SERPL CALCULATED.3IONS-SCNC: 10 MMOL/L (ref 5–15)
ANISOCYTOSIS BLD QL: ABNORMAL
AST SERPL-CCNC: 18 U/L (ref 1–32)
BASOPHILS # BLD MANUAL: 0.3 10*3/MM3 (ref 0–0.2)
BASOPHILS NFR BLD MANUAL: 2 % (ref 0–1.5)
BILIRUB SERPL-MCNC: <0.2 MG/DL (ref 0–1.2)
BUN SERPL-MCNC: 20 MG/DL (ref 8–23)
BUN/CREAT SERPL: 26.7 (ref 7–25)
CALCIUM SPEC-SCNC: 9.1 MG/DL (ref 8.6–10.5)
CHLORIDE SERPL-SCNC: 105 MMOL/L (ref 98–107)
CLUMPED PLATELETS: PRESENT
CO2 SERPL-SCNC: 24 MMOL/L (ref 22–29)
CREAT SERPL-MCNC: 0.75 MG/DL (ref 0.57–1)
D DIMER PPP FEU-MCNC: 0.59 MCGFEU/ML (ref 0–0.82)
D-LACTATE SERPL-SCNC: 2.8 MMOL/L (ref 0.5–2)
DEPRECATED RDW RBC AUTO: 55.7 FL (ref 37–54)
EGFRCR SERPLBLD CKD-EPI 2021: 79.6 ML/MIN/1.73
ERYTHROCYTE [DISTWIDTH] IN BLOOD BY AUTOMATED COUNT: 15 % (ref 12.3–15.4)
GLOBULIN UR ELPH-MCNC: 3.6 GM/DL
GLUCOSE SERPL-MCNC: 98 MG/DL (ref 65–99)
HCT VFR BLD AUTO: 38.7 % (ref 34–46.6)
HGB BLD-MCNC: 12.3 G/DL (ref 12–15.9)
HOLD SPECIMEN: NORMAL
LYMPHOCYTES # BLD MANUAL: 3.39 10*3/MM3 (ref 0.7–3.1)
LYMPHOCYTES NFR BLD MANUAL: 8 % (ref 5–12)
MCH RBC QN AUTO: 32 PG (ref 26.6–33)
MCHC RBC AUTO-ENTMCNC: 31.8 G/DL (ref 31.5–35.7)
MCV RBC AUTO: 100.8 FL (ref 79–97)
MONOCYTES # BLD: 1.18 10*3/MM3 (ref 0.1–0.9)
NEUTROPHILS # BLD AUTO: 9.88 10*3/MM3 (ref 1.7–7)
NEUTROPHILS NFR BLD MANUAL: 66 % (ref 42.7–76)
NEUTS BAND NFR BLD MANUAL: 1 % (ref 0–5)
NRBC BLD AUTO-RTO: 0.1 /100 WBC (ref 0–0.2)
NT-PROBNP SERPL-MCNC: 5092 PG/ML (ref 0–1800)
PLATELET # BLD AUTO: 467 10*3/MM3 (ref 140–450)
PMV BLD AUTO: 9.2 FL (ref 6–12)
POIKILOCYTOSIS BLD QL SMEAR: ABNORMAL
POLYCHROMASIA BLD QL SMEAR: ABNORMAL
POTASSIUM SERPL-SCNC: 4.6 MMOL/L (ref 3.5–5.2)
PROCALCITONIN SERPL-MCNC: 0.08 NG/ML (ref 0–0.25)
PROT SERPL-MCNC: 6.8 G/DL (ref 6–8.5)
RBC # BLD AUTO: 3.84 10*6/MM3 (ref 3.77–5.28)
SMALL PLATELETS BLD QL SMEAR: ABNORMAL
SODIUM SERPL-SCNC: 139 MMOL/L (ref 136–145)
TROPONIN T SERPL HS-MCNC: 56 NG/L
TROPONIN T SERPL HS-MCNC: 56 NG/L
VARIANT LYMPHS NFR BLD MANUAL: 23 % (ref 19.6–45.3)
WBC MORPH BLD: NORMAL
WBC NRBC COR # BLD: 14.75 10*3/MM3 (ref 3.4–10.8)

## 2023-02-09 PROCEDURE — 83605 ASSAY OF LACTIC ACID: CPT | Performed by: FAMILY MEDICINE

## 2023-02-09 PROCEDURE — 93010 ELECTROCARDIOGRAM REPORT: CPT | Performed by: HOSPITALIST

## 2023-02-09 PROCEDURE — 83880 ASSAY OF NATRIURETIC PEPTIDE: CPT | Performed by: FAMILY MEDICINE

## 2023-02-09 PROCEDURE — 85379 FIBRIN DEGRADATION QUANT: CPT | Performed by: FAMILY MEDICINE

## 2023-02-09 PROCEDURE — 25010000002 ADENOSINE PER 6 MG

## 2023-02-09 PROCEDURE — 70450 CT HEAD/BRAIN W/O DYE: CPT

## 2023-02-09 PROCEDURE — 99284 EMERGENCY DEPT VISIT MOD MDM: CPT

## 2023-02-09 PROCEDURE — 85025 COMPLETE CBC W/AUTO DIFF WBC: CPT | Performed by: FAMILY MEDICINE

## 2023-02-09 PROCEDURE — 84145 PROCALCITONIN (PCT): CPT | Performed by: FAMILY MEDICINE

## 2023-02-09 PROCEDURE — 84484 ASSAY OF TROPONIN QUANT: CPT | Performed by: FAMILY MEDICINE

## 2023-02-09 PROCEDURE — 93005 ELECTROCARDIOGRAM TRACING: CPT | Performed by: FAMILY MEDICINE

## 2023-02-09 PROCEDURE — 80053 COMPREHEN METABOLIC PANEL: CPT | Performed by: FAMILY MEDICINE

## 2023-02-09 PROCEDURE — 71045 X-RAY EXAM CHEST 1 VIEW: CPT

## 2023-02-09 PROCEDURE — 96374 THER/PROPH/DIAG INJ IV PUSH: CPT

## 2023-02-09 PROCEDURE — 85007 BL SMEAR W/DIFF WBC COUNT: CPT | Performed by: FAMILY MEDICINE

## 2023-02-09 RX ORDER — ACETAMINOPHEN 500 MG
1000 TABLET ORAL ONCE
Status: DISCONTINUED | OUTPATIENT
Start: 2023-02-09 | End: 2023-02-09

## 2023-02-09 RX ORDER — ADENOSINE 3 MG/ML
12 INJECTION, SOLUTION INTRAVENOUS ONCE
Status: DISCONTINUED | OUTPATIENT
Start: 2023-02-09 | End: 2023-02-09

## 2023-02-09 RX ORDER — NAPROXEN 500 MG/1
500 TABLET ORAL ONCE
Status: DISCONTINUED | OUTPATIENT
Start: 2023-02-09 | End: 2023-02-09

## 2023-02-09 RX ORDER — ADENOSINE 3 MG/ML
INJECTION, SOLUTION INTRAVENOUS
Status: COMPLETED
Start: 2023-02-09 | End: 2023-02-09

## 2023-02-09 RX ORDER — AMOXICILLIN AND CLAVULANATE POTASSIUM 875; 125 MG/1; MG/1
1 TABLET, FILM COATED ORAL 2 TIMES DAILY
Qty: 8 TABLET | Refills: 0 | Status: SHIPPED | OUTPATIENT
Start: 2023-02-09 | End: 2023-02-13

## 2023-02-09 RX ORDER — SODIUM CHLORIDE 0.9 % (FLUSH) 0.9 %
10 SYRINGE (ML) INJECTION AS NEEDED
Status: DISCONTINUED | OUTPATIENT
Start: 2023-02-09 | End: 2023-02-09 | Stop reason: HOSPADM

## 2023-02-09 RX ORDER — ADENOSINE 3 MG/ML
6 INJECTION, SOLUTION INTRAVENOUS ONCE
Status: COMPLETED | OUTPATIENT
Start: 2023-02-09 | End: 2023-02-09

## 2023-02-09 RX ORDER — METOCLOPRAMIDE HYDROCHLORIDE 5 MG/ML
10 INJECTION INTRAMUSCULAR; INTRAVENOUS ONCE
Status: DISCONTINUED | OUTPATIENT
Start: 2023-02-09 | End: 2023-02-09

## 2023-02-09 RX ORDER — DOXYCYCLINE 100 MG/1
100 CAPSULE ORAL 2 TIMES DAILY
Qty: 10 CAPSULE | Refills: 0 | Status: SHIPPED | OUTPATIENT
Start: 2023-02-09 | End: 2023-02-14

## 2023-02-09 RX ORDER — AMOXICILLIN AND CLAVULANATE POTASSIUM 875; 125 MG/1; MG/1
1 TABLET, FILM COATED ORAL 2 TIMES DAILY
Qty: 10 TABLET | Refills: 0 | Status: SHIPPED | OUTPATIENT
Start: 2023-02-09 | End: 2023-02-14

## 2023-02-09 RX ADMIN — SODIUM CHLORIDE, POTASSIUM CHLORIDE, SODIUM LACTATE AND CALCIUM CHLORIDE 500 ML: 600; 310; 30; 20 INJECTION, SOLUTION INTRAVENOUS at 03:24

## 2023-02-09 RX ADMIN — ADENOSINE 6 MG: 3 INJECTION, SOLUTION INTRAVENOUS at 03:15

## 2023-02-09 RX ADMIN — ADENOSINE 6 MG: 3 INJECTION INTRAVENOUS at 03:15

## 2023-02-09 NOTE — ED PROVIDER NOTES
Subjective   History of Present Illness  Patient is a pleasant 82-year-old female with a history of frequent urinary tract infections, SVT that lives at a nursing home.  Patient was recently discharged from the hospital after having an SVT episode which was cardioverted with 6 mg of adenosine.  Patient at that time found to have pneumonia, sepsis.  Was treated with pressors and antibiotics in the hospital.     Patient's note from last visit on 2/2/2023 till 2 2/5/2023 was admitted to the hospital.  Patient was found to have Enterococcus faecalis resistance to levofloxacin.  Patient had an elevated INR of 2.7 for elevated D-dimer.  Patient also had an elevated CRP.  Patient has a history of mild dementia SVTs pulmonary hypertension pulmonary embolism on chronic anticoagulation with Eliquis.  Patient does have a history of chronic respiratory failure on 2 L.  She was admitted for pneumonia and started on Zosyn and doxycycline.  It appears that she did well during her stay and was discharged back to the nursing home on oral Augmentin.        Review of Systems    Past Medical History:   Diagnosis Date   • Chronic hyponatremia    • Frequent UTI    • Low back pain    • Migraines    • Pneumonia    • SVT (supraventricular tachycardia) (HCC)    • UTI (urinary tract infection)        Allergies   Allergen Reactions   • Codeine Anaphylaxis       Past Surgical History:   Procedure Laterality Date   • CHOLECYSTECTOMY WITH INTRAOPERATIVE CHOLANGIOGRAM N/A 5/9/2019    Procedure: CHOLECYSTECTOMY LAPAROSCOPIC INTRAOPERATIVE CHOLANGIOGRAM;  Surgeon: Chandan Bravo MD;  Location: [facility] OR;  Service: General   • HIP HEMIARTHROPLASTY Left 5/6/2022    Procedure: HIP HEMIARTHROPLASTY;  Surgeon: Barrington Simons MD;  Location: [facility] OR;  Service: Orthopedics;  Laterality: Left;       Family History   Problem Relation Age of Onset   • Stroke Mother    • Anemia Mother    • Cancer Father         Colon cancer   • Heart disease Father    •  Heart attack Father    • Colon cancer Father        Social History     Socioeconomic History   • Marital status:    Tobacco Use   • Smoking status: Never   • Smokeless tobacco: Never   Vaping Use   • Vaping Use: Never used   Substance and Sexual Activity   • Alcohol use: No   • Drug use: No   • Sexual activity: Defer           Objective   Physical Exam    Chemical Cardioversion    Date/Time: 2/9/2023 3:25 AM  Performed by: Gerber Hale MD  Authorized by: Gerber Hale MD     Consent:     Consent obtained:  Verbal    Consent given by:  Patient    Risks discussed:  Induced arrhythmia    Alternatives discussed:  No treatment  Pre-procedure details:     Cardioversion basis:  Emergent    Rhythm:  Supraventricular tachycardia  Attempt one:     Cardioversion medication:  Adenosine 6mg      Medication outcome:  Conversion to normal sinus rhythm  Post-procedure details:     Patient status:  Awake    Patient tolerance of procedure:  Tolerated well, no immediate complications               ED Course  ED Course as of 02/09/23 0514   u Feb 09, 2023   0319 Patient given 6 mg of adenosine for SVT.  Patient returned back into sinus rhythm. [MA]   7034 HS Troponin T(!!): 56 [MA]   2503 Patient reassessed, she is more comfortable.  Daughter at bedside questions addressed updated on lab results. [MA]      ED Course User Index  [MA] Gerber Hale MD            Labs Reviewed   COMPREHENSIVE METABOLIC PANEL - Abnormal; Notable for the following components:       Result Value    Albumin 3.2 (*)     BUN/Creatinine Ratio 26.7 (*)     All other components within normal limits    Narrative:     GFR Normal >60  Chronic Kidney Disease <60  Kidney Failure <15    The GFR formula is only valid for adults with stable renal function between ages 18 and 70.   CBC WITH AUTO DIFFERENTIAL - Abnormal; Notable for the following components:    WBC 14.75 (*)     .8 (*)     RDW-SD 55.7 (*)     Platelets 467 (*)     All other  components within normal limits   BNP (IN-HOUSE) - Abnormal; Notable for the following components:    proBNP 5,092.0 (*)     All other components within normal limits    Narrative:     Among patients with dyspnea, NT-proBNP is highly sensitive for the detection of acute congestive heart failure. In addition NT-proBNP of <300 pg/ml effectively rules out acute congestive heart failure with 99% negative predictive value.    Results may be falsely decreased if patient taking Biotin.     TROPONIN - Abnormal; Notable for the following components:    HS Troponin T 56 (*)     All other components within normal limits    Narrative:     High Sensitive Troponin T Reference Range:  <10.0 ng/L- Negative Female for AMI  <15.0 ng/L- Negative Male for AMI  >=10 - Abnormal Female indicating possible myocardial injury.  >=15 - Abnormal Male indicating possible myocardial injury.   Clinicians would have to utilize clinical acumen, EKG, Troponin, and serial changes to determine if it is an Acute Myocardial Infarction or myocardial injury due to an underlying chronic condition.        SINGLE HSTROPONIN T - Abnormal; Notable for the following components:    HS Troponin T 56 (*)     All other components within normal limits    Narrative:     High Sensitive Troponin T Reference Range:  <10.0 ng/L- Negative Female for AMI  <15.0 ng/L- Negative Male for AMI  >=10 - Abnormal Female indicating possible myocardial injury.  >=15 - Abnormal Male indicating possible myocardial injury.   Clinicians would have to utilize clinical acumen, EKG, Troponin, and serial changes to determine if it is an Acute Myocardial Infarction or myocardial injury due to an underlying chronic condition.        LACTIC ACID, PLASMA - Abnormal; Notable for the following components:    Lactate 2.8 (*)     All other components within normal limits   MANUAL DIFFERENTIAL - Abnormal; Notable for the following components:    Basophil % 2.0 (*)     Neutrophils Absolute 9.88 (*)   "   Lymphocytes Absolute 3.39 (*)     Monocytes Absolute 1.18 (*)     Basophils Absolute 0.30 (*)     All other components within normal limits   D-DIMER, QUANTITATIVE - Normal    Narrative:     According to the assay 's published package insert, a normal (<0.50 MCGFEU/mL) D-dimer result in conjunction with a non-high clinical probability assessment, excludes deep vein thrombosis (DVT) and pulmonary embolism (PE) with high sensitivity.    D-dimer values increase with age and this can make VTE exclusion of an older population difficult. To address this, the [employer], based on best available evidence and recent guidelines, recommends that clinicians use age-adjusted D-dimer thresholds in patients greater than 50 years of age with: a) a low probability of PE who do not meet all Pulmonary Embolism Rule Out Criteria, or b) in those with intermediate probability of PE.   The formula for an age-adjusted D-dimer cut-off is \"age/100\".  For example, a 60 year old patient would have an age-adjusted cut-off of 0.60 MCGFEU/mL and an 80 year old 0.80 MCGFEU/mL.   PROCALCITONIN - Normal    Narrative:     As a Marker for Sepsis (Non-Neonates):    1. <0.5 ng/mL represents a low risk of severe sepsis and/or septic shock.  2. >2 ng/mL represents a high risk of severe sepsis and/or septic shock.    As a Marker for Lower Respiratory Tract Infections that require antibiotic therapy:    PCT on Admission    Antibiotic Therapy       6-12 Hrs later    >0.5                Strongly Recommended  >0.25 - <0.5        Recommended   0.1 - 0.25          Discouraged              Remeasure/reassess PCT  <0.1                Strongly Discouraged     Remeasure/reassess PCT    As 28 day mortality risk marker: \"Change in Procalcitonin Result\" (>80% or <=80%) if Day 0 (or Day 1) and Day 4 values are available. Refer to http://www.[employer]-pct-calculator.com    Change in PCT <=80%  A decrease of PCT levels below or equal to 80% " defines a positive change in PCT test result representing a higher risk for 28-day all-cause mortality of patients diagnosed with severe sepsis for septic shock.    Change in PCT >80%  A decrease of PCT levels of more than 80% defines a negative change in PCT result representing a lower risk for 28-day all-cause mortality of patients diagnosed with severe sepsis or septic shock.      RAINBOW URINE   LACTIC ACID, REFLEX   HIGH SENSITIVITIY TROPONIN T 2HR   CBC AND DIFFERENTIAL    Narrative:     The following orders were created for panel order CBC & Differential.  Procedure                               Abnormality         Status                     ---------                               -----------         ------                     CBC Auto Differential[[id number]]        Abnormal            Final result                 Please view results for these tests on the individual orders.                                       Medical Decision Making  Differential diagnosis for chest pain includes ACS, PE, AAA, Unstable angina, Aortic Dissection, GERD, Gastritis, Trauma, Viral Infection, MSK Pain, COPD, CHF, Pleurisy, CHF, Biliary Disease.  Patient was in SVT, this may be the most likely etiology of her chest distress.  However due to her recent sepsis and pneumonia, we will repeat labs.  We will get a CBC CMP lactic acid and a Pro-Sergio.  We will also check a chest x-ray on patient and give her IV fluids.  Patient will be monitored for progress.    Patient underwent a cardioversion with adenosine.  She has an elevated BNP and lactate.  Discussed with hospitalist on-call Dr. Rasmussen and we ran to the case.  Reviewed old records.  Patient has been given IV fluids and antibiotics.  He felt the patient would be stable for disposition and outpatient antibiotics.  Discussed with patient's daughter and patient they were comfortable being discharged back to the nursing home they felt that they had good care there.  I will discharge  patient back a few days of supplemental antibiotics.  Their questions were addressed they were comfortable with discharging home.    Amount and/or Complexity of Data Reviewed  Labs: ordered.     Details: She has leukocytosis, Has a BNP of 5000, troponin of 56, lactate of 2.8  Radiology: ordered.     Details: My interpretation of patient's chest x-ray compared to previous x-ray from 2/2/23, patient continues to have the pneumonia in the right lower lobe  ECG/medicine tests: ordered.      Risk  OTC drugs.  Prescription drug management.  Decision regarding hospitalization.          Final diagnoses:   SVT (supraventricular tachycardia) (HCC)       ED Disposition  ED Disposition     ED Disposition   Discharge    Condition   Stable    Comment   --             Chris Hermosillo MD  [address] [id number] [phone]    Call in 3 days           Medication List      New Prescriptions    doxycycline 100 MG capsule  Commonly known as: MONODOX  Take 1 capsule by mouth 2 (Two) Times a Day for 5 days.        Changed    * amoxicillin-clavulanate 875-125 MG per tablet  Commonly known as: AUGMENTIN  Take 1 tablet by mouth 2 (Two) Times a Day for 5 days.  What changed: You were already taking a medication with the same name, and this prescription was added. Make sure you understand how and when to take each.     * amoxicillin-clavulanate 875-125 MG per tablet  Commonly known as: Augmentin  Take 1 tablet by mouth 2 (Two) Times a Day for 4 days.  What changed: Another medication with the same name was added. Make sure you understand how and when to take each.         * This list has 2 medication(s) that are the same as other medications prescribed for you. Read the directions carefully, and ask your doctor or other care provider to review them with you.               Where to Get Your Medications      These medications were sent to [email or web address] [place], [place] - [facility] [address] -  [phone] [facility] [phone] FX  [address] [facility] [facility] [address]    Phone: [phone]   · amoxicillin-clavulanate 875-125 MG per tablet  · amoxicillin-clavulanate 875-125 MG per tablet  · doxycycline 100 MG capsule          Gerber Hale MD  02/09/23 0583

## 2023-02-09 NOTE — ED NOTES
Faxed face sheet and Medical Necessity form to [facility]. Called dispatch regarding pickup of pt

## 2023-02-11 LAB
QT INTERVAL: 336 MS
QTC INTERVAL: 417 MS

## 2023-03-08 ENCOUNTER — APPOINTMENT (OUTPATIENT)
Dept: GENERAL RADIOLOGY | Facility: HOSPITAL | Age: 83
End: 2023-03-08
Payer: MEDICARE

## 2023-03-08 ENCOUNTER — HOSPITAL ENCOUNTER (EMERGENCY)
Facility: HOSPITAL | Age: 83
Discharge: NURSING, REHAB OR LONG-TERM CARE | End: 2023-03-08
Attending: FAMILY MEDICINE | Admitting: FAMILY MEDICINE
Payer: MEDICARE

## 2023-03-08 ENCOUNTER — APPOINTMENT (OUTPATIENT)
Dept: CT IMAGING | Facility: HOSPITAL | Age: 83
End: 2023-03-08
Payer: MEDICARE

## 2023-03-08 VITALS
HEART RATE: 86 BPM | WEIGHT: 95.9 LBS | RESPIRATION RATE: 20 BRPM | TEMPERATURE: 97.9 F | BODY MASS INDEX: 18.83 KG/M2 | DIASTOLIC BLOOD PRESSURE: 82 MMHG | HEIGHT: 60 IN | SYSTOLIC BLOOD PRESSURE: 132 MMHG | OXYGEN SATURATION: 96 %

## 2023-03-08 DIAGNOSIS — G45.9 TIA (TRANSIENT ISCHEMIC ATTACK): Primary | ICD-10-CM

## 2023-03-08 DIAGNOSIS — R53.1 WEAKNESS: ICD-10-CM

## 2023-03-08 DIAGNOSIS — R77.8 ELEVATED TROPONIN: ICD-10-CM

## 2023-03-08 LAB
ALBUMIN SERPL-MCNC: 4.1 G/DL (ref 3.5–5.2)
ALBUMIN/GLOB SERPL: 1.2 G/DL
ALP SERPL-CCNC: 51 U/L (ref 39–117)
ALT SERPL W P-5'-P-CCNC: 12 U/L (ref 1–33)
ANION GAP SERPL CALCULATED.3IONS-SCNC: 11 MMOL/L (ref 5–15)
APTT PPP: 36.8 SECONDS (ref 24.1–35)
AST SERPL-CCNC: 21 U/L (ref 1–32)
BACTERIA UR QL AUTO: ABNORMAL /HPF
BASOPHILS # BLD AUTO: 0.07 10*3/MM3 (ref 0–0.2)
BASOPHILS NFR BLD AUTO: 0.8 % (ref 0–1.5)
BILIRUB SERPL-MCNC: 0.3 MG/DL (ref 0–1.2)
BILIRUB UR QL STRIP: NEGATIVE
BUN SERPL-MCNC: 10 MG/DL (ref 8–23)
BUN/CREAT SERPL: 16.1 (ref 7–25)
CALCIUM SPEC-SCNC: 9.8 MG/DL (ref 8.6–10.5)
CHLORIDE SERPL-SCNC: 102 MMOL/L (ref 98–107)
CLARITY UR: CLEAR
CO2 SERPL-SCNC: 25 MMOL/L (ref 22–29)
COLOR UR: YELLOW
CREAT SERPL-MCNC: 0.62 MG/DL (ref 0.57–1)
DEPRECATED RDW RBC AUTO: 58.2 FL (ref 37–54)
EGFRCR SERPLBLD CKD-EPI 2021: 89 ML/MIN/1.73
EOSINOPHIL # BLD AUTO: 0.32 10*3/MM3 (ref 0–0.4)
EOSINOPHIL NFR BLD AUTO: 3.7 % (ref 0.3–6.2)
ERYTHROCYTE [DISTWIDTH] IN BLOOD BY AUTOMATED COUNT: 16.3 % (ref 12.3–15.4)
GLOBULIN UR ELPH-MCNC: 3.4 GM/DL
GLUCOSE SERPL-MCNC: 92 MG/DL (ref 65–99)
GLUCOSE UR STRIP-MCNC: NEGATIVE MG/DL
HCT VFR BLD AUTO: 41 % (ref 34–46.6)
HGB BLD-MCNC: 13.3 G/DL (ref 12–15.9)
HGB UR QL STRIP.AUTO: NEGATIVE
HOLD SPECIMEN: NORMAL
HOLD SPECIMEN: NORMAL
HYALINE CASTS UR QL AUTO: ABNORMAL /LPF
IMM GRANULOCYTES # BLD AUTO: 0.08 10*3/MM3 (ref 0–0.05)
IMM GRANULOCYTES NFR BLD AUTO: 0.9 % (ref 0–0.5)
INR PPP: 1.36 (ref 0.91–1.09)
KETONES UR QL STRIP: NEGATIVE
LEUKOCYTE ESTERASE UR QL STRIP.AUTO: ABNORMAL
LYMPHOCYTES # BLD AUTO: 2.81 10*3/MM3 (ref 0.7–3.1)
LYMPHOCYTES NFR BLD AUTO: 32.8 % (ref 19.6–45.3)
MCH RBC QN AUTO: 31.5 PG (ref 26.6–33)
MCHC RBC AUTO-ENTMCNC: 32.4 G/DL (ref 31.5–35.7)
MCV RBC AUTO: 97.2 FL (ref 79–97)
MONOCYTES # BLD AUTO: 0.78 10*3/MM3 (ref 0.1–0.9)
MONOCYTES NFR BLD AUTO: 9.1 % (ref 5–12)
NEUTROPHILS NFR BLD AUTO: 4.52 10*3/MM3 (ref 1.7–7)
NEUTROPHILS NFR BLD AUTO: 52.7 % (ref 42.7–76)
NITRITE UR QL STRIP: NEGATIVE
NRBC BLD AUTO-RTO: 0 /100 WBC (ref 0–0.2)
PH UR STRIP.AUTO: 6 [PH] (ref 5–8)
PLATELET # BLD AUTO: 462 10*3/MM3 (ref 140–450)
PMV BLD AUTO: 9.4 FL (ref 6–12)
POTASSIUM SERPL-SCNC: 4.2 MMOL/L (ref 3.5–5.2)
PROT SERPL-MCNC: 7.5 G/DL (ref 6–8.5)
PROT UR QL STRIP: NEGATIVE
PROTHROMBIN TIME: 16.9 SECONDS (ref 11.8–14.8)
RBC # BLD AUTO: 4.22 10*6/MM3 (ref 3.77–5.28)
RBC # UR STRIP: ABNORMAL /HPF
REF LAB TEST METHOD: ABNORMAL
SODIUM SERPL-SCNC: 138 MMOL/L (ref 136–145)
SP GR UR STRIP: 1.01 (ref 1–1.03)
SQUAMOUS #/AREA URNS HPF: ABNORMAL /HPF
TROPONIN T SERPL HS-MCNC: 61 NG/L
UROBILINOGEN UR QL STRIP: ABNORMAL
WBC # UR STRIP: ABNORMAL /HPF
WBC NRBC COR # BLD: 8.58 10*3/MM3 (ref 3.4–10.8)
WHOLE BLOOD HOLD COAG: NORMAL
WHOLE BLOOD HOLD SPECIMEN: NORMAL

## 2023-03-08 PROCEDURE — 71045 X-RAY EXAM CHEST 1 VIEW: CPT

## 2023-03-08 PROCEDURE — 70450 CT HEAD/BRAIN W/O DYE: CPT

## 2023-03-08 PROCEDURE — 93005 ELECTROCARDIOGRAM TRACING: CPT | Performed by: FAMILY MEDICINE

## 2023-03-08 PROCEDURE — 99284 EMERGENCY DEPT VISIT MOD MDM: CPT | Performed by: PHYSICIAN ASSISTANT

## 2023-03-08 PROCEDURE — 84484 ASSAY OF TROPONIN QUANT: CPT | Performed by: FAMILY MEDICINE

## 2023-03-08 PROCEDURE — 80053 COMPREHEN METABOLIC PANEL: CPT | Performed by: FAMILY MEDICINE

## 2023-03-08 PROCEDURE — 85610 PROTHROMBIN TIME: CPT | Performed by: FAMILY MEDICINE

## 2023-03-08 PROCEDURE — 81001 URINALYSIS AUTO W/SCOPE: CPT | Performed by: FAMILY MEDICINE

## 2023-03-08 PROCEDURE — 85730 THROMBOPLASTIN TIME PARTIAL: CPT | Performed by: FAMILY MEDICINE

## 2023-03-08 PROCEDURE — 99285 EMERGENCY DEPT VISIT HI MDM: CPT

## 2023-03-08 PROCEDURE — 93010 ELECTROCARDIOGRAM REPORT: CPT | Performed by: STUDENT IN AN ORGANIZED HEALTH CARE EDUCATION/TRAINING PROGRAM

## 2023-03-08 PROCEDURE — 85025 COMPLETE CBC W/AUTO DIFF WBC: CPT | Performed by: FAMILY MEDICINE

## 2023-03-08 PROCEDURE — P9612 CATHETERIZE FOR URINE SPEC: HCPCS

## 2023-03-08 RX ORDER — SODIUM CHLORIDE 0.9 % (FLUSH) 0.9 %
10 SYRINGE (ML) INJECTION AS NEEDED
Status: DISCONTINUED | OUTPATIENT
Start: 2023-03-08 | End: 2023-03-08 | Stop reason: HOSPADM

## 2023-03-08 NOTE — ED PROVIDER NOTES
HPI:    Patient is a 82-year-old white female presents to the emergency room from the nursing home after being seen at 0845 today for left-sided weakness and facial droop.  Patient was allegedly seen by nursing at the nursing home at 0815 was advised  Mild confusion and generalized weakness.  When he returned the facial droop testing was acute and the family felt that the patient needed to be sent to the emergency room for concern of acute stroke.  The patient is a DNR which includes did not intubate and DO NOT do CPR.  Upon arrival patient was activated as acute stroke.      REVIEW OF SYSTEMS  CONSTITUTIONAL:  No complaints of fever, chills,or weakness  EYES:  No complaints of discharge   ENT: No complaints of sore throat or ear pain  CARDIOVASCULAR:  No complaints of chest pain, palpitations, or swelling  RESPIRATORY:  No complaints of cough or shortness of breath  GI:  No complaints of abdominal pain, nausea, vomiting, or diarrhea  MUSCULOSKELETAL:  No complaints of back pain  SKIN:  No complaints of rash  NEUROLOGIC: Positive for acute left facial droop and worsening left-sided weakness.  ENDOCRINE:  No complaints of polyuria or polydipsia  LYMPHATIC:  No complaints of swollen glands  GENITOURINARY: No complaints of urinary frequency or hematuria        PAST MEDICAL HISTORY  Past Medical History:   Diagnosis Date   • Chronic hyponatremia    • Frequent UTI    • Low back pain    • Migraines    • Pneumonia    • SVT (supraventricular tachycardia) (HCC)    • UTI (urinary tract infection)        FAMILY HISTORY  Family History   Problem Relation Age of Onset   • Stroke Mother    • Anemia Mother    • Cancer Father         Colon cancer   • Heart disease Father    • Heart attack Father    • Colon cancer Father        SOCIAL HISTORY  Social History     Socioeconomic History   • Marital status:    Tobacco Use   • Smoking status: Never   • Smokeless tobacco: Never   Vaping Use   • Vaping Use: Never used   Substance  and Sexual Activity   • Alcohol use: No   • Drug use: No   • Sexual activity: Defer       IMMUNIZATION HISTORY  Deferred to primary care physician.    SURGICAL HISTORY  Past Surgical History:   Procedure Laterality Date   • CHOLECYSTECTOMY WITH INTRAOPERATIVE CHOLANGIOGRAM N/A 5/9/2019    Procedure: CHOLECYSTECTOMY LAPAROSCOPIC INTRAOPERATIVE CHOLANGIOGRAM;  Surgeon: Chandan Bravo MD;  Location:  [facility];  Service: General   • HIP HEMIARTHROPLASTY Left 5/6/2022    Procedure: HIP HEMIARTHROPLASTY;  Surgeon: Barrington Simons MD;  Location:  [facility];  Service: Orthopedics;  Laterality: Left;       CURRENT MEDICATIONS    Current Facility-Administered Medications:   •  iopamidol (ISOVUE-370) 76 % injection 100 mL, 100 mL, Intravenous, Once in imaging, Emergency, Triage Protocol, MD  •  sodium chloride 0.9 % flush 10 mL, 10 mL, Intravenous, PRN, Tha Roche Jr., MD    Current Outpatient Medications:   •  acebutolol (SECTRAL) 200 MG capsule, Take 1 capsule by mouth 2 (Two) Times a Day., Disp: 90 capsule, Rfl: 0  •  apixaban (ELIQUIS) 5 MG tablet tablet, Take 1 tablet by mouth Every 12 (Twelve) Hours. Indications: DVT/PE (active thrombosis), Disp: 60 tablet, Rfl:   •  bisacodyl (DULCOLAX) 5 MG EC tablet, Take 10 mg by mouth every night at bedtime., Disp: , Rfl:   •  budesonide-formoterol (SYMBICORT) 160-4.5 MCG/ACT inhaler, Inhale 2 puffs 2 (Two) Times a Day., Disp: , Rfl:   •  Cholecalciferol (Vitamin D) 50 MCG (2000 UT) tablet, Take 2,000 Units by mouth Daily., Disp: , Rfl:   •  dilTIAZem CD (CARDIZEM CD) 360 MG 24 hr capsule, Take 360 mg by mouth Daily., Disp: , Rfl:   •  famotidine (PEPCID) 20 MG tablet, Take 20 mg by mouth Daily., Disp: , Rfl:   •  gabapentin (NEURONTIN) 100 MG capsule, Take 1 capsule by mouth 3 (Three) Times a Day., Disp: 15 capsule, Rfl: 0  •  Ipratropium-Albuterol (COMBIVENT RESPIMAT IN), Inhale 2 puffs Daily.  MCG/ACT, Disp: , Rfl:   •  ipratropium-albuterol (DUO-NEB) 0.5-2.5 mg/3  "ml nebulizer, Take 3 mL by nebulization Every 4 (Four) Hours As Needed for Wheezing or Shortness of Air., Disp: , Rfl:   •  lactulose (CHRONULAC) 10 GM/15ML solution, Take 20 g by mouth Daily As Needed., Disp: , Rfl:   •  LORazepam (ATIVAN) 0.5 MG tablet, Take 1 tablet by mouth 3 (Three) Times a Day., Disp: 15 tablet, Rfl: 0  •  megestrol (MEGACE) 40 MG/ML suspension, Take 400 mg by mouth Daily., Disp: , Rfl:   •  melatonin 5 MG tablet tablet, Take 5 mg by mouth Every Night., Disp: , Rfl:   •  Menthol-Zinc Oxide (Calmoseptine) 0.44-20.6 % ointment, Apply 1 application topically to the appropriate area as directed As Needed (after incontinent episodes). Apply to kathi-area after incontinent episodes, Disp: , Rfl:   •  ondansetron (ZOFRAN) 4 MG tablet, Take 4 mg by mouth Every 8 (Eight) Hours As Needed for Nausea or Vomiting., Disp: , Rfl:   •  oxyCODONE-acetaminophen (PERCOCET) 7.5-325 MG per tablet, Take 1 tablet by mouth Every 4 (Four) Hours As Needed for Moderate Pain., Disp: 18 tablet, Rfl: 0  •  polyethylene glycol (MIRALAX) 17 GM/SCOOP powder, Take 17 g by mouth Daily., Disp: , Rfl:   •  Vitamins A & D (magic barrier cream), Apply 1 application topically to the appropriate area as directed 2 (Two) Times a Day As Needed (with brief changes)., Disp: , Rfl:     ALLERGIES  Allergies   Allergen Reactions   • Codeine Anaphylaxis       Neuro exam    VITAL SIGNS:   /62   Pulse 69   Temp 97.9 °F (36.6 °C)   Resp 20   Ht 152.4 cm (60\")   Wt 43.5 kg (95 lb 14.4 oz)   SpO2 98%   BMI 18.73 kg/m²     Constitutional: Patient is alert and in no distress.  Patient with no current discomfort.    ENT: There is a normal pharynx with no acute erythema or exudate and oral mucosa is moist.  Nose is clear with no drainage.  Tympanic membranes intact and non-erythemic    Cardiovascular: S1-S2 regular rate and rhythm no murmurs rubs or gallops is noted.    Respiratory: Bilateral breath sounds are noted to be decreased in " both lung fields.  No gross crackles are noted.  Nontender.  There is no external lesions on the chest.  There is no crepitance    Abdomen: Soft nontender bowel sounds are normal in all 4 quadrants there is no rebound or guarding noted.  There is no abdominal distention or hepatosplenomegaly.    Integumentary: No acute changes noted    Genitourinary: Patient is voiding appropriately.    Integument: No acute lesions noted color appears to be normal.    Neurological: Patient with a left facial droop that was notable.  When the tongue was protruded the tongue was midline.  She was able to hold her upper extremities against gravity without a drift for 15 seconds.  Patient was noted to be retracted bilaterally lower extremity and was able to hold her right leg up for approximately 8 seconds without drifting down.  The left leg was able to be held slightly elevated just for about 2 to 3 seconds before dropping down to the bed.     [place] Coma Scale:15      NIH SCORE:  Modified NIH Stroke Scale/Score (mNIHSS) - MDCalc  Calculated on Mar 08 2023 11:38 AM  4 points -> Modified NIH Stroke Scale          RADIOLOGY/PROCEDURES      XR Chest 1 View   Final Result   Near complete resolution of right basilar infiltrates. No   lung consolidation is identified.   This report was finalized on 03/08/2023 11:24 by Dr. Louis Akhtar MD.      CT Head Without Contrast Stroke Protocol   Final Result       1. Moderate cerebral and cerebellar atrophy with chronic microvascular   disease but no evidence of acute intracranial process.   2. The code stroke report was phoned at 10:08 AM to Indira Foss in   the emergency room.           This report was finalized on 03/08/2023 10:13 by Dr. Hakan Rg MD.              FUTURE APPOINTMENTS     No future appointments.         COURSE & MEDICAL DECISION MAKING     1025: Dr. Vick the neurologist is in the room at the bedside after the results of the patient's CT scan which does not show  an acute stroke.  It was noted to have a chronic intracranial processes.  This is recommendation only to do the regular Noncon CT scan of the head which had these results.  He feels after speaking with the daughter that patient that the patient should just have electrolytes, a urinalysis, and other labs grossly ordered and if there is nothing to be treated feels comfortable with the patient going back to the nursing home.  This seems to be acceptable to the patient's family.       1035: Reexamination of the patient shows that her left facial droop is no longer present.  Patient also seems to be more alert.  Not sure if some of her symptoms might be related to the administration of the Neurontin prior to arrival here in the nursing home.  However will be waiting for the results of her laboratory test as well as speaking with her daughter after the test has resulted.    1320: Discussed with the patient's daughter who is the power of  about the plan of action for the patient.  As long as the patient has a normal urinary tract with no infection noted on the laboratory values the patient will be going back to the nursing home.  Results of the urinalysis shows no acute urinary tract infection thus the patient will be discharged back to the nursing home.  There has been understanding with speaking to neurologist Dr. Vick that no intervention should be performed on the patient and that the patient just be treated for any electrolyte abnormalities or infection that may be present.  Since none of these were found patient is stable to go back to the nursing home.  Patient is speaking      Patient's level of risk: Moderate        CRITICAL CARE    CRITICAL CARE: no    CRITICAL CARE TIME: none        Also Old charts were reviewed per [employer] EMR.  Pertinent details are summarized above.  All laboratory, radiologic, and EKG studies that were performed in the Emergency Department were a necessary part of the evaluation  needed to exclude unstable or  emergent medical conditions:yes    Patient was hemodynamically and neurologically stable in the ED.   Pertinent studies were reviewed as above.     Recent Results (from the past 24 hour(s))   Comprehensive Metabolic Panel    Collection Time: 03/08/23 10:11 AM    Specimen: Blood   Result Value Ref Range    Glucose 92 65 - 99 mg/dL    BUN 10 8 - 23 mg/dL    Creatinine 0.62 0.57 - 1.00 mg/dL    Sodium 138 136 - 145 mmol/L    Potassium 4.2 3.5 - 5.2 mmol/L    Chloride 102 98 - 107 mmol/L    CO2 25.0 22.0 - 29.0 mmol/L    Calcium 9.8 8.6 - 10.5 mg/dL    Total Protein 7.5 6.0 - 8.5 g/dL    Albumin 4.1 3.5 - 5.2 g/dL    ALT (SGPT) 12 1 - 33 U/L    AST (SGOT) 21 1 - 32 U/L    Alkaline Phosphatase 51 39 - 117 U/L    Total Bilirubin 0.3 0.0 - 1.2 mg/dL    Globulin 3.4 gm/dL    A/G Ratio 1.2 g/dL    BUN/Creatinine Ratio 16.1 7.0 - 25.0    Anion Gap 11.0 5.0 - 15.0 mmol/L    eGFR 89.0 >60.0 mL/min/1.73   Protime-INR    Collection Time: 03/08/23 10:11 AM    Specimen: Blood   Result Value Ref Range    Protime 16.9 (H) 11.8 - 14.8 Seconds    INR 1.36 (H) 0.91 - 1.09   aPTT    Collection Time: 03/08/23 10:11 AM    Specimen: Blood   Result Value Ref Range    PTT 36.8 (H) 24.1 - 35.0 seconds   Single High Sensitivity Troponin T    Collection Time: 03/08/23 10:11 AM    Specimen: Blood   Result Value Ref Range    HS Troponin T 61 (C) <10 ng/L   Green Top (Gel)    Collection Time: 03/08/23 10:11 AM   Result Value Ref Range    Extra Tube Hold for add-ons.    Lavender Top    Collection Time: 03/08/23 10:11 AM   Result Value Ref Range    Extra Tube hold for add-on    Red Top    Collection Time: 03/08/23 10:11 AM   Result Value Ref Range    Extra Tube Hold for add-ons.    Light Blue Top    Collection Time: 03/08/23 10:11 AM   Result Value Ref Range    Extra Tube Hold for add-ons.    CBC Auto Differential    Collection Time: 03/08/23 10:11 AM    Specimen: Blood   Result Value Ref Range    WBC 8.58 3.40 - 10.80  10*3/mm3    RBC 4.22 3.77 - 5.28 10*6/mm3    Hemoglobin 13.3 12.0 - 15.9 g/dL    Hematocrit 41.0 34.0 - 46.6 %    MCV 97.2 (H) 79.0 - 97.0 fL    MCH 31.5 26.6 - 33.0 pg    MCHC 32.4 31.5 - 35.7 g/dL    RDW 16.3 (H) 12.3 - 15.4 %    RDW-SD 58.2 (H) 37.0 - 54.0 fl    MPV 9.4 6.0 - 12.0 fL    Platelets 462 (H) 140 - 450 10*3/mm3    Neutrophil % 52.7 42.7 - 76.0 %    Lymphocyte % 32.8 19.6 - 45.3 %    Monocyte % 9.1 5.0 - 12.0 %    Eosinophil % 3.7 0.3 - 6.2 %    Basophil % 0.8 0.0 - 1.5 %    Immature Grans % 0.9 (H) 0.0 - 0.5 %    Neutrophils, Absolute 4.52 1.70 - 7.00 10*3/mm3    Lymphocytes, Absolute 2.81 0.70 - 3.10 10*3/mm3    Monocytes, Absolute 0.78 0.10 - 0.90 10*3/mm3    Eosinophils, Absolute 0.32 0.00 - 0.40 10*3/mm3    Basophils, Absolute 0.07 0.00 - 0.20 10*3/mm3    Immature Grans, Absolute 0.08 (H) 0.00 - 0.05 10*3/mm3    nRBC 0.0 0.0 - 0.2 /100 WBC   Urinalysis With Culture If Indicated - Urine, Catheter    Collection Time: 03/08/23 11:44 AM    Specimen: Urine, Catheter   Result Value Ref Range    Color, UA Yellow Yellow, Straw    Appearance, UA Clear Clear    pH, UA 6.0 5.0 - 8.0    Specific Gravity, UA 1.015 1.005 - 1.030    Glucose, UA Negative Negative    Ketones, UA Negative Negative    Bilirubin, UA Negative Negative    Blood, UA Negative Negative    Protein, UA Negative Negative    Leuk Esterase, UA Trace (A) Negative    Nitrite, UA Negative Negative    Urobilinogen, UA 0.2 E.U./dL 0.2 - 1.0 E.U./dL   Urinalysis, Microscopic Only - Urine, Catheter    Collection Time: 03/08/23 11:44 AM    Specimen: Urine, Catheter   Result Value Ref Range    RBC, UA 3-5 (A) None Seen /HPF    WBC, UA 3-5 (A) None Seen /HPF    Bacteria, UA Trace (A) None Seen /HPF    Squamous Epithelial Cells, UA 0-2 None Seen, 0-2 /HPF    Hyaline Casts, UA None Seen None Seen /LPF    Methodology Manual Light Microscopy    ECG 12 Lead ED Triage Standing Order; Acute Stroke (Onset <24 hrs)    Collection Time: 03/08/23 12:21 PM   Result  Value Ref Range    QT Interval 376 ms    QTC Interval 402 ms         The patient received:  Medications   sodium chloride 0.9 % flush 10 mL (has no administration in time range)   iopamidol (ISOVUE-370) 76 % injection 100 mL (has no administration in time range)              ED Disposition     ED Disposition   Discharge    Condition   Stable    Comment   --             This information is consistent with my knowledge of the patient’s controlled substance use history.    Patient evaluate during Coronavirus Pandemic. Isolation practices followed according to [facility] policy.     FINAL IMPRESSION   Diagnosis Plan   1. TIA (transient ischemic attack)        2. Weakness        3. Elevated troponin              MD Melchor Gusman Jr, Thomas Mark Jr., MD  03/08/23 6000

## 2023-03-08 NOTE — CONSULTS
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    This morning, the patient was administered gabapentin sometime around 6 AM and reportedly was last seen normal sometime around 8:00 AM when nursing assistants were making their morning handoff rounds.  However, after contacting the nursing home, no one actually awoke the patient to examine her or truly identify her neurologic status.    Then, sometime around 8:15 AM, the patient reportedly had some left facial droop and left-sided weakness.  [facility] for evaluation of possible stroke.  On arrival, she did have left facial weakness and left arm weakness and seemed to not follow commands.  Code stroke was initiated and the patient was initially seen in the CT scanner.    Initial CT of the head without enhancement demonstrates significant white matter disease, but no acute intracranial process.    Upon further stimulation, the patient is arousing and awakening, following commands, answering questions appropriately, but does have some residual weakness in the left upper extremity when compared to the right.    Is to be noted that she has been nonambulatory for several months since sustaining her fracture with resultant bilateral lower extremity hip, knee and ankle flexion contractures.    At the time of neurology assessment, her NIH is approximately 8 to based upon the left upper extremity weakness and very mild left facial  weakness which is improving.      Past Medical History:   Diagnosis Date   • Chronic hyponatremia    • Frequent UTI    • Low back pain    • Migraines    • Pneumonia    • SVT (supraventricular tachycardia) ([facility])    • UTI (urinary tract infection)        Allergies   Allergen Reactions   • Codeine Anaphylaxis     No current facility-administered medications on file prior to encounter.     Current Outpatient Medications on File Prior to Encounter   Medication Sig   • acebutolol (SECTRAL) 200 MG capsule Take 1 capsule by mouth 2 (Two) Times a Day.   • apixaban (ELIQUIS) 5 MG tablet tablet Take 1 tablet by mouth Every 12 (Twelve) Hours. Indications: DVT/PE (active thrombosis)   • bisacodyl (DULCOLAX) 5 MG EC tablet Take 10 mg by mouth every night at bedtime.   • budesonide-formoterol (SYMBICORT) 160-4.5 MCG/ACT inhaler Inhale 2 puffs 2 (Two) Times a Day.   • Cholecalciferol (Vitamin D) 50 MCG (2000 UT) tablet Take 2,000 Units by mouth Daily.   • dilTIAZem CD (CARDIZEM CD) 360 MG 24 hr capsule Take 360 mg by mouth Daily.   • famotidine (PEPCID) 20 MG tablet Take 20 mg by mouth Daily.   • gabapentin (NEURONTIN) 100 MG capsule Take 1 capsule by mouth 3 (Three) Times a Day.   • Ipratropium-Albuterol (COMBIVENT RESPIMAT IN) Inhale 2 puffs Daily.  MCG/ACT   • ipratropium-albuterol (DUO-NEB) 0.5-2.5 mg/3 ml nebulizer Take 3 mL by nebulization Every 4 (Four) Hours As Needed for Wheezing or Shortness of Air.   • lactulose (CHRONULAC) 10 GM/15ML solution Take 20 g by mouth Daily As Needed.   • LORazepam (ATIVAN) 0.5 MG tablet Take 1 tablet by mouth 3 (Three) Times a Day.   • megestrol (MEGACE) 40 MG/ML suspension Take 400 mg by mouth Daily.   • melatonin 5 MG tablet tablet Take 5 mg by mouth Every Night.   • Menthol-Zinc Oxide (Calmoseptine) 0.44-20.6 % ointment Apply 1 application topically to the appropriate area as directed As Needed (after incontinent episodes). Apply to kathi-area after incontinent episodes   •  "ondansetron (ZOFRAN) 4 MG tablet Take 4 mg by mouth Every 8 (Eight) Hours As Needed for Nausea or Vomiting.   • oxyCODONE-acetaminophen (PERCOCET) 7.5-325 MG per tablet Take 1 tablet by mouth Every 4 (Four) Hours As Needed for Moderate Pain.   • polyethylene glycol (MIRALAX) 17 GM/SCOOP powder Take 17 g by mouth Daily.   • Vitamins A & D (magic barrier cream) Apply 1 application topically to the appropriate area as directed 2 (Two) Times a Day As Needed (with brief changes).       Social History     Socioeconomic History   • Marital status:    Tobacco Use   • Smoking status: Never   • Smokeless tobacco: Never   Vaping Use   • Vaping Use: Never used   Substance and Sexual Activity   • Alcohol use: No   • Drug use: No   • Sexual activity: Defer     Family History   Problem Relation Age of Onset   • Stroke Mother    • Anemia Mother    • Cancer Father         Colon cancer   • Heart disease Father    • Heart attack Father    • Colon cancer Father        Objective      Vital Signs  Temp:  [97.9 °F (36.6 °C)] 97.9 °F (36.6 °C)  Heart Rate:  [74-75] 74  Resp:  [20] 20  BP: (128)/(67-69) 128/69    General Exam:  Head:  Normocephalic, atraumatic.  Bitemporal wasting  HEENT:  Neck supple.  Oropharynx dry.  CVS:  Regular rate and rhythm.    Lungs:  Clear.  No audible wheeze.  Abdomen:  Non-tender, Non-distended  Extremities:  No signs of peripheral edema  Skin:  No rashes      Neurologic Exam:  Mental Status:    -Awake. Alert.  After stimulation, the patient is oriented to person and \"hospital\"  -No word finding difficulties.  -No aphasia.  -No dysarthria.  -Follows simple commands.     CN II: Patient blinks to visual threat bilaterally.  Pupils equally reactive to light.  CN III, IV, VI:  Extraocular muscles function intact with no nystagmus.  CN V:  Facial sensory is symmetric.  CN VII:  Facial motor symmetric.  CN VIII:  Gross hearing intact bilaterally.  CN IX/X:  Palate elevates symmetrically.  CN XI:  Shoulder " shrug symmetric.  CN XII:  Tongue is midline on protrusion.     Motor: (strength out of 5:  1= minimal movement, 2 = movement in plane of gravity, 3 = movement against gravity, 4 = movement against some resistance, 5 = full strength)     -Right Upper Ext: Proximal: 4 Distal:   -Left Upper Ext: Proximal: 5 Distal: 5    -Bilateral lower extremities have flexion contractures of the hip, knee and ankle.  Poor motor effort bilaterally, but not overtly weaker on the left when compared to the right.  Strength primarily at the hip flexors bilaterally is 3/5.     Deep Tendon Reflexes:  -Blunted at the bilateral triceps, biceps and brachioradialis.  Blunted at the bilateral patella.  Cannot appreciate Achilles reflex or clonus due to flexion contractures.    Tone (Modified Elder Scale):  No appreciable increase in tone or rigidity noted.     Sensory:  -Intact to light touch, pinprick BUE (C5-T1) and BLE (L2-S1).     Coordination:  -Could not follow coordination exam instruction.     Gait  -Patient is nonambulatory    Results Review:  Lab Results (last 24 hours)     Procedure Component Value Units Date/Time    Comprehensive Metabolic Panel [[id number]] Collected: 03/08/23 1011    Specimen: Blood Updated: 03/08/23 1108     Glucose 92 mg/dL      BUN 10 mg/dL      Creatinine 0.62 mg/dL      Sodium 138 mmol/L      Potassium 4.2 mmol/L      Chloride 102 mmol/L      CO2 25.0 mmol/L      Calcium 9.8 mg/dL      Total Protein 7.5 g/dL      Albumin 4.1 g/dL      ALT (SGPT) 12 U/L      AST (SGOT) 21 U/L      Alkaline Phosphatase 51 U/L      Total Bilirubin 0.3 mg/dL      Globulin 3.4 gm/dL      A/G Ratio 1.2 g/dL      BUN/Creatinine Ratio 16.1     Anion Gap 11.0 mmol/L      eGFR 89.0 mL/min/1.73     Narrative:      GFR Normal >60  Chronic Kidney Disease <60  Kidney Failure <15    The GFR formula is only valid for adults with stable renal function between ages 18 and 70.    Single High Sensitivity Troponin T [[id number]]  (Abnormal)  Collected: 03/08/23 1011    Specimen: Blood Updated: 03/08/23 1106     HS Troponin T 61 ng/L     Narrative:      High Sensitive Troponin T Reference Range:  <10.0 ng/L- Negative Female for AMI  <15.0 ng/L- Negative Male for AMI  >=10 - Abnormal Female indicating possible myocardial injury.  >=15 - Abnormal Male indicating possible myocardial injury.   Clinicians would have to utilize clinical acumen, EKG, Troponin, and serial changes to determine if it is an Acute Myocardial Infarction or myocardial injury due to an underlying chronic condition.         Protime-INR [[id number]]  (Abnormal) Collected: 03/08/23 1011    Specimen: Blood Updated: 03/08/23 1049     Protime 16.9 Seconds      INR 1.36    aPTT [[id number]]  (Abnormal) Collected: 03/08/23 1011    Specimen: Blood Updated: 03/08/23 1049     PTT 36.8 seconds     CBC & Differential [[id number]]  (Abnormal) Collected: 03/08/23 1011    Specimen: Blood Updated: 03/08/23 1040    Narrative:      The following orders were created for panel order CBC & Differential.  Procedure                               Abnormality         Status                     ---------                               -----------         ------                     CBC Auto Differential[[id number]]        Abnormal            Final result                 Please view results for these tests on the individual orders.    CBC Auto Differential [[id number]]  (Abnormal) Collected: 03/08/23 1011    Specimen: Blood Updated: 03/08/23 1040     WBC 8.58 10*3/mm3      RBC 4.22 10*6/mm3      Hemoglobin 13.3 g/dL      Hematocrit 41.0 %      MCV 97.2 fL      MCH 31.5 pg      MCHC 32.4 g/dL      RDW 16.3 %      RDW-SD 58.2 fl      MPV 9.4 fL      Platelets 462 10*3/mm3      Neutrophil % 52.7 %      Lymphocyte % 32.8 %      Monocyte % 9.1 %      Eosinophil % 3.7 %      Basophil % 0.8 %      Immature Grans % 0.9 %      Neutrophils, Absolute 4.52 10*3/mm3      Lymphocytes, Absolute 2.81 10*3/mm3      Monocytes, Absolute  0.78 10*3/mm3      Eosinophils, Absolute 0.32 10*3/mm3      Basophils, Absolute 0.07 10*3/mm3      Immature Grans, Absolute 0.08 10*3/mm3      nRBC 0.0 /100 WBC     Green Top (Gel) [[id number]] Collected: 03/08/23 1011    Specimen: Blood Updated: 03/08/23 1035    Red Top [[id number]] Collected: 03/08/23 1011    Specimen: Blood Updated: 03/08/23 1035    Lavender Top [[id number]] Collected: 03/08/23 1011    Specimen: Blood Updated: 03/08/23 1035    [place] Draw [[id number]] Collected: 03/08/23 1011    Specimen: Blood Updated: 03/08/23 1035    Narrative:      The following orders were created for panel order [place] Draw.  Procedure                               Abnormality         Status                     ---------                               -----------         ------                     Green Top (Gel)[[id number]]                                  In process                 Lavender Top[[id number]]                                     In process                 Red Top[[id number]]                                          In process                 Light Blue Top[[id number]]                                   In process                   Please view results for these tests on the individual orders.    Light Blue Top [[id number]] Collected: 03/08/23 1011    Specimen: Blood Updated: 03/08/23 1035          .  Imaging Results (Last 24 Hours)     Procedure Component Value Units Date/Time    XR Chest 1 View [[id number]] Resulted: 03/08/23 1056     Updated: 03/08/23 1056    CT Head Without Contrast Stroke Protocol [[id number]] Collected: 03/08/23 1007     Updated: 03/08/23 1016    Narrative:      CT HEAD WO CONTRAST STROKE PROTOCOL- 3/8/2023 9:55 AM CST     HISTORY: Neuro deficit, acute, stroke suspected     COMPARISON: 02/09/2023      DLP: 731 mGy cm. All CT scans are performed using dose optimization  techniques as appropriate to the performed exam and including at least  one of the following: Automated exposure control, adjustment  of the mA  and/or kV according to size, and the use of the iterative reconstruction  technique.     TECHNIQUE: Serial axial tomographic images of the brain were obtained  without the use of intravenous contrast.      FINDINGS:   The midline structures are nondisplaced. There is moderate cerebral and  cerebellar atrophy, with an associated increase in the prominence of the  ventricles and sulci. The basilar cisterns are normal in size and  configuration. There is no evidence of intracranial hemorrhage or  mass-effect. There is low attenuation in the periventricular white  matter, consistent with chronic ischemic change. There are no abnormal  extra-axial fluid collections. There is no evidence of tonsillar  herniation.      The included orbits and their contents are unremarkable. The visualized  paranasal sinuses, mastoid air cells and middle ear cavities are clear.  The visualized osseous structures and overlying soft tissues of the  skull and face are intact.        Impression:         1. Moderate cerebral and cerebellar atrophy with chronic microvascular  disease but no evidence of acute intracranial process.  2. The code stroke report was phoned at 10:08 AM to Indira Foss in  the emergency room.        This report was finalized on 03/08/2023 10:13 by Dr. Hakan Rg MD.            Assessment/Plan     Hospital Problem List      * No active hospital problems. *      Impression    • Transient left-sided weakness  • No evidence of acute stroke  • Adult failure to thrive  • Underlying dementia  • TIA versus postictal weakness, resolving  • Chronically anticoagulated with Eliquis  • History of DVT/PE  • Adult failure to thrive  • Nonambulatory status with bilateral lower extremity flexion contractures  • Chronic hyponatremia  • History of recurrent UTI  • Chronic low back pain  • History of SVT  • History of left hip hemiarthroplasty  • Underweight/malnutrition   • DNR status    Plan    • Dr. Vick and LUPIS  "have reviewed the clinical and radiographic findings with the patient and her daughter, at bedside, in detail.  Her daughter actually works here at [facility] in [facility] and came down immediately for family conference.  At this time, the patient has a largely non-focal examination and the events of this morning could either be explained by medication adverse effects/polypharmacy, underlying infection or other metabolic derangement, TIA or post-seizure weakness that is improving.  • That being said, we have communicated with the daughter and Dr. Roche, emergency room attending, and will proceed with metabolic evaluation to ensure that there is no other physiologic etiology that is easily correctable and if not, consider avoiding admission and discharging back to [facility].  • Patient's daughter voices understanding and agreement with the plan of care.  The patient also states that she feels as though she has returned to her baseline and states that she simply felt \"tired and sleepy\" this morning.  • Neurology will sign off.  We advise not admitting the patient from a neurologic standpoint, and discharging back to SNF, unless there is other medical reason to admit the patient.      Thank you, Dr. Roche, for the consultation and the opportunity to participate in the care of your patient.          Jag Estrada PA-C  03/08/23  11:12 CST      Medical Decision Making     Number/Complexity of Problems  1. Moderate  ? 1 undiagnosed new problem with uncertain prognosis -   ? 1 acute illness with systemic symptoms -   2. High  ? 1 acute or chronic illness that pose a threat to life/body function -   Episode of left-sided weakness either representing TIA or post seizure weakness versus medication adverse effects and sedation.  Potentially life-threatening or disabling symptoms emergently evaluated.     MDM Data  • Moderate - 1/3 categories  • Extensive - 2/3 categories   "   1. Category 1: 3 of the following  ? Review of external notes  ? Review of results  ? Ordering of each unique test  ? Independent historian  2. Category 2:  Independent interpretation of test (ex: imaging)  3. Category 3:  Discussion of management with another provider  We have reviewed previous admission medical records and history and discussed this with patient, her daughter at bedside, emergency room attending and nursing staff.     Treatment Plan  1. Moderate - Prescription Drug management  2. High  ? Drug therapy requiring intensive monitoring for toxicity  ? Decision regarding hospitalization or escalation of care  ? De-escalate care/DNR decisions

## 2023-03-16 LAB
QT INTERVAL: 376 MS
QTC INTERVAL: 402 MS

## 2023-04-06 ENCOUNTER — HOSPITAL ENCOUNTER (OUTPATIENT)
Facility: HOSPITAL | Age: 83
Setting detail: OBSERVATION
Discharge: NURSING, REHAB OR LONG-TERM CARE | End: 2023-04-07
Attending: STUDENT IN AN ORGANIZED HEALTH CARE EDUCATION/TRAINING PROGRAM | Admitting: HOSPITALIST
Payer: MEDICARE

## 2023-04-06 ENCOUNTER — APPOINTMENT (OUTPATIENT)
Dept: GENERAL RADIOLOGY | Facility: HOSPITAL | Age: 83
End: 2023-04-06
Payer: MEDICARE

## 2023-04-06 DIAGNOSIS — Z79.01 ON CONTINUOUS ORAL ANTICOAGULATION: ICD-10-CM

## 2023-04-06 DIAGNOSIS — I47.1 SVT (SUPRAVENTRICULAR TACHYCARDIA): ICD-10-CM

## 2023-04-06 DIAGNOSIS — R53.1 GENERALIZED WEAKNESS: ICD-10-CM

## 2023-04-06 DIAGNOSIS — R06.02 SHORTNESS OF BREATH: ICD-10-CM

## 2023-04-06 DIAGNOSIS — R00.0 TACHYCARDIA: ICD-10-CM

## 2023-04-06 DIAGNOSIS — R07.9 ACUTE CHEST PAIN: ICD-10-CM

## 2023-04-06 DIAGNOSIS — I47.1 SUPRAVENTRICULAR TACHYCARDIA: Primary | ICD-10-CM

## 2023-04-06 LAB
ALBUMIN SERPL-MCNC: 3.7 G/DL (ref 3.5–5.2)
ALBUMIN/GLOB SERPL: 1.1 G/DL
ALP SERPL-CCNC: 61 U/L (ref 39–117)
ALT SERPL W P-5'-P-CCNC: 6 U/L (ref 1–33)
ANION GAP SERPL CALCULATED.3IONS-SCNC: 9 MMOL/L (ref 5–15)
AST SERPL-CCNC: 14 U/L (ref 1–32)
BASOPHILS # BLD AUTO: 0.06 10*3/MM3 (ref 0–0.2)
BASOPHILS NFR BLD AUTO: 0.5 % (ref 0–1.5)
BILIRUB SERPL-MCNC: 0.2 MG/DL (ref 0–1.2)
BUN SERPL-MCNC: 19 MG/DL (ref 8–23)
BUN/CREAT SERPL: 29.2 (ref 7–25)
CALCIUM SPEC-SCNC: 9.3 MG/DL (ref 8.6–10.5)
CHLORIDE SERPL-SCNC: 106 MMOL/L (ref 98–107)
CO2 SERPL-SCNC: 24 MMOL/L (ref 22–29)
CREAT SERPL-MCNC: 0.65 MG/DL (ref 0.57–1)
DEPRECATED RDW RBC AUTO: 62.2 FL (ref 37–54)
EGFRCR SERPLBLD CKD-EPI 2021: 88 ML/MIN/1.73
EOSINOPHIL # BLD AUTO: 0.25 10*3/MM3 (ref 0–0.4)
EOSINOPHIL NFR BLD AUTO: 2.3 % (ref 0.3–6.2)
ERYTHROCYTE [DISTWIDTH] IN BLOOD BY AUTOMATED COUNT: 16.8 % (ref 12.3–15.4)
GLOBULIN UR ELPH-MCNC: 3.4 GM/DL
GLUCOSE SERPL-MCNC: 128 MG/DL (ref 65–99)
HCT VFR BLD AUTO: 39.4 % (ref 34–46.6)
HGB BLD-MCNC: 12.8 G/DL (ref 12–15.9)
HOLD SPECIMEN: NORMAL
HOLD SPECIMEN: NORMAL
IMM GRANULOCYTES # BLD AUTO: 0.08 10*3/MM3 (ref 0–0.05)
IMM GRANULOCYTES NFR BLD AUTO: 0.7 % (ref 0–0.5)
LYMPHOCYTES # BLD AUTO: 2.06 10*3/MM3 (ref 0.7–3.1)
LYMPHOCYTES NFR BLD AUTO: 18.6 % (ref 19.6–45.3)
MAGNESIUM SERPL-MCNC: 1.9 MG/DL (ref 1.6–2.4)
MCH RBC QN AUTO: 32.3 PG (ref 26.6–33)
MCHC RBC AUTO-ENTMCNC: 32.5 G/DL (ref 31.5–35.7)
MCV RBC AUTO: 99.5 FL (ref 79–97)
MONOCYTES # BLD AUTO: 1.13 10*3/MM3 (ref 0.1–0.9)
MONOCYTES NFR BLD AUTO: 10.2 % (ref 5–12)
NEUTROPHILS NFR BLD AUTO: 67.7 % (ref 42.7–76)
NEUTROPHILS NFR BLD AUTO: 7.49 10*3/MM3 (ref 1.7–7)
NRBC BLD AUTO-RTO: 0 /100 WBC (ref 0–0.2)
PLATELET # BLD AUTO: 412 10*3/MM3 (ref 140–450)
PMV BLD AUTO: 9.1 FL (ref 6–12)
POTASSIUM SERPL-SCNC: 4.8 MMOL/L (ref 3.5–5.2)
PROT SERPL-MCNC: 7.1 G/DL (ref 6–8.5)
RBC # BLD AUTO: 3.96 10*6/MM3 (ref 3.77–5.28)
SODIUM SERPL-SCNC: 139 MMOL/L (ref 136–145)
TROPONIN T SERPL HS-MCNC: 52 NG/L
WBC NRBC COR # BLD: 11.07 10*3/MM3 (ref 3.4–10.8)
WHOLE BLOOD HOLD COAG: NORMAL
WHOLE BLOOD HOLD SPECIMEN: NORMAL

## 2023-04-06 PROCEDURE — 85025 COMPLETE CBC W/AUTO DIFF WBC: CPT | Performed by: STUDENT IN AN ORGANIZED HEALTH CARE EDUCATION/TRAINING PROGRAM

## 2023-04-06 PROCEDURE — 71045 X-RAY EXAM CHEST 1 VIEW: CPT

## 2023-04-06 PROCEDURE — 99285 EMERGENCY DEPT VISIT HI MDM: CPT

## 2023-04-06 PROCEDURE — 84484 ASSAY OF TROPONIN QUANT: CPT | Performed by: STUDENT IN AN ORGANIZED HEALTH CARE EDUCATION/TRAINING PROGRAM

## 2023-04-06 PROCEDURE — 83735 ASSAY OF MAGNESIUM: CPT | Performed by: STUDENT IN AN ORGANIZED HEALTH CARE EDUCATION/TRAINING PROGRAM

## 2023-04-06 PROCEDURE — 80053 COMPREHEN METABOLIC PANEL: CPT | Performed by: STUDENT IN AN ORGANIZED HEALTH CARE EDUCATION/TRAINING PROGRAM

## 2023-04-06 PROCEDURE — 93010 ELECTROCARDIOGRAM REPORT: CPT | Performed by: INTERNAL MEDICINE

## 2023-04-06 PROCEDURE — 93005 ELECTROCARDIOGRAM TRACING: CPT | Performed by: STUDENT IN AN ORGANIZED HEALTH CARE EDUCATION/TRAINING PROGRAM

## 2023-04-06 RX ORDER — SODIUM CHLORIDE 0.9 % (FLUSH) 0.9 %
10 SYRINGE (ML) INJECTION AS NEEDED
Status: DISCONTINUED | OUTPATIENT
Start: 2023-04-06 | End: 2023-04-08 | Stop reason: HOSPADM

## 2023-04-06 RX ORDER — ASPIRIN 81 MG/1
324 TABLET, CHEWABLE ORAL ONCE
Status: COMPLETED | OUTPATIENT
Start: 2023-04-06 | End: 2023-04-06

## 2023-04-06 RX ADMIN — ASPIRIN 324 MG: 81 TABLET, CHEWABLE ORAL at 22:06

## 2023-04-06 RX ADMIN — SODIUM CHLORIDE, POTASSIUM CHLORIDE, SODIUM LACTATE AND CALCIUM CHLORIDE 500 ML: 600; 310; 30; 20 INJECTION, SOLUTION INTRAVENOUS at 22:06

## 2023-04-07 VITALS
RESPIRATION RATE: 16 BRPM | HEART RATE: 72 BPM | OXYGEN SATURATION: 93 % | HEIGHT: 60 IN | WEIGHT: 108.9 LBS | SYSTOLIC BLOOD PRESSURE: 115 MMHG | BODY MASS INDEX: 21.38 KG/M2 | DIASTOLIC BLOOD PRESSURE: 54 MMHG | TEMPERATURE: 97.6 F

## 2023-04-07 PROBLEM — I47.1 SUPRAVENTRICULAR TACHYCARDIA: Status: ACTIVE | Noted: 2023-04-07

## 2023-04-07 PROBLEM — N39.0 UTI (URINARY TRACT INFECTION), BACTERIAL: Status: ACTIVE | Noted: 2023-04-07

## 2023-04-07 PROBLEM — A49.9 UTI (URINARY TRACT INFECTION), BACTERIAL: Status: ACTIVE | Noted: 2023-04-07

## 2023-04-07 PROBLEM — I47.10 SUPRAVENTRICULAR TACHYCARDIA: Status: ACTIVE | Noted: 2023-04-07

## 2023-04-07 LAB
ANION GAP SERPL CALCULATED.3IONS-SCNC: 9 MMOL/L (ref 5–15)
BACTERIA UR QL AUTO: ABNORMAL /HPF
BILIRUB UR QL STRIP: NEGATIVE
BUN SERPL-MCNC: 19 MG/DL (ref 8–23)
BUN/CREAT SERPL: 32.2 (ref 7–25)
CALCIUM SPEC-SCNC: 9.3 MG/DL (ref 8.6–10.5)
CHLORIDE SERPL-SCNC: 103 MMOL/L (ref 98–107)
CHOLEST SERPL-MCNC: 160 MG/DL (ref 0–200)
CLARITY UR: ABNORMAL
CO2 SERPL-SCNC: 23 MMOL/L (ref 22–29)
COLOR UR: YELLOW
CREAT SERPL-MCNC: 0.59 MG/DL (ref 0.57–1)
DEPRECATED RDW RBC AUTO: 64.3 FL (ref 37–54)
EGFRCR SERPLBLD CKD-EPI 2021: 90.1 ML/MIN/1.73
ERYTHROCYTE [DISTWIDTH] IN BLOOD BY AUTOMATED COUNT: 16.9 % (ref 12.3–15.4)
GEN 5 2HR TROPONIN T REFLEX: 85 NG/L
GLUCOSE SERPL-MCNC: 93 MG/DL (ref 65–99)
GLUCOSE UR STRIP-MCNC: NEGATIVE MG/DL
HCT VFR BLD AUTO: 36.4 % (ref 34–46.6)
HDLC SERPL-MCNC: 42 MG/DL (ref 40–60)
HGB BLD-MCNC: 11.3 G/DL (ref 12–15.9)
HGB UR QL STRIP.AUTO: ABNORMAL
HYALINE CASTS UR QL AUTO: ABNORMAL /LPF
KETONES UR QL STRIP: NEGATIVE
LDLC SERPL CALC-MCNC: 105 MG/DL (ref 0–100)
LDLC/HDLC SERPL: 2.48 {RATIO}
LEUKOCYTE ESTERASE UR QL STRIP.AUTO: ABNORMAL
MCH RBC QN AUTO: 31.7 PG (ref 26.6–33)
MCHC RBC AUTO-ENTMCNC: 31 G/DL (ref 31.5–35.7)
MCV RBC AUTO: 102 FL (ref 79–97)
NITRITE UR QL STRIP: POSITIVE
PH UR STRIP.AUTO: 8 [PH] (ref 5–8)
PLATELET # BLD AUTO: 375 10*3/MM3 (ref 140–450)
PMV BLD AUTO: 9.3 FL (ref 6–12)
POTASSIUM SERPL-SCNC: 4.5 MMOL/L (ref 3.5–5.2)
PROT UR QL STRIP: ABNORMAL
QT INTERVAL: 314 MS
QTC INTERVAL: 438 MS
RBC # BLD AUTO: 3.57 10*6/MM3 (ref 3.77–5.28)
RBC # UR STRIP: ABNORMAL /HPF
REF LAB TEST METHOD: ABNORMAL
SARS-COV-2 AG RESP QL IA.RAPID: NORMAL
SODIUM SERPL-SCNC: 135 MMOL/L (ref 136–145)
SP GR UR STRIP: 1.01 (ref 1–1.03)
SQUAMOUS #/AREA URNS HPF: ABNORMAL /HPF
TRI-PHOS CRY URNS QL MICRO: ABNORMAL /HPF
TRIGL SERPL-MCNC: 69 MG/DL (ref 0–150)
TROPONIN T DELTA: 33 NG/L
TROPONIN T SERPL HS-MCNC: 87 NG/L
TSH SERPL DL<=0.05 MIU/L-ACNC: 0.95 UIU/ML (ref 0.27–4.2)
UROBILINOGEN UR QL STRIP: ABNORMAL
VLDLC SERPL-MCNC: 13 MG/DL (ref 5–40)
WBC # UR STRIP: ABNORMAL /HPF
WBC NRBC COR # BLD: 9.09 10*3/MM3 (ref 3.4–10.8)

## 2023-04-07 PROCEDURE — 94799 UNLISTED PULMONARY SVC/PX: CPT

## 2023-04-07 PROCEDURE — G0378 HOSPITAL OBSERVATION PER HR: HCPCS

## 2023-04-07 PROCEDURE — 87077 CULTURE AEROBIC IDENTIFY: CPT

## 2023-04-07 PROCEDURE — 94664 DEMO&/EVAL PT USE INHALER: CPT

## 2023-04-07 PROCEDURE — 99235 HOSP IP/OBS SAME DATE MOD 70: CPT | Performed by: HOSPITALIST

## 2023-04-07 PROCEDURE — 80061 LIPID PANEL: CPT | Performed by: HOSPITALIST

## 2023-04-07 PROCEDURE — 93005 ELECTROCARDIOGRAM TRACING: CPT | Performed by: HOSPITALIST

## 2023-04-07 PROCEDURE — 87186 SC STD MICRODIL/AGAR DIL: CPT

## 2023-04-07 PROCEDURE — 80048 BASIC METABOLIC PNL TOTAL CA: CPT | Performed by: HOSPITALIST

## 2023-04-07 PROCEDURE — P9612 CATHETERIZE FOR URINE SPEC: HCPCS

## 2023-04-07 PROCEDURE — 81001 URINALYSIS AUTO W/SCOPE: CPT

## 2023-04-07 PROCEDURE — 84484 ASSAY OF TROPONIN QUANT: CPT | Performed by: HOSPITALIST

## 2023-04-07 PROCEDURE — 84443 ASSAY THYROID STIM HORMONE: CPT | Performed by: HOSPITALIST

## 2023-04-07 PROCEDURE — 85027 COMPLETE CBC AUTOMATED: CPT | Performed by: HOSPITALIST

## 2023-04-07 PROCEDURE — 87426 SARSCOV CORONAVIRUS AG IA: CPT | Performed by: EMERGENCY MEDICINE

## 2023-04-07 PROCEDURE — 93010 ELECTROCARDIOGRAM REPORT: CPT | Performed by: INTERNAL MEDICINE

## 2023-04-07 PROCEDURE — 87086 URINE CULTURE/COLONY COUNT: CPT

## 2023-04-07 RX ORDER — FAMOTIDINE 20 MG/1
20 TABLET, FILM COATED ORAL DAILY
Status: DISCONTINUED | OUTPATIENT
Start: 2023-04-07 | End: 2023-04-08 | Stop reason: HOSPADM

## 2023-04-07 RX ORDER — ACEBUTOLOL HYDROCHLORIDE 200 MG/1
200 CAPSULE ORAL 2 TIMES DAILY
Status: DISCONTINUED | OUTPATIENT
Start: 2023-04-07 | End: 2023-04-08 | Stop reason: HOSPADM

## 2023-04-07 RX ORDER — BUDESONIDE AND FORMOTEROL FUMARATE DIHYDRATE 160; 4.5 UG/1; UG/1
2 AEROSOL RESPIRATORY (INHALATION)
Status: DISCONTINUED | OUTPATIENT
Start: 2023-04-07 | End: 2023-04-08 | Stop reason: HOSPADM

## 2023-04-07 RX ORDER — DILTIAZEM HYDROCHLORIDE 240 MG/1
240 CAPSULE, COATED, EXTENDED RELEASE ORAL
Status: DISCONTINUED | OUTPATIENT
Start: 2023-04-07 | End: 2023-04-08 | Stop reason: HOSPADM

## 2023-04-07 RX ORDER — IPRATROPIUM BROMIDE AND ALBUTEROL SULFATE 2.5; .5 MG/3ML; MG/3ML
3 SOLUTION RESPIRATORY (INHALATION) EVERY 4 HOURS PRN
Status: DISCONTINUED | OUTPATIENT
Start: 2023-04-07 | End: 2023-04-08 | Stop reason: HOSPADM

## 2023-04-07 RX ORDER — POLYETHYLENE GLYCOL 3350 17 G/17G
17 POWDER, FOR SOLUTION ORAL DAILY
Status: DISCONTINUED | OUTPATIENT
Start: 2023-04-07 | End: 2023-04-08 | Stop reason: HOSPADM

## 2023-04-07 RX ORDER — ATORVASTATIN CALCIUM 10 MG/1
10 TABLET, FILM COATED ORAL NIGHTLY
Qty: 90 TABLET | Refills: 3 | Status: SHIPPED | OUTPATIENT
Start: 2023-04-07

## 2023-04-07 RX ORDER — NITROFURANTOIN 25; 75 MG/1; MG/1
100 CAPSULE ORAL EVERY 12 HOURS SCHEDULED
Qty: 14 CAPSULE | Refills: 0 | Status: SHIPPED | OUTPATIENT
Start: 2023-04-07 | End: 2023-04-14

## 2023-04-07 RX ORDER — PAROXETINE HYDROCHLORIDE 40 MG/1
40 TABLET, FILM COATED ORAL 2 TIMES DAILY
COMMUNITY

## 2023-04-07 RX ORDER — LOPERAMIDE HYDROCHLORIDE 2 MG/1
2 CAPSULE ORAL EVERY 6 HOURS PRN
COMMUNITY

## 2023-04-07 RX ORDER — CLOTRIMAZOLE AND BETAMETHASONE DIPROPIONATE 10; .64 MG/G; MG/G
1 CREAM TOPICAL EVERY 6 HOURS PRN
COMMUNITY

## 2023-04-07 RX ORDER — ACETAMINOPHEN 325 MG/1
650 TABLET ORAL EVERY 4 HOURS PRN
COMMUNITY

## 2023-04-07 RX ORDER — ATORVASTATIN CALCIUM 10 MG/1
10 TABLET, FILM COATED ORAL NIGHTLY
Status: DISCONTINUED | OUTPATIENT
Start: 2023-04-07 | End: 2023-04-08 | Stop reason: HOSPADM

## 2023-04-07 RX ORDER — MEGESTROL ACETATE 40 MG/ML
400 SUSPENSION ORAL 2 TIMES DAILY
COMMUNITY

## 2023-04-07 RX ORDER — NITROFURANTOIN 25; 75 MG/1; MG/1
100 CAPSULE ORAL EVERY 12 HOURS SCHEDULED
Status: DISCONTINUED | OUTPATIENT
Start: 2023-04-07 | End: 2023-04-07

## 2023-04-07 RX ORDER — DILTIAZEM HYDROCHLORIDE 240 MG/1
240 CAPSULE, COATED, EXTENDED RELEASE ORAL
Qty: 90 CAPSULE | Refills: 3 | Status: SHIPPED | OUTPATIENT
Start: 2023-04-08

## 2023-04-07 RX ORDER — BISACODYL 5 MG/1
10 TABLET, DELAYED RELEASE ORAL DAILY PRN
Status: DISCONTINUED | OUTPATIENT
Start: 2023-04-07 | End: 2023-04-08 | Stop reason: HOSPADM

## 2023-04-07 RX ORDER — FENOFIBRATE 145 MG/1
145 TABLET, COATED ORAL DAILY
COMMUNITY

## 2023-04-07 RX ORDER — NITROFURANTOIN 25; 75 MG/1; MG/1
100 CAPSULE ORAL EVERY 12 HOURS SCHEDULED
Status: DISCONTINUED | OUTPATIENT
Start: 2023-04-07 | End: 2023-04-08 | Stop reason: HOSPADM

## 2023-04-07 RX ORDER — ACETAMINOPHEN 325 MG/1
650 TABLET ORAL EVERY 6 HOURS PRN
Status: DISCONTINUED | OUTPATIENT
Start: 2023-04-07 | End: 2023-04-08 | Stop reason: HOSPADM

## 2023-04-07 RX ORDER — MEGESTROL ACETATE 40 MG/ML
400 SUSPENSION ORAL DAILY
Status: DISCONTINUED | OUTPATIENT
Start: 2023-04-07 | End: 2023-04-08 | Stop reason: HOSPADM

## 2023-04-07 RX ORDER — CHOLECALCIFEROL (VITAMIN D3) 125 MCG
5 CAPSULE ORAL NIGHTLY
Status: DISCONTINUED | OUTPATIENT
Start: 2023-04-07 | End: 2023-04-08 | Stop reason: HOSPADM

## 2023-04-07 RX ADMIN — DRONEDARONE 400 MG: 400 TABLET, FILM COATED ORAL at 09:52

## 2023-04-07 RX ADMIN — ACEBUTOLOL HYDROCHLORIDE 200 MG: 200 CAPSULE ORAL at 09:52

## 2023-04-07 RX ADMIN — MEGESTROL ACETATE 400 MG: 40 SUSPENSION ORAL at 09:52

## 2023-04-07 RX ADMIN — ACETAMINOPHEN 650 MG: 325 TABLET ORAL at 16:15

## 2023-04-07 RX ADMIN — APIXABAN 5 MG: 5 TABLET, FILM COATED ORAL at 09:52

## 2023-04-07 RX ADMIN — BUDESONIDE AND FORMOTEROL FUMARATE DIHYDRATE 2 PUFF: 160; 4.5 AEROSOL RESPIRATORY (INHALATION) at 19:07

## 2023-04-07 RX ADMIN — FAMOTIDINE 20 MG: 20 TABLET, FILM COATED ORAL at 09:52

## 2023-04-07 RX ADMIN — DILTIAZEM HYDROCHLORIDE 240 MG: 240 CAPSULE, EXTENDED RELEASE ORAL at 09:52

## 2023-04-07 RX ADMIN — NITROFURANTOIN MONOHYDRATE/MACROCRYSTALLINE 100 MG: 25; 75 CAPSULE ORAL at 12:59

## 2023-04-07 NOTE — CASE MANAGEMENT/SOCIAL WORK
Continued Stay Note  [facility]     Patient Name: Zelalem Hung  MRN: [id number]  Today's Date: 4/7/2023    Admit Date: 4/6/2023    Plan: [place]   Discharge Plan     Row Name 04/07/23 0851       Plan    Plan [place]    Patient/Family in Agreement with Plan yes    Plan Comments Pt resides at [facility].  Gwendolyn in admissions confirmed that pt has a bed hold.  No precert. needed.  Pt can return once medically stable.               Discharge Codes    No documentation.               Expected Discharge Date and Time     Expected Discharge Date Expected Discharge Time    Apr 8, 2023             KAMRAN Brandt

## 2023-04-07 NOTE — DISCHARGE SUMMARY
Date of Discharge:  4/7/2023    Discharge Diagnosis:   Supraventricular tachycardia    UTI (urinary tract infection), bacterial      Presenting Problem/History of Present Illness  Supraventricular tachycardia [I47.1]  Urinary tract infection    Hospital Course  Patient is a 82 y.o. female presented on 4/6/2023 following episode of chest pain and shortness of breath.  The symptoms correlated with an episode of SVT that she was having at her nursing home.  Patient was subsequently transferred to [facility] ER via EMS.  In route she was given adenosine with conversion normal sinus rhythm.  Troponin was slightly elevated during initial ER work-up.  Once patient converted to normal sinus rhythm she had no complaints.  Patient was started on Multaq 400 mg twice daily for paroxysmal SVT.  Patient will have an appointment made with Dr. Guerrero outpatient for further discussion of management of SVT.  Patient had urinalysis that showed +4 bacteria and positive nitrites.  She complained of frequency and burning.  Given that she was started on Macrobid 100 mg twice daily for 7 days.  Given her probable coronary artery disease with coronary calcifications noted on CT we will start Lipitor 10 mg.  At the time of discharge the patient is stable.  No further arrhythmias.    Procedures Performed         Review of Systems   Genitourinary: Positive for dysuria and frequency.   All other systems reviewed and are negative.      Consults:   Consults     Date and Time Order Name Status Description    3/8/2023 10:01 AM Inpatient Neurology Consult Stroke Completed           Pertinent Test Results:  Lab Results (last 72 hours)     Procedure Component Value Units Date/Time    Urinalysis, Microscopic Only - Straight Cath [[id number]]  (Abnormal) Collected: 04/07/23 1005    Specimen: Urine from Straight Cath Updated: 04/07/23 1121     RBC, UA 6-12 /HPF      WBC, UA Too Numerous to Count /HPF      Bacteria, UA 4+ /HPF      Squamous Epithelial  Cells, UA 3-6 /HPF      Hyaline Casts, UA None Seen /LPF      Triple Phosphate Crystals, UA Moderate/2+ /HPF      Methodology Manual Light Microscopy    Urinalysis With Culture If Indicated - Straight Cath [[id number]]  (Abnormal) Collected: 04/07/23 1005    Specimen: Urine from Straight Cath Updated: 04/07/23 1121     Color, UA Yellow     Appearance, UA Turbid     pH, UA 8.0     Specific Gravity, UA 1.015     Glucose, UA Negative     Ketones, UA Negative     Bilirubin, UA Negative     Blood, UA Moderate (2+)     Protein, UA 30 mg/dL (1+)     Leuk Esterase, UA Large (3+)     Nitrite, UA Positive     Urobilinogen, UA 0.2 E.U./dL    Narrative:      In absence of clinical symptoms, the presence of pyuria, bacteria, and/or nitrites on the urinalysis result does not correlate with infection.    Urine Culture - Urine, Straight Cath [[id number]] Collected: 04/07/23 1005    Specimen: Urine from Straight Cath Updated: 04/07/23 1121    Lipid Panel [[id number]]  (Abnormal) Collected: 04/07/23 0713    Specimen: Blood Updated: 04/07/23 1027     Total Cholesterol 160 mg/dL      Triglycerides 69 mg/dL      HDL Cholesterol 42 mg/dL      LDL Cholesterol  105 mg/dL      VLDL Cholesterol 13 mg/dL      LDL/HDL Ratio 2.48    Narrative:      Cholesterol Reference Ranges  (U.S. Department of Health and Human Services ATP III Classifications)    Desirable          <200 mg/dL  Borderline High    200-239 mg/dL  High Risk          >240 mg/dL      Triglyceride Reference Ranges  (U.S. Department of Health and Human Services ATP III Classifications)    Normal           <150 mg/dL  Borderline High  150-199 mg/dL  High             200-499 mg/dL  Very High        >500 mg/dL    HDL Reference Ranges  (U.S. Department of Health and Human Services ATP III Classifications)    Low     <40 mg/dl (major risk factor for CHD)  High    >60 mg/dl ('negative' risk factor for CHD)        LDL Reference Ranges  (U.S. Department of Health and Human Services ATP III  Classifications)    Optimal          <100 mg/dL  Near Optimal     100-129 mg/dL  Borderline High  130-159 mg/dL  High             160-189 mg/dL  Very High        >189 mg/dL    TSH [[id number]]  (Normal) Collected: 04/07/23 0713    Specimen: Blood Updated: 04/07/23 0755     TSH 0.952 uIU/mL     High Sensitivity Troponin T [[id number]]  (Abnormal) Collected: 04/07/23 0713    Specimen: Blood Updated: 04/07/23 0751     HS Troponin T 87 ng/L     Narrative:      High Sensitive Troponin T Reference Range:  <10.0 ng/L- Negative Female for AMI  <15.0 ng/L- Negative Male for AMI  >=10 - Abnormal Female indicating possible myocardial injury.  >=15 - Abnormal Male indicating possible myocardial injury.   Clinicians would have to utilize clinical acumen, EKG, Troponin, and serial changes to determine if it is an Acute Myocardial Infarction or myocardial injury due to an underlying chronic condition.         Basic Metabolic Panel [[id number]]  (Abnormal) Collected: 04/07/23 0713    Specimen: Blood Updated: 04/07/23 0750     Glucose 93 mg/dL      BUN 19 mg/dL      Creatinine 0.59 mg/dL      Sodium 135 mmol/L      Potassium 4.5 mmol/L      Chloride 103 mmol/L      CO2 23.0 mmol/L      Calcium 9.3 mg/dL      BUN/Creatinine Ratio 32.2     Anion Gap 9.0 mmol/L      eGFR 90.1 mL/min/1.73     Narrative:      GFR Normal >60  Chronic Kidney Disease <60  Kidney Failure <15    The GFR formula is only valid for adults with stable renal function between ages 18 and 70.    CBC (No Diff) [[id number]]  (Abnormal) Collected: 04/07/23 0713    Specimen: Blood Updated: 04/07/23 0731     WBC 9.09 10*3/mm3      RBC 3.57 10*6/mm3      Hemoglobin 11.3 g/dL      Hematocrit 36.4 %      .0 fL      MCH 31.7 pg      MCHC 31.0 g/dL      RDW 16.9 %      RDW-SD 64.3 fl      MPV 9.3 fL      Platelets 375 10*3/mm3     High Sensitivity Troponin T 2Hr [[id number]]  (Abnormal) Collected: 04/06/23 2345    Specimen: Blood Updated: 04/07/23 0018     HS Troponin T 85  ng/L      Troponin T Delta 33 ng/L     Narrative:      High Sensitive Troponin T Reference Range:  <10.0 ng/L- Negative Female for AMI  <15.0 ng/L- Negative Male for AMI  >=10 - Abnormal Female indicating possible myocardial injury.  >=15 - Abnormal Male indicating possible myocardial injury.   Clinicians would have to utilize clinical acumen, EKG, Troponin, and serial changes to determine if it is an Acute Myocardial Infarction or myocardial injury due to an underlying chronic condition.         [place] Draw [[id number]] Collected: 04/06/23 2144    Specimen: Blood Updated: 04/06/23 2246    Narrative:      The following orders were created for panel order [place] Draw.  Procedure                               Abnormality         Status                     ---------                               -----------         ------                     Green Top (Gel)[[id number]]                                  Final result               Lavender Top[[id number]]                                     Final result               Red Top[[id number]]                                          Final result               Light Blue Top[[id number]]                                   Final result                 Please view results for these tests on the individual orders.    Light Blue Top [[id number]] Collected: 04/06/23 2144    Specimen: Blood Updated: 04/06/23 2246     Extra Tube Hold for add-ons.     Comment: Auto resulted       Green Top (Gel) [[id number]] Collected: 04/06/23 2144    Specimen: Blood Updated: 04/06/23 2246     Extra Tube Hold for add-ons.     Comment: Auto resulted.       Lavender Top [[id number]] Collected: 04/06/23 2144    Specimen: Blood Updated: 04/06/23 2246     Extra Tube hold for add-on     Comment: Auto resulted       Red Top [[id number]] Collected: 04/06/23 2144    Specimen: Blood Updated: 04/06/23 2246     Extra Tube Hold for add-ons.     Comment: Auto resulted.       Magnesium [[id number]]  (Normal) Collected:  04/06/23 2144    Specimen: Blood Updated: 04/06/23 2216     Magnesium 1.9 mg/dL     Comprehensive Metabolic Panel [[id number]]  (Abnormal) Collected: 04/06/23 2144    Specimen: Blood Updated: 04/06/23 2216     Glucose 128 mg/dL      BUN 19 mg/dL      Creatinine 0.65 mg/dL      Sodium 139 mmol/L      Potassium 4.8 mmol/L      Chloride 106 mmol/L      CO2 24.0 mmol/L      Calcium 9.3 mg/dL      Total Protein 7.1 g/dL      Albumin 3.7 g/dL      ALT (SGPT) 6 U/L      AST (SGOT) 14 U/L      Alkaline Phosphatase 61 U/L      Total Bilirubin 0.2 mg/dL      Globulin 3.4 gm/dL      A/G Ratio 1.1 g/dL      BUN/Creatinine Ratio 29.2     Anion Gap 9.0 mmol/L      eGFR 88.0 mL/min/1.73     Narrative:      GFR Normal >60  Chronic Kidney Disease <60  Kidney Failure <15    The GFR formula is only valid for adults with stable renal function between ages 18 and 70.    High Sensitivity Troponin T [[id number]]  (Abnormal) Collected: 04/06/23 2144    Specimen: Blood Updated: 04/06/23 2211     HS Troponin T 52 ng/L     Narrative:      High Sensitive Troponin T Reference Range:  <10.0 ng/L- Negative Female for AMI  <15.0 ng/L- Negative Male for AMI  >=10 - Abnormal Female indicating possible myocardial injury.  >=15 - Abnormal Male indicating possible myocardial injury.   Clinicians would have to utilize clinical acumen, EKG, Troponin, and serial changes to determine if it is an Acute Myocardial Infarction or myocardial injury due to an underlying chronic condition.         CBC & Differential [[id number]]  (Abnormal) Collected: 04/06/23 2144    Specimen: Blood Updated: 04/06/23 2153    Narrative:      The following orders were created for panel order CBC & Differential.  Procedure                               Abnormality         Status                     ---------                               -----------         ------                     CBC Auto Differential[[id number]]        Abnormal            Final result                 Please view  results for these tests on the individual orders.    CBC Auto Differential [[id number]]  (Abnormal) Collected: 04/06/23 2144    Specimen: Blood Updated: 04/06/23 2153     WBC 11.07 10*3/mm3      RBC 3.96 10*6/mm3      Hemoglobin 12.8 g/dL      Hematocrit 39.4 %      MCV 99.5 fL      MCH 32.3 pg      MCHC 32.5 g/dL      RDW 16.8 %      RDW-SD 62.2 fl      MPV 9.1 fL      Platelets 412 10*3/mm3      Neutrophil % 67.7 %      Lymphocyte % 18.6 %      Monocyte % 10.2 %      Eosinophil % 2.3 %      Basophil % 0.5 %      Immature Grans % 0.7 %      Neutrophils, Absolute 7.49 10*3/mm3      Lymphocytes, Absolute 2.06 10*3/mm3      Monocytes, Absolute 1.13 10*3/mm3      Eosinophils, Absolute 0.25 10*3/mm3      Basophils, Absolute 0.06 10*3/mm3      Immature Grans, Absolute 0.08 10*3/mm3      nRBC 0.0 /100 WBC         Results for orders placed during the hospital encounter of 11/08/22    Adult Transthoracic Echo Complete W/ Cont if Necessary Per Protocol    Interpretation Summary  •  Left ventricular systolic function is hyperdynamic (EF > 70%).  •  The right ventricular cavity is mildly dilated. Borderline low right ventricular systolic function noted.  •  Cardiac valves are poorly visualized, however no obvious abnormalities identified by Doppler assessment      Condition on Discharge: Stable.  Near baseline.    Vital Signs  Temp:  [97.7 °F (36.5 °C)-98.2 °F (36.8 °C)] 97.7 °F (36.5 °C)  Heart Rate:  [] 86  Resp:  [14-22] 14  BP: (101-129)/(54-74) 129/70    Physical Exam:  Constitutional:       Appearance: Frail. Chronically ill-appearing.   Pulmonary:      Effort: Pulmonary effort is normal.      Breath sounds: Normal breath sounds.   Cardiovascular:      PMI at left midclavicular line. Normal rate. Regular rhythm.      Murmurs: There is no murmur.      No gallop. No click. No rub.   Edema:     Peripheral edema absent.   Abdominal:      General: Bowel sounds are normal.   Musculoskeletal: Normal range of motion.       Cervical back: Normal range of motion and neck supple. Skin:     General: Skin is warm.   Neurological:      Mental Status: Exhibits altered mental status and a cognitive deficit.      Comments: At baseline         Discharge Disposition      Discharge Medications     Discharge Medications      New Medications      Instructions Start Date   atorvastatin 10 MG tablet  Commonly known as: LIPITOR   10 mg, Oral, Nightly      dronedarone 400 MG tablet  Commonly known as: MULTAQ   400 mg, Oral, Every 12 Hours Scheduled      nitrofurantoin (macrocrystal-monohydrate) 100 MG capsule  Commonly known as: MACROBID   100 mg, Oral, Every 12 Hours Scheduled         Changes to Medications      Instructions Start Date   dilTIAZem  MG 24 hr capsule  Commonly known as: CARDIZEM CD  What changed:   · medication strength  · how much to take  · when to take this   240 mg, Oral, Every 24 Hours Scheduled   Start Date: April 8, 2023        Continue These Medications      Instructions Start Date   acebutolol 200 MG capsule  Commonly known as: SECTRAL   200 mg, Oral, 2 Times Daily      acetaminophen 325 MG tablet  Commonly known as: TYLENOL   650 mg, Oral, Every 4 Hours PRN      apixaban 5 MG tablet tablet  Commonly known as: ELIQUIS   5 mg, Oral, Every 12 Hours Scheduled      bisacodyl 5 MG EC tablet  Commonly known as: DULCOLAX   10 mg, Oral, Every Night at Bedtime      budesonide-formoterol 160-4.5 MCG/ACT inhaler  Commonly known as: SYMBICORT   2 puffs, Inhalation, 2 Times Daily - RT      Calmoseptine 0.44-20.6 % ointment  Generic drug: Menthol-Zinc Oxide   1 application, Topical, As Needed, Apply to kathi-area after incontinent episodes      clotrimazole-betamethasone 1-0.05 % cream  Commonly known as: LOTRISONE   1 application, Topical, Every 6 Hours PRN      famotidine 20 MG tablet  Commonly known as: PEPCID   20 mg, Oral, Daily      fenofibrate 145 MG tablet  Commonly known as: TRICOR   145 mg, Oral, Daily      gabapentin 100 MG  capsule  Commonly known as: NEURONTIN   100 mg, Oral, 3 Times Daily      ipratropium-albuterol 0.5-2.5 mg/3 ml nebulizer  Commonly known as: DUO-NEB   3 mL, Nebulization, Every 4 Hours PRN      COMBIVENT RESPIMAT IN   2 puffs, Inhalation, Daily,  MCG/ACT      lactulose 10 GM/15ML solution  Commonly known as: CHRONULAC   20 g, Oral, Daily PRN      loperamide 2 MG capsule  Commonly known as: IMODIUM   2 mg, Oral, Every 6 Hours PRN      LORazepam 0.5 MG tablet  Commonly known as: ATIVAN   0.5 mg, Oral, 3 Times Daily      megestrol 40 MG/ML suspension  Commonly known as: MEGACE   400 mg, Oral, 2 Times Daily      melatonin 5 MG tablet tablet   5 mg, Oral, Nightly      ondansetron 4 MG tablet  Commonly known as: ZOFRAN   4 mg, Oral, Every 8 Hours PRN      oxyCODONE-acetaminophen 7.5-325 MG per tablet  Commonly known as: PERCOCET   1 tablet, Oral, Every 4 Hours PRN      PARoxetine 40 MG tablet  Commonly known as: PAXIL   40 mg, Oral, 2 Times Daily      polyethylene glycol 17 GM/SCOOP powder  Commonly known as: MIRALAX   17 g, Oral, Daily      Vitamin D 50 MCG (2000 UT) tablet   2,000 Units, Oral, Daily         ASK your doctor about these medications      Instructions Start Date   magic barrier cream   1 application, 2 Times Daily PRN             Discharge Diet: Cardiac consistent carb    Activity at Discharge:     Follow-up Appointments  Future Appointments   Date Time Provider Department Center   5/26/2023 10:00 AM June Guerrero MD [facility]         Test Results Pending at Discharge  Pending Labs     Order Current Status    Urine Culture - Urine, Straight Cath In process        Patient started on Multaq 400 mg twice daily for paroxysmal SVT.  Patient's Cardizem was reduced to 240 mg daily given initiation of Multaq.  Patient will see Dr. Guerrero in outpatient for further evaluation of paroxysmal SVT.  Patient started on Lipitor 10 mg for probable coronary artery disease.  Patient started on Macrobid 100 mg twice  daily for UTI.  This will be an extended 7-day course of antibiotics.  Patient will discharge back to [facility] in stable condition.  Daughter was called and notified of patient's care        Electronically signed by LISA Collins, 04/07/23, 11:33 AM CDT.     Time spent on discharge: 32 minutes

## 2023-04-07 NOTE — PLAN OF CARE
Problem: Skin Injury Risk Increased  Goal: Skin Health and Integrity  Outcome: Ongoing, Progressing  Intervention: Optimize Skin Protection  Recent Flowsheet Documentation  Taken 4/7/2023 0400 by Vini Fair, RN  Head of Bed (HOB) Positioning: HOB at 45 degrees     Problem: Fall Injury Risk  Goal: Absence of Fall and Fall-Related Injury  Outcome: Ongoing, Progressing  Intervention: Promote Injury-Free Environment  Recent Flowsheet Documentation  Taken 4/7/2023 0400 by Vini Fair, RN  Safety Promotion/Fall Prevention: safety round/check completed   Goal Outcome Evaluation:

## 2023-04-07 NOTE — H&P
[facility] -  History and Physical     LOS: 0 days   Patient Care Team:  Chris Hermosillo MD as PCP - General (Internal Medicine)    Chief Complaint: Chest pain shortness of breath    Subjective     Interval History:   Zelalem Hung is a 82-year-old female with known history of mild dementia, SVT, hypertension, pulmonary embolism on chronic anticoagulation, chronic respiratory failure with hypoxia on 2 L oxygen who cardiology was asked admit due to episode of SVT.    Patient has had multiple admissions and visits to the emergency room over the last year.  She was most recently admitted 2/2/2023 until 2/5/2023 for SVT.  This was in the setting of HCAP and a UTI.    Yesterday EMS was called to the patient's nursing home, [facility], due to chest pain shortness of breath.  Analysis of her rhythm revealed SVT.  She was given 1 dose of adenosine in route.  She subsequently converted to normal sinus rhythm.  She has been normal sinus rhythm since.  She denies chest pain or shortness of breath now.  She is roughly at her baseline cognition.  She does complain of some urinary frequency as well as burning.  She is unsure how long has been going on.  Daughter is present and provides most of the history.  She says that she has had SVT episodes for many many years.  It has been discussed in the past about a potential ablation but she was told she is not a candidate.  She does not believe she has been started on any antiarrhythmic medication outside of her beta-blocker and calcium channel blocker.  She says she is been slowly declining in the nursing home.  She is bedbound.  She is able to take medications orally.      Review of Systems:     Review of Systems   Constitutional: Negative for fever.   Genitourinary: Positive for dysuria and frequency.       History  Past Medical History:   Diagnosis Date   • Chronic hyponatremia    • Frequent UTI    • Low back pain    • Migraines    • Pneumonia     • SVT (supraventricular tachycardia)    • UTI (urinary tract infection)      Past Surgical History:   Procedure Laterality Date   • CHOLECYSTECTOMY WITH INTRAOPERATIVE CHOLANGIOGRAM N/A 5/9/2019    Procedure: CHOLECYSTECTOMY LAPAROSCOPIC INTRAOPERATIVE CHOLANGIOGRAM;  Surgeon: Chandan Bravo MD;  Location:  [facility] OR;  Service: General   • HIP HEMIARTHROPLASTY Left 5/6/2022    Procedure: HIP HEMIARTHROPLASTY;  Surgeon: Barrington Simons MD;  Location:  [facility] OR;  Service: Orthopedics;  Laterality: Left;     Family History   Problem Relation Age of Onset   • Stroke Mother    • Anemia Mother    • Cancer Father         Colon cancer   • Heart disease Father    • Heart attack Father    • Colon cancer Father      Social History     Tobacco Use   • Smoking status: Never   • Smokeless tobacco: Never   Vaping Use   • Vaping Use: Never used   Substance Use Topics   • Alcohol use: No   • Drug use: No     Medications Prior to Admission   Medication Sig Dispense Refill Last Dose   • acebutolol (SECTRAL) 200 MG capsule Take 1 capsule by mouth 2 (Two) Times a Day. 90 capsule 0    • apixaban (ELIQUIS) 5 MG tablet tablet Take 1 tablet by mouth Every 12 (Twelve) Hours. Indications: DVT/PE (active thrombosis) 60 tablet     • bisacodyl (DULCOLAX) 5 MG EC tablet Take 10 mg by mouth every night at bedtime.      • budesonide-formoterol (SYMBICORT) 160-4.5 MCG/ACT inhaler Inhale 2 puffs 2 (Two) Times a Day.      • Cholecalciferol (Vitamin D) 50 MCG (2000 UT) tablet Take 2,000 Units by mouth Daily.      • dilTIAZem CD (CARDIZEM CD) 360 MG 24 hr capsule Take 360 mg by mouth Daily.      • famotidine (PEPCID) 20 MG tablet Take 20 mg by mouth Daily.      • gabapentin (NEURONTIN) 100 MG capsule Take 1 capsule by mouth 3 (Three) Times a Day. 15 capsule 0    • Ipratropium-Albuterol (COMBIVENT RESPIMAT IN) Inhale 2 puffs Daily.  MCG/ACT      • ipratropium-albuterol (DUO-NEB) 0.5-2.5 mg/3 ml nebulizer Take 3 mL by nebulization Every 4 (Four)  Hours As Needed for Wheezing or Shortness of Air.      • lactulose (CHRONULAC) 10 GM/15ML solution Take 20 g by mouth Daily As Needed.      • LORazepam (ATIVAN) 0.5 MG tablet Take 1 tablet by mouth 3 (Three) Times a Day. 15 tablet 0    • megestrol (MEGACE) 40 MG/ML suspension Take 400 mg by mouth Daily.      • melatonin 5 MG tablet tablet Take 5 mg by mouth Every Night.      • Menthol-Zinc Oxide (Calmoseptine) 0.44-20.6 % ointment Apply 1 application topically to the appropriate area as directed As Needed (after incontinent episodes). Apply to kathi-area after incontinent episodes      • ondansetron (ZOFRAN) 4 MG tablet Take 4 mg by mouth Every 8 (Eight) Hours As Needed for Nausea or Vomiting.      • oxyCODONE-acetaminophen (PERCOCET) 7.5-325 MG per tablet Take 1 tablet by mouth Every 4 (Four) Hours As Needed for Moderate Pain. 18 tablet 0    • polyethylene glycol (MIRALAX) 17 GM/SCOOP powder Take 17 g by mouth Daily.      • Vitamins A & D (magic barrier cream) Apply 1 application topically to the appropriate area as directed 2 (Two) Times a Day As Needed (with brief changes).        Allergies:  Codeine    Objective     Vital Sign Min/Max for last 24 hours  Temp  Min: 97.7 °F (36.5 °C)  Max: 98.2 °F (36.8 °C)   BP  Min: 101/60  Max: 129/70   Pulse  Min: 86  Max: 116   Resp  Min: 14  Max: 22   SpO2  Min: 96 %  Max: 98 %   Flow (L/min)  Min: 2  Max: 2   Weight  Min: 49.9 kg (110 lb)  Max: 49.9 kg (110 lb)         04/06/23 2128   Weight: 49.9 kg (110 lb)       Physical Exam:    Vitals and nursing note reviewed.   Constitutional:       Appearance: Not in distress. Frail. Chronically ill-appearing.   Pulmonary:      Effort: Pulmonary effort is normal.      Breath sounds: Normal breath sounds.   Cardiovascular:      PMI at left midclavicular line. Normal rate. Regular rhythm.      Murmurs: There is no murmur.      No gallop. No click. No rub.   Edema:     Peripheral edema absent.   Abdominal:      General: Bowel sounds  are normal.   Musculoskeletal: Normal range of motion.      Cervical back: Normal range of motion and neck supple. Skin:     General: Skin is warm.   Neurological:      Mental Status: Exhibits altered mental status.       Results Review:   Lab Results (last 72 hours)     Procedure Component Value Units Date/Time    TSH [[id number]]  (Normal) Collected: 04/07/23 0713    Specimen: Blood Updated: 04/07/23 0755     TSH 0.952 uIU/mL     High Sensitivity Troponin T [[id number]]  (Abnormal) Collected: 04/07/23 0713    Specimen: Blood Updated: 04/07/23 0751     HS Troponin T 87 ng/L     Narrative:      High Sensitive Troponin T Reference Range:  <10.0 ng/L- Negative Female for AMI  <15.0 ng/L- Negative Male for AMI  >=10 - Abnormal Female indicating possible myocardial injury.  >=15 - Abnormal Male indicating possible myocardial injury.   Clinicians would have to utilize clinical acumen, EKG, Troponin, and serial changes to determine if it is an Acute Myocardial Infarction or myocardial injury due to an underlying chronic condition.         Basic Metabolic Panel [[id number]]  (Abnormal) Collected: 04/07/23 0713    Specimen: Blood Updated: 04/07/23 0750     Glucose 93 mg/dL      BUN 19 mg/dL      Creatinine 0.59 mg/dL      Sodium 135 mmol/L      Potassium 4.5 mmol/L      Chloride 103 mmol/L      CO2 23.0 mmol/L      Calcium 9.3 mg/dL      BUN/Creatinine Ratio 32.2     Anion Gap 9.0 mmol/L      eGFR 90.1 mL/min/1.73     Narrative:      GFR Normal >60  Chronic Kidney Disease <60  Kidney Failure <15    The GFR formula is only valid for adults with stable renal function between ages 18 and 70.    CBC (No Diff) [[id number]]  (Abnormal) Collected: 04/07/23 0713    Specimen: Blood Updated: 04/07/23 0731     WBC 9.09 10*3/mm3      RBC 3.57 10*6/mm3      Hemoglobin 11.3 g/dL      Hematocrit 36.4 %      .0 fL      MCH 31.7 pg      MCHC 31.0 g/dL      RDW 16.9 %      RDW-SD 64.3 fl      MPV 9.3 fL      Platelets 375 10*3/mm3      Lipid Panel [[id number]] Collected: 04/07/23 0713    Specimen: Blood Updated: 04/07/23 0726    High Sensitivity Troponin T 2Hr [[id number]]  (Abnormal) Collected: 04/06/23 2345    Specimen: Blood Updated: 04/07/23 0018     HS Troponin T 85 ng/L      Troponin T Delta 33 ng/L     Narrative:      High Sensitive Troponin T Reference Range:  <10.0 ng/L- Negative Female for AMI  <15.0 ng/L- Negative Male for AMI  >=10 - Abnormal Female indicating possible myocardial injury.  >=15 - Abnormal Male indicating possible myocardial injury.   Clinicians would have to utilize clinical acumen, EKG, Troponin, and serial changes to determine if it is an Acute Myocardial Infarction or myocardial injury due to an underlying chronic condition.         [place] Draw [[id number]] Collected: 04/06/23 2144    Specimen: Blood Updated: 04/06/23 2246    Narrative:      The following orders were created for panel order [place] Draw.  Procedure                               Abnormality         Status                     ---------                               -----------         ------                     Green Top (Gel)[[id number]]                                  Final result               Lavender Top[[id number]]                                     Final result               Red Top[[id number]]                                          Final result               Light Blue Top[[id number]]                                   Final result                 Please view results for these tests on the individual orders.    Light Blue Top [[id number]] Collected: 04/06/23 2144    Specimen: Blood Updated: 04/06/23 2246     Extra Tube Hold for add-ons.     Comment: Auto resulted       Green Top (Gel) [[id number]] Collected: 04/06/23 2144    Specimen: Blood Updated: 04/06/23 2246     Extra Tube Hold for add-ons.     Comment: Auto resulted.       Lavender Top [[id number]] Collected: 04/06/23 2144    Specimen: Blood Updated: 04/06/23 2246     Extra Tube hold for add-on      Comment: Auto resulted       Red Top [[id number]] Collected: 04/06/23 2144    Specimen: Blood Updated: 04/06/23 2246     Extra Tube Hold for add-ons.     Comment: Auto resulted.       Magnesium [[id number]]  (Normal) Collected: 04/06/23 2144    Specimen: Blood Updated: 04/06/23 2216     Magnesium 1.9 mg/dL     Comprehensive Metabolic Panel [[id number]]  (Abnormal) Collected: 04/06/23 2144    Specimen: Blood Updated: 04/06/23 2216     Glucose 128 mg/dL      BUN 19 mg/dL      Creatinine 0.65 mg/dL      Sodium 139 mmol/L      Potassium 4.8 mmol/L      Chloride 106 mmol/L      CO2 24.0 mmol/L      Calcium 9.3 mg/dL      Total Protein 7.1 g/dL      Albumin 3.7 g/dL      ALT (SGPT) 6 U/L      AST (SGOT) 14 U/L      Alkaline Phosphatase 61 U/L      Total Bilirubin 0.2 mg/dL      Globulin 3.4 gm/dL      A/G Ratio 1.1 g/dL      BUN/Creatinine Ratio 29.2     Anion Gap 9.0 mmol/L      eGFR 88.0 mL/min/1.73     Narrative:      GFR Normal >60  Chronic Kidney Disease <60  Kidney Failure <15    The GFR formula is only valid for adults with stable renal function between ages 18 and 70.    High Sensitivity Troponin T [[id number]]  (Abnormal) Collected: 04/06/23 2144    Specimen: Blood Updated: 04/06/23 2211     HS Troponin T 52 ng/L     Narrative:      High Sensitive Troponin T Reference Range:  <10.0 ng/L- Negative Female for AMI  <15.0 ng/L- Negative Male for AMI  >=10 - Abnormal Female indicating possible myocardial injury.  >=15 - Abnormal Male indicating possible myocardial injury.   Clinicians would have to utilize clinical acumen, EKG, Troponin, and serial changes to determine if it is an Acute Myocardial Infarction or myocardial injury due to an underlying chronic condition.         CBC & Differential [[id number]]  (Abnormal) Collected: 04/06/23 2144    Specimen: Blood Updated: 04/06/23 2153    Narrative:      The following orders were created for panel order CBC & Differential.  Procedure                                Abnormality         Status                     ---------                               -----------         ------                     CBC Auto Differential[[id number]]        Abnormal            Final result                 Please view results for these tests on the individual orders.    CBC Auto Differential [[id number]]  (Abnormal) Collected: 04/06/23 2144    Specimen: Blood Updated: 04/06/23 2153     WBC 11.07 10*3/mm3      RBC 3.96 10*6/mm3      Hemoglobin 12.8 g/dL      Hematocrit 39.4 %      MCV 99.5 fL      MCH 32.3 pg      MCHC 32.5 g/dL      RDW 16.8 %      RDW-SD 62.2 fl      MPV 9.1 fL      Platelets 412 10*3/mm3      Neutrophil % 67.7 %      Lymphocyte % 18.6 %      Monocyte % 10.2 %      Eosinophil % 2.3 %      Basophil % 0.5 %      Immature Grans % 0.7 %      Neutrophils, Absolute 7.49 10*3/mm3      Lymphocytes, Absolute 2.06 10*3/mm3      Monocytes, Absolute 1.13 10*3/mm3      Eosinophils, Absolute 0.25 10*3/mm3      Basophils, Absolute 0.06 10*3/mm3      Immature Grans, Absolute 0.08 10*3/mm3      nRBC 0.0 /100 WBC               Echo EF Estimated  No results found for: ECHOEFEST      Cath Ejection Fraction Quantitative  No results found for: CATHEF        Medication Review: yes  Current Facility-Administered Medications   Medication Dose Route Frequency Provider Last Rate Last Admin   • acebutolol (SECTRAL) capsule 200 mg  200 mg Oral BID Ramón Barber APRN       • apixaban (ELIQUIS) tablet 5 mg  5 mg Oral Q12H Ramón Barber APRN       • bisacodyl (DULCOLAX) EC tablet 10 mg  10 mg Oral Daily PRN Ramón Barber APRN       • budesonide-formoterol (SYMBICORT) 160-4.5 MCG/ACT inhaler 2 puff  2 puff Inhalation BID - RT Ramón Braber APRN       • dilTIAZem CD (CARDIZEM CD) 24 hr capsule 240 mg  240 mg Oral Q24H Ramón Barber APRN       • famotidine (PEPCID) tablet 20 mg  20 mg Oral Daily Ramón Barber APRN       • ipratropium-albuterol (DUO-NEB) nebulizer solution 3 mL  3 mL  Nebulization Q4H PRN Ramón Barber APRN       • megestrol (MEGACE) 40 MG/ML suspension 400 mg  400 mg Oral Daily Ramón Barber APRN       • melatonin tablet 5 mg  5 mg Oral Nightly Ramón Barber APRN       • polyethylene glycol (MIRALAX) powder 0.5 bottle  0.5 bottle Oral Daily Ramón Barber APRN       • sodium chloride 0.9 % flush 10 mL  10 mL Intravenous PRN Kd Escobar MD             Assessment & Plan       Supraventricular tachycardia    SVT: Observe SVT noted and adenosine given with conversion to normal sinus rhythm  -We will continue home beta-blocker as well as calcium channel blocker.  We will reduce calcium channel blocker given below change.  -We will start Multaq 400 mg twice daily for antiarrhythmic coverage.  -We will plan on outpatient referral to EP.    Elevated troponin: In the setting of SVT.  Patient had chest pain and shortness of breath during episodes SVT but this is resolved with normal sinus rhythm.    Dysuria: We will check a in and out urine today.  Given her symptoms she may require antibiotics before going back to the nursing home.    Plan on discharge back to the nursing home today.  Pending results of urine we will plan on antibiotics prior to discharge.    Electronically signed by LISA Collins, 04/07/23, 8:28 AM CDT.

## 2023-04-07 NOTE — ED PROVIDER NOTES
"EMERGENCY DEPARTMENT ATTENDING NOTE    Patient Name: Zelalem Hung    Chief Complaint   Patient presents with   • Chest Pain   • Rapid Heart Rate       PATIENT PRESENTATION:  Zelalem Hung is a very pleasant 82 y.o. female with prior history of supraventricular tachycardia presents to emergency department brought in by EMS due to tachycardia chest pain shortness of breath.    Per EMS report patient was complaining of generalized weakness chest pain shortness of breath that began around 1945.  On EKG they noticed that she was in supraventricular tachycardia with rates in the 160s:      Patient previously received her home occasions including labetalol 200 mg gabapentin 100 mg Eliquis 5 mg and Ativan 0.5 mg.  EMS gave 6 mg of adenosine with conversion to sinus tachycardia.    On my review the patient she states that earlier after lunch she started feeling some chest aching sensation retrosternal nature no radiation.  Also endorsing generalized weakness and shortness of breath denies any lightheadedness or dizziness.  States that since the medication she got with the EMS crew she feels completely fine she has no chest pain or shortness of breath feels great.  Denies any previous fevers or chills or cough.  Denies any nausea vomiting abdominal pain.      PHYSICAL EXAM:   VS: /67 (BP Location: Right arm, Patient Position: Lying)   Pulse 94   Temp 98.2 °F (36.8 °C) (Axillary)   Resp 20   Ht 152.4 cm (60\")   Wt 49.9 kg (110 lb)   SpO2 96%   BMI 21.48 kg/m²   GENERAL: Chronically ill-appearing elderly woman sitting up in stretcher no acute stress; thin but otherwise well-developed, awake, alert, no acute distress, nontoxic appearing, comfortable  EYES: PERRL, sclerae anicteric, extraocular movements grossly intact, symmetric lids  EARS, NOSE, MOUTH, THROAT: atraumatic external nose and ears, moist mucous membranes  NECK: symmetric, trachea midline  RESPIRATORY: unlabored respiratory effort, clear to " auscultation bilaterally, good air movement  CARDIOVASCULAR: no murmurs, peripheral pulses 2+ and equal in all extremities  GI: soft, nontender, nondistended  MUSCULOSKELETAL/EXTREMITIES: extremities without obvious deformity  SKIN: warm and dry with no obvious rashes  NEUROLOGIC: moving all 4 extremities symmetrically, CN II-XII grossly intact; GCS 15  PSYCHIATRIC: alert, pleasant and cooperative. Appropriate mood and affect.      MEDICAL DECISION MAKING:    Zelalem Hung is a 82 y.o. female with history of prior supraventricular tachycardia presented to the emergency department after calling EMS for chest pain shortness of breath and weakness found to be in SVT with EMS and converted with adenosine by EMS.    Differential Diagnosis Considered: STEMI, NSTEMI, unstable angina, acute cardiac arrhythmia    Labs Ordered:  Labs Reviewed   COMPREHENSIVE METABOLIC PANEL - Abnormal; Notable for the following components:       Result Value    Glucose 128 (*)     BUN/Creatinine Ratio 29.2 (*)     All other components within normal limits    Narrative:     GFR Normal >60                  Chronic Kidney Disease <60                  Kidney Failure <15                                    The GFR formula is only valid for adults with stable renal function between ages 18 and 70.   CBC WITH AUTO DIFFERENTIAL - Abnormal; Notable for the following components:    WBC 11.07 (*)     MCV 99.5 (*)     RDW 16.8 (*)     RDW-SD 62.2 (*)     Lymphocyte % 18.6 (*)     Immature Grans % 0.7 (*)     Neutrophils, Absolute 7.49 (*)     Monocytes, Absolute 1.13 (*)     Immature Grans, Absolute 0.08 (*)     All other components within normal limits   TROPONIN - Abnormal; Notable for the following components:    HS Troponin T 52 (*)     All other components within normal limits    Narrative:     High Sensitive Troponin T Reference Range:                  <10.0 ng/L- Negative Female for AMI                  <15.0 ng/L- Negative Male for AMI                   >=10 - Abnormal Female indicating possible myocardial injury.                  >=15 - Abnormal Male indicating possible myocardial injury.                   Clinicians would have to utilize clinical acumen, EKG, Troponin, and serial changes to determine if it is an Acute Myocardial Infarction or myocardial injury due to an underlying chronic condition.                                        HIGH SENSITIVITIY TROPONIN T 2HR - Abnormal; Notable for the following components:    HS Troponin T 85 (*)     Troponin T Delta 33 (*)     All other components within normal limits    Narrative:     High Sensitive Troponin T Reference Range:                  <10.0 ng/L- Negative Female for AMI                  <15.0 ng/L- Negative Male for AMI                  >=10 - Abnormal Female indicating possible myocardial injury.                  >=15 - Abnormal Male indicating possible myocardial injury.                   Clinicians would have to utilize clinical acumen, EKG, Troponin, and serial changes to determine if it is an Acute Myocardial Infarction or myocardial injury due to an underlying chronic condition.                                        MAGNESIUM - Normal   RAINBOW DRAW    Narrative:     The following orders were created for panel order [place] Draw.                  Procedure                               Abnormality         Status                                     ---------                               -----------         ------                                     Green Top (Gel)[[id number]]                                  Final result                               Lavender Top[[id number]]                                     Final result                               Red Top[[id number]]                                          Final result                               Light Blue Top[[id number]]                                   Final result                                                 Please view results for  these tests on the individual orders.   CBC AND DIFFERENTIAL    Narrative:     The following orders were created for panel order CBC & Differential.                  Procedure                               Abnormality         Status                                     ---------                               -----------         ------                                     CBC Auto Differential[[id number]]        Abnormal            Final result                                                 Please view results for these tests on the individual orders.   GREEN TOP   LAVENDER TOP   RED TOP   LIGHT BLUE TOP        Imaging Ordered:   XR Chest 1 View   Final Result   1. Hypoventilation with vascular crowding.   2. Linear opacities in both lung bases likely due to atelectasis.   Pneumonia less likely.   3. Stable bronchial wall thickening. Old granulomatous disease.           This report was finalized on 04/06/2023 22:09 by Dr. Ag Lane MD.          Internal chart review:  Past Medical History:   Diagnosis Date   • Chronic hyponatremia    • Frequent UTI    • Low back pain    • Migraines    • Pneumonia    • SVT (supraventricular tachycardia)    • UTI (urinary tract infection)        Past Surgical History:   Procedure Laterality Date   • CHOLECYSTECTOMY WITH INTRAOPERATIVE CHOLANGIOGRAM N/A 5/9/2019    Procedure: CHOLECYSTECTOMY LAPAROSCOPIC INTRAOPERATIVE CHOLANGIOGRAM;  Surgeon: Chandan Bravo MD;  Location: [facility] OR;  Service: General   • HIP HEMIARTHROPLASTY Left 5/6/2022    Procedure: HIP HEMIARTHROPLASTY;  Surgeon: Barrington Simons MD;  Location: [facility] OR;  Service: Orthopedics;  Laterality: Left;       Allergies   Allergen Reactions   • Codeine Anaphylaxis         Current Facility-Administered Medications:   •  sodium chloride 0.9 % flush 10 mL, 10 mL, Intravenous, PRN, Kd Escobar MD    My EKG interpretation: Reviewed EMS EKG which shows SVT with rates in the 160s.  EKG on arrival normal sinus rhythm with  a rate of 98.  No ST elevations ST depressions or T wave inversions.    My lab interpretation: Initial high sensitive troponin of 52 with repeat of 85 with a delta of 33.  Normal magnesium.  Only slight available to count 11.07 normal hemoglobin.  Normal CMP.  Normal mag.    My imaging interpretation: Chest x-ray with no acute findings.    Decision rules/scores evaluated: HEART score 4.     Discussed with: the on call cardiologist, Dr. Worthy, given patient's elevated delta troponin in the setting of chronic troponin elevation.  He felt please given her presentation that likely troponin elevation was due to his SVT but given she is never a stress test it is reasonable to pursue cardiac stress testing.  Offered patient option to either be admitted for cardiac stress testing or to be discharged with close follow-up with him within 24 hours.    Shared decision making: Discussed results with the patient's daughter at the bedside and offered both options from cardiology and she said that she would much prefer to have patient stay inpatient for cardiac chest testing which I thought was reasonable.    ED Course and Re-evaluation: 83yo F presents emergency department with acute chest pain shortness of breath and generalized weakness in the setting of SVT witnessed by EMS now status post chemical cardioversion by EMS and asymptomatic on presentation.  Slight tachycardic on arrival given low-dose IV fluids with improvement of heart rate.  Patient remained well-appearing asymptomatic throughout her observation emergency department.  EKG nonischemic.  Labs notable for a positive elevated delta troponin.  Discussed with cardiology patient never had a stress test they offered patient ability to follow-up in clinic for possible outpatient testing versus admission after risk-benefit discussion with daughter preferred inpatient correction of testing so patient was admitted to cardiology.  Given her symptoms in the setting of acute  arrhythmia I think this is the most likely cause of her symptoms.  Would consider unstable angina given her elevated delta troponin less likely NSTEMI in my opinion given she is not having chest pain.  No evidence of STEMI on EKG.  Doubt other etiologies given association with arrhythmia.  No evidence of pneumothorax or pneumonia.  Patient was admitted to cardiology for cardiac stress testing.      ED Diagnosis:  Tachycardia; On continuous oral anticoagulation; Generalized weakness; Acute chest pain; Shortness of breath; Supraventricular tachycardia    Disposition: admit to cardiology        Signed:  Kd Escobar MD  Emergency Medicine Physician    Please note that portions of this note were completed with a voice recognition program.      Kd Escobar MD  04/07/23 0651

## 2023-04-07 NOTE — ED NOTES
Nursing report ED to floor  Zelalem Hung  82 y.o.  female    HPI:   Chief Complaint   Patient presents with    Chest Pain    Rapid Heart Rate       Admitting doctor:   Collin Worthy DO    Consulting provider(s):  Consults       Date and Time Order Name Status Description    3/8/2023 10:01 AM Inpatient Neurology Consult Stroke Completed              Admitting diagnosis:   The primary encounter diagnosis was Supraventricular tachycardia. Diagnoses of Tachycardia, On continuous oral anticoagulation, Generalized weakness, Acute chest pain, and Shortness of breath were also pertinent to this visit.    Code status:   Current Code Status       Date Active Code Status Order ID Comments User Context       4/6/2023 2143 No CPR (Do Not Attempt to Resuscitate) [id number]  Kd Escobar MD ED        Question Answer    Code Status (Patient has no pulse and is not breathing) No CPR (Do Not Attempt to Resuscitate)    Medical Interventions (Patient has pulse or is breathing) Full Support    Comments Documentation provided with patient    Release to patient Routine Release                    Allergies:   Codeine    Intake and Output    Intake/Output Summary (Last 24 hours) at 4/7/2023 0128  Last data filed at 4/6/2023 2334  Gross per 24 hour   Intake 500 ml   Output --   Net 500 ml       Weight:       04/06/23 2128   Weight: 49.9 kg (110 lb)       Most recent vitals:   Vitals:    04/06/23 2201 04/06/23 2328 04/06/23 2333 04/07/23 0031   BP: 104/74  104/64 108/61   BP Location:       Patient Position:       Pulse: 109 100 100 96   Resp:       Temp:       TempSrc:       SpO2: 98% 97% 97% 97%   Weight:       Height:         Oxygen Therapy: .    Active LDAs/IV Access:   Lines, Drains & Airways       Active LDAs       Name Placement date Placement time Site Days    Peripheral IV 04/06/23 2146 Anterior;Right Forearm 04/06/23 2146  Forearm  less than 1                    Labs (abnormal labs have a star):   Labs Reviewed    COMPREHENSIVE METABOLIC PANEL - Abnormal; Notable for the following components:       Result Value    Glucose 128 (*)     BUN/Creatinine Ratio 29.2 (*)     All other components within normal limits    Narrative:     GFR Normal >60  Chronic Kidney Disease <60  Kidney Failure <15    The GFR formula is only valid for adults with stable renal function between ages 18 and 70.   CBC WITH AUTO DIFFERENTIAL - Abnormal; Notable for the following components:    WBC 11.07 (*)     MCV 99.5 (*)     RDW 16.8 (*)     RDW-SD 62.2 (*)     Lymphocyte % 18.6 (*)     Immature Grans % 0.7 (*)     Neutrophils, Absolute 7.49 (*)     Monocytes, Absolute 1.13 (*)     Immature Grans, Absolute 0.08 (*)     All other components within normal limits   TROPONIN - Abnormal; Notable for the following components:    HS Troponin T 52 (*)     All other components within normal limits    Narrative:     High Sensitive Troponin T Reference Range:  <10.0 ng/L- Negative Female for AMI  <15.0 ng/L- Negative Male for AMI  >=10 - Abnormal Female indicating possible myocardial injury.  >=15 - Abnormal Male indicating possible myocardial injury.   Clinicians would have to utilize clinical acumen, EKG, Troponin, and serial changes to determine if it is an Acute Myocardial Infarction or myocardial injury due to an underlying chronic condition.        HIGH SENSITIVITIY TROPONIN T 2HR - Abnormal; Notable for the following components:    HS Troponin T 85 (*)     Troponin T Delta 33 (*)     All other components within normal limits    Narrative:     High Sensitive Troponin T Reference Range:  <10.0 ng/L- Negative Female for AMI  <15.0 ng/L- Negative Male for AMI  >=10 - Abnormal Female indicating possible myocardial injury.  >=15 - Abnormal Male indicating possible myocardial injury.   Clinicians would have to utilize clinical acumen, EKG, Troponin, and serial changes to determine if it is an Acute Myocardial Infarction or myocardial injury due to an underlying  chronic condition.        MAGNESIUM - Normal   RAINBOW DRAW    Narrative:     The following orders were created for panel order [place] Draw.  Procedure                               Abnormality         Status                     ---------                               -----------         ------                     Green Top (Gel)[[id number]]                                  Final result               Lavender Top[[id number]]                                     Final result               Red Top[[id number]]                                          Final result               Light Blue Top[[id number]]                                   Final result                 Please view results for these tests on the individual orders.   CBC AND DIFFERENTIAL    Narrative:     The following orders were created for panel order CBC & Differential.  Procedure                               Abnormality         Status                     ---------                               -----------         ------                     CBC Auto Differential[[id number]]        Abnormal            Final result                 Please view results for these tests on the individual orders.   GREEN TOP   LAVENDER TOP   RED TOP   LIGHT BLUE TOP       Meds given in ED:   Medications   sodium chloride 0.9 % flush 10 mL (has no administration in time range)   aspirin chewable tablet 324 mg (324 mg Oral Given 4/6/23 2206)   lactated ringers bolus 500 mL (0 mL Intravenous Stopped 4/6/23 2334)           NIH Stroke Scale:       Isolation/Infection(s):  No active isolations   No active infections     COVID Testing  Collected .  Resulted .    Nursing report ED to floor:  Mental status: .  Ambulatory status: .  Precautions: .    ED nurse phone extentsion- ..

## 2023-04-07 NOTE — ED NOTES
The following fall interventions were initiated:    [x] Patient and/or family given education   [x] Call light within reach and educated on how to use   [x] Bed rails up per protocol    [x] Bed locked and in the lowest position   [] Bed alarm set and on loudest setting   [x] Fall wrist band applied   [x] Non skid footwear applied   [x] Room free of clutter   [x] Patient items within reach   [x] Adequate lighting provided  [x] Falls sign present    [x] Patient moved closer to nursing station   [] Restraints applied

## 2023-04-07 NOTE — NURSING NOTE
Stress lab called needing a consent for the stress test ordered since patient is confused. I called Madie, patients daughter to get consent and she wants to speak with the doctor regarding the test ordered. I messaged Dr. Worthy, he asked me to pass along to Dr. Suggs. I reached out to Adeola in the reading room and she is passing the message along to Ifeanyi. Madie is on her way to the patient's room now so she will be available to talk to MD. Prachi Souza, RN  4/7/2023  08:06 CDT

## 2023-04-09 LAB
BACTERIA SPEC AEROBE CULT: ABNORMAL
BACTERIA SPEC AEROBE CULT: ABNORMAL

## 2023-04-10 LAB
QT INTERVAL: 366 MS
QTC INTERVAL: 442 MS

## 2023-05-10 ENCOUNTER — TELEPHONE (OUTPATIENT)
Dept: CARDIOLOGY | Facility: CLINIC | Age: 83
End: 2023-05-10
Payer: MEDICARE

## 2023-05-10 ENCOUNTER — HOSPITAL ENCOUNTER (EMERGENCY)
Facility: HOSPITAL | Age: 83
Discharge: HOME | End: 2023-05-10
Attending: EMERGENCY MEDICINE
Payer: MEDICARE

## 2023-05-10 ENCOUNTER — APPOINTMENT (OUTPATIENT)
Dept: GENERAL RADIOLOGY | Facility: HOSPITAL | Age: 83
End: 2023-05-10
Payer: MEDICARE

## 2023-05-10 VITALS
HEART RATE: 71 BPM | DIASTOLIC BLOOD PRESSURE: 60 MMHG | SYSTOLIC BLOOD PRESSURE: 108 MMHG | OXYGEN SATURATION: 97 % | HEIGHT: 60 IN | WEIGHT: 97.9 LBS | RESPIRATION RATE: 25 BRPM | BODY MASS INDEX: 19.22 KG/M2 | TEMPERATURE: 97.6 F

## 2023-05-10 DIAGNOSIS — I50.9 CHRONIC CONGESTIVE HEART FAILURE, UNSPECIFIED HEART FAILURE TYPE: ICD-10-CM

## 2023-05-10 DIAGNOSIS — I47.1 SVT (SUPRAVENTRICULAR TACHYCARDIA): Primary | ICD-10-CM

## 2023-05-10 DIAGNOSIS — N39.0 ACUTE UTI: ICD-10-CM

## 2023-05-10 LAB
ALBUMIN SERPL-MCNC: 3.8 G/DL (ref 3.5–5.2)
ALBUMIN/GLOB SERPL: 1.1 G/DL
ALP SERPL-CCNC: 74 U/L (ref 39–117)
ALT SERPL W P-5'-P-CCNC: 9 U/L (ref 1–33)
ANION GAP SERPL CALCULATED.3IONS-SCNC: 12 MMOL/L (ref 5–15)
AST SERPL-CCNC: 16 U/L (ref 1–32)
BACTERIA UR QL AUTO: ABNORMAL /HPF
BASOPHILS # BLD AUTO: 0.08 10*3/MM3 (ref 0–0.2)
BASOPHILS NFR BLD AUTO: 0.7 % (ref 0–1.5)
BILIRUB SERPL-MCNC: <0.2 MG/DL (ref 0–1.2)
BILIRUB UR QL STRIP: NEGATIVE
BUN SERPL-MCNC: 13 MG/DL (ref 8–23)
BUN/CREAT SERPL: 16.9 (ref 7–25)
CALCIUM SPEC-SCNC: 9.3 MG/DL (ref 8.6–10.5)
CHLORIDE SERPL-SCNC: 105 MMOL/L (ref 98–107)
CLARITY UR: ABNORMAL
CO2 SERPL-SCNC: 23 MMOL/L (ref 22–29)
COLOR UR: YELLOW
CREAT SERPL-MCNC: 0.77 MG/DL (ref 0.57–1)
DEPRECATED RDW RBC AUTO: 55.1 FL (ref 37–54)
EGFRCR SERPLBLD CKD-EPI 2021: 77.1 ML/MIN/1.73
EOSINOPHIL # BLD AUTO: 0.62 10*3/MM3 (ref 0–0.4)
EOSINOPHIL NFR BLD AUTO: 5.3 % (ref 0.3–6.2)
ERYTHROCYTE [DISTWIDTH] IN BLOOD BY AUTOMATED COUNT: 15.3 % (ref 12.3–15.4)
GLOBULIN UR ELPH-MCNC: 3.5 GM/DL
GLUCOSE SERPL-MCNC: 105 MG/DL (ref 65–99)
GLUCOSE UR STRIP-MCNC: NEGATIVE MG/DL
HCT VFR BLD AUTO: 37.9 % (ref 34–46.6)
HGB BLD-MCNC: 11.8 G/DL (ref 12–15.9)
HGB UR QL STRIP.AUTO: ABNORMAL
HOLD SPECIMEN: NORMAL
HYALINE CASTS UR QL AUTO: ABNORMAL /LPF
IMM GRANULOCYTES # BLD AUTO: 0.1 10*3/MM3 (ref 0–0.05)
IMM GRANULOCYTES NFR BLD AUTO: 0.8 % (ref 0–0.5)
KETONES UR QL STRIP: NEGATIVE
LEUKOCYTE ESTERASE UR QL STRIP.AUTO: ABNORMAL
LYMPHOCYTES # BLD AUTO: 2.7 10*3/MM3 (ref 0.7–3.1)
LYMPHOCYTES NFR BLD AUTO: 22.9 % (ref 19.6–45.3)
MCH RBC QN AUTO: 30.4 PG (ref 26.6–33)
MCHC RBC AUTO-ENTMCNC: 31.1 G/DL (ref 31.5–35.7)
MCV RBC AUTO: 97.7 FL (ref 79–97)
MONOCYTES # BLD AUTO: 1.25 10*3/MM3 (ref 0.1–0.9)
MONOCYTES NFR BLD AUTO: 10.6 % (ref 5–12)
NEUTROPHILS NFR BLD AUTO: 59.7 % (ref 42.7–76)
NEUTROPHILS NFR BLD AUTO: 7.04 10*3/MM3 (ref 1.7–7)
NITRITE UR QL STRIP: POSITIVE
NRBC BLD AUTO-RTO: 0 /100 WBC (ref 0–0.2)
NT-PROBNP SERPL-MCNC: 2252 PG/ML (ref 0–1800)
PH UR STRIP.AUTO: 7.5 [PH] (ref 5–8)
PLATELET # BLD AUTO: 449 10*3/MM3 (ref 140–450)
PMV BLD AUTO: 9.9 FL (ref 6–12)
POTASSIUM SERPL-SCNC: 4.3 MMOL/L (ref 3.5–5.2)
PROT SERPL-MCNC: 7.3 G/DL (ref 6–8.5)
PROT UR QL STRIP: ABNORMAL
RBC # BLD AUTO: 3.88 10*6/MM3 (ref 3.77–5.28)
RBC # UR STRIP: ABNORMAL /HPF
REF LAB TEST METHOD: ABNORMAL
SODIUM SERPL-SCNC: 140 MMOL/L (ref 136–145)
SP GR UR STRIP: 1.01 (ref 1–1.03)
SQUAMOUS #/AREA URNS HPF: ABNORMAL /HPF
UROBILINOGEN UR QL STRIP: ABNORMAL
WBC # UR STRIP: ABNORMAL /HPF
WBC NRBC COR # BLD: 11.79 10*3/MM3 (ref 3.4–10.8)
WHOLE BLOOD HOLD COAG: NORMAL
WHOLE BLOOD HOLD SPECIMEN: NORMAL

## 2023-05-10 PROCEDURE — 93005 ELECTROCARDIOGRAM TRACING: CPT | Performed by: EMERGENCY MEDICINE

## 2023-05-10 PROCEDURE — 71045 X-RAY EXAM CHEST 1 VIEW: CPT

## 2023-05-10 PROCEDURE — 85025 COMPLETE CBC W/AUTO DIFF WBC: CPT | Performed by: EMERGENCY MEDICINE

## 2023-05-10 PROCEDURE — 80053 COMPREHEN METABOLIC PANEL: CPT | Performed by: EMERGENCY MEDICINE

## 2023-05-10 PROCEDURE — 96375 TX/PRO/DX INJ NEW DRUG ADDON: CPT

## 2023-05-10 PROCEDURE — 87086 URINE CULTURE/COLONY COUNT: CPT | Performed by: EMERGENCY MEDICINE

## 2023-05-10 PROCEDURE — 94799 UNLISTED PULMONARY SVC/PX: CPT

## 2023-05-10 PROCEDURE — 81001 URINALYSIS AUTO W/SCOPE: CPT | Performed by: EMERGENCY MEDICINE

## 2023-05-10 PROCEDURE — 99284 EMERGENCY DEPT VISIT MOD MDM: CPT

## 2023-05-10 PROCEDURE — 96374 THER/PROPH/DIAG INJ IV PUSH: CPT

## 2023-05-10 PROCEDURE — 25010000002 FUROSEMIDE PER 20 MG: Performed by: EMERGENCY MEDICINE

## 2023-05-10 PROCEDURE — 94640 AIRWAY INHALATION TREATMENT: CPT

## 2023-05-10 PROCEDURE — 83880 ASSAY OF NATRIURETIC PEPTIDE: CPT | Performed by: EMERGENCY MEDICINE

## 2023-05-10 PROCEDURE — 25010000002 ADENOSINE PER 6 MG

## 2023-05-10 RX ORDER — FUROSEMIDE 10 MG/ML
40 INJECTION INTRAMUSCULAR; INTRAVENOUS ONCE
Status: COMPLETED | OUTPATIENT
Start: 2023-05-10 | End: 2023-05-10

## 2023-05-10 RX ORDER — ALBUTEROL SULFATE 2.5 MG/3ML
2.5 SOLUTION RESPIRATORY (INHALATION) ONCE
Status: COMPLETED | OUTPATIENT
Start: 2023-05-10 | End: 2023-05-10

## 2023-05-10 RX ORDER — ADENOSINE 3 MG/ML
INJECTION, SOLUTION INTRAVENOUS
Status: COMPLETED
Start: 2023-05-10 | End: 2023-05-10

## 2023-05-10 RX ORDER — CEFDINIR 300 MG/1
300 CAPSULE ORAL 2 TIMES DAILY
Qty: 14 CAPSULE | Refills: 0 | Status: SHIPPED | OUTPATIENT
Start: 2023-05-10

## 2023-05-10 RX ORDER — ADENOSINE 3 MG/ML
6 INJECTION, SOLUTION INTRAVENOUS ONCE
Status: COMPLETED | OUTPATIENT
Start: 2023-05-10 | End: 2023-05-10

## 2023-05-10 RX ADMIN — FUROSEMIDE 40 MG: 10 INJECTION, SOLUTION INTRAVENOUS at 11:44

## 2023-05-10 RX ADMIN — ADENOSINE 6 MG: 3 INJECTION INTRAVENOUS at 09:10

## 2023-05-10 RX ADMIN — ALBUTEROL SULFATE 2.5 MG: 2.5 SOLUTION RESPIRATORY (INHALATION) at 09:32

## 2023-05-10 RX ADMIN — ADENOSINE 6 MG: 3 INJECTION, SOLUTION INTRAVENOUS at 09:10

## 2023-05-10 NOTE — TELEPHONE ENCOUNTER
Call placed to KAMILLE Mercedes at [facility]. Patient had an appointment for 05/2023 and was cancelled due to scheduling conflict.Nursing facility to keep July appointment.KAMILLE Merceeds at [facility] states they give patient metoprolol tartrate PRN ordered as a one time dose for episodes.

## 2023-05-10 NOTE — ED PROVIDER NOTES
Subjective   History of Present Illness  Patient is an 82-year-old female with a history of SVT who presents to the ER with rapid heart rate.  Patient has a long history of intermittent episodes of SVT.  She lives at the nursing home and family states that when her heart rate goes up they usually give her metoprolol at the nursing home and then she is fine.  Nursing home did not give the patient metoprolol overnight and her tachycardia persisted so they brought her here.  She has required several doses of adenosine in the past as well.  Family states that she is to see the EP cardiologist in July for her SVT.  Family also states that when she has SVT she often has an associated UTI.  Patient states that she woke up this morning with a rapid heart rate but she feels fine.  She denies any fever, chest pain, shortness of air, abdominal pain, nausea vomiting diarrhea, urinary changes, neurologic changes.        Review of Systems   Constitutional: Negative.    HENT: Negative.    Eyes: Negative.    Respiratory: Negative.    Cardiovascular: Positive for palpitations.   Gastrointestinal: Negative.    Endocrine: Negative.    Genitourinary: Negative.    Musculoskeletal: Negative.    Skin: Negative.    Allergic/Immunologic: Negative.    Neurological: Negative.    Hematological: Negative.    Psychiatric/Behavioral: Negative.    All other systems reviewed and are negative.      Past Medical History:   Diagnosis Date   • Chronic hyponatremia    • Frequent UTI    • Low back pain    • Migraines    • Pneumonia    • SVT (supraventricular tachycardia)    • UTI (urinary tract infection)        Allergies   Allergen Reactions   • Codeine Anaphylaxis       Past Surgical History:   Procedure Laterality Date   • CHOLECYSTECTOMY WITH INTRAOPERATIVE CHOLANGIOGRAM N/A 5/9/2019    Procedure: CHOLECYSTECTOMY LAPAROSCOPIC INTRAOPERATIVE CHOLANGIOGRAM;  Surgeon: Chandan Bravo MD;  Location: [facility];  Service: General   • HIP HEMIARTHROPLASTY  Left 5/6/2022    Procedure: HIP HEMIARTHROPLASTY;  Surgeon: Barrington Simons MD;  Location: [facility];  Service: Orthopedics;  Laterality: Left;       Family History   Problem Relation Age of Onset   • Stroke Mother    • Anemia Mother    • Cancer Father         Colon cancer   • Heart disease Father    • Heart attack Father    • Colon cancer Father        Social History     Socioeconomic History   • Marital status:    Tobacco Use   • Smoking status: Never   • Smokeless tobacco: Never   Vaping Use   • Vaping Use: Never used   Substance and Sexual Activity   • Alcohol use: No   • Drug use: No   • Sexual activity: Defer           Objective   Physical Exam  Vitals and nursing note reviewed.   Constitutional:       Appearance: She is well-developed.   HENT:      Head: Normocephalic and atraumatic.   Eyes:      Conjunctiva/sclera: Conjunctivae normal.      Pupils: Pupils are equal, round, and reactive to light.   Cardiovascular:      Rate and Rhythm: Regular rhythm. Tachycardia present.      Heart sounds: Normal heart sounds.   Pulmonary:      Effort: Pulmonary effort is normal.      Breath sounds: Normal breath sounds.   Abdominal:      Palpations: Abdomen is soft.      Tenderness: There is no abdominal tenderness.   Musculoskeletal:         General: No deformity. Normal range of motion.      Cervical back: Normal range of motion.   Skin:     General: Skin is warm.   Neurological:      Mental Status: She is alert and oriented to person, place, and time.   Psychiatric:         Behavior: Behavior normal.         Chemical Cardioversion    Date/Time: 5/10/2023 5:45 PM  Performed by: Shellie Albert MD  Authorized by: Shellie Albert MD     Consent:     Consent obtained:  Verbal    Consent given by:  Patient    Risks discussed:  Death, induced arrhythmia and pain  Pre-procedure details:     Cardioversion basis:  Urgent    Rhythm:  Supraventricular tachycardia  Attempt one:     Cardioversion medication:  Adenosine 6mg       Medication outcome:  Conversion to normal sinus rhythm  Post-procedure details:     Patient status:  Awake    Patient tolerance of procedure:  Tolerated well, no immediate complications               ED Course      EKG as interpreted by me: SVT with a rate of 143, no acute ischemia or infarction  EKG 2 as interpreted by me: SVT with a rate of 142, no acute ischemia or infarction  EKG 3 as interpreted by me: Normal sinus rhythm with a rate of 82, no acute ischemia or infarction       Lab Results (last 24 hours)     Procedure Component Value Units Date/Time    CBC & Differential [[id number]]  (Abnormal) Collected: 05/10/23 0858    Specimen: Blood Updated: 05/10/23 0917    Narrative:      The following orders were created for panel order CBC & Differential.  Procedure                               Abnormality         Status                     ---------                               -----------         ------                     CBC Auto Differential[[id number]]        Abnormal            Final result                 Please view results for these tests on the individual orders.    Comprehensive Metabolic Panel [[id number]]  (Abnormal) Collected: 05/10/23 0858    Specimen: Blood Updated: 05/10/23 0933     Glucose 105 mg/dL      BUN 13 mg/dL      Creatinine 0.77 mg/dL      Sodium 140 mmol/L      Potassium 4.3 mmol/L      Chloride 105 mmol/L      CO2 23.0 mmol/L      Calcium 9.3 mg/dL      Total Protein 7.3 g/dL      Albumin 3.8 g/dL      ALT (SGPT) 9 U/L      AST (SGOT) 16 U/L      Alkaline Phosphatase 74 U/L      Total Bilirubin <0.2 mg/dL      Globulin 3.5 gm/dL      A/G Ratio 1.1 g/dL      BUN/Creatinine Ratio 16.9     Anion Gap 12.0 mmol/L      eGFR 77.1 mL/min/1.73     Narrative:      GFR Normal >60  Chronic Kidney Disease <60  Kidney Failure <15    The GFR formula is only valid for adults with stable renal function between ages 18 and 70.    CBC Auto Differential [[id number]]  (Abnormal) Collected: 05/10/23 0858     Specimen: Blood Updated: 05/10/23 0917     WBC 11.79 10*3/mm3      RBC 3.88 10*6/mm3      Hemoglobin 11.8 g/dL      Hematocrit 37.9 %      MCV 97.7 fL      MCH 30.4 pg      MCHC 31.1 g/dL      RDW 15.3 %      RDW-SD 55.1 fl      MPV 9.9 fL      Platelets 449 10*3/mm3      Neutrophil % 59.7 %      Lymphocyte % 22.9 %      Monocyte % 10.6 %      Eosinophil % 5.3 %      Basophil % 0.7 %      Immature Grans % 0.8 %      Neutrophils, Absolute 7.04 10*3/mm3      Lymphocytes, Absolute 2.70 10*3/mm3      Monocytes, Absolute 1.25 10*3/mm3      Eosinophils, Absolute 0.62 10*3/mm3      Basophils, Absolute 0.08 10*3/mm3      Immature Grans, Absolute 0.10 10*3/mm3      nRBC 0.0 /100 WBC     BNP [[id number]]  (Abnormal) Collected: 05/10/23 0858    Specimen: Blood Updated: 05/10/23 1002     proBNP 2,252.0 pg/mL     Narrative:      Among patients with dyspnea, NT-proBNP is highly sensitive for the detection of acute congestive heart failure. In addition NT-proBNP of <300 pg/ml effectively rules out acute congestive heart failure with 99% negative predictive value.    Results may be falsely decreased if patient taking Biotin.      Urinalysis With Culture If Indicated - Urine, Clean Catch [[id number]]  (Abnormal) Collected: 05/10/23 1145    Specimen: Urine, Clean Catch Updated: 05/10/23 1204     Color, UA Yellow     Appearance, UA Cloudy     pH, UA 7.5     Specific Gravity, UA 1.013     Glucose, UA Negative     Ketones, UA Negative     Bilirubin, UA Negative     Blood, UA Small (1+)     Protein, UA Trace     Leuk Esterase, UA Large (3+)     Nitrite, UA Positive     Urobilinogen, UA 0.2 E.U./dL    Narrative:      In absence of clinical symptoms, the presence of pyuria, bacteria, and/or nitrites on the urinalysis result does not correlate with infection.    Urinalysis, Microscopic Only - Urine, Clean Catch [[id number]]  (Abnormal) Collected: 05/10/23 1145    Specimen: Urine, Clean Catch Updated: 05/10/23 1204     RBC, UA 13-20 /HPF       WBC, UA 21-30 /HPF      Bacteria, UA 2+ /HPF      Squamous Epithelial Cells, UA None Seen /HPF      Hyaline Casts, UA None Seen /LPF      Methodology Automated Microscopy    Urine Culture - Urine, Urine, Clean Catch [[id number]] Collected: 05/10/23 1145    Specimen: Urine, Clean Catch Updated: 05/10/23 1204        XR Chest 1 View   Final Result       Central vascular congestion with bilateral interstitial opacity.   Correlate for mild pulmonary edema.   This report was finalized on 05/10/2023 09:40 by Dr. Sonny Mtz MD.                                 Medical Decision Making  Patient is an 82-year-old female with a history of SVT who presents to the ER with rapid heart rate.  Patient has a long history of intermittent episodes of SVT.  She lives at the nursing home and family states that when her heart rate goes up they usually give her metoprolol at the nursing home and then she is fine.  Nursing home did not give the patient metoprolol overnight and her tachycardia persisted so they brought her here.  She has required several doses of adenosine in the past as well.  Family states that she is to see the EP cardiologist in July for her SVT.  Family also states that when she has SVT she often has an associated UTI.  Patient states that she woke up this morning with a rapid heart rate but she feels fine.  She denies any fever, chest pain, shortness of air, abdominal pain, nausea vomiting diarrhea, urinary changes, neurologic changes.    Differential diagnosis: SVT, A-fib with aberrancy, sinus tachycardia    Initial blood pressure was normal.  Blood pressure began to drop.  Patient was given adenosine.  Patient converted to normal sinus rhythm.  Please see procedure note.  Patient tolerated well.  Labs showed leukocytosis, UTI and an elevated BNP.  BNP was improved from previous checks.  Chest x-ray showed central vascular congestion with bilateral interstitial opacities that could be mild pulmonary edema.  Patient  was given a dose of Lasix and had good urine output.  She denies any chest pain or shortness of air.  Patient wears 2 L of oxygen at the nursing home and had good sats on her 2 L here in the ER.  Patient was satting in the upper to mid 90s.  Work-up most consistent with SVT and a UTI.  Patient met no indication for admission.  She has a follow-up appoint with Dr. Guerrero to discuss her SVT and advised to keep that appointment.  She is also to follow-up with her PCP for a urine recheck and to monitor her congestive heart failure.  She will be given a prescription for cefdinir.  She is to return for any tachycardia, pain, fever or other concerns.  Patient and her daughter are agreeable to the plan.  She had no complaints at discharge.       Acute UTI: acute illness or injury  Chronic congestive heart failure, unspecified heart failure type: chronic illness or injury  SVT (supraventricular tachycardia): acute illness or injury  Amount and/or Complexity of Data Reviewed  Labs: ordered. Decision-making details documented in ED Course.  Radiology: ordered. Decision-making details documented in ED Course.  ECG/medicine tests: ordered. Decision-making details documented in ED Course.      Risk  Prescription drug management.          Final diagnoses:   SVT (supraventricular tachycardia)   Acute UTI   Chronic congestive heart failure, unspecified heart failure type       ED Disposition  ED Disposition     ED Disposition   Discharge    Condition   Stable    Comment   --             Chris Hermosillo MD  [address]  [facility] [address]  [phone]    Schedule an appointment as soon as possible for a visit       June Guerrero MD  [address]   [address]  [facility] [address]  [phone]    Schedule an appointment as soon as possible for a visit            Medication List      New Prescriptions    cefdinir 300 MG capsule  Commonly known as: OMNICEF  Take 1 capsule by mouth 2 (Two) Times a Day.           Where  to Get Your Medications      These medications were sent to [facility] [place], [place] - [address] - [phone] [facility] [phone] FX  [address], [facility] [address]    Phone: [phone]   · cefdinir 300 MG capsule          Shellie Albert MD  05/10/23 1214       Shellie Albert MD  05/10/23 7292

## 2023-05-10 NOTE — TELEPHONE ENCOUNTER
Caller: RAVI    Relationship to patient: WITH [facility]     Best call back number: [phone]    Chief complaint: PATIENT WAS RECENTLY SEEN IN THE EMERGENCY DEPARTMENT AND WAS TOLD SHE NEEDS TO BE SEEN ASAP.    Type of visit: NEW PATIENT    Requested date: ASAP     If rescheduling, when is the original appointment: 07.21.23

## 2023-05-11 LAB
BACTERIA SPEC AEROBE CULT: NORMAL
QT INTERVAL: 306 MS
QT INTERVAL: 382 MS
QTC INTERVAL: 446 MS
QTC INTERVAL: 470 MS

## 2023-06-10 ENCOUNTER — HOSPITAL ENCOUNTER (INPATIENT)
Age: 83
LOS: 9 days | Discharge: HOME | DRG: 871 | End: 2023-06-19
Attending: EMERGENCY MEDICINE | Admitting: HOSPITALIST
Payer: MEDICARE

## 2023-06-10 ENCOUNTER — APPOINTMENT (OUTPATIENT)
Dept: GENERAL RADIOLOGY | Age: 83
DRG: 871 | End: 2023-06-10
Payer: MEDICARE

## 2023-06-10 DIAGNOSIS — F41.9 ANXIETY: ICD-10-CM

## 2023-06-10 DIAGNOSIS — N30.01 ACUTE CYSTITIS WITH HEMATURIA: ICD-10-CM

## 2023-06-10 DIAGNOSIS — A41.9 SEPSIS SECONDARY TO UTI (HCC): ICD-10-CM

## 2023-06-10 DIAGNOSIS — A41.9 SEPSIS WITH ACUTE HYPOXIC RESPIRATORY FAILURE AND SEPTIC SHOCK, DUE TO UNSPECIFIED ORGANISM (HCC): ICD-10-CM

## 2023-06-10 DIAGNOSIS — R65.21 SEPSIS WITH ACUTE HYPOXIC RESPIRATORY FAILURE AND SEPTIC SHOCK, DUE TO UNSPECIFIED ORGANISM (HCC): ICD-10-CM

## 2023-06-10 DIAGNOSIS — J96.21 ACUTE AND CHRONIC RESPIRATORY FAILURE WITH HYPOXIA (HCC): Primary | ICD-10-CM

## 2023-06-10 DIAGNOSIS — S32.030S CLOSED COMPRESSION FRACTURE OF L3 VERTEBRA, SEQUELA: ICD-10-CM

## 2023-06-10 DIAGNOSIS — J96.01 SEPSIS WITH ACUTE HYPOXIC RESPIRATORY FAILURE AND SEPTIC SHOCK, DUE TO UNSPECIFIED ORGANISM (HCC): ICD-10-CM

## 2023-06-10 DIAGNOSIS — Z79.01 ON CONTINUOUS ORAL ANTICOAGULATION: ICD-10-CM

## 2023-06-10 DIAGNOSIS — N39.0 SEPSIS SECONDARY TO UTI (HCC): ICD-10-CM

## 2023-06-10 PROBLEM — Z86.79 HISTORY OF PAROXYSMAL SUPRAVENTRICULAR TACHYCARDIA: Status: ACTIVE | Noted: 2023-06-10

## 2023-06-10 PROBLEM — E87.70 VOLUME OVERLOAD: Status: ACTIVE | Noted: 2023-06-10

## 2023-06-10 LAB
ALBUMIN SERPL-MCNC: 3.2 G/DL (ref 3.5–5.2)
ALP SERPL-CCNC: 173 U/L (ref 35–104)
ALT SERPL-CCNC: 48 U/L (ref 5–33)
ANION GAP SERPL CALCULATED.3IONS-SCNC: 9 MMOL/L (ref 7–19)
AST SERPL-CCNC: 54 U/L (ref 5–32)
B PARAP IS1001 DNA NPH QL NAA+NON-PROBE: NOT DETECTED
B PERT.PT PRMT NPH QL NAA+NON-PROBE: NOT DETECTED
BACTERIA #/AREA URNS HPF: ABNORMAL /HPF
BASE EXCESS VENOUS: -1 MMOL/L
BASOPHILS # BLD: 0.1 K/UL (ref 0–0.2)
BASOPHILS NFR BLD: 0.3 % (ref 0–1)
BILIRUB SERPL-MCNC: 0.6 MG/DL (ref 0.2–1.2)
BILIRUB UR QL STRIP: NEGATIVE
BNP BLD-MCNC: 1903 PG/ML (ref 0–449)
BUN SERPL-MCNC: 32 MG/DL (ref 8–23)
C PNEUM DNA NPH QL NAA+NON-PROBE: NOT DETECTED
CALCIUM SERPL-MCNC: 8.7 MG/DL (ref 8.8–10.2)
CARBOXYHEMOGLOBIN: 3.2 %
CHLORIDE SERPL-SCNC: 104 MMOL/L (ref 98–111)
CLARITY UR: ABNORMAL
CO2 SERPL-SCNC: 24 MMOL/L (ref 22–29)
COLOR UR: ABNORMAL
CREAT SERPL-MCNC: 0.9 MG/DL (ref 0.5–0.9)
CRYSTALS URNS MICRO: ABNORMAL /HPF
EOSINOPHIL # BLD: 0 K/UL (ref 0–0.6)
EOSINOPHIL NFR BLD: 0.3 % (ref 0–5)
ERYTHROCYTE [DISTWIDTH] IN BLOOD BY AUTOMATED COUNT: 15.9 % (ref 11.5–14.5)
FLUAV RNA NPH QL NAA+NON-PROBE: NOT DETECTED
FLUBV RNA NPH QL NAA+NON-PROBE: NOT DETECTED
GLUCOSE SERPL-MCNC: 158 MG/DL (ref 74–109)
GLUCOSE UR STRIP.AUTO-MCNC: NEGATIVE MG/DL
HADV DNA NPH QL NAA+NON-PROBE: NOT DETECTED
HCO3 VENOUS: 25 MMOL/L (ref 23–29)
HCOV 229E RNA NPH QL NAA+NON-PROBE: NOT DETECTED
HCOV HKU1 RNA NPH QL NAA+NON-PROBE: NOT DETECTED
HCOV NL63 RNA NPH QL NAA+NON-PROBE: NOT DETECTED
HCOV OC43 RNA NPH QL NAA+NON-PROBE: NOT DETECTED
HCT VFR BLD AUTO: 29.5 % (ref 37–47)
HGB BLD-MCNC: 9.3 G/DL (ref 12–16)
HGB UR STRIP.AUTO-MCNC: ABNORMAL MG/L
HMPV RNA NPH QL NAA+NON-PROBE: NOT DETECTED
HPIV1 RNA NPH QL NAA+NON-PROBE: NOT DETECTED
HPIV2 RNA NPH QL NAA+NON-PROBE: NOT DETECTED
HPIV3 RNA NPH QL NAA+NON-PROBE: NOT DETECTED
HPIV4 RNA NPH QL NAA+NON-PROBE: NOT DETECTED
IMM GRANULOCYTES # BLD: 0.1 K/UL
INR PPP: 4.21 (ref 0.88–1.18)
KETONES UR STRIP.AUTO-MCNC: ABNORMAL MG/DL
LACTATE BLDV-SCNC: 1.1 MG/DL (ref 0.5–1.9)
LACTATE BLDV-SCNC: 1.7 MG/DL (ref 0.5–1.9)
LEUKOCYTE ESTERASE UR QL STRIP.AUTO: ABNORMAL
LIPASE SERPL-CCNC: 11 U/L (ref 13–60)
LYMPHOCYTES # BLD: 0.9 K/UL (ref 1.1–4.5)
LYMPHOCYTES NFR BLD: 5.8 % (ref 20–40)
M PNEUMO DNA NPH QL NAA+NON-PROBE: NOT DETECTED
MAGNESIUM SERPL-MCNC: 1.8 MG/DL (ref 1.6–2.4)
MCH RBC QN AUTO: 30.6 PG (ref 27–31)
MCHC RBC AUTO-ENTMCNC: 31.5 G/DL (ref 33–37)
MCV RBC AUTO: 97 FL (ref 81–99)
METHEMOGLOBIN VENOUS: 1 %
MONOCYTES # BLD: 1.3 K/UL (ref 0–0.9)
MONOCYTES NFR BLD: 8.7 % (ref 0–10)
NEUTROPHILS # BLD: 12.9 K/UL (ref 1.5–7.5)
NEUTS SEG NFR BLD: 84.2 % (ref 50–65)
NITRITE UR QL STRIP.AUTO: NEGATIVE
O2 CONTENT, VEN: 12 ML/DL
O2 SAT, VEN: 81 %
PCO2, VEN: 48 MMHG (ref 40–50)
PH UR STRIP.AUTO: 7 [PH] (ref 5–8)
PH VENOUS: 7.33 (ref 7.35–7.45)
PLATELET # BLD AUTO: 257 K/UL (ref 130–400)
PMV BLD AUTO: 10.2 FL (ref 9.4–12.3)
PO2, VEN: 47 MMHG
POTASSIUM SERPL-SCNC: 4.4 MMOL/L (ref 3.5–5)
PROCALCITONIN: 0.23 NG/ML (ref 0–0.09)
PROT SERPL-MCNC: 6.5 G/DL (ref 6.6–8.7)
PROT UR STRIP.AUTO-MCNC: 100 MG/DL
PROTHROMBIN TIME: 39.6 SEC (ref 12–14.6)
RBC # BLD AUTO: 3.04 M/UL (ref 4.2–5.4)
RBC #/AREA URNS HPF: ABNORMAL /HPF (ref 0–2)
RSV RNA NPH QL NAA+NON-PROBE: NOT DETECTED
RV+EV RNA NPH QL NAA+NON-PROBE: NOT DETECTED
S PNEUM AG SPEC QL: NORMAL
SARS-COV-2 RNA NPH QL NAA+NON-PROBE: NOT DETECTED
SODIUM SERPL-SCNC: 137 MMOL/L (ref 136–145)
SP GR UR STRIP.AUTO: 1.02 (ref 1–1.03)
SQUAMOUS #/AREA URNS HPF: ABNORMAL /HPF
TROPONIN T SERPL-MCNC: 0.02 NG/ML (ref 0–0.03)
TSH SERPL DL<=0.005 MIU/L-ACNC: 0.72 UIU/ML (ref 0.35–5.5)
UROBILINOGEN UR STRIP.AUTO-MCNC: 1 E.U./DL
WBC # BLD AUTO: 15.3 K/UL (ref 4.8–10.8)
WBC #/AREA URNS HPF: ABNORMAL /HPF (ref 0–5)

## 2023-06-10 PROCEDURE — 2580000003 HC RX 258: Performed by: EMERGENCY MEDICINE

## 2023-06-10 PROCEDURE — 87040 BLOOD CULTURE FOR BACTERIA: CPT

## 2023-06-10 PROCEDURE — 85610 PROTHROMBIN TIME: CPT

## 2023-06-10 PROCEDURE — 0202U NFCT DS 22 TRGT SARS-COV-2: CPT

## 2023-06-10 PROCEDURE — 80053 COMPREHEN METABOLIC PANEL: CPT

## 2023-06-10 PROCEDURE — 2580000003 HC RX 258: Performed by: HOSPITALIST

## 2023-06-10 PROCEDURE — 2700000000 HC OXYGEN THERAPY PER DAY

## 2023-06-10 PROCEDURE — 84484 ASSAY OF TROPONIN QUANT: CPT

## 2023-06-10 PROCEDURE — 96375 TX/PRO/DX INJ NEW DRUG ADDON: CPT

## 2023-06-10 PROCEDURE — 83880 ASSAY OF NATRIURETIC PEPTIDE: CPT

## 2023-06-10 PROCEDURE — 81001 URINALYSIS AUTO W/SCOPE: CPT

## 2023-06-10 PROCEDURE — 85025 COMPLETE CBC W/AUTO DIFF WBC: CPT

## 2023-06-10 PROCEDURE — 87449 NOS EACH ORGANISM AG IA: CPT

## 2023-06-10 PROCEDURE — 83690 ASSAY OF LIPASE: CPT

## 2023-06-10 PROCEDURE — 84443 ASSAY THYROID STIM HORMONE: CPT

## 2023-06-10 PROCEDURE — 83735 ASSAY OF MAGNESIUM: CPT

## 2023-06-10 PROCEDURE — 83605 ASSAY OF LACTIC ACID: CPT

## 2023-06-10 PROCEDURE — 93005 ELECTROCARDIOGRAM TRACING: CPT | Performed by: EMERGENCY MEDICINE

## 2023-06-10 PROCEDURE — 6360000002 HC RX W HCPCS: Performed by: EMERGENCY MEDICINE

## 2023-06-10 PROCEDURE — 2000000000 HC ICU R&B

## 2023-06-10 PROCEDURE — 99285 EMERGENCY DEPT VISIT HI MDM: CPT

## 2023-06-10 PROCEDURE — 96366 THER/PROPH/DIAG IV INF ADDON: CPT

## 2023-06-10 PROCEDURE — 87186 SC STD MICRODIL/AGAR DIL: CPT

## 2023-06-10 PROCEDURE — 71045 X-RAY EXAM CHEST 1 VIEW: CPT

## 2023-06-10 PROCEDURE — 96365 THER/PROPH/DIAG IV INF INIT: CPT

## 2023-06-10 PROCEDURE — 87086 URINE CULTURE/COLONY COUNT: CPT

## 2023-06-10 PROCEDURE — 6360000002 HC RX W HCPCS: Performed by: HOSPITALIST

## 2023-06-10 PROCEDURE — 36415 COLL VENOUS BLD VENIPUNCTURE: CPT

## 2023-06-10 PROCEDURE — 84145 PROCALCITONIN (PCT): CPT

## 2023-06-10 PROCEDURE — 82800 BLOOD PH: CPT

## 2023-06-10 PROCEDURE — 82803 BLOOD GASES ANY COMBINATION: CPT

## 2023-06-10 PROCEDURE — 2500000003 HC RX 250 WO HCPCS: Performed by: EMERGENCY MEDICINE

## 2023-06-10 RX ORDER — FAMOTIDINE 20 MG/1
20 TABLET, FILM COATED ORAL DAILY
COMMUNITY

## 2023-06-10 RX ORDER — POLYETHYLENE GLYCOL 3350 17 G/17G
17 POWDER, FOR SOLUTION ORAL DAILY
Status: DISCONTINUED | OUTPATIENT
Start: 2023-06-11 | End: 2023-06-19 | Stop reason: HOSPADM

## 2023-06-10 RX ORDER — ALBUTEROL SULFATE 2.5 MG/3ML
2.5 SOLUTION RESPIRATORY (INHALATION) EVERY 4 HOURS PRN
Status: DISCONTINUED | OUTPATIENT
Start: 2023-06-10 | End: 2023-06-19 | Stop reason: HOSPADM

## 2023-06-10 RX ORDER — SODIUM CHLORIDE 9 MG/ML
INJECTION, SOLUTION INTRAVENOUS PRN
Status: DISCONTINUED | OUTPATIENT
Start: 2023-06-10 | End: 2023-06-11

## 2023-06-10 RX ORDER — SODIUM CHLORIDE 0.9 % (FLUSH) 0.9 %
5-40 SYRINGE (ML) INJECTION EVERY 12 HOURS SCHEDULED
Status: DISCONTINUED | OUTPATIENT
Start: 2023-06-10 | End: 2023-06-19 | Stop reason: HOSPADM

## 2023-06-10 RX ORDER — FAMOTIDINE 20 MG/1
20 TABLET, FILM COATED ORAL DAILY
Status: DISCONTINUED | OUTPATIENT
Start: 2023-06-11 | End: 2023-06-19 | Stop reason: HOSPADM

## 2023-06-10 RX ORDER — BUTALBITAL, ACETAMINOPHEN AND CAFFEINE 50; 325; 40 MG/1; MG/1; MG/1
1 TABLET ORAL EVERY 4 HOURS PRN
Status: DISCONTINUED | OUTPATIENT
Start: 2023-06-10 | End: 2023-06-19 | Stop reason: HOSPADM

## 2023-06-10 RX ORDER — SODIUM CHLORIDE 9 MG/ML
INJECTION, SOLUTION INTRAVENOUS PRN
Status: DISCONTINUED | OUTPATIENT
Start: 2023-06-10 | End: 2023-06-19 | Stop reason: HOSPADM

## 2023-06-10 RX ORDER — GABAPENTIN 100 MG/1
100 CAPSULE ORAL 3 TIMES DAILY
COMMUNITY
Start: 2023-02-05

## 2023-06-10 RX ORDER — ONDANSETRON 2 MG/ML
4 INJECTION INTRAMUSCULAR; INTRAVENOUS EVERY 6 HOURS PRN
Status: DISCONTINUED | OUTPATIENT
Start: 2023-06-10 | End: 2023-06-19 | Stop reason: HOSPADM

## 2023-06-10 RX ORDER — NOREPINEPHRINE BITARTRATE 0.06 MG/ML
1-100 INJECTION, SOLUTION INTRAVENOUS CONTINUOUS
Status: DISCONTINUED | OUTPATIENT
Start: 2023-06-10 | End: 2023-06-12

## 2023-06-10 RX ORDER — MIRTAZAPINE 15 MG/1
15 TABLET, FILM COATED ORAL
COMMUNITY
Start: 2023-06-01

## 2023-06-10 RX ORDER — ANORECTAL OINTMENT 15.7; .44; 24; 20.6 G/100G; G/100G; G/100G; G/100G
1 OINTMENT TOPICAL PRN
COMMUNITY

## 2023-06-10 RX ORDER — POLYETHYLENE GLYCOL 3350 17 G/17G
17 POWDER, FOR SOLUTION ORAL DAILY PRN
Status: DISCONTINUED | OUTPATIENT
Start: 2023-06-10 | End: 2023-06-19 | Stop reason: HOSPADM

## 2023-06-10 RX ORDER — VALSARTAN 40 MG/1
40 TABLET ORAL EVERY 12 HOURS SCHEDULED
Status: DISCONTINUED | OUTPATIENT
Start: 2023-06-10 | End: 2023-06-12

## 2023-06-10 RX ORDER — POTASSIUM CHLORIDE 29.8 MG/ML
20 INJECTION INTRAVENOUS PRN
Status: DISCONTINUED | OUTPATIENT
Start: 2023-06-10 | End: 2023-06-19 | Stop reason: HOSPADM

## 2023-06-10 RX ORDER — POTASSIUM CHLORIDE 7.45 MG/ML
10 INJECTION INTRAVENOUS PRN
Status: DISCONTINUED | OUTPATIENT
Start: 2023-06-10 | End: 2023-06-19 | Stop reason: HOSPADM

## 2023-06-10 RX ORDER — BISACODYL 5 MG/1
10 TABLET, DELAYED RELEASE ORAL
COMMUNITY

## 2023-06-10 RX ORDER — POLYETHYLENE GLYCOL 3350 17 G/17G
17 POWDER, FOR SOLUTION ORAL DAILY
COMMUNITY

## 2023-06-10 RX ORDER — MORPHINE SULFATE 2 MG/ML
2 INJECTION, SOLUTION INTRAMUSCULAR; INTRAVENOUS ONCE
Status: COMPLETED | OUTPATIENT
Start: 2023-06-10 | End: 2023-06-10

## 2023-06-10 RX ORDER — LOPERAMIDE HYDROCHLORIDE 2 MG/1
2 CAPSULE ORAL EVERY 6 HOURS PRN
Status: DISCONTINUED | OUTPATIENT
Start: 2023-06-10 | End: 2023-06-19 | Stop reason: HOSPADM

## 2023-06-10 RX ORDER — BUDESONIDE AND FORMOTEROL FUMARATE DIHYDRATE 160; 4.5 UG/1; UG/1
2 AEROSOL RESPIRATORY (INHALATION)
COMMUNITY

## 2023-06-10 RX ORDER — LOPERAMIDE HYDROCHLORIDE 2 MG/1
2 CAPSULE ORAL EVERY 6 HOURS PRN
COMMUNITY

## 2023-06-10 RX ORDER — POTASSIUM CHLORIDE 20 MEQ/1
40 TABLET, EXTENDED RELEASE ORAL PRN
Status: DISCONTINUED | OUTPATIENT
Start: 2023-06-10 | End: 2023-06-19 | Stop reason: HOSPADM

## 2023-06-10 RX ORDER — SODIUM CHLORIDE 0.9 % (FLUSH) 0.9 %
5-40 SYRINGE (ML) INJECTION PRN
Status: DISCONTINUED | OUTPATIENT
Start: 2023-06-10 | End: 2023-06-19 | Stop reason: HOSPADM

## 2023-06-10 RX ORDER — LORAZEPAM 0.5 MG/1
0.5 TABLET ORAL
Status: ON HOLD | COMMUNITY
Start: 2023-05-25 | End: 2023-06-19 | Stop reason: HOSPADM

## 2023-06-10 RX ORDER — SODIUM CHLORIDE 0.9 % (FLUSH) 0.9 %
5-40 SYRINGE (ML) INJECTION EVERY 12 HOURS SCHEDULED
Status: DISCONTINUED | OUTPATIENT
Start: 2023-06-10 | End: 2023-06-11

## 2023-06-10 RX ORDER — MIRTAZAPINE 7.5 MG/1
15 TABLET, FILM COATED ORAL NIGHTLY
Status: DISCONTINUED | OUTPATIENT
Start: 2023-06-10 | End: 2023-06-19 | Stop reason: HOSPADM

## 2023-06-10 RX ORDER — SODIUM CHLORIDE 0.9 % (FLUSH) 0.9 %
5-40 SYRINGE (ML) INJECTION PRN
Status: DISCONTINUED | OUTPATIENT
Start: 2023-06-10 | End: 2023-06-11

## 2023-06-10 RX ORDER — VITAMIN B COMPLEX
2000 TABLET ORAL DAILY
Status: DISCONTINUED | OUTPATIENT
Start: 2023-06-11 | End: 2023-06-19 | Stop reason: HOSPADM

## 2023-06-10 RX ORDER — ONDANSETRON 4 MG/1
4 TABLET, ORALLY DISINTEGRATING ORAL EVERY 8 HOURS PRN
Status: DISCONTINUED | OUTPATIENT
Start: 2023-06-10 | End: 2023-06-19 | Stop reason: HOSPADM

## 2023-06-10 RX ORDER — CHOLECALCIFEROL (VITAMIN D3) 125 MCG
5 CAPSULE ORAL NIGHTLY
COMMUNITY

## 2023-06-10 RX ORDER — NITROGLYCERIN 0.4 MG/1
0.4 TABLET SUBLINGUAL EVERY 5 MIN PRN
Status: DISCONTINUED | OUTPATIENT
Start: 2023-06-10 | End: 2023-06-19 | Stop reason: HOSPADM

## 2023-06-10 RX ORDER — BUDESONIDE 0.5 MG/2ML
0.5 INHALANT ORAL EVERY 12 HOURS
Status: DISCONTINUED | OUTPATIENT
Start: 2023-06-10 | End: 2023-06-19 | Stop reason: HOSPADM

## 2023-06-10 RX ORDER — ACETAMINOPHEN 325 MG/1
650 TABLET ORAL EVERY 4 HOURS PRN
Status: DISCONTINUED | OUTPATIENT
Start: 2023-06-10 | End: 2023-06-19 | Stop reason: HOSPADM

## 2023-06-10 RX ORDER — OXYCODONE AND ACETAMINOPHEN 7.5; 325 MG/1; MG/1
1 TABLET ORAL EVERY 4 HOURS PRN
Status: ON HOLD | COMMUNITY
Start: 2023-02-05 | End: 2023-06-19 | Stop reason: SDUPTHER

## 2023-06-10 RX ORDER — DILTIAZEM HYDROCHLORIDE 240 MG/1
1 CAPSULE, EXTENDED RELEASE ORAL DAILY
COMMUNITY
Start: 2023-04-08

## 2023-06-10 RX ORDER — GABAPENTIN 100 MG/1
100 CAPSULE ORAL 3 TIMES DAILY
Status: DISCONTINUED | OUTPATIENT
Start: 2023-06-10 | End: 2023-06-19 | Stop reason: HOSPADM

## 2023-06-10 RX ORDER — DILTIAZEM HYDROCHLORIDE 240 MG/1
240 CAPSULE, COATED, EXTENDED RELEASE ORAL DAILY
Status: DISCONTINUED | OUTPATIENT
Start: 2023-06-11 | End: 2023-06-19 | Stop reason: HOSPADM

## 2023-06-10 RX ORDER — ATORVASTATIN CALCIUM 10 MG/1
10 TABLET, FILM COATED ORAL NIGHTLY
COMMUNITY
Start: 2023-04-07

## 2023-06-10 RX ORDER — MECOBALAMIN 5000 MCG
5 TABLET,DISINTEGRATING ORAL NIGHTLY
Status: DISCONTINUED | OUTPATIENT
Start: 2023-06-10 | End: 2023-06-19 | Stop reason: HOSPADM

## 2023-06-10 RX ORDER — CLOTRIMAZOLE AND BETAMETHASONE DIPROPIONATE 10; .64 MG/G; MG/G
1 CREAM TOPICAL EVERY 6 HOURS PRN
COMMUNITY

## 2023-06-10 RX ORDER — ONDANSETRON 4 MG/1
4 TABLET, FILM COATED ORAL EVERY 8 HOURS PRN
COMMUNITY

## 2023-06-10 RX ORDER — ARFORMOTEROL TARTRATE 15 UG/2ML
15 SOLUTION RESPIRATORY (INHALATION) 2 TIMES DAILY
Status: DISCONTINUED | OUTPATIENT
Start: 2023-06-10 | End: 2023-06-19 | Stop reason: HOSPADM

## 2023-06-10 RX ORDER — LACTULOSE 10 G/15ML
20 SOLUTION ORAL DAILY PRN
COMMUNITY

## 2023-06-10 RX ORDER — ATORVASTATIN CALCIUM 40 MG/1
40 TABLET, FILM COATED ORAL NIGHTLY
Status: DISCONTINUED | OUTPATIENT
Start: 2023-06-10 | End: 2023-06-19 | Stop reason: HOSPADM

## 2023-06-10 RX ORDER — MAGNESIUM SULFATE IN WATER 40 MG/ML
2000 INJECTION, SOLUTION INTRAVENOUS PRN
Status: DISCONTINUED | OUTPATIENT
Start: 2023-06-10 | End: 2023-06-19 | Stop reason: HOSPADM

## 2023-06-10 RX ORDER — FUROSEMIDE 10 MG/ML
40 INJECTION INTRAMUSCULAR; INTRAVENOUS ONCE
Status: COMPLETED | OUTPATIENT
Start: 2023-06-10 | End: 2023-06-10

## 2023-06-10 RX ORDER — IPRATROPIUM BROMIDE AND ALBUTEROL SULFATE 2.5; .5 MG/3ML; MG/3ML
3 SOLUTION RESPIRATORY (INHALATION) EVERY 4 HOURS PRN
COMMUNITY

## 2023-06-10 RX ADMIN — SODIUM CHLORIDE, PRESERVATIVE FREE 10 ML: 5 INJECTION INTRAVENOUS at 21:00

## 2023-06-10 RX ADMIN — FUROSEMIDE 10 MG/HR: 10 INJECTION, SOLUTION INTRAMUSCULAR; INTRAVENOUS at 22:42

## 2023-06-10 RX ADMIN — Medication 2 MCG/MIN: at 16:46

## 2023-06-10 RX ADMIN — FUROSEMIDE 40 MG: 10 INJECTION, SOLUTION INTRAMUSCULAR; INTRAVENOUS at 19:31

## 2023-06-10 RX ADMIN — ONDANSETRON 4 MG: 2 INJECTION INTRAMUSCULAR; INTRAVENOUS at 22:20

## 2023-06-10 RX ADMIN — MORPHINE SULFATE 2 MG: 2 INJECTION, SOLUTION INTRAMUSCULAR; INTRAVENOUS at 19:29

## 2023-06-10 RX ADMIN — CEFEPIME 2000 MG: 2 INJECTION, POWDER, FOR SOLUTION INTRAVENOUS at 17:19

## 2023-06-10 ASSESSMENT — ENCOUNTER SYMPTOMS
VOMITING: 0
SHORTNESS OF BREATH: 1
NAUSEA: 0

## 2023-06-10 ASSESSMENT — PAIN - FUNCTIONAL ASSESSMENT: PAIN_FUNCTIONAL_ASSESSMENT: ACTIVITIES ARE NOT PREVENTED

## 2023-06-10 ASSESSMENT — PAIN SCALES - GENERAL
PAINLEVEL_OUTOF10: 4
PAINLEVEL_OUTOF10: 6

## 2023-06-10 ASSESSMENT — PAIN DESCRIPTION - FREQUENCY: FREQUENCY: INTERMITTENT

## 2023-06-10 ASSESSMENT — PAIN DESCRIPTION - DESCRIPTORS: DESCRIPTORS: ACHING

## 2023-06-10 ASSESSMENT — PAIN DESCRIPTION - ONSET: ONSET: GRADUAL

## 2023-06-10 ASSESSMENT — PAIN DESCRIPTION - LOCATION: LOCATION: BACK

## 2023-06-10 ASSESSMENT — PAIN DESCRIPTION - ORIENTATION: ORIENTATION: LOWER

## 2023-06-10 ASSESSMENT — PAIN DESCRIPTION - PAIN TYPE: TYPE: ACUTE PAIN

## 2023-06-11 LAB
ALLENS TEST: ABNORMAL
ANION GAP SERPL CALCULATED.3IONS-SCNC: 17 MMOL/L (ref 7–19)
BASE EXCESS ARTERIAL: 9.7 MMOL/L (ref -2–2)
BUN SERPL-MCNC: 26 MG/DL (ref 8–23)
CALCIUM SERPL-MCNC: 9.3 MG/DL (ref 8.8–10.2)
CARBOXYHEMOGLOBIN ARTERIAL: 1.9 % (ref 0–5)
CHLORIDE SERPL-SCNC: 96 MMOL/L (ref 98–111)
CO2 SERPL-SCNC: 26 MMOL/L (ref 22–29)
CREAT SERPL-MCNC: 0.8 MG/DL (ref 0.5–0.9)
FIO2: 40 %
GLUCOSE SERPL-MCNC: 118 MG/DL (ref 74–109)
HCO3 ARTERIAL: 33.5 MMOL/L (ref 22–26)
HEMOGLOBIN, ART, EXTENDED: 10.9 G/DL (ref 12–16)
LV EF: 69 %
LVEF MODALITY: NORMAL
MAGNESIUM SERPL-MCNC: 1.6 MG/DL (ref 1.6–2.4)
METHEMOGLOBIN ARTERIAL: 1.1 %
O2 CONTENT ARTERIAL: 14.5 ML/DL
O2 DELIVERY DEVICE: ABNORMAL
O2 SAT, ARTERIAL: 94 %
O2 THERAPY: ABNORMAL
OXYGEN FLOW: 10
PCO2 ARTERIAL: 41 MMHG (ref 35–45)
PH ARTERIAL: 7.52 (ref 7.35–7.45)
PO2 ARTERIAL: 70 MMHG (ref 80–100)
POTASSIUM BLD-SCNC: 2.6 MMOL/L
POTASSIUM SERPL-SCNC: 3 MMOL/L (ref 3.5–5)
POTASSIUM SERPL-SCNC: 3.3 MMOL/L (ref 3.5–5)
SAMPLE SOURCE: ABNORMAL
SODIUM SERPL-SCNC: 139 MMOL/L (ref 136–145)

## 2023-06-11 PROCEDURE — 6370000000 HC RX 637 (ALT 250 FOR IP): Performed by: INTERNAL MEDICINE

## 2023-06-11 PROCEDURE — 6360000002 HC RX W HCPCS: Performed by: HOSPITALIST

## 2023-06-11 PROCEDURE — 2580000003 HC RX 258: Performed by: HOSPITALIST

## 2023-06-11 PROCEDURE — C8929 TTE W OR WO FOL WCON,DOPPLER: HCPCS

## 2023-06-11 PROCEDURE — 36600 WITHDRAWAL OF ARTERIAL BLOOD: CPT

## 2023-06-11 PROCEDURE — 83735 ASSAY OF MAGNESIUM: CPT

## 2023-06-11 PROCEDURE — 6360000004 HC RX CONTRAST MEDICATION: Performed by: HOSPITALIST

## 2023-06-11 PROCEDURE — 36415 COLL VENOUS BLD VENIPUNCTURE: CPT

## 2023-06-11 PROCEDURE — 84132 ASSAY OF SERUM POTASSIUM: CPT

## 2023-06-11 PROCEDURE — 82803 BLOOD GASES ANY COMBINATION: CPT

## 2023-06-11 PROCEDURE — 2700000000 HC OXYGEN THERAPY PER DAY

## 2023-06-11 PROCEDURE — 2000000000 HC ICU R&B

## 2023-06-11 PROCEDURE — 2580000003 HC RX 258: Performed by: EMERGENCY MEDICINE

## 2023-06-11 PROCEDURE — 80048 BASIC METABOLIC PNL TOTAL CA: CPT

## 2023-06-11 PROCEDURE — 94760 N-INVAS EAR/PLS OXIMETRY 1: CPT

## 2023-06-11 PROCEDURE — 6370000000 HC RX 637 (ALT 250 FOR IP): Performed by: HOSPITALIST

## 2023-06-11 PROCEDURE — 94640 AIRWAY INHALATION TREATMENT: CPT

## 2023-06-11 RX ORDER — PAROXETINE HYDROCHLORIDE 40 MG/1
40 TABLET, FILM COATED ORAL EVERY MORNING
COMMUNITY

## 2023-06-11 RX ORDER — LORAZEPAM 1 MG/1
0.5 TABLET ORAL 3 TIMES DAILY PRN
Status: DISPENSED | OUTPATIENT
Start: 2023-06-11 | End: 2023-06-16

## 2023-06-11 RX ORDER — OXYCODONE HYDROCHLORIDE AND ACETAMINOPHEN 5; 325 MG/1; MG/1
1 TABLET ORAL EVERY 4 HOURS PRN
Status: DISCONTINUED | OUTPATIENT
Start: 2023-06-11 | End: 2023-06-19 | Stop reason: HOSPADM

## 2023-06-11 RX ADMIN — GABAPENTIN 100 MG: 100 CAPSULE ORAL at 20:18

## 2023-06-11 RX ADMIN — POTASSIUM CHLORIDE 40 MEQ: 1500 TABLET, EXTENDED RELEASE ORAL at 06:13

## 2023-06-11 RX ADMIN — POTASSIUM CHLORIDE 40 MEQ: 1500 TABLET, EXTENDED RELEASE ORAL at 19:05

## 2023-06-11 RX ADMIN — VALSARTAN 40 MG: 40 TABLET, FILM COATED ORAL at 00:53

## 2023-06-11 RX ADMIN — FAMOTIDINE 20 MG: 20 TABLET ORAL at 10:46

## 2023-06-11 RX ADMIN — Medication 2000 UNITS: at 10:46

## 2023-06-11 RX ADMIN — OXYCODONE HYDROCHLORIDE AND ACETAMINOPHEN 1 TABLET: 5; 325 TABLET ORAL at 16:06

## 2023-06-11 RX ADMIN — ATORVASTATIN CALCIUM 40 MG: 40 TABLET, FILM COATED ORAL at 20:18

## 2023-06-11 RX ADMIN — Medication 5 MG: at 20:18

## 2023-06-11 RX ADMIN — CEFEPIME 2000 MG: 2 INJECTION, POWDER, FOR SOLUTION INTRAVENOUS at 01:16

## 2023-06-11 RX ADMIN — IPRATROPIUM BROMIDE 0.5 MG: 0.5 SOLUTION RESPIRATORY (INHALATION) at 09:56

## 2023-06-11 RX ADMIN — PERFLUTREN 1.5 ML: 6.52 INJECTION, SUSPENSION INTRAVENOUS at 14:44

## 2023-06-11 RX ADMIN — MAGNESIUM SULFATE HEPTAHYDRATE 2000 MG: 40 INJECTION, SOLUTION INTRAVENOUS at 06:14

## 2023-06-11 RX ADMIN — SODIUM CHLORIDE, PRESERVATIVE FREE 10 ML: 5 INJECTION INTRAVENOUS at 20:26

## 2023-06-11 RX ADMIN — APIXABAN 5 MG: 5 TABLET, FILM COATED ORAL at 10:45

## 2023-06-11 RX ADMIN — IPRATROPIUM BROMIDE 0.5 MG: 0.5 SOLUTION RESPIRATORY (INHALATION) at 18:13

## 2023-06-11 RX ADMIN — ARFORMOTEROL TARTRATE 15 MCG: 15 SOLUTION RESPIRATORY (INHALATION) at 18:13

## 2023-06-11 RX ADMIN — ARFORMOTEROL TARTRATE 15 MCG: 15 SOLUTION RESPIRATORY (INHALATION) at 06:09

## 2023-06-11 RX ADMIN — DRONEDARONE 400 MG: 400 TABLET, FILM COATED ORAL at 00:52

## 2023-06-11 RX ADMIN — ATORVASTATIN CALCIUM 40 MG: 40 TABLET, FILM COATED ORAL at 00:51

## 2023-06-11 RX ADMIN — CEFEPIME 2000 MG: 2 INJECTION, POWDER, FOR SOLUTION INTRAVENOUS at 13:54

## 2023-06-11 RX ADMIN — MIRTAZAPINE 15 MG: 7.5 TABLET ORAL at 00:53

## 2023-06-11 RX ADMIN — LORAZEPAM 0.5 MG: 1 TABLET ORAL at 00:54

## 2023-06-11 RX ADMIN — GABAPENTIN 100 MG: 100 CAPSULE ORAL at 00:53

## 2023-06-11 RX ADMIN — APIXABAN 5 MG: 5 TABLET, FILM COATED ORAL at 20:18

## 2023-06-11 RX ADMIN — MIRTAZAPINE 15 MG: 7.5 TABLET ORAL at 20:18

## 2023-06-11 RX ADMIN — DRONEDARONE 400 MG: 400 TABLET, FILM COATED ORAL at 20:18

## 2023-06-11 RX ADMIN — APIXABAN 5 MG: 5 TABLET, FILM COATED ORAL at 00:52

## 2023-06-11 RX ADMIN — GABAPENTIN 100 MG: 100 CAPSULE ORAL at 10:46

## 2023-06-11 RX ADMIN — IPRATROPIUM BROMIDE 0.5 MG: 0.5 SOLUTION RESPIRATORY (INHALATION) at 06:09

## 2023-06-11 RX ADMIN — POLYETHYLENE GLYCOL 3350 17 G: 17 POWDER, FOR SOLUTION ORAL at 10:45

## 2023-06-11 RX ADMIN — GABAPENTIN 100 MG: 100 CAPSULE ORAL at 14:03

## 2023-06-11 RX ADMIN — BUDESONIDE 500 MCG: 0.5 INHALANT ORAL at 06:09

## 2023-06-11 RX ADMIN — Medication 5 MG: at 00:52

## 2023-06-11 ASSESSMENT — PAIN DESCRIPTION - DESCRIPTORS: DESCRIPTORS: ACHING;DISCOMFORT

## 2023-06-11 ASSESSMENT — PAIN SCALES - GENERAL
PAINLEVEL_OUTOF10: 0
PAINLEVEL_OUTOF10: 7

## 2023-06-11 ASSESSMENT — PAIN DESCRIPTION - ORIENTATION: ORIENTATION: LOWER

## 2023-06-11 ASSESSMENT — PAIN DESCRIPTION - LOCATION: LOCATION: BACK

## 2023-06-12 ENCOUNTER — APPOINTMENT (OUTPATIENT)
Dept: GENERAL RADIOLOGY | Age: 83
DRG: 871 | End: 2023-06-12
Payer: MEDICARE

## 2023-06-12 PROBLEM — Z51.5 PALLIATIVE CARE PATIENT: Status: ACTIVE | Noted: 2023-06-12

## 2023-06-12 PROBLEM — E43 SEVERE MALNUTRITION (HCC): Chronic | Status: ACTIVE | Noted: 2023-06-12

## 2023-06-12 LAB
ALBUMIN SERPL-MCNC: 4.1 G/DL (ref 3.5–5.2)
ANION GAP SERPL CALCULATED.3IONS-SCNC: 16 MMOL/L (ref 7–19)
BASOPHILS # BLD: 0.1 K/UL (ref 0–0.2)
BASOPHILS NFR BLD: 0.5 % (ref 0–1)
BUN SERPL-MCNC: 32 MG/DL (ref 8–23)
CALCIUM SERPL-MCNC: 9.3 MG/DL (ref 8.8–10.2)
CHLORIDE SERPL-SCNC: 95 MMOL/L (ref 98–111)
CO2 SERPL-SCNC: 28 MMOL/L (ref 22–29)
CREAT SERPL-MCNC: 1.1 MG/DL (ref 0.5–0.9)
EKG P AXIS: 58 DEGREES
EKG P-R INTERVAL: 174 MS
EKG Q-T INTERVAL: 420 MS
EKG QRS DURATION: 86 MS
EKG QTC CALCULATION (BAZETT): 456 MS
EKG T AXIS: 29 DEGREES
EOSINOPHIL # BLD: 0.3 K/UL (ref 0–0.6)
EOSINOPHIL NFR BLD: 2.8 % (ref 0–5)
ERYTHROCYTE [DISTWIDTH] IN BLOOD BY AUTOMATED COUNT: 15.8 % (ref 11.5–14.5)
GLUCOSE SERPL-MCNC: 81 MG/DL (ref 74–109)
HCT VFR BLD AUTO: 28.8 % (ref 37–47)
HGB BLD-MCNC: 9.5 G/DL (ref 12–16)
IMM GRANULOCYTES # BLD: 0.1 K/UL
LYMPHOCYTES # BLD: 1.4 K/UL (ref 1.1–4.5)
LYMPHOCYTES NFR BLD: 12.8 % (ref 20–40)
MAGNESIUM SERPL-MCNC: 2.1 MG/DL (ref 1.6–2.4)
MCH RBC QN AUTO: 30.4 PG (ref 27–31)
MCHC RBC AUTO-ENTMCNC: 33 G/DL (ref 33–37)
MCV RBC AUTO: 92.3 FL (ref 81–99)
MONOCYTES # BLD: 1.6 K/UL (ref 0–0.9)
MONOCYTES NFR BLD: 14.1 % (ref 0–10)
NEUTROPHILS # BLD: 7.7 K/UL (ref 1.5–7.5)
NEUTS SEG NFR BLD: 68.8 % (ref 50–65)
PLATELET # BLD AUTO: 277 K/UL (ref 130–400)
PMV BLD AUTO: 10.2 FL (ref 9.4–12.3)
POTASSIUM SERPL-SCNC: 3.7 MMOL/L (ref 3.5–5)
RBC # BLD AUTO: 3.12 M/UL (ref 4.2–5.4)
SODIUM SERPL-SCNC: 139 MMOL/L (ref 136–145)
WBC # BLD AUTO: 11.2 K/UL (ref 4.8–10.8)

## 2023-06-12 PROCEDURE — 71045 X-RAY EXAM CHEST 1 VIEW: CPT

## 2023-06-12 PROCEDURE — 02HV33Z INSERTION OF INFUSION DEVICE INTO SUPERIOR VENA CAVA, PERCUTANEOUS APPROACH: ICD-10-PCS | Performed by: HOSPITALIST

## 2023-06-12 PROCEDURE — 6370000000 HC RX 637 (ALT 250 FOR IP): Performed by: INTERNAL MEDICINE

## 2023-06-12 PROCEDURE — 2580000003 HC RX 258: Performed by: HOSPITALIST

## 2023-06-12 PROCEDURE — 2580000003 HC RX 258: Performed by: INTERNAL MEDICINE

## 2023-06-12 PROCEDURE — 6370000000 HC RX 637 (ALT 250 FOR IP): Performed by: HOSPITALIST

## 2023-06-12 PROCEDURE — 82040 ASSAY OF SERUM ALBUMIN: CPT

## 2023-06-12 PROCEDURE — 36556 INSERT NON-TUNNEL CV CATH: CPT

## 2023-06-12 PROCEDURE — 94640 AIRWAY INHALATION TREATMENT: CPT

## 2023-06-12 PROCEDURE — 2580000003 HC RX 258: Performed by: EMERGENCY MEDICINE

## 2023-06-12 PROCEDURE — 94760 N-INVAS EAR/PLS OXIMETRY 1: CPT

## 2023-06-12 PROCEDURE — 2700000000 HC OXYGEN THERAPY PER DAY

## 2023-06-12 PROCEDURE — 85025 COMPLETE CBC W/AUTO DIFF WBC: CPT

## 2023-06-12 PROCEDURE — 80048 BASIC METABOLIC PNL TOTAL CA: CPT

## 2023-06-12 PROCEDURE — 1210000000 HC MED SURG R&B

## 2023-06-12 PROCEDURE — 6360000002 HC RX W HCPCS: Performed by: HOSPITALIST

## 2023-06-12 PROCEDURE — 94761 N-INVAS EAR/PLS OXIMETRY MLT: CPT

## 2023-06-12 PROCEDURE — 2060000000 HC ICU INTERMEDIATE R&B

## 2023-06-12 PROCEDURE — 93010 ELECTROCARDIOGRAM REPORT: CPT | Performed by: INTERNAL MEDICINE

## 2023-06-12 PROCEDURE — 36415 COLL VENOUS BLD VENIPUNCTURE: CPT

## 2023-06-12 PROCEDURE — 83735 ASSAY OF MAGNESIUM: CPT

## 2023-06-12 PROCEDURE — P9047 ALBUMIN (HUMAN), 25%, 50ML: HCPCS | Performed by: HOSPITALIST

## 2023-06-12 PROCEDURE — 6360000002 HC RX W HCPCS: Performed by: INTERNAL MEDICINE

## 2023-06-12 RX ORDER — SODIUM CHLORIDE, SODIUM LACTATE, POTASSIUM CHLORIDE, CALCIUM CHLORIDE 600; 310; 30; 20 MG/100ML; MG/100ML; MG/100ML; MG/100ML
INJECTION, SOLUTION INTRAVENOUS CONTINUOUS
Status: DISCONTINUED | OUTPATIENT
Start: 2023-06-12 | End: 2023-06-13

## 2023-06-12 RX ORDER — SODIUM CHLORIDE 9 MG/ML
INJECTION, SOLUTION INTRAVENOUS CONTINUOUS
Status: DISCONTINUED | OUTPATIENT
Start: 2023-06-12 | End: 2023-06-12

## 2023-06-12 RX ORDER — ALBUMIN (HUMAN) 12.5 G/50ML
25 SOLUTION INTRAVENOUS ONCE
Status: COMPLETED | OUTPATIENT
Start: 2023-06-12 | End: 2023-06-12

## 2023-06-12 RX ADMIN — APIXABAN 5 MG: 5 TABLET, FILM COATED ORAL at 20:28

## 2023-06-12 RX ADMIN — GABAPENTIN 100 MG: 100 CAPSULE ORAL at 20:28

## 2023-06-12 RX ADMIN — SODIUM CHLORIDE, POTASSIUM CHLORIDE, SODIUM LACTATE AND CALCIUM CHLORIDE: 600; 310; 30; 20 INJECTION, SOLUTION INTRAVENOUS at 16:38

## 2023-06-12 RX ADMIN — SODIUM CHLORIDE, POTASSIUM CHLORIDE, SODIUM LACTATE AND CALCIUM CHLORIDE: 600; 310; 30; 20 INJECTION, SOLUTION INTRAVENOUS at 01:07

## 2023-06-12 RX ADMIN — IPRATROPIUM BROMIDE 0.5 MG: 0.5 SOLUTION RESPIRATORY (INHALATION) at 06:11

## 2023-06-12 RX ADMIN — OXYCODONE HYDROCHLORIDE AND ACETAMINOPHEN 1 TABLET: 5; 325 TABLET ORAL at 18:27

## 2023-06-12 RX ADMIN — GABAPENTIN 100 MG: 100 CAPSULE ORAL at 09:36

## 2023-06-12 RX ADMIN — IPRATROPIUM BROMIDE 0.5 MG: 0.5 SOLUTION RESPIRATORY (INHALATION) at 19:19

## 2023-06-12 RX ADMIN — ARFORMOTEROL TARTRATE 15 MCG: 15 SOLUTION RESPIRATORY (INHALATION) at 19:19

## 2023-06-12 RX ADMIN — APIXABAN 5 MG: 5 TABLET, FILM COATED ORAL at 09:36

## 2023-06-12 RX ADMIN — OXYCODONE HYDROCHLORIDE AND ACETAMINOPHEN 1 TABLET: 5; 325 TABLET ORAL at 13:00

## 2023-06-12 RX ADMIN — MIRTAZAPINE 15 MG: 7.5 TABLET ORAL at 20:28

## 2023-06-12 RX ADMIN — DILTIAZEM HYDROCHLORIDE 240 MG: 240 CAPSULE, COATED, EXTENDED RELEASE ORAL at 09:56

## 2023-06-12 RX ADMIN — ALBUTEROL SULFATE 2.5 MG: 2.5 SOLUTION RESPIRATORY (INHALATION) at 09:03

## 2023-06-12 RX ADMIN — Medication 2000 UNITS: at 09:37

## 2023-06-12 RX ADMIN — DRONEDARONE 400 MG: 400 TABLET, FILM COATED ORAL at 09:36

## 2023-06-12 RX ADMIN — BUDESONIDE 500 MCG: 0.5 INHALANT ORAL at 19:19

## 2023-06-12 RX ADMIN — DRONEDARONE 400 MG: 400 TABLET, FILM COATED ORAL at 20:28

## 2023-06-12 RX ADMIN — ATORVASTATIN CALCIUM 40 MG: 40 TABLET, FILM COATED ORAL at 20:28

## 2023-06-12 RX ADMIN — POLYETHYLENE GLYCOL 3350 17 G: 17 POWDER, FOR SOLUTION ORAL at 09:38

## 2023-06-12 RX ADMIN — ALBUMIN (HUMAN) 25 G: 0.25 INJECTION, SOLUTION INTRAVENOUS at 01:05

## 2023-06-12 RX ADMIN — CEFEPIME 2000 MG: 2 INJECTION, POWDER, FOR SOLUTION INTRAVENOUS at 01:10

## 2023-06-12 RX ADMIN — BUDESONIDE 500 MCG: 0.5 INHALANT ORAL at 06:11

## 2023-06-12 RX ADMIN — DILTIAZEM HYDROCHLORIDE 240 MG: 240 CAPSULE, COATED, EXTENDED RELEASE ORAL at 07:39

## 2023-06-12 RX ADMIN — SODIUM CHLORIDE, PRESERVATIVE FREE 10 ML: 5 INJECTION INTRAVENOUS at 20:28

## 2023-06-12 RX ADMIN — GABAPENTIN 100 MG: 100 CAPSULE ORAL at 13:00

## 2023-06-12 RX ADMIN — FAMOTIDINE 20 MG: 20 TABLET ORAL at 09:36

## 2023-06-12 RX ADMIN — CEFEPIME 1000 MG: 1 INJECTION, POWDER, FOR SOLUTION INTRAMUSCULAR; INTRAVENOUS at 16:39

## 2023-06-12 RX ADMIN — Medication 5 MG: at 20:28

## 2023-06-12 RX ADMIN — ARFORMOTEROL TARTRATE 15 MCG: 15 SOLUTION RESPIRATORY (INHALATION) at 06:11

## 2023-06-12 RX ADMIN — SODIUM CHLORIDE: 9 INJECTION, SOLUTION INTRAVENOUS at 05:52

## 2023-06-12 RX ADMIN — IPRATROPIUM BROMIDE 0.5 MG: 0.5 SOLUTION RESPIRATORY (INHALATION) at 14:36

## 2023-06-12 RX ADMIN — IPRATROPIUM BROMIDE 0.5 MG: 0.5 SOLUTION RESPIRATORY (INHALATION) at 10:06

## 2023-06-12 RX ADMIN — OXYCODONE HYDROCHLORIDE AND ACETAMINOPHEN 1 TABLET: 5; 325 TABLET ORAL at 05:51

## 2023-06-12 ASSESSMENT — PAIN SCALES - GENERAL
PAINLEVEL_OUTOF10: 6
PAINLEVEL_OUTOF10: 6
PAINLEVEL_OUTOF10: 7

## 2023-06-12 ASSESSMENT — PAIN DESCRIPTION - ORIENTATION
ORIENTATION: LOWER
ORIENTATION: LOWER

## 2023-06-12 ASSESSMENT — PAIN DESCRIPTION - DESCRIPTORS
DESCRIPTORS: ACHING
DESCRIPTORS: SHOOTING

## 2023-06-12 ASSESSMENT — PAIN DESCRIPTION - LOCATION
LOCATION: BACK

## 2023-06-13 PROBLEM — I50.31 ACUTE HEART FAILURE WITH PRESERVED EJECTION FRACTION (HFPEF) (HCC): Status: ACTIVE | Noted: 2023-06-13

## 2023-06-13 PROBLEM — N39.0 SEPSIS SECONDARY TO UTI (HCC): Status: ACTIVE | Noted: 2023-06-10

## 2023-06-13 PROBLEM — I27.20 PULMONARY HYPERTENSION (HCC): Status: ACTIVE | Noted: 2023-06-13

## 2023-06-13 LAB
ANION GAP SERPL CALCULATED.3IONS-SCNC: 10 MMOL/L (ref 7–19)
BACTERIA UR CULT: ABNORMAL
BASOPHILS # BLD: 0.1 K/UL (ref 0–0.2)
BASOPHILS NFR BLD: 0.9 % (ref 0–1)
BUN SERPL-MCNC: 18 MG/DL (ref 8–23)
CALCIUM SERPL-MCNC: 9.3 MG/DL (ref 8.8–10.2)
CHLORIDE SERPL-SCNC: 101 MMOL/L (ref 98–111)
CO2 SERPL-SCNC: 28 MMOL/L (ref 22–29)
CREAT SERPL-MCNC: 0.5 MG/DL (ref 0.5–0.9)
EOSINOPHIL # BLD: 0.7 K/UL (ref 0–0.6)
EOSINOPHIL NFR BLD: 7.6 % (ref 0–5)
ERYTHROCYTE [DISTWIDTH] IN BLOOD BY AUTOMATED COUNT: 16 % (ref 11.5–14.5)
GLUCOSE SERPL-MCNC: 93 MG/DL (ref 74–109)
HCT VFR BLD AUTO: 29.5 % (ref 37–47)
HGB BLD-MCNC: 9.2 G/DL (ref 12–16)
IMM GRANULOCYTES # BLD: 0.1 K/UL
LYMPHOCYTES # BLD: 1.5 K/UL (ref 1.1–4.5)
LYMPHOCYTES NFR BLD: 16.4 % (ref 20–40)
MAGNESIUM SERPL-MCNC: 1.7 MG/DL (ref 1.6–2.4)
MCH RBC QN AUTO: 29.6 PG (ref 27–31)
MCHC RBC AUTO-ENTMCNC: 31.2 G/DL (ref 33–37)
MCV RBC AUTO: 94.9 FL (ref 81–99)
MONOCYTES # BLD: 1.4 K/UL (ref 0–0.9)
MONOCYTES NFR BLD: 15.2 % (ref 0–10)
NEUTROPHILS # BLD: 5.3 K/UL (ref 1.5–7.5)
NEUTS SEG NFR BLD: 58.6 % (ref 50–65)
ORGANISM: ABNORMAL
ORGANISM: ABNORMAL
PLATELET # BLD AUTO: 270 K/UL (ref 130–400)
PMV BLD AUTO: 9.8 FL (ref 9.4–12.3)
POTASSIUM SERPL-SCNC: 3.4 MMOL/L (ref 3.5–5)
RBC # BLD AUTO: 3.11 M/UL (ref 4.2–5.4)
SODIUM SERPL-SCNC: 139 MMOL/L (ref 136–145)
WBC # BLD AUTO: 9 K/UL (ref 4.8–10.8)

## 2023-06-13 PROCEDURE — 6360000002 HC RX W HCPCS: Performed by: STUDENT IN AN ORGANIZED HEALTH CARE EDUCATION/TRAINING PROGRAM

## 2023-06-13 PROCEDURE — 2060000000 HC ICU INTERMEDIATE R&B

## 2023-06-13 PROCEDURE — 97530 THERAPEUTIC ACTIVITIES: CPT

## 2023-06-13 PROCEDURE — 6360000002 HC RX W HCPCS: Performed by: INTERNAL MEDICINE

## 2023-06-13 PROCEDURE — 6370000000 HC RX 637 (ALT 250 FOR IP): Performed by: HOSPITALIST

## 2023-06-13 PROCEDURE — 1210000000 HC MED SURG R&B

## 2023-06-13 PROCEDURE — 6360000002 HC RX W HCPCS: Performed by: HOSPITALIST

## 2023-06-13 PROCEDURE — 2580000003 HC RX 258: Performed by: INTERNAL MEDICINE

## 2023-06-13 PROCEDURE — 94640 AIRWAY INHALATION TREATMENT: CPT

## 2023-06-13 PROCEDURE — 97166 OT EVAL MOD COMPLEX 45 MIN: CPT

## 2023-06-13 PROCEDURE — 6370000000 HC RX 637 (ALT 250 FOR IP): Performed by: STUDENT IN AN ORGANIZED HEALTH CARE EDUCATION/TRAINING PROGRAM

## 2023-06-13 PROCEDURE — 2580000003 HC RX 258: Performed by: EMERGENCY MEDICINE

## 2023-06-13 PROCEDURE — 2700000000 HC OXYGEN THERAPY PER DAY

## 2023-06-13 PROCEDURE — 94761 N-INVAS EAR/PLS OXIMETRY MLT: CPT

## 2023-06-13 PROCEDURE — 85025 COMPLETE CBC W/AUTO DIFF WBC: CPT

## 2023-06-13 PROCEDURE — 97535 SELF CARE MNGMENT TRAINING: CPT

## 2023-06-13 PROCEDURE — 6370000000 HC RX 637 (ALT 250 FOR IP): Performed by: INTERNAL MEDICINE

## 2023-06-13 PROCEDURE — 80048 BASIC METABOLIC PNL TOTAL CA: CPT

## 2023-06-13 PROCEDURE — 36415 COLL VENOUS BLD VENIPUNCTURE: CPT

## 2023-06-13 PROCEDURE — 83735 ASSAY OF MAGNESIUM: CPT

## 2023-06-13 RX ORDER — FUROSEMIDE 10 MG/ML
20 INJECTION INTRAMUSCULAR; INTRAVENOUS DAILY
Status: DISCONTINUED | OUTPATIENT
Start: 2023-06-13 | End: 2023-06-19 | Stop reason: HOSPADM

## 2023-06-13 RX ORDER — IPRATROPIUM BROMIDE AND ALBUTEROL SULFATE 2.5; .5 MG/3ML; MG/3ML
SOLUTION RESPIRATORY (INHALATION)
Status: DISPENSED
Start: 2023-06-13 | End: 2023-06-14

## 2023-06-13 RX ADMIN — GABAPENTIN 100 MG: 100 CAPSULE ORAL at 20:48

## 2023-06-13 RX ADMIN — ONDANSETRON 4 MG: 2 INJECTION INTRAMUSCULAR; INTRAVENOUS at 13:51

## 2023-06-13 RX ADMIN — DILTIAZEM HYDROCHLORIDE 240 MG: 240 CAPSULE, COATED, EXTENDED RELEASE ORAL at 10:34

## 2023-06-13 RX ADMIN — APIXABAN 5 MG: 5 TABLET, FILM COATED ORAL at 20:48

## 2023-06-13 RX ADMIN — MIRTAZAPINE 15 MG: 7.5 TABLET ORAL at 20:48

## 2023-06-13 RX ADMIN — SODIUM CHLORIDE, PRESERVATIVE FREE 10 ML: 5 INJECTION INTRAVENOUS at 20:48

## 2023-06-13 RX ADMIN — POTASSIUM BICARBONATE 40 MEQ: 782 TABLET, EFFERVESCENT ORAL at 14:03

## 2023-06-13 RX ADMIN — GABAPENTIN 100 MG: 100 CAPSULE ORAL at 10:34

## 2023-06-13 RX ADMIN — BUDESONIDE 500 MCG: 0.5 INHALANT ORAL at 07:08

## 2023-06-13 RX ADMIN — ATORVASTATIN CALCIUM 40 MG: 40 TABLET, FILM COATED ORAL at 20:48

## 2023-06-13 RX ADMIN — GABAPENTIN 100 MG: 100 CAPSULE ORAL at 14:03

## 2023-06-13 RX ADMIN — IPRATROPIUM BROMIDE 0.5 MG: 0.5 SOLUTION RESPIRATORY (INHALATION) at 07:07

## 2023-06-13 RX ADMIN — CEFEPIME 1000 MG: 1 INJECTION, POWDER, FOR SOLUTION INTRAMUSCULAR; INTRAVENOUS at 01:03

## 2023-06-13 RX ADMIN — SODIUM CHLORIDE, PRESERVATIVE FREE 10 ML: 5 INJECTION INTRAVENOUS at 10:40

## 2023-06-13 RX ADMIN — IPRATROPIUM BROMIDE 0.5 MG: 0.5 SOLUTION RESPIRATORY (INHALATION) at 19:20

## 2023-06-13 RX ADMIN — OXYCODONE HYDROCHLORIDE AND ACETAMINOPHEN 1 TABLET: 5; 325 TABLET ORAL at 20:48

## 2023-06-13 RX ADMIN — OXYCODONE HYDROCHLORIDE AND ACETAMINOPHEN 1 TABLET: 5; 325 TABLET ORAL at 15:47

## 2023-06-13 RX ADMIN — IPRATROPIUM BROMIDE 0.5 MG: 0.5 SOLUTION RESPIRATORY (INHALATION) at 14:43

## 2023-06-13 RX ADMIN — ARFORMOTEROL TARTRATE 15 MCG: 15 SOLUTION RESPIRATORY (INHALATION) at 07:07

## 2023-06-13 RX ADMIN — ARFORMOTEROL TARTRATE 15 MCG: 15 SOLUTION RESPIRATORY (INHALATION) at 19:21

## 2023-06-13 RX ADMIN — Medication 2000 UNITS: at 10:34

## 2023-06-13 RX ADMIN — FAMOTIDINE 20 MG: 20 TABLET ORAL at 10:34

## 2023-06-13 RX ADMIN — OXYCODONE HYDROCHLORIDE AND ACETAMINOPHEN 1 TABLET: 5; 325 TABLET ORAL at 06:29

## 2023-06-13 RX ADMIN — APIXABAN 5 MG: 5 TABLET, FILM COATED ORAL at 10:38

## 2023-06-13 RX ADMIN — DRONEDARONE 400 MG: 400 TABLET, FILM COATED ORAL at 20:48

## 2023-06-13 RX ADMIN — POLYETHYLENE GLYCOL 3350 17 G: 17 POWDER, FOR SOLUTION ORAL at 10:33

## 2023-06-13 RX ADMIN — CEFEPIME 1000 MG: 1 INJECTION, POWDER, FOR SOLUTION INTRAMUSCULAR; INTRAVENOUS at 14:11

## 2023-06-13 RX ADMIN — Medication 5 MG: at 20:48

## 2023-06-13 RX ADMIN — OXYCODONE HYDROCHLORIDE AND ACETAMINOPHEN 1 TABLET: 5; 325 TABLET ORAL at 00:58

## 2023-06-13 RX ADMIN — BUDESONIDE 500 MCG: 0.5 INHALANT ORAL at 19:21

## 2023-06-13 RX ADMIN — IPRATROPIUM BROMIDE 0.5 MG: 0.5 SOLUTION RESPIRATORY (INHALATION) at 11:30

## 2023-06-13 RX ADMIN — POTASSIUM CHLORIDE 40 MEQ: 1500 TABLET, EXTENDED RELEASE ORAL at 04:48

## 2023-06-13 RX ADMIN — DRONEDARONE 400 MG: 400 TABLET, FILM COATED ORAL at 10:33

## 2023-06-13 RX ADMIN — FUROSEMIDE 20 MG: 10 INJECTION, SOLUTION INTRAMUSCULAR; INTRAVENOUS at 15:47

## 2023-06-13 ASSESSMENT — PAIN SCALES - GENERAL
PAINLEVEL_OUTOF10: 5
PAINLEVEL_OUTOF10: 9
PAINLEVEL_OUTOF10: 10

## 2023-06-13 ASSESSMENT — PAIN DESCRIPTION - LOCATION
LOCATION: BACK

## 2023-06-13 ASSESSMENT — PAIN DESCRIPTION - ORIENTATION: ORIENTATION: LOWER

## 2023-06-13 ASSESSMENT — PAIN DESCRIPTION - DESCRIPTORS
DESCRIPTORS: ACHING;DISCOMFORT
DESCRIPTORS: ACHING

## 2023-06-14 LAB
ANION GAP SERPL CALCULATED.3IONS-SCNC: 8 MMOL/L (ref 7–19)
BASOPHILS # BLD: 0.1 K/UL (ref 0–0.2)
BASOPHILS NFR BLD: 0.9 % (ref 0–1)
BUN SERPL-MCNC: 11 MG/DL (ref 8–23)
CALCIUM SERPL-MCNC: 9.6 MG/DL (ref 8.8–10.2)
CHLORIDE SERPL-SCNC: 97 MMOL/L (ref 98–111)
CO2 SERPL-SCNC: 33 MMOL/L (ref 22–29)
CREAT SERPL-MCNC: 0.6 MG/DL (ref 0.5–0.9)
EOSINOPHIL # BLD: 0.8 K/UL (ref 0–0.6)
EOSINOPHIL NFR BLD: 7.1 % (ref 0–5)
ERYTHROCYTE [DISTWIDTH] IN BLOOD BY AUTOMATED COUNT: 15.8 % (ref 11.5–14.5)
GLUCOSE SERPL-MCNC: 91 MG/DL (ref 74–109)
HCT VFR BLD AUTO: 33 % (ref 37–47)
HGB BLD-MCNC: 10.5 G/DL (ref 12–16)
IMM GRANULOCYTES # BLD: 0.2 K/UL
LYMPHOCYTES # BLD: 1.8 K/UL (ref 1.1–4.5)
LYMPHOCYTES NFR BLD: 17.5 % (ref 20–40)
MAGNESIUM SERPL-MCNC: 1.6 MG/DL (ref 1.6–2.4)
MCH RBC QN AUTO: 30 PG (ref 27–31)
MCHC RBC AUTO-ENTMCNC: 31.8 G/DL (ref 33–37)
MCV RBC AUTO: 94.3 FL (ref 81–99)
MONOCYTES # BLD: 1.5 K/UL (ref 0–0.9)
MONOCYTES NFR BLD: 13.9 % (ref 0–10)
NEUTROPHILS # BLD: 6.2 K/UL (ref 1.5–7.5)
NEUTS SEG NFR BLD: 58.6 % (ref 50–65)
PLATELET # BLD AUTO: 331 K/UL (ref 130–400)
PMV BLD AUTO: 10.2 FL (ref 9.4–12.3)
POTASSIUM SERPL-SCNC: 4.5 MMOL/L (ref 3.5–5)
RBC # BLD AUTO: 3.5 M/UL (ref 4.2–5.4)
SODIUM SERPL-SCNC: 138 MMOL/L (ref 136–145)
WBC # BLD AUTO: 10.5 K/UL (ref 4.8–10.8)

## 2023-06-14 PROCEDURE — 6360000002 HC RX W HCPCS: Performed by: HOSPITALIST

## 2023-06-14 PROCEDURE — 85025 COMPLETE CBC W/AUTO DIFF WBC: CPT

## 2023-06-14 PROCEDURE — 2580000003 HC RX 258: Performed by: EMERGENCY MEDICINE

## 2023-06-14 PROCEDURE — 2060000000 HC ICU INTERMEDIATE R&B

## 2023-06-14 PROCEDURE — 1210000000 HC MED SURG R&B

## 2023-06-14 PROCEDURE — 83735 ASSAY OF MAGNESIUM: CPT

## 2023-06-14 PROCEDURE — 6360000002 HC RX W HCPCS: Performed by: STUDENT IN AN ORGANIZED HEALTH CARE EDUCATION/TRAINING PROGRAM

## 2023-06-14 PROCEDURE — 36415 COLL VENOUS BLD VENIPUNCTURE: CPT

## 2023-06-14 PROCEDURE — 2580000003 HC RX 258: Performed by: INTERNAL MEDICINE

## 2023-06-14 PROCEDURE — 6370000000 HC RX 637 (ALT 250 FOR IP): Performed by: INTERNAL MEDICINE

## 2023-06-14 PROCEDURE — 2700000000 HC OXYGEN THERAPY PER DAY

## 2023-06-14 PROCEDURE — 80048 BASIC METABOLIC PNL TOTAL CA: CPT

## 2023-06-14 PROCEDURE — 94761 N-INVAS EAR/PLS OXIMETRY MLT: CPT

## 2023-06-14 PROCEDURE — 6370000000 HC RX 637 (ALT 250 FOR IP): Performed by: HOSPITALIST

## 2023-06-14 PROCEDURE — 6360000002 HC RX W HCPCS: Performed by: INTERNAL MEDICINE

## 2023-06-14 PROCEDURE — 94640 AIRWAY INHALATION TREATMENT: CPT

## 2023-06-14 RX ADMIN — DILTIAZEM HYDROCHLORIDE 240 MG: 240 CAPSULE, COATED, EXTENDED RELEASE ORAL at 11:20

## 2023-06-14 RX ADMIN — LORAZEPAM 0.5 MG: 1 TABLET ORAL at 20:21

## 2023-06-14 RX ADMIN — GABAPENTIN 100 MG: 100 CAPSULE ORAL at 20:21

## 2023-06-14 RX ADMIN — LORAZEPAM 0.5 MG: 1 TABLET ORAL at 14:18

## 2023-06-14 RX ADMIN — APIXABAN 5 MG: 5 TABLET, FILM COATED ORAL at 11:20

## 2023-06-14 RX ADMIN — ARFORMOTEROL TARTRATE 15 MCG: 15 SOLUTION RESPIRATORY (INHALATION) at 20:10

## 2023-06-14 RX ADMIN — APIXABAN 5 MG: 5 TABLET, FILM COATED ORAL at 20:21

## 2023-06-14 RX ADMIN — SODIUM CHLORIDE, PRESERVATIVE FREE 10 ML: 5 INJECTION INTRAVENOUS at 20:21

## 2023-06-14 RX ADMIN — ATORVASTATIN CALCIUM 40 MG: 40 TABLET, FILM COATED ORAL at 20:20

## 2023-06-14 RX ADMIN — POLYETHYLENE GLYCOL 3350 17 G: 17 POWDER, FOR SOLUTION ORAL at 11:20

## 2023-06-14 RX ADMIN — Medication 2000 UNITS: at 11:20

## 2023-06-14 RX ADMIN — CEFEPIME 2000 MG: 2 INJECTION, POWDER, FOR SOLUTION INTRAVENOUS at 14:20

## 2023-06-14 RX ADMIN — OXYCODONE HYDROCHLORIDE AND ACETAMINOPHEN 1 TABLET: 5; 325 TABLET ORAL at 00:29

## 2023-06-14 RX ADMIN — Medication 5 MG: at 20:20

## 2023-06-14 RX ADMIN — ARFORMOTEROL TARTRATE 15 MCG: 15 SOLUTION RESPIRATORY (INHALATION) at 06:19

## 2023-06-14 RX ADMIN — GABAPENTIN 100 MG: 100 CAPSULE ORAL at 14:17

## 2023-06-14 RX ADMIN — OXYCODONE HYDROCHLORIDE AND ACETAMINOPHEN 1 TABLET: 5; 325 TABLET ORAL at 15:09

## 2023-06-14 RX ADMIN — BUDESONIDE 500 MCG: 0.5 INHALANT ORAL at 06:18

## 2023-06-14 RX ADMIN — FAMOTIDINE 20 MG: 20 TABLET ORAL at 11:20

## 2023-06-14 RX ADMIN — GABAPENTIN 100 MG: 100 CAPSULE ORAL at 11:24

## 2023-06-14 RX ADMIN — MIRTAZAPINE 15 MG: 7.5 TABLET ORAL at 20:20

## 2023-06-14 RX ADMIN — DRONEDARONE 400 MG: 400 TABLET, FILM COATED ORAL at 11:20

## 2023-06-14 RX ADMIN — OXYCODONE HYDROCHLORIDE AND ACETAMINOPHEN 1 TABLET: 5; 325 TABLET ORAL at 18:43

## 2023-06-14 RX ADMIN — IPRATROPIUM BROMIDE 0.5 MG: 0.5 SOLUTION RESPIRATORY (INHALATION) at 10:10

## 2023-06-14 RX ADMIN — BUDESONIDE 500 MCG: 0.5 INHALANT ORAL at 20:10

## 2023-06-14 RX ADMIN — IPRATROPIUM BROMIDE 0.5 MG: 0.5 SOLUTION RESPIRATORY (INHALATION) at 06:19

## 2023-06-14 RX ADMIN — IPRATROPIUM BROMIDE 0.5 MG: 0.5 SOLUTION RESPIRATORY (INHALATION) at 15:26

## 2023-06-14 RX ADMIN — DRONEDARONE 400 MG: 400 TABLET, FILM COATED ORAL at 20:21

## 2023-06-14 RX ADMIN — CEFEPIME 2000 MG: 2 INJECTION, POWDER, FOR SOLUTION INTRAVENOUS at 00:30

## 2023-06-14 RX ADMIN — IPRATROPIUM BROMIDE 0.5 MG: 0.5 SOLUTION RESPIRATORY (INHALATION) at 20:10

## 2023-06-14 RX ADMIN — FUROSEMIDE 20 MG: 10 INJECTION, SOLUTION INTRAMUSCULAR; INTRAVENOUS at 11:25

## 2023-06-14 RX ADMIN — ONDANSETRON 4 MG: 2 INJECTION INTRAMUSCULAR; INTRAVENOUS at 20:20

## 2023-06-14 ASSESSMENT — PAIN SCALES - GENERAL
PAINLEVEL_OUTOF10: 7
PAINLEVEL_OUTOF10: 6
PAINLEVEL_OUTOF10: 6

## 2023-06-14 ASSESSMENT — PAIN DESCRIPTION - ORIENTATION
ORIENTATION: LOWER
ORIENTATION: LOWER

## 2023-06-14 ASSESSMENT — PAIN DESCRIPTION - LOCATION
LOCATION: BACK
LOCATION: BACK

## 2023-06-14 ASSESSMENT — PAIN DESCRIPTION - DESCRIPTORS
DESCRIPTORS: ACHING;THROBBING
DESCRIPTORS: ACHING

## 2023-06-15 LAB
ANION GAP SERPL CALCULATED.3IONS-SCNC: 11 MMOL/L (ref 7–19)
BACTERIA BLD CULT ORG #2: NORMAL
BACTERIA BLD CULT: NORMAL
BASOPHILS # BLD: 0.1 K/UL (ref 0–0.2)
BASOPHILS NFR BLD: 0.9 % (ref 0–1)
BUN SERPL-MCNC: 14 MG/DL (ref 8–23)
CALCIUM SERPL-MCNC: 9.6 MG/DL (ref 8.8–10.2)
CHLORIDE SERPL-SCNC: 98 MMOL/L (ref 98–111)
CO2 SERPL-SCNC: 28 MMOL/L (ref 22–29)
CREAT SERPL-MCNC: 0.6 MG/DL (ref 0.5–0.9)
EKG P AXIS: NORMAL DEGREES
EKG P AXIS: NORMAL DEGREES
EKG P-R INTERVAL: NORMAL MS
EKG P-R INTERVAL: NORMAL MS
EKG Q-T INTERVAL: 306 MS
EKG Q-T INTERVAL: 322 MS
EKG QRS DURATION: 78 MS
EKG QRS DURATION: 80 MS
EKG QTC CALCULATION (BAZETT): 453 MS
EKG QTC CALCULATION (BAZETT): 468 MS
EKG T AXIS: 13 DEGREES
EKG T AXIS: 8 DEGREES
EOSINOPHIL # BLD: 0.8 K/UL (ref 0–0.6)
EOSINOPHIL NFR BLD: 8.4 % (ref 0–5)
ERYTHROCYTE [DISTWIDTH] IN BLOOD BY AUTOMATED COUNT: 15.9 % (ref 11.5–14.5)
GLUCOSE SERPL-MCNC: 98 MG/DL (ref 74–109)
HCT VFR BLD AUTO: 35.1 % (ref 37–47)
HGB BLD-MCNC: 10.8 G/DL (ref 12–16)
IMM GRANULOCYTES # BLD: 0.2 K/UL
LYMPHOCYTES # BLD: 1.8 K/UL (ref 1.1–4.5)
LYMPHOCYTES NFR BLD: 18.8 % (ref 20–40)
MAGNESIUM SERPL-MCNC: 1.5 MG/DL (ref 1.6–2.4)
MCH RBC QN AUTO: 30.1 PG (ref 27–31)
MCHC RBC AUTO-ENTMCNC: 30.8 G/DL (ref 33–37)
MCV RBC AUTO: 97.8 FL (ref 81–99)
MONOCYTES # BLD: 1.3 K/UL (ref 0–0.9)
MONOCYTES NFR BLD: 13.6 % (ref 0–10)
NEUTROPHILS # BLD: 5.5 K/UL (ref 1.5–7.5)
NEUTS SEG NFR BLD: 56.2 % (ref 50–65)
PLATELET # BLD AUTO: 302 K/UL (ref 130–400)
PMV BLD AUTO: 9.8 FL (ref 9.4–12.3)
POTASSIUM SERPL-SCNC: 4.4 MMOL/L (ref 3.5–5)
RBC # BLD AUTO: 3.59 M/UL (ref 4.2–5.4)
SODIUM SERPL-SCNC: 137 MMOL/L (ref 136–145)
WBC # BLD AUTO: 9.7 K/UL (ref 4.8–10.8)

## 2023-06-15 PROCEDURE — 1210000000 HC MED SURG R&B

## 2023-06-15 PROCEDURE — 6360000002 HC RX W HCPCS: Performed by: INTERNAL MEDICINE

## 2023-06-15 PROCEDURE — 6360000002 HC RX W HCPCS: Performed by: HOSPITALIST

## 2023-06-15 PROCEDURE — 2580000003 HC RX 258: Performed by: EMERGENCY MEDICINE

## 2023-06-15 PROCEDURE — 36415 COLL VENOUS BLD VENIPUNCTURE: CPT

## 2023-06-15 PROCEDURE — 99223 1ST HOSP IP/OBS HIGH 75: CPT | Performed by: INTERNAL MEDICINE

## 2023-06-15 PROCEDURE — 94640 AIRWAY INHALATION TREATMENT: CPT

## 2023-06-15 PROCEDURE — 6370000000 HC RX 637 (ALT 250 FOR IP): Performed by: INTERNAL MEDICINE

## 2023-06-15 PROCEDURE — 83735 ASSAY OF MAGNESIUM: CPT

## 2023-06-15 PROCEDURE — 80048 BASIC METABOLIC PNL TOTAL CA: CPT

## 2023-06-15 PROCEDURE — 6360000002 HC RX W HCPCS: Performed by: STUDENT IN AN ORGANIZED HEALTH CARE EDUCATION/TRAINING PROGRAM

## 2023-06-15 PROCEDURE — 2500000003 HC RX 250 WO HCPCS: Performed by: INTERNAL MEDICINE

## 2023-06-15 PROCEDURE — 94761 N-INVAS EAR/PLS OXIMETRY MLT: CPT

## 2023-06-15 PROCEDURE — 85025 COMPLETE CBC W/AUTO DIFF WBC: CPT

## 2023-06-15 PROCEDURE — 2580000003 HC RX 258: Performed by: INTERNAL MEDICINE

## 2023-06-15 PROCEDURE — 2700000000 HC OXYGEN THERAPY PER DAY

## 2023-06-15 PROCEDURE — 6370000000 HC RX 637 (ALT 250 FOR IP): Performed by: HOSPITALIST

## 2023-06-15 RX ORDER — METOPROLOL TARTRATE 5 MG/5ML
5 INJECTION INTRAVENOUS ONCE
Status: COMPLETED | OUTPATIENT
Start: 2023-06-15 | End: 2023-06-15

## 2023-06-15 RX ORDER — LANOLIN ALCOHOL/MO/W.PET/CERES
400 CREAM (GRAM) TOPICAL DAILY
Status: DISCONTINUED | OUTPATIENT
Start: 2023-06-15 | End: 2023-06-19 | Stop reason: HOSPADM

## 2023-06-15 RX ADMIN — SODIUM CHLORIDE, PRESERVATIVE FREE 10 ML: 5 INJECTION INTRAVENOUS at 20:11

## 2023-06-15 RX ADMIN — LORAZEPAM 0.5 MG: 1 TABLET ORAL at 20:11

## 2023-06-15 RX ADMIN — OXYCODONE HYDROCHLORIDE AND ACETAMINOPHEN 1 TABLET: 5; 325 TABLET ORAL at 08:36

## 2023-06-15 RX ADMIN — METOPROLOL TARTRATE 25 MG: 25 TABLET, FILM COATED ORAL at 20:11

## 2023-06-15 RX ADMIN — APIXABAN 5 MG: 5 TABLET, FILM COATED ORAL at 08:32

## 2023-06-15 RX ADMIN — IPRATROPIUM BROMIDE 0.5 MG: 0.5 SOLUTION RESPIRATORY (INHALATION) at 06:40

## 2023-06-15 RX ADMIN — ATORVASTATIN CALCIUM 40 MG: 40 TABLET, FILM COATED ORAL at 20:11

## 2023-06-15 RX ADMIN — APIXABAN 5 MG: 5 TABLET, FILM COATED ORAL at 20:11

## 2023-06-15 RX ADMIN — BUDESONIDE 500 MCG: 0.5 INHALANT ORAL at 18:25

## 2023-06-15 RX ADMIN — CEFEPIME 2000 MG: 2 INJECTION, POWDER, FOR SOLUTION INTRAVENOUS at 16:03

## 2023-06-15 RX ADMIN — FAMOTIDINE 20 MG: 20 TABLET ORAL at 08:32

## 2023-06-15 RX ADMIN — OXYCODONE HYDROCHLORIDE AND ACETAMINOPHEN 1 TABLET: 5; 325 TABLET ORAL at 16:02

## 2023-06-15 RX ADMIN — LORAZEPAM 0.5 MG: 1 TABLET ORAL at 08:32

## 2023-06-15 RX ADMIN — GABAPENTIN 100 MG: 100 CAPSULE ORAL at 16:02

## 2023-06-15 RX ADMIN — IPRATROPIUM BROMIDE 0.5 MG: 0.5 SOLUTION RESPIRATORY (INHALATION) at 14:54

## 2023-06-15 RX ADMIN — DRONEDARONE 400 MG: 400 TABLET, FILM COATED ORAL at 08:32

## 2023-06-15 RX ADMIN — FUROSEMIDE 20 MG: 10 INJECTION, SOLUTION INTRAMUSCULAR; INTRAVENOUS at 08:32

## 2023-06-15 RX ADMIN — ARFORMOTEROL TARTRATE 15 MCG: 15 SOLUTION RESPIRATORY (INHALATION) at 06:40

## 2023-06-15 RX ADMIN — MIRTAZAPINE 15 MG: 7.5 TABLET ORAL at 20:10

## 2023-06-15 RX ADMIN — IPRATROPIUM BROMIDE 0.5 MG: 0.5 SOLUTION RESPIRATORY (INHALATION) at 10:42

## 2023-06-15 RX ADMIN — SODIUM CHLORIDE, PRESERVATIVE FREE 10 ML: 5 INJECTION INTRAVENOUS at 08:40

## 2023-06-15 RX ADMIN — Medication 2000 UNITS: at 08:32

## 2023-06-15 RX ADMIN — Medication 400 MG: at 08:39

## 2023-06-15 RX ADMIN — ARFORMOTEROL TARTRATE 15 MCG: 15 SOLUTION RESPIRATORY (INHALATION) at 18:25

## 2023-06-15 RX ADMIN — METOPROLOL TARTRATE 25 MG: 25 TABLET, FILM COATED ORAL at 16:05

## 2023-06-15 RX ADMIN — DILTIAZEM HYDROCHLORIDE 240 MG: 240 CAPSULE, COATED, EXTENDED RELEASE ORAL at 08:32

## 2023-06-15 RX ADMIN — CEFEPIME 2000 MG: 2 INJECTION, POWDER, FOR SOLUTION INTRAVENOUS at 00:48

## 2023-06-15 RX ADMIN — POLYETHYLENE GLYCOL 3350 17 G: 17 POWDER, FOR SOLUTION ORAL at 08:32

## 2023-06-15 RX ADMIN — Medication 5 MG: at 20:10

## 2023-06-15 RX ADMIN — GABAPENTIN 100 MG: 100 CAPSULE ORAL at 08:32

## 2023-06-15 RX ADMIN — OXYCODONE HYDROCHLORIDE AND ACETAMINOPHEN 1 TABLET: 5; 325 TABLET ORAL at 20:11

## 2023-06-15 RX ADMIN — GABAPENTIN 100 MG: 100 CAPSULE ORAL at 20:11

## 2023-06-15 RX ADMIN — IPRATROPIUM BROMIDE 0.5 MG: 0.5 SOLUTION RESPIRATORY (INHALATION) at 18:25

## 2023-06-15 RX ADMIN — BUDESONIDE 500 MCG: 0.5 INHALANT ORAL at 06:43

## 2023-06-15 RX ADMIN — METOPROLOL TARTRATE 5 MG: 5 INJECTION, SOLUTION INTRAVENOUS at 09:32

## 2023-06-15 ASSESSMENT — PAIN DESCRIPTION - DESCRIPTORS
DESCRIPTORS: ACHING;DISCOMFORT
DESCRIPTORS: ACHING

## 2023-06-15 ASSESSMENT — PAIN DESCRIPTION - ORIENTATION
ORIENTATION: LOWER
ORIENTATION: LOWER

## 2023-06-15 ASSESSMENT — PAIN SCALES - GENERAL
PAINLEVEL_OUTOF10: 5
PAINLEVEL_OUTOF10: 5

## 2023-06-15 ASSESSMENT — PAIN DESCRIPTION - LOCATION
LOCATION: BACK
LOCATION: BACK

## 2023-06-16 LAB
BASOPHILS # BLD: 0.1 K/UL (ref 0–0.2)
BASOPHILS NFR BLD: 1 % (ref 0–1)
EOSINOPHIL # BLD: 0.9 K/UL (ref 0–0.6)
EOSINOPHIL NFR BLD: 8.7 % (ref 0–5)
ERYTHROCYTE [DISTWIDTH] IN BLOOD BY AUTOMATED COUNT: 15.9 % (ref 11.5–14.5)
HCT VFR BLD AUTO: 31.5 % (ref 37–47)
HGB BLD-MCNC: 9.7 G/DL (ref 12–16)
IMM GRANULOCYTES # BLD: 0.3 K/UL
LYMPHOCYTES # BLD: 2.2 K/UL (ref 1.1–4.5)
LYMPHOCYTES NFR BLD: 21.3 % (ref 20–40)
MCH RBC QN AUTO: 29.8 PG (ref 27–31)
MCHC RBC AUTO-ENTMCNC: 30.8 G/DL (ref 33–37)
MCV RBC AUTO: 96.9 FL (ref 81–99)
MONOCYTES # BLD: 1.3 K/UL (ref 0–0.9)
MONOCYTES NFR BLD: 12.5 % (ref 0–10)
NEUTROPHILS # BLD: 5.6 K/UL (ref 1.5–7.5)
NEUTS SEG NFR BLD: 53.9 % (ref 50–65)
PLATELET # BLD AUTO: 322 K/UL (ref 130–400)
PMV BLD AUTO: 10 FL (ref 9.4–12.3)
RBC # BLD AUTO: 3.25 M/UL (ref 4.2–5.4)
WBC # BLD AUTO: 10.4 K/UL (ref 4.8–10.8)

## 2023-06-16 PROCEDURE — 1210000000 HC MED SURG R&B

## 2023-06-16 PROCEDURE — 97530 THERAPEUTIC ACTIVITIES: CPT

## 2023-06-16 PROCEDURE — 2580000003 HC RX 258: Performed by: INTERNAL MEDICINE

## 2023-06-16 PROCEDURE — 85025 COMPLETE CBC W/AUTO DIFF WBC: CPT

## 2023-06-16 PROCEDURE — 6360000002 HC RX W HCPCS: Performed by: STUDENT IN AN ORGANIZED HEALTH CARE EDUCATION/TRAINING PROGRAM

## 2023-06-16 PROCEDURE — 6360000002 HC RX W HCPCS: Performed by: INTERNAL MEDICINE

## 2023-06-16 PROCEDURE — 2580000003 HC RX 258: Performed by: EMERGENCY MEDICINE

## 2023-06-16 PROCEDURE — 6370000000 HC RX 637 (ALT 250 FOR IP): Performed by: INTERNAL MEDICINE

## 2023-06-16 PROCEDURE — 94761 N-INVAS EAR/PLS OXIMETRY MLT: CPT

## 2023-06-16 PROCEDURE — 94640 AIRWAY INHALATION TREATMENT: CPT

## 2023-06-16 PROCEDURE — 2580000003 HC RX 258: Performed by: HOSPITALIST

## 2023-06-16 PROCEDURE — 6360000002 HC RX W HCPCS: Performed by: HOSPITALIST

## 2023-06-16 PROCEDURE — 6370000000 HC RX 637 (ALT 250 FOR IP): Performed by: HOSPITALIST

## 2023-06-16 PROCEDURE — 2700000000 HC OXYGEN THERAPY PER DAY

## 2023-06-16 PROCEDURE — 99232 SBSQ HOSP IP/OBS MODERATE 35: CPT | Performed by: INTERNAL MEDICINE

## 2023-06-16 PROCEDURE — 36415 COLL VENOUS BLD VENIPUNCTURE: CPT

## 2023-06-16 PROCEDURE — 97535 SELF CARE MNGMENT TRAINING: CPT

## 2023-06-16 RX ORDER — LORAZEPAM 1 MG/1
0.5 TABLET ORAL NIGHTLY PRN
Status: DISCONTINUED | OUTPATIENT
Start: 2023-06-16 | End: 2023-06-19 | Stop reason: HOSPADM

## 2023-06-16 RX ADMIN — GABAPENTIN 100 MG: 100 CAPSULE ORAL at 08:25

## 2023-06-16 RX ADMIN — SODIUM CHLORIDE: 9 INJECTION, SOLUTION INTRAVENOUS at 01:41

## 2023-06-16 RX ADMIN — LORAZEPAM 0.5 MG: 1 TABLET ORAL at 20:35

## 2023-06-16 RX ADMIN — ARFORMOTEROL TARTRATE 15 MCG: 15 SOLUTION RESPIRATORY (INHALATION) at 19:31

## 2023-06-16 RX ADMIN — OXYCODONE HYDROCHLORIDE AND ACETAMINOPHEN 1 TABLET: 5; 325 TABLET ORAL at 13:41

## 2023-06-16 RX ADMIN — FUROSEMIDE 20 MG: 10 INJECTION, SOLUTION INTRAMUSCULAR; INTRAVENOUS at 08:25

## 2023-06-16 RX ADMIN — APIXABAN 5 MG: 5 TABLET, FILM COATED ORAL at 20:34

## 2023-06-16 RX ADMIN — METOPROLOL TARTRATE 25 MG: 25 TABLET, FILM COATED ORAL at 08:25

## 2023-06-16 RX ADMIN — POLYETHYLENE GLYCOL 3350 17 G: 17 POWDER, FOR SOLUTION ORAL at 08:25

## 2023-06-16 RX ADMIN — GABAPENTIN 100 MG: 100 CAPSULE ORAL at 13:41

## 2023-06-16 RX ADMIN — GABAPENTIN 100 MG: 100 CAPSULE ORAL at 20:34

## 2023-06-16 RX ADMIN — SODIUM CHLORIDE, PRESERVATIVE FREE 10 ML: 5 INJECTION INTRAVENOUS at 20:33

## 2023-06-16 RX ADMIN — MIRTAZAPINE 15 MG: 7.5 TABLET ORAL at 20:33

## 2023-06-16 RX ADMIN — DILTIAZEM HYDROCHLORIDE 240 MG: 240 CAPSULE, COATED, EXTENDED RELEASE ORAL at 08:24

## 2023-06-16 RX ADMIN — SODIUM CHLORIDE, PRESERVATIVE FREE 10 ML: 5 INJECTION INTRAVENOUS at 08:25

## 2023-06-16 RX ADMIN — IPRATROPIUM BROMIDE 0.5 MG: 0.5 SOLUTION RESPIRATORY (INHALATION) at 10:17

## 2023-06-16 RX ADMIN — FAMOTIDINE 20 MG: 20 TABLET ORAL at 08:24

## 2023-06-16 RX ADMIN — ARFORMOTEROL TARTRATE 15 MCG: 15 SOLUTION RESPIRATORY (INHALATION) at 06:31

## 2023-06-16 RX ADMIN — ATORVASTATIN CALCIUM 40 MG: 40 TABLET, FILM COATED ORAL at 20:34

## 2023-06-16 RX ADMIN — Medication 5 MG: at 20:34

## 2023-06-16 RX ADMIN — CEFEPIME 2000 MG: 2 INJECTION, POWDER, FOR SOLUTION INTRAVENOUS at 01:40

## 2023-06-16 RX ADMIN — OXYCODONE HYDROCHLORIDE AND ACETAMINOPHEN 1 TABLET: 5; 325 TABLET ORAL at 22:49

## 2023-06-16 RX ADMIN — OXYCODONE HYDROCHLORIDE AND ACETAMINOPHEN 1 TABLET: 5; 325 TABLET ORAL at 17:44

## 2023-06-16 RX ADMIN — APIXABAN 5 MG: 5 TABLET, FILM COATED ORAL at 08:24

## 2023-06-16 RX ADMIN — BUDESONIDE 500 MCG: 0.5 INHALANT ORAL at 19:31

## 2023-06-16 RX ADMIN — IPRATROPIUM BROMIDE 0.5 MG: 0.5 SOLUTION RESPIRATORY (INHALATION) at 19:31

## 2023-06-16 RX ADMIN — OXYCODONE HYDROCHLORIDE AND ACETAMINOPHEN 1 TABLET: 5; 325 TABLET ORAL at 08:24

## 2023-06-16 RX ADMIN — IPRATROPIUM BROMIDE 0.5 MG: 0.5 SOLUTION RESPIRATORY (INHALATION) at 06:31

## 2023-06-16 RX ADMIN — Medication 400 MG: at 08:24

## 2023-06-16 RX ADMIN — BUDESONIDE 500 MCG: 0.5 INHALANT ORAL at 06:33

## 2023-06-16 RX ADMIN — IPRATROPIUM BROMIDE 0.5 MG: 0.5 SOLUTION RESPIRATORY (INHALATION) at 14:33

## 2023-06-16 RX ADMIN — Medication 2000 UNITS: at 08:24

## 2023-06-16 RX ADMIN — CEFEPIME 2000 MG: 2 INJECTION, POWDER, FOR SOLUTION INTRAVENOUS at 13:45

## 2023-06-16 ASSESSMENT — PAIN SCALES - GENERAL
PAINLEVEL_OUTOF10: 7
PAINLEVEL_OUTOF10: 5
PAINLEVEL_OUTOF10: 0
PAINLEVEL_OUTOF10: 8
PAINLEVEL_OUTOF10: 8

## 2023-06-16 ASSESSMENT — PAIN - FUNCTIONAL ASSESSMENT
PAIN_FUNCTIONAL_ASSESSMENT: PREVENTS OR INTERFERES SOME ACTIVE ACTIVITIES AND ADLS
PAIN_FUNCTIONAL_ASSESSMENT: PREVENTS OR INTERFERES SOME ACTIVE ACTIVITIES AND ADLS

## 2023-06-16 ASSESSMENT — PAIN DESCRIPTION - ORIENTATION
ORIENTATION: LOWER;MID
ORIENTATION: LOWER

## 2023-06-16 ASSESSMENT — PAIN DESCRIPTION - ONSET: ONSET: ON-GOING

## 2023-06-16 ASSESSMENT — PAIN DESCRIPTION - PAIN TYPE
TYPE: CHRONIC PAIN
TYPE: ACUTE PAIN;CHRONIC PAIN

## 2023-06-16 ASSESSMENT — PAIN DESCRIPTION - LOCATION
LOCATION: BACK

## 2023-06-16 ASSESSMENT — PAIN DESCRIPTION - DIRECTION: RADIATING_TOWARDS: NO

## 2023-06-16 ASSESSMENT — PAIN DESCRIPTION - FREQUENCY
FREQUENCY: INTERMITTENT
FREQUENCY: INTERMITTENT

## 2023-06-16 ASSESSMENT — PAIN DESCRIPTION - DESCRIPTORS
DESCRIPTORS: DISCOMFORT
DESCRIPTORS: DISCOMFORT;ACHING;SHARP
DESCRIPTORS: DISCOMFORT;ACHING
DESCRIPTORS: DISCOMFORT

## 2023-06-17 LAB
ALBUMIN SERPL-MCNC: 3.4 G/DL (ref 3.5–5.2)
ALP SERPL-CCNC: 127 U/L (ref 35–104)
ALT SERPL-CCNC: 11 U/L (ref 5–33)
ANION GAP SERPL CALCULATED.3IONS-SCNC: 7 MMOL/L (ref 7–19)
AST SERPL-CCNC: 16 U/L (ref 5–32)
BASOPHILS # BLD: 0.1 K/UL (ref 0–0.2)
BASOPHILS NFR BLD: 0.8 % (ref 0–1)
BILIRUB SERPL-MCNC: <0.2 MG/DL (ref 0.2–1.2)
BUN SERPL-MCNC: 18 MG/DL (ref 8–23)
CALCIUM SERPL-MCNC: 9.6 MG/DL (ref 8.8–10.2)
CHLORIDE SERPL-SCNC: 98 MMOL/L (ref 98–111)
CO2 SERPL-SCNC: 31 MMOL/L (ref 22–29)
CREAT SERPL-MCNC: 0.7 MG/DL (ref 0.5–0.9)
EOSINOPHIL # BLD: 1 K/UL (ref 0–0.6)
EOSINOPHIL NFR BLD: 9.3 % (ref 0–5)
ERYTHROCYTE [DISTWIDTH] IN BLOOD BY AUTOMATED COUNT: 15.7 % (ref 11.5–14.5)
GLUCOSE SERPL-MCNC: 105 MG/DL (ref 74–109)
HCT VFR BLD AUTO: 30.1 % (ref 37–47)
HGB BLD-MCNC: 9.7 G/DL (ref 12–16)
IMM GRANULOCYTES # BLD: 0.3 K/UL
LYMPHOCYTES # BLD: 2.2 K/UL (ref 1.1–4.5)
LYMPHOCYTES NFR BLD: 21.2 % (ref 20–40)
MCH RBC QN AUTO: 29.8 PG (ref 27–31)
MCHC RBC AUTO-ENTMCNC: 32.2 G/DL (ref 33–37)
MCV RBC AUTO: 92.6 FL (ref 81–99)
MONOCYTES # BLD: 1.4 K/UL (ref 0–0.9)
MONOCYTES NFR BLD: 13.3 % (ref 0–10)
NEUTROPHILS # BLD: 5.6 K/UL (ref 1.5–7.5)
NEUTS SEG NFR BLD: 52.7 % (ref 50–65)
PLATELET # BLD AUTO: 347 K/UL (ref 130–400)
PMV BLD AUTO: 10 FL (ref 9.4–12.3)
POTASSIUM SERPL-SCNC: 4.3 MMOL/L (ref 3.5–5)
PROT SERPL-MCNC: 6.2 G/DL (ref 6.6–8.7)
RBC # BLD AUTO: 3.25 M/UL (ref 4.2–5.4)
SODIUM SERPL-SCNC: 136 MMOL/L (ref 136–145)
WBC # BLD AUTO: 10.6 K/UL (ref 4.8–10.8)

## 2023-06-17 PROCEDURE — 2700000000 HC OXYGEN THERAPY PER DAY

## 2023-06-17 PROCEDURE — 6360000002 HC RX W HCPCS: Performed by: STUDENT IN AN ORGANIZED HEALTH CARE EDUCATION/TRAINING PROGRAM

## 2023-06-17 PROCEDURE — 6370000000 HC RX 637 (ALT 250 FOR IP): Performed by: INTERNAL MEDICINE

## 2023-06-17 PROCEDURE — 6360000002 HC RX W HCPCS: Performed by: INTERNAL MEDICINE

## 2023-06-17 PROCEDURE — 85025 COMPLETE CBC W/AUTO DIFF WBC: CPT

## 2023-06-17 PROCEDURE — 2580000003 HC RX 258: Performed by: EMERGENCY MEDICINE

## 2023-06-17 PROCEDURE — 80053 COMPREHEN METABOLIC PANEL: CPT

## 2023-06-17 PROCEDURE — 2580000003 HC RX 258: Performed by: INTERNAL MEDICINE

## 2023-06-17 PROCEDURE — 94761 N-INVAS EAR/PLS OXIMETRY MLT: CPT

## 2023-06-17 PROCEDURE — 6360000002 HC RX W HCPCS: Performed by: HOSPITALIST

## 2023-06-17 PROCEDURE — 94640 AIRWAY INHALATION TREATMENT: CPT

## 2023-06-17 PROCEDURE — 1210000000 HC MED SURG R&B

## 2023-06-17 PROCEDURE — 6370000000 HC RX 637 (ALT 250 FOR IP): Performed by: HOSPITALIST

## 2023-06-17 PROCEDURE — 36415 COLL VENOUS BLD VENIPUNCTURE: CPT

## 2023-06-17 RX ADMIN — METOPROLOL TARTRATE 25 MG: 25 TABLET, FILM COATED ORAL at 20:47

## 2023-06-17 RX ADMIN — Medication 2000 UNITS: at 09:35

## 2023-06-17 RX ADMIN — BUTALBITAL, ACETAMINOPHEN, AND CAFFEINE 1 TABLET: 50; 325; 40 TABLET ORAL at 09:49

## 2023-06-17 RX ADMIN — GABAPENTIN 100 MG: 100 CAPSULE ORAL at 20:47

## 2023-06-17 RX ADMIN — FAMOTIDINE 20 MG: 20 TABLET ORAL at 09:35

## 2023-06-17 RX ADMIN — BUDESONIDE 500 MCG: 0.5 INHALANT ORAL at 06:17

## 2023-06-17 RX ADMIN — APIXABAN 5 MG: 5 TABLET, FILM COATED ORAL at 20:47

## 2023-06-17 RX ADMIN — DILTIAZEM HYDROCHLORIDE 240 MG: 240 CAPSULE, COATED, EXTENDED RELEASE ORAL at 09:35

## 2023-06-17 RX ADMIN — Medication 400 MG: at 09:37

## 2023-06-17 RX ADMIN — IPRATROPIUM BROMIDE 0.5 MG: 0.5 SOLUTION RESPIRATORY (INHALATION) at 14:01

## 2023-06-17 RX ADMIN — SODIUM CHLORIDE, PRESERVATIVE FREE 10 ML: 5 INJECTION INTRAVENOUS at 20:47

## 2023-06-17 RX ADMIN — BUTALBITAL, ACETAMINOPHEN, AND CAFFEINE 1 TABLET: 50; 325; 40 TABLET ORAL at 14:17

## 2023-06-17 RX ADMIN — ATORVASTATIN CALCIUM 40 MG: 40 TABLET, FILM COATED ORAL at 20:47

## 2023-06-17 RX ADMIN — GABAPENTIN 100 MG: 100 CAPSULE ORAL at 13:54

## 2023-06-17 RX ADMIN — SODIUM CHLORIDE, PRESERVATIVE FREE 10 ML: 5 INJECTION INTRAVENOUS at 09:38

## 2023-06-17 RX ADMIN — CEFEPIME 2000 MG: 2 INJECTION, POWDER, FOR SOLUTION INTRAVENOUS at 01:15

## 2023-06-17 RX ADMIN — IPRATROPIUM BROMIDE 0.5 MG: 0.5 SOLUTION RESPIRATORY (INHALATION) at 18:17

## 2023-06-17 RX ADMIN — BUDESONIDE 500 MCG: 0.5 INHALANT ORAL at 18:17

## 2023-06-17 RX ADMIN — IPRATROPIUM BROMIDE 0.5 MG: 0.5 SOLUTION RESPIRATORY (INHALATION) at 06:17

## 2023-06-17 RX ADMIN — Medication 5 MG: at 20:46

## 2023-06-17 RX ADMIN — FUROSEMIDE 20 MG: 10 INJECTION, SOLUTION INTRAMUSCULAR; INTRAVENOUS at 09:37

## 2023-06-17 RX ADMIN — OXYCODONE HYDROCHLORIDE AND ACETAMINOPHEN 1 TABLET: 5; 325 TABLET ORAL at 19:35

## 2023-06-17 RX ADMIN — POLYETHYLENE GLYCOL 3350 17 G: 17 POWDER, FOR SOLUTION ORAL at 09:34

## 2023-06-17 RX ADMIN — ARFORMOTEROL TARTRATE 15 MCG: 15 SOLUTION RESPIRATORY (INHALATION) at 18:17

## 2023-06-17 RX ADMIN — APIXABAN 5 MG: 5 TABLET, FILM COATED ORAL at 09:41

## 2023-06-17 RX ADMIN — ARFORMOTEROL TARTRATE 15 MCG: 15 SOLUTION RESPIRATORY (INHALATION) at 06:17

## 2023-06-17 RX ADMIN — GABAPENTIN 100 MG: 100 CAPSULE ORAL at 09:37

## 2023-06-17 RX ADMIN — IPRATROPIUM BROMIDE 0.5 MG: 0.5 SOLUTION RESPIRATORY (INHALATION) at 10:02

## 2023-06-17 RX ADMIN — MIRTAZAPINE 15 MG: 7.5 TABLET ORAL at 20:46

## 2023-06-17 ASSESSMENT — PAIN DESCRIPTION - LOCATION
LOCATION: BACK

## 2023-06-17 ASSESSMENT — PAIN DESCRIPTION - ORIENTATION
ORIENTATION: LOWER

## 2023-06-17 ASSESSMENT — PAIN DESCRIPTION - PAIN TYPE: TYPE: CHRONIC PAIN

## 2023-06-17 ASSESSMENT — PAIN SCALES - GENERAL
PAINLEVEL_OUTOF10: 3
PAINLEVEL_OUTOF10: 0
PAINLEVEL_OUTOF10: 3
PAINLEVEL_OUTOF10: 6
PAINLEVEL_OUTOF10: 6
PAINLEVEL_OUTOF10: 8
PAINLEVEL_OUTOF10: 4
PAINLEVEL_OUTOF10: 6

## 2023-06-17 ASSESSMENT — PAIN DESCRIPTION - DESCRIPTORS: DESCRIPTORS: DISCOMFORT

## 2023-06-17 ASSESSMENT — PAIN DESCRIPTION - FREQUENCY: FREQUENCY: INTERMITTENT

## 2023-06-17 ASSESSMENT — PAIN DESCRIPTION - ONSET: ONSET: ON-GOING

## 2023-06-17 ASSESSMENT — PAIN - FUNCTIONAL ASSESSMENT: PAIN_FUNCTIONAL_ASSESSMENT: PREVENTS OR INTERFERES SOME ACTIVE ACTIVITIES AND ADLS

## 2023-06-17 ASSESSMENT — PAIN DESCRIPTION - DIRECTION: RADIATING_TOWARDS: NO

## 2023-06-18 LAB
ALBUMIN SERPL-MCNC: 3.1 G/DL (ref 3.5–5.2)
ALP SERPL-CCNC: 125 U/L (ref 35–104)
ALT SERPL-CCNC: 10 U/L (ref 5–33)
ANION GAP SERPL CALCULATED.3IONS-SCNC: 11 MMOL/L (ref 7–19)
AST SERPL-CCNC: 16 U/L (ref 5–32)
BASOPHILS # BLD: 0.1 K/UL (ref 0–0.2)
BASOPHILS NFR BLD: 0.9 % (ref 0–1)
BILIRUB SERPL-MCNC: <0.2 MG/DL (ref 0.2–1.2)
BUN SERPL-MCNC: 16 MG/DL (ref 8–23)
CALCIUM SERPL-MCNC: 9.8 MG/DL (ref 8.8–10.2)
CHLORIDE SERPL-SCNC: 101 MMOL/L (ref 98–111)
CO2 SERPL-SCNC: 26 MMOL/L (ref 22–29)
CREAT SERPL-MCNC: 0.6 MG/DL (ref 0.5–0.9)
EOSINOPHIL # BLD: 0.8 K/UL (ref 0–0.6)
EOSINOPHIL NFR BLD: 7.7 % (ref 0–5)
ERYTHROCYTE [DISTWIDTH] IN BLOOD BY AUTOMATED COUNT: 16 % (ref 11.5–14.5)
GLUCOSE SERPL-MCNC: 98 MG/DL (ref 74–109)
HCT VFR BLD AUTO: 29.4 % (ref 37–47)
HGB BLD-MCNC: 9.5 G/DL (ref 12–16)
IMM GRANULOCYTES # BLD: 0.2 K/UL
LYMPHOCYTES # BLD: 1.8 K/UL (ref 1.1–4.5)
LYMPHOCYTES NFR BLD: 18.5 % (ref 20–40)
MCH RBC QN AUTO: 30.3 PG (ref 27–31)
MCHC RBC AUTO-ENTMCNC: 32.3 G/DL (ref 33–37)
MCV RBC AUTO: 93.6 FL (ref 81–99)
MONOCYTES # BLD: 1.4 K/UL (ref 0–0.9)
MONOCYTES NFR BLD: 13.7 % (ref 0–10)
NEUTROPHILS # BLD: 5.7 K/UL (ref 1.5–7.5)
NEUTS SEG NFR BLD: 57.1 % (ref 50–65)
PLATELET # BLD AUTO: 375 K/UL (ref 130–400)
PMV BLD AUTO: 9.9 FL (ref 9.4–12.3)
POTASSIUM SERPL-SCNC: 4.2 MMOL/L (ref 3.5–5)
PROT SERPL-MCNC: 6.4 G/DL (ref 6.6–8.7)
RBC # BLD AUTO: 3.14 M/UL (ref 4.2–5.4)
SODIUM SERPL-SCNC: 138 MMOL/L (ref 136–145)
WBC # BLD AUTO: 9.9 K/UL (ref 4.8–10.8)

## 2023-06-18 PROCEDURE — 6360000002 HC RX W HCPCS: Performed by: HOSPITALIST

## 2023-06-18 PROCEDURE — 85025 COMPLETE CBC W/AUTO DIFF WBC: CPT

## 2023-06-18 PROCEDURE — 6370000000 HC RX 637 (ALT 250 FOR IP): Performed by: INTERNAL MEDICINE

## 2023-06-18 PROCEDURE — 94640 AIRWAY INHALATION TREATMENT: CPT

## 2023-06-18 PROCEDURE — 6370000000 HC RX 637 (ALT 250 FOR IP): Performed by: HOSPITALIST

## 2023-06-18 PROCEDURE — 36415 COLL VENOUS BLD VENIPUNCTURE: CPT

## 2023-06-18 PROCEDURE — 2580000003 HC RX 258: Performed by: EMERGENCY MEDICINE

## 2023-06-18 PROCEDURE — 1210000000 HC MED SURG R&B

## 2023-06-18 PROCEDURE — 80053 COMPREHEN METABOLIC PANEL: CPT

## 2023-06-18 PROCEDURE — 2700000000 HC OXYGEN THERAPY PER DAY

## 2023-06-18 PROCEDURE — 94761 N-INVAS EAR/PLS OXIMETRY MLT: CPT

## 2023-06-18 PROCEDURE — 6360000002 HC RX W HCPCS: Performed by: STUDENT IN AN ORGANIZED HEALTH CARE EDUCATION/TRAINING PROGRAM

## 2023-06-18 RX ADMIN — MIRTAZAPINE 15 MG: 7.5 TABLET ORAL at 21:15

## 2023-06-18 RX ADMIN — OXYCODONE HYDROCHLORIDE AND ACETAMINOPHEN 1 TABLET: 5; 325 TABLET ORAL at 03:30

## 2023-06-18 RX ADMIN — APIXABAN 5 MG: 5 TABLET, FILM COATED ORAL at 21:15

## 2023-06-18 RX ADMIN — FAMOTIDINE 20 MG: 20 TABLET ORAL at 11:18

## 2023-06-18 RX ADMIN — SODIUM CHLORIDE, PRESERVATIVE FREE 10 ML: 5 INJECTION INTRAVENOUS at 21:16

## 2023-06-18 RX ADMIN — GABAPENTIN 100 MG: 100 CAPSULE ORAL at 21:15

## 2023-06-18 RX ADMIN — DILTIAZEM HYDROCHLORIDE 240 MG: 240 CAPSULE, COATED, EXTENDED RELEASE ORAL at 11:19

## 2023-06-18 RX ADMIN — Medication 2000 UNITS: at 11:18

## 2023-06-18 RX ADMIN — OXYCODONE HYDROCHLORIDE AND ACETAMINOPHEN 1 TABLET: 5; 325 TABLET ORAL at 11:17

## 2023-06-18 RX ADMIN — Medication 5 MG: at 21:15

## 2023-06-18 RX ADMIN — OXYCODONE HYDROCHLORIDE AND ACETAMINOPHEN 1 TABLET: 5; 325 TABLET ORAL at 17:47

## 2023-06-18 RX ADMIN — BUDESONIDE 500 MCG: 0.5 INHALANT ORAL at 06:09

## 2023-06-18 RX ADMIN — IPRATROPIUM BROMIDE 0.5 MG: 0.5 SOLUTION RESPIRATORY (INHALATION) at 14:04

## 2023-06-18 RX ADMIN — APIXABAN 5 MG: 5 TABLET, FILM COATED ORAL at 11:24

## 2023-06-18 RX ADMIN — ARFORMOTEROL TARTRATE 15 MCG: 15 SOLUTION RESPIRATORY (INHALATION) at 19:36

## 2023-06-18 RX ADMIN — IPRATROPIUM BROMIDE 0.5 MG: 0.5 SOLUTION RESPIRATORY (INHALATION) at 06:09

## 2023-06-18 RX ADMIN — ATORVASTATIN CALCIUM 40 MG: 40 TABLET, FILM COATED ORAL at 21:15

## 2023-06-18 RX ADMIN — FUROSEMIDE 20 MG: 10 INJECTION, SOLUTION INTRAMUSCULAR; INTRAVENOUS at 12:26

## 2023-06-18 RX ADMIN — Medication 400 MG: at 11:18

## 2023-06-18 RX ADMIN — SODIUM CHLORIDE, PRESERVATIVE FREE 10 ML: 5 INJECTION INTRAVENOUS at 11:24

## 2023-06-18 RX ADMIN — METOPROLOL TARTRATE 25 MG: 25 TABLET, FILM COATED ORAL at 21:16

## 2023-06-18 RX ADMIN — IPRATROPIUM BROMIDE 0.5 MG: 0.5 SOLUTION RESPIRATORY (INHALATION) at 10:04

## 2023-06-18 RX ADMIN — ARFORMOTEROL TARTRATE 15 MCG: 15 SOLUTION RESPIRATORY (INHALATION) at 06:09

## 2023-06-18 RX ADMIN — OXYCODONE HYDROCHLORIDE AND ACETAMINOPHEN 1 TABLET: 5; 325 TABLET ORAL at 22:32

## 2023-06-18 RX ADMIN — METOPROLOL TARTRATE 25 MG: 25 TABLET, FILM COATED ORAL at 11:17

## 2023-06-18 RX ADMIN — GABAPENTIN 100 MG: 100 CAPSULE ORAL at 13:47

## 2023-06-18 RX ADMIN — BUDESONIDE 500 MCG: 0.5 INHALANT ORAL at 19:36

## 2023-06-18 RX ADMIN — POLYETHYLENE GLYCOL 3350 17 G: 17 POWDER, FOR SOLUTION ORAL at 11:17

## 2023-06-18 RX ADMIN — IPRATROPIUM BROMIDE 0.5 MG: 0.5 SOLUTION RESPIRATORY (INHALATION) at 19:36

## 2023-06-18 ASSESSMENT — PAIN DESCRIPTION - FREQUENCY
FREQUENCY: INTERMITTENT
FREQUENCY: INTERMITTENT

## 2023-06-18 ASSESSMENT — PAIN DESCRIPTION - PAIN TYPE
TYPE: CHRONIC PAIN
TYPE: CHRONIC PAIN

## 2023-06-18 ASSESSMENT — PAIN SCALES - GENERAL
PAINLEVEL_OUTOF10: 0
PAINLEVEL_OUTOF10: 10
PAINLEVEL_OUTOF10: 0
PAINLEVEL_OUTOF10: 6
PAINLEVEL_OUTOF10: 0

## 2023-06-18 ASSESSMENT — PAIN DESCRIPTION - LOCATION
LOCATION: BACK
LOCATION: BACK

## 2023-06-18 ASSESSMENT — PAIN DESCRIPTION - DIRECTION
RADIATING_TOWARDS: NO
RADIATING_TOWARDS: NO

## 2023-06-18 ASSESSMENT — PAIN DESCRIPTION - DESCRIPTORS
DESCRIPTORS: DISCOMFORT
DESCRIPTORS: DISCOMFORT

## 2023-06-18 ASSESSMENT — PAIN DESCRIPTION - ONSET
ONSET: ON-GOING
ONSET: ON-GOING

## 2023-06-18 ASSESSMENT — PAIN DESCRIPTION - ORIENTATION
ORIENTATION: LOWER
ORIENTATION: LOWER

## 2023-06-19 VITALS
OXYGEN SATURATION: 91 % | TEMPERATURE: 97.5 F | HEART RATE: 77 BPM | SYSTOLIC BLOOD PRESSURE: 122 MMHG | BODY MASS INDEX: 20.61 KG/M2 | WEIGHT: 102.25 LBS | RESPIRATION RATE: 16 BRPM | HEIGHT: 59 IN | DIASTOLIC BLOOD PRESSURE: 64 MMHG

## 2023-06-19 PROBLEM — S32.030A CLOSED COMPRESSION FRACTURE OF THIRD LUMBAR VERTEBRA (HCC): Status: ACTIVE | Noted: 2022-05-06

## 2023-06-19 LAB
ALBUMIN SERPL-MCNC: 3.4 G/DL (ref 3.5–5.2)
ALP SERPL-CCNC: 136 U/L (ref 35–104)
ALT SERPL-CCNC: 9 U/L (ref 5–33)
ANION GAP SERPL CALCULATED.3IONS-SCNC: 11 MMOL/L (ref 7–19)
AST SERPL-CCNC: 15 U/L (ref 5–32)
BILIRUB SERPL-MCNC: <0.2 MG/DL (ref 0.2–1.2)
BUN SERPL-MCNC: 17 MG/DL (ref 8–23)
CALCIUM SERPL-MCNC: 9.7 MG/DL (ref 8.8–10.2)
CHLORIDE SERPL-SCNC: 102 MMOL/L (ref 98–111)
CO2 SERPL-SCNC: 27 MMOL/L (ref 22–29)
CREAT SERPL-MCNC: 0.6 MG/DL (ref 0.5–0.9)
GLUCOSE SERPL-MCNC: 98 MG/DL (ref 74–109)
POTASSIUM SERPL-SCNC: 4.3 MMOL/L (ref 3.5–5)
PROT SERPL-MCNC: 6.1 G/DL (ref 6.6–8.7)
SARS-COV-2 RDRP RESP QL NAA+PROBE: NOT DETECTED
SODIUM SERPL-SCNC: 140 MMOL/L (ref 136–145)

## 2023-06-19 PROCEDURE — 6360000002 HC RX W HCPCS: Performed by: STUDENT IN AN ORGANIZED HEALTH CARE EDUCATION/TRAINING PROGRAM

## 2023-06-19 PROCEDURE — 6370000000 HC RX 637 (ALT 250 FOR IP): Performed by: HOSPITALIST

## 2023-06-19 PROCEDURE — 6370000000 HC RX 637 (ALT 250 FOR IP): Performed by: INTERNAL MEDICINE

## 2023-06-19 PROCEDURE — 36415 COLL VENOUS BLD VENIPUNCTURE: CPT

## 2023-06-19 PROCEDURE — 2700000000 HC OXYGEN THERAPY PER DAY

## 2023-06-19 PROCEDURE — 2580000003 HC RX 258: Performed by: EMERGENCY MEDICINE

## 2023-06-19 PROCEDURE — 6360000002 HC RX W HCPCS: Performed by: HOSPITALIST

## 2023-06-19 PROCEDURE — 87635 SARS-COV-2 COVID-19 AMP PRB: CPT

## 2023-06-19 PROCEDURE — 80053 COMPREHEN METABOLIC PANEL: CPT

## 2023-06-19 PROCEDURE — 94640 AIRWAY INHALATION TREATMENT: CPT

## 2023-06-19 PROCEDURE — 94760 N-INVAS EAR/PLS OXIMETRY 1: CPT

## 2023-06-19 RX ORDER — OXYCODONE AND ACETAMINOPHEN 7.5; 325 MG/1; MG/1
1 TABLET ORAL EVERY 4 HOURS PRN
Qty: 12 TABLET | Refills: 0 | Status: SHIPPED | OUTPATIENT
Start: 2023-06-19 | End: 2023-06-22

## 2023-06-19 RX ORDER — LORAZEPAM 1 MG/1
0.5 TABLET ORAL NIGHTLY PRN
Qty: 3 TABLET | Refills: 0 | Status: SHIPPED | OUTPATIENT
Start: 2023-06-19 | End: 2023-06-25

## 2023-06-19 RX ORDER — FUROSEMIDE 20 MG/1
20 TABLET ORAL DAILY
Qty: 30 TABLET | Refills: 0 | Status: SHIPPED | OUTPATIENT
Start: 2023-06-19 | End: 2023-07-19

## 2023-06-19 RX ADMIN — GABAPENTIN 100 MG: 100 CAPSULE ORAL at 14:48

## 2023-06-19 RX ADMIN — OXYCODONE HYDROCHLORIDE AND ACETAMINOPHEN 1 TABLET: 5; 325 TABLET ORAL at 14:48

## 2023-06-19 RX ADMIN — SODIUM CHLORIDE, PRESERVATIVE FREE 10 ML: 5 INJECTION INTRAVENOUS at 09:00

## 2023-06-19 RX ADMIN — BUDESONIDE 500 MCG: 0.5 INHALANT ORAL at 07:34

## 2023-06-19 RX ADMIN — IPRATROPIUM BROMIDE 0.5 MG: 0.5 SOLUTION RESPIRATORY (INHALATION) at 10:38

## 2023-06-19 RX ADMIN — Medication 400 MG: at 09:00

## 2023-06-19 RX ADMIN — GABAPENTIN 100 MG: 100 CAPSULE ORAL at 08:59

## 2023-06-19 RX ADMIN — IPRATROPIUM BROMIDE 0.5 MG: 0.5 SOLUTION RESPIRATORY (INHALATION) at 15:22

## 2023-06-19 RX ADMIN — METOPROLOL TARTRATE 25 MG: 25 TABLET, FILM COATED ORAL at 09:00

## 2023-06-19 RX ADMIN — FUROSEMIDE 20 MG: 10 INJECTION, SOLUTION INTRAMUSCULAR; INTRAVENOUS at 09:00

## 2023-06-19 RX ADMIN — APIXABAN 5 MG: 5 TABLET, FILM COATED ORAL at 09:00

## 2023-06-19 RX ADMIN — IPRATROPIUM BROMIDE 0.5 MG: 0.5 SOLUTION RESPIRATORY (INHALATION) at 07:34

## 2023-06-19 RX ADMIN — FAMOTIDINE 20 MG: 20 TABLET ORAL at 09:00

## 2023-06-19 RX ADMIN — Medication 2000 UNITS: at 08:59

## 2023-06-19 RX ADMIN — DILTIAZEM HYDROCHLORIDE 240 MG: 240 CAPSULE, COATED, EXTENDED RELEASE ORAL at 09:00

## 2023-06-19 RX ADMIN — ARFORMOTEROL TARTRATE 15 MCG: 15 SOLUTION RESPIRATORY (INHALATION) at 07:34

## 2023-06-19 RX ADMIN — OXYCODONE HYDROCHLORIDE AND ACETAMINOPHEN 1 TABLET: 5; 325 TABLET ORAL at 08:59

## 2023-06-19 NOTE — PLAN OF CARE
Problem: Discharge Planning  Goal: Discharge to home or other facility with appropriate resources  Outcome: Progressing  Flowsheets (Taken 6/19/2023 0335)  Discharge to home or other facility with appropriate resources:   Identify barriers to discharge with patient and caregiver   Arrange for needed discharge resources and transportation as appropriate   Identify discharge learning needs (meds, wound care, etc)   Refer to discharge planning if patient needs post-hospital services based on physician order or complex needs related to functional status, cognitive ability or social support system     Problem: Pain  Goal: Verbalizes/displays adequate comfort level or baseline comfort level  Outcome: Progressing  Flowsheets (Taken 6/18/2023 2313)  Verbalizes/displays adequate comfort level or baseline comfort level:   Encourage patient to monitor pain and request assistance   Assess pain using appropriate pain scale   Administer analgesics based on type and severity of pain and evaluate response   Implement non-pharmacological measures as appropriate and evaluate response   Consider cultural and social influences on pain and pain management   Notify Licensed Independent Practitioner if interventions unsuccessful or patient reports new pain     Problem: Safety - Adult  Goal: Free from fall injury  Outcome: Progressing  Flowsheets (Taken 6/19/2023 0340)  Free From Fall Injury: Instruct family/caregiver on patient safety     Problem: ABCDS Injury Assessment  Goal: Absence of physical injury  Outcome: Progressing  Flowsheets (Taken 6/19/2023 0340)  Absence of Physical Injury: Implement safety measures based on patient assessment     Problem: Skin/Tissue Integrity  Goal: Absence of new skin breakdown  Description: 1. Monitor for areas of redness and/or skin breakdown  2. Every 4-6 hours minimum:  Change oxygen saturation probe site  3.   Every 4-6 hours:  If on nasal continuous positive airway pressure, respiratory therapy

## 2023-06-19 NOTE — DISCHARGE SUMMARY
[place], [place], [address]  DEPARTMENT OF HOSPITALIST MEDICINE    DISCHARGE SUMMARY:        Domonique Scott  :  1940  MRN:  [id number]    Admit date:  6/10/2023  Discharge date:  2023      Admitting Physician:  Asha Sullivan MD    Advance Directive: DNR    Consults Made:   IP CONSULT TO HEART FAILURE NURSE/COORDINATOR  IP CONSULT TO DIETITIAN  PALLIATIVE CARE EVAL  IP CONSULT TO CARDIOLOGY  IP CONSULT TO ELECTROPHYSIOLOGY      Primary Care Physician:  Mary Kate Jason, APRN - CNP    Discharge Diagnoses:  Principal Problem:    Sepsis secondary to UTI [facility])  Active Problems:    Closed compression fracture of third lumbar vertebra ([facility]    UTI (urinary tract infection)    Acute on chronic respiratory failure with hypoxia (HCC)    Volume overload    History of paroxysmal supraventricular tachycardia    Palliative care patient    Severe malnutrition ([facility] [facility]    Acute heart failure with preserved ejection fraction (HFpEF) ([facility]    Pulmonary hypertension ([facility] [address] [facility]  Resolved Problems:    * No resolved hospital problems.  *          Pertinent Labs:  CBC with DIFF:   Recent Labs     23  0340 23  0502   WBC 10.6 9.9   RBC 3.25* 3.14*   HGB 9.7* 9.5*   HCT 30.1* 29.4*   MCV 92.6 93.6   MCH 29.8 30.3   MCHC 32.2* 32.3*   RDW 15.7* 16.0*    375   MPV 10.0 9.9   NEUTOPHILPCT 52.7 57.1   LYMPHOPCT 21.2 18.5*   MONOPCT 13.3* 13.7*   EOSRELPCT 9.3* 7.7*   BASOPCT 0.8 0.9   NEUTROABS 5.6 5.7   LYMPHSABS 2.2 1.8   MONOSABS 1.40* 1.40*   EOSABS 1.00* 0.80*   BASOSABS 0.10 0.10       CMP/BMP:  Recent Labs     23  0340 23  0502 23  0255    138 140   K 4.3 4.2 4.3   CL 98 101 102   CO2 31* 26 27   ANIONGAP 7 11 11   GLUCOSE 105 98 98   BUN 18 16 17   CREATININE 0.7 0.6 0.6   LABGLOM >60 >60 >60   CALCIUM 9.6 9.8 9.7   PROT 6.2* 6.4* 6.1*   LABALBU 3.4* 3.1* 3.4*   BILITOT <0.2 <0.2 <0.2   ALKPHOS 127* 125* 136*   ALT 11 10 9   AST 16 16 15         CRP:  No results for input(s):

## 2023-06-19 NOTE — CARE COORDINATION
06/19/23 0901   IMM Letter   IMM Letter given to Patient/Family/Significant other/Guardian/POA/by: Given to and explained to patient by Gianfranco Capone RN CM. Patient verbalized understanding. Signed IMM copy placed in chart.    IMM Letter date given: 06/19/23   IMM Letter time given: 0900

## 2023-06-28 ENCOUNTER — OFFICE VISIT (OUTPATIENT)
Dept: PALLATIVE CARE | Age: 83
End: 2023-06-28

## 2023-06-28 DIAGNOSIS — Z51.5 ENCOUNTER FOR PALLIATIVE CARE: ICD-10-CM

## 2023-06-28 DIAGNOSIS — Z74.01 BEDBOUND: ICD-10-CM

## 2023-06-28 DIAGNOSIS — R06.02 SHORTNESS OF BREATH: Primary | ICD-10-CM

## 2023-06-28 DIAGNOSIS — F41.9 ANXIETY: ICD-10-CM

## 2023-06-28 DIAGNOSIS — I47.1 SUPRAVENTRICULAR TACHYCARDIA (HCC): ICD-10-CM

## 2023-07-02 VITALS
SYSTOLIC BLOOD PRESSURE: 94 MMHG | HEART RATE: 66 BPM | DIASTOLIC BLOOD PRESSURE: 46 MMHG | TEMPERATURE: 97.8 F | OXYGEN SATURATION: 93 % | RESPIRATION RATE: 18 BRPM

## 2023-07-10 PROBLEM — N39.0 UTI (URINARY TRACT INFECTION): Status: RESOLVED | Noted: 2023-06-10 | Resolved: 2023-07-10

## 2023-07-28 ENCOUNTER — LAB REQUISITION (OUTPATIENT)
Dept: LAB | Facility: HOSPITAL | Age: 83
End: 2023-07-28
Payer: MEDICARE

## 2023-07-28 LAB
BACTERIA UR QL AUTO: ABNORMAL /HPF
BILIRUB UR QL STRIP: NEGATIVE
CLARITY UR: ABNORMAL
COARSE GRAN CASTS URNS QL MICRO: ABNORMAL /LPF
COLOR UR: YELLOW
GLUCOSE UR STRIP-MCNC: NEGATIVE MG/DL
HGB UR QL STRIP.AUTO: ABNORMAL
HYALINE CASTS UR QL AUTO: ABNORMAL /LPF
KETONES UR QL STRIP: NEGATIVE
LEUKOCYTE ESTERASE UR QL STRIP.AUTO: ABNORMAL
NITRITE UR QL STRIP: NEGATIVE
PH UR STRIP.AUTO: 7.5 [PH] (ref 5–8)
PROT UR QL STRIP: ABNORMAL
RBC # UR STRIP: ABNORMAL /HPF
REF LAB TEST METHOD: ABNORMAL
SP GR UR STRIP: 1.01 (ref 1–1.03)
SQUAMOUS #/AREA URNS HPF: ABNORMAL /HPF
UROBILINOGEN UR QL STRIP: ABNORMAL
WBC # UR STRIP: ABNORMAL /HPF

## 2023-07-28 PROCEDURE — 87086 URINE CULTURE/COLONY COUNT: CPT | Performed by: INTERNAL MEDICINE

## 2023-07-28 PROCEDURE — 87186 SC STD MICRODIL/AGAR DIL: CPT | Performed by: INTERNAL MEDICINE

## 2023-07-28 PROCEDURE — 87077 CULTURE AEROBIC IDENTIFY: CPT | Performed by: INTERNAL MEDICINE

## 2023-07-28 PROCEDURE — 81001 URINALYSIS AUTO W/SCOPE: CPT | Performed by: INTERNAL MEDICINE

## 2023-07-30 LAB — BACTERIA SPEC AEROBE CULT: ABNORMAL

## 2023-08-03 ENCOUNTER — APPOINTMENT (OUTPATIENT)
Dept: GENERAL RADIOLOGY | Facility: HOSPITAL | Age: 83
End: 2023-08-03
Payer: MEDICARE

## 2023-08-03 ENCOUNTER — HOSPITAL ENCOUNTER (EMERGENCY)
Facility: HOSPITAL | Age: 83
Discharge: HOME | End: 2023-08-03
Attending: STUDENT IN AN ORGANIZED HEALTH CARE EDUCATION/TRAINING PROGRAM
Payer: MEDICARE

## 2023-08-03 VITALS
TEMPERATURE: 98.8 F | HEIGHT: 60 IN | SYSTOLIC BLOOD PRESSURE: 99 MMHG | BODY MASS INDEX: 20.42 KG/M2 | HEART RATE: 82 BPM | WEIGHT: 104 LBS | DIASTOLIC BLOOD PRESSURE: 55 MMHG | RESPIRATION RATE: 20 BRPM | OXYGEN SATURATION: 96 %

## 2023-08-03 DIAGNOSIS — R06.02 SHORTNESS OF BREATH: ICD-10-CM

## 2023-08-03 DIAGNOSIS — E87.1 HYPONATREMIA: ICD-10-CM

## 2023-08-03 DIAGNOSIS — Z86.79 HISTORY OF SUPRAVENTRICULAR TACHYCARDIA: ICD-10-CM

## 2023-08-03 DIAGNOSIS — Z87.440 HISTORY OF URINARY TRACT INFECTION: ICD-10-CM

## 2023-08-03 DIAGNOSIS — Z79.01 ON CONTINUOUS ORAL ANTICOAGULATION: ICD-10-CM

## 2023-08-03 DIAGNOSIS — R41.0 DELIRIUM: ICD-10-CM

## 2023-08-03 DIAGNOSIS — J18.9 PNEUMONIA OF LEFT LUNG DUE TO INFECTIOUS ORGANISM, UNSPECIFIED PART OF LUNG: ICD-10-CM

## 2023-08-03 DIAGNOSIS — R07.9 ACUTE CHEST PAIN: Primary | ICD-10-CM

## 2023-08-03 LAB
ALBUMIN SERPL-MCNC: 3.5 G/DL (ref 3.5–5.2)
ALBUMIN/GLOB SERPL: 0.9 G/DL
ALP SERPL-CCNC: 125 U/L (ref 39–117)
ALT SERPL W P-5'-P-CCNC: 6 U/L (ref 1–33)
ANION GAP SERPL CALCULATED.3IONS-SCNC: 11 MMOL/L (ref 5–15)
AST SERPL-CCNC: 14 U/L (ref 1–32)
BACTERIA UR QL AUTO: ABNORMAL /HPF
BASOPHILS # BLD AUTO: 0.07 10*3/MM3 (ref 0–0.2)
BASOPHILS NFR BLD AUTO: 0.4 % (ref 0–1.5)
BILIRUB SERPL-MCNC: 0.3 MG/DL (ref 0–1.2)
BILIRUB UR QL STRIP: NEGATIVE
BUN SERPL-MCNC: 11 MG/DL (ref 8–23)
BUN/CREAT SERPL: 17.7 (ref 7–25)
CALCIUM SPEC-SCNC: 9.9 MG/DL (ref 8.6–10.5)
CHLORIDE SERPL-SCNC: 95 MMOL/L (ref 98–107)
CLARITY UR: ABNORMAL
CO2 SERPL-SCNC: 24 MMOL/L (ref 22–29)
COLOR UR: YELLOW
CREAT SERPL-MCNC: 0.62 MG/DL (ref 0.57–1)
D DIMER PPP FEU-MCNC: 0.64 MCGFEU/ML (ref 0–0.82)
DEPRECATED RDW RBC AUTO: 53.9 FL (ref 37–54)
EGFRCR SERPLBLD CKD-EPI 2021: 89 ML/MIN/1.73
EOSINOPHIL # BLD AUTO: 0.09 10*3/MM3 (ref 0–0.4)
EOSINOPHIL NFR BLD AUTO: 0.5 % (ref 0.3–6.2)
ERYTHROCYTE [DISTWIDTH] IN BLOOD BY AUTOMATED COUNT: 16.2 % (ref 12.3–15.4)
FLUAV RNA RESP QL NAA+PROBE: NOT DETECTED
FLUBV RNA RESP QL NAA+PROBE: NOT DETECTED
GEN 5 2HR TROPONIN T REFLEX: 37 NG/L
GLOBULIN UR ELPH-MCNC: 4.1 GM/DL
GLUCOSE SERPL-MCNC: 136 MG/DL (ref 65–99)
GLUCOSE UR STRIP-MCNC: NEGATIVE MG/DL
HCT VFR BLD AUTO: 33.5 % (ref 34–46.6)
HGB BLD-MCNC: 10.2 G/DL (ref 12–15.9)
HGB UR QL STRIP.AUTO: ABNORMAL
HOLD SPECIMEN: NORMAL
HOLD SPECIMEN: NORMAL
HYALINE CASTS UR QL AUTO: ABNORMAL /LPF
IMM GRANULOCYTES # BLD AUTO: 0.13 10*3/MM3 (ref 0–0.05)
IMM GRANULOCYTES NFR BLD AUTO: 0.7 % (ref 0–0.5)
KETONES UR QL STRIP: NEGATIVE
LEUKOCYTE ESTERASE UR QL STRIP.AUTO: ABNORMAL
LIPASE SERPL-CCNC: 13 U/L (ref 13–60)
LYMPHOCYTES # BLD AUTO: 1.01 10*3/MM3 (ref 0.7–3.1)
LYMPHOCYTES NFR BLD AUTO: 5.2 % (ref 19.6–45.3)
MAGNESIUM SERPL-MCNC: 1.8 MG/DL (ref 1.6–2.4)
MCH RBC QN AUTO: 27.4 PG (ref 26.6–33)
MCHC RBC AUTO-ENTMCNC: 30.4 G/DL (ref 31.5–35.7)
MCV RBC AUTO: 90.1 FL (ref 79–97)
MONOCYTES # BLD AUTO: 1.82 10*3/MM3 (ref 0.1–0.9)
MONOCYTES NFR BLD AUTO: 9.3 % (ref 5–12)
NEUTROPHILS NFR BLD AUTO: 16.41 10*3/MM3 (ref 1.7–7)
NEUTROPHILS NFR BLD AUTO: 83.9 % (ref 42.7–76)
NITRITE UR QL STRIP: NEGATIVE
NRBC BLD AUTO-RTO: 0 /100 WBC (ref 0–0.2)
NT-PROBNP SERPL-MCNC: 576.9 PG/ML (ref 0–1800)
PH UR STRIP.AUTO: 7 [PH] (ref 5–8)
PLATELET # BLD AUTO: 460 10*3/MM3 (ref 140–450)
PMV BLD AUTO: 9.5 FL (ref 6–12)
POTASSIUM SERPL-SCNC: 4.2 MMOL/L (ref 3.5–5.2)
PROCALCITONIN SERPL-MCNC: 0.25 NG/ML (ref 0–0.25)
PROT SERPL-MCNC: 7.6 G/DL (ref 6–8.5)
PROT UR QL STRIP: NEGATIVE
RBC # BLD AUTO: 3.72 10*6/MM3 (ref 3.77–5.28)
RBC # UR STRIP: ABNORMAL /HPF
REF LAB TEST METHOD: ABNORMAL
SARS-COV-2 RNA RESP QL NAA+PROBE: NOT DETECTED
SODIUM SERPL-SCNC: 130 MMOL/L (ref 136–145)
SP GR UR STRIP: 1.01 (ref 1–1.03)
SQUAMOUS #/AREA URNS HPF: ABNORMAL /HPF
TROPONIN T DELTA: 5 NG/L
TROPONIN T SERPL HS-MCNC: 32 NG/L
UROBILINOGEN UR QL STRIP: ABNORMAL
WBC # UR STRIP: ABNORMAL /HPF
WBC NRBC COR # BLD: 19.53 10*3/MM3 (ref 3.4–10.8)
WHOLE BLOOD HOLD COAG: NORMAL
WHOLE BLOOD HOLD SPECIMEN: NORMAL

## 2023-08-03 PROCEDURE — 84484 ASSAY OF TROPONIN QUANT: CPT | Performed by: STUDENT IN AN ORGANIZED HEALTH CARE EDUCATION/TRAINING PROGRAM

## 2023-08-03 PROCEDURE — 36415 COLL VENOUS BLD VENIPUNCTURE: CPT

## 2023-08-03 PROCEDURE — 87636 SARSCOV2 & INF A&B AMP PRB: CPT | Performed by: STUDENT IN AN ORGANIZED HEALTH CARE EDUCATION/TRAINING PROGRAM

## 2023-08-03 PROCEDURE — 84145 PROCALCITONIN (PCT): CPT | Performed by: STUDENT IN AN ORGANIZED HEALTH CARE EDUCATION/TRAINING PROGRAM

## 2023-08-03 PROCEDURE — 85379 FIBRIN DEGRADATION QUANT: CPT | Performed by: STUDENT IN AN ORGANIZED HEALTH CARE EDUCATION/TRAINING PROGRAM

## 2023-08-03 PROCEDURE — 81001 URINALYSIS AUTO W/SCOPE: CPT | Performed by: STUDENT IN AN ORGANIZED HEALTH CARE EDUCATION/TRAINING PROGRAM

## 2023-08-03 PROCEDURE — 87086 URINE CULTURE/COLONY COUNT: CPT | Performed by: STUDENT IN AN ORGANIZED HEALTH CARE EDUCATION/TRAINING PROGRAM

## 2023-08-03 PROCEDURE — 93005 ELECTROCARDIOGRAM TRACING: CPT | Performed by: STUDENT IN AN ORGANIZED HEALTH CARE EDUCATION/TRAINING PROGRAM

## 2023-08-03 PROCEDURE — 83690 ASSAY OF LIPASE: CPT | Performed by: STUDENT IN AN ORGANIZED HEALTH CARE EDUCATION/TRAINING PROGRAM

## 2023-08-03 PROCEDURE — 93010 ELECTROCARDIOGRAM REPORT: CPT | Performed by: INTERNAL MEDICINE

## 2023-08-03 PROCEDURE — 80053 COMPREHEN METABOLIC PANEL: CPT | Performed by: STUDENT IN AN ORGANIZED HEALTH CARE EDUCATION/TRAINING PROGRAM

## 2023-08-03 PROCEDURE — 83735 ASSAY OF MAGNESIUM: CPT | Performed by: STUDENT IN AN ORGANIZED HEALTH CARE EDUCATION/TRAINING PROGRAM

## 2023-08-03 PROCEDURE — 85025 COMPLETE CBC W/AUTO DIFF WBC: CPT | Performed by: STUDENT IN AN ORGANIZED HEALTH CARE EDUCATION/TRAINING PROGRAM

## 2023-08-03 PROCEDURE — 99284 EMERGENCY DEPT VISIT MOD MDM: CPT

## 2023-08-03 PROCEDURE — 71045 X-RAY EXAM CHEST 1 VIEW: CPT

## 2023-08-03 PROCEDURE — 83880 ASSAY OF NATRIURETIC PEPTIDE: CPT | Performed by: STUDENT IN AN ORGANIZED HEALTH CARE EDUCATION/TRAINING PROGRAM

## 2023-08-03 RX ORDER — ASPIRIN 81 MG/1
324 TABLET, CHEWABLE ORAL ONCE
Status: COMPLETED | OUTPATIENT
Start: 2023-08-03 | End: 2023-08-03

## 2023-08-03 RX ORDER — SODIUM CHLORIDE 0.9 % (FLUSH) 0.9 %
10 SYRINGE (ML) INJECTION AS NEEDED
Status: DISCONTINUED | OUTPATIENT
Start: 2023-08-03 | End: 2023-08-03 | Stop reason: HOSPADM

## 2023-08-03 RX ADMIN — SODIUM CHLORIDE, POTASSIUM CHLORIDE, SODIUM LACTATE AND CALCIUM CHLORIDE 500 ML: 600; 310; 30; 20 INJECTION, SOLUTION INTRAVENOUS at 05:00

## 2023-08-03 RX ADMIN — ASPIRIN 324 MG: 81 TABLET, CHEWABLE ORAL at 05:00

## 2023-08-03 NOTE — DISCHARGE INSTRUCTIONS
Today you are seen for your symptoms.  Your x-ray showed possible evidence of pneumonia.  You are already taking cefdinir for treatment of urinary tract infection which is a good treatment.  I have added doxycycline for additional treatment.  As discussed with your daughter you were offered admission for further cardiac work-up but given your goals of care we are discharging home.  Therefore with acute follow-up with soon as possible your primary care provider for further management.  If any of your symptoms worsen prior please return to the emergency department immediately.

## 2023-08-03 NOTE — ED PROVIDER NOTES
"EMERGENCY DEPARTMENT ATTENDING NOTE    Patient Name: Zelalem Hung    Chief Complaint   Patient presents with    Chest Pain     Patient to ED via EMS from [facility] with c/o CP ongoing x1 hour with associated SOB. Per EMS O2 sat 77% on 3L/NC home O2. On arrival 94% on 3L/NC. GCS15    Shortness of Breath       PATIENT PRESENTATION:  Zelalem Hung is a very pleasant 82 y.o. female  with history of supraventricular tachycardia presenting emergency department due to chest pain shortness of breath brought in by EMS from nursing home.    Per EMS report patient reported some chest pain shortness of breath and about 1 hour prior to arrival.  Per EMS patient was satting 77% on her 3 L which is her baseline nasal cannula.  No other medications given.    History limited from patient as she frequently starts talking about her prior fractures and is somewhat distractible but did endorse some shortness of breath and chest pain.  Unclear exact radiation.    Daughter at the bedside provides collateral history.  She reports the patient has been in nursing home setting of prior pelvic and back fractures which are nonoperatively managed in terms of back and femur is status post orthopedic repair.  She has not been amatory since that event.  She is still taking anticoagulation.  Daughter reports that the nurse notes she has been slightly more confused they reported diagnosed with urinary tract infection and the culture data recently came back which caused her to switch to a different antibiotic she is not sure exactly what antibiotic she is taking.  Daughter states patient is on baseline 2.5 L nasal cannula she denies a history of COPD although the patient is a former smoker.  Unclear exact reason that patient is on O2.      PHYSICAL EXAM:   VS: BP 99/55   Pulse 83   Temp 98.6 øF (37 øC) (Axillary)   Resp 21   Ht 152.4 cm (60\")   Wt 47.2 kg (104 lb)   SpO2 95%   BMI 20.31 kg/mý   GENERAL: Chronically " ill-appearing elderly woman sitting with stretcher no acute distress; well-nourished, well-developed, awake, alert, no acute distress, nontoxic appearing, comfortable  EYES: PERRL, sclerae anicteric, extraocular movements grossly intact, symmetric lids  EARS, NOSE, MOUTH, THROAT: atraumatic external nose and ears, moist mucous membranes  NECK: symmetric, trachea midline  RESPIRATORY: unlabored respiratory effort, clear to auscultation bilaterally, good air movement; no inspiratory wheezing  CARDIOVASCULAR: no murmurs, peripheral pulses 2+ and equal in all extremities  GI: soft, nontender, nondistended  MUSCULOSKELETAL/EXTREMITIES: No pitting lower extremity edema; extremities without obvious deformity  SKIN: warm and dry with no obvious rashes  NEUROLOGIC: moving all 4 extremities symmetrically, CN II-XII grossly intact; GCS 14 for confusion  PSYCHIATRIC: alert, pleasant and cooperative. Appropriate mood and affect.      MEDICAL DECISION MAKING:    Zelalem Hung is a 82 y.o. female presented to the emergency department due to chest pain and shortness of breath.    Differential Diagnosis Considered: Acute coronary syndrome including STEMI, NSTEMI, unstable angina, pneumonia, pneumothorax, pulmonary embolism    Labs Ordered:  Labs Reviewed   TROPONIN - Abnormal; Notable for the following components:       Result Value    HS Troponin T 32 (*)     All other components within normal limits    Narrative:     High Sensitive Troponin T Reference Range:  <10.0 ng/L- Negative Female for AMI  <15.0 ng/L- Negative Male for AMI  >=10 - Abnormal Female indicating possible myocardial injury.  >=15 - Abnormal Male indicating possible myocardial injury.   Clinicians would have to utilize clinical acumen, EKG, Troponin, and serial changes to determine if it is an Acute Myocardial Infarction or myocardial injury due to an underlying chronic condition.        COMPREHENSIVE METABOLIC PANEL - Abnormal; Notable for the following  components:    Glucose 136 (*)     Sodium 130 (*)     Chloride 95 (*)     Alkaline Phosphatase 125 (*)     All other components within normal limits    Narrative:     GFR Normal >60  Chronic Kidney Disease <60  Kidney Failure <15    The GFR formula is only valid for adults with stable renal function between ages 18 and 70.   CBC WITH AUTO DIFFERENTIAL - Abnormal; Notable for the following components:    WBC 19.53 (*)     RBC 3.72 (*)     Hemoglobin 10.2 (*)     Hematocrit 33.5 (*)     MCHC 30.4 (*)     RDW 16.2 (*)     Platelets 460 (*)     Neutrophil % 83.9 (*)     Lymphocyte % 5.2 (*)     Immature Grans % 0.7 (*)     Neutrophils, Absolute 16.41 (*)     Monocytes, Absolute 1.82 (*)     Immature Grans, Absolute 0.13 (*)     All other components within normal limits   URINALYSIS W/ CULTURE IF INDICATED - Abnormal; Notable for the following components:    Appearance, UA Cloudy (*)     Blood, UA Small (1+) (*)     Leuk Esterase, UA Large (3+) (*)     All other components within normal limits    Narrative:     In absence of clinical symptoms, the presence of pyuria, bacteria, and/or nitrites on the urinalysis result does not correlate with infection.   HIGH SENSITIVITIY TROPONIN T 2HR - Abnormal; Notable for the following components:    HS Troponin T 37 (*)     Troponin T Delta 5 (*)     All other components within normal limits    Narrative:     High Sensitive Troponin T Reference Range:  <10.0 ng/L- Negative Female for AMI  <15.0 ng/L- Negative Male for AMI  >=10 - Abnormal Female indicating possible myocardial injury.  >=15 - Abnormal Male indicating possible myocardial injury.   Clinicians would have to utilize clinical acumen, EKG, Troponin, and serial changes to determine if it is an Acute Myocardial Infarction or myocardial injury due to an underlying chronic condition.        URINALYSIS, MICROSCOPIC ONLY - Abnormal; Notable for the following components:    RBC, UA 3-5 (*)     WBC, UA 6-12 (*)     All other  "components within normal limits   COVID-19 AND FLU A/B, NP SWAB IN TRANSPORT MEDIA 8-12 HR TAT - Normal    Narrative:     Fact sheet for providers: https://www.fda.gov/media/246517/download    Fact sheet for patients: https://www.fda.gov/media/318583/download    Test performed by PCR.   BNP (IN-HOUSE) - Normal    Narrative:     Among patients with dyspnea, NT-proBNP is highly sensitive for the detection of acute congestive heart failure. In addition NT-proBNP of <300 pg/ml effectively rules out acute congestive heart failure with 99% negative predictive value.     LIPASE - Normal   MAGNESIUM - Normal   D-DIMER, QUANTITATIVE - Normal    Narrative:     According to the assay 's published package insert, a normal (<0.50 MCGFEU/mL) D-dimer result in conjunction with a non-high clinical probability assessment, excludes deep vein thrombosis (DVT) and pulmonary embolism (PE) with high sensitivity.    D-dimer values increase with age and this can make VTE exclusion of an older population difficult. To address this, the American College [employer], based on best available evidence and recent guidelines, recommends that clinicians use age-adjusted D-dimer thresholds in patients greater than 50 years of age with: a) a low probability of PE who do not meet all Pulmonary Embolism Rule Out Criteria, or b) in those with intermediate probability of PE.   The formula for an age-adjusted D-dimer cut-off is \"age/100\".  For example, a 60 year old patient would have an age-adjusted cut-off of 0.60 MCGFEU/mL and an 80 year old 0.80 MCGFEU/mL.   PROCALCITONIN - Normal    Narrative:     As a Marker for Sepsis (Non-Neonates):    1. <0.5 ng/mL represents a low risk of severe sepsis and/or septic shock.  2. >2 ng/mL represents a high risk of severe sepsis and/or septic shock.    As a Marker for Lower Respiratory Tract Infections that require antibiotic therapy:    PCT on Admission    Antibiotic Therapy       6-12 Hrs " "later    >0.5                Strongly Recommended  >0.25 - <0.5        Recommended   0.1 - 0.25          Discouraged              Remeasure/reassess PCT  <0.1                Strongly Discouraged     Remeasure/reassess PCT    As 28 day mortality risk marker: \"Change in Procalcitonin Result\" (>80% or <=80%) if Day 0 (or Day 1) and Day 4 values are available. Refer to http://www.[facility]pct-calculator.com    Change in PCT <=80%  A decrease of PCT levels below or equal to 80% defines a positive change in PCT test result representing a higher risk for 28-day all-cause mortality of patients diagnosed with severe sepsis for septic shock.    Change in PCT >80%  A decrease of PCT levels of more than 80% defines a negative change in PCT result representing a lower risk for 28-day all-cause mortality of patients diagnosed with severe sepsis or septic shock.      URINE CULTURE   RAINBOW DRAW    Narrative:     The following orders were created for panel order [place] Draw.  Procedure                               Abnormality         Status                     ---------                               -----------         ------                     Green Top (Gel)[[id number]]                                  Final result               Lavender Top[[id number]]                                     Final result               Red Top[[id number]]                                          Final result               Light Blue Top[[id number]]                                   Final result                 Please view results for these tests on the individual orders.   GREEN TOP   LAVENDER TOP   RED TOP   LIGHT BLUE TOP   CBC AND DIFFERENTIAL    Narrative:     The following orders were created for panel order CBC & Differential.  Procedure                               Abnormality         Status                     ---------                               -----------         ------                     CBC Auto Differential[[id number]]        Abnormal "            Final result                 Please view results for these tests on the individual orders.        Imaging Ordered:   XR Chest 1 View   Final Result   1. Left retrocardiac opacities may represent patchy atelectasis versus   pneumonia. Otherwise chronic changes as described above.   This report was finalized on 08/03/2023 07:11 by Dr. Adeola Lawrence MD.          Internal chart review:   Past Medical History:   Diagnosis Date    Chronic hyponatremia     Frequent UTI     Low back pain     Migraines     Pneumonia     SVT (supraventricular tachycardia)     UTI (urinary tract infection)        Past Surgical History:   Procedure Laterality Date    CHOLECYSTECTOMY WITH INTRAOPERATIVE CHOLANGIOGRAM N/A 5/9/2019    Procedure: CHOLECYSTECTOMY LAPAROSCOPIC INTRAOPERATIVE CHOLANGIOGRAM;  Surgeon: Chandan Bravo MD;  Location: [facility] OR;  Service: General    HIP HEMIARTHROPLASTY Left 5/6/2022    Procedure: HIP HEMIARTHROPLASTY;  Surgeon: Barrington Simons MD;  Location: [facility] OR;  Service: Orthopedics;  Laterality: Left;       Allergies   Allergen Reactions    Codeine Anaphylaxis         Current Facility-Administered Medications:     [COMPLETED] Insert Peripheral IV, , , Once **AND** sodium chloride 0.9 % flush 10 mL, 10 mL, Intravenous, PRN, Kd Escobar MD    Current Outpatient Medications:     acebutolol (SECTRAL) 200 MG capsule, Take 1 capsule by mouth 2 (Two) Times a Day., Disp: 90 capsule, Rfl: 0    acetaminophen (TYLENOL) 325 MG tablet, Take 2 tablets by mouth Every 4 (Four) Hours As Needed for Mild Pain., Disp: , Rfl:     apixaban (ELIQUIS) 5 MG tablet tablet, Take 1 tablet by mouth Every 12 (Twelve) Hours. Indications: DVT/PE (active thrombosis), Disp: 60 tablet, Rfl:     atorvastatin (LIPITOR) 10 MG tablet, Take 1 tablet by mouth Every Night., Disp: 90 tablet, Rfl: 3    bisacodyl (DULCOLAX) 5 MG EC tablet, Take 10 mg by mouth every night at bedtime., Disp: , Rfl:     budesonide-formoterol (SYMBICORT)  160-4.5 MCG/ACT inhaler, Inhale 2 puffs 2 (Two) Times a Day., Disp: , Rfl:     cefdinir (OMNICEF) 300 MG capsule, Take 1 capsule by mouth 2 (Two) Times a Day., Disp: 14 capsule, Rfl: 0    Cholecalciferol (Vitamin D) 50 MCG (2000 UT) tablet, Take 2,000 Units by mouth Daily., Disp: , Rfl:     clotrimazole-betamethasone (LOTRISONE) 1-0.05 % cream, Apply 1 application topically to the appropriate area as directed Every 6 (Six) Hours As Needed (apply to L Back and L Buttocks as needed for rash)., Disp: , Rfl:     dilTIAZem CD (CARDIZEM CD) 240 MG 24 hr capsule, Take 1 capsule by mouth Daily., Disp: 90 capsule, Rfl: 3    dronedarone (MULTAQ) 400 MG tablet, Take 1 tablet by mouth Every 12 (Twelve) Hours., Disp: 60 tablet, Rfl: 11    famotidine (PEPCID) 20 MG tablet, Take 20 mg by mouth Daily., Disp: , Rfl:     fenofibrate (TRICOR) 145 MG tablet, Take 1 tablet by mouth Daily., Disp: , Rfl:     gabapentin (NEURONTIN) 100 MG capsule, Take 1 capsule by mouth 3 (Three) Times a Day., Disp: 15 capsule, Rfl: 0    Ipratropium-Albuterol (COMBIVENT RESPIMAT IN), Inhale 2 puffs Daily.  MCG/ACT, Disp: , Rfl:     ipratropium-albuterol (DUO-NEB) 0.5-2.5 mg/3 ml nebulizer, Take 3 mL by nebulization Every 4 (Four) Hours As Needed for Wheezing or Shortness of Air., Disp: , Rfl:     lactulose (CHRONULAC) 10 GM/15ML solution, Take 30 mL by mouth Daily As Needed (constipation)., Disp: , Rfl:     loperamide (IMODIUM) 2 MG capsule, Take 1 capsule by mouth Every 6 (Six) Hours As Needed for Diarrhea., Disp: , Rfl:     LORazepam (ATIVAN) 0.5 MG tablet, Take 1 tablet by mouth 3 (Three) Times a Day., Disp: 15 tablet, Rfl: 0    megestrol (MEGACE) 40 MG/ML suspension, Take 10 mL by mouth 2 (Two) Times a Day., Disp: , Rfl:     melatonin 5 MG tablet tablet, Take 5 mg by mouth Every Night., Disp: , Rfl:     Menthol-Zinc Oxide (Calmoseptine) 0.44-20.6 % ointment, Apply 1 application topically to the appropriate area as directed As Needed (after  incontinent episodes). Apply to kathi-area after incontinent episodes, Disp: , Rfl:     ondansetron (ZOFRAN) 4 MG tablet, Take 4 mg by mouth Every 8 (Eight) Hours As Needed for Nausea or Vomiting., Disp: , Rfl:     oxyCODONE-acetaminophen (PERCOCET) 7.5-325 MG per tablet, Take 1 tablet by mouth Every 4 (Four) Hours As Needed for Moderate Pain., Disp: 18 tablet, Rfl: 0    PARoxetine (PAXIL) 40 MG tablet, Take 1 tablet by mouth 2 (Two) Times a Day., Disp: , Rfl:     polyethylene glycol (MIRALAX) 17 GM/SCOOP powder, Take 17 g by mouth Daily., Disp: , Rfl:     My EKG interpretation: Some artifact but appears to be normal sinus rhythm rate 96.  No ST elevations ST depressions or T wave inversions.    My lab interpretation: Initial high sensitive troponin of 32 with repeat of 35.  Delta of 5.  Prior elevated high sensitive opponent.  Urinalysis with 612 oh blood cells with no bacteria.  Large leukocytes.  Negative nitrates.  Normal calcitonin.  Negative COVID influenza testing.  Normal BNP and magnesium.  Normal lipase.  Negative D-dimer.  Elevated white blood cell count to 90.53.  CMP with hyponatremia 130 compared to prior about 140.  Elevated white blood count to 19.53.    My imaging interpretation: Chest x-ray with left retrocardiac opacities possibly atelectasis versus pneumonia.    Decision rules/scores evaluated: HEART score 4.     Shared decision making: Discussed results with patient's at the bedside.  Discussed elevated troponin and plan for admission if that was within this patient's goals of care.  Daughter stated that patient was not even a candidate for cardiac catheterization she had previously been evaluated cardiology and stated that she did not want to pursue additional work-up in terms of stress testing or admission for elevated troponin in the setting of patient symptoms.  She also did not want to proceed with CT scan patient is already on Eliquis prior history of PEs.  I felt this was appropriate as  well.    ED Course and Re-evaluation: 89yo F presents emergency room due to concerns for chest pain shortness of breath.  Given chest pain patient was given aspirin.  Initial EKG is nonischemic.  Initial troponin elevated.  Repeat elevated with slight delta of 5.  Otherwise her chest x-ray shows possible pneumonia.  She does have leukocytosis to 19.53.  Sodium of 130.  Otherwise negative D-dimer.  Normal lipase.  No magnesium and BNP.  Following completion of work-up discussed the case with the patient's daughter.  Initially was planning to admit patient given positive delta troponin in setting of chest pain.  Given patient's goals of care with discussion with daughter patient was ultimately discharged home.  Daughter not want to pursue any additional cardiac work-up and already been told that she was not a candidate for any cardiac catheterization which I think is reasonable.  Patient is already continued on anticoagulation so doubt saddle PE given labs.  She has been stable with no desaturations on her home oxygen.  Patient is already on cefdinir for treatment of urinary tract infection.  Given possible pneumonia on chest x-ray added doxycycline to her regimen.  Patient was discharged home with plan to follow with her primary care provider within 2 days for further management and given return precautions to the emergency department for worsening symptoms.      ED Diagnosis:  Acute chest pain; History of supraventricular tachycardia; On continuous oral anticoagulation; Shortness of breath; Delirium; History of urinary tract infection; Hyponatremia; Pneumonia of left lung due to infectious organism, unspecified part of giana*    Disposition: to home  Follow up plan: PCP follow up within 2 days, return to ED immediately if symptoms worsen        Signed:  Kd Escobar MD  Emergency Medicine Physician    Please note that portions of this note were completed with a voice recognition program.      Kd Escobar  MD VIRIDIANA  08/03/23 0801

## 2023-08-04 LAB
BACTERIA SPEC AEROBE CULT: NO GROWTH
QT INTERVAL: 354 MS
QTC INTERVAL: 447 MS

## 2023-08-14 ENCOUNTER — LAB REQUISITION (OUTPATIENT)
Dept: LAB | Facility: HOSPITAL | Age: 83
End: 2023-08-14
Payer: MEDICARE

## 2023-08-14 LAB
ANION GAP SERPL CALCULATED.3IONS-SCNC: 13 MMOL/L (ref 5–15)
BASOPHILS # BLD AUTO: 0.04 10*3/MM3 (ref 0–0.2)
BASOPHILS NFR BLD AUTO: 0.5 % (ref 0–1.5)
BUN SERPL-MCNC: 5 MG/DL (ref 8–23)
BUN/CREAT SERPL: 11.6 (ref 7–25)
CALCIUM SPEC-SCNC: 9.1 MG/DL (ref 8.6–10.5)
CHLORIDE SERPL-SCNC: 98 MMOL/L (ref 98–107)
CO2 SERPL-SCNC: 26 MMOL/L (ref 22–29)
CREAT SERPL-MCNC: 0.43 MG/DL (ref 0.57–1)
DEPRECATED RDW RBC AUTO: 53.9 FL (ref 37–54)
EGFRCR SERPLBLD CKD-EPI 2021: 97.2 ML/MIN/1.73
EOSINOPHIL # BLD AUTO: 0.28 10*3/MM3 (ref 0–0.4)
EOSINOPHIL NFR BLD AUTO: 3.4 % (ref 0.3–6.2)
ERYTHROCYTE [DISTWIDTH] IN BLOOD BY AUTOMATED COUNT: 16.4 % (ref 12.3–15.4)
GLUCOSE SERPL-MCNC: 83 MG/DL (ref 65–99)
HCT VFR BLD AUTO: 31.4 % (ref 34–46.6)
HGB BLD-MCNC: 9.3 G/DL (ref 12–15.9)
IMM GRANULOCYTES # BLD AUTO: 0.07 10*3/MM3 (ref 0–0.05)
IMM GRANULOCYTES NFR BLD AUTO: 0.9 % (ref 0–0.5)
LYMPHOCYTES # BLD AUTO: 1.91 10*3/MM3 (ref 0.7–3.1)
LYMPHOCYTES NFR BLD AUTO: 23.5 % (ref 19.6–45.3)
MCH RBC QN AUTO: 26.3 PG (ref 26.6–33)
MCHC RBC AUTO-ENTMCNC: 29.6 G/DL (ref 31.5–35.7)
MCV RBC AUTO: 88.7 FL (ref 79–97)
MONOCYTES # BLD AUTO: 1.25 10*3/MM3 (ref 0.1–0.9)
MONOCYTES NFR BLD AUTO: 15.4 % (ref 5–12)
NEUTROPHILS NFR BLD AUTO: 4.58 10*3/MM3 (ref 1.7–7)
NEUTROPHILS NFR BLD AUTO: 56.3 % (ref 42.7–76)
NRBC BLD AUTO-RTO: 0 /100 WBC (ref 0–0.2)
PLATELET # BLD AUTO: 519 10*3/MM3 (ref 140–450)
PMV BLD AUTO: 9.5 FL (ref 6–12)
POTASSIUM SERPL-SCNC: 4.3 MMOL/L (ref 3.5–5.2)
RBC # BLD AUTO: 3.54 10*6/MM3 (ref 3.77–5.28)
SODIUM SERPL-SCNC: 137 MMOL/L (ref 136–145)
WBC NRBC COR # BLD: 8.13 10*3/MM3 (ref 3.4–10.8)

## 2023-08-14 PROCEDURE — 85025 COMPLETE CBC W/AUTO DIFF WBC: CPT

## 2023-08-14 PROCEDURE — 36415 COLL VENOUS BLD VENIPUNCTURE: CPT

## 2023-08-14 PROCEDURE — 80048 BASIC METABOLIC PNL TOTAL CA: CPT

## 2023-08-23 ENCOUNTER — OFFICE VISIT (OUTPATIENT)
Dept: PALLATIVE CARE | Age: 83
End: 2023-08-23
Payer: MEDICARE

## 2023-08-23 DIAGNOSIS — R09.02 HYPOXIA: Primary | ICD-10-CM

## 2023-08-23 DIAGNOSIS — Z51.5 ENCOUNTER FOR PALLIATIVE CARE: ICD-10-CM

## 2023-08-23 DIAGNOSIS — R53.83 LETHARGY: ICD-10-CM

## 2023-08-23 DIAGNOSIS — G89.29 CHRONIC MIDLINE LOW BACK PAIN, UNSPECIFIED WHETHER SCIATICA PRESENT: ICD-10-CM

## 2023-08-23 DIAGNOSIS — Z74.01 BEDBOUND: ICD-10-CM

## 2023-08-23 DIAGNOSIS — M54.50 CHRONIC MIDLINE LOW BACK PAIN, UNSPECIFIED WHETHER SCIATICA PRESENT: ICD-10-CM

## 2023-08-23 PROCEDURE — 1123F ACP DISCUSS/DSCN MKR DOCD: CPT

## 2023-08-23 PROCEDURE — 99310 SBSQ NF CARE HIGH MDM 45: CPT

## 2023-08-23 NOTE — PROGRESS NOTES
Supportive Care/Community Based Palliative Care  Follow Up Note        Patient Name:  [facility] Record Number:  [id number]  YOB: 1940    Date of Visit: 8/23/2023  Location of Visit:  [facility] (admission date 5/10/2023)    Patient Care Team:  MAULIK Martinez CNP as PCP - General (Nurse Practitioner)  Jorge Shelton MD (Cardiology)  MAULIK Sharma CNP as Advanced Practice Nurse (Nurse Practitioner Family)    History obtained from:  patient, electronic medical record, facility nurse    PALLIATIVE DIAGNOSES AND ORDERS/RECOMMENDATIONS/PLAN:   1. Hypoxia  Oxygen saturation 88% on 3 L-collaborated with attending MAULIK, orders to titrate oxygen as needed. Patient's oxygen was increased to 4 L per nasal cannula. 2. Lethargy  Likely due to timing of medications. Patient received Ativan, gabapentin and her pain medication relatively close together. Discussed with nursing staff to reschedule medications    3. Chronic midline low back pain, unspecified whether sciatica present  Continue pain medication    4. Bedbound  Turn every 2 to prevent skin breakdown  Treat back pain per orders    5. Encounter for palliative care  Current goals of care include:  Try to limit hospitalizations, Maintain or Improve function quality of life, Would want hospitalization if needed    Will continue goals of care discussions based on clinical course  Follow up home visit in 4-6 weeks and as needed to monitor functional status and continue goals of care    CHIEF COMPLAINT:     Chief Complaint   Patient presents with    Fatigue     Significant lethargy this morning, decreased appetite, plausible the patient is overmedicated/sedated       CLINICAL SUMMARY:          Willard Hummel is a 80 y.o. female with PMH of SVT, anxiety, frequent UTIs, compression fracture of third lumbar vertebrae, hyperlipidemia, pulmonary hypertension, and other comorbidities.      Multiple hospitalizations over

## 2023-08-28 VITALS
HEART RATE: 77 BPM | OXYGEN SATURATION: 88 % | TEMPERATURE: 97.2 F | DIASTOLIC BLOOD PRESSURE: 58 MMHG | RESPIRATION RATE: 16 BRPM | SYSTOLIC BLOOD PRESSURE: 102 MMHG

## 2025-04-11 ENCOUNTER — LAB REQUISITION (OUTPATIENT)
Dept: LAB | Facility: HOSPITAL | Age: 85
End: 2025-04-11
Payer: MEDICARE

## 2025-04-11 DIAGNOSIS — R30.0 DYSURIA: ICD-10-CM

## 2025-04-11 LAB

## 2025-04-11 PROCEDURE — 87186 SC STD MICRODIL/AGAR DIL: CPT

## 2025-04-11 PROCEDURE — 87086 URINE CULTURE/COLONY COUNT: CPT

## 2025-04-11 PROCEDURE — 87077 CULTURE AEROBIC IDENTIFY: CPT

## 2025-04-11 PROCEDURE — 81001 URINALYSIS AUTO W/SCOPE: CPT

## 2025-04-14 LAB — BACTERIA SPEC AEROBE CULT: ABNORMAL

## 2025-05-21 ENCOUNTER — LAB REQUISITION (OUTPATIENT)
Dept: LAB | Facility: HOSPITAL | Age: 85
End: 2025-05-21
Payer: MEDICARE

## 2025-05-21 DIAGNOSIS — R09.02 HYPOXEMIA: ICD-10-CM

## 2025-05-21 DIAGNOSIS — J96.11 CHRONIC RESPIRATORY FAILURE WITH HYPOXIA: ICD-10-CM

## 2025-05-21 LAB
ALBUMIN SERPL-MCNC: 3.6 G/DL (ref 3.5–5.2)
ALBUMIN/GLOB SERPL: 0.8 G/DL
ALP SERPL-CCNC: 197 U/L (ref 39–117)
ALT SERPL W P-5'-P-CCNC: 17 U/L (ref 1–33)
ANION GAP SERPL CALCULATED.3IONS-SCNC: 13 MMOL/L (ref 5–15)
AST SERPL-CCNC: 22 U/L (ref 1–32)
BILIRUB SERPL-MCNC: 0.2 MG/DL (ref 0–1.2)
BUN SERPL-MCNC: 10 MG/DL (ref 8–23)
BUN/CREAT SERPL: 19.2 (ref 7–25)
CALCIUM SPEC-SCNC: 8.9 MG/DL (ref 8.6–10.5)
CHLORIDE SERPL-SCNC: 97 MMOL/L (ref 98–107)
CO2 SERPL-SCNC: 27 MMOL/L (ref 22–29)
CREAT SERPL-MCNC: 0.52 MG/DL (ref 0.57–1)
DEPRECATED RDW RBC AUTO: 56.8 FL (ref 37–54)
EGFRCR SERPLBLD CKD-EPI 2021: 91.7 ML/MIN/1.73
ERYTHROCYTE [DISTWIDTH] IN BLOOD BY AUTOMATED COUNT: 15.9 % (ref 12.3–15.4)
GLOBULIN UR ELPH-MCNC: 4.5 GM/DL
GLUCOSE SERPL-MCNC: 164 MG/DL (ref 65–99)
HCT VFR BLD AUTO: 36.8 % (ref 34–46.6)
HGB BLD-MCNC: 11.9 G/DL (ref 12–15.9)
MCH RBC QN AUTO: 31.2 PG (ref 26.6–33)
MCHC RBC AUTO-ENTMCNC: 32.3 G/DL (ref 31.5–35.7)
MCV RBC AUTO: 96.6 FL (ref 79–97)
PLATELET # BLD AUTO: 300 10*3/MM3 (ref 140–450)
PMV BLD AUTO: 10.3 FL (ref 6–12)
POTASSIUM SERPL-SCNC: 3.7 MMOL/L (ref 3.5–5.2)
PROT SERPL-MCNC: 8.1 G/DL (ref 6–8.5)
RBC # BLD AUTO: 3.81 10*6/MM3 (ref 3.77–5.28)
SODIUM SERPL-SCNC: 137 MMOL/L (ref 136–145)
WBC NRBC COR # BLD AUTO: 13.89 10*3/MM3 (ref 3.4–10.8)

## 2025-05-21 PROCEDURE — 85027 COMPLETE CBC AUTOMATED: CPT | Performed by: INTERNAL MEDICINE

## 2025-05-21 PROCEDURE — 80053 COMPREHEN METABOLIC PANEL: CPT | Performed by: INTERNAL MEDICINE

## 2025-07-12 NOTE — CASE MANAGEMENT/SOCIAL WORK
Arrived with report of needing synthroid refill and bump on right leg    Continued Stay Note  [facility]     Patient Name: Zelalem Hung  MRN: [id number]  Today's Date: 4/7/2023    Admit Date: 4/6/2023    Plan: [facility]   Discharge Plan     Row Name 04/07/23 0955       Plan    Final Discharge Disposition Code 03 - skilled nursing facility (SNF)    Final Note Gwendolyn verified that pt can return to [facility] today.  [phone]    Row Name 04/07/23 0851       Plan    Plan [facility]    Patient/Family in Agreement with Plan yes    Plan Comments Pt resides at [facility].  Gwendolyn in admissions confirmed that pt has a bed hold.  No precert. needed.  Pt can return once medically stable.               Discharge Codes    No documentation.               Expected Discharge Date and Time     Expected Discharge Date Expected Discharge Time    Apr 8, 2023             KAMRAN Brandt

## 2025-08-13 ENCOUNTER — LAB REQUISITION (OUTPATIENT)
Dept: LAB | Facility: HOSPITAL | Age: 85
End: 2025-08-13
Payer: MEDICARE

## 2025-08-13 DIAGNOSIS — J43.9 EMPHYSEMA, UNSPECIFIED: ICD-10-CM

## 2025-08-13 LAB
ALBUMIN SERPL-MCNC: 3.3 G/DL (ref 3.5–5.2)
ALBUMIN/GLOB SERPL: 0.8 G/DL
ALP SERPL-CCNC: 252 U/L (ref 39–117)
ALT SERPL W P-5'-P-CCNC: 30 U/L (ref 1–33)
ANION GAP SERPL CALCULATED.3IONS-SCNC: 9 MMOL/L (ref 5–15)
AST SERPL-CCNC: 25 U/L (ref 1–32)
BASOPHILS # BLD AUTO: 0.05 10*3/MM3 (ref 0–0.2)
BASOPHILS NFR BLD AUTO: 0.4 % (ref 0–1.5)
BILIRUB SERPL-MCNC: 0.3 MG/DL (ref 0–1.2)
BUN SERPL-MCNC: 15.2 MG/DL (ref 8–23)
BUN/CREAT SERPL: 22 (ref 7–25)
CALCIUM SPEC-SCNC: 9.1 MG/DL (ref 8.6–10.5)
CHLORIDE SERPL-SCNC: 97 MMOL/L (ref 98–107)
CO2 SERPL-SCNC: 30 MMOL/L (ref 22–29)
CREAT SERPL-MCNC: 0.69 MG/DL (ref 0.57–1)
DEPRECATED RDW RBC AUTO: 55.4 FL (ref 37–54)
EGFRCR SERPLBLD CKD-EPI 2021: 85.7 ML/MIN/1.73
EOSINOPHIL # BLD AUTO: 0.58 10*3/MM3 (ref 0–0.4)
EOSINOPHIL NFR BLD AUTO: 5.1 % (ref 0.3–6.2)
ERYTHROCYTE [DISTWIDTH] IN BLOOD BY AUTOMATED COUNT: 14.9 % (ref 12.3–15.4)
GLOBULIN UR ELPH-MCNC: 4 GM/DL
GLUCOSE SERPL-MCNC: 91 MG/DL (ref 65–99)
HCT VFR BLD AUTO: 35.5 % (ref 34–46.6)
HGB BLD-MCNC: 11 G/DL (ref 12–15.9)
IMM GRANULOCYTES # BLD AUTO: 0.04 10*3/MM3 (ref 0–0.05)
IMM GRANULOCYTES NFR BLD AUTO: 0.4 % (ref 0–0.5)
LYMPHOCYTES # BLD AUTO: 2.31 10*3/MM3 (ref 0.7–3.1)
LYMPHOCYTES NFR BLD AUTO: 20.4 % (ref 19.6–45.3)
MCH RBC QN AUTO: 31 PG (ref 26.6–33)
MCHC RBC AUTO-ENTMCNC: 31 G/DL (ref 31.5–35.7)
MCV RBC AUTO: 100 FL (ref 79–97)
MONOCYTES # BLD AUTO: 1.45 10*3/MM3 (ref 0.1–0.9)
MONOCYTES NFR BLD AUTO: 12.8 % (ref 5–12)
NEUTROPHILS NFR BLD AUTO: 6.92 10*3/MM3 (ref 1.7–7)
NEUTROPHILS NFR BLD AUTO: 60.9 % (ref 42.7–76)
NRBC BLD AUTO-RTO: 0 /100 WBC (ref 0–0.2)
PLATELET # BLD AUTO: 176 10*3/MM3 (ref 140–450)
PMV BLD AUTO: 11.8 FL (ref 6–12)
POTASSIUM SERPL-SCNC: 4.1 MMOL/L (ref 3.5–5.2)
PROT SERPL-MCNC: 7.3 G/DL (ref 6–8.5)
RBC # BLD AUTO: 3.55 10*6/MM3 (ref 3.77–5.28)
SODIUM SERPL-SCNC: 136 MMOL/L (ref 136–145)
WBC NRBC COR # BLD AUTO: 11.35 10*3/MM3 (ref 3.4–10.8)

## 2025-08-13 PROCEDURE — 36415 COLL VENOUS BLD VENIPUNCTURE: CPT

## 2025-08-13 PROCEDURE — 80053 COMPREHEN METABOLIC PANEL: CPT

## 2025-08-13 PROCEDURE — 85025 COMPLETE CBC W/AUTO DIFF WBC: CPT

## (undated) DEVICE — SUT VIC 4/0 P3 18IN UD VCP494H

## (undated) DEVICE — MONOPOLAR METZENBAUM SCISSOR, MINI BLADE TIP, DISPOSABLE: Brand: MONOPOLAR METZENBAUM SCISSOR, MINI BLADE TIP, DISPOSABLE

## (undated) DEVICE — GLV SURG BIOGEL LTX PF 7 1/2

## (undated) DEVICE — GLV SURG TRIUMPH MICRO PF LTX 7.5 STRL

## (undated) DEVICE — PAD GRND REM POLYHESIVE A/ DISP

## (undated) DEVICE — ELECTRD BLD EDGE/INSUL1P 2.4X5.1MM STRL

## (undated) DEVICE — ENDOPOUCH RETRIEVER SPECIMEN RETRIEVAL BAGS: Brand: ENDOPOUCH RETRIEVER

## (undated) DEVICE — LAPAROSCOPIC MONOPOLAR CORD: Brand: VALLEYLAB

## (undated) DEVICE — SPNG GZ WOVN 4X4IN 12PLY 10/BX STRL

## (undated) DEVICE — SYS CLS SKIN PREMIERPRO EXOFINFUSION 22CM

## (undated) DEVICE — SELF CENTERING BI-POLAR HEAD 28MM ID 41MM OD
Type: IMPLANTABLE DEVICE | Site: HIP | Status: NON-FUNCTIONAL
Brand: SELF CENTERING
Removed: 2022-05-06

## (undated) DEVICE — PILLW ABD MD

## (undated) DEVICE — PK TURNOVER RM ADV

## (undated) DEVICE — 3M™ STERI-STRIP™ REINFORCED ADHESIVE SKIN CLOSURES, R1546, 1/4 IN X 4 IN (6 MM X 100 MM), 10 STRIPS/ENVELOPE: Brand: 3M™ STERI-STRIP™

## (undated) DEVICE — ST CATH CHOLANG WKARLAN/BALN 2LUM 4F 1.25

## (undated) DEVICE — 4-PORT MANIFOLD: Brand: NEPTUNE 2

## (undated) DEVICE — ANTIBACTERIAL UNDYED BRAIDED (POLYGLACTIN 910), SYNTHETIC ABSORBABLE SUTURE: Brand: COATED VICRYL

## (undated) DEVICE — 3M™ IOBAN™ 2 ANTIMICROBIAL INCISE DRAPE 6650EZ: Brand: IOBAN™ 2

## (undated) DEVICE — ST TB EXT STANDARDBORE 30IN

## (undated) DEVICE — PDS II VLT 0 107CM AG ST3: Brand: ENDOLOOP

## (undated) DEVICE — GLV SURG SENSICARE W/ALOE PF LF 7 STRL

## (undated) DEVICE — CLIP APPLIER: Brand: ENDO CLIP

## (undated) DEVICE — SUT VIC 3/0 RB1 27IN UD VCP215H

## (undated) DEVICE — SYR LUERLOK 50ML

## (undated) DEVICE — FOAM BUMP ROUND LARGE: Brand: MEDLINE INDUSTRIES, INC.

## (undated) DEVICE — 2, DISPOSABLE SUCTION/IRRIGATOR WITHOUT DISPOSABLE TIP: Brand: STRYKEFLOW

## (undated) DEVICE — TOWEL,OR,DSP,ST,BLUE,STD,4/PK,20PK/CS: Brand: MEDLINE

## (undated) DEVICE — GLV SURG SENSICARE W/ALOE PF LF 6.5 STRL

## (undated) DEVICE — GLV SURG DERMASSURE GRN LF PF 8.0

## (undated) DEVICE — TRY PREP SCRB VAG PVP

## (undated) DEVICE — PAD LAP CHOLE: Brand: MEDLINE INDUSTRIES, INC.

## (undated) DEVICE — PK HIP TOTL 30

## (undated) DEVICE — SUT VIC 0 UR6 27IN VCP603H